# Patient Record
Sex: FEMALE | Race: WHITE | NOT HISPANIC OR LATINO | Employment: OTHER | URBAN - METROPOLITAN AREA
[De-identification: names, ages, dates, MRNs, and addresses within clinical notes are randomized per-mention and may not be internally consistent; named-entity substitution may affect disease eponyms.]

---

## 2017-01-09 ENCOUNTER — LAB VISIT (OUTPATIENT)
Dept: LAB | Facility: HOSPITAL | Age: 62
End: 2017-01-09
Attending: FAMILY MEDICINE
Payer: COMMERCIAL

## 2017-01-09 ENCOUNTER — OFFICE VISIT (OUTPATIENT)
Dept: FAMILY MEDICINE | Facility: CLINIC | Age: 62
End: 2017-01-09
Payer: COMMERCIAL

## 2017-01-09 VITALS
OXYGEN SATURATION: 98 % | BODY MASS INDEX: 33.31 KG/M2 | HEART RATE: 95 BPM | WEIGHT: 195.13 LBS | RESPIRATION RATE: 18 BRPM | HEIGHT: 64 IN | SYSTOLIC BLOOD PRESSURE: 128 MMHG | DIASTOLIC BLOOD PRESSURE: 82 MMHG

## 2017-01-09 DIAGNOSIS — M15.9 PRIMARY OSTEOARTHRITIS INVOLVING MULTIPLE JOINTS: ICD-10-CM

## 2017-01-09 DIAGNOSIS — R10.13 EPIGASTRIC PAIN: ICD-10-CM

## 2017-01-09 DIAGNOSIS — M15.9 PRIMARY OSTEOARTHRITIS INVOLVING MULTIPLE JOINTS: Primary | ICD-10-CM

## 2017-01-09 DIAGNOSIS — K21.9 GASTROESOPHAGEAL REFLUX DISEASE, ESOPHAGITIS PRESENCE NOT SPECIFIED: ICD-10-CM

## 2017-01-09 PROCEDURE — 86039 ANTINUCLEAR ANTIBODIES (ANA): CPT

## 2017-01-09 PROCEDURE — 1159F MED LIST DOCD IN RCRD: CPT | Mod: S$GLB,,, | Performed by: FAMILY MEDICINE

## 2017-01-09 PROCEDURE — 86235 NUCLEAR ANTIGEN ANTIBODY: CPT | Mod: 59

## 2017-01-09 PROCEDURE — 99213 OFFICE O/P EST LOW 20 MIN: CPT | Mod: S$GLB,,, | Performed by: FAMILY MEDICINE

## 2017-01-09 PROCEDURE — 36415 COLL VENOUS BLD VENIPUNCTURE: CPT | Mod: PO

## 2017-01-09 PROCEDURE — 86038 ANTINUCLEAR ANTIBODIES: CPT

## 2017-01-09 PROCEDURE — 99999 PR PBB SHADOW E&M-EST. PATIENT-LVL III: CPT | Mod: PBBFAC,,, | Performed by: FAMILY MEDICINE

## 2017-01-09 RX ORDER — CELECOXIB 100 MG/1
100 CAPSULE ORAL 2 TIMES DAILY PRN
Qty: 60 CAPSULE | Refills: 2 | Status: SHIPPED | OUTPATIENT
Start: 2017-01-09 | End: 2018-08-24

## 2017-01-09 NOTE — MR AVS SNAPSHOT
[facility]  [address]  [place] [address]  Phone: [phone]  Fax: [phone]                  Pham Licea   2017 8:40 AM   Office Visit    Description:  Female : 1955   Provider:  CARLOS MANUEL Howell MD   Department:  [facility]           Reason for Visit     Establish Care           Diagnoses this Visit        Comments    Primary osteoarthritis involving multiple joints    -  Primary     Gastroesophageal reflux disease, esophagitis presence not specified         Epigastric pain                To Do List           Future Appointments        Provider Department Dept Phone    7/10/2017 1:00 PM CARLOS MANUEL Howell MD [facility] [phone]      Goals (5 Years of Data)     None      Follow-Up and Disposition     Return in about 6 months (around 2017), or if symptoms worsen or fail to improve.    Follow-up and Disposition History       These Medications        Disp Refills Start End    celecoxib (CELEBREX) 100 MG capsule 60 capsule 2 2017     Take 1 capsule (100 mg total) by mouth 2 (two) times daily as needed for Pain. - Oral    Pharmacy: [facility] [employer] [address] [place] [address] AT [facility] Ph #: [phone]       ranitidine (ZANTAC) 150 MG tablet 60 tablet 5 2017    Take 1 tablet (150 mg total) by mouth 2 (two) times daily. - Oral    Pharmacy: [facility] [employer] [place] [address] AT [address] [facility]  [address] Ph #: [phone]         [facility]     [facility] Care [facility] -  Assistance  Registered nurses in the [facility] provide clinical advisement, health education, appointment booking, and other advisory services.  Call for this free service at [phone].             Medications           Message regarding Medications     Verify the changes and/or additions to your medication regime listed below  "are the same as discussed with your clinician today.  If any of these changes or additions are incorrect, please notify your healthcare provider.        START taking these NEW medications        Refills    celecoxib (CELEBREX) 100 MG capsule 2    Sig: Take 1 capsule (100 mg total) by mouth 2 (two) times daily as needed for Pain.    Class: Normal    Route: Oral      STOP taking these medications     sucralfate (CARAFATE) 1 gram tablet Take 1 tablet (1 g total) by mouth 4 (four) times daily before meals and nightly.           Verify that the below list of medications is an accurate representation of the medications you are currently taking.  If none reported, the list may be blank. If incorrect, please contact your healthcare provider. Carry this list with you in case of emergency.           Current Medications     ranitidine (ZANTAC) 150 MG tablet Take 1 tablet (150 mg total) by mouth 2 (two) times daily.    celecoxib (CELEBREX) 100 MG capsule Take 1 capsule (100 mg total) by mouth 2 (two) times daily as needed for Pain.           Clinical Reference Information           Vital Signs - Last Recorded  Most recent update: 1/9/2017  9:16 AM by Liv Trevino LPN    BP Pulse Resp Ht Wt SpO2    128/82 (BP Location: Left arm, Patient Position: Sitting, BP Method: Manual) 95 18 5' 4" (1.626 m) 88.5 kg (195 lb 1.7 oz) 98%    BMI                33.49 kg/m2          Blood Pressure          Most Recent Value    BP  128/82      Allergies as of 1/9/2017     No Known Allergies      Immunizations Administered on Date of Encounter - 1/9/2017     None      Orders Placed During Today's Visit     Future Labs/Procedures Expected by Jayro TORIBIO  1/9/2017 3/10/2018      "

## 2017-01-09 NOTE — PROGRESS NOTES
Subjective:       Patient ID: Pham Licea is a 61 y.o. female.    Chief Complaint: Establish Care    HPI Comments: Here as new patient to establish. Main concern is multiple joint pains.  Pain worse in am.  Tried mobic with some benefit but had positive blood in stool and stopped.    Review of Systems   Constitutional: Negative for chills and fever.   Respiratory: Negative for cough, chest tightness and shortness of breath.    Cardiovascular: Negative for chest pain, palpitations and leg swelling.   Gastrointestinal: Negative for abdominal distention and abdominal pain.   Endocrine: Negative for cold intolerance and heat intolerance.   Musculoskeletal: Positive for arthralgias.   Skin: Negative for rash and wound.       Objective:      Physical Exam   Constitutional: She is oriented to person, place, and time. She appears well-developed and well-nourished.   HENT:   Head: Normocephalic and atraumatic.   Cardiovascular: Normal rate, regular rhythm and normal heart sounds.    Pulmonary/Chest: Effort normal and breath sounds normal.   Musculoskeletal: Normal range of motion.   Neurological: She is alert and oriented to person, place, and time.   Psychiatric: She has a normal mood and affect.   Nursing note and vitals reviewed.      Assessment:       1. Primary osteoarthritis involving multiple joints    2. Gastroesophageal reflux disease, esophagitis presence not specified    3. Epigastric pain        Plan:       Primary osteoarthritis involving multiple joints  -     CATARINO; Future; Expected date: 1/9/17    Gastroesophageal reflux disease, esophagitis presence not specified    Epigastric pain  -     ranitidine (ZANTAC) 150 MG tablet; Take 1 tablet (150 mg total) by mouth 2 (two) times daily.  Dispense: 60 tablet; Refill: 5    Other orders  -     celecoxib (CELEBREX) 100 MG capsule; Take 1 capsule (100 mg total) by mouth 2 (two) times daily as needed for Pain.  Dispense: 60 capsule; Refill: 2    Trial of med for OA.   CATARINO screen.  Treat and monitor.    Labs at annual at next visit.  Return in about 6 months (around 7/9/2017), or if symptoms worsen or fail to improve.

## 2017-01-10 LAB
ANA SER QL IF: POSITIVE
ANA TITR SER IF: NORMAL {TITER}

## 2017-01-12 LAB
ANTI SM ANTIBODY: 1.63 EU
ANTI SM/RNP ANTIBODY: 1.03 EU
ANTI-SM INTERPRETATION: NEGATIVE
ANTI-SM/RNP INTERPRETATION: NEGATIVE
ANTI-SSA ANTIBODY: 0.23 EU
ANTI-SSA INTERPRETATION: NEGATIVE
ANTI-SSB ANTIBODY: 0 EU
ANTI-SSB INTERPRETATION: NEGATIVE
DSDNA AB SER-ACNC: NORMAL [IU]/ML

## 2017-01-17 ENCOUNTER — TELEPHONE (OUTPATIENT)
Dept: FAMILY MEDICINE | Facility: CLINIC | Age: 62
End: 2017-01-17

## 2017-01-17 DIAGNOSIS — R76.8 POSITIVE ANA (ANTINUCLEAR ANTIBODY): Primary | ICD-10-CM

## 2017-01-23 ENCOUNTER — TELEPHONE (OUTPATIENT)
Dept: FAMILY MEDICINE | Facility: CLINIC | Age: 62
End: 2017-01-23

## 2017-01-23 NOTE — TELEPHONE ENCOUNTER
Spoke to pt and she thinks she has a UTI  And the only thing new is the celebrex. Pt states she is out of town and wont be back until next week. Please advise. Thanks.

## 2017-01-23 NOTE — TELEPHONE ENCOUNTER
----- Message from Dana Miller sent at 1/23/2017 11:52 AM CST -----  Contact: self  Patient [phone] feels she may have a side effect to the Celebrex but not any problems breathing/she is asking to speak with the Nurse and this is the only information that patient would give/please call

## 2017-05-22 DIAGNOSIS — R10.13 EPIGASTRIC PAIN: ICD-10-CM

## 2017-06-22 ENCOUNTER — PATIENT OUTREACH (OUTPATIENT)
Dept: ADMINISTRATIVE | Facility: HOSPITAL | Age: 62
End: 2017-06-22

## 2017-06-30 ENCOUNTER — PATIENT OUTREACH (OUTPATIENT)
Dept: ADMINISTRATIVE | Facility: HOSPITAL | Age: 62
End: 2017-06-30

## 2017-06-30 NOTE — LETTER
June 30, 2017    Pham Licea  [address] [place] [address]             [facility]  [address]  [facility] [address]  Phone: [phone] Dear Ms. Licea:    We have tried to reach you by mychart unsuccessfully.     [facility] is committed to your overall health.  To help you get the most out of each of your visits, we will review your information to make sure you are up to date on all of your recommended tests and/or procedures.       Dr. Howell       has found that you may be due for:     Mammogram     If you have had any of the above done at another facility, please bring the records or information with you so that your record at [facility] will be complete.      If you are currently taking medication , please bring it with you to your appointment for review.     We appreciate the opportunity to provide you with excellent medical care.         If you have any questions or concerns, please don't hesitate to call.    Sincerely,    Bridgette Nguyen  Clinical Care Coordinator  [facility] [address]  [place] [address]  Phone: [phone]   Fax: [phone]

## 2018-08-07 ENCOUNTER — TELEPHONE (OUTPATIENT)
Dept: ORTHOPEDICS | Facility: CLINIC | Age: 63
End: 2018-08-07

## 2018-08-07 NOTE — TELEPHONE ENCOUNTER
Called patient to inform her that Dr. Palencia does not treat back pain and will only treat one body part per visit. Patient currently has back pain, knee pain, and hip pain as her C/O. No answer at patient's phone and left a voicemail to call back office to alter her appointment information.

## 2018-08-10 ENCOUNTER — OFFICE VISIT (OUTPATIENT)
Dept: ORTHOPEDICS | Facility: CLINIC | Age: 63
End: 2018-08-10
Payer: COMMERCIAL

## 2018-08-10 ENCOUNTER — HOSPITAL ENCOUNTER (OUTPATIENT)
Dept: RADIOLOGY | Facility: HOSPITAL | Age: 63
Discharge: HOME | End: 2018-08-10
Attending: ORTHOPAEDIC SURGERY
Payer: COMMERCIAL

## 2018-08-10 VITALS
BODY MASS INDEX: 33.31 KG/M2 | HEIGHT: 64 IN | HEART RATE: 63 BPM | WEIGHT: 195.13 LBS | DIASTOLIC BLOOD PRESSURE: 72 MMHG | SYSTOLIC BLOOD PRESSURE: 116 MMHG

## 2018-08-10 DIAGNOSIS — M25.561 RIGHT KNEE PAIN, UNSPECIFIED CHRONICITY: Primary | ICD-10-CM

## 2018-08-10 DIAGNOSIS — M25.561 RIGHT KNEE PAIN, UNSPECIFIED CHRONICITY: ICD-10-CM

## 2018-08-10 DIAGNOSIS — M17.11 ARTHRITIS OF KNEE, RIGHT: ICD-10-CM

## 2018-08-10 PROCEDURE — 3008F BODY MASS INDEX DOCD: CPT | Mod: CPTII,S$GLB,, | Performed by: ORTHOPAEDIC SURGERY

## 2018-08-10 PROCEDURE — 73564 X-RAY EXAM KNEE 4 OR MORE: CPT | Mod: 26,RT,, | Performed by: RADIOLOGY

## 2018-08-10 PROCEDURE — 99203 OFFICE O/P NEW LOW 30 MIN: CPT | Mod: S$GLB,,, | Performed by: ORTHOPAEDIC SURGERY

## 2018-08-10 PROCEDURE — 73562 X-RAY EXAM OF KNEE 3: CPT | Mod: TC,PN,LT

## 2018-08-10 PROCEDURE — 99999 PR PBB SHADOW E&M-EST. PATIENT-LVL III: CPT | Mod: PBBFAC,,, | Performed by: ORTHOPAEDIC SURGERY

## 2018-08-10 PROCEDURE — 73562 X-RAY EXAM OF KNEE 3: CPT | Mod: 26,59,LT, | Performed by: RADIOLOGY

## 2018-08-14 NOTE — PROGRESS NOTES
Past Medical History:   Diagnosis Date    Fatty liver     Former smoker     Osteoarthritis        Past Surgical History:   Procedure Laterality Date    APPENDECTOMY       SECTION      x3    COLONOSCOPY  ~    [facility]: normal findings per patient report    HYSTERECTOMY      one ovary conserved    UPPER GASTROINTESTINAL ENDOSCOPY         Current Outpatient Medications   Medication Sig    ranitidine (ZANTAC) 150 MG tablet Take 1 tablet (150 mg total) by mouth 2 (two) times daily as needed.    celecoxib (CELEBREX) 100 MG capsule Take 1 capsule (100 mg total) by mouth 2 (two) times daily as needed for Pain.     No current facility-administered medications for this visit.        Review of patient's allergies indicates:  No Known Allergies    Family History   Problem Relation Age of Onset    Ovarian cancer Mother     Heart disease Mother     Osteoporosis Mother     Hypertension Father     Stroke Father 50    Breast cancer Neg Hx     Colon cancer Neg Hx     Colon polyps Neg Hx     Crohn's disease Neg Hx     Ulcerative colitis Neg Hx        Social History     Socioeconomic History    Marital status:      Spouse name: Not on file    Number of children: Not on file    Years of education: Not on file    Highest education level: Not on file   Social Needs    Financial resource strain: Not on file    Food insecurity - worry: Not on file    Food insecurity - inability: Not on file    Transportation needs - medical: Not on file    Transportation needs - non-medical: Not on file   Occupational History    Not on file   Tobacco Use    Smoking status: Former Smoker     Packs/day: 1.00     Years: 40.00     Pack years: 40.00     Last attempt to quit: 2009     Years since quittin.7    Smokeless tobacco: Never Used    Tobacco comment: quit    Substance and Sexual Activity    Alcohol use: Yes     Comment: once per month    Drug use: No    Sexual activity: Yes   Other Topics  "Concern    Not on file   Social History Narrative    Not on file       Chief Complaint:   Chief Complaint   Patient presents with    Right Knee - Pain       Consulting Physician: Self, Aaareferral    History of present illness:    This is a 62 y.o. female who complains of right knee pain for several months.  She notes her pain ranging from 5-9 out of 10 and worse with activities and sitting.  She denies any injury.    Review of Systems:    Constitution: Denies chills, fever, and sweats.  HENT: Denies headaches or blurry vision.  Cardiovascular: Denies chest pain or irregular heart beat.  Respiratory: Denies cough or shortness of breath.  Gastrointestinal: Denies abdominal pain, nausea, or vomiting.  Musculoskeletal:  Denies muscle cramps.  Neurological: Denies dizziness or focal weakness.  Psychiatric/Behavioral: Normal mental status.  Hematologic/Lymphatic: Denies bleeding problem or easy bruising/bleeding.  Skin: Denies rash or suspicious lesions.    Examination:    Vital Signs:    Vitals:    08/10/18 0915   BP: 116/72   Pulse: 63   Weight: 88.5 kg (195 lb 1.7 oz)   Height: 5' 4" (1.626 m)   PainSc:   5   PainLoc: Knee       Body mass index is 33.49 kg/m².    This a well-developed, well nourished patient in no acute distress.    Alert and oriented x 3 and cooperative to examination.       Physical Exam: Right Knee Exam    Gait   Antalgic    Skin  Rash:   None  Scars:   None    Inspection  Erythema:  None  Bruising:  None  Effusion:  None  Masses:  None  Lymphadenopathy: None    Range of Motion: 0 to 130°    Medial Joint : y  Lateral Joint : n    Patellofemoral Tenderness: Yes  Patellofemoral Crepitus: Yes    Lachman:  Normal  Anterior Drawer: Normal  Posterior Drawer: Normal    Adela's:  Negative  Apley's:  Negative    Varus Stress:  Stable  Valgus Stress:  Stable    Strength:  5/5    Pulses:  Palpable  Sensation:  Intact          Imaging: X-rays ordered and reviewed today personally of " the left knee show mild-to-moderate degenerative changes.        Assessment: Right knee pain, unspecified chronicity  -     Ambulatory Referral to Physical/Occupational Therapy    Arthritis of knee, right        Plan:  She has some medial joint line tenderness along with some proximal hamstring tenderness and SI joint pain.  Will go ahead and get her started in physical therapy.  Will also give her a [facility].  She can come back and see us in 6 weeks at that point we will go ahead taken x-ray of her hip.  We gave her a prescription for Voltaren cream.      DISCLAIMER: This note may have been dictated using voice recognition software and may contain grammatical errors.     NOTE: Consult report sent to referring provider via [employer] EMR.

## 2018-08-20 ENCOUNTER — CLINICAL SUPPORT (OUTPATIENT)
Dept: REHABILITATION | Facility: HOSPITAL | Age: 63
End: 2018-08-20
Payer: COMMERCIAL

## 2018-08-20 DIAGNOSIS — M25.561 RIGHT KNEE PAIN, UNSPECIFIED CHRONICITY: ICD-10-CM

## 2018-08-20 PROCEDURE — 97161 PT EVAL LOW COMPLEX 20 MIN: CPT | Mod: PN | Performed by: PHYSICAL THERAPIST

## 2018-08-20 PROCEDURE — 97110 THERAPEUTIC EXERCISES: CPT | Mod: PN | Performed by: PHYSICAL THERAPIST

## 2018-08-20 NOTE — PATIENT INSTRUCTIONS
Stretching: Hamstring (Sitting)        With right leg straight, tuck other foot near groin. Reach down until stretch is felt in back of thigh. Keep back straight. Hold __10__ seconds.  Repeat __10__ times per set. Do __1__ sets per session. Do __1__ sessions per day.     [email or web address]     Copyright © [employer]. All rights reserved.   Stretching: Calf - Towel        Sit with knee straight and towel looped around left foot. Gently pull on towel until stretch is felt in calf. Hold __10__ seconds.  Repeat __10__ times per set. Do __1__ sets per session. Do __1__ sessions per day.     [email or web address]     Quad Set: Slight Flexion        Tense muscles on top of left thigh. Hold __3__ seconds.  Repeat __10__ times per set. Do __3__ sets per session. Do __1__ sessions per day.  Copyright © [employer]. All rights reserved.     Strengthening: Hip Adduction - Isometric        With ball or folded pillow between knees, squeeze knees together. Hold __3__ seconds.  Repeat __10__ times per set. Do __3__ sets per session. Do __1__ sessions per day.     [email or web address]     Copyright © [employer]. All rights reserved.        [email or web address]     Copyright © [employer]. All rights reserved.   Strengthening: Hip Abductor - Resisted        With band looped around both legs above knees, push thighs apart.  Repeat __10__ times per set. Do __3__ sets per session. Do __1__ sessions per day.     [email or web address]     Copyright © [employer]. All rights reserved.   Strengthening: Straight Leg Raise (Phase 1)        Tighten muscles on front of right thigh, then lift leg __6__ inches from surface, keeping knee locked.   Repeat __10__ times per set. Do __10__ sets per session. Do __1__ sessions per day.     [email or web address]     Copyright © [employer]. All rights reserved.

## 2018-08-23 ENCOUNTER — CLINICAL SUPPORT (OUTPATIENT)
Dept: REHABILITATION | Facility: HOSPITAL | Age: 63
End: 2018-08-23
Attending: ORTHOPAEDIC SURGERY
Payer: COMMERCIAL

## 2018-08-23 DIAGNOSIS — M25.561 RIGHT KNEE PAIN, UNSPECIFIED CHRONICITY: ICD-10-CM

## 2018-08-23 PROCEDURE — 97110 THERAPEUTIC EXERCISES: CPT | Mod: PN

## 2018-08-23 NOTE — PROGRESS NOTES
Name: Pham Licea  Clinic Number: [id number]  Date of Treatment: 08/23/2018   Diagnosis:   Encounter Diagnosis   Name Primary?    Right knee pain, unspecified chronicity        Time in: 1658  Time Out: 1749  Total Treatment Time: 51      Subjective:    Pham reports she performed her HEP last PM.  Patient reports their pain to be 1/10 on a 0-10 scale with 0 being no pain and 10 being the worst pain imaginable.    Objective    Patient received individual therapy to increase strength, ROM and flexibility with activities as follows:     Pham received therapeutic exercises to develop strength, ROM and flexibility for 46 minutes including:     Nustep level 3 x 10 minutes  Standing gastroc stretch 3 x 30 sec B  Hamstring 3 x 30 sec B  SLR x 30  SAQ x 30  Ball squeeze x 30  Clamshell RTB x 30  LAQ x 30  Shuttle: 50# B, 25# R x 30 each    MFR with therapy bar to R ITB region x 5 minutes.    Written Home Exercises Provided: cont HEP  Pt demo good understanding of the education provided. Pham demonstrated good return demonstration of activities.     Assessment:     TP along quad/ITB region which decreased in size and pain intensity upon completion of MT.  Good tolerance for activity.  No increase in s/s reported.  Pt will continue to benefit from skilled PT intervention. Medical Necessity is demonstrated by:  Pain limits function of effected part for some activities, Unable to participate fully in daily activities, Requires skilled supervision to complete and progress HEP and Weakness.    Patient is making good progress towards established goals.      Plan:  Continue with established Plan of Care towards PT goals.

## 2018-08-24 ENCOUNTER — INITIAL CONSULT (OUTPATIENT)
Dept: SPINE | Facility: CLINIC | Age: 63
End: 2018-08-24
Payer: COMMERCIAL

## 2018-08-24 VITALS
HEART RATE: 69 BPM | BODY MASS INDEX: 33.31 KG/M2 | WEIGHT: 195.13 LBS | SYSTOLIC BLOOD PRESSURE: 106 MMHG | HEIGHT: 64 IN | DIASTOLIC BLOOD PRESSURE: 67 MMHG

## 2018-08-24 DIAGNOSIS — M54.5 CHRONIC MIDLINE LOW BACK PAIN, WITH SCIATICA PRESENCE UNSPECIFIED: Primary | ICD-10-CM

## 2018-08-24 DIAGNOSIS — G89.29 CHRONIC MIDLINE LOW BACK PAIN, WITH SCIATICA PRESENCE UNSPECIFIED: Primary | ICD-10-CM

## 2018-08-24 DIAGNOSIS — Z13.820 OSTEOPOROSIS SCREENING: ICD-10-CM

## 2018-08-24 PROBLEM — M54.50 CHRONIC MIDLINE LOW BACK PAIN: Status: ACTIVE | Noted: 2018-08-24

## 2018-08-24 PROCEDURE — 99204 OFFICE O/P NEW MOD 45 MIN: CPT | Mod: ,,, | Performed by: PHYSICAL MEDICINE & REHABILITATION

## 2018-08-24 PROCEDURE — 3008F BODY MASS INDEX DOCD: CPT | Mod: ,,, | Performed by: PHYSICAL MEDICINE & REHABILITATION

## 2018-08-24 RX ORDER — DICLOFENAC SODIUM 10 MG/G
GEL TOPICAL
Refills: 0 | COMMUNITY
Start: 2018-08-10 | End: 2018-11-02 | Stop reason: SDUPTHER

## 2018-08-24 NOTE — PROGRESS NOTES
SUBJECTIVE:    Patient ID: Pham Licea is a 63 y.o. female.    Chief Complaint: Back Pain (aching pain in lower back moving towards R hip and R knee [id number] most recently changed for the worse a month ago. )    This is a 63-year-old woman who has no primary care physician here.  She really has no chronic major medical problems but she does have a history of gastroesophageal reflux disease and I believe peptic ulcer disease.  She also has a long history of low back pain.  By way of history back in January of 2017 her primary care physician was Dr. Howell.  She presented to him then with complaints of multiple joint pains.  That prompted him to do an CATARINO which came back positive at 1 : 320.  Subsequent connective tissue disease screening was negative.  She was referred to a rheumatologist who she actually saw in [place] and he was convinced that she did not have a connective tissue disease.  He felt that she had trochanteric bursitis and she responded favorably to injections in the right trochanteric bursa.  She also responded to Neurontin she believes 150 mg 4 times per day but it caused her too much grogginess and she discontinued it.  Earlier this month she became more bothered with back knee and hip pain on the right side.  She saw Dr. Tyson on 08/10/2018 and he did x-rays of the knees which showed mild-to-moderate degenerative joint disease.  He also felt that she had hamstring tenderness and possibly some SI joint related pain.  He ordered Voltaren gel for the knee pain which seems to be helping and also start her in physical therapy.  She had her 1st therapy session yesterday.  Overall she feels like she is getting better.  Her current complaints to me are mid back discomfort that occurs primarily when she is lying in bed.  She also has right lateral hip and lower buttocks discomfort as well as medial knee pain and tingling sensation on the sole of her right foot.  She has no definite radicular  sound and pains.  She has no weakness.  She denies bowel or bladder dysfunction fever chills sweats or unexpected weight loss.  Current pain level is about a 3/10.  She works as an .  One of the things that really concerns her with all these aches and pain she is having is that her mother suffered a hip fracture later in life and was extremely debilitated following that.  Patient does not want to follow down that path.  She has not had a DEXA scan done since .  She had an MRI of the lumbar spine in 2017 and that shows only mild degenerative disc disease.  No significant neural foraminal or central canal stenosis.  I reviewed that with her today          Past Medical History:   Diagnosis Date    Fatty liver     Former smoker     Osteoarthritis      Social History     Socioeconomic History    Marital status:      Spouse name: Not on file    Number of children: Not on file    Years of education: Not on file    Highest education level: Not on file   Social Needs    Financial resource strain: Not on file    Food insecurity - worry: Not on file    Food insecurity - inability: Not on file    Transportation needs - medical: Not on file    Transportation needs - non-medical: Not on file   Occupational History    Not on file   Tobacco Use    Smoking status: Former Smoker     Packs/day: 1.00     Years: 40.00     Pack years: 40.00     Last attempt to quit: 2009     Years since quittin.8    Smokeless tobacco: Never Used    Tobacco comment: quit    Substance and Sexual Activity    Alcohol use: Yes     Comment: once per month    Drug use: No    Sexual activity: Yes   Other Topics Concern    Not on file   Social History Narrative    Not on file     Past Surgical History:   Procedure Laterality Date    APPENDECTOMY       SECTION      x3    COLONOSCOPY  ~    [facility]: normal findings per patient report    HYSTERECTOMY      one ovary conserved     "UPPER GASTROINTESTINAL ENDOSCOPY       Family History   Problem Relation Age of Onset    Ovarian cancer Mother     Heart disease Mother     Osteoporosis Mother     Hypertension Father     Stroke Father 50    Breast cancer Neg Hx     Colon cancer Neg Hx     Colon polyps Neg Hx     Crohn's disease Neg Hx     Ulcerative colitis Neg Hx      Vitals:    08/24/18 0913   BP: 106/67   Pulse: 69   Weight: 88.5 kg (195 lb 1.7 oz)   Height: 5' 4" (1.626 m)       Review of Systems   Constitutional: Negative for chills, diaphoresis, fatigue, fever and unexpected weight change.   HENT: Negative for trouble swallowing.    Eyes: Negative for visual disturbance.   Respiratory: Negative for shortness of breath.    Cardiovascular: Negative for chest pain.   Gastrointestinal: Negative for abdominal pain, constipation, nausea and vomiting.   Genitourinary: Negative for difficulty urinating.   Musculoskeletal: Negative for arthralgias, back pain, gait problem, joint swelling, myalgias, neck pain and neck stiffness.   Neurological: Negative for dizziness, speech difficulty, weakness, light-headedness, numbness and headaches.          Objective:      Physical Exam   Constitutional: She is oriented to person, place, and time. She appears well-developed and well-nourished.   Neurological: She is alert and oriented to person, place, and time.   She is awake and in no acute distress  She has no point tenderness or palpable masses about the lumbar spine  She has mild tenderness over the right greater trochanter  Forward flexion and extension are normal  S she has discomfort with both movements but perhaps flexion is a little worse  She can heel and toe walk normally  Deep tendon reflexes +1 at both knees and both ankles  Straight leg raising is negative bilaterally  CHERELLE testing is negative on the left and causes discomfort at the right lower back when the right leg is tested           Assessment:       1. Chronic midline low back " pain, with sciatica presence unspecified    2. Osteoporosis screening           Plan:     she has a nonfocal examination from a neurological standpoint and no historical red flags.  I think she has a mild case of trochanteric bursitis.  I offered her an injection there but she politely declines.  She also is known to have degenerative joint disease of the knees and that is probably why her knees hurt.  She probably has some contribution of her low back discomfort and gluteal and lateral hip pain from lumbar degenerative disc disease but I have no clear evidence that her discomfort is due to radiculitis or radiculopathy.  Thankfully she is getting better and is satisfied with the way that things are going with outpatient physical therapy.  She needs to continue that.  I think she needs to have a DEXA scan check.  I can report that to her over the phone.  She can follow up with me on an as-needed basis      Chronic midline low back pain, with sciatica presence unspecified    Osteoporosis screening  -     DXA Bone Density Spine And Hip; Future; Expected date: 08/24/2018

## 2018-08-27 ENCOUNTER — CLINICAL SUPPORT (OUTPATIENT)
Dept: REHABILITATION | Facility: HOSPITAL | Age: 63
End: 2018-08-27
Payer: COMMERCIAL

## 2018-08-27 ENCOUNTER — TELEPHONE (OUTPATIENT)
Dept: SPINE | Facility: CLINIC | Age: 63
End: 2018-08-27

## 2018-08-27 ENCOUNTER — HOSPITAL ENCOUNTER (OUTPATIENT)
Dept: RADIOLOGY | Facility: HOSPITAL | Age: 63
Discharge: HOME | End: 2018-08-27
Attending: PHYSICAL MEDICINE & REHABILITATION
Payer: COMMERCIAL

## 2018-08-27 DIAGNOSIS — Z13.820 OSTEOPOROSIS SCREENING: ICD-10-CM

## 2018-08-27 DIAGNOSIS — M25.561 RIGHT KNEE PAIN, UNSPECIFIED CHRONICITY: ICD-10-CM

## 2018-08-27 PROCEDURE — 97110 THERAPEUTIC EXERCISES: CPT | Mod: PN

## 2018-08-27 PROCEDURE — 97032 APPL MODALITY 1+ESTIM EA 15: CPT | Mod: PN

## 2018-08-27 PROCEDURE — 97010 HOT OR COLD PACKS THERAPY: CPT | Mod: PN

## 2018-08-27 PROCEDURE — 77080 DXA BONE DENSITY AXIAL: CPT | Mod: 26,,, | Performed by: RADIOLOGY

## 2018-08-27 PROCEDURE — 77080 DXA BONE DENSITY AXIAL: CPT | Mod: TC

## 2018-08-27 PROCEDURE — 97140 MANUAL THERAPY 1/> REGIONS: CPT | Mod: PN

## 2018-08-27 NOTE — TELEPHONE ENCOUNTER
----- Message from Julio Evans MD sent at 8/27/2018 12:56 PM CDT -----  She has osteopenia (not osteoporosis) which is mild bone loss. Should take calcium supplement (plus vit D)1200mg per day

## 2018-08-27 NOTE — TELEPHONE ENCOUNTER
Called pt. No answer. Left message on voicemail to start 1200mg vit D daily, no osteoporosis only osteopenia. Informed pt to call for any questions.

## 2018-08-27 NOTE — PROGRESS NOTES
She has osteopenia (not osteoporosis) which is mild bone loss. Should take calcium supplement (plus vit D)1200mg per day

## 2018-08-27 NOTE — PROGRESS NOTES
"Name: Pham Licea  Clinic Number: [id number]  Date of Treatment: 08/27/2018   Diagnosis:   Encounter Diagnosis   Name Primary?    Right knee pain, unspecified chronicity        Time in: 0812  Time Out: 0857  Total Treatment Time: 45      Subjective:    Pham reports increased pain and buckling R knee after nustep and after standing gastroc stretch.  Patient reports their pain to be 3.5/10 on a 0-10 scale with 0 being no pain and 10 being the worst pain imaginable.    Objective    Patient received individual therapy to increase strength and flexibility with activities as follows:     Pham received therapeutic exercises to develop strength and flexibility for 23 minutes including:     Nustep level 3 x 10 minutes  Standing gastroc stretch 3 x 30 sec B  Step 6" x 20  Attempted SAQ, pt reported pain    Pham received the following manual therapy techniques: MFR with therapy bar and STM to medial knee region 10 minutes.     IFC to medial knee region x 11 minutes in conjunction with CP.    Written Home Exercises Provided: cont HEP  Pt demo good understanding of the education provided. Pham demonstrated fair return demonstration of activities.     Assessment:     Knee buckled x 2 during therapy.  Pt reported increased weakness in knee today.  Pt reported the buckling of knee had not occurred before today.  Pt completed therapy and walked to MD's office-in the same area-to make appt for tomorrow.  Limited activity level today due to pain and knee buckling.  Pt will continue to benefit from skilled PT intervention. Medical Necessity is demonstrated by:  Fall Risk, Pain limits function of effected part for some activities, Unable to participate fully in daily activities and Requires skilled supervision to complete and progress HEP.    Patient is making fair progress towards established goals.      Plan:  Continue with established Plan of Care towards PT goals.   "

## 2018-08-28 ENCOUNTER — OFFICE VISIT (OUTPATIENT)
Dept: ORTHOPEDICS | Facility: CLINIC | Age: 63
End: 2018-08-28
Payer: COMMERCIAL

## 2018-08-28 VITALS
DIASTOLIC BLOOD PRESSURE: 74 MMHG | SYSTOLIC BLOOD PRESSURE: 131 MMHG | BODY MASS INDEX: 33.31 KG/M2 | WEIGHT: 195.13 LBS | HEART RATE: 75 BPM | HEIGHT: 64 IN

## 2018-08-28 DIAGNOSIS — M17.11 ARTHRITIS OF KNEE, RIGHT: Primary | ICD-10-CM

## 2018-08-28 DIAGNOSIS — M25.561 RIGHT KNEE PAIN, UNSPECIFIED CHRONICITY: ICD-10-CM

## 2018-08-28 DIAGNOSIS — M25.561 RIGHT KNEE PAIN, UNSPECIFIED CHRONICITY: Primary | ICD-10-CM

## 2018-08-28 PROCEDURE — 99213 OFFICE O/P EST LOW 20 MIN: CPT | Mod: S$GLB,,, | Performed by: ORTHOPAEDIC SURGERY

## 2018-08-28 PROCEDURE — 99999 PR PBB SHADOW E&M-EST. PATIENT-LVL III: CPT | Mod: PBBFAC,,, | Performed by: ORTHOPAEDIC SURGERY

## 2018-08-28 PROCEDURE — 3008F BODY MASS INDEX DOCD: CPT | Mod: CPTII,S$GLB,, | Performed by: ORTHOPAEDIC SURGERY

## 2018-08-29 NOTE — PROGRESS NOTES
Past Medical History:   Diagnosis Date    Fatty liver     Former smoker     Osteoarthritis        Past Surgical History:   Procedure Laterality Date    APPENDECTOMY       SECTION      x3    COLONOSCOPY  ~2014    [facility]: normal findings per patient report    HYSTERECTOMY      one ovary conserved    UPPER GASTROINTESTINAL ENDOSCOPY         Current Outpatient Medications   Medication Sig    diclofenac sodium 1 % Gel APPLY AS DIRECTED     No current facility-administered medications for this visit.        Review of patient's allergies indicates:  No Known Allergies    Family History   Problem Relation Age of Onset    Ovarian cancer Mother     Heart disease Mother     Osteoporosis Mother     Hypertension Father     Stroke Father 50    Breast cancer Neg Hx     Colon cancer Neg Hx     Colon polyps Neg Hx     Crohn's disease Neg Hx     Ulcerative colitis Neg Hx        Social History     Socioeconomic History    Marital status:      Spouse name: Not on file    Number of children: Not on file    Years of education: Not on file    Highest education level: Not on file   Social Needs    Financial resource strain: Not on file    Food insecurity - worry: Not on file    Food insecurity - inability: Not on file    Transportation needs - medical: Not on file    Transportation needs - non-medical: Not on file   Occupational History    Not on file   Tobacco Use    Smoking status: Former Smoker     Packs/day: 1.00     Years: 40.00     Pack years: 40.00     Last attempt to quit: 2009     Years since quittin.8    Smokeless tobacco: Never Used    Tobacco comment: quit    Substance and Sexual Activity    Alcohol use: Yes     Comment: once per month    Drug use: No    Sexual activity: Yes   Other Topics Concern    Not on file   Social History Narrative    Not on file       Chief Complaint:   Chief Complaint   Patient presents with    Right Knee - Pain       Consulting Physician:  "No ref. provider found    History of present illness:    This is a 63 y.o. female who complains of right knee pain for several months. She denies any injury. She was in PT and had sudden onset of new pain and locking of the right knee that was different from previous pain.    Review of Systems:    Constitution: Denies chills, fever, and sweats.  HENT: Denies headaches or blurry vision.  Cardiovascular: Denies chest pain or irregular heart beat.  Respiratory: Denies cough or shortness of breath.  Gastrointestinal: Denies abdominal pain, nausea, or vomiting.  Musculoskeletal:  Denies muscle cramps.  Neurological: Denies dizziness or focal weakness.  Psychiatric/Behavioral: Normal mental status.  Hematologic/Lymphatic: Denies bleeding problem or easy bruising/bleeding.  Skin: Denies rash or suspicious lesions.    Examination:    Vital Signs:    Vitals:    08/28/18 1539   BP: 131/74   Pulse: 75   Weight: 88.5 kg (195 lb 1.7 oz)   Height: 5' 4" (1.626 m)   PainSc:   2   PainLoc: Knee       Body mass index is 33.49 kg/m².    This a well-developed, well nourished patient in no acute distress.    Alert and oriented x 3 and cooperative to examination.       Physical Exam: Right Knee Exam    Gait   Antalgic    Skin  Rash:   None  Scars:   None    Inspection  Erythema:  None  Bruising:  None  Effusion:  None  Masses:  None  Lymphadenopathy: None    Range of Motion: 0 to 130°    Medial Joint : yes  Lateral Joint : n    Patellofemoral Tenderness: Yes  Patellofemoral Crepitus: Yes    Lachman:  Normal  Anterior Drawer: Normal  Posterior Drawer: Normal    Adela's:  positive  Apley's:  positive    Varus Stress:  Stable  Valgus Stress:  Stable    Strength:  5/5    Pulses:  Palpable  Sensation:  Intact          Imaging: X-rays of the left knee show mild-to-moderate degenerative changes.        Assessment: Arthritis of knee, right    Right knee pain, unspecified chronicity        Plan: She has new onset " worsening of knee pain and locking. Will MRI for possible meniscal pathology.    DISCLAIMER: This note may have been dictated using voice recognition software and may contain grammatical errors.     NOTE: Consult report sent to referring provider via [facility] EMR.

## 2018-09-04 ENCOUNTER — TELEPHONE (OUTPATIENT)
Dept: ORTHOPEDICS | Facility: CLINIC | Age: 63
End: 2018-09-04

## 2018-09-04 NOTE — TELEPHONE ENCOUNTER
----- Message from Kay El MA sent at 9/4/2018  9:48 AM CDT -----  Contact: Self  MRI was not approved. Patient is requesting the office review the denial and see if the office can get the MRI approved.    Call Back# [phone]  Thanks

## 2018-09-06 ENCOUNTER — TELEPHONE (OUTPATIENT)
Dept: ORTHOPEDICS | Facility: CLINIC | Age: 63
End: 2018-09-06

## 2018-09-06 ENCOUNTER — PATIENT MESSAGE (OUTPATIENT)
Dept: ORTHOPEDICS | Facility: CLINIC | Age: 63
End: 2018-09-06

## 2018-09-06 NOTE — TELEPHONE ENCOUNTER
----- Message from Kay El MA sent at 9/6/2018 10:05 AM CDT -----  Contact: Self  Patient LVM following up on the status of her MRI. Patient states the office had some paperwork to do so the MRI would be approved    Call Back# [phone]

## 2018-09-06 NOTE — TELEPHONE ENCOUNTER
Returned pt's call, advised that her insurance has denied MRI at this time due to her not having 6 week trial of conservative measures.  Pt states that we need to appeal the decision that her insurance was not aware that she was in PT when her knee locked up.  I advised that all her office notes were sent to [facility] and that this was stated in there and was still denied.  Provider advises that she continue with the Voltaren gel and we retry to order MRI after the 6 weeks which would be around last week of this month.  Pt very upset that appeal would not be completed and asked me to cancel all her future appts.  I have cancelled as requested.

## 2018-09-07 ENCOUNTER — PATIENT MESSAGE (OUTPATIENT)
Dept: ORTHOPEDICS | Facility: CLINIC | Age: 63
End: 2018-09-07

## 2018-09-14 ENCOUNTER — TELEPHONE (OUTPATIENT)
Dept: ORTHOPEDICS | Facility: CLINIC | Age: 63
End: 2018-09-14

## 2018-09-14 NOTE — TELEPHONE ENCOUNTER
Called pt to advise that provider has completed peer review and MRI has been approved.  I have given auth info to precert department.  MRI scheduled for Monday in [facility].

## 2018-09-17 ENCOUNTER — HOSPITAL ENCOUNTER (OUTPATIENT)
Dept: RADIOLOGY | Facility: HOSPITAL | Age: 63
Discharge: HOME | End: 2018-09-17
Attending: ORTHOPAEDIC SURGERY
Payer: COMMERCIAL

## 2018-09-17 DIAGNOSIS — M25.561 RIGHT KNEE PAIN, UNSPECIFIED CHRONICITY: ICD-10-CM

## 2018-09-17 PROCEDURE — 73721 MRI JNT OF LWR EXTRE W/O DYE: CPT | Mod: TC,RT

## 2018-09-17 PROCEDURE — 73721 MRI JNT OF LWR EXTRE W/O DYE: CPT | Mod: 26,RT,, | Performed by: RADIOLOGY

## 2018-09-21 ENCOUNTER — OFFICE VISIT (OUTPATIENT)
Dept: ORTHOPEDICS | Facility: CLINIC | Age: 63
End: 2018-09-21
Payer: COMMERCIAL

## 2018-09-21 VITALS
HEIGHT: 64 IN | DIASTOLIC BLOOD PRESSURE: 69 MMHG | WEIGHT: 195 LBS | HEART RATE: 75 BPM | BODY MASS INDEX: 33.29 KG/M2 | SYSTOLIC BLOOD PRESSURE: 115 MMHG

## 2018-09-21 DIAGNOSIS — M17.11 PRIMARY OSTEOARTHRITIS OF RIGHT KNEE: ICD-10-CM

## 2018-09-21 DIAGNOSIS — S83.241S ACUTE TEAR MEDIAL MENISCUS, RIGHT, SEQUELA: Primary | ICD-10-CM

## 2018-09-21 PROCEDURE — 99203 OFFICE O/P NEW LOW 30 MIN: CPT | Mod: 25,S$GLB,, | Performed by: ORTHOPAEDIC SURGERY

## 2018-09-21 PROCEDURE — 3008F BODY MASS INDEX DOCD: CPT | Mod: CPTII,S$GLB,, | Performed by: ORTHOPAEDIC SURGERY

## 2018-09-21 PROCEDURE — 99999 PR PBB SHADOW E&M-EST. PATIENT-LVL III: CPT | Mod: PBBFAC,,, | Performed by: ORTHOPAEDIC SURGERY

## 2018-09-21 PROCEDURE — 20610 DRAIN/INJ JOINT/BURSA W/O US: CPT | Mod: RT,S$GLB,, | Performed by: ORTHOPAEDIC SURGERY

## 2018-09-21 RX ADMIN — TRIAMCINOLONE ACETONIDE 80 MG: 40 INJECTION, SUSPENSION INTRA-ARTICULAR; INTRAMUSCULAR at 01:09

## 2018-09-25 PROBLEM — S83.241S: Status: ACTIVE | Noted: 2018-09-25

## 2018-09-25 PROBLEM — M17.11 PRIMARY OSTEOARTHRITIS OF RIGHT KNEE: Status: ACTIVE | Noted: 2018-09-25

## 2018-09-25 RX ORDER — TRIAMCINOLONE ACETONIDE 40 MG/ML
80 INJECTION, SUSPENSION INTRA-ARTICULAR; INTRAMUSCULAR
Status: DISCONTINUED | OUTPATIENT
Start: 2018-09-21 | End: 2018-09-25 | Stop reason: HOSPADM

## 2018-09-25 NOTE — PROCEDURES
Large Joint Aspiration/Injection: R knee  Date/Time: 9/21/2018 1:19 PM  Performed by: Rudolph Palencia DO  Authorized by: Rudolph Palencia, DO     Consent Done?:  Yes (Verbal)  Indications:  Pain, joint swelling and diagnostic evaluation  Procedure site marked: Yes    Timeout: Prior to procedure the correct patient, procedure, and site was verified      Location:  Knee  Site:  R knee  Prep: Patient was prepped and draped in usual sterile fashion    Ultrasonic Guidance for needle placement: No  Needle size:  22 G  Approach:  Anterolateral  Medications:  80 mg triamcinolone acetonide 40 mg/mL  Patient tolerance:  Patient tolerated the procedure well with no immediate complications

## 2018-09-25 NOTE — PROGRESS NOTES
Subjective:      Patient ID: Pham Licea is a 63 y.o. female.    Chief Complaint: Knee Pain (right knee MRI results)      HPI: Ms. Licea is a 63-year-old female who returns today to discuss the results of an MRI of her right knee. She stated her pain began approximately 1 year ago when she began doing physical therapy.  Walking and crossing her legs increases her symptoms, while rest improves them. She stated she gets swelling and giving way, but denied locking.  She has taken NSAIDs with help.  She has worn a neoprene sleeve with help.  She has not had injections.  She has done physical therapy without help.    ROS:  No new diagnosis/surgery/prescriptions since last visit on 08/28/2018.      Objective:      Physical Exam:   General: AAOx3.  No acute distress  Vascular:  Pulses intact and equal bilaterally.  Capillary refill less than 3 seconds and equal bilaterally  Neurologic:  Pinprick and soft touch intact and equal bilaterally  Integment:  No ecchymosis, no errythema  Extremity:  Knee:  Extension/flexion right knee 2/118°, left knee 0/122°.  Mild effusion right knee. Mild crepitus with motion both knees.  Negative patellar load/compression both knees.  Negative patella apprehension/relocation both knees.  Mild increased excursion with varus stressing both knees.  Valgus stressing both knees equal with endpoint.  Lachman/drawer both knees equal with endpoint.  Adela negative both knees.  Mild joint line tenderness right knee. Amelia positive right knee.  Nontender at the anserine insertion both knees.  No swelling at the anserine insertion both knees.  Radiography:  Personally reviewed MRI of the right knee completed on 09/17/2018 which showed a medial meniscus tear and tricompartmental chondromalacia.        Assessment:       Impression:     1. Acute tear medial meniscus, right, sequela    2. Primary osteoarthritis of right knee          Plan:       1.  Discussed physical examination and  radiographic findings with the patient. Pham understands that she has a medial meniscus tear and arthritis of her right knee. Discussed with her that would like to see her trial and injection to see if she improves before proceeding with surgery. She stated she would like to trial an injection.  2.  Offered a steroid injection to the right knee, she elected to proceed.  3.  Hinged brace right knee, 1 was fitted to the patient.  4.  Continue with physical therapy until next follow-up.  5.  All NSAIDs per PCM.  6.  Follow up in approximately 4-6 weeks if the patient has not improved then will discuss surgery at that time.

## 2018-09-27 DIAGNOSIS — M25.551 RIGHT HIP PAIN: Primary | ICD-10-CM

## 2018-09-28 ENCOUNTER — OFFICE VISIT (OUTPATIENT)
Dept: ORTHOPEDICS | Facility: CLINIC | Age: 63
End: 2018-09-28
Payer: COMMERCIAL

## 2018-09-28 ENCOUNTER — HOSPITAL ENCOUNTER (OUTPATIENT)
Dept: RADIOLOGY | Facility: HOSPITAL | Age: 63
Discharge: HOME | End: 2018-09-28
Attending: ORTHOPAEDIC SURGERY
Payer: COMMERCIAL

## 2018-09-28 VITALS — HEIGHT: 64 IN | WEIGHT: 195.13 LBS | BODY MASS INDEX: 33.31 KG/M2

## 2018-09-28 DIAGNOSIS — M25.551 RIGHT HIP PAIN: ICD-10-CM

## 2018-09-28 DIAGNOSIS — G57.01 PIRIFORMIS SYNDROME OF RIGHT SIDE: ICD-10-CM

## 2018-09-28 DIAGNOSIS — M16.11 PRIMARY OSTEOARTHRITIS OF RIGHT HIP: ICD-10-CM

## 2018-09-28 DIAGNOSIS — M54.10 RADICULAR SYNDROME OF LEFT LEG: Primary | ICD-10-CM

## 2018-09-28 PROCEDURE — 73502 X-RAY EXAM HIP UNI 2-3 VIEWS: CPT | Mod: 26,RT,, | Performed by: RADIOLOGY

## 2018-09-28 PROCEDURE — 99214 OFFICE O/P EST MOD 30 MIN: CPT | Mod: S$GLB,,, | Performed by: ORTHOPAEDIC SURGERY

## 2018-09-28 PROCEDURE — 99999 PR PBB SHADOW E&M-EST. PATIENT-LVL II: CPT | Mod: PBBFAC,,, | Performed by: ORTHOPAEDIC SURGERY

## 2018-09-28 PROCEDURE — 3008F BODY MASS INDEX DOCD: CPT | Mod: CPTII,S$GLB,, | Performed by: ORTHOPAEDIC SURGERY

## 2018-09-28 PROCEDURE — 73502 X-RAY EXAM HIP UNI 2-3 VIEWS: CPT | Mod: TC,PN,RT

## 2018-09-28 NOTE — PROGRESS NOTES
Subjective:      Patient ID: Pham Licea is a 63 y.o. female.    Chief Complaint: Pain of the Right Hip      HPI: Ms. Licea returned today with new complaints of 1 and half years of right hip pain which has increased in intensity over the last 2-3 weeks.  Her pain originally began after she fell 1/2 years ago.  She has gotten relief of some of her pain with injections she has had for up to this point the last 1 given on 04/2018.  Sitting or laying on her hip increases her symptoms. Unloading her hip improves the symptoms. She cannot take NSAIDs because she has ulcers.  She has used Voltaren gel with help.  She has not done physical therapy but she is scheduled to start in approximately 4 days.  She stated that the pain refers from her buttocks down the backs of her legs to her feet.    ROS:  No new diagnosis/surgery/prescriptions since last visit on 09/25/2018.      Objective:      Physical Exam:   General: AAOx3.  No acute distress  Vascular:  Pulses intact and equal bilaterally.  Capillary refill less than 3 seconds and equal bilaterally  Neurologic:  Pinprick and soft touch intact and equal bilaterally. Mildly positive straight leg raising right leg.  Integment:  No ecchymosis, no errythema  Extremity:  Hip:  Flexion/extension equal bilaterally greater than 95/15 degrees. Internal/external rotation equal bilaterally. Gonzalez/fabere/logroll/push-pull negative. Nontender over the greater trochanter bilaterally. 0bers negative bilaterally. Nontender with groin palpation bilaterally. Mild tenderness with palpation piriformis fossa.                      Lumbar spine:  Tender with palpation lumbosacral spine.  Mild increased tone with palpation lumbosacral spine. Tender with motion lumbosacral spine.  Radiography:  Personally reviewed x-rays of the right hip to include AP pelvis completed on 09/28/2018 showed moderate femoral acetabular arthritis and lumbosacral arthritis.        Assessment:       Impression:    1.  Radicular right lower extremity pain.  2.  Mild piriformis syndrome right lower extremity.  3.  Mild hip arthritis right hip.      Plan:       1.  Discussed physical examination and radiographic findings with the patient. Pham understands that she has radicular lower extremity pain and she needs to discuss this with her spine surgeon.  She stated that she saw her spine surgeon the recent past and he told her there was nothing wrong with her.  Explained to the patient that the provocative tests show that she has issues with her lumbosacral spine and she needs to discuss them with them and have them evaluate her and treat her possibly with Pain Management.  2.  Continue with home exercises.  3.  May consider referral to physical therapy after the patient has had a thoroughly evaluation by her spine surgeon.  4.  Would not recommend she take NSAIDs as she has had a history of ulcers in the past.  5.  Follow-up p.r.n.

## 2018-10-01 ENCOUNTER — TELEPHONE (OUTPATIENT)
Dept: ORTHOPEDICS | Facility: CLINIC | Age: 63
End: 2018-10-01

## 2018-10-01 ENCOUNTER — CLINICAL SUPPORT (OUTPATIENT)
Dept: REHABILITATION | Facility: HOSPITAL | Age: 63
End: 2018-10-01
Payer: COMMERCIAL

## 2018-10-01 DIAGNOSIS — M62.81 MUSCLE WEAKNESS: ICD-10-CM

## 2018-10-01 NOTE — PLAN OF CARE
"Physical Therapy Evaluation/Plan of Care    Name: Pham Licea 1955  Clinic Number: [id number]    Diagnosis:   Encounter Diagnosis   Name Primary?    Muscle weakness      Physician: No ref. provider found  Treatment Orders: {AMB PT KNEE ORDERS:33571}    Past Medical History:   Diagnosis Date    Fatty liver     Former smoker     Osteoarthritis      Current Outpatient Medications   Medication Sig    diclofenac sodium 1 % Gel APPLY AS DIRECTED    ranitidine (ZANTAC) 150 MG tablet Take 150 mg by mouth nightly.     No current facility-administered medications for this visit.      Review of patient's allergies indicates:  No Known Allergies  Precautions: ***    Evaluation Date: ***  Time In: ***  Time Out: ***  Total Time: *** min  Visit # authorized: ***  Authorization period: ***  Plan of care Expiration: ***    Subjective     Onset Date: ***  Prior Level of Function: ***  Current level of Function: ***  Social History: Patient is in the process of purchasing a raised house with ~13 steps.  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 female that presents to [facility] secondary to ***. Pham states ***.     Imaging: X-ray or MRI taken and revealed ***.    Pain: current {0-10:90829::"0"}/10, worst {0-10:54718::"0"}/10, best {0-10:20567::"0"}/10, {Pain Description:85888}, {Constant/Intermittent:34672}  Radicular symptoms: ***  Aggravating factors: squatting, sitting, standing, walking on uneven surfaces, trunk extension,  Easing factors: topical pain gel, ice, heat    Pts goals: ***    Onset/SOCO: {AMB PT KNEE ONSET:70348}    Primary concern/ Chief complaints:    Objective     Observation: ***    Posture: ***    Range of Motion:   Knee Left active Left Passive Right Active R passive   Flexion *** *** *** ***   Extension *** *** *** ***       Lower Extremity Strength  Right LE  Left LE    Knee extension: {AMB PT " VESTIBULAR STRENGTH:60591} Knee extension: {AMB PT VESTIBULAR STRENGTH:21230}   Knee flexion: {AMB PT VESTIBULAR STRENGTH:37273} Knee flexion: {AMB PT VESTIBULAR STRENGTH:48743}   Hip flexion: {AMB PT VESTIBULAR STRENGTH:71199} Hip flexion: {AMB PT VESTIBULAR STRENGTH:88038}   Hip extension:  {AMB PT VESTIBULAR STRENGTH:85638} Hip extension: {AMB PT VESTIBULAR STRENGTH:93398}   Hip abduction: {AMB PT VESTIBULAR STRENGTH:71074} Hip abduction: {AMB PT VESTIBULAR STRENGTH:73437}   Hip adduction: {AMB PT VESTIBULAR STRENGTH:91969} Hip adduction {AMB PT VESTIBULAR STRENGTH:78182}   Ankle dorsiflexion: {AMB PT VESTIBULAR STRENGTH:78579} Ankle dorsiflexion: {AMB PT VESTIBULAR STRENGTH:71347}   Ankle plantarflexion: {AMB PT VESTIBULAR STRENGTH:27147} Ankle plantarflexion: {AMB PT VESTIBULAR STRENGTH:62437}       Special Tests:   Left Right   Valgus Stress Test *** ***   Varus Stress test *** ***   Lachman's test *** ***   Posterior Lachman *** ***   Aashish's Test *** ***   Apley's Compression *** ***   Apley's Distraction *** ***   Workman's compression test *** ***   Thessaly's Test *** ***   Patellar Grind Test *** ***     Step down test: ***    Function:    - SLS R: ***  - SLS L: ***  - Squat: ***   - Sit <--> Stand:***   - Bed Mobility: ***    Joint Mobility: ***  Patellar    Palpation: ***    Sensation: ***    Flexibility: ***   Ely's test: R = *** degrees ; L = *** degrees   Popliteal Angle: R = *** degrees ; L = *** degrees   Alena's test: R = *** ; L = ***   Zaheer test: R = *** ; L = ***    Edema: ***    Girth Measurement Joint line 5 cm below 10 cm above   Left 42 cm 37 cm 47.5 cm   Right 43 cm 37 cm 48 cm     Functional Limitations Reports - G Codes  Category: ***  Tool: ***  Score: ***  Current: ***  Goal: ***    Education:  ***    PT Evaluation Completed: {YES:41720}  Discussed Plan of Care with patient: {YES:66388}    TREATMENT:  Pham received therapeutic exercises to develop strength and endurance,  flexibility for *** minutes including:see exercise sheet ***    Pham  received the following manual therapy techniques x *** min. To include Joint mobilizations and Soft tissue Mobilization were applied to the: *** To include: ***     Pt. Received cold pack x 10 min. To ***. Following treatment.    HEP provided: ***  Instructed pt. Regarding: Proper technique with all exercises. Pt demo good understanding of the education provided. Pham demonstrated good return demonstration of activities.    Assessment     This is a 63 y.o. female referred to outpatient physical therapy and presents with a medical diagnosis of *** and demonstrates limitations as described in the problem list. Pt will benefit from physcial therapy services in order to maximize pain free and/or functional use of {LEFT/RIGHT:73419} ***. The following goals were discussed with the patient and patient is in agreement with them as to be addressed in the treatment plan. Pt was given a HEP consisting of ***. Pt verbally understood the instructions as they were given and demonstrated proper form and technique during therapy. Pt was advise to perform these exercises free of pain, and to stop performing them if pain occurs.     Anticipated barriers to physical therapy: ***    Medical necessity is demonstrated by the following IMPAIRMENTS/PROBLEM LIST:   1)Increase in pain level limiting function   2)***   3)***   4)***   5)Lack of HEP    GOALS:   Short Term Goals:  *** weeks  1. Pt will report decreased knee pain to ***/10 in order to increase tolerance for ***.  2. Pt will increase knee *** ROM by at least *** degrees in order to show improved functional mobility.  3. Pt will increase knee *** ROM by at least *** degrees in order to show improved functional mobility.  4. Pt will ***  5. Pt will be independent and consistent with issued HEP in order to show carryover between therapy sessions.    Long Term Goals: *** weeks  1. Pt will report decreased  knee pain to ***/10 in order to increase tolerance for ***.  2. Pt will ***  3. Pt will increase *** strength by 1 ms grade in order to increase tolerance to functional activities and ADLs.  4. Pt will report at *** level (***-***% impaired) on FOTO knee survey score for knee pain disability to demonstrate an increase in LE function and mobility in home and community environment.   5. Pt will be independent with updated HEP to maintain gains following discharge with therapy.    Plan     Pt will be treated by physical therapy 1-3 times a week for *** weeks for Pt Education, HEP, therapeutic exercises, neuromuscular re-education, joint mobilizations, modalities prn to achieve established goals. Pham may at times be seen by a PTA as part of the Rehab Team.     Cont PT for  ***         weeks.     Derrell Paulson, PT    I certify the need for these services furnished under this plan of treatment and while under my care.______________________________ Physician/Referring Practitioner  Date of Signature

## 2018-10-01 NOTE — TELEPHONE ENCOUNTER
Returned patient's call. Informed patient that Dr Palencia documented in his 09/28/2018 note: May consider referral to physical therapy after the patient has had a thoroughly evaluation by her spine surgeon. Patient voiced understanding.    Patient has an appointment with Dr. Evans on 10/4/2018.

## 2018-10-01 NOTE — TELEPHONE ENCOUNTER
----- Message from Lois Chavez sent at 10/1/2018  1:36 PM CDT -----  Contact: self  Patient would like a call back due to needs clarification of physical therapy orders for the rt  Knee. Please all patient at [phone]. Thanks!

## 2018-10-03 ENCOUNTER — TELEPHONE (OUTPATIENT)
Dept: SPINE | Facility: CLINIC | Age: 63
End: 2018-10-03

## 2018-10-04 ENCOUNTER — TELEPHONE (OUTPATIENT)
Dept: PAIN MEDICINE | Facility: CLINIC | Age: 63
End: 2018-10-04

## 2018-10-04 ENCOUNTER — OFFICE VISIT (OUTPATIENT)
Dept: SPINE | Facility: CLINIC | Age: 63
End: 2018-10-04
Payer: COMMERCIAL

## 2018-10-04 VITALS
WEIGHT: 195.13 LBS | HEIGHT: 64 IN | BODY MASS INDEX: 33.31 KG/M2 | SYSTOLIC BLOOD PRESSURE: 115 MMHG | DIASTOLIC BLOOD PRESSURE: 72 MMHG | HEART RATE: 68 BPM

## 2018-10-04 DIAGNOSIS — G89.29 CHRONIC MIDLINE LOW BACK PAIN, WITH SCIATICA PRESENCE UNSPECIFIED: Primary | ICD-10-CM

## 2018-10-04 DIAGNOSIS — M51.36 DDD (DEGENERATIVE DISC DISEASE), LUMBAR: Primary | ICD-10-CM

## 2018-10-04 DIAGNOSIS — M54.5 CHRONIC MIDLINE LOW BACK PAIN, WITH SCIATICA PRESENCE UNSPECIFIED: Primary | ICD-10-CM

## 2018-10-04 PROCEDURE — 3008F BODY MASS INDEX DOCD: CPT | Mod: ,,, | Performed by: PHYSICAL MEDICINE & REHABILITATION

## 2018-10-04 PROCEDURE — 99213 OFFICE O/P EST LOW 20 MIN: CPT | Mod: ,,, | Performed by: PHYSICAL MEDICINE & REHABILITATION

## 2018-10-04 RX ORDER — METHYLPREDNISOLONE 4 MG/1
TABLET ORAL
Qty: 1 PACKAGE | Refills: 0 | Status: SHIPPED | OUTPATIENT
Start: 2018-10-04 | End: 2018-10-11

## 2018-10-04 NOTE — H&P (VIEW-ONLY)
SUBJECTIVE:    Patient ID: Pham Licea is a 63 y.o. female.    Chief Complaint: Hip Pain (right hip)    She is here to follow-up on her right lateral hip discomfort.  She saw Dr. Palencia who was fairly adamant that patient's pain is being referred from her spine.  Clinically she complains of right lateral hip discomfort.  Occasionally she has pain radiating down the right leg into the sole of the foot but that is not persistent.  She has no new or progressive problems.          Past Medical History:   Diagnosis Date    Fatty liver     Former smoker     Osteoarthritis      Social History     Socioeconomic History    Marital status:      Spouse name: Not on file    Number of children: Not on file    Years of education: Not on file    Highest education level: Not on file   Social Needs    Financial resource strain: Not on file    Food insecurity - worry: Not on file    Food insecurity - inability: Not on file    Transportation needs - medical: Not on file    Transportation needs - non-medical: Not on file   Occupational History    Not on file   Tobacco Use    Smoking status: Former Smoker     Packs/day: 1.00     Years: 40.00     Pack years: 40.00     Last attempt to quit: 2009     Years since quittin.9    Smokeless tobacco: Never Used    Tobacco comment: quit    Substance and Sexual Activity    Alcohol use: Yes     Comment: once per month    Drug use: No    Sexual activity: Yes   Other Topics Concern    Not on file   Social History Narrative    Not on file     Past Surgical History:   Procedure Laterality Date    APPENDECTOMY       SECTION      x3    COLONOSCOPY  ~    [facility]: normal findings per patient report    ESOPHAGOGASTRODUODENOSCOPY (EGD) N/A 2016    Performed by Scooby Pink Jr., MD at [facility]    HYSTERECTOMY      one ovary conserved    UPPER GASTROINTESTINAL ENDOSCOPY       Family History   Problem Relation Age of Onset    Ovarian  "cancer Mother     Heart disease Mother     Osteoporosis Mother     Arthritis Mother     Hypertension Father     Stroke Father 50    Brain Hemorrhage Father     Aneurysm Father     Osteoarthritis Sister     Arthritis Sister     Hypertension Sister     Obesity Sister     Breast cancer Neg Hx     Colon cancer Neg Hx     Colon polyps Neg Hx     Crohn's disease Neg Hx     Ulcerative colitis Neg Hx      Vitals:    10/04/18 1149   BP: 115/72   Pulse: 68   Weight: 88.5 kg (195 lb 1.7 oz)   Height: 5' 4" (1.626 m)       Review of Systems   Constitutional: Negative for chills, diaphoresis, fatigue, fever and unexpected weight change.   HENT: Negative for trouble swallowing.    Eyes: Negative for visual disturbance.   Respiratory: Negative for shortness of breath.    Cardiovascular: Negative for chest pain.   Gastrointestinal: Negative for abdominal pain, constipation, nausea and vomiting.   Genitourinary: Negative for difficulty urinating.   Musculoskeletal: Negative for arthralgias, back pain, gait problem, joint swelling, myalgias, neck pain and neck stiffness.   Neurological: Negative for dizziness, speech difficulty, weakness, light-headedness, numbness and headaches.          Objective:      Physical Exam   Constitutional: She is oriented to person, place, and time. She appears well-developed and well-nourished.   Neurological: She is alert and oriented to person, place, and time.   She is awake and in no acute distress  Straight leg raising is negative bilaterally both sitting and supine  CHERELLE testing is negative bilaterally  We took another look at the MRI of the lumbar spine and again I see no definite evidence of any nerve root involvement           Assessment:       1. Chronic midline low back pain, with sciatica presence unspecified           Plan:     we talked about possibility of doing EMG and nerve conduction studies to confirm the presence of radiculopathy.  We also talked about doing a " diagnostic/therapeutic epidural steroid injection.  She chose that option.  Unfortunately she has to do some traveling in the middle of this month and we may not be able to get her scheduled for the TRISH prior to that.  In that case I went ahead and sent a prescription for Medrol Dosepak to her pharmacy which she can take in lieu of the epidural injection if it can be approved and time.  If she does get the epidural steroid injection and still does not improved to her satisfaction then I think EMG and nerve conduction studies is the next reasonable step      Chronic midline low back pain, with sciatica presence unspecified    Other orders  -     methylPREDNISolone (MEDROL DOSEPACK) 4 mg tablet; use as directed  Dispense: 1 Package; Refill: 0

## 2018-10-04 NOTE — PROGRESS NOTES
SUBJECTIVE:    Patient ID: Pham Licea is a 63 y.o. female.    Chief Complaint: Hip Pain (right hip)    She is here to follow-up on her right lateral hip discomfort.  She saw Dr. Palencia who was fairly adamant that patient's pain is being referred from her spine.  Clinically she complains of right lateral hip discomfort.  Occasionally she has pain radiating down the right leg into the sole of the foot but that is not persistent.  She has no new or progressive problems.          Past Medical History:   Diagnosis Date    Fatty liver     Former smoker     Osteoarthritis      Social History     Socioeconomic History    Marital status:      Spouse name: Not on file    Number of children: Not on file    Years of education: Not on file    Highest education level: Not on file   Social Needs    Financial resource strain: Not on file    Food insecurity - worry: Not on file    Food insecurity - inability: Not on file    Transportation needs - medical: Not on file    Transportation needs - non-medical: Not on file   Occupational History    Not on file   Tobacco Use    Smoking status: Former Smoker     Packs/day: 1.00     Years: 40.00     Pack years: 40.00     Last attempt to quit: 2009     Years since quittin.9    Smokeless tobacco: Never Used    Tobacco comment: quit    Substance and Sexual Activity    Alcohol use: Yes     Comment: once per month    Drug use: No    Sexual activity: Yes   Other Topics Concern    Not on file   Social History Narrative    Not on file     Past Surgical History:   Procedure Laterality Date    APPENDECTOMY       SECTION      x3    COLONOSCOPY  ~    [facility]: normal findings per patient report    ESOPHAGOGASTRODUODENOSCOPY (EGD) N/A 2016    Performed by Scooby Pink Jr[facility] MD at [facility]    HYSTERECTOMY      one ovary conserved    UPPER GASTROINTESTINAL ENDOSCOPY       Family History   Problem Relation Age of Onset    Ovarian  "cancer Mother     Heart disease Mother     Osteoporosis Mother     Arthritis Mother     Hypertension Father     Stroke Father 50    Brain Hemorrhage Father     Aneurysm Father     Osteoarthritis Sister     Arthritis Sister     Hypertension Sister     Obesity Sister     Breast cancer Neg Hx     Colon cancer Neg Hx     Colon polyps Neg Hx     Crohn's disease Neg Hx     Ulcerative colitis Neg Hx      Vitals:    10/04/18 1149   BP: 115/72   Pulse: 68   Weight: 88.5 kg (195 lb 1.7 oz)   Height: 5' 4" (1.626 m)       Review of Systems   Constitutional: Negative for chills, diaphoresis, fatigue, fever and unexpected weight change.   HENT: Negative for trouble swallowing.    Eyes: Negative for visual disturbance.   Respiratory: Negative for shortness of breath.    Cardiovascular: Negative for chest pain.   Gastrointestinal: Negative for abdominal pain, constipation, nausea and vomiting.   Genitourinary: Negative for difficulty urinating.   Musculoskeletal: Negative for arthralgias, back pain, gait problem, joint swelling, myalgias, neck pain and neck stiffness.   Neurological: Negative for dizziness, speech difficulty, weakness, light-headedness, numbness and headaches.          Objective:      Physical Exam   Constitutional: She is oriented to person, place, and time. She appears well-developed and well-nourished.   Neurological: She is alert and oriented to person, place, and time.   She is awake and in no acute distress  Straight leg raising is negative bilaterally both sitting and supine  CHERELLE testing is negative bilaterally  We took another look at the MRI of the lumbar spine and again I see no definite evidence of any nerve root involvement           Assessment:       1. Chronic midline low back pain, with sciatica presence unspecified           Plan:     we talked about possibility of doing EMG and nerve conduction studies to confirm the presence of radiculopathy.  We also talked about doing a " diagnostic/therapeutic epidural steroid injection.  She chose that option.  Unfortunately she has to do some traveling in the middle of this month and we may not be able to get her scheduled for the TRISH prior to that.  In that case I went ahead and sent a prescription for Medrol Dosepak to her pharmacy which she can take in lieu of the epidural injection if it can be approved and time.  If she does get the epidural steroid injection and still does not improved to her satisfaction then I think EMG and nerve conduction studies is the next reasonable step      Chronic midline low back pain, with sciatica presence unspecified    Other orders  -     methylPREDNISolone (MEDROL DOSEPACK) 4 mg tablet; use as directed  Dispense: 1 Package; Refill: 0

## 2018-10-04 NOTE — TELEPHONE ENCOUNTER
----- Message from Julio Evans MD sent at 10/4/2018 12:12 PM CDT -----  Please schedule for interlaminar TRISH at L5-S1

## 2018-10-12 ENCOUNTER — HOSPITAL ENCOUNTER (OUTPATIENT)
Facility: AMBULARY SURGERY CENTER | Age: 63
Discharge: HOME | End: 2018-10-12
Attending: ANESTHESIOLOGY | Admitting: ANESTHESIOLOGY
Payer: COMMERCIAL

## 2018-10-12 DIAGNOSIS — M54.16 LUMBAR RADICULITIS: Primary | ICD-10-CM

## 2018-10-12 PROCEDURE — 62323 NJX INTERLAMINAR LMBR/SAC: CPT | Mod: ,,, | Performed by: ANESTHESIOLOGY

## 2018-10-12 PROCEDURE — 62323 NJX INTERLAMINAR LMBR/SAC: CPT | Performed by: ANESTHESIOLOGY

## 2018-10-12 RX ORDER — LIDOCAINE HYDROCHLORIDE 10 MG/ML
INJECTION, SOLUTION EPIDURAL; INFILTRATION; INTRACAUDAL; PERINEURAL
Status: DISCONTINUED
Start: 2018-10-12 | End: 2018-10-12 | Stop reason: HOSPADM

## 2018-10-12 RX ORDER — LIDOCAINE HYDROCHLORIDE 10 MG/ML
INJECTION, SOLUTION EPIDURAL; INFILTRATION; INTRACAUDAL; PERINEURAL
Status: DISCONTINUED | OUTPATIENT
Start: 2018-10-12 | End: 2018-10-12 | Stop reason: HOSPADM

## 2018-10-12 RX ORDER — SODIUM CHLORIDE 9 MG/ML
INJECTION, SOLUTION INTRAMUSCULAR; INTRAVENOUS; SUBCUTANEOUS
Status: DISCONTINUED | OUTPATIENT
Start: 2018-10-12 | End: 2018-10-12 | Stop reason: HOSPADM

## 2018-10-12 RX ORDER — METHYLPREDNISOLONE ACETATE 80 MG/ML
INJECTION, SUSPENSION INTRA-ARTICULAR; INTRALESIONAL; INTRAMUSCULAR; SOFT TISSUE
Status: DISCONTINUED | OUTPATIENT
Start: 2018-10-12 | End: 2018-10-12 | Stop reason: HOSPADM

## 2018-10-12 RX ORDER — METHYLPREDNISOLONE ACETATE 80 MG/ML
INJECTION, SUSPENSION INTRA-ARTICULAR; INTRALESIONAL; INTRAMUSCULAR; SOFT TISSUE
Status: DISCONTINUED
Start: 2018-10-12 | End: 2018-10-12 | Stop reason: HOSPADM

## 2018-10-12 RX ORDER — SODIUM CHLORIDE, SODIUM LACTATE, POTASSIUM CHLORIDE, CALCIUM CHLORIDE 600; 310; 30; 20 MG/100ML; MG/100ML; MG/100ML; MG/100ML
INJECTION, SOLUTION INTRAVENOUS ONCE AS NEEDED
Status: DISCONTINUED | OUTPATIENT
Start: 2018-10-12 | End: 2018-10-12

## 2018-10-12 NOTE — OP NOTE
PROCEDURE DATE: 10/12/2018    Procedure:   Interlaminar epidural steroid injection at L5-S1 under fluoroscopic guidance.    Diagnosis: lUMBAR DISC DISPLACEMENT WITHOUT MYELOPATHY  pOSTOP DIAGNOSIS: sAME    Physician: Kal Johnson M.D.    Medications injected:10 mg dexamethasone with 4 ml of preservative free NaCl    Local anesthetic injected:    Lidocaine 1% 2 ml total    Sedation Medications: None    Estimated blood loss:  None    Complications:  None    Technique:  Time-out taken to identify patient and procedure prior to starting the procedure.  With the patient laying in a prone position, the area was prepped and draped in the usual sterile fashion using ChloraPrep and a fenestrated drape.  After determining the target level with an AP fluoroscopic view, local anesthetic was given using a 25-gauge 1.5 inch needle by raising a wheal and then infiltrating toward the interlaminar entry space.  A 3.5inch 20-gauge Touhy needle was introduced under AP fluoroscopic guidance to the interlaminar space of L5-S1. Once the trajectory was established, the needle was visualized in the lateral view and advanced using loss of resistance technique. Once in the desired position, 1ml contrast was injected to confirm placement and there was no vascular uptake nor intrathecal spread.  The medication was then injected slowly. The patient tolerated the procedure well.      The patient was monitored after the procedure.   They were given post-procedure and discharge instructions to follow at home.  The patient was discharged in a stable condition.

## 2018-10-12 NOTE — DISCHARGE SUMMARY
[facility]  Discharge Note  Short Stay    Admit Date: 10/12/2018    Discharge Date and Time: 10/12/2018    Attending Physician: Kal Johnson MD     Discharge Provider: Kal Johnson    Diagnoses:  Active Hospital Problems    Diagnosis  POA    *Lumbar radiculitis [M54.16]  Yes      Resolved Hospital Problems   No resolved problems to display.       Hospital Course: Lumbar TRISH  Discharged Condition: Good    Final Diagnoses:   Active Hospital Problems    Diagnosis  POA    *Lumbar radiculitis [M54.16]  Yes      Resolved Hospital Problems   No resolved problems to display.       Disposition: Home or Self Care    Follow up/Patient Instructions:    Medications:  Reconciled Home Medications:      Medication List      CONTINUE taking these medications    diclofenac sodium 1 % Gel  Commonly known as:  VOLTAREN  APPLY AS DIRECTED     ranitidine 150 MG tablet  Commonly known as:  ZANTAC  Take 150 mg by mouth nightly.          Discharge Procedure Orders   Call MD for:  temperature >100.4     Call MD for:  persistent nausea and vomiting or diarrhea     Call MD for:  severe uncontrolled pain     Call MD for:  redness, tenderness, or signs of infection (pain, swelling, redness, odor or green/yellow discharge around incision site)     Call MD for:  difficulty breathing or increased cough     Call MD for:  severe persistent headache        Follow up with MD in 2-3 weeks    Discharge Procedure Orders (must include Diet, Follow-up, Activity):   Discharge Procedure Orders (must include Diet, Follow-up, Activity)   Call MD for:  temperature >100.4     Call MD for:  persistent nausea and vomiting or diarrhea     Call MD for:  severe uncontrolled pain     Call MD for:  redness, tenderness, or signs of infection (pain, swelling, redness, odor or green/yellow discharge around incision site)     Call MD for:  difficulty breathing or increased cough     Call MD for:  severe persistent headache

## 2018-10-15 VITALS
SYSTOLIC BLOOD PRESSURE: 117 MMHG | HEIGHT: 64 IN | TEMPERATURE: 98 F | HEART RATE: 79 BPM | DIASTOLIC BLOOD PRESSURE: 74 MMHG | RESPIRATION RATE: 18 BRPM | BODY MASS INDEX: 33.31 KG/M2 | OXYGEN SATURATION: 95 % | WEIGHT: 195.13 LBS

## 2018-10-20 PROBLEM — M62.81 MUSCLE WEAKNESS: Status: ACTIVE | Noted: 2018-10-20

## 2018-10-21 NOTE — PROGRESS NOTES
Per RN, Dr Slime Rivera documented in his 09/28/2018 note: May consider referral to physical therapy after the patient has had a thoroughly evaluation by her spine surgeon.  Will await further medical workup and updated physical therapy orders prior to proceeding with evaluation.

## 2018-11-02 ENCOUNTER — OFFICE VISIT (OUTPATIENT)
Dept: SPINE | Facility: CLINIC | Age: 63
End: 2018-11-02
Payer: COMMERCIAL

## 2018-11-02 VITALS — WEIGHT: 195.13 LBS | HEIGHT: 64 IN | BODY MASS INDEX: 33.31 KG/M2

## 2018-11-02 DIAGNOSIS — G89.29 CHRONIC MIDLINE LOW BACK PAIN, WITH SCIATICA PRESENCE UNSPECIFIED: Primary | ICD-10-CM

## 2018-11-02 DIAGNOSIS — M54.5 CHRONIC MIDLINE LOW BACK PAIN, WITH SCIATICA PRESENCE UNSPECIFIED: Primary | ICD-10-CM

## 2018-11-02 PROCEDURE — 3008F BODY MASS INDEX DOCD: CPT | Mod: ,,, | Performed by: PHYSICAL MEDICINE & REHABILITATION

## 2018-11-02 PROCEDURE — 99213 OFFICE O/P EST LOW 20 MIN: CPT | Mod: ,,, | Performed by: PHYSICAL MEDICINE & REHABILITATION

## 2018-11-02 RX ORDER — DICLOFENAC SODIUM 10 MG/G
GEL TOPICAL
Qty: 1 TUBE | Refills: 3 | Status: SHIPPED | OUTPATIENT
Start: 2018-11-02 | End: 2019-08-13 | Stop reason: SDUPTHER

## 2018-11-02 NOTE — PROGRESS NOTES
SUBJECTIVE:    Patient ID: Pham Licea is a 63 y.o. female.    Chief Complaint: Follow-up (Post procedure )    She is here to follow-up status post interlaminar epidural steroid injection at L5-S1 on 10/12/2018 with Dr. Johnson.  She says she feels about 90% better following the injection.  Current pain level is about 2/10 which she can live with.  She has no new or progressive problems.  She requested a refill on Voltaren gel          Past Medical History:   Diagnosis Date    Fatty liver     Former smoker     Osteoarthritis      Social History     Socioeconomic History    Marital status:      Spouse name: Not on file    Number of children: Not on file    Years of education: Not on file    Highest education level: Not on file   Social Needs    Financial resource strain: Not on file    Food insecurity - worry: Not on file    Food insecurity - inability: Not on file    Transportation needs - medical: Not on file    Transportation needs - non-medical: Not on file   Occupational History    Not on file   Tobacco Use    Smoking status: Former Smoker     Packs/day: 1.00     Years: 40.00     Pack years: 40.00     Last attempt to quit: 2009     Years since quittin.0    Smokeless tobacco: Never Used    Tobacco comment: quit    Substance and Sexual Activity    Alcohol use: Yes     Comment: once per month    Drug use: No    Sexual activity: Yes   Other Topics Concern    Not on file   Social History Narrative    Not on file     Past Surgical History:   Procedure Laterality Date    APPENDECTOMY       SECTION      x3    COLONOSCOPY  ~    [facility]: normal findings per patient report    EPIDURAL STEROID INJECTION INTO LUMBAR SPINE N/A 10/12/2018    Procedure: Injection-steroid-epidural-lumbar;  Surgeon: Kal Johnson MD;  Location: [facility];  Service: Pain Management;  Laterality: N/A;  L5-S1    ESOPHAGOGASTRODUODENOSCOPY (EGD) N/A 2016    Performed by Scooby Pink Jr.,  "MD at [facility] END[facility]    HYSTERECTOMY      one ovary conserved    Injection-steroid-epidural-lumbar N/A 10/12/2018    [facility]    UPPER GASTROINTESTINAL ENDOSCOPY       Family History   Problem Relation Age of Onset    Ovarian cancer Mother     Heart disease Mother     Osteoporosis Mother     Arthritis Mother     Hypertension Father     Stroke Father 50    Brain Hemorrhage Father     Aneurysm Father     Osteoarthritis Sister     Arthritis Sister     Hypertension Sister     Obesity Sister     Breast cancer Neg Hx     Colon cancer Neg Hx     Colon polyps Neg Hx     Crohn's disease Neg Hx     Ulcerative colitis Neg Hx      Vitals:    11/02/18 1002   Weight: 88.5 kg (195 lb 1.7 oz)   Height: 5' 4" (1.626 m)       Review of Systems   Constitutional: Negative for chills, diaphoresis, fatigue, fever and unexpected weight change.   HENT: Negative for trouble swallowing.    Eyes: Negative for visual disturbance.   Respiratory: Negative for shortness of breath.    Cardiovascular: Negative for chest pain.   Gastrointestinal: Negative for abdominal pain, constipation, nausea and vomiting.   Genitourinary: Negative for difficulty urinating.   Musculoskeletal: Negative for arthralgias, back pain, gait problem, joint swelling, myalgias, neck pain and neck stiffness.   Neurological: Negative for dizziness, speech difficulty, weakness, light-headedness, numbness and headaches.          Objective:      Physical Exam   Constitutional: She appears well-developed and well-nourished.   Not examined           Assessment:       1. Chronic midline low back pain, with sciatica presence unspecified           Plan:     improved following epidural steroid injections.  Can repeat the injection as needed.  Follow-up with me as needed.  Refilled Voltaren gel      Chronic midline low back pain, with sciatica presence unspecified    Other orders  -     diclofenac sodium (VOLTAREN) 1 % Gel; APPLY AS " DIRECTED  Dispense: 1 Tube; Refill: 3

## 2018-11-26 PROBLEM — Z13.820 OSTEOPOROSIS SCREENING: Status: RESOLVED | Noted: 2018-08-24 | Resolved: 2018-11-26

## 2019-05-15 ENCOUNTER — TELEPHONE (OUTPATIENT)
Dept: SPINE | Facility: CLINIC | Age: 64
End: 2019-05-15

## 2019-05-16 ENCOUNTER — OFFICE VISIT (OUTPATIENT)
Dept: SPINE | Facility: CLINIC | Age: 64
End: 2019-05-16
Payer: COMMERCIAL

## 2019-05-16 ENCOUNTER — TELEPHONE (OUTPATIENT)
Dept: PAIN MEDICINE | Facility: CLINIC | Age: 64
End: 2019-05-16

## 2019-05-16 VITALS
BODY MASS INDEX: 33.31 KG/M2 | WEIGHT: 195.13 LBS | DIASTOLIC BLOOD PRESSURE: 80 MMHG | SYSTOLIC BLOOD PRESSURE: 130 MMHG | HEART RATE: 73 BPM | HEIGHT: 64 IN

## 2019-05-16 DIAGNOSIS — M54.5 CHRONIC MIDLINE LOW BACK PAIN, WITH SCIATICA PRESENCE UNSPECIFIED: Primary | ICD-10-CM

## 2019-05-16 DIAGNOSIS — G89.29 CHRONIC MIDLINE LOW BACK PAIN, WITH SCIATICA PRESENCE UNSPECIFIED: Primary | ICD-10-CM

## 2019-05-16 PROCEDURE — 99213 PR OFFICE/OUTPT VISIT, EST, LEVL III, 20-29 MIN: ICD-10-PCS | Mod: ,,, | Performed by: PHYSICAL MEDICINE & REHABILITATION

## 2019-05-16 PROCEDURE — 3008F BODY MASS INDEX DOCD: CPT | Mod: ,,, | Performed by: PHYSICAL MEDICINE & REHABILITATION

## 2019-05-16 PROCEDURE — 99213 OFFICE O/P EST LOW 20 MIN: CPT | Mod: ,,, | Performed by: PHYSICAL MEDICINE & REHABILITATION

## 2019-05-16 PROCEDURE — 3008F PR BODY MASS INDEX (BMI) DOCUMENTED: ICD-10-PCS | Mod: ,,, | Performed by: PHYSICAL MEDICINE & REHABILITATION

## 2019-05-16 RX ORDER — ACETAMINOPHEN 325 MG/1
325 TABLET ORAL EVERY 6 HOURS PRN
Status: ON HOLD | COMMUNITY
End: 2021-05-11 | Stop reason: HOSPADM

## 2019-05-16 NOTE — PROGRESS NOTES
SUBJECTIVE:    Patient ID: Pham Licea is a 63 y.o. female.    Chief Complaint: Back Pain; Hip Pain; and Knee Pain    She returns to clinic today with recurrent low back pain at the lumbosacral junction associated with bilateral leg discomfort down to the knees.  The same symptoms as before.  No new or progressive problems.  I note that she had a good results from interlaminar epidural steroid injections on L5-S1 on 10/12/2018 with Dr. Johnson.  She would like to repeat the procedure.  I also note that she plans a trip to [facility] in November and wants to make sure that she is able to have a repeat injection if needed prior to that trip        Past Medical History:   Diagnosis Date    Fatty liver     Former smoker     Osteoarthritis      Social History     Socioeconomic History    Marital status:      Spouse name: Not on file    Number of children: Not on file    Years of education: Not on file    Highest education level: Not on file   Occupational History    Not on file   Social Needs    Financial resource strain: Not on file    Food insecurity:     Worry: Not on file     Inability: Not on file    Transportation needs:     Medical: Not on file     Non-medical: Not on file   Tobacco Use    Smoking status: Former Smoker     Packs/day: 1.00     Years: 40.00     Pack years: 40.00     Last attempt to quit: 2009     Years since quittin.5    Smokeless tobacco: Never Used    Tobacco comment: quit    Substance and Sexual Activity    Alcohol use: Yes     Comment: once per month    Drug use: No    Sexual activity: Yes   Lifestyle    Physical activity:     Days per week: Not on file     Minutes per session: Not on file    Stress: Not on file   Relationships    Social connections:     Talks on phone: Not on file     Gets together: Not on file     Attends Buddhism service: Not on file     Active member of club or organization: Not on file     Attends meetings of clubs or organizations:  "Not on file     Relationship status: Not on file   Other Topics Concern    Not on file   Social History Narrative    Not on file     Past Surgical History:   Procedure Laterality Date    APPENDECTOMY       SECTION      x3    COLONOSCOPY  ~    [facility]: normal findings per patient report    ESOPHAGOGASTRODUODENOSCOPY (EGD) N/A 2016    [facility] Sc[facility] Jr[facility] at [facility]    HYSTERECTOMY      one ovary conserved    Injection-steroid-epidural-lumbar N/A 10/12/2018    Performed by Kal Johnson MD at [facility]    UPPER GASTROINTESTINAL ENDOSCOPY       Family History   Problem Relation Age of Onset    Ovarian cancer Mother     Heart disease Mother     Osteoporosis Mother     Arthritis Mother     Hypertension Father     Stroke Father 50    Brain Hemorrhage Father     Aneurysm Father     Osteoarthritis Sister     Arthritis Sister     Hypertension Sister     Obesity Sister     Breast cancer Neg Hx     Colon cancer Neg Hx     Colon polyps Neg Hx     Crohn's disease Neg Hx     Ulcerative colitis Neg Hx      Vitals:    19 0900   BP: 130/80   Pulse: 73   Weight: 88.5 kg (195 lb 1.7 oz)   Height: 5' 4" (1.626 m)       Review of Systems   Constitutional: Negative for chills, diaphoresis, fatigue, fever and unexpected weight change.   HENT: Negative for trouble swallowing.    Eyes: Negative for visual disturbance.   Respiratory: Negative for shortness of breath.    Cardiovascular: Negative for chest pain.   Gastrointestinal: Negative for abdominal pain, constipation, nausea and vomiting.   Genitourinary: Negative for difficulty urinating.   Musculoskeletal: Negative for arthralgias, back pain, gait problem, joint swelling, myalgias, neck pain and neck stiffness.   Neurological: Negative for dizziness, speech difficulty, weakness, light-headedness, numbness and headaches.          Objective:      Physical Exam   Constitutional: She is oriented to person, place, and time. She " appears well-developed and well-nourished.   Neurological: She is alert and oriented to person, place, and time.   She is awake and in no acute distress  Deep tendon reflexes are trace at both knees and both ankles  Strength is normal in both lower extremities           Assessment:       1. Chronic midline low back pain, with sciatica presence unspecified           Plan:     repeat interlaminar epidural steroid injection at L5-S1.  Follow-up with me after the procedure      Chronic midline low back pain, with sciatica presence unspecified

## 2019-05-16 NOTE — H&P (VIEW-ONLY)
SUBJECTIVE:    Patient ID: Pham Licea is a 63 y.o. female.    Chief Complaint: Back Pain; Hip Pain; and Knee Pain    She returns to clinic today with recurrent low back pain at the lumbosacral junction associated with bilateral leg discomfort down to the knees.  The same symptoms as before.  No new or progressive problems.  I note that she had a good results from interlaminar epidural steroid injections on L5-S1 on 10/12/2018 with Dr. Johnson.  She would like to repeat the procedure.  I also note that she plans a trip to [facility] in November and wants to make sure that she is able to have a repeat injection if needed prior to that trip        Past Medical History:   Diagnosis Date    Fatty liver     Former smoker     Osteoarthritis      Social History     Socioeconomic History    Marital status:      Spouse name: Not on file    Number of children: Not on file    Years of education: Not on file    Highest education level: Not on file   Occupational History    Not on file   Social Needs    Financial resource strain: Not on file    Food insecurity:     Worry: Not on file     Inability: Not on file    Transportation needs:     Medical: Not on file     Non-medical: Not on file   Tobacco Use    Smoking status: Former Smoker     Packs/day: 1.00     Years: 40.00     Pack years: 40.00     Last attempt to quit: 2009     Years since quittin.5    Smokeless tobacco: Never Used    Tobacco comment: quit    Substance and Sexual Activity    Alcohol use: Yes     Comment: once per month    Drug use: No    Sexual activity: Yes   Lifestyle    Physical activity:     Days per week: Not on file     Minutes per session: Not on file    Stress: Not on file   Relationships    Social connections:     Talks on phone: Not on file     Gets together: Not on file     Attends Yazidism service: Not on file     Active member of club or organization: Not on file     Attends meetings of clubs or organizations:  "Not on file     Relationship status: Not on file   Other Topics Concern    Not on file   Social History Narrative    Not on file     Past Surgical History:   Procedure Laterality Date    APPENDECTOMY       SECTION      x3    COLONOSCOPY  ~    [facility]: normal findings per patient report    ESOPHAGOGASTRODUODENOSCOPY (EGD) N/A 2016    [facility] Scooby [facility] Jr[facility]O    HYSTERECTOMY      one ovary conserved    Injection-steroid-epidural-lumbar N/A 10/12/2018    [facility]    UPPER GASTROINTESTINAL ENDOSCOPY       Family History   Problem Relation Age of Onset    Ovarian cancer Mother     Heart disease Mother     Osteoporosis Mother     Arthritis Mother     Hypertension Father     Stroke Father 50    Brain Hemorrhage Father     Aneurysm Father     Osteoarthritis Sister     Arthritis Sister     Hypertension Sister     Obesity Sister     Breast cancer Neg Hx     Colon cancer Neg Hx     Colon polyps Neg Hx     Crohn's disease Neg Hx     Ulcerative colitis Neg Hx      Vitals:    19 0900   BP: 130/80   Pulse: 73   Weight: 88.5 kg (195 lb 1.7 oz)   Height: 5' 4" (1.626 m)       Review of Systems   Constitutional: Negative for chills, diaphoresis, fatigue, fever and unexpected weight change.   HENT: Negative for trouble swallowing.    Eyes: Negative for visual disturbance.   Respiratory: Negative for shortness of breath.    Cardiovascular: Negative for chest pain.   Gastrointestinal: Negative for abdominal pain, constipation, nausea and vomiting.   Genitourinary: Negative for difficulty urinating.   Musculoskeletal: Negative for arthralgias, back pain, gait problem, joint swelling, myalgias, neck pain and neck stiffness.   Neurological: Negative for dizziness, speech difficulty, weakness, light-headedness, numbness and headaches.          Objective:      Physical Exam   Constitutional: She is oriented to person, place, and time. She " appears well-developed and well-nourished.   Neurological: She is alert and oriented to person, place, and time.   She is awake and in no acute distress  Deep tendon reflexes are trace at both knees and both ankles  Strength is normal in both lower extremities           Assessment:       1. Chronic midline low back pain, with sciatica presence unspecified           Plan:     repeat interlaminar epidural steroid injection at L5-S1.  Follow-up with me after the procedure      Chronic midline low back pain, with sciatica presence unspecified

## 2019-05-16 NOTE — TELEPHONE ENCOUNTER
----- Message from Julio Evans MD sent at 5/16/2019  9:19 AM CDT -----  Please schedule for interlaminar epidural steroid injection at L5-S1

## 2019-05-17 DIAGNOSIS — M54.16 LUMBAR RADICULOPATHY: Primary | ICD-10-CM

## 2019-05-31 ENCOUNTER — HOSPITAL ENCOUNTER (OUTPATIENT)
Facility: AMBULARY SURGERY CENTER | Age: 64
Discharge: HOME | End: 2019-05-31
Attending: ANESTHESIOLOGY | Admitting: ANESTHESIOLOGY
Payer: COMMERCIAL

## 2019-05-31 DIAGNOSIS — M54.16 LUMBAR RADICULITIS: Primary | ICD-10-CM

## 2019-05-31 PROCEDURE — 62323 NJX INTERLAMINAR LMBR/SAC: CPT | Mod: ,,, | Performed by: ANESTHESIOLOGY

## 2019-05-31 PROCEDURE — 62323 PR INJ LUMBAR/SACRAL, W/IMAGING GUIDANCE: ICD-10-PCS | Mod: ,,, | Performed by: ANESTHESIOLOGY

## 2019-05-31 PROCEDURE — 62323 NJX INTERLAMINAR LMBR/SAC: CPT | Performed by: ANESTHESIOLOGY

## 2019-05-31 RX ORDER — SODIUM CHLORIDE 9 MG/ML
INJECTION, SOLUTION INTRAMUSCULAR; INTRAVENOUS; SUBCUTANEOUS
Status: DISCONTINUED | OUTPATIENT
Start: 2019-05-31 | End: 2019-05-31 | Stop reason: HOSPADM

## 2019-05-31 RX ORDER — SODIUM CHLORIDE, SODIUM LACTATE, POTASSIUM CHLORIDE, CALCIUM CHLORIDE 600; 310; 30; 20 MG/100ML; MG/100ML; MG/100ML; MG/100ML
INJECTION, SOLUTION INTRAVENOUS ONCE AS NEEDED
Status: DISCONTINUED | OUTPATIENT
Start: 2019-05-31 | End: 2019-05-31 | Stop reason: HOSPADM

## 2019-05-31 RX ORDER — DEXAMETHASONE SODIUM PHOSPHATE 10 MG/ML
INJECTION INTRAMUSCULAR; INTRAVENOUS
Status: DISCONTINUED | OUTPATIENT
Start: 2019-05-31 | End: 2019-05-31 | Stop reason: HOSPADM

## 2019-05-31 RX ORDER — DEXAMETHASONE SODIUM PHOSPHATE 10 MG/ML
INJECTION INTRAMUSCULAR; INTRAVENOUS
Status: DISCONTINUED
Start: 2019-05-31 | End: 2019-05-31 | Stop reason: HOSPADM

## 2019-05-31 RX ORDER — METHYLPREDNISOLONE ACETATE 80 MG/ML
INJECTION, SUSPENSION INTRA-ARTICULAR; INTRALESIONAL; INTRAMUSCULAR; SOFT TISSUE
Status: DISCONTINUED
Start: 2019-05-31 | End: 2019-05-31 | Stop reason: WASHOUT

## 2019-05-31 RX ORDER — LIDOCAINE HYDROCHLORIDE 10 MG/ML
INJECTION, SOLUTION EPIDURAL; INFILTRATION; INTRACAUDAL; PERINEURAL
Status: DISCONTINUED | OUTPATIENT
Start: 2019-05-31 | End: 2019-05-31 | Stop reason: HOSPADM

## 2019-05-31 RX ORDER — LIDOCAINE HYDROCHLORIDE 10 MG/ML
INJECTION, SOLUTION EPIDURAL; INFILTRATION; INTRACAUDAL; PERINEURAL
Status: DISCONTINUED
Start: 2019-05-31 | End: 2019-05-31 | Stop reason: HOSPADM

## 2019-05-31 NOTE — DISCHARGE INSTRUCTIONS
Before leaving, please make sure you have all your personal belongings such as glasses, purses, wallets, keys, cell phones, jewelry, jackets etc        Pain injection instructions:     This procedure may take a couple weeks to relieve pain  You may get some pain relief from the local anesthetic initally.      Activity as tolerated- gradually increase activities.  Dont lift over 10 lbs for 24 hrs   No heat at injection sites x 2 days. No heating pads, hot tubs, saunas, or swimming in any body of water or pool for 2 days.  Use ice pack for mild swelling and for comfort , apply for 20 minutes, remove for 20 minute intervals. No direct contact of ice itself  to skin.  May shower today. No tub baths for two days.      Resume Aspirin, Plavix, or Coumadin the day after the procedure unless otherwise instructed.   If diabetic,monitor your glucose carefully as steroids can increase your glucose level    Seek immediate medical help for:   Severe increase in your usual pain or appearance of new pain.  Prolonged (mor than 8 hours) or increasing weakness or numbness in the legs or arms. Numbing medicine was injected and can affect the messages to and from the brain and legs or arms.  .    Fever above 101 ,Drainage,redness,active bleeding, or increased swelling at the injection site.  Headache, shortness of breath, chest pain, or breathing problems.

## 2019-05-31 NOTE — DISCHARGE SUMMARY
[facility]  Discharge Note  Short Stay    Admit Date: 5/31/2019    Discharge Date and Time: 5/31/2019    Attending Physician: Kal Johnson MD     Discharge Provider: Kal Johnson    Diagnoses:  Active Hospital Problems    Diagnosis  POA    *Lumbar radiculitis [M54.16]  Yes      Resolved Hospital Problems   No resolved problems to display.       Hospital Course: Lumbar TRISH  Discharged Condition: Good    Final Diagnoses:   Active Hospital Problems    Diagnosis  POA    *Lumbar radiculitis [M54.16]  Yes      Resolved Hospital Problems   No resolved problems to display.       Disposition: Home or Self Care    Follow up/Patient Instructions:    Medications:  Reconciled Home Medications:      Medication List      CONTINUE taking these medications    acetaminophen 325 MG tablet  Commonly known as:  TYLENOL  Take 325 mg by mouth every 6 (six) hours as needed for Pain.     diclofenac sodium 1 % Gel  Commonly known as:  VOLTAREN  APPLY AS DIRECTED     ranitidine 150 MG tablet  Commonly known as:  ZANTAC  Take 150 mg by mouth nightly.          Discharge Procedure Orders   Call MD for:  temperature >100.4     Call MD for:  persistent nausea and vomiting or diarrhea     Call MD for:  severe uncontrolled pain     Call MD for:  redness, tenderness, or signs of infection (pain, swelling, redness, odor or green/yellow discharge around incision site)     Call MD for:  difficulty breathing or increased cough     Call MD for:  severe persistent headache        Follow up with MD in 2-3 weeks    Discharge Procedure Orders (must include Diet, Follow-up, Activity):   Discharge Procedure Orders (must include Diet, Follow-up, Activity)   Call MD for:  temperature >100.4     Call MD for:  persistent nausea and vomiting or diarrhea     Call MD for:  severe uncontrolled pain     Call MD for:  redness, tenderness, or signs of infection (pain, swelling, redness, odor or green/yellow discharge around incision site)     Call MD for:   difficulty breathing or increased cough     Call MD for:  severe persistent headache

## 2019-05-31 NOTE — OP NOTE
PROCEDURE DATE: 5/31/2019    Procedure:   Interlaminar epidural steroid injection at L5-S1 under fluoroscopic guidance.    Diagnosis: lUMBAR DISC DISPLACEMENT WITHOUT MYELOPATHY  pOSTOP DIAGNOSIS: sAME    Physician: Kal Johnson M.D.    Medications injected:10 mg dexamethasone with 4 ml of preservative free NaCl    Local anesthetic injected:    Lidocaine 1% 2 ml total    Sedation Medications: None    Estimated blood loss:  None    Complications:  None    Technique:  Time-out taken to identify patient and procedure prior to starting the procedure.  With the patient laying in a prone position, the area was prepped and draped in the usual sterile fashion using ChloraPrep and a fenestrated drape.  After determining the target level with an AP fluoroscopic view, local anesthetic was given using a 25-gauge 1.5 inch needle by raising a wheal and then infiltrating toward the interlaminar entry space.  A 3.5inch 20-gauge Touhy needle was introduced under AP fluoroscopic guidance to the interlaminar space of L5-S1. Once the trajectory was established, the needle was visualized in the lateral view and advanced using loss of resistance technique. Once in the desired position, 1ml contrast was injected to confirm placement and there was no vascular uptake nor intrathecal spread.  The medication was then injected slowly. The patient tolerated the procedure well.      The patient was monitored after the procedure.   They were given post-procedure and discharge instructions to follow at home.  The patient was discharged in a stable condition.

## 2019-06-03 VITALS
WEIGHT: 195 LBS | HEIGHT: 64 IN | HEART RATE: 73 BPM | RESPIRATION RATE: 20 BRPM | BODY MASS INDEX: 33.29 KG/M2 | OXYGEN SATURATION: 94 % | TEMPERATURE: 98 F | SYSTOLIC BLOOD PRESSURE: 134 MMHG | DIASTOLIC BLOOD PRESSURE: 80 MMHG

## 2019-06-24 ENCOUNTER — OFFICE VISIT (OUTPATIENT)
Dept: SPINE | Facility: CLINIC | Age: 64
End: 2019-06-24
Payer: COMMERCIAL

## 2019-06-24 VITALS
DIASTOLIC BLOOD PRESSURE: 79 MMHG | HEART RATE: 63 BPM | SYSTOLIC BLOOD PRESSURE: 133 MMHG | HEIGHT: 64 IN | BODY MASS INDEX: 33.29 KG/M2 | WEIGHT: 195 LBS

## 2019-06-24 DIAGNOSIS — G89.29 CHRONIC MIDLINE LOW BACK PAIN, WITH SCIATICA PRESENCE UNSPECIFIED: Primary | ICD-10-CM

## 2019-06-24 DIAGNOSIS — M54.5 CHRONIC MIDLINE LOW BACK PAIN, WITH SCIATICA PRESENCE UNSPECIFIED: Primary | ICD-10-CM

## 2019-06-24 PROCEDURE — 99213 PR OFFICE/OUTPT VISIT, EST, LEVL III, 20-29 MIN: ICD-10-PCS | Mod: ,,, | Performed by: PHYSICAL MEDICINE & REHABILITATION

## 2019-06-24 PROCEDURE — 3008F BODY MASS INDEX DOCD: CPT | Mod: ,,, | Performed by: PHYSICAL MEDICINE & REHABILITATION

## 2019-06-24 PROCEDURE — 3008F PR BODY MASS INDEX (BMI) DOCUMENTED: ICD-10-PCS | Mod: ,,, | Performed by: PHYSICAL MEDICINE & REHABILITATION

## 2019-06-24 PROCEDURE — 99213 OFFICE O/P EST LOW 20 MIN: CPT | Mod: ,,, | Performed by: PHYSICAL MEDICINE & REHABILITATION

## 2019-06-24 NOTE — PROGRESS NOTES
SUBJECTIVE:    Patient ID: Pham Licea is a 63 y.o. female.    Chief Complaint: Follow-up    She is here for follow-up status post interlaminar epidural steroid injection on 2019 with Dr. Johnson.  She got no relief from that procedure.  She presents today continuing to complain of primarily medial leg pain around the right knee and occasional radiating discomfort down the right leg into the calf and lateral portion of the right foot.  Pain level is 8 to 10/10.  She has no new or progressive problems.  She is only taking Voltaren gel Tylenol and tramadol as needed for pain.  Tramadol does help but she tries not to take it very often.  I note that she has been on gabapentin in the past but stopped that secondary to grogginess.  I recommend that she get back on the gabapentin taking it at night instead        Past Medical History:   Diagnosis Date    Fatty liver     Former smoker     Osteoarthritis      Social History     Socioeconomic History    Marital status:      Spouse name: Not on file    Number of children: Not on file    Years of education: Not on file    Highest education level: Not on file   Occupational History    Not on file   Social Needs    Financial resource strain: Not on file    Food insecurity:     Worry: Not on file     Inability: Not on file    Transportation needs:     Medical: Not on file     Non-medical: Not on file   Tobacco Use    Smoking status: Former Smoker     Packs/day: 1.00     Years: 40.00     Pack years: 40.00     Last attempt to quit: 2009     Years since quittin.6    Smokeless tobacco: Never Used    Tobacco comment: quit    Substance and Sexual Activity    Alcohol use: Yes     Comment: once per month    Drug use: No    Sexual activity: Yes   Lifestyle    Physical activity:     Days per week: Not on file     Minutes per session: Not on file    Stress: Not on file   Relationships    Social connections:     Talks on phone: Not on file     " Gets together: Not on file     Attends Hindu service: Not on file     Active member of club or organization: Not on file     Attends meetings of clubs or organizations: Not on file     Relationship status: Not on file   Other Topics Concern    Not on file   Social History Narrative    Not on file     Past Surgical History:   Procedure Laterality Date    APPENDECTOMY       SECTION      x3    COLONOSCOPY  ~    [facility]: normal findings per patient report    ESOPHAGOGASTRODUODENOSCOPY (EGD) N/A 2016    [facility] END[facility]    HYSTERECTOMY      one ovary conserved    Injection-steroid-epidural-lumbar N/A 10/12/2018    [facility]formed [facility] at [facility] OR    Injection-steroid-epidural-lumbar L5-S1 N/A 2019    Per[facility] by Kal [facility] at [facility] OR    UPPER GASTROINTESTINAL ENDOSCOPY       Family History   Problem Relation Age of Onset    Ovarian cancer Mother     Heart disease Mother     Osteoporosis Mother     Arthritis Mother     Hypertension Father     Stroke Father 50    Brain Hemorrhage Father     Aneurysm Father     Osteoarthritis Sister     Arthritis Sister     Hypertension Sister     Obesity Sister     Breast cancer Neg Hx     Colon cancer Neg Hx     Colon polyps Neg Hx     Crohn's disease Neg Hx     Ulcerative colitis Neg Hx      Vitals:    19 0927   BP: 133/79   Pulse: 63   Weight: 88.5 kg (195 lb)   Height: 5' 4" (1.626 m)       Review of Systems   Constitutional: Negative for chills, diaphoresis, fatigue, fever and unexpected weight change.   HENT: Negative for trouble swallowing.    Eyes: Negative for visual disturbance.   Respiratory: Negative for shortness of breath.    Cardiovascular: Negative for chest pain.   Gastrointestinal: Negative for abdominal pain, constipation, nausea and vomiting.   Genitourinary: Negative for difficulty urinating.   Musculoskeletal: Negative for arthralgias, back pain, gait problem, joint " swelling, myalgias, neck pain and neck stiffness.   Neurological: Negative for dizziness, speech difficulty, weakness, light-headedness, numbness and headaches.          Objective:      Physical Exam   Constitutional: She is oriented to person, place, and time. She appears well-developed and well-nourished.   Neurological: She is alert and oriented to person, place, and time.   She is awake and in no acute distress  No point tenderness or palpable masses about the lumbar spine  Deep tendon reflexes are +1 at both knees  Deep tendon reflexes +1 at both ankles  Strength is normal in both lower extremities           Assessment:       1. Chronic midline low back pain, with sciatica presence unspecified           Plan:     she fail to respond to epidural steroid injections this time.  She is having rather severe pain in her leg and I think she has an element of radicular pain specifically S1 radicular pain.  I note that her previous MRI did not show any significant abnormalities.  I recommended updated MRI since she is not responding to treatment.  Get back on gabapentin.  Consider EMG and nerve conduction studies      Chronic midline low back pain, with sciatica presence unspecified  -     MRI Lumbar Spine Without Contrast; Future; Expected date: 06/24/2019

## 2019-07-01 ENCOUNTER — HOSPITAL ENCOUNTER (OUTPATIENT)
Dept: RADIOLOGY | Facility: HOSPITAL | Age: 64
Discharge: HOME | End: 2019-07-01
Attending: PHYSICAL MEDICINE & REHABILITATION
Payer: COMMERCIAL

## 2019-07-01 DIAGNOSIS — M54.5 CHRONIC MIDLINE LOW BACK PAIN, WITH SCIATICA PRESENCE UNSPECIFIED: ICD-10-CM

## 2019-07-01 DIAGNOSIS — G89.29 CHRONIC MIDLINE LOW BACK PAIN, WITH SCIATICA PRESENCE UNSPECIFIED: ICD-10-CM

## 2019-07-01 PROCEDURE — 72148 MRI LUMBAR SPINE W/O DYE: CPT | Mod: 26,,, | Performed by: RADIOLOGY

## 2019-07-01 PROCEDURE — 72148 MRI LUMBAR SPINE WITHOUT CONTRAST: ICD-10-PCS | Mod: 26,,, | Performed by: RADIOLOGY

## 2019-07-01 PROCEDURE — 72148 MRI LUMBAR SPINE W/O DYE: CPT | Mod: TC

## 2019-07-03 ENCOUNTER — OFFICE VISIT (OUTPATIENT)
Dept: SPINE | Facility: CLINIC | Age: 64
End: 2019-07-03
Payer: COMMERCIAL

## 2019-07-03 VITALS
BODY MASS INDEX: 33.31 KG/M2 | HEIGHT: 64 IN | HEART RATE: 81 BPM | DIASTOLIC BLOOD PRESSURE: 82 MMHG | SYSTOLIC BLOOD PRESSURE: 138 MMHG | WEIGHT: 195.13 LBS

## 2019-07-03 DIAGNOSIS — M54.16 LUMBAR RADICULITIS: Primary | ICD-10-CM

## 2019-07-03 PROCEDURE — 3008F BODY MASS INDEX DOCD: CPT | Mod: ,,, | Performed by: PHYSICAL MEDICINE & REHABILITATION

## 2019-07-03 PROCEDURE — 3008F PR BODY MASS INDEX (BMI) DOCUMENTED: ICD-10-PCS | Mod: ,,, | Performed by: PHYSICAL MEDICINE & REHABILITATION

## 2019-07-03 PROCEDURE — 99213 PR OFFICE/OUTPT VISIT, EST, LEVL III, 20-29 MIN: ICD-10-PCS | Mod: ,,, | Performed by: PHYSICAL MEDICINE & REHABILITATION

## 2019-07-03 PROCEDURE — 99213 OFFICE O/P EST LOW 20 MIN: CPT | Mod: ,,, | Performed by: PHYSICAL MEDICINE & REHABILITATION

## 2019-07-03 RX ORDER — GABAPENTIN 300 MG/1
300 CAPSULE ORAL 2 TIMES DAILY
COMMUNITY
End: 2019-08-09

## 2019-07-03 NOTE — PROGRESS NOTES
SUBJECTIVE:    Patient ID: Pham Licea is a 63 y.o. female.    Chief Complaint: Back Pain    She is here to review her lumbar MRI which was done to evaluate her complaints of primarily right leg discomfort.  The last time I saw her the complaint was of radiating right leg pain down to the lateral portion of the right foot.  The MRI is summarized below:    Impression      1. Grade 1 anterolisthesis of L4 on L5.  2. Degenerative disc disease at L2-L3 resulting in mild central spinal canal stenosis with mild bilateral neural foraminal narrowing.  3. Multilevel degenerative disc disease from L3-L5 resulting in moderate central spinal canal stenosis with mild to moderate neural foraminal narrowing.  4. Degenerative disc disease at L5-S1 resulting in mild bilateral neural foraminal narrowing.    Clinically she is about the same although every now and then she gets discomfort radiating to the anterior portion of the legs as well.  She has no new or progressive problems          Past Medical History:   Diagnosis Date    Fatty liver     Former smoker     Osteoarthritis      Social History     Socioeconomic History    Marital status:      Spouse name: Not on file    Number of children: Not on file    Years of education: Not on file    Highest education level: Not on file   Occupational History    Not on file   Social Needs    Financial resource strain: Not on file    Food insecurity:     Worry: Not on file     Inability: Not on file    Transportation needs:     Medical: Not on file     Non-medical: Not on file   Tobacco Use    Smoking status: Former Smoker     Packs/day: 1.00     Years: 40.00     Pack years: 40.00     Last attempt to quit: 2009     Years since quittin.6    Smokeless tobacco: Never Used    Tobacco comment: quit    Substance and Sexual Activity    Alcohol use: Yes     Comment: once per month    Drug use: No    Sexual activity: Yes   Lifestyle    Physical activity:  "    Days per week: Not on file     Minutes per session: Not on file    Stress: Not on file   Relationships    Social connections:     Talks on phone: Not on file     Gets together: Not on file     Attends Sikh service: Not on file     Active member of club or organization: Not on file     Attends meetings of clubs or organizations: Not on file     Relationship status: Not on file   Other Topics Concern    Not on file   Social History Narrative    Not on file     Past Surgical History:   Procedure Laterality Date    APPENDECTOMY       SECTION      x3    COLONOSCOPY  ~2014    [facility]: normal findings per patient report    ESOPHAGOGASTRODUODENOSCOPY (EGD) N/A 2016    [facility]O    HYSTERECTOMY      one ovary conserved    Injection-steroid-epidural-lumbar N/A 10/12/2018    [facility]    Injection-steroid-epidural-lumbar L5-S1 N/A 2019    [facility] Kal [facility] at [facility] OR    UPPER GASTROINTESTINAL ENDOSCOPY       Family History   Problem Relation Age of Onset    Ovarian cancer Mother     Heart disease Mother     Osteoporosis Mother     Arthritis Mother     Hypertension Father     Stroke Father 50    Brain Hemorrhage Father     Aneurysm Father     Osteoarthritis Sister     Arthritis Sister     Hypertension Sister     Obesity Sister     Breast cancer Neg Hx     Colon cancer Neg Hx     Colon polyps Neg Hx     Crohn's disease Neg Hx     Ulcerative colitis Neg Hx      Vitals:    19 0756   BP: 138/82   Pulse: 81   Weight: 88.5 kg (195 lb 1.7 oz)   Height: 5' 4" (1.626 m)       Review of Systems   Constitutional: Negative for chills, diaphoresis, fatigue, fever and unexpected weight change.   HENT: Negative for trouble swallowing.    Eyes: Negative for visual disturbance.   Respiratory: Negative for shortness of breath.    Cardiovascular: Negative for chest pain.   Gastrointestinal: Negative for " abdominal pain, constipation, nausea and vomiting.   Genitourinary: Negative for difficulty urinating.   Musculoskeletal: Negative for arthralgias, back pain, gait problem, joint swelling, myalgias, neck pain and neck stiffness.   Neurological: Negative for dizziness, speech difficulty, weakness, light-headedness, numbness and headaches.          Objective:      Physical Exam   Constitutional: She is oriented to person, place, and time. She appears well-developed and well-nourished.   Neurological: She is alert and oriented to person, place, and time.           Assessment:       1. Lumbar radiculitis           Plan:     we will get EMG and nerve conduction studies looking for primarily S1 plus or minus L5 radiculopathy.  I note that she has gone back on gabapentin 2 at night and 1 during the day which takes the edge off the pain somewhat.  I will see her back after the study      Lumbar radiculitis  -     EMG W/ ULTRASOUND AND NERVE CONDUCTION TEST 2 Extremities; Future

## 2019-07-10 ENCOUNTER — TELEPHONE (OUTPATIENT)
Dept: SPINE | Facility: CLINIC | Age: 64
End: 2019-07-10

## 2019-07-10 NOTE — TELEPHONE ENCOUNTER
----- Message from Stacey M Lefort sent at 7/10/2019 10:38 AM CDT -----  Pt called and wanted to know if you would fill out the form so she could get the temp handicapp tag for her car.  She has an appt with Dr Yeager tomorrow - and would like to pick it up after her appt.

## 2019-07-11 ENCOUNTER — OFFICE VISIT (OUTPATIENT)
Dept: PHYSICAL MEDICINE AND REHAB | Facility: CLINIC | Age: 64
End: 2019-07-11
Payer: COMMERCIAL

## 2019-07-11 DIAGNOSIS — M54.16 LUMBAR RADICULITIS: ICD-10-CM

## 2019-07-11 PROCEDURE — 95910 PR NERVE CONDUCTION STUDY; 7-8 STUDIES: ICD-10-PCS | Mod: S$GLB,,, | Performed by: PHYSICAL MEDICINE & REHABILITATION

## 2019-07-11 PROCEDURE — 95886 PR EMG COMPLETE, W/ NERVE CONDUCTION STUDIES, 5+ MUSCLES: ICD-10-PCS | Mod: S$GLB,,, | Performed by: PHYSICAL MEDICINE & REHABILITATION

## 2019-07-11 PROCEDURE — 99499 NO LOS: ICD-10-PCS | Mod: S$GLB,,, | Performed by: PHYSICAL MEDICINE & REHABILITATION

## 2019-07-11 PROCEDURE — 95910 NRV CNDJ TEST 7-8 STUDIES: CPT | Mod: S$GLB,,, | Performed by: PHYSICAL MEDICINE & REHABILITATION

## 2019-07-11 PROCEDURE — 95886 MUSC TEST DONE W/N TEST COMP: CPT | Mod: S$GLB,,, | Performed by: PHYSICAL MEDICINE & REHABILITATION

## 2019-07-11 PROCEDURE — 99499 UNLISTED E&M SERVICE: CPT | Mod: S$GLB,,, | Performed by: PHYSICAL MEDICINE & REHABILITATION

## 2019-07-11 NOTE — PROGRESS NOTES
[facility]  Suzan Yeager D.O.  Physical Medicine and Rehab  Phone: [phone]  Fax: [phone]    Neurography & Electromyography Report              Patient: Pham Licea   Patient ID: [id number]   Sex: Female   YOB: 1955   Age: 63 Years 10 Months   Notes:     Last visit date: 7/11/2019             Visit date and time: 7/11/2019 07:40   Patient Age on Visit Date: 63 Years 10 Months   Referring Physician:     Diagnoses:        Leg pain       Sensory NCS      Nerve / Sites Rec. Site Onset Lat Peak Lat Ref. NP Amp Ref. PP Amp Ref. Segments Distance Velocity     ms ms ms µV µV µV µV  cm m/s   R Sural - Ankle (Calf)      Calf Ankle NR NR ?4.20 NR ?5.0 NR ?5.0 Calf - Ankle 14 NR   L Sural - Ankle (Calf)      Calf Ankle NR NR ?4.20 NR ?5.0 NR ?5.0 Calf - Ankle 14 NR   R Superficial peroneal - Ankle      Lat leg Ankle NR NR ?4.40 NR ?5.0 NR ?5.0 Lat leg - Ankle 14 NR   L Superficial peroneal - Ankle      Lat leg Ankle 4.53 5.21 ?4.40 20.6 ?5.0 13.6 ?5.0 Lat leg - Ankle 14 31           Motor NCS      Nerve / Sites Muscle Latency Ref. Amplitude Ref. Amp % Duration Segments Distance Lat Diff Velocity Ref.     ms ms mV mV % ms  cm ms m/s m/s   R Peroneal - EDB      Ankle EDB 4.53 ?6.20 4.7 ?2.0 100 7.03 Ankle - EDB 8         Fib head EDB 11.35  4.5  96.7 7.45 Fib head - Ankle 31 6.82 45 ?39      Pop fossa EDB 12.60  4.5  94.9 6.98 Pop fossa - Fib head 10 1.25 80 ?39   L Peroneal - EDB      Ankle EDB 4.48 ?6.20 4.9 ?2.0 100 7.66 Ankle - EDB 8         Fib head EDB 11.51  4.2  85.3 8.23 Fib head - Ankle 32 7.03 46 ?39      Pop fossa EDB 13.13  4.0  81.8  Pop fossa - Fib head 10 1.61 62 ?39   R Tibial - AH      Ankle AH 3.18 ?6.00 8.2 ?3.0 100 6.30 Ankle - AH 8         Pop fossa AH 12.50  0.5  6.04 6.35 Pop fossa - Ankle 36 9.32 39 ?39   L Tibial - AH      Ankle AH 3.80 ?6.00 3.8 ?3.0 100 6.67 Ankle - AH 8         Pop fossa AH 14.64  0.5  14  Pop fossa - Ankle 33 10.83 30 ?39           F   Wave      Nerve Fmin Ref.    ms ms   R Tibial - AH 50.83 ?58.00   L Tibial - AH 50.31 ?58.00           EMG Summary Table     Spontaneous MUAP Recruitment   Muscle IA Fib PSW Fasc H.F. Amp Dur. PPP Pattern   L. Biceps femoris (short head) N None None None None N N N N   R. Biceps femoris (short head) N None None None 1+ 1+ 1+ 1+ Reduced   L. Rectus femoris N None None None None N N N N   R. Rectus femoris N None None None 1+ 1+ 1+ 1+ Reduced   L. Vastus lateralis N None None None None N N N N   R. Vastus lateralis N None None None 1+ 1+ 1+ 1+ Reduced   L. Tibialis anterior N None None None None N N N N   R. Tibialis anterior N None None None 1+ 1+ 1+ 1+ Reduced   L. Gastrocnemius (Medial head) N None None None None N N N N   R. Gastrocnemius (Medial head) N None None None None N N N N   R. Gluteus medius N None None None None N N N N   L. Lumbar paraspinals (mid) N None None None None N N N N   R. Lumbar paraspinals (mid) N None None None None N N N N   L. Lumbar paraspinals (low) N None None None None N N N N   R. Lumbar paraspinals (low) N None None None None N N N N           Summary    The motor conduction test was performed on 4 nerve(s). The results were normal in 2 nerve(s): R Peroneal - EDB, L Peroneal - EDB. Results outside the specified normal range were found in 2 nerve(s), as follows:   In the R Tibial - AH study  o the take off velocity result was reduced for Pop fossa - Ankle segment   In the L Tibial - AH study  o the take off velocity result was reduced for Pop fossa - Ankle segment    The sensory conduction test had results outside of the specified normal range in all 4 of the tested nerves:   In the R Sural - Ankle (Calf) study  o the response was considered absent for Calf stimulation   In the L Sural - Ankle (Calf) study  o the response was considered absent for Calf stimulation   In the R Superficial peroneal - Ankle study  o the response was considered absent for Lat leg stimulation   In  the L Superficial peroneal - Ankle study  o the peak latency result was increased for Lat leg stimulation    The F wave study was normal in all 2 of the tested nerves: R Tibial - AH, L Tibial - AH.    The needle EMG examination was performed in 15 muscles. It was normal in 11 muscle(s): L. Biceps femoris (short head), L. Rectus femoris, L. Vastus lateralis, L. Tibialis anterior, L. Gastrocnemius (Medial head), R. Gastrocnemius (Medial head), R. Gluteus medius, L. Lumbar paraspinals (mid), R. Lumbar paraspinals (mid), L. Lumbar paraspinals (low), R. Lumbar paraspinals (low). The study was abnormal in 4 muscle(s), with the following distribution:   Abnormal spontaneous/insertional activity was found in R. Biceps femoris (short head), R. Rectus femoris, R. Vastus lateralis, R. Tibialis anterior.   The MUP waveform abnormality was found in R. Biceps femoris (short head), R. Rectus femoris, R. Vastus lateralis, R. Tibialis anterior.   Abnormal interference pattern was found in R. Biceps femoris (short head), R. Rectus femoris, R. Vastus lateralis, R. Tibialis anterior.              Conclusion:     Chronic right L4/L5 radiculopathy with NO active denervation  Mild sensorimotor axonal neuropathy          ____________________________  Suzan Yeager D.O.

## 2019-07-11 NOTE — LETTER
July 11, 2019      Julio Evans MD  [address] [place] [address] Dr  [address]  [place] [address]           [place] [facility]  [address] [place] [address] Dr [address]  [facility] [address]  Phone: [phone]  Fax: [phone]          Patient: Pham Licea   MR Number: [id number]   YOB: 1955   Date of Visit: 7/11/2019       Dear Dr. Julio Evans:    Thank you for referring Pham Licea to me for evaluation. Attached you will find relevant portions of my assessment and plan of care.    If you have questions, please do not hesitate to call me. I look forward to following Pham Licea along with you.    Sincerely,    Suzan Yeager,     Enclosure  CC:  No Recipients    If you would like to receive this communication electronically, please contact [email or web address] or [phone] to request more information on [employer] access.    For providers and/or their staff who would like to refer a patient to [facility], please contact us through our one-stop-shop provider referral line, [facility], at [phone].    If you feel you have received this communication in error or would no longer like to receive these types of communications, please e-mail [email or web address]

## 2019-07-12 ENCOUNTER — TELEPHONE (OUTPATIENT)
Dept: SPINE | Facility: CLINIC | Age: 64
End: 2019-07-12

## 2019-07-12 NOTE — TELEPHONE ENCOUNTER
Per Dr. Evans informed patient to used heating pad, warm soaks in a tub and to take otc IBP or aleve. Instructed patient that if symptoms become severe that she should go to the ER. Patient accepted and voiced understanding.

## 2019-07-12 NOTE — TELEPHONE ENCOUNTER
----- Message from Stacey M Lefort sent at 7/12/2019  1:25 PM CDT -----  Pt had the EMG done yesterday and said she is experiencing some pretty significant pain - she would like a call back at [phone].  Thank you.

## 2019-07-22 ENCOUNTER — OFFICE VISIT (OUTPATIENT)
Dept: SPINE | Facility: CLINIC | Age: 64
End: 2019-07-22
Payer: COMMERCIAL

## 2019-07-22 VITALS
DIASTOLIC BLOOD PRESSURE: 80 MMHG | BODY MASS INDEX: 33.29 KG/M2 | HEART RATE: 79 BPM | SYSTOLIC BLOOD PRESSURE: 127 MMHG | WEIGHT: 195 LBS | HEIGHT: 64 IN

## 2019-07-22 DIAGNOSIS — M54.16 LUMBAR RADICULITIS: Primary | ICD-10-CM

## 2019-07-22 DIAGNOSIS — G60.9 IDIOPATHIC PERIPHERAL NEUROPATHY: ICD-10-CM

## 2019-07-22 PROCEDURE — 3008F BODY MASS INDEX DOCD: CPT | Mod: ,,, | Performed by: PHYSICAL MEDICINE & REHABILITATION

## 2019-07-22 PROCEDURE — 3008F PR BODY MASS INDEX (BMI) DOCUMENTED: ICD-10-PCS | Mod: ,,, | Performed by: PHYSICAL MEDICINE & REHABILITATION

## 2019-07-22 PROCEDURE — 99213 OFFICE O/P EST LOW 20 MIN: CPT | Mod: ,,, | Performed by: PHYSICAL MEDICINE & REHABILITATION

## 2019-07-22 PROCEDURE — 99213 PR OFFICE/OUTPT VISIT, EST, LEVL III, 20-29 MIN: ICD-10-PCS | Mod: ,,, | Performed by: PHYSICAL MEDICINE & REHABILITATION

## 2019-07-22 NOTE — PROGRESS NOTES
SUBJECTIVE:    Patient ID: Pham Licea is a 63 y.o. female.    Chief Complaint: Follow-up    She is here to review her EMG and nerve conduction studies which were done to evaluate her complaint of primarily right leg pain that radiates down the anterolateral portion of the leg generally stopping at about the level of the knee.  EMG shows chronic right L4/L5 radiculopathy with no active denervation and mild sensory motor axonal neuropathy.  Clinically she is about the same.  Still complaining of 7/10 discomfort.  On Neurontin 3 times per day.        Past Medical History:   Diagnosis Date    Fatty liver     Former smoker     Osteoarthritis      Social History     Socioeconomic History    Marital status:      Spouse name: Not on file    Number of children: Not on file    Years of education: Not on file    Highest education level: Not on file   Occupational History    Not on file   Social Needs    Financial resource strain: Not on file    Food insecurity:     Worry: Not on file     Inability: Not on file    Transportation needs:     Medical: Not on file     Non-medical: Not on file   Tobacco Use    Smoking status: Former Smoker     Packs/day: 1.00     Years: 40.00     Pack years: 40.00     Last attempt to quit: 2009     Years since quittin.7    Smokeless tobacco: Never Used    Tobacco comment: quit    Substance and Sexual Activity    Alcohol use: Yes     Comment: once per month    Drug use: No    Sexual activity: Yes   Lifestyle    Physical activity:     Days per week: Not on file     Minutes per session: Not on file    Stress: Not on file   Relationships    Social connections:     Talks on phone: Not on file     Gets together: Not on file     Attends Oriental orthodox service: Not on file     Active member of club or organization: Not on file     Attends meetings of clubs or organizations: Not on file     Relationship status: Not on file   Other Topics Concern    Not on file  "  Social History Narrative    Not on file     Past Surgical History:   Procedure Laterality Date    APPENDECTOMY       SECTION      x3    COLONOSCOPY  ~    [facility]: normal findings per patient report    ESOPHAGOGASTRODUODENOSCOPY (EGD) N/A 2016    [facility] Jr[facility]    HYSTERECTOMY      one ovary conserved    Injection-steroid-epidural-lumbar N/A 10/12/2018    [facility]    Injection-steroid-epidural-lumbar L5-S1 N/A 2019    [facility]    UPPER GASTROINTESTINAL ENDOSCOPY       Family History   Problem Relation Age of Onset    Ovarian cancer Mother     Heart disease Mother     Osteoporosis Mother     Arthritis Mother     Hypertension Father     Stroke Father 50    Brain Hemorrhage Father     Aneurysm Father     Osteoarthritis Sister     Arthritis Sister     Hypertension Sister     Obesity Sister     Breast cancer Neg Hx     Colon cancer Neg Hx     Colon polyps Neg Hx     Crohn's disease Neg Hx     Ulcerative colitis Neg Hx      Vitals:    19 0824   BP: 127/80   Pulse: 79   Weight: 88.5 kg (195 lb)   Height: 5' 4" (1.626 m)       Review of Systems   Constitutional: Negative for chills, diaphoresis, fatigue, fever and unexpected weight change.   HENT: Negative for trouble swallowing.    Eyes: Negative for visual disturbance.   Respiratory: Negative for shortness of breath.    Cardiovascular: Negative for chest pain.   Gastrointestinal: Negative for abdominal pain, constipation, nausea and vomiting.   Genitourinary: Negative for difficulty urinating.   Musculoskeletal: Negative for arthralgias, back pain, gait problem, joint swelling, myalgias, neck pain and neck stiffness.   Neurological: Negative for dizziness, speech difficulty, weakness, light-headedness, numbness and headaches.          Objective:      Physical Exam   Constitutional: She is oriented to person, place, and time. " She appears well-developed and well-nourished.   Neurological: She is alert and oriented to person, place, and time.           Assessment:       1. Lumbar radiculitis    2. Idiopathic peripheral neuropathy           Plan:     continue current treatment.  I am going to refer her for a 2nd opinion with neurology particularly regarding the peripheral neuropathy.  She really has not responded to epidural steroid injections and I am not convinced that neurosurgical intervention would be beneficial.  She can follow up with me in about 6 weeks or as needed        Lumbar radiculitis    Idiopathic peripheral neuropathy  -     Ambulatory Referral to Neurology

## 2019-07-24 ENCOUNTER — TELEPHONE (OUTPATIENT)
Dept: NEUROLOGY | Facility: CLINIC | Age: 64
End: 2019-07-24

## 2019-07-25 ENCOUNTER — TELEPHONE (OUTPATIENT)
Dept: NEUROLOGY | Facility: CLINIC | Age: 64
End: 2019-07-25

## 2019-07-25 NOTE — TELEPHONE ENCOUNTER
----- Message from Minerva Lorenzana MD sent at 7/25/2019 12:17 PM CDT -----      ----- Message -----  From: Stacey M Lefort  Sent: 7/25/2019   9:18 AM  To: Minerva Lorenzana MD    Pt is returning your call - she is anxious to make an appointment - she apologizes for missing your call yesterday.  She can be reached at [phone].  Thank you.

## 2019-08-05 ENCOUNTER — TELEPHONE (OUTPATIENT)
Dept: NEUROSURGERY | Facility: CLINIC | Age: 64
End: 2019-08-05

## 2019-08-05 NOTE — TELEPHONE ENCOUNTER
Patient called wanting to schedule appointment with neurology. She has a referral from Dr. Evans. Thanks!

## 2019-08-05 NOTE — TELEPHONE ENCOUNTER
Called pt and scheduled consult appt with Dr. Man for neuropathy per referral from Dr. Evans. Pt added to waiting list. Date/time/location confirmed; verbalized understanding.

## 2019-08-09 ENCOUNTER — TELEPHONE (OUTPATIENT)
Dept: SPINE | Facility: CLINIC | Age: 64
End: 2019-08-09

## 2019-08-09 RX ORDER — GABAPENTIN 300 MG/1
CAPSULE ORAL
Qty: 120 CAPSULE | Refills: 2 | Status: SHIPPED | OUTPATIENT
Start: 2019-08-09 | End: 2019-09-13

## 2019-08-09 NOTE — TELEPHONE ENCOUNTER
----- Message from Stacey M Lefort sent at 8/9/2019  8:58 AM CDT -----  Pt called and asked for a refill on her gabapentin. She asked that it be sent to [facility] - she asked for a return call at [phone].  Thank you.

## 2019-08-13 RX ORDER — DICLOFENAC SODIUM 10 MG/G
GEL TOPICAL
Qty: 100 G | Refills: 11 | Status: ON HOLD | OUTPATIENT
Start: 2019-08-13 | End: 2021-05-11 | Stop reason: HOSPADM

## 2019-08-14 ENCOUNTER — PATIENT OUTREACH (OUTPATIENT)
Dept: ADMINISTRATIVE | Facility: HOSPITAL | Age: 64
End: 2019-08-14

## 2019-08-16 ENCOUNTER — OFFICE VISIT (OUTPATIENT)
Dept: FAMILY MEDICINE | Facility: CLINIC | Age: 64
End: 2019-08-16
Payer: COMMERCIAL

## 2019-08-16 VITALS
HEART RATE: 79 BPM | OXYGEN SATURATION: 95 % | BODY MASS INDEX: 35.54 KG/M2 | HEIGHT: 64 IN | DIASTOLIC BLOOD PRESSURE: 72 MMHG | TEMPERATURE: 98 F | RESPIRATION RATE: 12 BRPM | SYSTOLIC BLOOD PRESSURE: 119 MMHG | WEIGHT: 208.19 LBS

## 2019-08-16 DIAGNOSIS — Z23 NEED FOR SHINGLES VACCINE: ICD-10-CM

## 2019-08-16 DIAGNOSIS — Z23 TETANUS-DIPHTHERIA VACCINATION ADMINISTERED AT CURRENT VISIT: ICD-10-CM

## 2019-08-16 DIAGNOSIS — Z12.39 BREAST CANCER SCREENING: ICD-10-CM

## 2019-08-16 DIAGNOSIS — Z12.9 SCREENING FOR CANCER: ICD-10-CM

## 2019-08-16 DIAGNOSIS — Z76.89 ENCOUNTER TO ESTABLISH CARE: Primary | ICD-10-CM

## 2019-08-16 DIAGNOSIS — Z79.899 HIGH RISK MEDICATION USE: ICD-10-CM

## 2019-08-16 DIAGNOSIS — Z12.2 ENCOUNTER FOR SCREENING FOR LUNG CANCER: ICD-10-CM

## 2019-08-16 DIAGNOSIS — E66.09 CLASS 2 OBESITY DUE TO EXCESS CALORIES WITHOUT SERIOUS COMORBIDITY WITH BODY MASS INDEX (BMI) OF 35.0 TO 35.9 IN ADULT: ICD-10-CM

## 2019-08-16 PROCEDURE — 90471 TDAP VACCINE GREATER THAN OR EQUAL TO 7YO IM: ICD-10-PCS | Mod: S$GLB,,, | Performed by: NURSE PRACTITIONER

## 2019-08-16 PROCEDURE — 3008F PR BODY MASS INDEX (BMI) DOCUMENTED: ICD-10-PCS | Mod: CPTII,S$GLB,, | Performed by: NURSE PRACTITIONER

## 2019-08-16 PROCEDURE — 90471 IMMUNIZATION ADMIN: CPT | Mod: S$GLB,,, | Performed by: NURSE PRACTITIONER

## 2019-08-16 PROCEDURE — 90715 TDAP VACCINE 7 YRS/> IM: CPT | Mod: S$GLB,,, | Performed by: NURSE PRACTITIONER

## 2019-08-16 PROCEDURE — 3008F BODY MASS INDEX DOCD: CPT | Mod: CPTII,S$GLB,, | Performed by: NURSE PRACTITIONER

## 2019-08-16 PROCEDURE — 99396 PREV VISIT EST AGE 40-64: CPT | Mod: 25,S$GLB,, | Performed by: NURSE PRACTITIONER

## 2019-08-16 PROCEDURE — 90715 TDAP VACCINE GREATER THAN OR EQUAL TO 7YO IM: ICD-10-PCS | Mod: S$GLB,,, | Performed by: NURSE PRACTITIONER

## 2019-08-16 PROCEDURE — 99396 PR PREVENTIVE VISIT,EST,40-64: ICD-10-PCS | Mod: 25,S$GLB,, | Performed by: NURSE PRACTITIONER

## 2019-08-16 NOTE — PATIENT INSTRUCTIONS
1- 1200 meliton a day with calorie counting  2- walk 30 min a day  3- increase water intake  4- do not eat after 6 pm and use smaller plates for smaller portions.   5- Dtap today and shingrix sent to the pharmacy  6- fasting labs, mammo and chest CT ordered for screening, pt agreeable  7- calcium twice daily for osteopenia    --------------------------------------------------------------    Vitamin D  Does this test have other names?  25-hydroxyvitamin D (25-high-DROX-ee-VIE-tuh-min D), 25(OH)D  What is this test?  Vitamin D is mainly found in fortified dairy foods, juice, breakfast cereal, and certain fish. This vitamin plays many roles in the body. But because it helps the body absorb calcium from foods and supplements, it's particularly important for bone health. Vitamin D has many additional roles in the body.  Vitamin D comes in several forms. When ultraviolet light, such as sunlight, hits your skin, it creates vitamin D3. D2 is used to fortify dairy foods. Both of these are further processed by your liver and kidneys into a form your body can use. Most tests for vitamin D check the level of a form circulating in the body called 25-hydroxyvitamin D, also called 25(OH)D.   Why do I need this test?  Vitamin D testing has become much more popular in recent years. Your healthcare provider may check your vitamin D levels to find out if you have any risks to bone health. These might be:  · Low calcium  · Soft bones caused by low vitamin D or problems using it (osteomalacia)  · Osteopenia  · Osteoporosis  · Rickets, in children  You may also need this test if you are at risk for low vitamin D levels. Risks include:  · Being an older adult  · Having difficulty absorbing fat from your diet  · Having chronic kidney disease  · Have dark skin pigmentation  · Being a  baby  Vitamin D has many effects in the body. You may need this test to help your provider diagnose or treat:  · Problems with the parathyroid  gland  · Cancer  · Autoimmune diseases such as multiple sclerosis and Crohn's disease  · Psoriasis  · Asthma  · Weakness or falls    What other tests might I have along with this test?  A healthcare provider may also want to check your parathyroid hormone levels and your calcium levels.   What do my test results mean?  A result for a lab test may be affected by many things, including the method the laboratory uses to do the test. If your test results are different from the normal value, you may not have a problem. To learn what the results mean for you, talk with your healthcare provider.  Children and adults need more than 30 nanograms per milliliter (ng/ml) of vitamin D. The optimal level of 25(OH)D is usually between 30 and 60 ng/mL. Recommended daily amounts range from 400 to 800 international units (IU) per day based on your age.  Levels lower than normal can mean you are:  · Not making enough vitamin D on your own  · Not getting enough vitamin D in your diet  · Not absorbing vitamin D from your food as you should  Lower levels may also mean that your body is not converting the vitamin as it should. This might be because of kidney or liver disease.  Above-normal levels may be a sign that you're taking too much in supplement form.   How is this test done?  The test requires a blood sample, which is drawn through a needle from a vein in your arm.  Does this test pose any risks?  Taking a blood sample with a needle carries risks that include bleeding, infection, bruising, and a sense of lightheadedness. When the needle pricks your arm, you may feel a slight stinging sensation or pain. Afterward, the site may be slightly sore.   What might affect my test results?  The amount of time you spend in the sunlight, your diet, and whether you take vitamin D in supplement form can affect your vitamin D levels. Ask your healthcare provider if any health conditions you have or medicines you take could affect your  results.  How do I get ready for this test?  Tell your healthcare provider if you take vitamin D supplements. Also be sure your provider knows about all medicines, herbs, vitamins, and supplements you are taking. This includes medicines that don't need a prescription and any illicit drugs you may use.   © 8786-0989 [employer]. [address] [place] [address], [place] [address]. All rights reserved. This information is not intended as a substitute for professional medical care. Always follow your healthcare professional's instructions.

## 2019-08-16 NOTE — PROGRESS NOTES
Subjective:       Patient ID: Pham Licea is a 64 y.o. female.    Chief Complaint: Establish Care (Weight gain)  New patient presetns to [facility] care. PMH: lumbar pain with right leg neuropathy with moderate relief provided by gabapentin. She has underwent inj and therapy without relief yet has not been seen by a chiropractor. Previous chart encounters viewed. She is due for labs and screening, requesting all be updated. She is under the care of Ortho with recent abnormal EMG thus referred to Neurology for a second opinion.     Past Medical History:   Diagnosis Date    Fatty liver 2015    Former smoker     Osteoarthritis     Ulcer of abdomen wall 2016       Past Surgical History:   Procedure Laterality Date    APPENDECTOMY       SECTION  , , 1983    x3    COLONOSCOPY  ~    [facility]: normal findings per patient report    ESOPHAGOGASTRODUODENOSCOPY (EGD) N/A 2016    Performed by Scooby Pink Jr[facility] at [facility]    HYSTERECTOMY  1993    one ovary conserved    Injection-steroid-epidural-lumbar N/A 10/12/2018    Performed [facility] Kal [facility] at [facility]    Injection-steroid-epidural-lumbar L5-S1 N/A 2019    [facility] at [facility]    UPPER GASTROINTESTINAL ENDOSCOPY  2016        Social History     Socioeconomic History    Marital status:      Spouse name: Not on file    Number of children: 4    Years of education: Not on file    Highest education level: Some college, no degree   Occupational History    Occupation: sale specialist-    Social Needs    Financial resource strain: Not on file    Food insecurity:     Worry: Not on file     Inability: Not on file    Transportation needs:     Medical: Not on file     Non-medical: Not on file   Tobacco Use    Smoking status: Former Smoker     Packs/day: 1.00     Years: 40.00     Pack years: 40.00     Last attempt to quit: 2009     Years since quittin.7    Smokeless tobacco: Never Used     Tobacco comment: quit 2009   Substance and Sexual Activity    Alcohol use: Yes     Comment: Not often - one glass of wine every now and then    Drug use: Not Currently     Types: Marijuana     Comment: in 1970s    Sexual activity: Not Currently   Lifestyle    Physical activity:     Days per week: Not on file     Minutes per session: Not on file    Stress: Not on file   Relationships    Social connections:     Talks on phone: Not on file     Gets together: Not on file     Attends [employer] service: Not on file     Active member of club or organization: Not on file     Attends meetings of clubs or organizations: Not on file     Relationship status: Not on file   Other Topics Concern    Not on file   Social History Narrative    Not on file       Family History   Problem Relation Age of Onset    Ovarian cancer Mother     Heart disease Mother     Osteoporosis Mother     Arthritis Mother     Hypertension Father     Stroke Father 50    Brain Hemorrhage Father     Aneurysm Father     Osteoarthritis Sister     Arthritis Sister     Hypertension Sister     Obesity Sister     Breast cancer Neg Hx     Colon cancer Neg Hx     Colon polyps Neg Hx     Crohn's disease Neg Hx     Ulcerative colitis Neg Hx        Review of patient's allergies indicates:  No Known Allergies       Current Outpatient Medications:     acetaminophen (TYLENOL) 325 MG tablet, Take 325 mg by mouth every 6 (six) hours as needed for Pain., Disp: , Rfl:     diclofenac sodium (VOLTAREN) 1 % Gel, APPLY AS DIRECTED, Disp: 100 g, Rfl: 11    gabapentin (NEURONTIN) 300 MG capsule, Take 1 tablet in the a.m., 1 tablet 8 hr later and 2 tablets q.h.s., Disp: 120 capsule, Rfl: 2    ranitidine (ZANTAC) 150 MG tablet, Take 150 mg by mouth nightly., Disp: , Rfl:     Back Pain   This is a chronic problem. The current episode started more than 1 year ago. The problem occurs intermittently. The problem has been gradually worsening since onset.  The pain is present in the lumbar spine. The quality of the pain is described as aching, burning, cramping, shooting and stabbing. The pain radiates to the right knee, right foot and right thigh. The pain is at a severity of 6/10. The pain is worse during the day. The symptoms are aggravated by twisting, standing, lying down and bending (walking). Associated symptoms include leg pain, numbness, tingling and weakness. Pertinent negatives include no perianal numbness. Risk factors include menopause, obesity, lack of exercise, poor posture and sedentary lifestyle. She has tried analgesics, chiropractic manipulation, NSAIDs, heat and bed rest for the symptoms. The treatment provided moderate relief.     Review of Systems   Constitutional: Negative.    HENT: Negative.    Eyes: Negative.    Respiratory: Negative.    Cardiovascular: Negative.    Gastrointestinal: Negative.    Endocrine: Negative.    Genitourinary: Negative.    Musculoskeletal: Positive for back pain.   Skin: Negative.    Allergic/Immunologic: Negative.    Neurological: Positive for tingling, weakness and numbness.   Hematological: Negative.    Psychiatric/Behavioral: Negative.        Objective:      Physical Exam   Constitutional: She is oriented to person, place, and time. She appears well-developed and well-nourished.   HENT:   Head: Normocephalic and atraumatic.   Mouth/Throat: Oropharynx is clear and moist.   Eyes: Pupils are equal, round, and reactive to light. Conjunctivae are normal. No scleral icterus.   Neck: Normal range of motion. Neck supple. No thyromegaly present.   Cardiovascular: Normal rate and regular rhythm.   No murmur heard.  Pulmonary/Chest: Effort normal and breath sounds normal. No respiratory distress.   Abdominal: Soft. Bowel sounds are normal. She exhibits no distension. There is no tenderness.   Musculoskeletal: Normal range of motion. She exhibits no edema.   Neurological: She is alert and oriented to person, place, and time. A  sensory deficit is present. She exhibits normal muscle tone. Coordination abnormal.   Skin: Skin is warm. Capillary refill takes less than 2 seconds. No rash noted.   Psychiatric: She has a normal mood and affect. Her behavior is normal. Judgment and thought content normal.   Nursing note and vitals reviewed.      Assessment:       1. Encounter to establish care    2. Breast cancer screening    3. Tetanus-diphtheria vaccination administered at current visit    4. Need for shingles vaccine    5. High risk medication use    6. Class 2 obesity due to excess calories without serious comorbidity with body mass index (BMI) of 35.0 to 35.9 in adult    7. Screening for cancer    8. Encounter for screening for lung cancer        Plan:     1- 1200 meliton a day with calorie counting  2- walk 30 min a day  3- increase water intake  4- do not eat after 6 pm and use smaller plates for smaller portions.   5- Dtap today and shingrix sent to the pharmacy  6- fasting labs, mammo and chest CT ordered for screening, pt agreeable  7- calcium twice daily for osteopenia  8- RTC 4 weeks to discuss all results         Encounter to establish care    Breast cancer screening  -     Mammo Digital Screening Bilateral With CAD; Future; Expected date: 02/14/2020    Tetanus-diphtheria vaccination administered at current visit  -     (In Office Administered) Tdap Vaccine    Need for shingles vaccine  -     Zoster Recombinant Vaccine    High risk medication use  -     Vitamin D; Future; Expected date: 08/16/2019  -     TSH; Future; Expected date: 08/16/2019  -     T4, free; Future; Expected date: 08/16/2019  -     Lipid panel; Future; Expected date: 08/16/2019  -     Comprehensive metabolic panel; Future; Expected date: 08/16/2019  -     CBC auto differential; Future; Expected date: 08/16/2019  -     Hemoglobin A1c; Future; Expected date: 08/16/2019    Class 2 obesity due to excess calories without serious comorbidity with body mass index (BMI) of 35.0  to 35.9 in adult  -     Vitamin D; Future; Expected date: 08/16/2019  -     TSH; Future; Expected date: 08/16/2019  -     T4, free; Future; Expected date: 08/16/2019  -     Lipid panel; Future; Expected date: 08/16/2019  -     Comprehensive metabolic panel; Future; Expected date: 08/16/2019  -     CBC auto differential; Future; Expected date: 08/16/2019  -     Hemoglobin A1c; Future; Expected date: 08/16/2019    Screening for cancer    Encounter for screening for lung cancer  -     CT Chest Lung Screening Low Dose; Future; Expected date: 08/16/2019        Risks, benefits, and side effects were discussed with the patient. All questions were answered to the fullest satisfaction of the patient, and pt verbalized understanding and agreement to treatment plan. Pt was to call with any new or worsening symptoms, or present to the ER.

## 2019-08-23 ENCOUNTER — HOSPITAL ENCOUNTER (OUTPATIENT)
Dept: RADIOLOGY | Facility: HOSPITAL | Age: 64
Discharge: HOME | End: 2019-08-23
Attending: NURSE PRACTITIONER
Payer: COMMERCIAL

## 2019-08-23 VITALS — BODY MASS INDEX: 35 KG/M2 | HEIGHT: 64 IN | WEIGHT: 205 LBS

## 2019-08-23 DIAGNOSIS — Z12.39 BREAST CANCER SCREENING: ICD-10-CM

## 2019-08-23 DIAGNOSIS — Z12.2 ENCOUNTER FOR SCREENING FOR LUNG CANCER: ICD-10-CM

## 2019-08-23 PROCEDURE — G0297 LDCT FOR LUNG CA SCREEN: HCPCS | Mod: 26,,, | Performed by: RADIOLOGY

## 2019-08-23 PROCEDURE — 77067 SCR MAMMO BI INCL CAD: CPT | Mod: 26,,, | Performed by: RADIOLOGY

## 2019-08-23 PROCEDURE — 77067 SCR MAMMO BI INCL CAD: CPT | Mod: TC

## 2019-08-23 PROCEDURE — 77067 MAMMO DIGITAL SCREENING BILAT WITH TOMOSYNTHESIS_CAD: ICD-10-PCS | Mod: 26,,, | Performed by: RADIOLOGY

## 2019-08-23 PROCEDURE — G0297 CT CHEST LUNG SCREENING LOW DOSE: ICD-10-PCS | Mod: 26,,, | Performed by: RADIOLOGY

## 2019-08-23 PROCEDURE — 77063 BREAST TOMOSYNTHESIS BI: CPT | Mod: 26,,, | Performed by: RADIOLOGY

## 2019-08-23 PROCEDURE — G0297 LDCT FOR LUNG CA SCREEN: HCPCS | Mod: TC

## 2019-08-23 PROCEDURE — 77063 MAMMO DIGITAL SCREENING BILAT WITH TOMOSYNTHESIS_CAD: ICD-10-PCS | Mod: 26,,, | Performed by: RADIOLOGY

## 2019-08-27 ENCOUNTER — PATIENT MESSAGE (OUTPATIENT)
Dept: FAMILY MEDICINE | Facility: CLINIC | Age: 64
End: 2019-08-27

## 2019-08-29 ENCOUNTER — PATIENT OUTREACH (OUTPATIENT)
Dept: ADMINISTRATIVE | Facility: HOSPITAL | Age: 64
End: 2019-08-29

## 2019-08-29 ENCOUNTER — PATIENT MESSAGE (OUTPATIENT)
Dept: FAMILY MEDICINE | Facility: CLINIC | Age: 64
End: 2019-08-29

## 2019-09-04 ENCOUNTER — PATIENT OUTREACH (OUTPATIENT)
Dept: ADMINISTRATIVE | Facility: HOSPITAL | Age: 64
End: 2019-09-04

## 2019-09-11 ENCOUNTER — PATIENT OUTREACH (OUTPATIENT)
Dept: ADMINISTRATIVE | Facility: HOSPITAL | Age: 64
End: 2019-09-11

## 2019-09-13 ENCOUNTER — OFFICE VISIT (OUTPATIENT)
Dept: FAMILY MEDICINE | Facility: CLINIC | Age: 64
End: 2019-09-13
Payer: COMMERCIAL

## 2019-09-13 ENCOUNTER — TELEPHONE (OUTPATIENT)
Dept: NEUROLOGY | Facility: CLINIC | Age: 64
End: 2019-09-13

## 2019-09-13 VITALS
WEIGHT: 206.19 LBS | BODY MASS INDEX: 35.2 KG/M2 | HEIGHT: 64 IN | SYSTOLIC BLOOD PRESSURE: 126 MMHG | OXYGEN SATURATION: 98 % | RESPIRATION RATE: 16 BRPM | TEMPERATURE: 98 F | HEART RATE: 74 BPM | DIASTOLIC BLOOD PRESSURE: 76 MMHG

## 2019-09-13 DIAGNOSIS — Z23 NEED FOR VACCINATION FOR H FLU TYPE B: ICD-10-CM

## 2019-09-13 DIAGNOSIS — M25.551 RIGHT HIP PAIN: ICD-10-CM

## 2019-09-13 DIAGNOSIS — R91.1 PULMONARY NODULE: ICD-10-CM

## 2019-09-13 DIAGNOSIS — E55.9 VITAMIN D DEFICIENCY: Primary | ICD-10-CM

## 2019-09-13 DIAGNOSIS — E66.09 CLASS 2 OBESITY DUE TO EXCESS CALORIES WITHOUT SERIOUS COMORBIDITY WITH BODY MASS INDEX (BMI) OF 35.0 TO 35.9 IN ADULT: ICD-10-CM

## 2019-09-13 DIAGNOSIS — M15.9 PRIMARY OSTEOARTHRITIS INVOLVING MULTIPLE JOINTS: ICD-10-CM

## 2019-09-13 PROCEDURE — 3008F BODY MASS INDEX DOCD: CPT | Mod: CPTII,S$GLB,, | Performed by: NURSE PRACTITIONER

## 2019-09-13 PROCEDURE — 99214 PR OFFICE/OUTPT VISIT, EST, LEVL IV, 30-39 MIN: ICD-10-PCS | Mod: S$GLB,,, | Performed by: NURSE PRACTITIONER

## 2019-09-13 PROCEDURE — 99214 OFFICE O/P EST MOD 30 MIN: CPT | Mod: S$GLB,,, | Performed by: NURSE PRACTITIONER

## 2019-09-13 PROCEDURE — 3008F PR BODY MASS INDEX (BMI) DOCUMENTED: ICD-10-PCS | Mod: CPTII,S$GLB,, | Performed by: NURSE PRACTITIONER

## 2019-09-13 RX ORDER — MELOXICAM 7.5 MG/1
7.5 TABLET ORAL DAILY
Qty: 90 TABLET | Refills: 1 | Status: SHIPPED | OUTPATIENT
Start: 2019-09-13 | End: 2020-03-06 | Stop reason: SDUPTHER

## 2019-09-13 RX ORDER — GABAPENTIN 300 MG/1
300 CAPSULE ORAL 3 TIMES DAILY
Qty: 90 CAPSULE | Refills: 2
Start: 2019-09-13 | End: 2020-01-07

## 2019-09-13 NOTE — TELEPHONE ENCOUNTER
Called pt to reschedule consult appt with Dr. Man for neuropathy; offered next available date and apologized; pt refusing to reschedule at this time; offered contact for [facility]; states that she will call them; verbalized understanding.

## 2019-09-13 NOTE — PATIENT INSTRUCTIONS
Vitamin D  Does this test have other names?  25-hydroxyvitamin D (25-high-DROX-ee-VIE-tuh-min D), 25(OH)D  What is this test?  Vitamin D is mainly found in fortified dairy foods, juice, breakfast cereal, and certain fish. This vitamin plays many roles in the body. But because it helps the body absorb calcium from foods and supplements, it's particularly important for bone health. Vitamin D has many additional roles in the body.  Vitamin D comes in several forms. When ultraviolet light, such as sunlight, hits your skin, it creates vitamin D3. D2 is used to fortify dairy foods. Both of these are further processed by your liver and kidneys into a form your body can use. Most tests for vitamin D check the level of a form circulating in the body called 25-hydroxyvitamin D, also called 25(OH)D.   Why do I need this test?  Vitamin D testing has become much more popular in recent years. Your healthcare provider may check your vitamin D levels to find out if you have any risks to bone health. These might be:  · Low calcium  · Soft bones caused by low vitamin D or problems using it (osteomalacia)  · Osteopenia  · Osteoporosis  · Rickets, in children  You may also need this test if you are at risk for low vitamin D levels. Risks include:  · Being an older adult  · Having difficulty absorbing fat from your diet  · Having chronic kidney disease  · Have dark skin pigmentation  · Being a  baby  Vitamin D has many effects in the body. You may need this test to help your provider diagnose or treat:  · Problems with the parathyroid gland  · Cancer  · Autoimmune diseases such as multiple sclerosis and Crohn's disease  · Psoriasis  · Asthma  · Weakness or falls    What other tests might I have along with this test?  A healthcare provider may also want to check your parathyroid hormone levels and your calcium levels.   What do my test results mean?  A result for a lab test may be affected by many things, including the method  the laboratory uses to do the test. If your test results are different from the normal value, you may not have a problem. To learn what the results mean for you, talk with your healthcare provider.  Children and adults need more than 30 nanograms per milliliter (ng/ml) of vitamin D. The optimal level of 25(OH)D is usually between 30 and 60 ng/mL. Recommended daily amounts range from 400 to 800 international units (IU) per day based on your age.  Levels lower than normal can mean you are:  · Not making enough vitamin D on your own  · Not getting enough vitamin D in your diet  · Not absorbing vitamin D from your food as you should  Lower levels may also mean that your body is not converting the vitamin as it should. This might be because of kidney or liver disease.  Above-normal levels may be a sign that you're taking too much in supplement form.   How is this test done?  The test requires a blood sample, which is drawn through a needle from a vein in your arm.  Does this test pose any risks?  Taking a blood sample with a needle carries risks that include bleeding, infection, bruising, and a sense of lightheadedness. When the needle pricks your arm, you may feel a slight stinging sensation or pain. Afterward, the site may be slightly sore.   What might affect my test results?  The amount of time you spend in the sunlight, your diet, and whether you take vitamin D in supplement form can affect your vitamin D levels. Ask your healthcare provider if any health conditions you have or medicines you take could affect your results.  How do I get ready for this test?  Tell your healthcare provider if you take vitamin D supplements. Also be sure your provider knows about all medicines, herbs, vitamins, and supplements you are taking. This includes medicines that don't need a prescription and any illicit drugs you may use.   © 0929-8510 [employer]. [address] [place] [address], [place] [address]. All rights reserved.  This information is not intended as a substitute for professional medical care. Always follow your healthcare professional's instructions.        Understanding Gastric Ulcers    A gastric ulcer is an open sore in the stomach lining. It is sometimes called a peptic ulcer. This is a more general term for ulcers that may be in the stomach or the upper part of the small intestine. Ulcers can cause pain. But they may also have no symptoms for a long time.  What causes gastric ulcers?  Gastric ulcers have a few common causes. To find the cause of your ulcer, your healthcare provider will give you an exam and take your health history. He or she may also order some tests. The main causes of gastric ulcers include:  · Infection with the H. pylori (Helicobacter pylori) bacteria. This damages the stomach lining. Digestive juices can then harm the digestive tract.  · Long-term use of some over-the-counter pain medicines. This reduces the bodys ability to protect the stomach from damage.  Other causes of gastric ulcers include:  · Heavy alcohol use  · Having a family history of ulcers  · In rare cases, a tumor in the digestive tract may cause an ulcer  Symptoms of a gastric ulcer  Ulcer symptoms may appear and then go away for a time. Symptoms of a gastric ulcer may include:  · Stomach pain, often a dull or burning feeling toward the top of your belly  · Feeling full or bloated  · Heartburn or acid reflux  · Upset stomach (nausea) or vomiting  · Vomiting blood  · Lack of appetite  · Weight loss  · Black stool  · Red blood in the stool  Treatment for a gastric ulcer  Treatment for gastric ulcers may depend on what is causing them. Treatment may include:  · Not using over-the-counter medicines. You will likely need to stop taking these medicines. But in some cases these medicines cant be safely stopped. Check with your healthcare provider to see what is best for you.   · Taking medicines to ease symptoms. These medicines may  help to reduce the amount of acid your stomach makes. They also may help coat your stomach lining.  · Taking antibiotics. If your ulcer was caused by H. pylori, your provider will likely prescribe antibiotics to get rid of the infection.  · Having an endoscopy. This is often done to check the stomach and diagnose the ulcer. In some cases it can also treat the ulcer. It involves passing a flexible tube through your mouth into your stomach and small intestine.  · Using a tube (catheter) to stop the bleeding. A thin tube is passed into one of your blood vessels. Special tools are used to help stop the bleeding.  · Having surgery. You often may need this if the ulcer has caused severe symptoms.  Making some lifestyle changes can reduce ulcer symptoms. It may also prevent more damage to your digestive tract. These changes include:  · Not taking over-the-counter pain medicines. Talk with your provider about using another type of pain reliever.  · Not taking aspirin unless your provider has advised it  · Limiting the amount of alcohol you drink  · Quitting smoking  Possible complications of a gastric ulcer  Gastric ulcers can have serious complications. These can include:  · Bleeding into the stomach  · A hole (perforation) in the stomach  · A blockage that interferes with food moving from your stomach to the small intestine  An ongoing infection with H. pylori may be a risk factor for stomach cancer. This is one reason it is important to get rid of this bacteria.  When to call your healthcare provider  Call your healthcare provider right away if you have any of these:  · Vomiting blood, or vomit that looks like coffee grounds  · Bloody, black, or tarry-looking stools  · Fever of 100.4°F (38°C) or higher, or as directed  · Pain that gets worse  · Symptoms that dont get better with treatment, or symptoms that get worse  · New symptoms   Date Last Reviewed: 5/1/2016  © 8902-5822 [employer]. [address] [place] [address]  Road, [place] [address]. All rights reserved. This information is not intended as a substitute for professional medical care. Always follow your healthcare professional's instructions.

## 2019-09-13 NOTE — PROGRESS NOTES
Subjective:       Patient ID: Pham Licea is a 64 y.o. female.    Chief Complaint: Follow-up (4 wk f/u - labs done 19)  Presents for 1 mo follow up. CT chest screening revealed pulmonary nodules recommended repeat in 6 mo. Labs and mammogram all normal. Reports pain of right hip and lower back not improved with an average 5/10 all the time. H/O taking mobic yet developed bleeding ulcer after taking for 1 year yet is not sure if increased stress and poor diet was a contributing factor, reports medication effective. She will f/u Neurology tomorrow for abnormal EMG. She has lost 2 pounds.     Past Medical History:   Diagnosis Date    Fatty liver     Former smoker     Osteoarthritis 2010    Pulmonary nodules 2019    Repeat CT in 6 mo    Ulcer of abdomen wall 2016       Past Surgical History:   Procedure Laterality Date    APPENDECTOMY  1993     SECTION  , , 1983    x3    COLONOSCOPY  ~    [facility]: normal findings per patient report    ESOPHAGOGASTRODUODENOSCOPY (EGD) N/A 2016    [facility]O    HYSTERECTOMY  1993    one ovary conserved    Injection-steroid-epidural-lumbar N/A 10/12/2018    [facility]    Injection-steroid-epidural-lumbar L5-S1 N/A 2019    [facility]    UPPER GASTROINTESTINAL ENDOSCOPY  2016        Social History     Socioeconomic History    Marital status:      Spouse name: Not on file    Number of children: 4    Years of education: Not on file    Highest education level: Some college, no degree   Occupational History    Occupation: sale specialist-    Social Needs    Financial resource strain: Not on file    Food insecurity:     Worry: Not on file     Inability: Not on file    Transportation needs:     Medical: Not on file     Non-medical: Not on file   Tobacco Use    Smoking status: Former Smoker     Packs/day: 1.00     Years:  40.00     Pack years: 40.00     Last attempt to quit: 2009     Years since quittin.8    Smokeless tobacco: Never Used    Tobacco comment: quit 2009   Substance and Sexual Activity    Alcohol use: Yes     Comment: Not often - one glass of wine every now and then    Drug use: Not Currently     Types: Marijuana     Comment: in 1970s    Sexual activity: Not Currently   Lifestyle    Physical activity:     Days per week: Not on file     Minutes per session: Not on file    Stress: Not on file   Relationships    Social connections:     Talks on phone: Not on file     Gets together: Not on file     Attends Mandaeism service: Not on file     Active member of club or organization: Not on file     Attends meetings of clubs or organizations: Not on file     Relationship status: Not on file   Other Topics Concern    Not on file   Social History Narrative    Not on file       Family History   Problem Relation Age of Onset    Ovarian cancer Mother     Heart disease Mother     Osteoporosis Mother     Arthritis Mother     Hypertension Father     Stroke Father 50    Brain Hemorrhage Father     Aneurysm Father     Osteoarthritis Sister     Arthritis Sister     Hypertension Sister     Obesity Sister     Breast cancer Neg Hx     Colon cancer Neg Hx     Colon polyps Neg Hx     Crohn's disease Neg Hx     Ulcerative colitis Neg Hx        Review of patient's allergies indicates:  No Known Allergies       Current Outpatient Medications:     acetaminophen (TYLENOL) 325 MG tablet, Take 325 mg by mouth every 6 (six) hours as needed for Pain., Disp: , Rfl:     diclofenac sodium (VOLTAREN) 1 % Gel, APPLY AS DIRECTED, Disp: 100 g, Rfl: 11    gabapentin (NEURONTIN) 300 MG capsule, Take 1 tablet in the a.m., 1 tablet 8 hr later and 2 tablets q.h.s., Disp: 120 capsule, Rfl: 2    ranitidine (ZANTAC) 150 MG tablet, Take 150 mg by mouth nightly., Disp: , Rfl:     HPI  Review of Systems   Constitutional: Negative.     HENT: Negative.    Eyes: Negative.    Respiratory: Negative.    Cardiovascular: Negative.    Gastrointestinal: Negative.    Endocrine: Negative.    Genitourinary: Negative.    Musculoskeletal: Positive for back pain.        Right hip pain   Skin: Negative.    Allergic/Immunologic: Negative.    Neurological: Negative.    Hematological: Negative.    Psychiatric/Behavioral: Negative.        Objective:      Physical Exam   Constitutional: She is oriented to person, place, and time. She appears well-developed and well-nourished.   HENT:   Head: Normocephalic and atraumatic.   Mouth/Throat: Oropharynx is clear and moist.   Eyes: Pupils are equal, round, and reactive to light. Conjunctivae are normal. No scleral icterus.   Neck: Normal range of motion. Neck supple. No thyromegaly present.   Cardiovascular: Normal rate and regular rhythm.   No murmur heard.  Pulmonary/Chest: Effort normal. No respiratory distress.   Abdominal: Soft. Bowel sounds are normal. She exhibits no distension. There is no tenderness.   Musculoskeletal: Normal range of motion. She exhibits no edema.   Neurological: She is alert and oriented to person, place, and time. No sensory deficit. She exhibits normal muscle tone.   Skin: Skin is warm. Capillary refill takes less than 2 seconds. No rash noted.   Psychiatric: She has a normal mood and affect. Her behavior is normal. Judgment and thought content normal.   Nursing note and vitals reviewed.      Assessment:       1. Vitamin D deficiency    2. Primary osteoarthritis involving multiple joints    3. Right hip pain    4. Pulmonary nodule    5. Need for vaccination for H flu type B        Plan:       1- recommend Vit D3 3000 iu daily  2- start mobic with caution   3- continue with weight loss  4- start saxenda as discussed  Vitamin D deficiency    Primary osteoarthritis involving multiple joints    Right hip pain    Pulmonary nodule    Need for vaccination for H flu type B        Risks, benefits, and  side effects were discussed with the patient. All questions were answered to the fullest satisfaction of the patient, and pt verbalized understanding and agreement to treatment plan. Pt was to call with any new or worsening symptoms, or present to the ER.

## 2019-09-15 ENCOUNTER — TELEPHONE (OUTPATIENT)
Dept: FAMILY MEDICINE | Facility: CLINIC | Age: 64
End: 2019-09-15

## 2019-09-16 ENCOUNTER — TELEPHONE (OUTPATIENT)
Dept: SPINE | Facility: CLINIC | Age: 64
End: 2019-09-16

## 2019-09-16 NOTE — TELEPHONE ENCOUNTER
----- Message from Stacey M Lefort sent at 9/16/2019  8:30 AM CDT -----  Pt called and left a msg. She was sent to see Dr Julia Man and needs a new referral.  Dr Man is booked out and won't be able to see her for 8 weeks. Pt needs a call back at [phone].  Thank you.

## 2019-10-03 ENCOUNTER — PATIENT MESSAGE (OUTPATIENT)
Dept: FAMILY MEDICINE | Facility: CLINIC | Age: 64
End: 2019-10-03

## 2019-10-04 ENCOUNTER — OFFICE VISIT (OUTPATIENT)
Dept: FAMILY MEDICINE | Facility: CLINIC | Age: 64
End: 2019-10-04
Payer: COMMERCIAL

## 2019-10-04 VITALS
RESPIRATION RATE: 13 BRPM | OXYGEN SATURATION: 96 % | HEART RATE: 74 BPM | TEMPERATURE: 98 F | BODY MASS INDEX: 35.54 KG/M2 | WEIGHT: 208.19 LBS | SYSTOLIC BLOOD PRESSURE: 127 MMHG | HEIGHT: 64 IN | DIASTOLIC BLOOD PRESSURE: 70 MMHG

## 2019-10-04 DIAGNOSIS — M65.331 TRIGGER MIDDLE FINGER OF RIGHT HAND: ICD-10-CM

## 2019-10-04 PROCEDURE — 3008F PR BODY MASS INDEX (BMI) DOCUMENTED: ICD-10-PCS | Mod: CPTII,S$GLB,, | Performed by: NURSE PRACTITIONER

## 2019-10-04 PROCEDURE — 3008F BODY MASS INDEX DOCD: CPT | Mod: CPTII,S$GLB,, | Performed by: NURSE PRACTITIONER

## 2019-10-04 PROCEDURE — 99213 OFFICE O/P EST LOW 20 MIN: CPT | Mod: S$GLB,,, | Performed by: NURSE PRACTITIONER

## 2019-10-04 PROCEDURE — 99213 PR OFFICE/OUTPT VISIT, EST, LEVL III, 20-29 MIN: ICD-10-PCS | Mod: S$GLB,,, | Performed by: NURSE PRACTITIONER

## 2019-10-04 NOTE — PROGRESS NOTES
Subjective:       Patient ID: Pham Licea is a 64 y.o. female.    Chief Complaint: Hand Pain (Trigger finger - right middle finger; Rheumatoid nodules noted on both hands.) and Immunizations (Shingles vaccination to be done at patient's pharmacy.)  Presents with c/o right middle finger getting stuck every night waking her up with associated hand/arm pain for hours. Relieve with elevation, finger realignment and pain cream. Rheumatoids nodules of bilateral hands reports pain decreased from 6 to 4/10.  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     Past Medical History:   Diagnosis Date    Fatty liver 2015    Former smoker     Osteoarthritis 2010    Pulmonary nodules 2019    Repeat CT in 6 mo    Ulcer of abdomen wall 2016       Past Surgical History:   Procedure Laterality Date    APPENDECTOMY  1993     SECTION  , , 1983    x3    COLONOSCOPY  ~    [facility]: normal findings per patient report    EPIDURAL STEROID INJECTION INTO LUMBAR SPINE N/A 10/12/2018    Procedure: Injection-steroid-epidural-lumbar;  Surgeon: Kal Johnson MD;  Location: [facility] OR;  Service: Pain Management;  Laterality: N/A;  L5-S1    EPIDURAL STEROID INJECTION INTO LUMBAR SPINE N/A 2019    Procedure: Injection-steroid-epidural-lumbar L5-S1;  Surgeon: Kal Johnson MD;  Location: [facility] OR;  Service: Pain Management;  Laterality: N/A;    HYSTERECTOMY      one ovary conserved    UPPER GASTROINTESTINAL ENDOSCOPY  2016        Social History     Socioeconomic History    Marital status:      Spouse name: Not on file    Number of children: 4    Years of education: Not on file    Highest education level: Some college, no degree   Occupational History    Occupation: sale specialist-    Social Needs    Financial resource strain: Not on file    Food insecurity:     Worry: Not on file     Inability: Not on file    Transportation  needs:     Medical: Not on file     Non-medical: Not on file   Tobacco Use    Smoking status: Former Smoker     Packs/day: 1.00     Years: 40.00     Pack years: 40.00     Last attempt to quit: 2009     Years since quittin.9    Smokeless tobacco: Never Used    Tobacco comment: quit 2009   Substance and Sexual Activity    Alcohol use: Yes     Comment: Not often - one glass of wine every now and then    Drug use: Not Currently     Types: Marijuana     Comment: in 1970s    Sexual activity: Not Currently   Lifestyle    Physical activity:     Days per week: Not on file     Minutes per session: Not on file    Stress: Not on file   Relationships    Social connections:     Talks on phone: Not on file     Gets together: Not on file     Attends Holiness service: Not on file     Active member of club or organization: Not on file     Attends meetings of clubs or organizations: Not on file     Relationship status: Not on file   Other Topics Concern    Not on file   Social History Narrative    Not on file       Family History   Problem Relation Age of Onset    Ovarian cancer Mother     Heart disease Mother     Osteoporosis Mother     Arthritis Mother     Hypertension Father     Stroke Father 50    Brain Hemorrhage Father     Aneurysm Father     Osteoarthritis Sister     Arthritis Sister     Hypertension Sister     Obesity Sister     Breast cancer Neg Hx     Colon cancer Neg Hx     Colon polyps Neg Hx     Crohn's disease Neg Hx     Ulcerative colitis Neg Hx        Review of patient's allergies indicates:  No Known Allergies       Current Outpatient Medications:     acetaminophen (TYLENOL) 325 MG tablet, Take 325 mg by mouth every 6 (six) hours as needed for Pain., Disp: , Rfl:     diclofenac sodium (VOLTAREN) 1 % Gel, APPLY AS DIRECTED, Disp: 100 g, Rfl: 11    gabapentin (NEURONTIN) 300 MG capsule, Take 1 capsule (300 mg total) by mouth 3 (three) times daily. Take 1 tablet in the a.m., 1  tablet 8 hr later and 2 tablets q.h.s., Disp: 90 capsule, Rfl: 2    meloxicam (MOBIC) 7.5 MG tablet, Take 1 tablet (7.5 mg total) by mouth once daily., Disp: 90 tablet, Rfl: 1    ranitidine (ZANTAC) 150 MG tablet, Take 150 mg by mouth nightly., Disp: , Rfl:     HPI  Review of Systems   Constitutional: Negative.    HENT: Negative.    Eyes: Negative.    Respiratory: Negative.    Cardiovascular: Negative.    Gastrointestinal: Negative.    Endocrine: Negative.    Genitourinary: Negative.    Musculoskeletal: Negative.         Right middle finger pain, stiffness and generalized weakness of both hands.    Skin: Negative.    Allergic/Immunologic: Negative.    Neurological: Negative.    Hematological: Negative.    Psychiatric/Behavioral: Negative.        Objective:      Physical Exam   Constitutional: She is oriented to person, place, and time. She appears well-developed and well-nourished.   HENT:   Head: Normocephalic and atraumatic.   Mouth/Throat: Oropharynx is clear and moist.   Eyes: Pupils are equal, round, and reactive to light. Conjunctivae are normal. No scleral icterus.   Neck: Normal range of motion. Neck supple. No thyromegaly present.   Cardiovascular: Normal rate and regular rhythm.   No murmur heard.  Pulmonary/Chest: Effort normal. No respiratory distress.   Abdominal: Soft. Bowel sounds are normal. She exhibits no distension. There is no tenderness.   Musculoskeletal: Normal range of motion. She exhibits no edema.   Rheumatoids nodules of bilateral hands    Neurological: She is alert and oriented to person, place, and time. No sensory deficit. She exhibits normal muscle tone.   Skin: Skin is warm. Capillary refill takes less than 2 seconds. No rash noted.   Psychiatric: She has a normal mood and affect. Her behavior is normal. Judgment and thought content normal.   Nursing note and vitals reviewed.      Assessment:       1. Trigger middle finger of right hand        Plan:     1- continue and RTC  PRN    Trigger middle finger of right hand  -     Ambulatory referral/consult to Orthopedics; Future; Expected date: 10/04/2019        Risks, benefits, and side effects were discussed with the patient. All questions were answered to the fullest satisfaction of the patient, and pt verbalized understanding and agreement to treatment plan. Pt was to call with any new or worsening symptoms, or present to the ER.

## 2019-10-04 NOTE — PATIENT INSTRUCTIONS
Treating Trigger Finger     The tendon sheath is opened to release the tendon. Once the tendon can move freely again, the finger can bend and straighten more normally.     Trigger finger occurs when the tissue inside your finger or thumb becomes inflamed. Mild cases can be treated without surgery. If the problem is severe, surgery may be needed. Your doctor will discuss your options with you.  Nonsurgical treatment  For mild symptoms, your doctor may have you rest the finger or thumb. You may also be told to take anti-inflammatory medicines. These include ibuprofen or aspirin. You may be given an injection of medicine in the base of the finger or thumb. This typically is a steroid, such as cortisone.  Surgery  If nonsurgical treatments dont ease your symptoms, surgery may be recommended. A tendon is a cordlike fiber that attaches muscle to bone and allows joints to bend. The tendon is surrounded by a protective cover called a sheath. During surgery, the sheath in your finger or thumb is opened to enlarge the space and release the swollen tendon. This allows the finger or thumb to bend and straighten normally. Surgery takes about 20 minutes. It can often be done using a local anesthetic. You may be able to go home the same day. Your hand will be wrapped in a soft bandage. You may need to wear a plaster splint for a short time to keep the finger or thumb still as it heals. The stitches will be removed in about 2 weeks. Your doctor can discuss the risks and benefits of surgery with you.  Date Last Reviewed: 9/21/2015 © 2000-2017 [employer]. [address] [place] [address], [place] [address]. All rights reserved. This information is not intended as a substitute for professional medical care. Always follow your healthcare professional's instructions.

## 2019-10-23 ENCOUNTER — OFFICE VISIT (OUTPATIENT)
Dept: ORTHOPEDICS | Facility: CLINIC | Age: 64
End: 2019-10-23
Attending: ORTHOPAEDIC SURGERY
Payer: COMMERCIAL

## 2019-10-23 DIAGNOSIS — M65.331 TRIGGER MIDDLE FINGER OF RIGHT HAND: ICD-10-CM

## 2019-10-23 PROCEDURE — 20550 NJX 1 TENDON SHEATH/LIGAMENT: CPT | Mod: F7,S$GLB,, | Performed by: ORTHOPAEDIC SURGERY

## 2019-10-23 PROCEDURE — 20550 PR INJECT TENDON SHEATH/LIGAMENT: ICD-10-PCS | Mod: F7,S$GLB,, | Performed by: ORTHOPAEDIC SURGERY

## 2019-10-23 PROCEDURE — 99203 PR OFFICE/OUTPT VISIT, NEW, LEVL III, 30-44 MIN: ICD-10-PCS | Mod: 25,S$GLB,, | Performed by: ORTHOPAEDIC SURGERY

## 2019-10-23 PROCEDURE — 99999 PR PBB SHADOW E&M-EST. PATIENT-LVL III: ICD-10-PCS | Mod: PBBFAC,,, | Performed by: ORTHOPAEDIC SURGERY

## 2019-10-23 PROCEDURE — 99999 PR PBB SHADOW E&M-EST. PATIENT-LVL III: CPT | Mod: PBBFAC,,, | Performed by: ORTHOPAEDIC SURGERY

## 2019-10-23 PROCEDURE — 99203 OFFICE O/P NEW LOW 30 MIN: CPT | Mod: 25,S$GLB,, | Performed by: ORTHOPAEDIC SURGERY

## 2019-10-23 RX ORDER — TRIAMCINOLONE ACETONIDE 40 MG/ML
20 INJECTION, SUSPENSION INTRA-ARTICULAR; INTRAMUSCULAR
Status: COMPLETED | OUTPATIENT
Start: 2019-10-23 | End: 2019-10-23

## 2019-10-23 RX ADMIN — TRIAMCINOLONE ACETONIDE 20 MG: 40 INJECTION, SUSPENSION INTRA-ARTICULAR; INTRAMUSCULAR at 01:10

## 2019-10-23 NOTE — PROGRESS NOTES
Subjective:      Patient ID: Pham Licea is a 64 y.o. female.    Chief Complaint: Pain of the Left Hand; Pain of the Right Hand; and Arthritis      HPI  Pham Licea is a  64 y.o. female presenting today for painful locking of the right middle finger.  There was not a history of trauma.  Onset of symptoms began several months ago  She does have arthritis in both hands and she takes Mobic for this and uses Voltaren gel with good results  However recently symptoms have gotten worse with stiffness and pain the middle finger no numbness or tingling is reported.      Review of patient's allergies indicates:  No Known Allergies      Current Outpatient Medications   Medication Sig Dispense Refill    acetaminophen (TYLENOL) 325 MG tablet Take 325 mg by mouth every 6 (six) hours as needed for Pain.      diclofenac sodium (VOLTAREN) 1 % Gel APPLY AS DIRECTED 100 g 11    gabapentin (NEURONTIN) 300 MG capsule Take 1 capsule (300 mg total) by mouth 3 (three) times daily. Take 1 tablet in the a.m., 1 tablet 8 hr later and 2 tablets q.h.s. 90 capsule 2    meloxicam (MOBIC) 7.5 MG tablet Take 1 tablet (7.5 mg total) by mouth once daily. 90 tablet 1    ranitidine (ZANTAC) 150 MG tablet Take 150 mg by mouth nightly.       Current Facility-Administered Medications   Medication Dose Route Frequency Provider Last Rate Last Dose    [COMPLETED] triamcinolone acetonide injection 20 mg  20 mg INTRABURSAL 1 time in Clinic/HOD Kennedy Mahmood Jr., MD   20 mg at 10/23/19 1345       Past Medical History:   Diagnosis Date    Fatty liver 2015    Former smoker 2009    Osteoarthritis 2010    Pulmonary nodules 2019    Repeat CT in 6 mo    Ulcer of abdomen wall 2016       Past Surgical History:   Procedure Laterality Date    APPENDECTOMY  1993     SECTION  , 1982, 1983    x3    COLONOSCOPY  ~2014    [facility]: normal findings per patient report    EPIDURAL STEROID INJECTION INTO LUMBAR SPINE N/A  10/12/2018    Procedure: Injection-steroid-epidural-lumbar;  Surgeon: Kal Johnson MD;  Location: [facility] OR;  Service: Pain Management;  Laterality: N/A;  L5-S1    EPIDURAL STEROID INJECTION INTO LUMBAR SPINE N/A 5/31/2019    Procedure: Injection-steroid-epidural-lumbar L5-S1;  Surgeon: Kal Johnson MD;  Location: [facility] OR;  Service: Pain Management;  Laterality: N/A;    HYSTERECTOMY  1993    one ovary conserved    UPPER GASTROINTESTINAL ENDOSCOPY  2016       Review of Systems:  ROS    OBJECTIVE:     PHYSICAL EXAM:       Vitals:    10/23/19 1322   PainSc:   2     Well developed, well nourished female in no acute distress  Alert and oriented x 3  HEENT- Normal exam  Lungs- Clear to auscultation  Heart- Regular rate and rhythm  Abdomen- Soft nontender  Extremity exam- examination right hand demonstrates swelling tenderness over the flexor tendon sheath right middle finger  Mild triggering  Range of motion slightly decreased   strength decreased  Tinel sign negative at the wrist    RADIOGRAPHS:  None  Comments: I have personally reviewed the imaging and I agree with the above radiologist's report.    ASSESSMENT/PLAN:     IMPRESSION:  Triggering right middle finger    PLAN:  I explained the nature of the problem to the patient recommended injection.  After pause for time-out identified the right middle finger injected flexor tendon sheath combination Kenalog 20 mg 0.5 cc xylocaine sterile technique  Tolerated the procedure well without complication  Gentle range of motion Advil as needed follow-up 3 weeks if symptoms do not resolve       - We talked at length about the anatomy and pathophysiology of   Encounter Diagnosis   Name Primary?    Trigger middle finger of right hand            Disclaimer: This note has been generated using voice-recognition software. There may be typographical errors that have been missed during proof-reading.

## 2019-10-23 NOTE — LETTER
October 23, 2019      Mar Bowling, NP  [address]  [address]  [place] [address]           [facility] [place], [facility] [address]  [facility], [address]  [facility] [address]  Phone: [phone]          Patient: Pham Licea   MR Number: [id number]   YOB: 1955   Date of Visit: 10/23/2019       Dear Mar Bowling:    Thank you for referring Pham Licea to me for evaluation. Attached you will find relevant portions of my assessment and plan of care.    If you have questions, please do not hesitate to call me. I look forward to following Pham Licea along with you.    Sincerely,    Kennedy Mahmood Jr., MD    Enclosure  CC:  No Recipients    If you would like to receive this communication electronically, please contact [email or web address] or [phone] to request more information on [employer] access.    For providers and/or their staff who would like to refer a patient to [facility], please contact us through our one-stop-shop provider referral line, [facility], at [phone].    If you feel you have received this communication in error or would no longer like to receive these types of communications, please e-mail [email or web address]

## 2019-11-20 ENCOUNTER — OFFICE VISIT (OUTPATIENT)
Dept: ORTHOPEDICS | Facility: CLINIC | Age: 64
End: 2019-11-20
Attending: ORTHOPAEDIC SURGERY
Payer: COMMERCIAL

## 2019-11-20 VITALS — BODY MASS INDEX: 35.51 KG/M2 | HEIGHT: 64 IN | WEIGHT: 208 LBS

## 2019-11-20 DIAGNOSIS — M65.331 TRIGGER MIDDLE FINGER OF RIGHT HAND: Primary | ICD-10-CM

## 2019-11-20 PROCEDURE — 20550 NJX 1 TENDON SHEATH/LIGAMENT: CPT | Mod: F7,S$GLB,, | Performed by: ORTHOPAEDIC SURGERY

## 2019-11-20 PROCEDURE — 3008F PR BODY MASS INDEX (BMI) DOCUMENTED: ICD-10-PCS | Mod: CPTII,S$GLB,, | Performed by: ORTHOPAEDIC SURGERY

## 2019-11-20 PROCEDURE — 99999 PR PBB SHADOW E&M-EST. PATIENT-LVL III: ICD-10-PCS | Mod: PBBFAC,,, | Performed by: ORTHOPAEDIC SURGERY

## 2019-11-20 PROCEDURE — 99999 PR PBB SHADOW E&M-EST. PATIENT-LVL III: CPT | Mod: PBBFAC,,, | Performed by: ORTHOPAEDIC SURGERY

## 2019-11-20 PROCEDURE — 20550 PR INJECT TENDON SHEATH/LIGAMENT: ICD-10-PCS | Mod: F7,S$GLB,, | Performed by: ORTHOPAEDIC SURGERY

## 2019-11-20 PROCEDURE — 99213 PR OFFICE/OUTPT VISIT, EST, LEVL III, 20-29 MIN: ICD-10-PCS | Mod: 25,S$GLB,, | Performed by: ORTHOPAEDIC SURGERY

## 2019-11-20 PROCEDURE — 99213 OFFICE O/P EST LOW 20 MIN: CPT | Mod: 25,S$GLB,, | Performed by: ORTHOPAEDIC SURGERY

## 2019-11-20 PROCEDURE — 3008F BODY MASS INDEX DOCD: CPT | Mod: CPTII,S$GLB,, | Performed by: ORTHOPAEDIC SURGERY

## 2019-11-20 RX ORDER — TRIAMCINOLONE ACETONIDE 40 MG/ML
20 INJECTION, SUSPENSION INTRA-ARTICULAR; INTRAMUSCULAR
Status: COMPLETED | OUTPATIENT
Start: 2019-11-20 | End: 2019-11-20

## 2019-11-20 RX ADMIN — TRIAMCINOLONE ACETONIDE 20 MG: 40 INJECTION, SUSPENSION INTRA-ARTICULAR; INTRAMUSCULAR at 02:11

## 2019-11-20 NOTE — PROGRESS NOTES
Subjective:      Patient ID: Pham Licea is a 64 y.o. female.  Chief Complaint: Joint Pain (trigger finger rt hand)      HPI  Pham Licea is a  64 y.o. female presenting today for follow up of triggering right middle finger.  She reports that she is doing better but still having occasional locking of the right middle finger but much less painful than before  Last injection was about a month ago  No numbness or tingling reported.    Review of patient's allergies indicates:  No Known Allergies      Current Outpatient Medications   Medication Sig Dispense Refill    acetaminophen (TYLENOL) 325 MG tablet Take 325 mg by mouth every 6 (six) hours as needed for Pain.      diclofenac sodium (VOLTAREN) 1 % Gel APPLY AS DIRECTED 100 g 11    gabapentin (NEURONTIN) 300 MG capsule Take 1 capsule (300 mg total) by mouth 3 (three) times daily. Take 1 tablet in the a.m., 1 tablet 8 hr later and 2 tablets q.h.s. 90 capsule 2    meloxicam (MOBIC) 7.5 MG tablet Take 1 tablet (7.5 mg total) by mouth once daily. 90 tablet 1    ranitidine (ZANTAC) 150 MG tablet Take 150 mg by mouth nightly.       No current facility-administered medications for this visit.        Past Medical History:   Diagnosis Date    Fatty liver     Former smoker     Osteoarthritis 2010    Pulmonary nodules 2019    Repeat CT in 6 mo    Ulcer of abdomen wall 2016       Past Surgical History:   Procedure Laterality Date    APPENDECTOMY  1993     SECTION  , , 1983    x3    COLONOSCOPY  ~    [facility]: normal findings per patient report    EPIDURAL STEROID INJECTION INTO LUMBAR SPINE N/A 10/12/2018    Procedure: Injection-steroid-epidural-lumbar;  Surgeon: Kal Johnson MD;  Location: [facility] OR;  Service: Pain Management;  Laterality: N/A;  L5-S1    EPIDURAL STEROID INJECTION INTO LUMBAR SPINE N/A 2019    Procedure: Injection-steroid-epidural-lumbar L5-S1;  Surgeon: Kal Johnson MD;  Location: [facility] OR;   "Service: Pain Management;  Laterality: N/A;    HYSTERECTOMY  1993    one ovary conserved    UPPER GASTROINTESTINAL ENDOSCOPY  2016       OBJECTIVE:   PHYSICAL EXAM:  Height: 5' 4" (162.6 cm) Weight: 94.3 kg (208 lb)  Vitals:    11/20/19 1345   Weight: 94.3 kg (208 lb)   Height: 5' 4" (1.626 m)   PainSc: 0-No pain     Ortho/SPM Exam  Examination right hand there is some mild tenderness over the flexor tendon sheath range of motion finger full just a light catching at the A1 pulley  strength decreased  Sensation intact all digits  Skin color and temperature normal    RADIOGRAPHS:  None  Comments: I have personally reviewed the imaging and I agree with the above radiologist's report.    ASSESSMENT/PLAN:     IMPRESSION:  Residual triggering right middle finger    PLAN:  Discussed options including injection versus surgery  She would like another injection  After pause for time-out identified the right middle finger injected flexor tendon sheath combination Kenalog 20 mg 0.5 cc xylocaine sterile technique  Tolerated the procedure well without complication  Follow-up as needed if symptoms persist or recur I would recommend surgical release    FOLLOW UP:  As needed    Disclaimer: This note has been generated using voice-recognition software. There may be typographical errors that have been missed during proof-reading.  "

## 2019-12-02 ENCOUNTER — PATIENT MESSAGE (OUTPATIENT)
Dept: FAMILY MEDICINE | Facility: CLINIC | Age: 64
End: 2019-12-02

## 2019-12-02 DIAGNOSIS — K21.9 GASTROESOPHAGEAL REFLUX DISEASE WITHOUT ESOPHAGITIS: Primary | ICD-10-CM

## 2019-12-04 ENCOUNTER — TELEPHONE (OUTPATIENT)
Dept: FAMILY MEDICINE | Facility: CLINIC | Age: 64
End: 2019-12-04

## 2019-12-05 ENCOUNTER — PATIENT MESSAGE (OUTPATIENT)
Dept: FAMILY MEDICINE | Facility: CLINIC | Age: 64
End: 2019-12-05

## 2019-12-05 RX ORDER — PANTOPRAZOLE SODIUM 20 MG/1
20 TABLET, DELAYED RELEASE ORAL DAILY
Qty: 90 TABLET | Refills: 3 | Status: SHIPPED | OUTPATIENT
Start: 2019-12-05 | End: 2020-12-14 | Stop reason: SDUPTHER

## 2019-12-05 NOTE — TELEPHONE ENCOUNTER
Received request for alternative Rx for Ranitidine on 12 /2/19. Request for alternative was sent on 12/3/19. Ranitidine filled on 12/4/19.     Pt requesting alternative. Please advise.

## 2020-01-06 ENCOUNTER — PATIENT MESSAGE (OUTPATIENT)
Dept: SPINE | Facility: CLINIC | Age: 65
End: 2020-01-06

## 2020-01-07 RX ORDER — GABAPENTIN 300 MG/1
CAPSULE ORAL
Qty: 360 CAPSULE | Refills: 3 | Status: ON HOLD | OUTPATIENT
Start: 2020-01-07 | End: 2021-05-11 | Stop reason: HOSPADM

## 2020-02-04 ENCOUNTER — PATIENT MESSAGE (OUTPATIENT)
Dept: FAMILY MEDICINE | Facility: CLINIC | Age: 65
End: 2020-02-04

## 2020-02-06 ENCOUNTER — PATIENT OUTREACH (OUTPATIENT)
Dept: ADMINISTRATIVE | Facility: HOSPITAL | Age: 65
End: 2020-02-06

## 2020-02-14 ENCOUNTER — HOSPITAL ENCOUNTER (OUTPATIENT)
Dept: RADIOLOGY | Facility: HOSPITAL | Age: 65
Discharge: HOME | End: 2020-02-14
Attending: NURSE PRACTITIONER
Payer: COMMERCIAL

## 2020-02-14 DIAGNOSIS — R91.1 PULMONARY NODULE: ICD-10-CM

## 2020-02-14 PROCEDURE — 71250 CT THORAX DX C-: CPT | Mod: 26,,, | Performed by: RADIOLOGY

## 2020-02-14 PROCEDURE — 71250 CT THORAX DX C-: CPT | Mod: TC

## 2020-02-14 PROCEDURE — 71250 CT CHEST WITHOUT CONTRAST: ICD-10-PCS | Mod: 26,,, | Performed by: RADIOLOGY

## 2020-02-21 ENCOUNTER — OFFICE VISIT (OUTPATIENT)
Dept: FAMILY MEDICINE | Facility: CLINIC | Age: 65
End: 2020-02-21
Payer: COMMERCIAL

## 2020-02-21 VITALS
RESPIRATION RATE: 16 BRPM | HEART RATE: 61 BPM | WEIGHT: 207.81 LBS | SYSTOLIC BLOOD PRESSURE: 129 MMHG | DIASTOLIC BLOOD PRESSURE: 66 MMHG | BODY MASS INDEX: 35.48 KG/M2 | TEMPERATURE: 98 F | OXYGEN SATURATION: 98 % | HEIGHT: 64 IN

## 2020-02-21 DIAGNOSIS — Z12.9 SCREENING FOR CANCER: ICD-10-CM

## 2020-02-21 DIAGNOSIS — R91.8 PULMONARY NODULES/LESIONS, MULTIPLE: Primary | ICD-10-CM

## 2020-02-21 DIAGNOSIS — R06.09 DYSPNEA ON EXERTION: ICD-10-CM

## 2020-02-21 PROCEDURE — 99214 PR OFFICE/OUTPT VISIT, EST, LEVL IV, 30-39 MIN: ICD-10-PCS | Mod: S$GLB,,, | Performed by: FAMILY MEDICINE

## 2020-02-21 PROCEDURE — 3008F PR BODY MASS INDEX (BMI) DOCUMENTED: ICD-10-PCS | Mod: CPTII,S$GLB,, | Performed by: FAMILY MEDICINE

## 2020-02-21 PROCEDURE — 99214 OFFICE O/P EST MOD 30 MIN: CPT | Mod: S$GLB,,, | Performed by: FAMILY MEDICINE

## 2020-02-21 PROCEDURE — 3008F BODY MASS INDEX DOCD: CPT | Mod: CPTII,S$GLB,, | Performed by: FAMILY MEDICINE

## 2020-02-21 RX ORDER — ALBUTEROL SULFATE 90 UG/1
2 AEROSOL, METERED RESPIRATORY (INHALATION) EVERY 6 HOURS PRN
Qty: 18 G | Refills: 3 | Status: SHIPPED | OUTPATIENT
Start: 2020-02-21 | End: 2021-05-19

## 2020-02-25 NOTE — TELEPHONE ENCOUNTER
----- Message from Emma Simeon sent at 2/24/2020 12:42 PM CST -----  Type:  Pharmacy Calling to Clarify an RX    Name of Caller:  Rosalba  Pharmacy Name:  [facility]  Prescription Name:  albuterol (PROVENTIL/VENTOLIN HFA) 90 mcg/actuation inhaler  What do they need to clarify?: name of medication   Best Call Back Number:  [phone] option 3  Additional Information:

## 2020-02-26 ENCOUNTER — TELEPHONE (OUTPATIENT)
Dept: FAMILY MEDICINE | Facility: CLINIC | Age: 65
End: 2020-02-26

## 2020-02-26 NOTE — TELEPHONE ENCOUNTER
PA submitted. Received message PA not required. Called pt insurance company. States PA not needed and they will call pt to inform.     ----- Message from Nuvia Rivero sent at 2/26/2020 11:56 AM CST -----  Contact: Pt  Type: Needs medical advice      Who Called: Pt  Best Call Back Number:  [phone]  Additional Information:  Pt stated [facility] is requesting a medical necessity form to be completed for albuterol (PROVENTIL/VENTOLIN HFA) 90 mcg/actuation inhaler.   Please contact pt once completed,

## 2020-02-26 NOTE — TELEPHONE ENCOUNTER
See other encounter.     ----- Message from Marquis Hernandez sent at 2/25/2020 11:14 AM CST -----  Contact: Yoel with [facility] [employer] with [facility] called regarding for a prior authorization     Yoel can be reached at [phone]

## 2020-02-29 NOTE — PROGRESS NOTES
[facility] Note    Subjective      Ms. Licea is a 64 y.o. female who presents to clinic for CT results.     Patient had a CT chest without contrast 1 week ago to follow up on pulmonary nodules seen on CT lung screening.  CT revealed stable lung nodules and recommended follow-up with a low-dose CT lung screening in 12 months.  Previous smoker.  Smoked for about 40 years 1 pack per day.    Today she does complain of shortness of breath on exertion.  Better with rest.  Not associated with any chest pain  Is associated with wheezing.  Denies any chest face or chest tightness.  CT chest did reveal emphysema.  Denies any leg swelling, orthopnea, or PND.  Denies a cough.    [facility] Pham has a past medical history of Fatty liver (), Former smoker (), Osteoarthritis (), Pulmonary nodules (2019), and Ulcer of abdomen wall ().   PSX Pham has a past surgical history that includes Upper gastrointestinal endoscopy (); Colonoscopy (~); Epidural steroid injection into lumbar spine (N/A, 10/12/2018); Epidural steroid injection into lumbar spine (N/A, 2019);  section (, , ); Hysterectomy (); and Appendectomy ().    Pham's family history includes Aneurysm in her father; Arthritis in her mother and sister; Brain Hemorrhage in her father; Heart disease in her mother; Hypertension in her father and sister; Obesity in her sister; Osteoarthritis in her sister; Osteoporosis in her mother; Ovarian cancer in her mother; Stroke (age of onset: 50) in her father.    Pham reports that she quit smoking about 10 years ago. She has a 40.00 pack-year smoking history. She has never used smokeless tobacco. She reports that she drinks alcohol. She reports that she has current or past drug history. Drug: Marijuana.   WIL Nath has No Known Allergies.   ABIMAEL Nath has a current medication list which includes the following prescription(s): acetaminophen,  "diclofenac sodium, gabapentin, meloxicam, pantoprazole, and albuterol.     Review of Systems   Constitutional: Negative for chills, fatigue and fever.   Eyes: Negative for visual disturbance.   Respiratory: Positive for shortness of breath and wheezing. Negative for cough.    Cardiovascular: Negative for chest pain and leg swelling.   Gastrointestinal: Negative for abdominal pain.   Neurological: Negative for dizziness and headaches.   Psychiatric/Behavioral: Negative for confusion.     Objective     /66 (BP Location: Right arm, Patient Position: Sitting, BP Method: Medium (Automatic))   Pulse 61   Temp 97.8 °F (36.6 °C) (Oral)   Resp 16   Ht 5' 4" (1.626 m)   Wt 94.3 kg (207 lb 12.8 oz)   SpO2 98%   BMI 35.67 kg/m²     Physical Exam   Constitutional: She appears well-developed and well-nourished. No distress.   HENT:   Head: Normocephalic and atraumatic.   Eyes: Right eye exhibits no discharge. Left eye exhibits no discharge.   Neck: Neck supple.   Cardiovascular: Normal rate, regular rhythm, normal heart sounds and intact distal pulses. Exam reveals no friction rub.   No murmur heard.  Pulmonary/Chest: Effort normal and breath sounds normal. No respiratory distress. She has no wheezes. She has no rales.   Lymphadenopathy:     She has no cervical adenopathy.   Neurological: She is alert.   Skin: Skin is warm and dry. Capillary refill takes less than 2 seconds. She is not diaphoretic.   Psychiatric: She has a normal mood and affect. Her behavior is normal.   Vitals reviewed.     Assessment/Plan     Pham was seen today for CT results and shortness of breath.    Diagnoses and all orders for this visit:  -New patient and new problem to me    Pulmonary nodules/lesions, multiple  -     CT Chest Lung Screening Low Dose; Future    Screening for cancer  -     CT Chest Lung Screening Low Dose; Future    Dyspnea on exertion  -     albuterol (PROVENTIL/VENTOLIN HFA) 90 mcg/actuation inhaler; Inhale 2 puffs into " the lungs every 6 (six) hours as needed for Wheezing or Shortness of Breath. Rescue  -     Complete PFT w/ bronchodilator; Future    -CT in 1 year ordered.   -Shortness of breath likely secondary to COPD/emphysema. Will obtain PFTs and try albuterol.     Follow up in about 4 weeks (around 3/20/2020).    Future Appointments   Date Time Provider Department Center   3/20/2020  8:40 AM Deven Emerson MD [facility]       Deven Emerson MD  Family Medicine  [facility]

## 2020-03-04 DIAGNOSIS — M15.9 PRIMARY OSTEOARTHRITIS INVOLVING MULTIPLE JOINTS: ICD-10-CM

## 2020-03-04 DIAGNOSIS — M25.551 RIGHT HIP PAIN: ICD-10-CM

## 2020-03-06 DIAGNOSIS — M15.9 PRIMARY OSTEOARTHRITIS INVOLVING MULTIPLE JOINTS: ICD-10-CM

## 2020-03-06 DIAGNOSIS — M25.551 RIGHT HIP PAIN: ICD-10-CM

## 2020-03-06 RX ORDER — MELOXICAM 7.5 MG/1
TABLET ORAL
Qty: 90 TABLET | Refills: 3 | OUTPATIENT
Start: 2020-03-06

## 2020-03-06 RX ORDER — MELOXICAM 7.5 MG/1
7.5 TABLET ORAL DAILY
Qty: 90 TABLET | Refills: 1 | Status: SHIPPED | OUTPATIENT
Start: 2020-03-06 | End: 2020-06-29 | Stop reason: SDUPTHER

## 2020-03-08 ENCOUNTER — PATIENT MESSAGE (OUTPATIENT)
Dept: FAMILY MEDICINE | Facility: CLINIC | Age: 65
End: 2020-03-08

## 2020-03-12 ENCOUNTER — PATIENT MESSAGE (OUTPATIENT)
Dept: FAMILY MEDICINE | Facility: CLINIC | Age: 65
End: 2020-03-12

## 2020-03-18 ENCOUNTER — PROCEDURE VISIT (OUTPATIENT)
Dept: RESPIRATORY THERAPY | Facility: HOSPITAL | Age: 65
End: 2020-03-18
Attending: FAMILY MEDICINE
Payer: COMMERCIAL

## 2020-03-18 DIAGNOSIS — Z12.9 SCREENING FOR CANCER: ICD-10-CM

## 2020-03-18 DIAGNOSIS — R91.8 PULMONARY NODULES/LESIONS, MULTIPLE: ICD-10-CM

## 2020-03-18 DIAGNOSIS — R06.09 DYSPNEA ON EXERTION: ICD-10-CM

## 2020-03-18 PROCEDURE — 25000242 PHARM REV CODE 250 ALT 637 W/ HCPCS: Performed by: FAMILY MEDICINE

## 2020-03-18 PROCEDURE — 94727 GAS DIL/WSHOT DETER LNG VOL: CPT

## 2020-03-18 PROCEDURE — 94729 DIFFUSING CAPACITY: CPT

## 2020-03-18 PROCEDURE — 94760 N-INVAS EAR/PLS OXIMETRY 1: CPT

## 2020-03-18 PROCEDURE — 94060 EVALUATION OF WHEEZING: CPT

## 2020-03-18 RX ORDER — ALBUTEROL SULFATE 0.83 MG/ML
2.5 SOLUTION RESPIRATORY (INHALATION) ONCE
Status: COMPLETED | OUTPATIENT
Start: 2020-03-18 | End: 2020-03-18

## 2020-03-18 RX ADMIN — ALBUTEROL SULFATE 2.5 MG: 2.5 SOLUTION RESPIRATORY (INHALATION) at 10:03

## 2020-03-19 LAB
BRPFT: ABNORMAL
DLCO ADJ PRE: 13.01 ML/(MIN*MMHG) (ref 16.83–28.29)
DLCO SINGLE BREATH LLN: 16.83
DLCO SINGLE BREATH PRE REF: 57.7 %
DLCO SINGLE BREATH REF: 22.56
DLCOC SBVA LLN: 3.07
DLCOC SBVA PRE REF: 99.9 %
DLCOC SBVA REF: 4.54
DLCOC SINGLE BREATH LLN: 16.83
DLCOC SINGLE BREATH PRE REF: 57.7 %
DLCOC SINGLE BREATH REF: 22.56
DLCOVA LLN: 3.07
DLCOVA PRE REF: 99.9 %
DLCOVA PRE: 4.54 ML/(MIN*MMHG*L) (ref 3.07–6.01)
DLCOVA REF: 4.54
DLVAADJ PRE: 4.54 ML/(MIN*MMHG*L) (ref 3.07–6.01)
ERVN2 LLN: 0.77
ERVN2 PRE REF: 82.5 %
ERVN2 PRE: 0.64 L (ref 0.77–0.77)
ERVN2 REF: 0.77
FEF 25 75 CHG: 18.7 %
FEF 25 75 LLN: 1.08
FEF 25 75 POST REF: 131.9 %
FEF 25 75 PRE REF: 111.2 %
FEF 25 75 REF: 2.17
FET100 CHG: 2 %
FEV1 CHG: -0.5 %
FEV1 FVC CHG: 3.9 %
FEV1 FVC LLN: 67
FEV1 FVC POST REF: 107.1 %
FEV1 FVC PRE REF: 103.1 %
FEV1 FVC REF: 79
FEV1 LLN: 1.83
FEV1 POST REF: 94.2 %
FEV1 PRE REF: 94.6 %
FEV1 REF: 2.44
FRCN2 LLN: 1.89
FRCN2 PRE REF: 79.4 %
FRCN2 REF: 2.71
FVC CHG: -4.2 %
FVC LLN: 2.33
FVC POST REF: 87.4 %
FVC PRE REF: 91.2 %
FVC REF: 3.11
IVC PRE: 1.81 L (ref 2.33–3.88)
IVC SINGLE BREATH LLN: 2.33
IVC SINGLE BREATH PRE REF: 58.3 %
IVC SINGLE BREATH REF: 3.11
MVV LLN: 78
MVV PRE REF: 54.6 %
MVV REF: 92
PEF CHG: -5.7 %
PEF LLN: 4.49
PEF POST REF: 86.4 %
PEF PRE REF: 91.6 %
PEF REF: 6.21
POST FEF 25 75: 2.86 L/S (ref 1.08–3.26)
POST FET 100: 8.41 SEC
POST FEV1 FVC: 84.63 % (ref 66.7–91.33)
POST FEV1: 2.3 L (ref 1.83–3.05)
POST FVC: 2.71 L (ref 2.33–3.88)
POST PEF: 5.36 L/S (ref 4.49–7.93)
PRE DLCO: 13.01 ML/(MIN*MMHG) (ref 16.83–28.29)
PRE FEF 25 75: 2.41 L/S (ref 1.08–3.26)
PRE FET 100: 8.24 SEC
PRE FEV1 FVC: 81.46 % (ref 66.7–91.33)
PRE FEV1: 2.31 L (ref 1.83–3.05)
PRE FRC N2: 2.15 L
PRE FVC: 2.83 L (ref 2.33–3.88)
PRE MVV: 50 L/MIN (ref 77.81–105.27)
PRE PEF: 5.69 L/S (ref 4.49–7.93)
RVN2 LLN: 1.37
RVN2 PRE REF: 66.2 %
RVN2 PRE: 1.29 L (ref 1.37–2.52)
RVN2 REF: 1.94
RVN2TLCN2 LLN: 30.45
RVN2TLCN2 PRE REF: 78 %
RVN2TLCN2 PRE: 31.23 % (ref 30.45–49.63)
RVN2TLCN2 REF: 40.04
TLCN2 LLN: 3.98
TLCN2 PRE REF: 82.9 %
TLCN2 PRE: 4.12 L (ref 3.98–5.96)
TLCN2 REF: 4.97
VA PRE: 2.87 L (ref 4.82–4.82)
VA SINGLE BREATH LLN: 4.82
VA SINGLE BREATH PRE REF: 59.5 %
VA SINGLE BREATH REF: 4.82
VCMAXN2 LLN: 2.33
VCMAXN2 PRE REF: 91.2 %
VCMAXN2 PRE: 2.83 L (ref 2.33–3.88)
VCMAXN2 REF: 3.11

## 2020-03-20 ENCOUNTER — OFFICE VISIT (OUTPATIENT)
Dept: FAMILY MEDICINE | Facility: CLINIC | Age: 65
End: 2020-03-20
Payer: COMMERCIAL

## 2020-03-20 DIAGNOSIS — R06.09 DYSPNEA ON EXERTION: Primary | ICD-10-CM

## 2020-03-20 PROCEDURE — 99214 OFFICE O/P EST MOD 30 MIN: CPT | Mod: 95,,, | Performed by: FAMILY MEDICINE

## 2020-03-20 PROCEDURE — 99214 PR OFFICE/OUTPT VISIT, EST, LEVL IV, 30-39 MIN: ICD-10-PCS | Mod: 95,,, | Performed by: FAMILY MEDICINE

## 2020-03-20 NOTE — PROGRESS NOTES
[facility] - Clinic Note    Subjective      Ms. Licea is a 64 y.o. female who presents to clinic for a follow up of shortness of breath.     The patient location is: home  The chief complaint leading to consultation is: shortness of breath  Visit type: Virtual visit with synchronous audio and video  Total time spent with patient: 11 mins  Each patient to whom he or she provides medical services by telemedicine is:  (1) informed of the relationship between the physician and patient and the respective role of any other health care provider with respect to management of the patient; and (2) notified that he or she may decline to receive medical services by telemedicine and may withdraw from such care at any time.    Notes:     Patient seen 1 month ago for shortness of breath.  Shortness of breath would occur on exertion and better with rest.  Associated with wheezing.  Patient is a previous long-time smoker.  Smoked for 40 years about a pack a day.  PFTs ordered at her last visit as well as CT chest for lung screening.  She was given albuterol p.r.n. as well as her last visit.    Today she reports that she is doing well.  States that she has been using the albuterol inhaler intermittently p.r.n..  States that she does find relief with the inhaler.  Reports activities such as:  On the treadmill will cause her to have wheezing feel short of breath and uses the inhaler and finds relief.  Has not completed her CT chest.  Patient had her PFT performed a couple of days ago.  Revealed lung volumes with normal limits.  Moderate decrease in diffusion capacity.  FEF changed by 19%.  Mild response to bronchodilator challenge.    [facility] Pham has a past medical history of Fatty liver (2015), Former smoker (2009), Osteoarthritis (2010), Pulmonary nodules (08/16/2019), and Ulcer of abdomen wall (2016).   [facility] Pham has a past surgical history that includes Upper gastrointestinal endoscopy (2016); Colonoscopy (~2014);  Epidural steroid injection into lumbar spine (N/A, 10/12/2018); Epidural steroid injection into lumbar spine (N/A, 2019);  section (, , ); Hysterectomy (); and Appendectomy ().   ABISAI Nath's family history includes Aneurysm in her father; Arthritis in her mother and sister; Brain Hemorrhage in her father; Heart disease in her mother; Hypertension in her father and sister; Obesity in her sister; Osteoarthritis in her sister; Osteoporosis in her mother; Ovarian cancer in her mother; Stroke (age of onset: 50) in her father.   KARL Nath reports that she quit smoking about 10 years ago. She has a 40.00 pack-year smoking history. She has never used smokeless tobacco. She reports that she drinks alcohol. She reports that she has current or past drug history. Drug: Marijuana.   WIL Nath has No Known Allergies.   ABIMAEL Nath has a current medication list which includes the following prescription(s): acetaminophen, albuterol, diclofenac sodium, gabapentin, meloxicam, and pantoprazole.     Review of Systems   Constitutional: Negative for chills, fatigue and fever.   Eyes: Negative for visual disturbance.   Respiratory: Negative for cough and shortness of breath.    Cardiovascular: Negative for chest pain and leg swelling.   Gastrointestinal: Negative for abdominal pain.   Neurological: Negative for dizziness, seizures and headaches.     Objective     There were no vitals taken for this visit.    Physical Exam   Constitutional: She appears well-developed and well-nourished. No distress.   HENT:   Head: Normocephalic and atraumatic.   Right Ear: External ear normal.   Left Ear: External ear normal.   Eyes: EOM are normal. Right eye exhibits no discharge. Left eye exhibits no discharge.   Pulmonary/Chest: Effort normal. No respiratory distress.   Neurological: She is alert.   Skin: She is not diaphoretic. No pallor.   Psychiatric: She has a normal mood and affect. Her behavior is normal.  Judgment and thought content normal.      Assessment/Plan     Diagnoses and all orders for this visit:    Dyspnea on exertion  -PFTs performed and seemed consistent with mild intermittent asthma. Counseled on using albuterol inhaler prn and prior to any physical activity.    -due to COVID-19 at this time, instructed patient to avoid mobic and all NSAID use and switch to tylenol.     -Patient verbalized understanding and agreed to the plan above.      Follow up in about 3 months (around 6/20/2020).    Deven Emerson MD  Family Medicine  [facility]

## 2020-04-06 ENCOUNTER — PATIENT MESSAGE (OUTPATIENT)
Dept: FAMILY MEDICINE | Facility: CLINIC | Age: 65
End: 2020-04-06

## 2020-04-06 NOTE — TELEPHONE ENCOUNTER
Spoke to patient about her message.   States that she has a history of tonsillar stones.   Reports that she is having pain in the back of her throat, post-nasal drip, hoarse voice, and swelling of her tonsils.   States that right tonsil is swollen and draining.   Denies fever, cough, or shortness of breath.   Scheduled an appt with ENT, Dr. Thompson, tomorrow tati.   -Patient verbalized understanding and agreed to the plan above.

## 2020-05-20 ENCOUNTER — PATIENT MESSAGE (OUTPATIENT)
Dept: ADMINISTRATIVE | Facility: HOSPITAL | Age: 65
End: 2020-05-20

## 2020-06-29 ENCOUNTER — OFFICE VISIT (OUTPATIENT)
Dept: FAMILY MEDICINE | Facility: CLINIC | Age: 65
End: 2020-06-29
Payer: COMMERCIAL

## 2020-06-29 VITALS
HEIGHT: 64 IN | OXYGEN SATURATION: 98 % | RESPIRATION RATE: 17 BRPM | TEMPERATURE: 98 F | HEART RATE: 73 BPM | DIASTOLIC BLOOD PRESSURE: 64 MMHG | SYSTOLIC BLOOD PRESSURE: 106 MMHG | WEIGHT: 191 LBS | BODY MASS INDEX: 32.61 KG/M2

## 2020-06-29 DIAGNOSIS — M25.551 RIGHT HIP PAIN: ICD-10-CM

## 2020-06-29 DIAGNOSIS — J45.20 MILD INTERMITTENT ASTHMA WITHOUT COMPLICATION: Primary | ICD-10-CM

## 2020-06-29 DIAGNOSIS — Z11.4 ENCOUNTER FOR SCREENING FOR HUMAN IMMUNODEFICIENCY VIRUS (HIV): ICD-10-CM

## 2020-06-29 DIAGNOSIS — M15.9 PRIMARY OSTEOARTHRITIS INVOLVING MULTIPLE JOINTS: ICD-10-CM

## 2020-06-29 PROCEDURE — 99214 PR OFFICE/OUTPT VISIT, EST, LEVL IV, 30-39 MIN: ICD-10-PCS | Mod: S$GLB,,, | Performed by: FAMILY MEDICINE

## 2020-06-29 PROCEDURE — 3008F BODY MASS INDEX DOCD: CPT | Mod: CPTII,S$GLB,, | Performed by: FAMILY MEDICINE

## 2020-06-29 PROCEDURE — 3008F PR BODY MASS INDEX (BMI) DOCUMENTED: ICD-10-PCS | Mod: CPTII,S$GLB,, | Performed by: FAMILY MEDICINE

## 2020-06-29 PROCEDURE — 99214 OFFICE O/P EST MOD 30 MIN: CPT | Mod: S$GLB,,, | Performed by: FAMILY MEDICINE

## 2020-06-29 RX ORDER — MELOXICAM 7.5 MG/1
15 TABLET ORAL DAILY
Qty: 180 TABLET | Refills: 1 | Status: SHIPPED | OUTPATIENT
Start: 2020-06-29 | End: 2021-02-09 | Stop reason: DRUGHIGH

## 2020-07-03 ENCOUNTER — LAB VISIT (OUTPATIENT)
Dept: LAB | Facility: HOSPITAL | Age: 65
End: 2020-07-03
Attending: FAMILY MEDICINE
Payer: COMMERCIAL

## 2020-07-03 DIAGNOSIS — Z11.4 ENCOUNTER FOR SCREENING FOR HUMAN IMMUNODEFICIENCY VIRUS (HIV): ICD-10-CM

## 2020-07-03 PROCEDURE — 36415 COLL VENOUS BLD VENIPUNCTURE: CPT

## 2020-07-03 PROCEDURE — 86703 HIV-1/HIV-2 1 RESULT ANTBDY: CPT

## 2020-07-06 LAB — HIV 1+2 AB+HIV1 P24 AG SERPL QL IA: NEGATIVE

## 2020-07-24 ENCOUNTER — OFFICE VISIT (OUTPATIENT)
Dept: FAMILY MEDICINE | Facility: CLINIC | Age: 65
End: 2020-07-24
Payer: COMMERCIAL

## 2020-07-24 DIAGNOSIS — J45.20 MILD INTERMITTENT ASTHMA WITHOUT COMPLICATION: Primary | ICD-10-CM

## 2020-07-24 PROCEDURE — 99212 OFFICE O/P EST SF 10 MIN: CPT | Mod: 95,,, | Performed by: FAMILY MEDICINE

## 2020-07-24 PROCEDURE — 99212 PR OFFICE/OUTPT VISIT, EST, LEVL II, 10-19 MIN: ICD-10-PCS | Mod: 95,,, | Performed by: FAMILY MEDICINE

## 2020-07-24 NOTE — PROGRESS NOTES
[facility] - Clinic Note    Subjective    The patient location is: home  The chief complaint leading to consultation is: asthma follow up  Visit type: audiovisual    Face to Face time with patient: 10 minutes of total time spent on the encounter, which includes face to face time and non-face to face time preparing to see the patient (eg, review of tests), Obtaining and/or reviewing separately obtained history, Documenting clinical information in the electronic or other health record, Independently interpreting results (not separately reported) and communicating results to the patient/family/caregiver, or Care coordination (not separately reported).         Each patient to whom he or she provides medical services by telemedicine is:  (1) informed of the relationship between the physician and patient and the respective role of any other health care provider with respect to management of the patient; and (2) notified that he or she may decline to receive medical services by telemedicine and may withdraw from such care at any time.    Notes:     Ms. Licea is a 65 y.o. female who presents to for a VV for asthma.     Mild intermittent asthma: doing well. No increase shortness of breath. Use albuterol inhaler prn. No increase use.     Answers for HPI/ROS submitted by the patient on 7/24/2020   Shortness of breath  Chronicity: recurrent  Onset: more than 1 year ago  Frequency: intermittently  Progression since onset: gradually improving  Episode duration: 1 hours  claudication: Yes  coryza: No  hemoptysis: No  leg pain: Yes  orthopnea: No  PND: No  sputum production: No  swollen glands: No  syncope: No  Aggravating factors: emotional upset, exercise  Improvement on treatment: moderate  Risk factors for DVT/PE: no known risk factors  Treatments tried: beta agonist inhalers  asthma: Yes  allergies: No  COPD: No  pneumonia: No  aspirin allergies: No  CAD: No  DVT: No  heart failure: No  PE: No  recent surgery:  No  bronchiolitis: No  chronic lung disease: No    [facility] Pham has a past medical history of Fatty liver (), Former smoker (), Osteoarthritis (), Pulmonary nodules (2019), and Ulcer of abdomen wall ().   PSXH Pham has a past surgical history that includes Upper gastrointestinal endoscopy (); Colonoscopy (~); Epidural steroid injection into lumbar spine (N/A, 10/12/2018); Epidural steroid injection into lumbar spine (N/A, 2019);  section (, , ); Hysterectomy (); Appendectomy (); and Laparoscopic cholecystectomy (N/A, 2020).   [facility] Pham's family history includes Aneurysm in her father; Arthritis in her mother and sister; Brain Hemorrhage in her father; Heart disease in her mother; Hypertension in her father and sister; Obesity in her sister; Osteoarthritis in her sister; Osteoporosis in her mother; Ovarian cancer in her mother; Stroke (age of onset: 50) in her father.   KARL Nath reports that she quit smoking about 10 years ago. She has a 40.00 pack-year smoking history. She has never used smokeless tobacco. She reports current alcohol use. She reports previous drug use. Drug: Marijuana.    WIL Nath has No Known Allergies.   ABIMAEL Naht has a current medication list which includes the following prescription(s): acetaminophen, albuterol, diclofenac sodium, gabapentin, hydrocodone-acetaminophen, meloxicam, ondansetron, ondansetron, pantoprazole, prednisone, and sucralfate.     Review of Systems   Constitutional: Negative for fever.   HENT: Positive for sore throat. Negative for ear pain and rhinorrhea.    Respiratory: Negative for wheezing.    Cardiovascular: Negative for chest pain and leg swelling.   Gastrointestinal: Negative for abdominal pain and vomiting.   Musculoskeletal: Negative for neck pain.   Skin: Negative for rash.   Neurological: Negative for headaches.     Objective     LMP  (LMP Unknown)     Physical Exam   Constitutional:   Non-toxic appearance. She does not appear ill. No distress.   HENT:   Head: Normocephalic and atraumatic.   Right Ear: External ear normal.   Left Ear: External ear normal.   Eyes: Right eye exhibits no discharge. Left eye exhibits no discharge.   Pulmonary/Chest: Effort normal. No respiratory distress.   Speaks in complete sentences without notable SOB. No tachypnea.    Abdominal: Normal appearance.   Neurological: She is alert.   Skin: She is not diaphoretic.   Psychiatric: Her behavior is normal. Mood, judgment and thought content normal.      Assessment/Plan     Diagnoses and all orders for this visit:    Mild intermittent asthma without complication  -stable. Continue albuterol prn.      Deven Emerson MD  Family Medicine  [facility]

## 2020-08-07 NOTE — PROGRESS NOTES
[facility] - Clinic Note    Subjective      Ms. Licea is a 64 y.o. female who presents to clinic for a follow up.     Reports that she is doing well.     Asthma: doing well with albuterol inhaler prn. Uses prior when she will be exerting herself or out in the yard. No increase use of inhaler. Denies shortness of breath.     Patient does have arthritis and hip pain on the right. Takes mobic 7.5mg daily.   States that she has been doing more projects around the house and out in the yard as she has been at home and having increase pain.    [facility] Pham has a past medical history of Fatty liver (), Former smoker (), Osteoarthritis (), Pulmonary nodules (2019), and Ulcer of abdomen wall ().   PSXH Pham has a past surgical history that includes Upper gastrointestinal endoscopy (); Colonoscopy (~); Epidural steroid injection into lumbar spine (N/A, 10/12/2018); Epidural steroid injection into lumbar spine (N/A, 2019);  section (, , ); Hysterectomy (); and Appendectomy ().    Pham's family history includes Aneurysm in her father; Arthritis in her mother and sister; Brain Hemorrhage in her father; Heart disease in her mother; Hypertension in her father and sister; Obesity in her sister; Osteoarthritis in her sister; Osteoporosis in her mother; Ovarian cancer in her mother; Stroke (age of onset: 50) in her father.    Pham reports that she quit smoking about 10 years ago. She has a 40.00 pack-year smoking history. She has never used smokeless tobacco. She reports current alcohol use. She reports previous drug use. Drug: Marijuana.   WIL Nath has No Known Allergies.   [facility] Pham has a current medication list which includes the following prescription(s): acetaminophen, albuterol, diclofenac sodium, gabapentin, meloxicam, and pantoprazole.     Review of Systems   Constitutional: Negative for activity change, appetite change, fatigue and fever.  "  Eyes: Negative for visual disturbance.   Respiratory: Negative for cough and shortness of breath.    Cardiovascular: Negative for chest pain, palpitations and leg swelling.   Gastrointestinal: Negative for abdominal pain, nausea and vomiting.   Musculoskeletal: Positive for arthralgias. Negative for gait problem.   Neurological: Negative for dizziness, syncope and headaches.   Psychiatric/Behavioral: Negative for confusion.     Objective     /64   Pulse 73   Temp 98 °F (36.7 °C) (Temporal)   Resp 17   Ht 5' 4" (1.626 m)   Wt 86.6 kg (191 lb)   LMP  (LMP Unknown)   SpO2 98%   BMI 32.79 kg/m²     Physical Exam   Constitutional: She appears well-developed and well-nourished.  Non-toxic appearance. She does not appear ill. No distress.   HENT:   Head: Normocephalic and atraumatic.   Eyes: Right eye exhibits no discharge. Left eye exhibits no discharge.   Neck: Neck supple.   Cardiovascular: Normal rate, regular rhythm, normal heart sounds and normal pulses. Exam reveals no gallop and no friction rub.   No murmur heard.  Pulmonary/Chest: Effort normal and breath sounds normal. No respiratory distress. She has no wheezes. She has no rhonchi. She has no rales.   Abdominal: Normal appearance.   Lymphadenopathy:     She has no cervical adenopathy.   Neurological: She is alert.   Skin: Skin is warm and dry. Capillary refill takes less than 2 seconds. She is not diaphoretic.   Psychiatric: Her behavior is normal. Mood, judgment and thought content normal.   Vitals reviewed.     Assessment/Plan     Pham was seen today for follow-up.    Diagnoses and all orders for this visit:    Mild intermittent asthma without complication  -stable. Continue albuterol prn.    Primary osteoarthritis involving multiple joints  -     meloxicam (MOBIC) 7.5 MG tablet; Take 2 tablets (15 mg total) by mouth once daily.  - Increase to 15mg prn. Recommended trying scheduled tylenol arthritis 1,000mg bid prn.    Right hip pain  -     " meloxicam (MOBIC) 7.5 MG tablet; Take 2 tablets (15 mg total) by mouth once daily.    Encounter for screening for human immunodeficiency virus (HIV)  -     HIV 1/2 Ag/Ab (4th Gen); Future      Follow up in about 4 weeks (around 7/27/2020) for asthma.    Deven Emerson MD  Family Medicine  [facility]

## 2020-08-30 ENCOUNTER — HOSPITAL ENCOUNTER (EMERGENCY)
Facility: HOSPITAL | Age: 65
Discharge: HOME | End: 2020-08-30
Attending: EMERGENCY MEDICINE
Payer: COMMERCIAL

## 2020-08-30 VITALS
DIASTOLIC BLOOD PRESSURE: 87 MMHG | BODY MASS INDEX: 34.2 KG/M2 | HEART RATE: 89 BPM | TEMPERATURE: 99 F | RESPIRATION RATE: 18 BRPM | OXYGEN SATURATION: 97 % | HEIGHT: 63 IN | SYSTOLIC BLOOD PRESSURE: 144 MMHG | WEIGHT: 193 LBS

## 2020-08-30 DIAGNOSIS — K29.70 GASTRITIS, PRESENCE OF BLEEDING UNSPECIFIED, UNSPECIFIED CHRONICITY, UNSPECIFIED GASTRITIS TYPE: Primary | ICD-10-CM

## 2020-08-30 PROCEDURE — 99283 EMERGENCY DEPT VISIT LOW MDM: CPT

## 2020-08-30 PROCEDURE — 25000003 PHARM REV CODE 250: Performed by: EMERGENCY MEDICINE

## 2020-08-30 RX ORDER — SUCRALFATE 1 G/1
1 TABLET ORAL 4 TIMES DAILY
Qty: 30 TABLET | Refills: 0 | Status: SHIPPED | OUTPATIENT
Start: 2020-08-30 | End: 2021-02-09

## 2020-08-30 RX ADMIN — LIDOCAINE HYDROCHLORIDE: 20 SOLUTION ORAL; TOPICAL at 07:08

## 2020-08-30 NOTE — ED PROVIDER NOTES
Encounter Date: 2020       History     Chief Complaint   Patient presents with    Abdominal Pain     x4wks    Nausea     65-year-old female comes via private vehicle from home this morning for evaluation and treatment epigastric abdominal pain.  She states that she has a history of stomach ulcers but has not had much of a problem over the last several years.  Recently, however, she has been eating foods that she should not, and last night she admits to common to a Mexican restaurant been eating quite a bit.  In the middle of the night she began feeling some epigastric discomfort which has continued through this morning.  She denies any vomiting but does have some mild nausea.  Over the past 2 weeks she has not had any dark or bloody stools.  Denies any lightheadedness, weakness, etc..  No abdominal bloating etc..  She takes daily Protonix.  She has not tried any medications such as Maalox, etc..  Denies any history of cholecystitis gallbladder disease, denies any history of pancreatitis.  Denies drinking alcohol.        Review of patient's allergies indicates:  No Known Allergies  Past Medical History:   Diagnosis Date    Fatty liver     Former smoker     Osteoarthritis 2010    Pulmonary nodules 2019    Repeat CT in 6 mo    Ulcer of abdomen wall 2016     Past Surgical History:   Procedure Laterality Date    APPENDECTOMY       SECTION  , , 1983    x3    COLONOSCOPY  ~    [facility]: normal findings per patient report    EPIDURAL STEROID INJECTION INTO LUMBAR SPINE N/A 10/12/2018    Procedure: Injection-steroid-epidural-lumbar;  Surgeon: Kal Johnson MD;  Location: [facility] OR;  Service: Pain Management;  Laterality: N/A;  L5-S1    EPIDURAL STEROID INJECTION INTO LUMBAR SPINE N/A 2019    Procedure: Injection-steroid-epidural-lumbar L5-S1;  Surgeon: Kal Johnson MD;  Location: [facility] OR;  Service: Pain Management;  Laterality: N/A;    HYSTERECTOMY      one ovary  conserved    UPPER GASTROINTESTINAL ENDOSCOPY  2016     Family History   Problem Relation Age of Onset    Ovarian cancer Mother     Heart disease Mother     Osteoporosis Mother     Arthritis Mother     Hypertension Father     Stroke Father 50    Brain Hemorrhage Father     Aneurysm Father     Osteoarthritis Sister     Arthritis Sister     Hypertension Sister     Obesity Sister     Breast cancer Neg Hx     Colon cancer Neg Hx     Colon polyps Neg Hx     Crohn's disease Neg Hx     Ulcerative colitis Neg Hx      Social History     Tobacco Use    Smoking status: Former Smoker     Packs/day: 1.00     Years: 40.00     Pack years: 40.00     Quit date: 11/2/2009     Years since quitting: 10.8    Smokeless tobacco: Never Used    Tobacco comment: quit 2009   Substance Use Topics    Alcohol use: Yes     Frequency: Monthly or less     Drinks per session: 1 or 2     Binge frequency: Never     Comment: Not often - one glass of wine every now and then    Drug use: Not Currently     Types: Marijuana     Comment: in 1970s     Review of Systems   Constitutional: Negative for appetite change, chills and fever.   HENT: Negative for congestion, rhinorrhea, sore throat and trouble swallowing.    Eyes: Negative for photophobia and visual disturbance.   Respiratory: Negative for cough, chest tightness and shortness of breath.    Cardiovascular: Negative for chest pain and palpitations.   Gastrointestinal: Negative for blood in stool, constipation and nausea.   Endocrine: Negative for polyphagia and polyuria.   Genitourinary: Negative for dyspareunia, enuresis and hematuria.   Musculoskeletal: Negative for myalgias, neck pain and neck stiffness.   Skin: Negative for pallor and wound.   Neurological: Negative for weakness, light-headedness and numbness.   Psychiatric/Behavioral: Negative for confusion, decreased concentration and dysphoric mood. The patient is not nervous/anxious.        Physical Exam     Initial  Vitals [08/30/20 0725]   BP Pulse Resp Temp SpO2   (!) 144/87 89 18 98.6 °F (37 °C) 97 %      MAP       --         Physical Exam    Nursing note and vitals reviewed.  Constitutional: She appears well-developed and well-nourished. No distress.   HENT:   Head: Normocephalic and atraumatic.   Nose: Nose normal.   Mouth/Throat: No oropharyngeal exudate.   Eyes: EOM are normal. Pupils are equal, round, and reactive to light. No scleral icterus.   Neck: Normal range of motion. Neck supple. No JVD present.   Cardiovascular: Normal rate, regular rhythm, normal heart sounds and intact distal pulses.   No murmur heard.  Pulmonary/Chest: Breath sounds normal. No stridor. No respiratory distress. She has no wheezes.   Abdominal: Soft. Bowel sounds are normal. She exhibits no distension and no mass. There is abdominal tenderness. There is no rebound and no guarding.   Mild mid-epigastric tenderness to palpation   Musculoskeletal: Normal range of motion. No tenderness or edema.   Neurological: She is oriented to person, place, and time. She has normal strength and normal reflexes. No sensory deficit. GCS score is 15. GCS eye subscore is 4. GCS verbal subscore is 5. GCS motor subscore is 6.   Skin: Skin is warm and dry. Capillary refill takes less than 2 seconds. No erythema.   Psychiatric: She has a normal mood and affect. Her behavior is normal.         ED Course   Procedures  Labs Reviewed - No data to display       Imaging Results    None          Medical Decision Making:   Differential Diagnosis:   Gastritis, cholecystitis, pancreatitis, colitis, etc.  ED Management:  Given the history stomach ulcers, and negative history of gallbladder disease or pancreatic disease, also given history of poor dietary choices over the last few weeks, the most likely diagnosis is gastritis.  She does not appear to be suffering any gastric bleeding.  No bloody vomitus, no dark stools.  Vital signs are normal.  Here in the emergency department,  the patient was given a GI cocktail, and upon re-evaluation, the patient reports good relief.  I believe she is safe for discharge.  She will continue her Protonix, and I will prescribe Carafate.  She will follow up proper diet, and follow-up with her primary care doctor.  I suggested she have her doctor refer her to gastroenterologist as well.  She will return here as needed or if worse in any way.                                 Clinical Impression:       ICD-10-CM ICD-9-CM   1. Gastritis, presence of bleeding unspecified, unspecified chronicity, unspecified gastritis type  K29.70 535.50             ED Disposition Condition    Discharge Stable        ED Prescriptions     Medication Sig Dispense Start Date End Date Auth. Provider    sucralfate (CARAFATE) 1 gram tablet Take 1 tablet (1 g total) by mouth 4 (four) times daily. 30 tablet 8/30/2020  Arnulfo Melgar MD        Follow-up Information     Follow up With Specialties Details Why Contact Info    Deven Emerson MD Family Medicine In 1 day  [address] [facility] [address]  [phone]      [facility] - ED Emergency Medicine  As needed, If symptoms worsen [address] [facility] [address] [phone]                                     Arnulfo Melgar MD  08/30/20 4042

## 2020-08-30 NOTE — DISCHARGE INSTRUCTIONS
Follow a bland diet as we discussed, continue your Protonix, and take Carafate as prescribed.  Follow-up with primary care and return here as needed or if worse in any way.

## 2020-09-01 ENCOUNTER — HOSPITAL ENCOUNTER (OUTPATIENT)
Dept: RADIOLOGY | Facility: HOSPITAL | Age: 65
Discharge: HOME | End: 2020-09-01
Attending: FAMILY MEDICINE
Payer: COMMERCIAL

## 2020-09-01 ENCOUNTER — TELEPHONE (OUTPATIENT)
Dept: FAMILY MEDICINE | Facility: CLINIC | Age: 65
End: 2020-09-01

## 2020-09-01 ENCOUNTER — HOSPITAL ENCOUNTER (OUTPATIENT)
Facility: HOSPITAL | Age: 65
Discharge: HOME | End: 2020-09-03
Attending: FAMILY MEDICINE | Admitting: FAMILY MEDICINE
Payer: COMMERCIAL

## 2020-09-01 ENCOUNTER — OFFICE VISIT (OUTPATIENT)
Dept: FAMILY MEDICINE | Facility: CLINIC | Age: 65
End: 2020-09-01
Payer: COMMERCIAL

## 2020-09-01 VITALS
OXYGEN SATURATION: 97 % | SYSTOLIC BLOOD PRESSURE: 111 MMHG | BODY MASS INDEX: 33.91 KG/M2 | HEIGHT: 63 IN | RESPIRATION RATE: 18 BRPM | HEART RATE: 88 BPM | WEIGHT: 191.38 LBS | DIASTOLIC BLOOD PRESSURE: 70 MMHG | TEMPERATURE: 98 F

## 2020-09-01 DIAGNOSIS — R11.2 INTRACTABLE VOMITING WITH NAUSEA, UNSPECIFIED VOMITING TYPE: Primary | ICD-10-CM

## 2020-09-01 DIAGNOSIS — R10.11 RIGHT UPPER QUADRANT ABDOMINAL PAIN: ICD-10-CM

## 2020-09-01 DIAGNOSIS — Z48.89 ENCOUNTER FOR POSTOPERATIVE CARE: ICD-10-CM

## 2020-09-01 DIAGNOSIS — K81.9 CHOLECYSTITIS: ICD-10-CM

## 2020-09-01 DIAGNOSIS — R11.2 INTRACTABLE VOMITING WITH NAUSEA, UNSPECIFIED VOMITING TYPE: ICD-10-CM

## 2020-09-01 DIAGNOSIS — Z01.810 PREOP CARDIOVASCULAR EXAM: ICD-10-CM

## 2020-09-01 DIAGNOSIS — K80.00 CALCULUS OF GALLBLADDER WITH ACUTE CHOLECYSTITIS WITHOUT OBSTRUCTION: ICD-10-CM

## 2020-09-01 PROBLEM — R11.10 EMESIS: Status: ACTIVE | Noted: 2020-09-01

## 2020-09-01 PROBLEM — R10.9 ABDOMINAL PAIN: Status: ACTIVE | Noted: 2020-09-01

## 2020-09-01 PROBLEM — R11.0 NAUSEA: Status: ACTIVE | Noted: 2020-09-01

## 2020-09-01 LAB — SARS-COV-2 RDRP RESP QL NAA+PROBE: NEGATIVE

## 2020-09-01 PROCEDURE — G0378 HOSPITAL OBSERVATION PER HR: HCPCS

## 2020-09-01 PROCEDURE — 96374 THER/PROPH/DIAG INJ IV PUSH: CPT

## 2020-09-01 PROCEDURE — 76700 US EXAM ABDOM COMPLETE: CPT | Mod: 26,,, | Performed by: RADIOLOGY

## 2020-09-01 PROCEDURE — 3008F BODY MASS INDEX DOCD: CPT | Mod: CPTII,S$GLB,, | Performed by: FAMILY MEDICINE

## 2020-09-01 PROCEDURE — 3008F PR BODY MASS INDEX (BMI) DOCUMENTED: ICD-10-PCS | Mod: CPTII,S$GLB,, | Performed by: FAMILY MEDICINE

## 2020-09-01 PROCEDURE — U0002 COVID-19 LAB TEST NON-CDC: HCPCS

## 2020-09-01 PROCEDURE — 99285 EMERGENCY DEPT VISIT HI MDM: CPT | Mod: 25

## 2020-09-01 PROCEDURE — 96375 TX/PRO/DX INJ NEW DRUG ADDON: CPT

## 2020-09-01 PROCEDURE — 25000003 PHARM REV CODE 250: Performed by: FAMILY MEDICINE

## 2020-09-01 PROCEDURE — 76700 US ABDOMEN COMPLETE: ICD-10-PCS | Mod: 26,,, | Performed by: RADIOLOGY

## 2020-09-01 PROCEDURE — 99499 UNLISTED E&M SERVICE: CPT | Mod: S$GLB,,, | Performed by: FAMILY MEDICINE

## 2020-09-01 PROCEDURE — 63600175 PHARM REV CODE 636 W HCPCS: Performed by: FAMILY MEDICINE

## 2020-09-01 PROCEDURE — 99499 NO LOS: ICD-10-PCS | Mod: S$GLB,,, | Performed by: FAMILY MEDICINE

## 2020-09-01 PROCEDURE — 76700 US EXAM ABDOM COMPLETE: CPT | Mod: TC,PN

## 2020-09-01 PROCEDURE — 99219 PR INITIAL OBSERVATION CARE,LEVL II: ICD-10-PCS | Mod: ,,, | Performed by: FAMILY MEDICINE

## 2020-09-01 PROCEDURE — 96361 HYDRATE IV INFUSION ADD-ON: CPT

## 2020-09-01 PROCEDURE — 99219 PR INITIAL OBSERVATION CARE,LEVL II: CPT | Mod: ,,, | Performed by: FAMILY MEDICINE

## 2020-09-01 RX ORDER — SODIUM CHLORIDE, SODIUM LACTATE, POTASSIUM CHLORIDE, CALCIUM CHLORIDE 600; 310; 30; 20 MG/100ML; MG/100ML; MG/100ML; MG/100ML
INJECTION, SOLUTION INTRAVENOUS CONTINUOUS
Status: DISCONTINUED | OUTPATIENT
Start: 2020-09-01 | End: 2020-09-03 | Stop reason: HOSPADM

## 2020-09-01 RX ORDER — SUCRALFATE 1 G/1
1 TABLET ORAL 4 TIMES DAILY
Status: DISCONTINUED | OUTPATIENT
Start: 2020-09-01 | End: 2020-09-03 | Stop reason: HOSPADM

## 2020-09-01 RX ORDER — SODIUM CHLORIDE 0.9 % (FLUSH) 0.9 %
10 SYRINGE (ML) INJECTION
Status: DISCONTINUED | OUTPATIENT
Start: 2020-09-01 | End: 2020-09-03 | Stop reason: HOSPADM

## 2020-09-01 RX ORDER — SODIUM CHLORIDE, SODIUM LACTATE, POTASSIUM CHLORIDE, CALCIUM CHLORIDE 600; 310; 30; 20 MG/100ML; MG/100ML; MG/100ML; MG/100ML
1000 INJECTION, SOLUTION INTRAVENOUS
Status: COMPLETED | OUTPATIENT
Start: 2020-09-01 | End: 2020-09-01

## 2020-09-01 RX ORDER — SODIUM CHLORIDE 0.9 % (FLUSH) 0.9 %
10 SYRINGE (ML) INJECTION
Status: CANCELLED | OUTPATIENT
Start: 2020-09-01

## 2020-09-01 RX ORDER — PANTOPRAZOLE SODIUM 40 MG/1
40 TABLET, DELAYED RELEASE ORAL DAILY
Status: DISCONTINUED | OUTPATIENT
Start: 2020-09-02 | End: 2020-09-03 | Stop reason: HOSPADM

## 2020-09-01 RX ORDER — MORPHINE SULFATE 10 MG/ML
2 INJECTION INTRAMUSCULAR; INTRAVENOUS; SUBCUTANEOUS EVERY 4 HOURS PRN
Status: DISCONTINUED | OUTPATIENT
Start: 2020-09-01 | End: 2020-09-03 | Stop reason: HOSPADM

## 2020-09-01 RX ORDER — ONDANSETRON 2 MG/ML
4 INJECTION INTRAMUSCULAR; INTRAVENOUS EVERY 6 HOURS PRN
Status: DISCONTINUED | OUTPATIENT
Start: 2020-09-01 | End: 2020-09-02

## 2020-09-01 RX ORDER — ENOXAPARIN SODIUM 100 MG/ML
40 INJECTION SUBCUTANEOUS EVERY 24 HOURS
Status: DISCONTINUED | OUTPATIENT
Start: 2020-09-02 | End: 2020-09-02

## 2020-09-01 RX ORDER — ONDANSETRON 8 MG/1
8 TABLET, ORALLY DISINTEGRATING ORAL EVERY 6 HOURS PRN
Qty: 30 TABLET | Refills: 1 | Status: SHIPPED | OUTPATIENT
Start: 2020-09-01 | End: 2022-03-24 | Stop reason: SDUPTHER

## 2020-09-01 RX ORDER — GABAPENTIN 300 MG/1
300 CAPSULE ORAL 3 TIMES DAILY
Status: DISCONTINUED | OUTPATIENT
Start: 2020-09-01 | End: 2020-09-03 | Stop reason: HOSPADM

## 2020-09-01 RX ADMIN — GABAPENTIN 300 MG: 300 CAPSULE ORAL at 09:09

## 2020-09-01 RX ADMIN — SUCRALFATE 1 G: 1 TABLET ORAL at 09:09

## 2020-09-01 RX ADMIN — ONDANSETRON HYDROCHLORIDE 4 MG: 2 SOLUTION INTRAMUSCULAR; INTRAVENOUS at 11:09

## 2020-09-01 RX ADMIN — SODIUM CHLORIDE, POTASSIUM CHLORIDE, SODIUM LACTATE AND CALCIUM CHLORIDE: 600; 310; 30; 20 INJECTION, SOLUTION INTRAVENOUS at 07:09

## 2020-09-01 RX ADMIN — SODIUM CHLORIDE, SODIUM LACTATE, POTASSIUM CHLORIDE, AND CALCIUM CHLORIDE 1000 ML: .6; .31; .03; .02 INJECTION, SOLUTION INTRAVENOUS at 05:09

## 2020-09-01 RX ADMIN — ERTAPENEM SODIUM 1 G: 1 INJECTION, POWDER, LYOPHILIZED, FOR SOLUTION INTRAMUSCULAR; INTRAVENOUS at 10:09

## 2020-09-01 NOTE — ED PROVIDER NOTES
Please note that my documentation in this Electronic Healthcare Record was produced using speech recognition software and therefore may contain errors related to that software.These could include grammar, punctuation and spelling errors or the inclusion/ exclusion of phrases that were not intended. Please contact myself for any clarification, questions or concerns.    HPI: Patient is a 65 y.o. female who presents with the chief complaint of to be directly admitted.  Patient has been having some right upper quadrant abdominal pain for the last couple days with nausea, vomiting, diarrhea.  She was seen here previously and diagnosed with gastritis.  Seen by her primary care today and had ultrasound which did show concern for cholecystitis.  She was sent here to have a cholecystectomy.  She was advised by Dr. Coombs to come to the Er.  Patient denies any chest pain, difficulty breathing, lightheadedness, dizziness.    REVIEW OF SYSTEMS - 10 systems were independently reviewed and are otherwise negative with the exception of those items previously documented in the HPI and nursing notes.    Allergy: Patient has no known allergies.    Past medical history:   Past Medical History:   Diagnosis Date    Fatty liver     Former smoker     Osteoarthritis 2010    Pulmonary nodules 2019    Repeat CT in 6 mo    Ulcer of abdomen wall 2016       Surgical History:   Past Surgical History:   Procedure Laterality Date    APPENDECTOMY  1993     SECTION  , , 1983    x3    COLONOSCOPY  ~    [facility]: normal findings per patient report    EPIDURAL STEROID INJECTION INTO LUMBAR SPINE N/A 10/12/2018    Procedure: Injection-steroid-epidural-lumbar;  Surgeon: Kal Johnson MD;  Location: [facility] OR;  Service: Pain Management;  Laterality: N/A;  L5-S1    EPIDURAL STEROID INJECTION INTO LUMBAR SPINE N/A 2019    Procedure: Injection-steroid-epidural-lumbar L5-S1;  Surgeon: Kal Johnson MD;  Location: [facility]  "OR;  Service: Pain Management;  Laterality: N/A;    HYSTERECTOMY  1993    one ovary conserved    UPPER GASTROINTESTINAL ENDOSCOPY  2016       Social history:   Social History     Socioeconomic History    Marital status:      Spouse name: Not on file    Number of children: 4    Years of education: Not on file    Highest education level: Some college, no degree   Occupational History    Occupation: sale specialist-    Social Needs    Financial resource strain: Not very hard    Food insecurity     Worry: Never true     Inability: Never true    Transportation needs     Medical: No     Non-medical: No   Tobacco Use    Smoking status: Former Smoker     Packs/day: 1.00     Years: 40.00     Pack years: 40.00     Quit date: 11/2/2009     Years since quitting: 10.8    Smokeless tobacco: Never Used    Tobacco comment: quit 2009   Substance and Sexual Activity    Alcohol use: Yes     Frequency: Monthly or less     Drinks per session: 1 or 2     Binge frequency: Never     Comment: Not often - one glass of wine every now and then    Drug use: Not Currently     Types: Marijuana     Comment: in 1970s    Sexual activity: Not Currently   Lifestyle    Physical activity     Days per week: 0 days     Minutes per session: 0 min    Stress: Only a little   Relationships    Social connections     Talks on phone: More than three times a week     Gets together: Once a week     Attends [employer] service: Not on file     Active member of club or organization: Yes     Attends meetings of clubs or organizations: 1 to 4 times per year     Relationship status:    Other Topics Concern    Not on file   Social History Narrative    Not on file       Family history: non-contributory    EHR: reviewed    Vitals: /75 (BP Location: Left arm, Patient Position: Sitting)   Pulse 89   Temp 98.8 °F (37.1 °C) (Oral)   Resp 18   Ht 5' 3" (1.6 m)   Wt 86.6 kg (191 lb)   LMP  (LMP Unknown)   SpO2 97%   Breastfeeding " No   BMI 33.83 kg/m²     PHYSICAL EXAM:    General-65-year-old female awake and alert, oriented, GCS 15, in no acute distress,  HEENT- normocephalic, atraumatic, sclera anicteric, moist mucous membranes, PERRL, EOMI  CARDIOVASCULAR- regular rate and rhythm  PULMONARY- nonlabored, no respiratory distress  GASTROINTESTINAL- soft, right upper quadrant tender to palpation with a positive Berrios sign, nondistended, no rigidity, rebound, or guarding, no CVA tenderness  NEUROLOGIC- mental status normal, speech fluid, cognition normal, CN II-XII grossly intact, sensations equal normal bilateral upper and lower extremities, peripheral pulse 2 +/4, ambulatory with proper gait.  MUSCULOSKELETAL- well-nourished, well-developed  DERMATOLOGIC- warm and dry, no visible rashes  PSYCHIATRIC- normal affect, normal concentration           Labs Reviewed   SARS-COV-2 RNA AMPLIFICATION, QUAL       No orders to display       MEDICAL DECISION MAKING: Patient is a 65 y.o. female who presented with chief complaint of sent by MD for cholecystectomy.  Patient has been having some right upper quadrant pain and diagnosed with cholecystitis.  Ultrasound and labs were performed today.  She does have some right upper quadrant abdominal tenderness with a positive Berrios sign.  COVID swab was negative.  Patient will be admitted to Dr. Coombs and she has already consult general surgeon Dr. Frey.  Patient's vitals are stable and normal.    CLINICAL IMPRESSION:  1. Cholecystitis         DEEPALI Montoya  09/01/20 1758       DEEPALI Montoya  09/01/20 1752

## 2020-09-01 NOTE — PROGRESS NOTES
[facility] - Clinic Note    Subjective      Ms. Licea is a 65 y.o. female who presents to clinic with complaints of nausea and vomiting.     Reports that she has been feeling uneasy for the past couple of weeks.   States that for the past week she has had nausea and vomiting.  Associated with right sided abdominal pain and intermittent diarrhea.   Unable to keep fluids and food down. Throws it up. Also states that she is throwing up bile.   She went to the ED 2 days ago and was diagnosed with gastritis. Was sent home with carafate. She has not been able to take the carafate as she cannot keep anything down.   Admits to eating more fatty foods recently such has papa johns.    [facility] Pham has a past medical history of Fatty liver (), Former smoker (), Osteoarthritis (), Pulmonary nodules (2019), and Ulcer of abdomen wall ().   PSX Pham has a past surgical history that includes Upper gastrointestinal endoscopy (); Colonoscopy (~); Epidural steroid injection into lumbar spine (N/A, 10/12/2018); Epidural steroid injection into lumbar spine (N/A, 2019);  section (, , ); Hysterectomy (); and Appendectomy ().    Pham's family history includes Aneurysm in her father; Arthritis in her mother and sister; Brain Hemorrhage in her father; Heart disease in her mother; Hypertension in her father and sister; Obesity in her sister; Osteoarthritis in her sister; Osteoporosis in her mother; Ovarian cancer in her mother; Stroke (age of onset: 50) in her father.    Pham reports that she quit smoking about 10 years ago. She has a 40.00 pack-year smoking history. She has never used smokeless tobacco. She reports current alcohol use. She reports previous drug use. Drug: Marijuana.   [facility] Pham has No Known Allergies.   MED Pham has a current medication list which includes the following prescription(s): acetaminophen, albuterol, diclofenac sodium,  "gabapentin, meloxicam, pantoprazole, sucralfate, and ondansetron.     Review of Systems   Constitutional: Positive for activity change, appetite change and chills. Negative for fatigue and fever.   Eyes: Negative for visual disturbance.   Respiratory: Negative for cough and shortness of breath.    Cardiovascular: Negative for chest pain, palpitations and leg swelling.   Gastrointestinal: Positive for abdominal pain, nausea and vomiting. Negative for constipation.   Neurological: Negative for dizziness and headaches.   Psychiatric/Behavioral: Negative for confusion.     Objective     /70   Pulse 88   Temp 97.5 °F (36.4 °C) (Temporal)   Resp 18   Ht 5' 3" (1.6 m)   Wt 86.8 kg (191 lb 6 oz)   LMP  (LMP Unknown)   SpO2 97%   BMI 33.90 kg/m²     Physical Exam   Constitutional: She appears well-developed and well-nourished.  Non-toxic appearance. She does not appear ill. No distress.   HENT:   Head: Normocephalic and atraumatic.   Eyes: Right eye exhibits no discharge. Left eye exhibits no discharge.   Neck: Neck supple.   Cardiovascular: Normal rate, regular rhythm, normal heart sounds and normal pulses. Exam reveals no gallop and no friction rub.   No murmur heard.  Pulmonary/Chest: Effort normal and breath sounds normal. No respiratory distress. She has no wheezes. She has no rhonchi. She has no rales.   Abdominal: Soft. Normal appearance and bowel sounds are normal. She exhibits no distension and no mass. There is abdominal tenderness. There is guarding. There is no rebound.   +TTP in the upper epigastric,RUQ and RLQ with guarding   Lymphadenopathy:     She has no cervical adenopathy.   Neurological: She is alert.   Skin: Skin is warm and dry. Capillary refill takes less than 2 seconds. She is not diaphoretic.   Psychiatric: Her behavior is normal. Mood, judgment and thought content normal.   Vitals reviewed.     Assessment/Plan     Pham was seen today for hospital follow up.    Diagnoses and all " orders for this visit:    Intractable vomiting with nausea, unspecified vomiting type  -     Lipase; Future  -     Comprehensive metabolic panel; Future  -     CBC auto differential; Future  -     ondansetron (ZOFRAN-ODT) 8 MG TbDL; Take 1 tablet (8 mg total) by mouth every 6 (six) hours as needed (nausea or vomiting).  -     US Abdomen Complete; Future    Right upper quadrant abdominal pain  -     Comprehensive metabolic panel; Future  -     CBC auto differential; Future  -     US Abdomen Complete; Future    -concern for cholecystitis. Obtain labs and imaging for further eval.    Follow up in about 2 weeks (around 9/15/2020).    Future Appointments   Date Time Provider Department Center   9/1/2020 12:30 PM [facility] US1 [facility] ULTRAIC [facility]   9/1/2020  1:30 PM LAB, [facility] FAM MED [facility] LAB [facility]       Deven Emerson MD  Family Medicine  [facility]

## 2020-09-01 NOTE — TELEPHONE ENCOUNTER
Spoke to patient about her US results. Informed that shows acute cholecystitis and needs admit. Patient verbalized understanding. Discussed with Dr. Coombs for direct admit who accepted.

## 2020-09-02 ENCOUNTER — ANESTHESIA (OUTPATIENT)
Dept: SURGERY | Facility: HOSPITAL | Age: 65
End: 2020-09-02
Payer: COMMERCIAL

## 2020-09-02 ENCOUNTER — ANESTHESIA EVENT (OUTPATIENT)
Dept: SURGERY | Facility: HOSPITAL | Age: 65
End: 2020-09-02
Payer: COMMERCIAL

## 2020-09-02 LAB
ALBUMIN SERPL BCP-MCNC: 3.5 G/DL (ref 3.5–5.2)
ALP SERPL-CCNC: 48 U/L (ref 55–135)
ALT SERPL W/O P-5'-P-CCNC: 13 U/L (ref 10–44)
ANION GAP SERPL CALC-SCNC: 12 MMOL/L (ref 8–16)
AST SERPL-CCNC: 16 U/L (ref 10–40)
BASOPHILS # BLD AUTO: 0.04 K/UL (ref 0–0.2)
BASOPHILS NFR BLD: 0.5 % (ref 0–1.9)
BILIRUB SERPL-MCNC: 0.5 MG/DL (ref 0.1–1)
BUN SERPL-MCNC: 21 MG/DL (ref 8–23)
CALCIUM SERPL-MCNC: 8.1 MG/DL (ref 8.7–10.5)
CHLORIDE SERPL-SCNC: 94 MMOL/L (ref 95–110)
CO2 SERPL-SCNC: 28 MMOL/L (ref 23–29)
CREAT SERPL-MCNC: 0.9 MG/DL (ref 0.5–1.4)
DIFFERENTIAL METHOD: ABNORMAL
EOSINOPHIL # BLD AUTO: 0.1 K/UL (ref 0–0.5)
EOSINOPHIL NFR BLD: 1.6 % (ref 0–8)
ERYTHROCYTE [DISTWIDTH] IN BLOOD BY AUTOMATED COUNT: 13.7 % (ref 11.5–14.5)
EST. GFR  (AFRICAN AMERICAN): >60 ML/MIN/1.73 M^2
EST. GFR  (NON AFRICAN AMERICAN): >60 ML/MIN/1.73 M^2
GLUCOSE SERPL-MCNC: 84 MG/DL (ref 70–110)
HCT VFR BLD AUTO: 34.4 % (ref 37–48.5)
HGB BLD-MCNC: 11 G/DL (ref 12–16)
IMM GRANULOCYTES # BLD AUTO: 0.02 K/UL (ref 0–0.04)
IMM GRANULOCYTES NFR BLD AUTO: 0.2 % (ref 0–0.5)
INR PPP: 1 (ref 0.8–1.2)
LYMPHOCYTES # BLD AUTO: 2.8 K/UL (ref 1–4.8)
LYMPHOCYTES NFR BLD: 33.1 % (ref 18–48)
MCH RBC QN AUTO: 28.1 PG (ref 27–31)
MCHC RBC AUTO-ENTMCNC: 32 G/DL (ref 32–36)
MCV RBC AUTO: 88 FL (ref 82–98)
MONOCYTES # BLD AUTO: 1 K/UL (ref 0.3–1)
MONOCYTES NFR BLD: 11.2 % (ref 4–15)
NEUTROPHILS # BLD AUTO: 4.6 K/UL (ref 1.8–7.7)
NEUTROPHILS NFR BLD: 53.4 % (ref 38–73)
NRBC BLD-RTO: 0 /100 WBC
PLATELET # BLD AUTO: 272 K/UL (ref 150–350)
PMV BLD AUTO: 10.4 FL (ref 9.2–12.9)
POTASSIUM SERPL-SCNC: 3.3 MMOL/L (ref 3.5–5.1)
PROT SERPL-MCNC: 5.8 G/DL (ref 6–8.4)
PROTHROMBIN TIME: 10.3 SEC (ref 9–12.5)
RBC # BLD AUTO: 3.91 M/UL (ref 4–5.4)
SODIUM SERPL-SCNC: 134 MMOL/L (ref 136–145)
WBC # BLD AUTO: 8.59 K/UL (ref 3.9–12.7)

## 2020-09-02 PROCEDURE — 99204 PR OFFICE/OUTPT VISIT, NEW, LEVL IV, 45-59 MIN: ICD-10-PCS | Mod: 57,,, | Performed by: SURGERY

## 2020-09-02 PROCEDURE — 94760 N-INVAS EAR/PLS OXIMETRY 1: CPT

## 2020-09-02 PROCEDURE — 47562 LAPAROSCOPIC CHOLECYSTECTOMY: CPT | Mod: ,,, | Performed by: SURGERY

## 2020-09-02 PROCEDURE — 63600175 PHARM REV CODE 636 W HCPCS: Performed by: FAMILY MEDICINE

## 2020-09-02 PROCEDURE — D9220A PRA ANESTHESIA: Mod: ANES,,, | Performed by: ANESTHESIOLOGY

## 2020-09-02 PROCEDURE — 25000003 PHARM REV CODE 250: Performed by: NURSE ANESTHETIST, CERTIFIED REGISTERED

## 2020-09-02 PROCEDURE — 80053 COMPREHEN METABOLIC PANEL: CPT

## 2020-09-02 PROCEDURE — 63600175 PHARM REV CODE 636 W HCPCS: Performed by: SURGERY

## 2020-09-02 PROCEDURE — C9290 INJ, BUPIVACAINE LIPOSOME: HCPCS | Performed by: SURGERY

## 2020-09-02 PROCEDURE — 36000709 HC OR TIME LEV III EA ADD 15 MIN: Performed by: SURGERY

## 2020-09-02 PROCEDURE — 71000033 HC RECOVERY, INTIAL HOUR: Performed by: SURGERY

## 2020-09-02 PROCEDURE — 99204 OFFICE O/P NEW MOD 45 MIN: CPT | Mod: 57,,, | Performed by: SURGERY

## 2020-09-02 PROCEDURE — 96375 TX/PRO/DX INJ NEW DRUG ADDON: CPT

## 2020-09-02 PROCEDURE — 63600175 PHARM REV CODE 636 W HCPCS

## 2020-09-02 PROCEDURE — 37000009 HC ANESTHESIA EA ADD 15 MINS: Performed by: SURGERY

## 2020-09-02 PROCEDURE — 85025 COMPLETE CBC W/AUTO DIFF WBC: CPT

## 2020-09-02 PROCEDURE — 88304 TISSUE EXAM BY PATHOLOGIST: CPT | Performed by: PATHOLOGY

## 2020-09-02 PROCEDURE — 99499 NO LOS: ICD-10-PCS | Mod: ,,, | Performed by: SURGERY

## 2020-09-02 PROCEDURE — 87075 CULTR BACTERIA EXCEPT BLOOD: CPT | Mod: 59

## 2020-09-02 PROCEDURE — 96376 TX/PRO/DX INJ SAME DRUG ADON: CPT | Mod: 59

## 2020-09-02 PROCEDURE — 36415 COLL VENOUS BLD VENIPUNCTURE: CPT

## 2020-09-02 PROCEDURE — 47562 PR LAP,CHOLECYSTECTOMY: ICD-10-PCS | Mod: ,,, | Performed by: SURGERY

## 2020-09-02 PROCEDURE — 25000003 PHARM REV CODE 250: Performed by: SURGERY

## 2020-09-02 PROCEDURE — G0378 HOSPITAL OBSERVATION PER HR: HCPCS

## 2020-09-02 PROCEDURE — D9220A PRA ANESTHESIA: ICD-10-PCS | Mod: CRNA,,, | Performed by: NURSE ANESTHETIST, CERTIFIED REGISTERED

## 2020-09-02 PROCEDURE — 63600175 PHARM REV CODE 636 W HCPCS: Performed by: NURSE ANESTHETIST, CERTIFIED REGISTERED

## 2020-09-02 PROCEDURE — 88304 TISSUE EXAM BY PATHOLOGIST: CPT | Mod: 26,,, | Performed by: PATHOLOGY

## 2020-09-02 PROCEDURE — 94799 UNLISTED PULMONARY SVC/PX: CPT

## 2020-09-02 PROCEDURE — 25000003 PHARM REV CODE 250: Performed by: FAMILY MEDICINE

## 2020-09-02 PROCEDURE — 85610 PROTHROMBIN TIME: CPT

## 2020-09-02 PROCEDURE — D9220A PRA ANESTHESIA: Mod: CRNA,,, | Performed by: NURSE ANESTHETIST, CERTIFIED REGISTERED

## 2020-09-02 PROCEDURE — 99499 UNLISTED E&M SERVICE: CPT | Mod: ,,, | Performed by: SURGERY

## 2020-09-02 PROCEDURE — 88304 PR  SURG PATH,LEVEL III: ICD-10-PCS | Mod: 26,,, | Performed by: PATHOLOGY

## 2020-09-02 PROCEDURE — 36000708 HC OR TIME LEV III 1ST 15 MIN: Performed by: SURGERY

## 2020-09-02 PROCEDURE — 99225 PR SUBSEQUENT OBSERVATION CARE,LEVEL II: CPT | Mod: ,,, | Performed by: FAMILY MEDICINE

## 2020-09-02 PROCEDURE — 27201423 OPTIME MED/SURG SUP & DEVICES STERILE SUPPLY: Performed by: SURGERY

## 2020-09-02 PROCEDURE — 87070 CULTURE OTHR SPECIMN AEROBIC: CPT | Mod: 59

## 2020-09-02 PROCEDURE — 99225 PR SUBSEQUENT OBSERVATION CARE,LEVEL II: ICD-10-PCS | Mod: ,,, | Performed by: FAMILY MEDICINE

## 2020-09-02 PROCEDURE — 37000008 HC ANESTHESIA 1ST 15 MINUTES: Performed by: SURGERY

## 2020-09-02 PROCEDURE — 96361 HYDRATE IV INFUSION ADD-ON: CPT | Mod: 59

## 2020-09-02 PROCEDURE — D9220A PRA ANESTHESIA: ICD-10-PCS | Mod: ANES,,, | Performed by: ANESTHESIOLOGY

## 2020-09-02 RX ORDER — EPHEDRINE SULFATE 50 MG/ML
INJECTION, SOLUTION INTRAVENOUS
Status: DISCONTINUED | OUTPATIENT
Start: 2020-09-02 | End: 2020-09-02

## 2020-09-02 RX ORDER — SUCCINYLCHOLINE CHLORIDE 20 MG/ML
INJECTION INTRAMUSCULAR; INTRAVENOUS
Status: DISCONTINUED | OUTPATIENT
Start: 2020-09-02 | End: 2020-09-02

## 2020-09-02 RX ORDER — ROCURONIUM BROMIDE 10 MG/ML
INJECTION, SOLUTION INTRAVENOUS
Status: DISCONTINUED | OUTPATIENT
Start: 2020-09-02 | End: 2020-09-02

## 2020-09-02 RX ORDER — PHENYLEPHRINE HYDROCHLORIDE 10 MG/ML
INJECTION INTRAVENOUS
Status: DISCONTINUED | OUTPATIENT
Start: 2020-09-02 | End: 2020-09-02

## 2020-09-02 RX ORDER — MIDAZOLAM HYDROCHLORIDE 1 MG/ML
INJECTION, SOLUTION INTRAMUSCULAR; INTRAVENOUS
Status: DISCONTINUED | OUTPATIENT
Start: 2020-09-02 | End: 2020-09-02

## 2020-09-02 RX ORDER — ONDANSETRON 2 MG/ML
4 INJECTION INTRAMUSCULAR; INTRAVENOUS EVERY 4 HOURS PRN
Status: DISCONTINUED | OUTPATIENT
Start: 2020-09-02 | End: 2020-09-03 | Stop reason: HOSPADM

## 2020-09-02 RX ORDER — ENOXAPARIN SODIUM 100 MG/ML
40 INJECTION SUBCUTANEOUS EVERY 24 HOURS
Status: DISCONTINUED | OUTPATIENT
Start: 2020-09-03 | End: 2020-09-03 | Stop reason: HOSPADM

## 2020-09-02 RX ORDER — MEPERIDINE HYDROCHLORIDE 50 MG/ML
INJECTION INTRAMUSCULAR; INTRAVENOUS; SUBCUTANEOUS
Status: DISCONTINUED | OUTPATIENT
Start: 2020-09-02 | End: 2020-09-02

## 2020-09-02 RX ORDER — LIDOCAINE HYDROCHLORIDE 20 MG/ML
INJECTION, SOLUTION EPIDURAL; INFILTRATION; INTRACAUDAL; PERINEURAL
Status: DISCONTINUED | OUTPATIENT
Start: 2020-09-02 | End: 2020-09-02

## 2020-09-02 RX ORDER — DEXAMETHASONE SODIUM PHOSPHATE 4 MG/ML
INJECTION, SOLUTION INTRA-ARTICULAR; INTRALESIONAL; INTRAMUSCULAR; INTRAVENOUS; SOFT TISSUE
Status: DISCONTINUED | OUTPATIENT
Start: 2020-09-02 | End: 2020-09-02

## 2020-09-02 RX ORDER — MORPHINE SULFATE 4 MG/ML
4 INJECTION, SOLUTION INTRAMUSCULAR; INTRAVENOUS EVERY 4 HOURS PRN
Status: DISCONTINUED | OUTPATIENT
Start: 2020-09-02 | End: 2020-09-03 | Stop reason: HOSPADM

## 2020-09-02 RX ORDER — PROPOFOL 10 MG/ML
VIAL (ML) INTRAVENOUS
Status: DISCONTINUED | OUTPATIENT
Start: 2020-09-02 | End: 2020-09-02

## 2020-09-02 RX ORDER — GLYCOPYRROLATE 0.2 MG/ML
INJECTION INTRAMUSCULAR; INTRAVENOUS
Status: DISCONTINUED | OUTPATIENT
Start: 2020-09-02 | End: 2020-09-02

## 2020-09-02 RX ORDER — OXYCODONE AND ACETAMINOPHEN 10; 325 MG/1; MG/1
1 TABLET ORAL EVERY 4 HOURS PRN
Status: DISCONTINUED | OUTPATIENT
Start: 2020-09-02 | End: 2020-09-03 | Stop reason: HOSPADM

## 2020-09-02 RX ORDER — OXYCODONE AND ACETAMINOPHEN 10; 325 MG/1; MG/1
2 TABLET ORAL EVERY 4 HOURS PRN
Status: DISCONTINUED | OUTPATIENT
Start: 2020-09-02 | End: 2020-09-03 | Stop reason: HOSPADM

## 2020-09-02 RX ORDER — MORPHINE SULFATE 4 MG/ML
INJECTION, SOLUTION INTRAMUSCULAR; INTRAVENOUS
Status: COMPLETED
Start: 2020-09-02 | End: 2020-09-02

## 2020-09-02 RX ORDER — POTASSIUM CHLORIDE 20 MEQ/1
40 TABLET, EXTENDED RELEASE ORAL ONCE
Status: COMPLETED | OUTPATIENT
Start: 2020-09-02 | End: 2020-09-02

## 2020-09-02 RX ADMIN — ONDANSETRON HYDROCHLORIDE 4 MG: 2 SOLUTION INTRAMUSCULAR; INTRAVENOUS at 12:09

## 2020-09-02 RX ADMIN — MORPHINE SULFATE 4 MG: 4 INJECTION INTRAVENOUS at 10:09

## 2020-09-02 RX ADMIN — MIDAZOLAM HYDROCHLORIDE 2 MG: 1 INJECTION, SOLUTION INTRAMUSCULAR; INTRAVENOUS at 12:09

## 2020-09-02 RX ADMIN — SUCRALFATE 1 G: 1 TABLET ORAL at 04:09

## 2020-09-02 RX ADMIN — ROCURONIUM BROMIDE 20 MG: 10 INJECTION, SOLUTION INTRAVENOUS at 12:09

## 2020-09-02 RX ADMIN — Medication 25 MG: at 01:09

## 2020-09-02 RX ADMIN — GABAPENTIN 300 MG: 300 CAPSULE ORAL at 04:09

## 2020-09-02 RX ADMIN — EPHEDRINE SULFATE 5 MG: 50 INJECTION INTRAVENOUS at 12:09

## 2020-09-02 RX ADMIN — Medication 25 MG: at 12:09

## 2020-09-02 RX ADMIN — GLYCOPYRROLATE 0.2 MG: 0.2 INJECTION INTRAMUSCULAR; INTRAVENOUS at 12:09

## 2020-09-02 RX ADMIN — SODIUM CHLORIDE, POTASSIUM CHLORIDE, SODIUM LACTATE AND CALCIUM CHLORIDE: 600; 310; 30; 20 INJECTION, SOLUTION INTRAVENOUS at 12:09

## 2020-09-02 RX ADMIN — SODIUM CHLORIDE, POTASSIUM CHLORIDE, SODIUM LACTATE AND CALCIUM CHLORIDE 1000 ML: 600; 310; 30; 20 INJECTION, SOLUTION INTRAVENOUS at 05:09

## 2020-09-02 RX ADMIN — SUCRALFATE 1 G: 1 TABLET ORAL at 08:09

## 2020-09-02 RX ADMIN — DEXAMETHASONE SODIUM PHOSPHATE 8 MG: 4 INJECTION, SOLUTION INTRAMUSCULAR; INTRAVENOUS at 12:09

## 2020-09-02 RX ADMIN — ONDANSETRON HYDROCHLORIDE 4 MG: 2 SOLUTION INTRAMUSCULAR; INTRAVENOUS at 02:09

## 2020-09-02 RX ADMIN — GABAPENTIN 300 MG: 300 CAPSULE ORAL at 08:09

## 2020-09-02 RX ADMIN — PHENYLEPHRINE HYDROCHLORIDE 100 MCG: 10 INJECTION INTRAVENOUS at 12:09

## 2020-09-02 RX ADMIN — POTASSIUM CHLORIDE 40 MEQ: 1500 TABLET, EXTENDED RELEASE ORAL at 09:09

## 2020-09-02 RX ADMIN — OXYCODONE HYDROCHLORIDE AND ACETAMINOPHEN 1 TABLET: 10; 325 TABLET ORAL at 05:09

## 2020-09-02 RX ADMIN — ROCURONIUM BROMIDE 30 MG: 10 INJECTION, SOLUTION INTRAVENOUS at 12:09

## 2020-09-02 RX ADMIN — LIDOCAINE HYDROCHLORIDE 100 MG: 20 INJECTION, SOLUTION EPIDURAL; INFILTRATION; INTRACAUDAL; PERINEURAL at 12:09

## 2020-09-02 RX ADMIN — OXYCODONE HYDROCHLORIDE AND ACETAMINOPHEN 1 TABLET: 10; 325 TABLET ORAL at 09:09

## 2020-09-02 RX ADMIN — PROMETHAZINE HYDROCHLORIDE 6.25 MG: 25 INJECTION INTRAMUSCULAR; INTRAVENOUS at 02:09

## 2020-09-02 RX ADMIN — SODIUM CHLORIDE, POTASSIUM CHLORIDE, SODIUM LACTATE AND CALCIUM CHLORIDE 1000 ML: 600; 310; 30; 20 INJECTION, SOLUTION INTRAVENOUS at 07:09

## 2020-09-02 RX ADMIN — PROPOFOL 200 MG: 10 INJECTION, EMULSION INTRAVENOUS at 12:09

## 2020-09-02 RX ADMIN — SUCCINYLCHOLINE CHLORIDE 120 MG: 20 INJECTION, SOLUTION INTRAMUSCULAR; INTRAVENOUS at 12:09

## 2020-09-02 RX ADMIN — MORPHINE SULFATE 2 MG: 4 INJECTION INTRAVENOUS at 03:09

## 2020-09-02 NOTE — H&P
[facility] - Med Surg Hospital Medicine  History & Physical    Patient Name: Pham Licea  MRN: [id number]  Admission Date: 2020  Attending Physician: Eloise Coombs MD   Primary Care Provider: Deven Emerson MD         Patient information was obtained from patient and PCP.     Subjective:     Principal Problem:Cholelithiasis with acute cholecystitis    Chief Complaint:   Chief Complaint   Patient presents with    direct admit        HPI: Patient is a 65-year-old female with past medical history of fatty liver, osteoarthritis who presents as a direct admit from PCP, Dr. Emerson, for acute cholecystitis.  Patient presented to her PCPs office with a 1 week history of nausea, abdominal pain, vomiting, diarrhea for the last 1 day.  Pain located in the periumbilical and right upper quadrant of her abdomen.  Noticed worsening with food.  Has decreased appetite and has avoided significant p.o. intake for the last several days.  Denies any fevers, chest pain, shortness of breath.  She was sent for a right upper quadrant ultrasound after office visit which showed cholelithiasis with likely acute cholecystitis.  Hospital team called for admission.  General surgery consulted.    Past Medical History:   Diagnosis Date    Fatty liver     Former smoker     Osteoarthritis 2010    Pulmonary nodules 2019    Repeat CT in 6 mo    Ulcer of abdomen wall 2016       Past Surgical History:   Procedure Laterality Date    APPENDECTOMY  1993     SECTION  , , 1983    x3    COLONOSCOPY  ~2014    [facility]: normal findings per patient report    EPIDURAL STEROID INJECTION INTO LUMBAR SPINE N/A 10/12/2018    Procedure: Injection-steroid-epidural-lumbar;  Surgeon: Kal Johnson MD;  Location: [facility];  Service: Pain Management;  Laterality: N/A;  L5-S1    EPIDURAL STEROID INJECTION INTO LUMBAR SPINE N/A 2019    Procedure: Injection-steroid-epidural-lumbar L5-S1;   Surgeon: Kal Johnson MD;  Location: [facility] OR;  Service: Pain Management;  Laterality: N/A;    HYSTERECTOMY  1993    one ovary conserved    UPPER GASTROINTESTINAL ENDOSCOPY  2016       Review of patient's allergies indicates:  No Known Allergies    No current facility-administered medications on file prior to encounter.      Current Outpatient Medications on File Prior to Encounter   Medication Sig    acetaminophen (TYLENOL) 325 MG tablet Take 325 mg by mouth every 6 (six) hours as needed for Pain.    albuterol (PROVENTIL/VENTOLIN HFA) 90 mcg/actuation inhaler Inhale 2 puffs into the lungs every 6 (six) hours as needed for Wheezing or Shortness of Breath. Rescue    diclofenac sodium (VOLTAREN) 1 % Gel APPLY AS DIRECTED    gabapentin (NEURONTIN) 300 MG capsule TAKE 1 CAPSULE BY MOUTH IN THE MORNING , 1 CAPSULE 8 HOURS LATER AND 2 CAPSULES DAILY AT BEDTIME (Patient taking differently: 2 (two) times daily. )    meloxicam (MOBIC) 7.5 MG tablet Take 2 tablets (15 mg total) by mouth once daily.    ondansetron (ZOFRAN-ODT) 8 MG TbDL Take 1 tablet (8 mg total) by mouth every 6 (six) hours as needed (nausea or vomiting).    pantoprazole (PROTONIX) 20 MG tablet Take 1 tablet (20 mg total) by mouth once daily.    sucralfate (CARAFATE) 1 gram tablet Take 1 tablet (1 g total) by mouth 4 (four) times daily.     Family History     Problem Relation (Age of Onset)    Aneurysm Father    Arthritis Mother, Sister    Brain Hemorrhage Father    Heart disease Mother    Hypertension Father, Sister    Obesity Sister    Osteoarthritis Sister    Osteoporosis Mother    Ovarian cancer Mother    Stroke Father (50)        Tobacco Use    Smoking status: Former Smoker     Packs/day: 1.00     Years: 40.00     Pack years: 40.00     Quit date: 11/2/2009     Years since quitting: 10.8    Smokeless tobacco: Never Used    Tobacco comment: quit 2009   Substance and Sexual Activity    Alcohol use: Yes     Frequency: Monthly or less     Drinks  per session: 1 or 2     Binge frequency: Never     Comment: Not often - one glass of wine every now and then    Drug use: Not Currently     Types: Marijuana     Comment: in 1970s    Sexual activity: Not Currently     Review of Systems   Constitutional: Positive for activity change and appetite change. Negative for chills, fatigue, fever and unexpected weight change.   HENT: Negative.    Eyes: Negative for visual disturbance.   Respiratory: Negative for cough and shortness of breath.    Cardiovascular: Negative for chest pain and palpitations.   Gastrointestinal: Positive for abdominal distention, abdominal pain, diarrhea, nausea and vomiting. Negative for constipation.   Endocrine: Negative.    Genitourinary: Negative.    Musculoskeletal: Negative.    Skin: Negative.    Neurological: Negative.    Hematological: Negative.    Psychiatric/Behavioral: Negative.      Objective:     Vital Signs (Most Recent):  Temp: 98.8 °F (37.1 °C) (09/01/20 1718)  Pulse: 89 (09/01/20 1718)  Resp: 18 (09/01/20 1718)  BP: 109/75 (09/01/20 1718)  SpO2: 97 % (09/01/20 1718) Vital Signs (24h Range):  Temp:  [97.5 °F (36.4 °C)-98.8 °F (37.1 °C)] 98.8 °F (37.1 °C)  Pulse:  [88-89] 89  Resp:  [18] 18  SpO2:  [97 %] 97 %  BP: (109-111)/(70-75) 109/75     Weight: 86.6 kg (191 lb)  Body mass index is 33.83 kg/m².    Physical Exam  Vitals signs reviewed.   Constitutional:       General: She is not in acute distress.     Appearance: Normal appearance. She is not ill-appearing.   HENT:      Head: Normocephalic and atraumatic.      Right Ear: External ear normal.      Left Ear: External ear normal.      Nose: Nose normal.      Mouth/Throat:      Mouth: Mucous membranes are moist.   Eyes:      Conjunctiva/sclera: Conjunctivae normal.   Cardiovascular:      Rate and Rhythm: Normal rate and regular rhythm.      Pulses: Normal pulses.      Heart sounds: No murmur. No gallop.    Pulmonary:      Effort: Pulmonary effort is normal. No respiratory  distress.      Breath sounds: Normal breath sounds. No wheezing or rales.   Abdominal:      General: Bowel sounds are normal. There is distension (Mild).      Tenderness: There is abdominal tenderness ( right upper quadrant, periumbilical, midepigastric tenderness to palpation).   Musculoskeletal:      Right lower leg: No edema.      Left lower leg: No edema.   Skin:     General: Skin is warm and dry.   Neurological:      Mental Status: She is alert and oriented to person, place, and time. Mental status is at baseline.   Psychiatric:         Mood and Affect: Mood normal.         Behavior: Behavior normal.             Significant Labs:   Recent Lab Results       09/01/20  1727   09/01/20  1324        Albumin   4.0     Alkaline Phosphatase   56     ALT   12     Anion Gap   12     AST   17     Baso #   0.04     Basophil%   0.4     BILIRUBIN TOTAL   1.1  Comment:  For infants and newborns, interpretation of results should be based  on gestational age, weight and in agreement with clinical  observations.  Premature Infant recommended reference ranges:  Up to 24 hours.............<8.0 mg/dL  Up to 48 hours............<12.0 mg/dL  3-5 days..................<15.0 mg/dL  6-29 days.................<15.0 mg/dL       BUN, Bld   22     Calcium   8.7     Chloride   92     CO2   29     Creatinine   0.7     Differential Method   Automated     eGFR if    >60.0     eGFR if non    >60.0  Comment:  Calculation used to obtain the estimated glomerular filtration  rate (eGFR) is the CKD-EPI equation.        Eos #   0.1     Eosinophil%   0.9     Glucose   85     Gran # (ANC)   5.2     Gran%   52.6     Hematocrit   39.3     Hemoglobin   12.7     Immature Grans (Abs)   0.02  Comment:  Mild elevation in immature granulocytes is non specific and   can be seen in a variety of conditions including stress response,   acute inflammation, trauma and pregnancy. Correlation with other   laboratory and clinical findings  is essential.       Immature Granulocytes   0.2     Lipase   27     Lymph #   3.3     Lymph%   33.4     MCH   28.2     MCHC   32.3     MCV   87     Mono #   1.2     Mono%   12.5     MPV   10.7     nRBC   0     Platelets   316     Potassium   3.5     PROTEIN TOTAL   6.7     RBC   4.50     RDW   13.6     SARS-CoV-2 RNA, Amplification, Qual Negative  Comment:  This test utilizes isothermal nucleic acid amplification   technology to detect the SARS-CoV-2 RdRp nucleic acid segment.   The analytical sensitivity (limit of detection) is 125 genome   equivalents/mL.   A POSITIVE result implies infection with the SARS-CoV-2 virus;  the patient is presumed to be contagious.    A NEGATIVE result means that SARS-CoV-2 nucleic acids are not  present above the limit of detection. A NEGATIVE result should be   treated as presumptive. It does not rule out the possibility of   COVID-19 and should not be the sole basis for treatment decisions.   If COVID-19 is strongly suspected based on clinical and exposure   history, re-testing using an alternate molecular assay should be   considered.   This test is only for use under the Food and Drug   Administration s Emergency Use Authorization (EUA).   Commercial kits are provided by [employer].   Performance characteristics of the EUA have been independently  verified by [facility] Department of  Pathology and Laboratory Medicine.   _________________________________________________________________  The ID NOW COVID-19 Letter of Authorization, along with the   authorized Fact Sheet for Healthcare Providers, the authorized Fact  Sheet for Patients, and authorized labeling are available on the FDA   website:  www.fda.gov/MedicalDevices/Safety/EmergencySituations/hjx236704.htm         Sodium   133     WBC   9.90         All pertinent labs within the past 24 hours have been reviewed.    Significant Imaging: I have reviewed all pertinent imaging results/findings within the past  24 hours.    Assessment/Plan:     * Cholelithiasis with acute cholecystitis  Right upper quadrant ultrasound demonstrating likely acute cholecystitis  General surgery consulted, appreciate their recommendations, planning for cholecystectomy around noon tomorrow  Vitals q.4 hours  Start on IV fluids  IV Invanz 1 g x1 per guidelines  Clear liquid diet  NPO at midnight  CMP, lipase, CBC without significant abnormalities  Follow-up a.m. labs      Abdominal pain  Secondary to cholecystitis  Plan as above      Nausea  Plan as above      Emesis  Secondary to acute cholecystitis most likely  IV fluids  Zofran p.r.n.  Plan as above      VTE Risk Mitigation (From admission, onward)         Ordered     enoxaparin injection 40 mg  Every 24 hours      09/01/20 1813     IP VTE HIGH RISK PATIENT  Once      09/01/20 1813     Place sequential compression device  Until discontinued      09/01/20 1813                   Eloise Coombs MD  Department of Hospital Medicine   [facility] - Med Surg

## 2020-09-02 NOTE — HOSPITAL COURSE
09/02/2020  Patient to OR today for laparoscopic cholecystectomy.  Recovering well postoperatively.  Follow-up a.m. CBC.  Planning for discharge home in a.m..  Tolerating diet well.

## 2020-09-02 NOTE — TRANSFER OF CARE
"Anesthesia Transfer of Care Note    Patient: Pham Licea    Procedure(s) Performed: Procedure(s) (LRB):  CHOLECYSTECTOMY, LAPAROSCOPIC (N/A)    Patient location: PACU    Anesthesia Type: general    Transport from OR: Transported from OR on room air with adequate spontaneous ventilation    Post pain: adequate analgesia    Post assessment: no apparent anesthetic complications    Post vital signs: stable    Level of consciousness: awake    Nausea/Vomiting: no nausea/vomiting    Complications: none    Transfer of care protocol was followed      Last vitals:   Visit Vitals  /63 (BP Location: Left arm, Patient Position: Lying)   Pulse 64   Temp 36.8 °C (98.2 °F) (Oral)   Resp 10   Ht 5' 3" (1.6 m)   Wt 86.6 kg (191 lb)   LMP  (LMP Unknown)   SpO2 95%   Breastfeeding No   BMI 33.83 kg/m²     "

## 2020-09-02 NOTE — PROGRESS NOTES
[facility] Hospital Medicine  Progress Note    Patient Name: Pham Licea  MRN: [id number]  Patient Class: OP- Observation   Admission Date: 9/1/2020  Length of Stay: 0 days  Attending Physician: Eloise Coombs MD  Primary Care Provider: Deven Emerson MD        Subjective:     Principal Problem:Cholelithiasis with acute cholecystitis        HPI:  Patient is a 65-year-old female with past medical history of fatty liver, osteoarthritis who presents as a direct admit from PCP, Dr. Emerson, for acute cholecystitis.  Patient presented to her PCPs office with a 1 week history of nausea, abdominal pain, vomiting, diarrhea for the last 1 day.  Pain located in the periumbilical and right upper quadrant of her abdomen.  Noticed worsening with food.  Has decreased appetite and has avoided significant p.o. intake for the last several days.  Denies any fevers, chest pain, shortness of breath.  She was sent for a right upper quadrant ultrasound after office visit which showed cholelithiasis with likely acute cholecystitis.  Hospital team called for admission.  General surgery consulted.    Overview/Hospital Course:  09/02/2020  Patient to OR today for laparoscopic cholecystectomy.  Recovering well postoperatively.  Follow-up a.m. CBC.  Planning for discharge home in a.m..  Tolerating diet well.    Interval History:  No acute events    Review of Systems   Constitutional: Negative for fatigue and fever.   HENT: Negative.    Eyes: Negative.    Respiratory: Negative for shortness of breath.    Cardiovascular: Negative for chest pain.   Gastrointestinal: Positive for abdominal distention and abdominal pain (Appropriate post surgically).   Genitourinary: Negative.  Negative for difficulty urinating.   Musculoskeletal: Negative for back pain.   Skin: Negative.    Neurological: Negative.    Hematological: Negative.    Psychiatric/Behavioral: Negative.      Objective:     Vital Signs  (Most Recent):  Temp: 97.8 °F (36.6 °C) (09/02/20 1449)  Pulse: 78 (09/02/20 1613)  Resp: 18 (09/02/20 1756)  BP: (!) 108/55 (09/02/20 1449)  SpO2: (!) 94 % (09/02/20 1613) Vital Signs (24h Range):  Temp:  [97.1 °F (36.2 °C)-98.2 °F (36.8 °C)] 97.8 °F (36.6 °C)  Pulse:  [62-89] 78  Resp:  [10-22] 18  SpO2:  [88 %-96 %] 94 %  BP: ()/(47-63) 108/55     Weight: 86.6 kg (191 lb)  Body mass index is 33.83 kg/m².    Intake/Output Summary (Last 24 hours) at 9/2/2020 1808  Last data filed at 9/2/2020 1723  Gross per 24 hour   Intake 1400 ml   Output 1000 ml   Net 400 ml      Physical Exam  Vitals signs reviewed.   Constitutional:       General: She is not in acute distress.     Appearance: Normal appearance. She is obese. She is not toxic-appearing.   HENT:      Head: Normocephalic and atraumatic.      Right Ear: External ear normal.      Left Ear: External ear normal.      Nose: Nose normal.      Mouth/Throat:      Mouth: Mucous membranes are moist.   Eyes:      Conjunctiva/sclera: Conjunctivae normal.   Cardiovascular:      Rate and Rhythm: Normal rate and regular rhythm.      Pulses: Normal pulses.      Heart sounds: No murmur. No gallop.    Pulmonary:      Effort: Pulmonary effort is normal. No respiratory distress.      Breath sounds: No wheezing or rales.   Abdominal:      Comments: Bowel sounds present, mild distension, slightly tender to palpation, laparoscopic sites normal in appearance   Musculoskeletal:      Right lower leg: No edema.      Left lower leg: No edema.   Skin:     General: Skin is warm and dry.   Neurological:      Mental Status: She is alert. Mental status is at baseline.   Psychiatric:         Mood and Affect: Mood normal.         Behavior: Behavior normal.         Significant Labs:   Recent Lab Results       09/02/20  0600   09/02/20  0559        Albumin 3.5       Alkaline Phosphatase 48       ALT 13       Anion Gap 12       AST 16       Baso #   0.04     Basophil%   0.5     BILIRUBIN TOTAL  0.5  Comment:  For infants and newborns, interpretation of results should be based  on gestational age, weight and in agreement with clinical  observations.  Premature Infant recommended reference ranges:  Up to 24 hours.............<8.0 mg/dL  Up to 48 hours............<12.0 mg/dL  3-5 days..................<15.0 mg/dL  6-29 days.................<15.0 mg/dL         BUN, Bld 21       Calcium 8.1       Chloride 94       CO2 28       Creatinine 0.9       Differential Method   Automated     eGFR if  >60.0       eGFR if non  >60.0  Comment:  Calculation used to obtain the estimated glomerular filtration  rate (eGFR) is the CKD-EPI equation.          Eos #   0.1     Eosinophil%   1.6     Glucose 84       Gran # (ANC)   4.6     Gran%   53.4     Hematocrit   34.4     Hemoglobin   11.0     Immature Grans (Abs)   0.02  Comment:  Mild elevation in immature granulocytes is non specific and   can be seen in a variety of conditions including stress response,   acute inflammation, trauma and pregnancy. Correlation with other   laboratory and clinical findings is essential.       Immature Granulocytes   0.2     INR   1.0  Comment:  Coumadin Therapy:  2.0 - 3.0 for INR for all indicators except mechanical heart valves  and antiphospholipid syndromes which should use 2.5 - 3.5.       Lymph #   2.8     Lymph%   33.1     MCH   28.1     MCHC   32.0     MCV   88     Mono #   1.0     Mono%   11.2     MPV   10.4     nRBC   0     Platelets   272     Potassium 3.3       PROTEIN TOTAL 5.8       Protime   10.3     RBC   3.91     RDW   13.7     Sodium 134       WBC   8.59         All pertinent labs within the past 24 hours have been reviewed.    Significant Imaging: I have reviewed all pertinent imaging results/findings within the past 24 hours.      Assessment/Plan:      * Cholelithiasis with acute cholecystitis  Right upper quadrant ultrasound demonstrating likely acute cholecystitis  General surgery  consulted, appreciate their recommendations, planning for cholecystectomy around noon tomorrow  Vitals q.4 hours  Start on IV fluids  IV Invanz 1 g x1 per guidelines  Clear liquid diet  NPO at midnight  CMP, lipase, CBC without significant abnormalities  Follow-up a.m. labs    09/02/2020  To OR today for laparoscopic cholecystectomy  Normal postoperative recovery  Continue IV Invanz acute 24 hr through tomorrow  Advance diet  Follow-up a.m. labs, CBC and CMP      Abdominal pain  Secondary to cholecystitis  Plan as above      Nausea  Plan as above      Emesis  Secondary to acute cholecystitis most likely  IV fluids  Zofran p.r.n.  Plan as above        VTE Risk Mitigation (From admission, onward)         Ordered     enoxaparin injection 40 mg  Every 24 hours      09/02/20 1444     Place LINDSEY hose  Until discontinued      09/02/20 1444     IP VTE HIGH RISK PATIENT  Once      09/01/20 1813     Place sequential compression device  Until discontinued      09/01/20 1813                Discharge Planning   GABBY:      Code Status: Not on file   Is the patient medically ready for discharge?:     Reason for patient still in hospital (select all that apply): Other (specify) Postop recovery  Discharge Plan A: Home with family                  Eloise Coombs MD  Department of Hospital Medicine   [facility]

## 2020-09-02 NOTE — PLAN OF CARE
09/02/20 0935   Discharge Assessment   Assessment Type Discharge Planning Assessment   Confirmed/corrected address and phone number on facesheet? Yes   Assessment information obtained from? Patient   Expected Length of Stay (days) 2   Communicated expected length of stay with patient/caregiver yes   Prior to hospitilization cognitive status: Alert/Oriented   Prior to hospitalization functional status: Independent   Current cognitive status: Alert/Oriented   Current Functional Status: Independent   Lives With alone   Able to Return to Prior Arrangements yes   Is patient able to care for self after discharge? Yes   Who are your caregiver(s) and their phone number(s)? Magali Nunez daughter [phone]   Patient's perception of discharge disposition home or selfcare   Readmission Within the Last 30 Days no previous admission in last 30 days   Patient currently being followed by outpatient case management? No   Patient currently receives any other outside agency services? No   Equipment Currently Used at Home none   Do you have any problems affording any of your prescribed medications? No   Is the patient taking medications as prescribed? yes   Does the patient have transportation home? Yes   Transportation Anticipated family or friend will provide   Does the patient receive services at the [facility]? No   Discharge Plan A Home with family   Patient/Family in Agreement with Plan yes   Patient lives alone. Her daughter Magali will be staying with her when she goes home. 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 She denies any needs at this time. Will continue to follow. Plan is to go to surgery today.

## 2020-09-02 NOTE — ANESTHESIA POSTPROCEDURE EVALUATION
Anesthesia Post Evaluation    Patient: Pham Licea    Procedure(s) Performed: Procedure(s) (LRB):  CHOLECYSTECTOMY, LAPAROSCOPIC (N/A)    Final Anesthesia Type: general    Patient location during evaluation: PACU  Patient participation: Yes- Able to Participate  Level of consciousness: awake and awake and alert  Post-procedure vital signs: reviewed and stable  Pain management: adequate  Airway patency: patent    PONV status at discharge: No PONV  Anesthetic complications: no      Cardiovascular status: blood pressure returned to baseline  Respiratory status: unassisted and spontaneous ventilation  Hydration status: euvolemic  Follow-up not needed.          Vitals Value Taken Time   /55 09/02/20 1449   Temp 36.6 °C (97.8 °F) 09/02/20 1449   Pulse 84 09/02/20 1449   Resp 18 09/02/20 1449   SpO2 96 % 09/02/20 1449         Event Time   Out of Recovery 09/02/2020 14:45:00         Pain/Derick Score: Pain Rating Prior to Med Admin: 9 (9/2/2020  3:14 AM)  Derick Score: 10 (9/2/2020  2:30 PM)

## 2020-09-02 NOTE — HPI
Patient is a 65-year-old female with past medical history of fatty liver, osteoarthritis who presents as a direct admit from PCP, Dr. Emerson, for acute cholecystitis.  Patient presented to her PCPs office with a 1 week history of nausea, abdominal pain, vomiting, diarrhea for the last 1 day.  Pain located in the periumbilical and right upper quadrant of her abdomen.  Noticed worsening with food.  Has decreased appetite and has avoided significant p.o. intake for the last several days.  Denies any fevers, chest pain, shortness of breath.  She was sent for a right upper quadrant ultrasound after office visit which showed cholelithiasis with likely acute cholecystitis.  Hospital team called for admission.  General surgery consulted.

## 2020-09-02 NOTE — CONSULTS
[facility]  General Surgery  Consult Note  2020    Patient Name: Pham Licea  MRN: [id number]  Admission Date: 2020        REASON FOR CONSULT:  Abdominal pain    HISTORY:  Ms. Licea was admitted yesterday from the [facility] with right upper quadrant abdominal pain.  She has been experiencing intermittent episodes of right upper quadrant abdominal pain with nausea since 2016.  Symptoms have been well controlled by following a low-fat diet.  Over the last 3 weeks she has had multiple episodes of right upper quadrant abdominal pain with nausea but no vomiting.  For the past 5 days the pain has been constant and unrelenting.  Nausea has also been constant.  No vomiting.  At no time has she had any fever, chills, jaundice, biliuria, acholia, diarrhea, constipation, melena, hematochezia, hematemesis, weight loss, etc.  No other aggravating factors other than fatty meals.  No alleviating factors other than adhering to a low-fat diet.  No triggering issues.  No other associated symptoms.    PAST MEDICAL HISTORY:  Several  sections through Pfannenstiel incisions.  Lumbar disc disease.  Chronic pain.  Hysterectomy.  Appendectomy.  Gastroesophageal reflux disease.  COPD.    ALLERGIES:  No known drug allergies    HOME MEDICATIONS:  Albuterol MDI.  Diclofenac.  Neurontin.  Meloxicam.  Zofran.  Protonix.  Carafate.    HOSPITAL MEDICATIONS:  Invanz.  Neurontin.  Protonix.  Carafate.  Potassium.  Lactated Ringer's.  Morphine.  Phenergan.    SOCIAL HISTORY:  Reformed smoker.  Quit smoking 11 years ago.  Prior to quitting she had a 40 pack-year history.  Social consumer of alcohol.  No history of alcohol dependence or withdrawal.  Recreational user of marijuana.  No history of alcohol or drug treatment.    FAMILY HISTORY:  Arthritis.  Hypertension.  Ovarian cancer.  Cerebrovascular accident.    ROS:  As above otherwise all 14 systems entirely  negative    Physical Exam:   Gen.-normotensive, normocardic, comfortable, no acute distress, GCS 15.   HEENT-normocephalic, atraumatic, sclerae are anicteric, pupils equal round reactive to light, conjunctiva clear, nares patent without discharge, dentition fair, no oral lesions, oropharynx clear without exudates, mucous membranes moist.   Neck-nontender, full range of motion, no JVD, no bruits, no masses, no thyromegaly.   Cardiovascular-regular rate and rhythm, PMI nondisplaced, tones normal, no gallops, no rubs, no murmurs.  Intact distal pulses throughout.  No peripheral edema.  No bruits or thrills.  Good perfusion.   Pulmonary-clear to auscultation, no respiratory distress, no wheezes, no rales, no rhonchi, good full symmetrical excursion, no accessory muscle use, no retractions.   Abdomen-soft, tender in the right upper quadrant on deep palpation only, nondistended, normoactive normopitched bowel sounds, no masses, no ventral hernias, no inguinal hernias, no bruits, no hepatosplenomegaly, no guarding, no rebound.  Negative heel jolt, psoas, obturator, and Berrios signs.  No percussion, referred, or CVA tenderness.  -normal external genitalia, no lesions, no discharge, no masses.  Rectal-no masses, normal sphincter tone, nontender, Hemoccult negative.    Extremities/Musculoskeletal-no clubbing, no cyanosis, no edema, no gross deformities.  Integument-no rashes, no lesions, no jaundice, no induration, no decubiti.    Lymphatic-no adenopathy - cervical, supraclavicular, axillary, or inguinal.    Psych-mental status intact, no hallucinations, no suicidal ideations, no homicidal ideations, alert, appropriate, oriented to person, time, and place.  Neuro-no gross cranial nerve deficits, no gross motor deficits, no evident sensory deficits, no incoordination, no tremors    LAB RESULTS:  No leukocytosis.  Mild normochromic normocytic anemia with a hemoglobin of 11.0 grams/deciliter.  No thrombocytopenia.  PT/INR  shows no evidence of a clotting disorder, factor deficit, or anticoagulation therapy.  Mild hyponatremia, hypokalemia, hypochloremia, and hypocalcemia.  BUN and creatinine shows no evidence of renal dysfunction.  Glucose shows no suspicion diabetes.  Liver profile shows no evidence of hepatocellular disease or biliary obstruction.  Lipase yesterday showed no evidence of pancreatitis.  COVID-19 rapid screening yesterday negative.    RADIOLOGY RESULTS:  Abdominal ultrasound films and report reviewed from yesterday.  There was evidence of cholelithiasis and demetrio cholecystic fluid consistent with cholecystitis.  A positive sonographic Berrios sign was present yesterday suggestive of acute cholecystitis.  Hepatic steatosis was also noted.    CARDIOVASCULAR STUDIES:  No cardiovascular data available for review.  EKG will be ordered preop.    ASSESSMENT:  Cholelithiasis with cholecystitis, possible acute cholecystitis.  Comorbid issues noted above.    MEDICAL DECISION MAKING:  Ms. Licea is best served with laparoscopy and cholecystectomy today.  She has been NPO since midnight.  No contraindication to proceeding today pending review of EKG.    COUNSELING:  Pham Licea was counseled regarding the results of the evaluation thus far and the differential diagnosis including but not limited to: cholelithiasis, cholecystitis, choledocholithiasis, gastritis, duodenitis, ulcer disease, reflux disease, Sphincter of Oddi Dysfunction, pancreatitis, irritable bowel syndrome, inflammatory bowel disease, diverticular disease, neoplastic disease, infectious disease, ischemic disease, etc.  We also discussed all diagnostic and therapeutic options as well as the risks, benefits, possible complications, details of, and indications for each option.  Options discussed included but were not limited to: endoscopy, laparoscopic cholecystectomy, conventional cholecystectomy, radiologic studies, second opinion, observation, empiric  therapy, symptomatic therapy, stone dissolving therapy, lithotripsy, referral elsewhere for treatment, etc.  Possible complications of laparoscopic surgery discussed included but were not limited to: bleeding, infections, scars, chronic pain, nerve damage, internal injuries, bile duct injuries, inability to complete the operation laparoscopically / need to convert to an open technique, retained stones, persistent symptoms, need for additional operations or procedures, chronic diarrhea, weight gain, weight loss, etc.  Questions were solicited and answered.  [employer] publications were provided regarding endoscopy, gallbladder disease, laparoscopic surgery, etc.  I read the entire consent form to her verbatim. A copy of the consent form was provided for her review outside the office and hospital prior to the procedure.  Entire conversation was held in laymans terms.  All unfamiliar terms defined.  At the conclusion of the conversation she voiced complete understanding of all we discussed, satisfaction that all questions were answered, and that she felt fully informed and completely capable of making an informed decision.  She requests and desires to proceed with an attempted laparoscopic cholecystectomy.    RECOMMENDATIONS:  Continue supportive care.  Continue NPO.  Continue intravenous fluids.  Continue intravenous antibiotics.  Continue analgesics.  Continue antiemetics.  Laparoscopic cholecystectomy when OR availability permits.  Further recommendations will depend upon clinical course.    PLAN:  Laparoscopic cholecystectomy today, timing depending upon OR availability.  Further management will depend upon clinical course.        Govind Frey MD  9/2/2020

## 2020-09-02 NOTE — ASSESSMENT & PLAN NOTE
Right upper quadrant ultrasound demonstrating likely acute cholecystitis  General surgery consulted, appreciate their recommendations, planning for cholecystectomy around noon tomorrow  Vitals q.4 hours  Start on IV fluids  IV Invanz 1 g x1 per guidelines  Clear liquid diet  NPO at midnight  CMP, lipase, CBC without significant abnormalities  Follow-up a.m. labs

## 2020-09-02 NOTE — PLAN OF CARE
PT TO RECOVERY FROM OR , PT CONNECTED TO MONITOR, IV INTACT,AND INFUSING,  INCISION X 4 NOTED TO ABDOMEN, CLEAN/ DRY/ INTACT CLOSED WITH DERMABOND . ICE PACK APPLIED TO ABDOMEN.,VITALS STABLE, WILL CONTINUE TO MONITOR

## 2020-09-02 NOTE — ANESTHESIA PREPROCEDURE EVALUATION
09/02/2020  Pham Licea is a 65 y.o., female.    Anesthesia Evaluation    I have reviewed the Patient Summary Reports.    I have reviewed the Nursing Notes.    I have reviewed the Medications.     Review of Systems  Anesthesia Hx:  No problems with previous Anesthesia  Neg history of prior surgery. Denies Family Hx of Anesthesia complications.   Denies Personal Hx of Anesthesia complications.   Social:  Non-Smoker    Hematology/Oncology:  Hematology Normal   Oncology Normal     EENT/Dental:EENT/Dental Normal   Cardiovascular:  Cardiovascular Normal     Pulmonary:  Pulmonary Normal    Renal/:  Renal/ Normal     Hepatic/GI:   GERD, poorly controlled Liver Disease,    Musculoskeletal:   Arthritis     Neurological:   Neuromuscular Disease,    Endocrine:  Endocrine Normal    Dermatological:  Skin Normal    Psych:  Psychiatric Normal           Physical Exam  General:  Obesity    Airway/Jaw/Neck:  AIRWAY FINDINGS: Normal      Eyes/Ears/Nose:  EYES/EARS/NOSE FINDINGS: Normal   Dental:  DENTAL FINDINGS: Normal   Chest/Lungs:  Chest/Lungs Clear    Heart/Vascular:  Heart Findings: Normal Heart murmur: negative Vascular Findings: Normal    Abdomen:  Abdomen Findings: Normal    Musculoskeletal:  Musculoskeletal Findings: Normal   Skin:  Skin Findings: Normal    Mental Status:  Mental Status Findings: Normal        Anesthesia Plan  Type of Anesthesia, risks & benefits discussed:  Anesthesia Type:  general  Patient's Preference:   Intra-op Monitoring Plan: standard ASA monitors  Intra-op Monitoring Plan Comments:   Post Op Pain Control Plan:   Post Op Pain Control Plan Comments:   Induction:   IV  Beta Blocker:  Patient is not currently on a Beta-Blocker (No further documentation required).       Informed Consent: Patient understands risks and agrees with Anesthesia plan.  Questions answered. Anesthesia  consent signed with patient.  ASA Score: 2     Day of Surgery Review of History & Physical: I have interviewed and examined the patient. I have reviewed the patient's H&P dated:    H&P update referred to the provider.         Ready For Surgery From Anesthesia Perspective.

## 2020-09-02 NOTE — SUBJECTIVE & OBJECTIVE
Interval History:  No acute events    Review of Systems   Constitutional: Negative for fatigue and fever.   HENT: Negative.    Eyes: Negative.    Respiratory: Negative for shortness of breath.    Cardiovascular: Negative for chest pain.   Gastrointestinal: Positive for abdominal distention and abdominal pain (Appropriate post surgically).   Genitourinary: Negative.  Negative for difficulty urinating.   Musculoskeletal: Negative for back pain.   Skin: Negative.    Neurological: Negative.    Hematological: Negative.    Psychiatric/Behavioral: Negative.      Objective:     Vital Signs (Most Recent):  Temp: 97.8 °F (36.6 °C) (09/02/20 1449)  Pulse: 78 (09/02/20 1613)  Resp: 18 (09/02/20 1756)  BP: (!) 108/55 (09/02/20 1449)  SpO2: (!) 94 % (09/02/20 1613) Vital Signs (24h Range):  Temp:  [97.1 °F (36.2 °C)-98.2 °F (36.8 °C)] 97.8 °F (36.6 °C)  Pulse:  [62-89] 78  Resp:  [10-22] 18  SpO2:  [88 %-96 %] 94 %  BP: ()/(47-63) 108/55     Weight: 86.6 kg (191 lb)  Body mass index is 33.83 kg/m².    Intake/Output Summary (Last 24 hours) at 9/2/2020 1808  Last data filed at 9/2/2020 1723  Gross per 24 hour   Intake 1400 ml   Output 1000 ml   Net 400 ml      Physical Exam  Vitals signs reviewed.   Constitutional:       General: She is not in acute distress.     Appearance: Normal appearance. She is obese. She is not toxic-appearing.   HENT:      Head: Normocephalic and atraumatic.      Right Ear: External ear normal.      Left Ear: External ear normal.      Nose: Nose normal.      Mouth/Throat:      Mouth: Mucous membranes are moist.   Eyes:      Conjunctiva/sclera: Conjunctivae normal.   Cardiovascular:      Rate and Rhythm: Normal rate and regular rhythm.      Pulses: Normal pulses.      Heart sounds: No murmur. No gallop.    Pulmonary:      Effort: Pulmonary effort is normal. No respiratory distress.      Breath sounds: No wheezing or rales.   Abdominal:      Comments: Bowel sounds present, mild distension, slightly  tender to palpation, laparoscopic sites normal in appearance   Musculoskeletal:      Right lower leg: No edema.      Left lower leg: No edema.   Skin:     General: Skin is warm and dry.   Neurological:      Mental Status: She is alert. Mental status is at baseline.   Psychiatric:         Mood and Affect: Mood normal.         Behavior: Behavior normal.         Significant Labs:   Recent Lab Results       09/02/20  0600   09/02/20  0559        Albumin 3.5       Alkaline Phosphatase 48       ALT 13       Anion Gap 12       AST 16       Baso #   0.04     Basophil%   0.5     BILIRUBIN TOTAL 0.5  Comment:  For infants and newborns, interpretation of results should be based  on gestational age, weight and in agreement with clinical  observations.  Premature Infant recommended reference ranges:  Up to 24 hours.............<8.0 mg/dL  Up to 48 hours............<12.0 mg/dL  3-5 days..................<15.0 mg/dL  6-29 days.................<15.0 mg/dL         BUN, Bld 21       Calcium 8.1       Chloride 94       CO2 28       Creatinine 0.9       Differential Method   Automated     eGFR if  >60.0       eGFR if non  >60.0  Comment:  Calculation used to obtain the estimated glomerular filtration  rate (eGFR) is the CKD-EPI equation.          Eos #   0.1     Eosinophil%   1.6     Glucose 84       Gran # (ANC)   4.6     Gran%   53.4     Hematocrit   34.4     Hemoglobin   11.0     Immature Grans (Abs)   0.02  Comment:  Mild elevation in immature granulocytes is non specific and   can be seen in a variety of conditions including stress response,   acute inflammation, trauma and pregnancy. Correlation with other   laboratory and clinical findings is essential.       Immature Granulocytes   0.2     INR   1.0  Comment:  Coumadin Therapy:  2.0 - 3.0 for INR for all indicators except mechanical heart valves  and antiphospholipid syndromes which should use 2.5 - 3.5.       Lymph #   2.8     Lymph%   33.1      MCH   28.1     MCHC   32.0     MCV   88     Mono #   1.0     Mono%   11.2     MPV   10.4     nRBC   0     Platelets   272     Potassium 3.3       PROTEIN TOTAL 5.8       Protime   10.3     RBC   3.91     RDW   13.7     Sodium 134       WBC   8.59         All pertinent labs within the past 24 hours have been reviewed.    Significant Imaging: I have reviewed all pertinent imaging results/findings within the past 24 hours.

## 2020-09-02 NOTE — NURSING
1125: Patient to surgery via bed.    1443: Patient back to room from PACU via bed.  VS taken, pt. Is awake and alert.  Bed alarm placed and call light within reach.  Patient instructed to call when needing to get up, verbalized understanding.  Patient eating ice chips at this time, NAD noted.

## 2020-09-02 NOTE — SUBJECTIVE & OBJECTIVE
Past Medical History:   Diagnosis Date    Fatty liver 2015    Former smoker 2009    Osteoarthritis 2010    Pulmonary nodules 2019    Repeat CT in 6 mo    Ulcer of abdomen wall 2016       Past Surgical History:   Procedure Laterality Date    APPENDECTOMY  1993     SECTION  , , 1983    x3    COLONOSCOPY  ~    [facility]: normal findings per patient report    EPIDURAL STEROID INJECTION INTO LUMBAR SPINE N/A 10/12/2018    Procedure: Injection-steroid-epidural-lumbar;  Surgeon: Kal Johnson MD;  Location: [facility] OR;  Service: Pain Management;  Laterality: N/A;  L5-S1    EPIDURAL STEROID INJECTION INTO LUMBAR SPINE N/A 2019    Procedure: Injection-steroid-epidural-lumbar L5-S1;  Surgeon: Kal Johnson MD;  Location: [facility] OR;  Service: Pain Management;  Laterality: N/A;    HYSTERECTOMY      one ovary conserved    UPPER GASTROINTESTINAL ENDOSCOPY  2016       Review of patient's allergies indicates:  No Known Allergies    No current facility-administered medications on file prior to encounter.      Current Outpatient Medications on File Prior to Encounter   Medication Sig    acetaminophen (TYLENOL) 325 MG tablet Take 325 mg by mouth every 6 (six) hours as needed for Pain.    albuterol (PROVENTIL/VENTOLIN HFA) 90 mcg/actuation inhaler Inhale 2 puffs into the lungs every 6 (six) hours as needed for Wheezing or Shortness of Breath. Rescue    diclofenac sodium (VOLTAREN) 1 % Gel APPLY AS DIRECTED    gabapentin (NEURONTIN) 300 MG capsule TAKE 1 CAPSULE BY MOUTH IN THE MORNING , 1 CAPSULE 8 HOURS LATER AND 2 CAPSULES DAILY AT BEDTIME (Patient taking differently: 2 (two) times daily. )    meloxicam (MOBIC) 7.5 MG tablet Take 2 tablets (15 mg total) by mouth once daily.    ondansetron (ZOFRAN-ODT) 8 MG TbDL Take 1 tablet (8 mg total) by mouth every 6 (six) hours as needed (nausea or vomiting).    pantoprazole (PROTONIX) 20 MG tablet Take 1 tablet (20 mg total) by mouth once daily.     sucralfate (CARAFATE) 1 gram tablet Take 1 tablet (1 g total) by mouth 4 (four) times daily.     Family History     Problem Relation (Age of Onset)    Aneurysm Father    Arthritis Mother, Sister    Brain Hemorrhage Father    Heart disease Mother    Hypertension Father, Sister    Obesity Sister    Osteoarthritis Sister    Osteoporosis Mother    Ovarian cancer Mother    Stroke Father (50)        Tobacco Use    Smoking status: Former Smoker     Packs/day: 1.00     Years: 40.00     Pack years: 40.00     Quit date: 11/2/2009     Years since quitting: 10.8    Smokeless tobacco: Never Used    Tobacco comment: quit 2009   Substance and Sexual Activity    Alcohol use: Yes     Frequency: Monthly or less     Drinks per session: 1 or 2     Binge frequency: Never     Comment: Not often - one glass of wine every now and then    Drug use: Not Currently     Types: Marijuana     Comment: in 1970s    Sexual activity: Not Currently     Review of Systems   Constitutional: Positive for activity change and appetite change. Negative for chills, fatigue, fever and unexpected weight change.   HENT: Negative.    Eyes: Negative for visual disturbance.   Respiratory: Negative for cough and shortness of breath.    Cardiovascular: Negative for chest pain and palpitations.   Gastrointestinal: Positive for abdominal distention, abdominal pain, diarrhea, nausea and vomiting. Negative for constipation.   Endocrine: Negative.    Genitourinary: Negative.    Musculoskeletal: Negative.    Skin: Negative.    Neurological: Negative.    Hematological: Negative.    Psychiatric/Behavioral: Negative.      Objective:     Vital Signs (Most Recent):  Temp: 98.8 °F (37.1 °C) (09/01/20 1718)  Pulse: 89 (09/01/20 1718)  Resp: 18 (09/01/20 1718)  BP: 109/75 (09/01/20 1718)  SpO2: 97 % (09/01/20 1718) Vital Signs (24h Range):  Temp:  [97.5 °F (36.4 °C)-98.8 °F (37.1 °C)] 98.8 °F (37.1 °C)  Pulse:  [88-89] 89  Resp:  [18] 18  SpO2:  [97 %] 97 %  BP:  (109-111)/(70-75) 109/75     Weight: 86.6 kg (191 lb)  Body mass index is 33.83 kg/m².    Physical Exam  Vitals signs reviewed.   Constitutional:       General: She is not in acute distress.     Appearance: Normal appearance. She is not ill-appearing.   HENT:      Head: Normocephalic and atraumatic.      Right Ear: External ear normal.      Left Ear: External ear normal.      Nose: Nose normal.      Mouth/Throat:      Mouth: Mucous membranes are moist.   Eyes:      Conjunctiva/sclera: Conjunctivae normal.   Cardiovascular:      Rate and Rhythm: Normal rate and regular rhythm.      Pulses: Normal pulses.      Heart sounds: No murmur. No gallop.    Pulmonary:      Effort: Pulmonary effort is normal. No respiratory distress.      Breath sounds: Normal breath sounds. No wheezing or rales.   Abdominal:      General: Bowel sounds are normal. There is distension (Mild).      Tenderness: There is abdominal tenderness ( right upper quadrant, periumbilical, midepigastric tenderness to palpation).   Musculoskeletal:      Right lower leg: No edema.      Left lower leg: No edema.   Skin:     General: Skin is warm and dry.   Neurological:      Mental Status: She is alert and oriented to person, place, and time. Mental status is at baseline.   Psychiatric:         Mood and Affect: Mood normal.         Behavior: Behavior normal.             Significant Labs:   Recent Lab Results       09/01/20  1727   09/01/20  1324        Albumin   4.0     Alkaline Phosphatase   56     ALT   12     Anion Gap   12     AST   17     Baso #   0.04     Basophil%   0.4     BILIRUBIN TOTAL   1.1  Comment:  For infants and newborns, interpretation of results should be based  on gestational age, weight and in agreement with clinical  observations.  Premature Infant recommended reference ranges:  Up to 24 hours.............<8.0 mg/dL  Up to 48 hours............<12.0 mg/dL  3-5 days..................<15.0 mg/dL  6-29 days.................<15.0 mg/dL        BUN, Bld   22     Calcium   8.7     Chloride   92     CO2   29     Creatinine   0.7     Differential Method   Automated     eGFR if    >60.0     eGFR if non    >60.0  Comment:  Calculation used to obtain the estimated glomerular filtration  rate (eGFR) is the CKD-EPI equation.        Eos #   0.1     Eosinophil%   0.9     Glucose   85     Gran # (ANC)   5.2     Gran%   52.6     Hematocrit   39.3     Hemoglobin   12.7     Immature Grans (Abs)   0.02  Comment:  Mild elevation in immature granulocytes is non specific and   can be seen in a variety of conditions including stress response,   acute inflammation, trauma and pregnancy. Correlation with other   laboratory and clinical findings is essential.       Immature Granulocytes   0.2     Lipase   27     Lymph #   3.3     Lymph%   33.4     MCH   28.2     MCHC   32.3     MCV   87     Mono #   1.2     Mono%   12.5     MPV   10.7     nRBC   0     Platelets   316     Potassium   3.5     PROTEIN TOTAL   6.7     RBC   4.50     RDW   13.6     SARS-CoV-2 RNA, Amplification, Qual Negative  Comment:  This test utilizes isothermal nucleic acid amplification   technology to detect the SARS-CoV-2 RdRp nucleic acid segment.   The analytical sensitivity (limit of detection) is 125 genome   equivalents/mL.   A POSITIVE result implies infection with the SARS-CoV-2 virus;  the patient is presumed to be contagious.    A NEGATIVE result means that SARS-CoV-2 nucleic acids are not  present above the limit of detection. A NEGATIVE result should be   treated as presumptive. It does not rule out the possibility of   COVID-19 and should not be the sole basis for treatment decisions.   If COVID-19 is strongly suspected based on clinical and exposure   history, re-testing using an alternate molecular assay should be   considered.   This test is only for use under the Food and Drug   Administration s Emergency Use Authorization (EUA).   Commercial kits are provided  by [employer].   Performance characteristics of the EUA have been independently  verified by [facility] Department of  Pathology and Laboratory Medicine.   _________________________________________________________________  The ID NOW COVID-19 Letter of Authorization, along with the   authorized Fact Sheet for Healthcare Providers, the authorized Fact  Sheet for Patients, and authorized labeling are available on the FDA   website:  www.fda.gov/MedicalDevices/Safety/EmergencySituations/ykr313390.htm         Sodium   133     WBC   9.90         All pertinent labs within the past 24 hours have been reviewed.    Significant Imaging: I have reviewed all pertinent imaging results/findings within the past 24 hours.

## 2020-09-02 NOTE — ASSESSMENT & PLAN NOTE
Right upper quadrant ultrasound demonstrating likely acute cholecystitis  General surgery consulted, appreciate their recommendations, planning for cholecystectomy around noon tomorrow  Vitals q.4 hours  Start on IV fluids  IV Invanz 1 g x1 per guidelines  Clear liquid diet  NPO at midnight  CMP, lipase, CBC without significant abnormalities  Follow-up a.m. labs    09/02/2020  To OR today for laparoscopic cholecystectomy  Normal postoperative recovery  Continue IV Invanz acute 24 hr through tomorrow  Advance diet  Follow-up a.m. labs, CBC and CMP

## 2020-09-03 VITALS
HEIGHT: 63 IN | TEMPERATURE: 97 F | DIASTOLIC BLOOD PRESSURE: 64 MMHG | WEIGHT: 191 LBS | SYSTOLIC BLOOD PRESSURE: 125 MMHG | OXYGEN SATURATION: 100 % | RESPIRATION RATE: 19 BRPM | BODY MASS INDEX: 33.84 KG/M2 | HEART RATE: 77 BPM

## 2020-09-03 LAB
ALBUMIN SERPL BCP-MCNC: 3.2 G/DL (ref 3.5–5.2)
ALP SERPL-CCNC: 57 U/L (ref 55–135)
ALT SERPL W/O P-5'-P-CCNC: 38 U/L (ref 10–44)
ANION GAP SERPL CALC-SCNC: 11 MMOL/L (ref 8–16)
AST SERPL-CCNC: 40 U/L (ref 10–40)
BASOPHILS # BLD AUTO: 0.01 K/UL (ref 0–0.2)
BASOPHILS NFR BLD: 0.1 % (ref 0–1.9)
BILIRUB SERPL-MCNC: 0.5 MG/DL (ref 0.1–1)
BUN SERPL-MCNC: 15 MG/DL (ref 8–23)
CALCIUM SERPL-MCNC: 8.2 MG/DL (ref 8.7–10.5)
CHLORIDE SERPL-SCNC: 99 MMOL/L (ref 95–110)
CO2 SERPL-SCNC: 27 MMOL/L (ref 23–29)
CREAT SERPL-MCNC: 0.7 MG/DL (ref 0.5–1.4)
DIFFERENTIAL METHOD: ABNORMAL
EOSINOPHIL # BLD AUTO: 0 K/UL (ref 0–0.5)
EOSINOPHIL NFR BLD: 0.1 % (ref 0–8)
ERYTHROCYTE [DISTWIDTH] IN BLOOD BY AUTOMATED COUNT: 13.4 % (ref 11.5–14.5)
EST. GFR  (AFRICAN AMERICAN): >60 ML/MIN/1.73 M^2
EST. GFR  (NON AFRICAN AMERICAN): >60 ML/MIN/1.73 M^2
GLUCOSE SERPL-MCNC: 109 MG/DL (ref 70–110)
HCT VFR BLD AUTO: 32.1 % (ref 37–48.5)
HGB BLD-MCNC: 10.3 G/DL (ref 12–16)
IMM GRANULOCYTES # BLD AUTO: 0.06 K/UL (ref 0–0.04)
IMM GRANULOCYTES NFR BLD AUTO: 0.5 % (ref 0–0.5)
LYMPHOCYTES # BLD AUTO: 1.9 K/UL (ref 1–4.8)
LYMPHOCYTES NFR BLD: 15.8 % (ref 18–48)
MCH RBC QN AUTO: 28.5 PG (ref 27–31)
MCHC RBC AUTO-ENTMCNC: 32.1 G/DL (ref 32–36)
MCV RBC AUTO: 89 FL (ref 82–98)
MONOCYTES # BLD AUTO: 0.9 K/UL (ref 0.3–1)
MONOCYTES NFR BLD: 7.8 % (ref 4–15)
NEUTROPHILS # BLD AUTO: 8.9 K/UL (ref 1.8–7.7)
NEUTROPHILS NFR BLD: 75.7 % (ref 38–73)
NRBC BLD-RTO: 0 /100 WBC
PLATELET # BLD AUTO: 273 K/UL (ref 150–350)
PMV BLD AUTO: 10.1 FL (ref 9.2–12.9)
POTASSIUM SERPL-SCNC: 4.1 MMOL/L (ref 3.5–5.1)
PROT SERPL-MCNC: 5.9 G/DL (ref 6–8.4)
RBC # BLD AUTO: 3.62 M/UL (ref 4–5.4)
SODIUM SERPL-SCNC: 137 MMOL/L (ref 136–145)
WBC # BLD AUTO: 11.81 K/UL (ref 3.9–12.7)

## 2020-09-03 PROCEDURE — 96372 THER/PROPH/DIAG INJ SC/IM: CPT

## 2020-09-03 PROCEDURE — 99217 PR OBSERVATION CARE DISCHARGE: CPT | Mod: ,,, | Performed by: FAMILY MEDICINE

## 2020-09-03 PROCEDURE — 80053 COMPREHEN METABOLIC PANEL: CPT

## 2020-09-03 PROCEDURE — 99024 PR POST-OP FOLLOW-UP VISIT: ICD-10-PCS | Mod: ,,, | Performed by: SURGERY

## 2020-09-03 PROCEDURE — 25000003 PHARM REV CODE 250: Performed by: SURGERY

## 2020-09-03 PROCEDURE — 94760 N-INVAS EAR/PLS OXIMETRY 1: CPT

## 2020-09-03 PROCEDURE — 63600175 PHARM REV CODE 636 W HCPCS: Performed by: SURGERY

## 2020-09-03 PROCEDURE — 36415 COLL VENOUS BLD VENIPUNCTURE: CPT

## 2020-09-03 PROCEDURE — 63700000 PHARM REV CODE 250 ALT 637 W/O HCPCS: Performed by: FAMILY MEDICINE

## 2020-09-03 PROCEDURE — 85025 COMPLETE CBC W/AUTO DIFF WBC: CPT

## 2020-09-03 PROCEDURE — 94640 AIRWAY INHALATION TREATMENT: CPT

## 2020-09-03 PROCEDURE — 99217 PR OBSERVATION CARE DISCHARGE: ICD-10-PCS | Mod: ,,, | Performed by: FAMILY MEDICINE

## 2020-09-03 PROCEDURE — 99024 POSTOP FOLLOW-UP VISIT: CPT | Mod: ,,, | Performed by: SURGERY

## 2020-09-03 PROCEDURE — G0378 HOSPITAL OBSERVATION PER HR: HCPCS

## 2020-09-03 PROCEDURE — 96361 HYDRATE IV INFUSION ADD-ON: CPT

## 2020-09-03 PROCEDURE — 25000242 PHARM REV CODE 250 ALT 637 W/ HCPCS: Performed by: SURGERY

## 2020-09-03 RX ORDER — ALBUTEROL SULFATE 0.83 MG/ML
SOLUTION RESPIRATORY (INHALATION)
Status: COMPLETED
Start: 2020-09-03 | End: 2020-09-03

## 2020-09-03 RX ORDER — IPRATROPIUM BROMIDE AND ALBUTEROL SULFATE 2.5; .5 MG/3ML; MG/3ML
3 SOLUTION RESPIRATORY (INHALATION) ONCE
Status: DISCONTINUED | OUTPATIENT
Start: 2020-09-03 | End: 2020-09-03

## 2020-09-03 RX ORDER — ONDANSETRON 4 MG/1
4 TABLET, FILM COATED ORAL EVERY 6 HOURS PRN
Qty: 30 TABLET | Refills: 0 | Status: SHIPPED | OUTPATIENT
Start: 2020-09-03 | End: 2021-03-26 | Stop reason: ALTCHOICE

## 2020-09-03 RX ORDER — ALBUTEROL SULFATE 0.83 MG/ML
2.5 SOLUTION RESPIRATORY (INHALATION) EVERY 4 HOURS PRN
Status: DISCONTINUED | OUTPATIENT
Start: 2020-09-03 | End: 2020-09-03 | Stop reason: HOSPADM

## 2020-09-03 RX ORDER — PREDNISONE 20 MG/1
20 TABLET ORAL DAILY
Qty: 10 TABLET | Refills: 0 | Status: SHIPPED | OUTPATIENT
Start: 2020-09-03 | End: 2021-02-09

## 2020-09-03 RX ORDER — FLUCONAZOLE 150 MG/1
150 TABLET ORAL DAILY
Qty: 6 TABLET | Refills: 0 | Status: SHIPPED | OUTPATIENT
Start: 2020-09-03 | End: 2020-09-09

## 2020-09-03 RX ORDER — FLUCONAZOLE 100 MG/1
200 TABLET ORAL ONCE
Status: COMPLETED | OUTPATIENT
Start: 2020-09-03 | End: 2020-09-03

## 2020-09-03 RX ORDER — HYDROCODONE BITARTRATE AND ACETAMINOPHEN 5; 325 MG/1; MG/1
1 TABLET ORAL EVERY 6 HOURS PRN
Qty: 20 TABLET | Refills: 0 | Status: SHIPPED | OUTPATIENT
Start: 2020-09-03 | End: 2021-02-09

## 2020-09-03 RX ADMIN — ERTAPENEM SODIUM 1 G: 1 INJECTION, POWDER, LYOPHILIZED, FOR SOLUTION INTRAMUSCULAR; INTRAVENOUS at 10:09

## 2020-09-03 RX ADMIN — SUCRALFATE 1 G: 1 TABLET ORAL at 01:09

## 2020-09-03 RX ADMIN — GABAPENTIN 300 MG: 300 CAPSULE ORAL at 09:09

## 2020-09-03 RX ADMIN — FLUCONAZOLE 200 MG: 100 TABLET ORAL at 03:09

## 2020-09-03 RX ADMIN — SUCRALFATE 1 G: 1 TABLET ORAL at 09:09

## 2020-09-03 RX ADMIN — PANTOPRAZOLE SODIUM 40 MG: 40 TABLET, DELAYED RELEASE ORAL at 09:09

## 2020-09-03 RX ADMIN — SODIUM CHLORIDE, POTASSIUM CHLORIDE, SODIUM LACTATE AND CALCIUM CHLORIDE: 600; 310; 30; 20 INJECTION, SOLUTION INTRAVENOUS at 05:09

## 2020-09-03 RX ADMIN — GABAPENTIN 300 MG: 300 CAPSULE ORAL at 03:09

## 2020-09-03 RX ADMIN — ALBUTEROL SULFATE 2.5 MG: 2.5 SOLUTION RESPIRATORY (INHALATION) at 02:09

## 2020-09-03 RX ADMIN — ONDANSETRON HYDROCHLORIDE 4 MG: 2 SOLUTION INTRAMUSCULAR; INTRAVENOUS at 08:09

## 2020-09-03 RX ADMIN — ENOXAPARIN SODIUM 40 MG: 40 INJECTION SUBCUTANEOUS at 10:09

## 2020-09-03 NOTE — PROGRESS NOTES
[facility] - Med Surg  General Surgery  Progress Note  09/03/2020    Patient Name: Pham Licea  MRN: [id number]  Admission Date: 9/1/2020  Hospital Day: 2    POD #:  1  Antibiotics:  Invanz - Day 3      Interval History:  No complaints related to surgery.  Pain controlled.  No nausea or vomiting.  No fever or chills.  Tolerating diet.  Passing flatus.  Mobility fair.  Desires discharge.  Mild dyspnea with wheezing due to pre-existing asthma.  No chest pain.    Vitals:  Afebrile.  Good vital signs.  Good room air oxygen saturations.    I/O:  Intake greater than output  UOP:  Good    Exam:   Gen - Comfortable.  Nontoxic.  No acute distress.  CV - Regular rate and rhythm.  Good perfusion.  No edema.  Pulm - Mild inspiratory and expiratory wheezing.  Nonlabored.   Abd - Soft.  Nondistended.  Expected tenderness only.  Normoactive normopitched bowel sounds.   Integument - No rashes.  No decubiti.  No jaundice.  Incision healing well without erythema, edema, exudate, induration, fluctuance, crepitus, necrosis, discoloration, separation, etc.  Ext - No edema.  No cords.  No tenderness.      Lab Results:  No leukocytosis.  Mild normochromic normocytic anemia.  No thrombocytopenia.  Mild hypocalcemia.  Electrolytes otherwise are all in the range of normal.  BUN and creatinine good.  Euglycemic.  No evidence of hepatocellular disease or biliary obstruction.    Radiology Results:  No new imaging results    Pathology:  Pending    Assessment:  Satisfactory postoperative recovery.  No evident surgical complications.  Symptomatic asthma.  Clinically stable.    Recommendations:  Reinstate bronchodilator therapy for asthma.  Discharge home when stable from asthma standpoint.  Follow-up surgery clinic 2 weeks.  Further recommendations will depend upon clinical course.    Plan:  Will follow while hospitalized.  Further management will depend upon clinical course.  Will order albuterol therapy to assist  hospitalist with postop management.        Govind Frey MD FACS

## 2020-09-03 NOTE — OP NOTE
[facility] - Med Surg  General Surgery  Op Note  9/2/2020      Patient Name: Pham Licea  MRN: [id number]         SURGEON:  Govind Frey M.D.     ASSISTANT:  None     PREOPERATIVE DIAGNOSIS:  Acute cholecystitis with cholelithiasis     POSTOPERATIVE DIAGNOSIS:  Same     SURGICAL PROCEDURE PERFORMED:  Laparoscopic Cholecystectomy    ANESTHESIA:  General Endotracheal.     INDICATIONS FOR PROCEDURE:  Admitted with abdominal pain.  Ultrasound confirmed cholelithiasis and demetrio cholecystic fluid.    SURGICAL FINDINGS:  Mild acute cholecystitis with cholelithiasis.  Moderate volume bile-stained ascites.     PROCEDURE IN DETAIL:   Pham Licea was identified by name, wrist band, and birth date in preop holding.  She confirmed identity.  Informed consent process was reviewed.  She  verbalized consent as well as understanding of all treatment options, risks, benefits, details of, and indications for each option.  Intravenous antibiotics were administered.  She was transported the operating room. Time-out completed. Transfered to the OR bed. General anesthesia induced. Abdomen was prepped and draped in the usual sterile fashion with ChloraPrep, sterile towels, and sterile drapes.  ChloraPrep was permitted to dry for 3 min prior to placing towels and drapes.  Exparel was infiltrated into each port site as each port was placed for postoperative analgesia.  A total of 20 cc was used.  1 cm vertical periumbilical skin incision was made with a number 15 blade.  Subcutaneous tissue was divided with electrocautery. Traction sutures were placed on either side of the fascial midline.  Fascia was incised in the midline with a number 15 blade. Peritoneum was entered bluntly.  Inner surface of the anterior abdominal wall was examined digitally.  No adhesions or other pathology identified. 12 mm blunt-tipped Monterroso balloon trocar was introduced without difficulty or complications. Telescope was  inserted.  Abdominal cavity was inspected.  No evidence of any intra-abdominal or retroperitoneal injuries.  The above pathology was identified. Three 5 mm ports were then inserted through separate skin incisions under laparoscopic visualization without difficulty or complications.  One of these was inserted in the epigastrium and 2 in the right upper quadrant.  Anterior and cephalad traction was applied to the dome of the gallbladder.  Adhesions of omentum, duodenum, and stomach to the gallbladder and liver were divided with electrocautery and blunt dissection.  Inferior and lateral traction was applied to the the infundibulum.  Blunt dissection was carried out in the triangle of Calot to identify the cystic duct, common bile duct, common hepatic duct, cystic artery, and right hepatic artery.  Critical view of safety achieved.  Cystic duct was clipped twice proximally and once distally.  Cystic artery was clipped twice proximally and once distally.  The structures were divided between the proximal distal clips.  The gallbladder was then freed from its attachments to the liver with blunt and electrocautery assisted dissection. Just prior to completing the amputation tension was relaxed on the liver and hemostasis of the gallbladder bed was ensured.  The gallbladder was then completely freed from the liver. Telescope was moved to the epigastric port.  Gallbladder was retrieved through the umbilical port.  Umbilical port was replaced.  Pneumoperitoneum was reestablished.  Telescope was moved back to the umbilical port.  Abdomen was irrigated copiously.  All irrigation solution was evacuated.  Hemostasis was ensured throughout the abdomen.  The three 5 mm ports were removed. Hemostasis was ensured at these sites by careful inspection of the internal and external surfaces.  Pneumoperitoneum was completely evacuated.  Umbilical port was removed and hemostasis was ensured at this site by careful inspection.  Peritoneum at  the umbilical site was closed with 3 0 Vicryl. Fascia was closed with 0 Vicryl.  Subcutaneous tissue was closed with 3 0 Vicryl. Skin was closed at all port sites with 4 0 undyed subcuticular Vicryl suture. Sterile Dermabond dressings applied. She was emerged from anesthesia without difficulty and transported to the recovery room in good condition.     TOLERATED:  Well    COUNTS:  Correct    DRAINS:  None     ESTIMATED BLOOD LOSS:  Less than 10 cc     SPECIMEN:  Gallbladder.  Bile Culture.  Culture of ascites     COMPLICATIONS:  None     DISPOSITION:  To PACU, stable.      Documented with M*Modal voice recognition software.        Govind Frey MD FACS

## 2020-09-03 NOTE — PLAN OF CARE
09/03/20 1527   Final Note   Assessment Type Final Discharge Note   Anticipated Discharge Disposition Home   What phone number can be called within the next 1-3 days to see how you are doing after discharge? [phone]   Hospital Follow Up  Appt(s) scheduled? Yes   Discharge plans and expectations educations in teach back method with documentation complete? Yes   Verbal & written follow up appointment with Dr Frey & Dr Emerson provided to patient. Demonstrated understanding by verbal feedback. States her daughter won't be able to get her until after her mattress is delivered between 3-5pm today. Denies any other needs at this time.

## 2020-09-03 NOTE — DISCHARGE SUMMARY
[facility] Hospital Medicine  Discharge Summary      Patient Name: Pham Licea  MRN: [id number]  Admission Date: 9/1/2020  Hospital Length of Stay: 0 days  Discharge Date and Time:  09/03/2020 5:16 PM  Attending Physician: Mya Ackerman MD   Discharging Provider: Mya Ackerman MD  Primary Care Provider: Deven Emerson MD        HPI:   Patient is a 65-year-old female with past medical history of fatty liver, osteoarthritis who presents as a direct admit from PCP, Dr. Emerson, for acute cholecystitis.  Patient presented to her PCPs office with a 1 week history of nausea, abdominal pain, vomiting, diarrhea for the last 1 day.  Pain located in the periumbilical and right upper quadrant of her abdomen.  Noticed worsening with food.  Has decreased appetite and has avoided significant p.o. intake for the last several days.  Denies any fevers, chest pain, shortness of breath.  She was sent for a right upper quadrant ultrasound after office visit which showed cholelithiasis with likely acute cholecystitis.  Hospital team called for admission.  General surgery consulted.       Procedure(s) (LRB):  CHOLECYSTECTOMY, LAPAROSCOPIC (N/A)      Hospital Course:   09/02/2020  Patient to OR today for laparoscopic cholecystectomy.  Recovering well postoperatively.  Follow-up a.m. CBC.  Planning for discharge home in a.m..  Tolerating diet well.    09/3/2020  Patient continues to do well in the post-surgical period. Had mild wheezing treated well with albuterol on day of discharge. WBC wnl. Afebrile. Patient discharged home with strict precautions. Will follow up with General Surgery in 2 weeks and PCP in 3 weeks.     Consults:   Consults (From admission, onward)        Status Ordering Provider     Inpatient consult to General Surgery  Once     Provider:  Govind Frey MD    Completed MYA ACKERMAN          Final Active Diagnoses:    Diagnosis Date Noted POA     PRINCIPAL PROBLEM:  Cholelithiasis with acute cholecystitis [K80.00] 09/01/2020 Yes    Encounter for postoperative care [Z48.89]  Not Applicable    Emesis [R11.10] 09/01/2020 Yes    Nausea [R11.0] 09/01/2020 Yes    Abdominal pain [R10.9] 09/01/2020 Yes    Obesity (BMI 30.0-34.9) [E66.9] 06/22/2016 Yes      Problems Resolved During this Admission:      Discharged Condition: good    Disposition: Home or Self Care    Follow Up:  Follow-up Information     Govind Frey MD. Go on 9/16/2020.    Specialty: General Surgery  Why: appointment time: Wed Sept 16th at 11:45am for surgery follow up  Contact information:  [address]  [facility] [place] [address]  [phone]             Deven Emerson MD. Go on 9/18/2020.    Specialty: Family Medicine  Why: appointment time: Friday Sept 18th at 11:20am for hospital follow up  Contact information:  [address] [facility] [address]  [phone]                 Patient Instructions:      Diet Adult Regular     Notify your health care provider if you experience any of the following:  temperature >100.4     Notify your health care provider if you experience any of the following:  severe uncontrolled pain     Notify your health care provider if you experience any of the following:  difficulty breathing or increased cough     Activity as tolerated     Medications:  Reconciled Home Medications:      Medication List      START taking these medications    fluconazole 150 MG Tab  Commonly known as: DIFLUCAN  Take 1 tablet (150 mg total) by mouth once daily. for 6 days     HYDROcodone-acetaminophen 5-325 mg per tablet  Commonly known as: NORCO  Take 1 tablet by mouth every 6 (six) hours as needed for Pain.     ondansetron 4 MG tablet  Commonly known as: ZOFRAN  Take 1 tablet (4 mg total) by mouth every 6 (six) hours as needed for Nausea.     predniSONE 20 MG tablet  Commonly known as: DELTASONE  Take 1 tablet (20 mg total) by mouth once  daily.        CHANGE how you take these medications    gabapentin 300 MG capsule  Commonly known as: NEURONTIN  TAKE 1 CAPSULE BY MOUTH IN THE MORNING , 1 CAPSULE 8 HOURS LATER AND 2 CAPSULES DAILY AT BEDTIME  What changed: See the new instructions.        CONTINUE taking these medications    acetaminophen 325 MG tablet  Commonly known as: TYLENOL  Take 325 mg by mouth every 6 (six) hours as needed for Pain.     albuterol 90 mcg/actuation inhaler  Commonly known as: PROVENTIL/VENTOLIN HFA  Inhale 2 puffs into the lungs every 6 (six) hours as needed for Wheezing or Shortness of Breath. Rescue     diclofenac sodium 1 % Gel  Commonly known as: VOLTAREN  APPLY AS DIRECTED     meloxicam 7.5 MG tablet  Commonly known as: MOBIC  Take 2 tablets (15 mg total) by mouth once daily.     ondansetron 8 MG Tbdl  Commonly known as: ZOFRAN-ODT  Take 1 tablet (8 mg total) by mouth every 6 (six) hours as needed (nausea or vomiting).     pantoprazole 20 MG tablet  Commonly known as: PROTONIX  Take 1 tablet (20 mg total) by mouth once daily.     sucralfate 1 gram tablet  Commonly known as: CARAFATE  Take 1 tablet (1 g total) by mouth 4 (four) times daily.            Significant Diagnostic Studies: Labs:   BMP:   Recent Labs   Lab 09/02/20  0600 09/03/20  0824   GLU 84 109   * 137   K 3.3* 4.1   CL 94* 99   CO2 28 27   BUN 21 15   CREATININE 0.9 0.7   CALCIUM 8.1* 8.2*   , CMP   Recent Labs   Lab 09/02/20  0600 09/03/20  0824   * 137   K 3.3* 4.1   CL 94* 99   CO2 28 27   GLU 84 109   BUN 21 15   CREATININE 0.9 0.7   CALCIUM 8.1* 8.2*   PROT 5.8* 5.9*   ALBUMIN 3.5 3.2*   BILITOT 0.5 0.5   ALKPHOS 48* 57   AST 16 40   ALT 13 38   ANIONGAP 12 11   ESTGFRAFRICA >60.0 >60.0   EGFRNONAA >60.0 >60.0   , CBC   Recent Labs   Lab 09/02/20  0559 09/03/20  0824   WBC 8.59 11.81   HGB 11.0* 10.3*   HCT 34.4* 32.1*    273    and All labs within the past 24 hours have been reviewed    Pending Diagnostic Studies:     Procedure  Component Value Units Date/Time    Specimen to Pathology, Surgery General Surgery [[id number]] Collected: 09/02/20 1340    Order Status: Sent Lab Status: In process Updated: 09/03/20 1036        Indwelling Lines/Drains at time of discharge:   Lines/Drains/Airways     None                 Time spent on the discharge of patient: 35 minutes  Patient was seen and examined on the date of discharge and determined to be suitable for discharge.         Eloise Coombs MD  Department of Hospital Medicine  [facility]

## 2020-09-03 NOTE — PLAN OF CARE
09/03/20 1145   Discharge Reassessment   Assessment Type Discharge Planning Reassessment   Anticipated Discharge Disposition Home   Provided patient/caregiver education on the expected discharge date and the discharge plan Yes   Do you have any problems affording any of your prescribed medications? No   Discharge Plan A Home with family   DME Needed Upon Discharge  none   Visitor at bedside. Patient waiting for doctor to make rounds to see if she will get to go home today. Denies any needs at this time. Will provide her with follow up when ready for DC.

## 2020-09-03 NOTE — NURSING
PT D/C PER MD ORDER IV SITE REMOVED WITH CATH INTACT, GAUZE AND TAPE APPLIED, PT OLY WELL. D/C INSTRUCTIONS REVIEWED WITH PT, VERB UNDERSTOOD. PT JULISSA ARRIVED, PT W/C OUT, NURSE AT SIDE.

## 2020-09-03 NOTE — PLAN OF CARE
Problem: Adult Inpatient Plan of Care  Goal: Plan of Care Review  Outcome: Ongoing, Progressing  Goal: Absence of Hospital-Acquired Illness or Injury  Outcome: Ongoing, Progressing  Goal: Optimal Comfort and Wellbeing  Outcome: Ongoing, Progressing  Goal: Readiness for Transition of Care  Outcome: Ongoing, Progressing

## 2020-09-06 LAB
BACTERIA SPEC AEROBE CULT: NO GROWTH
BACTERIA SPEC AEROBE CULT: NO GROWTH

## 2020-09-08 LAB
FINAL PATHOLOGIC DIAGNOSIS: NORMAL
GROSS: NORMAL

## 2020-09-10 LAB
BACTERIA SPEC ANAEROBE CULT: NORMAL
BACTERIA SPEC ANAEROBE CULT: NORMAL

## 2020-09-16 ENCOUNTER — PATIENT MESSAGE (OUTPATIENT)
Dept: SURGERY | Facility: CLINIC | Age: 65
End: 2020-09-16

## 2020-09-17 ENCOUNTER — PATIENT MESSAGE (OUTPATIENT)
Dept: SURGERY | Facility: CLINIC | Age: 65
End: 2020-09-17

## 2020-09-21 ENCOUNTER — OFFICE VISIT (OUTPATIENT)
Dept: SURGERY | Facility: CLINIC | Age: 65
DRG: 390 | End: 2020-09-21
Payer: COMMERCIAL

## 2020-09-21 VITALS
WEIGHT: 188.69 LBS | SYSTOLIC BLOOD PRESSURE: 131 MMHG | BODY MASS INDEX: 33.43 KG/M2 | DIASTOLIC BLOOD PRESSURE: 75 MMHG | TEMPERATURE: 98 F | RESPIRATION RATE: 16 BRPM | HEART RATE: 66 BPM | HEIGHT: 63 IN

## 2020-09-21 DIAGNOSIS — Z48.89 ENCOUNTER FOR POSTOPERATIVE CARE: Primary | ICD-10-CM

## 2020-09-21 PROCEDURE — 99024 POSTOP FOLLOW-UP VISIT: CPT | Mod: S$GLB,,, | Performed by: SURGERY

## 2020-09-21 PROCEDURE — 99024 PR POST-OP FOLLOW-UP VISIT: ICD-10-PCS | Mod: S$GLB,,, | Performed by: SURGERY

## 2020-09-21 NOTE — LETTER
September 21, 2020      Deven Emerson MD  [address] [facility] [place] [address]           [facility]  [address] [place] [address]  Phone: [phone]  Fax: [phone]          Patient: Pham Licea   MR Number: [id number]   YOB: 1955   Date of Visit: 9/21/2020       Dear Dr. Deven Emerson:    Thank you for referring Pham Licea to me for evaluation. Attached you will find relevant portions of my assessment and plan of care.    If you have questions, please do not hesitate to call me. I look forward to following Pham Licea along with you.    Sincerely,    Govind Frey MD    Enclosure  CC:  No Recipients    If you would like to receive this communication electronically, please contact [email or web address] or [phone] to request more information on [employer] access.    For providers and/or their staff who would like to refer a patient to [facility], please contact us through our one-stop-shop provider referral line, [facility], at [phone].    If you feel you have received this communication in error or would no longer like to receive these types of communications, please e-mail [email or web address]

## 2020-09-23 ENCOUNTER — TELEPHONE (OUTPATIENT)
Dept: FAMILY MEDICINE | Facility: CLINIC | Age: 65
End: 2020-09-23

## 2020-09-23 ENCOUNTER — HOSPITAL ENCOUNTER (INPATIENT)
Facility: HOSPITAL | Age: 65
LOS: 4 days | Discharge: HOME | DRG: 390 | End: 2020-09-27
Attending: FAMILY MEDICINE | Admitting: FAMILY MEDICINE
Payer: COMMERCIAL

## 2020-09-23 DIAGNOSIS — K56.609 SBO (SMALL BOWEL OBSTRUCTION): Primary | ICD-10-CM

## 2020-09-23 LAB
ALBUMIN SERPL BCP-MCNC: 4.7 G/DL (ref 3.5–5.2)
ALP SERPL-CCNC: 74 U/L (ref 55–135)
ALT SERPL W/O P-5'-P-CCNC: 17 U/L (ref 10–44)
ANION GAP SERPL CALC-SCNC: 17 MMOL/L (ref 8–16)
AST SERPL-CCNC: 20 U/L (ref 10–40)
BACTERIA #/AREA URNS HPF: ABNORMAL /HPF
BASOPHILS # BLD AUTO: 0.09 K/UL (ref 0–0.2)
BASOPHILS NFR BLD: 0.5 % (ref 0–1.9)
BILIRUB SERPL-MCNC: 0.5 MG/DL (ref 0.1–1)
BILIRUB UR QL STRIP: NEGATIVE
BUN SERPL-MCNC: 18 MG/DL (ref 8–23)
CALCIUM SERPL-MCNC: 9.7 MG/DL (ref 8.7–10.5)
CHLORIDE SERPL-SCNC: 98 MMOL/L (ref 95–110)
CLARITY UR: CLEAR
CO2 SERPL-SCNC: 25 MMOL/L (ref 23–29)
COLOR UR: YELLOW
CREAT SERPL-MCNC: 0.8 MG/DL (ref 0.5–1.4)
DIFFERENTIAL METHOD: ABNORMAL
EOSINOPHIL # BLD AUTO: 0.3 K/UL (ref 0–0.5)
EOSINOPHIL NFR BLD: 1.5 % (ref 0–8)
ERYTHROCYTE [DISTWIDTH] IN BLOOD BY AUTOMATED COUNT: 13.4 % (ref 11.5–14.5)
EST. GFR  (AFRICAN AMERICAN): >60 ML/MIN/1.73 M^2
EST. GFR  (NON AFRICAN AMERICAN): >60 ML/MIN/1.73 M^2
GLUCOSE SERPL-MCNC: 125 MG/DL (ref 70–110)
GLUCOSE UR QL STRIP: NEGATIVE
HCT VFR BLD AUTO: 43 % (ref 37–48.5)
HGB BLD-MCNC: 14 G/DL (ref 12–16)
HGB UR QL STRIP: NEGATIVE
IMM GRANULOCYTES # BLD AUTO: 0.06 K/UL (ref 0–0.04)
IMM GRANULOCYTES NFR BLD AUTO: 0.3 % (ref 0–0.5)
KETONES UR QL STRIP: NEGATIVE
LEUKOCYTE ESTERASE UR QL STRIP: ABNORMAL
LIPASE SERPL-CCNC: 32 U/L (ref 4–60)
LYMPHOCYTES # BLD AUTO: 3.8 K/UL (ref 1–4.8)
LYMPHOCYTES NFR BLD: 21.1 % (ref 18–48)
MCH RBC QN AUTO: 28.3 PG (ref 27–31)
MCHC RBC AUTO-ENTMCNC: 32.6 G/DL (ref 32–36)
MCV RBC AUTO: 87 FL (ref 82–98)
MICROSCOPIC COMMENT: ABNORMAL
MONOCYTES # BLD AUTO: 1 K/UL (ref 0.3–1)
MONOCYTES NFR BLD: 5.7 % (ref 4–15)
NEUTROPHILS # BLD AUTO: 12.6 K/UL (ref 1.8–7.7)
NEUTROPHILS NFR BLD: 70.9 % (ref 38–73)
NITRITE UR QL STRIP: NEGATIVE
NRBC BLD-RTO: 0 /100 WBC
PH UR STRIP: >8 [PH] (ref 5–8)
PLATELET # BLD AUTO: 313 K/UL (ref 150–350)
PMV BLD AUTO: 10.6 FL (ref 9.2–12.9)
POTASSIUM SERPL-SCNC: 4 MMOL/L (ref 3.5–5.1)
PROT SERPL-MCNC: 7.2 G/DL (ref 6–8.4)
PROT UR QL STRIP: NEGATIVE
RBC # BLD AUTO: 4.94 M/UL (ref 4–5.4)
RBC #/AREA URNS HPF: 2 /HPF (ref 0–4)
SARS-COV-2 RDRP RESP QL NAA+PROBE: NEGATIVE
SODIUM SERPL-SCNC: 140 MMOL/L (ref 136–145)
SP GR UR STRIP: 1.01 (ref 1–1.03)
URN SPEC COLLECT METH UR: ABNORMAL
UROBILINOGEN UR STRIP-ACNC: NEGATIVE EU/DL
WBC # BLD AUTO: 17.8 K/UL (ref 3.9–12.7)
WBC #/AREA URNS HPF: 25 /HPF (ref 0–5)

## 2020-09-23 PROCEDURE — 81000 URINALYSIS NONAUTO W/SCOPE: CPT

## 2020-09-23 PROCEDURE — 74177 CT ABD & PELVIS W/CONTRAST: CPT | Mod: TC

## 2020-09-23 PROCEDURE — 25000003 PHARM REV CODE 250: Performed by: PHYSICIAN ASSISTANT

## 2020-09-23 PROCEDURE — 25500020 PHARM REV CODE 255: Performed by: FAMILY MEDICINE

## 2020-09-23 PROCEDURE — 99222 1ST HOSP IP/OBS MODERATE 55: CPT | Mod: ,,, | Performed by: FAMILY MEDICINE

## 2020-09-23 PROCEDURE — 11000001 HC ACUTE MED/SURG PRIVATE ROOM

## 2020-09-23 PROCEDURE — 25000003 PHARM REV CODE 250: Performed by: FAMILY MEDICINE

## 2020-09-23 PROCEDURE — 96375 TX/PRO/DX INJ NEW DRUG ADDON: CPT

## 2020-09-23 PROCEDURE — 96374 THER/PROPH/DIAG INJ IV PUSH: CPT

## 2020-09-23 PROCEDURE — 63600175 PHARM REV CODE 636 W HCPCS: Performed by: FAMILY MEDICINE

## 2020-09-23 PROCEDURE — 74018 RADEX ABDOMEN 1 VIEW: CPT | Mod: TC,FY

## 2020-09-23 PROCEDURE — 99222 PR INITIAL HOSPITAL CARE,LEVL II: ICD-10-PCS | Mod: ,,, | Performed by: FAMILY MEDICINE

## 2020-09-23 PROCEDURE — 99285 EMERGENCY DEPT VISIT HI MDM: CPT | Mod: 25

## 2020-09-23 PROCEDURE — 96361 HYDRATE IV INFUSION ADD-ON: CPT

## 2020-09-23 PROCEDURE — 87086 URINE CULTURE/COLONY COUNT: CPT

## 2020-09-23 PROCEDURE — 63600175 PHARM REV CODE 636 W HCPCS

## 2020-09-23 PROCEDURE — 83690 ASSAY OF LIPASE: CPT

## 2020-09-23 PROCEDURE — 74177 CT ABDOMEN PELVIS WITH CONTRAST: ICD-10-PCS | Mod: 26,,, | Performed by: RADIOLOGY

## 2020-09-23 PROCEDURE — 74177 CT ABD & PELVIS W/CONTRAST: CPT | Mod: 26,,, | Performed by: RADIOLOGY

## 2020-09-23 PROCEDURE — 85025 COMPLETE CBC W/AUTO DIFF WBC: CPT

## 2020-09-23 PROCEDURE — 74018 RADEX ABDOMEN 1 VIEW: CPT | Mod: 26,,, | Performed by: RADIOLOGY

## 2020-09-23 PROCEDURE — 80053 COMPREHEN METABOLIC PANEL: CPT

## 2020-09-23 PROCEDURE — U0002 COVID-19 LAB TEST NON-CDC: HCPCS

## 2020-09-23 PROCEDURE — 63600175 PHARM REV CODE 636 W HCPCS: Performed by: PHYSICIAN ASSISTANT

## 2020-09-23 PROCEDURE — 74018 XR NON-RADIOLOGIST PERFORMED NG/GASTRIC TUBE CHECK: ICD-10-PCS | Mod: 26,,, | Performed by: RADIOLOGY

## 2020-09-23 RX ORDER — PROMETHAZINE HYDROCHLORIDE 25 MG/ML
INJECTION, SOLUTION INTRAMUSCULAR; INTRAVENOUS
Status: COMPLETED
Start: 2020-09-23 | End: 2020-09-23

## 2020-09-23 RX ORDER — ACETAMINOPHEN 325 MG/1
325 TABLET ORAL EVERY 6 HOURS PRN
Status: DISCONTINUED | OUTPATIENT
Start: 2020-09-23 | End: 2020-09-27 | Stop reason: HOSPADM

## 2020-09-23 RX ORDER — ONDANSETRON 2 MG/ML
INJECTION INTRAMUSCULAR; INTRAVENOUS
Status: DISPENSED
Start: 2020-09-23 | End: 2020-09-24

## 2020-09-23 RX ORDER — ONDANSETRON 2 MG/ML
4 INJECTION INTRAMUSCULAR; INTRAVENOUS
Status: COMPLETED | OUTPATIENT
Start: 2020-09-23 | End: 2020-09-23

## 2020-09-23 RX ORDER — ONDANSETRON 2 MG/ML
4 INJECTION INTRAMUSCULAR; INTRAVENOUS EVERY 6 HOURS PRN
Status: DISCONTINUED | OUTPATIENT
Start: 2020-09-23 | End: 2020-09-27 | Stop reason: HOSPADM

## 2020-09-23 RX ORDER — MORPHINE SULFATE 4 MG/ML
4 INJECTION, SOLUTION INTRAMUSCULAR; INTRAVENOUS EVERY 4 HOURS PRN
Status: DISCONTINUED | OUTPATIENT
Start: 2020-09-23 | End: 2020-09-27 | Stop reason: HOSPADM

## 2020-09-23 RX ORDER — IPRATROPIUM BROMIDE AND ALBUTEROL SULFATE 2.5; .5 MG/3ML; MG/3ML
3 SOLUTION RESPIRATORY (INHALATION) EVERY 4 HOURS PRN
Status: DISCONTINUED | OUTPATIENT
Start: 2020-09-23 | End: 2020-09-27 | Stop reason: HOSPADM

## 2020-09-23 RX ORDER — SODIUM CHLORIDE 9 MG/ML
INJECTION, SOLUTION INTRAVENOUS CONTINUOUS
Status: DISCONTINUED | OUTPATIENT
Start: 2020-09-23 | End: 2020-09-27 | Stop reason: HOSPADM

## 2020-09-23 RX ORDER — MORPHINE SULFATE 4 MG/ML
4 INJECTION, SOLUTION INTRAMUSCULAR; INTRAVENOUS
Status: COMPLETED | OUTPATIENT
Start: 2020-09-23 | End: 2020-09-23

## 2020-09-23 RX ORDER — SODIUM CHLORIDE 0.9 % (FLUSH) 0.9 %
10 SYRINGE (ML) INJECTION
Status: DISCONTINUED | OUTPATIENT
Start: 2020-09-23 | End: 2020-09-27 | Stop reason: HOSPADM

## 2020-09-23 RX ORDER — SUCRALFATE 1 G/1
1 TABLET ORAL
Status: DISCONTINUED | OUTPATIENT
Start: 2020-09-23 | End: 2020-09-24

## 2020-09-23 RX ORDER — GABAPENTIN 300 MG/1
300 CAPSULE ORAL 3 TIMES DAILY
Status: DISCONTINUED | OUTPATIENT
Start: 2020-09-23 | End: 2020-09-27 | Stop reason: HOSPADM

## 2020-09-23 RX ORDER — PANTOPRAZOLE SODIUM 40 MG/10ML
40 INJECTION, POWDER, LYOPHILIZED, FOR SOLUTION INTRAVENOUS DAILY
Status: DISCONTINUED | OUTPATIENT
Start: 2020-09-24 | End: 2020-09-27 | Stop reason: HOSPADM

## 2020-09-23 RX ADMIN — MORPHINE SULFATE 4 MG: 4 INJECTION, SOLUTION INTRAMUSCULAR; INTRAVENOUS at 06:09

## 2020-09-23 RX ADMIN — SODIUM CHLORIDE 1000 ML: 0.9 INJECTION, SOLUTION INTRAVENOUS at 05:09

## 2020-09-23 RX ADMIN — MORPHINE SULFATE 4 MG: 4 INJECTION, SOLUTION INTRAMUSCULAR; INTRAVENOUS at 05:09

## 2020-09-23 RX ADMIN — ONDANSETRON HYDROCHLORIDE 4 MG: 2 SOLUTION INTRAMUSCULAR; INTRAVENOUS at 07:09

## 2020-09-23 RX ADMIN — SODIUM CHLORIDE: 0.9 INJECTION, SOLUTION INTRAVENOUS at 07:09

## 2020-09-23 RX ADMIN — ONDANSETRON HYDROCHLORIDE 4 MG: 2 SOLUTION INTRAMUSCULAR; INTRAVENOUS at 05:09

## 2020-09-23 RX ADMIN — PROMETHAZINE HYDROCHLORIDE 12.5 MG: 25 INJECTION INTRAMUSCULAR; INTRAVENOUS at 08:09

## 2020-09-23 RX ADMIN — IOHEXOL 75 ML: 350 INJECTION, SOLUTION INTRAVENOUS at 05:09

## 2020-09-23 NOTE — SUBJECTIVE & OBJECTIVE
Past Medical History:   Diagnosis Date    Fatty liver 2015    Former smoker 2009    Osteoarthritis 2010    Pulmonary nodules 2019    Repeat CT in 6 mo    Ulcer of abdomen wall 2016       Past Surgical History:   Procedure Laterality Date    APPENDECTOMY  1993     SECTION  , , 1983    x3    COLONOSCOPY  ~    [facility]: normal findings per patient report    EPIDURAL STEROID INJECTION INTO LUMBAR SPINE N/A 10/12/2018    Procedure: Injection-steroid-epidural-lumbar;  Surgeon: Kal Johnson MD;  Location: [facility] OR;  Service: Pain Management;  Laterality: N/A;  L5-S1    EPIDURAL STEROID INJECTION INTO LUMBAR SPINE N/A 2019    Procedure: Injection-steroid-epidural-lumbar L5-S1;  Surgeon: Kal Johnson MD;  Location: [facility] OR;  Service: Pain Management;  Laterality: N/A;    HYSTERECTOMY      one ovary conserved    LAPAROSCOPIC CHOLECYSTECTOMY N/A 2020    Procedure: CHOLECYSTECTOMY, LAPAROSCOPIC;  Surgeon: Govind Frey MD;  Location: [facility] OR;  Service: General;  Laterality: N/A;    UPPER GASTROINTESTINAL ENDOSCOPY         Review of patient's allergies indicates:  No Known Allergies    No current facility-administered medications on file prior to encounter.      Current Outpatient Medications on File Prior to Encounter   Medication Sig    acetaminophen (TYLENOL) 325 MG tablet Take 325 mg by mouth every 6 (six) hours as needed for Pain.    albuterol (PROVENTIL/VENTOLIN HFA) 90 mcg/actuation inhaler Inhale 2 puffs into the lungs every 6 (six) hours as needed for Wheezing or Shortness of Breath. Rescue    diclofenac sodium (VOLTAREN) 1 % Gel APPLY AS DIRECTED    gabapentin (NEURONTIN) 300 MG capsule TAKE 1 CAPSULE BY MOUTH IN THE MORNING , 1 CAPSULE 8 HOURS LATER AND 2 CAPSULES DAILY AT BEDTIME (Patient taking differently: 2 (two) times daily. )    HYDROcodone-acetaminophen (NORCO) 5-325 mg per tablet Take 1 tablet by mouth every 6 (six) hours as needed for Pain.     meloxicam (MOBIC) 7.5 MG tablet Take 2 tablets (15 mg total) by mouth once daily.    ondansetron (ZOFRAN) 4 MG tablet Take 1 tablet (4 mg total) by mouth every 6 (six) hours as needed for Nausea.    ondansetron (ZOFRAN-ODT) 8 MG TbDL Take 1 tablet (8 mg total) by mouth every 6 (six) hours as needed (nausea or vomiting).    pantoprazole (PROTONIX) 20 MG tablet Take 1 tablet (20 mg total) by mouth once daily.    predniSONE (DELTASONE) 20 MG tablet Take 1 tablet (20 mg total) by mouth once daily.    sucralfate (CARAFATE) 1 gram tablet Take 1 tablet (1 g total) by mouth 4 (four) times daily.     Family History     Problem Relation (Age of Onset)    Aneurysm Father    Arthritis Mother, Sister    Brain Hemorrhage Father    Heart disease Mother    Hypertension Father, Sister    Obesity Sister    Osteoarthritis Sister    Osteoporosis Mother    Ovarian cancer Mother    Stroke Father (50)        Tobacco Use    Smoking status: Former Smoker     Packs/day: 1.00     Years: 40.00     Pack years: 40.00     Quit date: 11/2/2009     Years since quitting: 10.8    Smokeless tobacco: Never Used    Tobacco comment: quit 2009   Substance and Sexual Activity    Alcohol use: Yes     Frequency: Monthly or less     Drinks per session: 1 or 2     Binge frequency: Never     Comment: Not often - one glass of wine every now and then    Drug use: Not Currently     Types: Marijuana     Comment: in 1970s    Sexual activity: Not Currently     Review of Systems   Constitutional: Negative for chills, diaphoresis, fatigue and fever.   HENT: Negative.    Eyes: Negative for visual disturbance.   Respiratory: Negative for shortness of breath.    Cardiovascular: Negative for chest pain, palpitations and leg swelling.   Gastrointestinal: Positive for abdominal pain, nausea and vomiting.   Endocrine: Negative.    Genitourinary: Negative.    Musculoskeletal: Negative.    Skin: Negative.    Allergic/Immunologic: Negative.    Neurological: Negative.     Hematological: Negative.    Psychiatric/Behavioral: Negative.      Objective:     Vital Signs (Most Recent):  Temp: 98 °F (36.7 °C) (09/23/20 1822)  Pulse: 73 (09/23/20 1817)  Resp: 19 (09/23/20 1818)  BP: 117/66 (09/23/20 1817)  SpO2: 100 % (09/23/20 1817) Vital Signs (24h Range):  Temp:  [98 °F (36.7 °C)-98.4 °F (36.9 °C)] 98 °F (36.7 °C)  Pulse:  [72-87] 73  Resp:  [14-19] 19  SpO2:  [95 %-100 %] 100 %  BP: ()/(43-79) 117/66     Weight: 83.9 kg (184 lb 15.5 oz)  Body mass index is 32.77 kg/m².    Physical Exam  Constitutional:       Appearance: She is not toxic-appearing.      Comments: Tearful, slightly anxious but in no acute distress.  Alert   HENT:      Head: Normocephalic and atraumatic.      Right Ear: External ear normal.      Left Ear: External ear normal.      Nose:      Comments: NG tube in place     Mouth/Throat:      Mouth: Mucous membranes are moist.   Eyes:      Conjunctiva/sclera: Conjunctivae normal.   Cardiovascular:      Rate and Rhythm: Normal rate and regular rhythm.      Pulses: Normal pulses.      Heart sounds: No murmur. No gallop.    Pulmonary:      Effort: Pulmonary effort is normal. No respiratory distress.      Breath sounds: Normal breath sounds. No wheezing or rales.   Abdominal:      General: There is no distension.      Comments: Bowel sounds present, soft abdomen, tender to palpation in the midepigastric region but essentially generalized tenderness   Musculoskeletal:      Right lower leg: No edema.      Left lower leg: No edema.   Skin:     General: Skin is warm and dry.   Neurological:      Mental Status: She is oriented to person, place, and time. Mental status is at baseline.   Psychiatric:         Thought Content: Thought content normal.         Judgment: Judgment normal.      Comments: Anxious mood and affect             Significant Labs:   Recent Lab Results       09/23/20  1816   09/23/20  1800   09/23/20  1704        Albumin     4.7     Alkaline Phosphatase     74      ALT     17     Anion Gap     17     Appearance, UA Clear         AST     20     Bacteria, UA Rare         Baso #     0.09     Basophil%     0.5     Bilirubin (UA) Negative         BILIRUBIN TOTAL     0.5  Comment:  For infants and newborns, interpretation of results should be based  on gestational age, weight and in agreement with clinical  observations.  Premature Infant recommended reference ranges:  Up to 24 hours.............<8.0 mg/dL  Up to 48 hours............<12.0 mg/dL  3-5 days..................<15.0 mg/dL  6-29 days.................<15.0 mg/dL       BUN, Bld     18     Calcium     9.7     Chloride     98     CO2     25     Color, UA Yellow         Creatinine     0.8     Differential Method     Automated     eGFR if      >60.0     eGFR if non      >60.0  Comment:  Calculation used to obtain the estimated glomerular filtration  rate (eGFR) is the CKD-EPI equation.        Eos #     0.3     Eosinophil%     1.5     Glucose     125     Glucose, UA Negative         Gran # (ANC)     12.6     Gran%     70.9     Hematocrit     43.0     Hemoglobin     14.0     Immature Grans (Abs)     0.06  Comment:  Mild elevation in immature granulocytes is non specific and   can be seen in a variety of conditions including stress response,   acute inflammation, trauma and pregnancy. Correlation with other   laboratory and clinical findings is essential.       Immature Granulocytes     0.3     Ketones, UA Negative         Leukocytes, UA Trace         Lipase     32     Lymph #     3.8     Lymph%     21.1     MCH     28.3     MCHC     32.6     MCV     87     Microscopic Comment SEE COMMENT  Comment:  Other formed elements not mentioned in the report are not   present in the microscopic examination.            Mono #     1.0     Mono%     5.7     MPV     10.6     NITRITE UA Negative         nRBC     0     Occult Blood UA Negative         pH, UA >8.0         Platelets     313     Potassium     4.0      PROTEIN TOTAL     7.2     Protein, UA Negative  Comment:  Recommend a 24 hour urine protein or a urine   protein/creatinine ratio if globulin induced proteinuria is  clinically suspected.           RBC     4.94     RBC, UA 2         RDW     13.4     SARS-CoV-2 RNA, Amplification, Qual   Negative  Comment:  This test utilizes isothermal nucleic acid amplification   technology to detect the SARS-CoV-2 RdRp nucleic acid segment.   The analytical sensitivity (limit of detection) is 125 genome   equivalents/mL.   A POSITIVE result implies infection with the SARS-CoV-2 virus;  the patient is presumed to be contagious.    A NEGATIVE result means that SARS-CoV-2 nucleic acids are not  present above the limit of detection. A NEGATIVE result should be   treated as presumptive. It does not rule out the possibility of   COVID-19 and should not be the sole basis for treatment decisions.   If COVID-19 is strongly suspected based on clinical and exposure   history, re-testing using an alternate molecular assay should be   considered.   This test is only for use under the Food and Drug   Administration s Emergency Use Authorization (EUA).   Commercial kits are provided by [employer].   Performance characteristics of the EUA have been independently  verified by [facility] Department of  Pathology and Laboratory Medicine.   _________________________________________________________________  The ID NOW COVID-19 Letter of Authorization, along with the   authorized Fact Sheet for Healthcare Providers, the authorized Fact  Sheet for Patients, and authorized labeling are available on the FDA   website:  www.fda.gov/MedicalDevices/Safety/EmergencySituations/wlz274634.htm         Sodium     140     Specific [place], UA 1.015         Specimen UA Urine, Clean Catch         UROBILINOGEN UA Negative         WBC, UA 25         WBC     17.80         All pertinent labs within the past 24 hours have been  reviewed.    Significant Imaging: I have reviewed all pertinent imaging results/findings within the past 24 hours.

## 2020-09-23 NOTE — TELEPHONE ENCOUNTER
Spoke with patient, patient stating having N/V, extreme cramping and is screaming in pain. Advised patient per Dr. Frey, report to nearest ED. Patient v/u.

## 2020-09-23 NOTE — HPI
Patient is a 65-year-old female with past medical history of hepatic steatosis, osteoarthritis, GERD who presents to the ER with a several hours history of severe abdominal pain.  Three weeks ago patient had a laparoscopic cholecystectomy with unremarkable/routine postop recovery.  She was discharged home and was tolerating p.o. intake well.  Had follow-up with general surgery a few days ago in continue to do well until this morning when she developed severe abdominal pain in started to have nausea and vomiting.  The pain became so severe that she came to the ER.  In the ER, CT abdomen/pelvis showed a high-grade small-bowel obstruction.  General surgery notified and hospital team called for admission.  Appreciate the opportunity to be involved in this case.

## 2020-09-23 NOTE — ED TRIAGE NOTES
"Present to the ER with c/o " the stomach cramps, throwing up" reports symptoms since " this morning" symptoms since approx [id number], reports 3 episodes of vomiting, reports taking zofran odt at approx 1500 today with mild relief of nausea, reports taking Mylanta po at approx 1530 today with no relief of symptoms, currently c/o mid upper abd pain rates 11/10, recent gallbladder surgery on the beginning of this month   "

## 2020-09-23 NOTE — ASSESSMENT & PLAN NOTE
Admit and consult General surgery, follow up recommendations  NG tube placed  IV fluids at 125 mL/hour  NPO  IV morphine prn pain

## 2020-09-23 NOTE — TELEPHONE ENCOUNTER
----- Message from Mallory Clinton MA sent at 9/23/2020  4:00 PM CDT -----  Regarding: Call Back  PT stated she is not feeling well today since her Gallbladder Surgery, Per PT also stated she has been throwing up .Requesting a call back  Call Back # [phone]

## 2020-09-23 NOTE — ED PROVIDER NOTES
Encounter Date: 2020       History     Chief Complaint   Patient presents with    Abdominal Pain    3 weeks s/p cholecystectomy     65-year-old female presents complaining increased abdominal pain today a sudden onset the pain is in the lower epigastric area and is intermittent gripping associated with nausea vomiting she has not had constipation no history diarrhea she has a decreased appetite she is 22 days status post cholecystectomy per Dr. Jas Frey I had been doing well and had an appointment with Dr. Frey Monday and it was normal        Review of patient's allergies indicates:  No Known Allergies  Past Medical History:   Diagnosis Date    Fatty liver 2015    Former smoker 2009    Osteoarthritis 2010    Pulmonary nodules 2019    Repeat CT in 6 mo    Ulcer of abdomen wall 2016     Past Surgical History:   Procedure Laterality Date    APPENDECTOMY  1993     SECTION  , , 1983    x3    COLONOSCOPY  ~    [facility]: normal findings per patient report    EPIDURAL STEROID INJECTION INTO LUMBAR SPINE N/A 10/12/2018    Procedure: Injection-steroid-epidural-lumbar;  Surgeon: Kal Johnson MD;  Location: [facility] OR;  Service: Pain Management;  Laterality: N/A;  L5-S1    EPIDURAL STEROID INJECTION INTO LUMBAR SPINE N/A 2019    Procedure: Injection-steroid-epidural-lumbar L5-S1;  Surgeon: Kal Johnson MD;  Location: [facility] OR;  Service: Pain Management;  Laterality: N/A;    HYSTERECTOMY      one ovary conserved    LAPAROSCOPIC CHOLECYSTECTOMY N/A 2020    Procedure: CHOLECYSTECTOMY, LAPAROSCOPIC;  Surgeon: Govind Frey MD;  Location: [facility] OR;  Service: General;  Laterality: N/A;    UPPER GASTROINTESTINAL ENDOSCOPY  2016     Family History   Problem Relation Age of Onset    Ovarian cancer Mother     Heart disease Mother     Osteoporosis Mother     Arthritis Mother     Hypertension Father     Stroke Father 50    Brain Hemorrhage Father     Aneurysm Father      Osteoarthritis Sister     Arthritis Sister     Hypertension Sister     Obesity Sister     Breast cancer Neg Hx     Colon cancer Neg Hx     Colon polyps Neg Hx     Crohn's disease Neg Hx     Ulcerative colitis Neg Hx      Social History     Tobacco Use    Smoking status: Former Smoker     Packs/day: 1.00     Years: 40.00     Pack years: 40.00     Quit date: 11/2/2009     Years since quitting: 10.8    Smokeless tobacco: Never Used    Tobacco comment: quit 2009   Substance Use Topics    Alcohol use: Yes     Frequency: Monthly or less     Drinks per session: 1 or 2     Binge frequency: Never     Comment: Not often - one glass of wine every now and then    Drug use: Not Currently     Types: Marijuana     Comment: in 1970s     Review of Systems   Constitutional: Negative for fever.   HENT: Negative for sore throat.    Respiratory: Negative for shortness of breath.    Cardiovascular: Negative for chest pain.   Gastrointestinal: Positive for abdominal pain, nausea and vomiting. Negative for blood in stool.   Genitourinary: Negative for dysuria.   Musculoskeletal: Negative for back pain.   Skin: Negative for rash.   Neurological: Negative for weakness.   Hematological: Does not bruise/bleed easily.       Physical Exam     Initial Vitals   BP Pulse Resp Temp SpO2   09/23/20 1646 09/23/20 1644 09/23/20 1644 09/23/20 1644 09/23/20 1644   (!) 94/43 84 18 98.3 °F (36.8 °C) 100 %      MAP       --                Physical Exam    Nursing note and vitals reviewed.  Constitutional: She appears well-developed and well-nourished. She is not diaphoretic. No distress.   HENT:   Head: Normocephalic and atraumatic.   Right Ear: External ear normal.   Left Ear: External ear normal.   Eyes: Pupils are equal, round, and reactive to light. Right eye exhibits no discharge. Left eye exhibits no discharge.   Neck: No tracheal deviation present. No JVD present.   Cardiovascular: Exam reveals no friction rub.    No murmur  heard.  Pulmonary/Chest: No stridor. No respiratory distress. She has no wheezes. She has no rales.   Abdominal: Bowel sounds are normal. She exhibits distension. She exhibits no mass. There is abdominal tenderness. There is guarding. There is no rebound.   Musculoskeletal: Normal range of motion.   Neurological: She is alert.   Skin: Skin is warm.   Psychiatric: She has a normal mood and affect.         ED Course   Procedures  Labs Reviewed   COMPREHENSIVE METABOLIC PANEL - Abnormal; Notable for the following components:       Result Value    Glucose 125 (*)     Anion Gap 17 (*)     All other components within normal limits   CBC W/ AUTO DIFFERENTIAL - Abnormal; Notable for the following components:    WBC 17.80 (*)     Gran # (ANC) 12.6 (*)     Immature Grans (Abs) 0.06 (*)     All other components within normal limits   LIPASE   URINALYSIS, REFLEX TO URINE CULTURE   SARS-COV-2 RNA AMPLIFICATION, QUAL          Imaging Results          X-ray Abdomen for NG Tube Placement (Nursing should notify Radiology after placement) (In process)                CT Abdomen Pelvis With Contrast (Final result)  Result time 09/23/20 17:34:24    Final result by Beverly Yost MD (09/23/20 17:34:24)                 Impression:      Findings consistent with small-bowel obstruction the site appearing low right side of the pelvis and appearing high-grade with fecal filled and mildly thick walled appearance of distal small bowel..    Additional findings as detailed above including fatty mass appearing associated with the ascending colon suggesting lipoma, calcifications suggesting calcification the bladder wall and less likely bladder stone, mild diverticulosis without CT findings of acute diverticulitis.  Prior hysterectomy.  Prior cholecystectomy.  Nonvisualization of the appendix.    Results were called to Dr. De La Torre at 05:32 hours 09/23/2020      Electronically signed by: Beverly Yost MD  Date:    09/23/2020  Time:    17:34              Narrative:    EXAMINATION:  CT ABDOMEN PELVIS WITH CONTRAST    CLINICAL HISTORY:  abdominal pain s/p emile;    TECHNIQUE:  Low dose axial images, sagittal and coronal reformations were obtained from the lung bases to the pubic symphysis following the IV administration of 75 mL of Omnipaque 350 and with no p.o. contrast    COMPARISON:  11/01/2012    FINDINGS:  Liver, gallbladder, bile ducts; cholecystectomy clips.  No biliary duct dilatation.  Liver unremarkable in appearance.  No fluid collections in the gallbladder fossa on this post cholecystectomy patient    Adrenal glands unremarkable appearance    Pancreas unremarkable appearance    Abdominal aorta no aneurysm    Kidneys, ureters, bladder; symmetrical renal enhancement with no hydronephrosis.  Unremarkable appearance of the kidneys.  Urinary bladder mildly distended at time of the exam.  There is a 5 mm calcification projecting along the posterior aspect of the urinary bladder on the left in a similar location but slightly larger than 1 was seen on the prior study of 11/01/2012.  This was suggested to be recently passed ureteral stone on that exam in could be bladder stone but has an appearance more suggestive of calcification in the wall of the bladder.    Reproductive organs; prior hysterectomy.  Adnexal region unremarkable appearance    Stomach, bowel, mesentery; mild diverticulosis without CT findings of acute diverticulitis.  Sharply-circumscribed oval 3 cm fat density appearing within the ascending colon near but separate from the ileocecal valve,, suggesting a lipoma, and having become apparent since the prior exam.  Suggest further evaluation/follow-up.    Appendix not identified with certainty but no abnormal appendix inflammatory changes appendiceal region is seen    There are dilated fluid-filled loops of small bowel measuring up to 3.6 cm in diameter.  Colon is not dilated nor is distal small bowel.  The more distal the dilated small  bowel loops are fecal filled in appearance.  Appearance consistent with small-bowel obstruction and transition zone thought to be seen low in the right side of the pelvis with there is also mild circumferential wall thickening of small bowel loops.  No free intraperitoneal air or fluid.  No abscess..                                 Medical Decision Making:   ED Management:  Case discussed with Dr. Holm who is covering surgical service, case discussed with Dr. Coombs  In the distant past patient has had  x3 a hysterectomy with a Pfannenstiel incision, a right oophorectomy and a right appendectomy                   ED Course as of Sep 23 1809   Wed Sep 23, 2020   1741 Dr. Jas Frey  was called on his cell phone       [WK]      ED Course User Index  [WK] Donnell De La Torre MD            Clinical Impression:     ICD-10-CM ICD-9-CM   1. SBO (small bowel obstruction)  K56.609 560.9                          ED Disposition Condition    Admit                             Donnell DeL a Torre MD  201       Donnell De La Torre MD  20 1814

## 2020-09-24 LAB
ALBUMIN SERPL BCP-MCNC: 3.3 G/DL (ref 3.5–5.2)
ALP SERPL-CCNC: 81 U/L (ref 55–135)
ALT SERPL W/O P-5'-P-CCNC: 50 U/L (ref 10–44)
ANION GAP SERPL CALC-SCNC: 10 MMOL/L (ref 8–16)
AST SERPL-CCNC: 50 U/L (ref 10–40)
BASOPHILS # BLD AUTO: 0.03 K/UL (ref 0–0.2)
BASOPHILS NFR BLD: 0.3 % (ref 0–1.9)
BILIRUB SERPL-MCNC: 1 MG/DL (ref 0.1–1)
BUN SERPL-MCNC: 17 MG/DL (ref 8–23)
CALCIUM SERPL-MCNC: 7.8 MG/DL (ref 8.7–10.5)
CHLORIDE SERPL-SCNC: 106 MMOL/L (ref 95–110)
CO2 SERPL-SCNC: 22 MMOL/L (ref 23–29)
CREAT SERPL-MCNC: 0.6 MG/DL (ref 0.5–1.4)
DIFFERENTIAL METHOD: ABNORMAL
EOSINOPHIL # BLD AUTO: 0.2 K/UL (ref 0–0.5)
EOSINOPHIL NFR BLD: 1.9 % (ref 0–8)
ERYTHROCYTE [DISTWIDTH] IN BLOOD BY AUTOMATED COUNT: 14 % (ref 11.5–14.5)
EST. GFR  (AFRICAN AMERICAN): >60 ML/MIN/1.73 M^2
EST. GFR  (NON AFRICAN AMERICAN): >60 ML/MIN/1.73 M^2
GLUCOSE SERPL-MCNC: 91 MG/DL (ref 70–110)
HCT VFR BLD AUTO: 33.7 % (ref 37–48.5)
HGB BLD-MCNC: 11 G/DL (ref 12–16)
IMM GRANULOCYTES # BLD AUTO: 0.02 K/UL (ref 0–0.04)
IMM GRANULOCYTES NFR BLD AUTO: 0.2 % (ref 0–0.5)
LYMPHOCYTES # BLD AUTO: 2.5 K/UL (ref 1–4.8)
LYMPHOCYTES NFR BLD: 25.9 % (ref 18–48)
MCH RBC QN AUTO: 28.7 PG (ref 27–31)
MCHC RBC AUTO-ENTMCNC: 32.6 G/DL (ref 32–36)
MCV RBC AUTO: 88 FL (ref 82–98)
MONOCYTES # BLD AUTO: 0.9 K/UL (ref 0.3–1)
MONOCYTES NFR BLD: 9.4 % (ref 4–15)
NEUTROPHILS # BLD AUTO: 6 K/UL (ref 1.8–7.7)
NEUTROPHILS NFR BLD: 62.3 % (ref 38–73)
NRBC BLD-RTO: 0 /100 WBC
PLATELET # BLD AUTO: 243 K/UL (ref 150–350)
PMV BLD AUTO: 10.7 FL (ref 9.2–12.9)
POTASSIUM SERPL-SCNC: 3.9 MMOL/L (ref 3.5–5.1)
PROT SERPL-MCNC: 5.6 G/DL (ref 6–8.4)
RBC # BLD AUTO: 3.83 M/UL (ref 4–5.4)
SODIUM SERPL-SCNC: 138 MMOL/L (ref 136–145)
WBC # BLD AUTO: 9.67 K/UL (ref 3.9–12.7)

## 2020-09-24 PROCEDURE — 99232 SBSQ HOSP IP/OBS MODERATE 35: CPT | Mod: ,,, | Performed by: FAMILY MEDICINE

## 2020-09-24 PROCEDURE — 99222 1ST HOSP IP/OBS MODERATE 55: CPT | Mod: 24,,, | Performed by: SURGERY

## 2020-09-24 PROCEDURE — 80053 COMPREHEN METABOLIC PANEL: CPT

## 2020-09-24 PROCEDURE — 94640 AIRWAY INHALATION TREATMENT: CPT

## 2020-09-24 PROCEDURE — 25000003 PHARM REV CODE 250: Performed by: FAMILY MEDICINE

## 2020-09-24 PROCEDURE — 99900035 HC TECH TIME PER 15 MIN (STAT)

## 2020-09-24 PROCEDURE — 99232 PR SUBSEQUENT HOSPITAL CARE,LEVL II: ICD-10-PCS | Mod: ,,, | Performed by: FAMILY MEDICINE

## 2020-09-24 PROCEDURE — 63600175 PHARM REV CODE 636 W HCPCS: Performed by: HOSPITALIST

## 2020-09-24 PROCEDURE — C9113 INJ PANTOPRAZOLE SODIUM, VIA: HCPCS | Performed by: FAMILY MEDICINE

## 2020-09-24 PROCEDURE — 85025 COMPLETE CBC W/AUTO DIFF WBC: CPT

## 2020-09-24 PROCEDURE — 36415 COLL VENOUS BLD VENIPUNCTURE: CPT

## 2020-09-24 PROCEDURE — 94760 N-INVAS EAR/PLS OXIMETRY 1: CPT

## 2020-09-24 PROCEDURE — 11000001 HC ACUTE MED/SURG PRIVATE ROOM

## 2020-09-24 PROCEDURE — 63600175 PHARM REV CODE 636 W HCPCS: Performed by: FAMILY MEDICINE

## 2020-09-24 PROCEDURE — 99222 PR INITIAL HOSPITAL CARE,LEVL II: ICD-10-PCS | Mod: 24,,, | Performed by: SURGERY

## 2020-09-24 RX ORDER — SUCRALFATE 1 G/10ML
1 SUSPENSION ORAL
Status: DISCONTINUED | OUTPATIENT
Start: 2020-09-24 | End: 2020-09-27 | Stop reason: HOSPADM

## 2020-09-24 RX ADMIN — GABAPENTIN 300 MG: 300 CAPSULE ORAL at 08:09

## 2020-09-24 RX ADMIN — SUCRALFATE 1 G: 1 SUSPENSION ORAL at 03:09

## 2020-09-24 RX ADMIN — ONDANSETRON HYDROCHLORIDE 4 MG: 2 SOLUTION INTRAMUSCULAR; INTRAVENOUS at 10:09

## 2020-09-24 RX ADMIN — SUCRALFATE 1 G: 1 SUSPENSION ORAL at 10:09

## 2020-09-24 RX ADMIN — PANTOPRAZOLE SODIUM 40 MG: 40 INJECTION, POWDER, FOR SOLUTION INTRAVENOUS at 08:09

## 2020-09-24 RX ADMIN — MORPHINE SULFATE 4 MG: 4 INJECTION, SOLUTION INTRAMUSCULAR; INTRAVENOUS at 08:09

## 2020-09-24 RX ADMIN — SUCRALFATE 1 G: 1 SUSPENSION ORAL at 11:09

## 2020-09-24 RX ADMIN — GABAPENTIN 300 MG: 300 CAPSULE ORAL at 03:09

## 2020-09-24 RX ADMIN — MORPHINE SULFATE 4 MG: 4 INJECTION, SOLUTION INTRAMUSCULAR; INTRAVENOUS at 10:09

## 2020-09-24 RX ADMIN — GABAPENTIN 300 MG: 300 CAPSULE ORAL at 10:09

## 2020-09-24 NOTE — MED STUDENT SUBJECTIVE & OBJECTIVE
Medical Student Subjective & Objective     Principal Problem: SBO (small bowel obstruction)    Chief Complaint:   Chief Complaint   Patient presents with    Abdominal Pain    3 weeks s/p cholecystectomy       HPI: Pham Licea is a 65 y.o. female with s/p laparoscopic cholecystectomy on 2020 presents with nausea and diffuse abdominal pain for two days.  She presented to surgery clinic 3 days prior, without complaints and postoperative recovery was as expected.  The abdominal pain began suddenly last night, and became so severe that she felt the need to seek emergency medical treatment.  She reports hours of intolerable nausea with bilious vomiting.  Her last bowel movement was two days prior.  Since admission and placement of NG tube, her nausea and abdominal pain have improved considerably.     Past Medical History:   Diagnosis Date    Fatty liver     Former smoker     Osteoarthritis     Pulmonary nodules 2019    Repeat CT in 6 mo    Ulcer of abdomen wall 2016       Past Surgical History:   Procedure Laterality Date    APPENDECTOMY       SECTION  , , 1983    x3    COLONOSCOPY  ~    [facility]: normal findings per patient report    EPIDURAL STEROID INJECTION INTO LUMBAR SPINE N/A 10/12/2018    Procedure: Injection-steroid-epidural-lumbar;  Surgeon: Kal Johnson MD;  Location: [facility] OR;  Service: Pain Management;  Laterality: N/A;  L5-S1    EPIDURAL STEROID INJECTION INTO LUMBAR SPINE N/A 2019    Procedure: Injection-steroid-epidural-lumbar L5-S1;  Surgeon: Kal Johnson MD;  Location: [facility] OR;  Service: Pain Management;  Laterality: N/A;    HYSTERECTOMY      one ovary conserved    LAPAROSCOPIC CHOLECYSTECTOMY N/A 2020    Procedure: CHOLECYSTECTOMY, LAPAROSCOPIC;  Surgeon: Govind Frey MD;  Location: [facility] OR;  Service: General;  Laterality: N/A;    UPPER GASTROINTESTINAL ENDOSCOPY  2016       Review of patient's allergies indicates:  No  Known Allergies    No current facility-administered medications on file prior to encounter.      Current Outpatient Medications on File Prior to Encounter   Medication Sig    acetaminophen (TYLENOL) 325 MG tablet Take 325 mg by mouth every 6 (six) hours as needed for Pain.    albuterol (PROVENTIL/VENTOLIN HFA) 90 mcg/actuation inhaler Inhale 2 puffs into the lungs every 6 (six) hours as needed for Wheezing or Shortness of Breath. Rescue    diclofenac sodium (VOLTAREN) 1 % Gel APPLY AS DIRECTED    gabapentin (NEURONTIN) 300 MG capsule TAKE 1 CAPSULE BY MOUTH IN THE MORNING , 1 CAPSULE 8 HOURS LATER AND 2 CAPSULES DAILY AT BEDTIME (Patient taking differently: 2 (two) times daily. )    HYDROcodone-acetaminophen (NORCO) 5-325 mg per tablet Take 1 tablet by mouth every 6 (six) hours as needed for Pain.    meloxicam (MOBIC) 7.5 MG tablet Take 2 tablets (15 mg total) by mouth once daily.    ondansetron (ZOFRAN) 4 MG tablet Take 1 tablet (4 mg total) by mouth every 6 (six) hours as needed for Nausea.    ondansetron (ZOFRAN-ODT) 8 MG TbDL Take 1 tablet (8 mg total) by mouth every 6 (six) hours as needed (nausea or vomiting).    pantoprazole (PROTONIX) 20 MG tablet Take 1 tablet (20 mg total) by mouth once daily.    predniSONE (DELTASONE) 20 MG tablet Take 1 tablet (20 mg total) by mouth once daily.    sucralfate (CARAFATE) 1 gram tablet Take 1 tablet (1 g total) by mouth 4 (four) times daily.     Family History     Problem Relation (Age of Onset)    Aneurysm Father    Arthritis Mother, Sister    Brain Hemorrhage Father    Heart disease Mother    Hypertension Father, Sister    Obesity Sister    Osteoarthritis Sister    Osteoporosis Mother    Ovarian cancer Mother    Stroke Father (50)        Tobacco Use    Smoking status: Former Smoker     Packs/day: 1.00     Years: 40.00     Pack years: 40.00     Quit date: 11/2/2009     Years since quitting: 10.9    Smokeless tobacco: Never Used    Tobacco comment: quit 2009    Substance and Sexual Activity    Alcohol use: Yes     Frequency: Monthly or less     Drinks per session: 1 or 2     Binge frequency: Never     Comment: Not often - one glass of wine every now and then    Drug use: Not Currently     Types: Marijuana     Comment: in 1970s    Sexual activity: Not Currently     Review of Systems   Constitutional: Negative.    HENT: Negative.    Eyes: Negative.    Respiratory: Negative.    Cardiovascular: Negative.    Gastrointestinal: Positive for abdominal pain, nausea and vomiting.   Endocrine: Negative.    Genitourinary: Negative.    Musculoskeletal: Negative.    Skin: Negative.    Allergic/Immunologic: Negative.    Neurological: Negative.    Hematological: Negative.    Psychiatric/Behavioral: Negative.      Objective:     Vital Signs (Most Recent):  Temp: 97.9 °F (36.6 °C) (09/24/20 0742)  Pulse: 71 (09/24/20 0742)  Resp: 18 (09/24/20 0804)  BP: (!) 130/57 (09/24/20 0742)  SpO2: 96 % (09/24/20 0742) Vital Signs (24h Range):  Temp:  [96.5 °F (35.8 °C)-98.5 °F (36.9 °C)] 97.9 °F (36.6 °C)  Pulse:  [71-87] 71  Resp:  [14-19] 18  SpO2:  [95 %-100 %] 96 %  BP: ()/(43-79) 130/57     Weight: 88.8 kg (195 lb 12.3 oz)  Body mass index is 34.68 kg/m².    Intake/Output Summary (Last 24 hours) at 9/24/2020 0921  Last data filed at 9/24/2020 0800  Gross per 24 hour   Intake 1600 ml   Output 975 ml   Net 625 ml      Physical Exam  Constitutional:       Appearance: Normal appearance.   HENT:      Head: Normocephalic and atraumatic.      Right Ear: External ear normal.      Left Ear: External ear normal.      Nose: Nose normal.      Mouth/Throat:      Mouth: Mucous membranes are moist.   Cardiovascular:      Rate and Rhythm: Normal rate and regular rhythm.      Pulses: Normal pulses.      Heart sounds: Normal heart sounds.   Pulmonary:      Effort: Pulmonary effort is normal.      Breath sounds: Normal breath sounds.   Abdominal:      General: There is no distension.      Palpations:  Abdomen is soft.      Tenderness: There is abdominal tenderness. There is no guarding or rebound.      Comments: Hypoactive bowel sounds noted in all four quadrants.     Skin:     General: Skin is warm and dry.   Neurological:      General: No focal deficit present.      Mental Status: She is alert and oriented to person, place, and time.   Psychiatric:         Mood and Affect: Mood normal.         Behavior: Behavior normal.       Significant Labs:   CBC:   Recent Labs   Lab 09/23/20  1704 09/24/20  0838   WBC 17.80* 9.67   HGB 14.0 11.0*   HCT 43.0 33.7*    243       Significant Imaging: CT: I have reviewed all pertinent results/findings within the past 24 hours and my personal findings are:  consistent with small-bowel obstruction the site appearing low right side of the pelvis and appearing high-grade with fecal filled and mildly thick walled appearance of distal small bowel    Medical Student Subjective & Objective

## 2020-09-24 NOTE — PLAN OF CARE
09/24/20 0820   Discharge Assessment   Assessment Type Discharge Planning Assessment   Confirmed/corrected address and phone number on facesheet? Yes   Assessment information obtained from? Patient   Expected Length of Stay (days) 4   Communicated expected length of stay with patient/caregiver yes   Prior to hospitilization cognitive status: Alert/Oriented   Prior to hospitalization functional status: Independent   Current cognitive status: Alert/Oriented   Current Functional Status: Independent   Lives With alone   Able to Return to Prior Arrangements yes   Is patient able to care for self after discharge? Yes   Who are your caregiver(s) and their phone number(s)? Cait Moncada daughter [phone] or Magali Nunez daughter [phone]   Patient's perception of discharge disposition home or selfcare   Readmission Within the Last 30 Days current reason for admission unrelated to previous admission   If yes, most recent facility name: [facility]   Patient currently being followed by outpatient case management? No   Patient currently receives any other outside agency services? No   Equipment Currently Used at Home none   Do you have any problems affording any of your prescribed medications? No   Is the patient taking medications as prescribed? yes   Does the patient have transportation home? Yes   Transportation Anticipated family or friend will provide   Does the patient receive services at the [facility]? No   Discharge Plan A Home   DME Needed Upon Discharge  none   Patient/Family in Agreement with Plan yes   Readmission Questionnaire   At the time of your discharge, did someone talk to you about what your health problems were? Yes   At the time of discharge, did someone talk to you about what to watch out for regarding worsening of your health problem? Yes   At the time of discharge, did someone talk to you about what to do if you experienced worsening of your health problem? Yes   At  the time of discharge, did someone talk to you about which medication to take when you left the hospital and which ones to stop taking? Yes   At the time of discharge, did someone talk to you about when and where to follow up with a doctor after you left the hospital? Yes   What do you believe caused you to be sick enough to be re-admitted? started yesterday wtih nausea, vomiting & stomach cramps   How often do you need to have someone help you when you read instructions, pamphlets, or other written material from your doctor or pharmacy? Never   Do you have problems taking your medications as prescribed? No   Do you have any problems affording any of  your prescribed medications? No   Do you have problems obtaining/receiving your medications? No   Does the patient have transportation to healthcare appointments? Yes   Living Arrangements house   Does the patient have family/friends to help with healtcare needs after discharge? yes   Does your caregiver provide all the help you need? Yes   Are you currently feeling confused? No   Are you currently having problems thinking? No   Are you currently having memory problems? No   Have you felt down, depressed, or hopeless? 0   Have you felt little interest or pleasure in doing things? 0   In the last 7 days, my sleep quality was: good   Patient lives at home alone. Her daughter stayed with her after her last surgery. States if she needs someone to stay with her one of her daughters will stay with her. She is independent at home & was doing well until yesterday when she started with nausea & vomiting. She had her postop appointment on Monday & everything was fine. States she sometimes uses [facility] for prescriptions as it may be cheaper than what she has to pay using her insurance. Denies any needs at this time. Will continue to follow.

## 2020-09-24 NOTE — SUBJECTIVE & OBJECTIVE
Interval History: one episode of emesis overnight    Review of Systems   Constitutional: Negative for fatigue and fever.   HENT: Negative.    Eyes: Negative.    Respiratory: Negative for shortness of breath.    Cardiovascular: Negative for chest pain.   Gastrointestinal: Positive for abdominal pain and nausea. Negative for constipation, diarrhea and vomiting.   Endocrine: Negative.    Genitourinary: Negative.    Musculoskeletal: Negative.    Skin: Negative.    Allergic/Immunologic: Negative.    Neurological: Negative.    Hematological: Negative.    Psychiatric/Behavioral: Negative.      Objective:     Vital Signs (Most Recent):  Temp: 97.9 °F (36.6 °C) (09/24/20 0742)  Pulse: 71 (09/24/20 0742)  Resp: 18 (09/24/20 0804)  BP: (!) 130/57 (09/24/20 0742)  SpO2: 96 % (09/24/20 0742) Vital Signs (24h Range):  Temp:  [96.5 °F (35.8 °C)-98.5 °F (36.9 °C)] 97.9 °F (36.6 °C)  Pulse:  [71-87] 71  Resp:  [14-19] 18  SpO2:  [95 %-100 %] 96 %  BP: ()/(43-79) 130/57     Weight: 88.8 kg (195 lb 12.3 oz)  Body mass index is 34.68 kg/m².    Intake/Output Summary (Last 24 hours) at 9/24/2020 0958  Last data filed at 9/24/2020 0800  Gross per 24 hour   Intake 1630 ml   Output 975 ml   Net 655 ml      Physical Exam  Vitals signs reviewed.   Constitutional:       General: She is not in acute distress.     Appearance: She is normal weight. She is not toxic-appearing.   HENT:      Head: Normocephalic and atraumatic.      Right Ear: External ear normal.      Left Ear: External ear normal.      Nose: Nose normal.      Mouth/Throat:      Mouth: Mucous membranes are moist.   Eyes:      Conjunctiva/sclera: Conjunctivae normal.   Cardiovascular:      Rate and Rhythm: Normal rate and regular rhythm.      Pulses: Normal pulses.      Heart sounds: No murmur. No gallop.    Pulmonary:      Effort: Pulmonary effort is normal. No respiratory distress.      Breath sounds: Normal breath sounds. No wheezing or rales.   Abdominal:      Palpations:  Abdomen is soft.      Comments: Hypoactive bowel sounds, nondistended, mild generalized tenderness to palpation   Musculoskeletal:      Right lower leg: No edema.      Left lower leg: No edema.   Skin:     General: Skin is warm and dry.   Neurological:      Mental Status: She is alert and oriented to person, place, and time. Mental status is at baseline.   Psychiatric:         Mood and Affect: Mood normal.         Behavior: Behavior normal.         Significant Labs:   Recent Lab Results       09/24/20  0838   09/23/20  1816   09/23/20  1800   09/23/20  1704        Albumin       4.7     Alkaline Phosphatase       74     ALT       17     Anion Gap       17     Appearance, UA   Clear         AST       20     Bacteria, UA   Rare         Baso # 0.03     0.09     Basophil% 0.3     0.5     Bilirubin (UA)   Negative         BILIRUBIN TOTAL       0.5  Comment:  For infants and newborns, interpretation of results should be based  on gestational age, weight and in agreement with clinical  observations.  Premature Infant recommended reference ranges:  Up to 24 hours.............<8.0 mg/dL  Up to 48 hours............<12.0 mg/dL  3-5 days..................<15.0 mg/dL  6-29 days.................<15.0 mg/dL       BUN, Bld       18     Calcium       9.7     Chloride       98     CO2       25     Color, UA   Yellow         Creatinine       0.8     Differential Method Automated     Automated     eGFR if        >60.0     eGFR if non        >60.0  Comment:  Calculation used to obtain the estimated glomerular filtration  rate (eGFR) is the CKD-EPI equation.        Eos # 0.2     0.3     Eosinophil% 1.9     1.5     Glucose       125     Glucose, UA   Negative         Gran # (ANC) 6.0     12.6     Gran% 62.3     70.9     Hematocrit 33.7     43.0     Hemoglobin 11.0     14.0     Immature Grans (Abs) 0.02  Comment:  Mild elevation in immature granulocytes is non specific and   can be seen in a variety of  conditions including stress response,   acute inflammation, trauma and pregnancy. Correlation with other   laboratory and clinical findings is essential.       0.06  Comment:  Mild elevation in immature granulocytes is non specific and   can be seen in a variety of conditions including stress response,   acute inflammation, trauma and pregnancy. Correlation with other   laboratory and clinical findings is essential.       Immature Granulocytes 0.2     0.3     Ketones, UA   Negative         Leukocytes, UA   Trace         Lipase       32     Lymph # 2.5     3.8     Lymph% 25.9     21.1     MCH 28.7     28.3     MCHC 32.6     32.6     MCV 88     87     Microscopic Comment   SEE COMMENT  Comment:  Other formed elements not mentioned in the report are not   present in the microscopic examination.            Mono # 0.9     1.0     Mono% 9.4     5.7     MPV 10.7     10.6     NITRITE UA   Negative         nRBC 0     0     Occult Blood UA   Negative         pH, UA   >8.0         Platelets 243     313     Potassium       4.0     PROTEIN TOTAL       7.2     Protein, UA   Negative  Comment:  Recommend a 24 hour urine protein or a urine   protein/creatinine ratio if globulin induced proteinuria is  clinically suspected.           RBC 3.83     4.94     RBC, UA   2         RDW 14.0     13.4     SARS-CoV-2 RNA, Amplification, Qual     Negative  Comment:  This test utilizes isothermal nucleic acid amplification   technology to detect the SARS-CoV-2 RdRp nucleic acid segment.   The analytical sensitivity (limit of detection) is 125 genome   equivalents/mL.   A POSITIVE result implies infection with the SARS-CoV-2 virus;  the patient is presumed to be contagious.    A NEGATIVE result means that SARS-CoV-2 nucleic acids are not  present above the limit of detection. A NEGATIVE result should be   treated as presumptive. It does not rule out the possibility of   COVID-19 and should not be the sole basis for treatment decisions.   If  COVID-19 is strongly suspected based on clinical and exposure   history, re-testing using an alternate molecular assay should be   considered.   This test is only for use under the Food and Drug   Administration s Emergency Use Authorization (EUA).   Commercial kits are provided by [employer].   Performance characteristics of the EUA have been independently  verified by [facility] Department of  Pathology and Laboratory Medicine.   _________________________________________________________________  The ID NOW COVID-19 Letter of Authorization, along with the   authorized Fact Sheet for Healthcare Providers, the authorized Fact  Sheet for Patients, and authorized labeling are available on the FDA   website:  www.fda.gov/MedicalDevices/Safety/EmergencySituations/gun207067.htm         Sodium       140     Specific [place], UA   1.015         Specimen UA   Urine, Clean Catch         UROBILINOGEN UA   Negative         WBC, UA   25         WBC 9.67     17.80         All pertinent labs within the past 24 hours have been reviewed.    Significant Imaging: I have reviewed all pertinent imaging results/findings within the past 24 hours.

## 2020-09-24 NOTE — NURSING
New admit from ED with report of abd pain and N/V starting earlier today. NGT to right nare with small amount tan colored drainage present. No odor to drainage. Connected NGT to LWS. Gill to room and POC. Instructed on NPO status. Instructed on intake/output. Verbalized understanding of all instructions. Placed bed in low and locked position. Call light within reach. Side rails up x2.

## 2020-09-24 NOTE — ASSESSMENT & PLAN NOTE
Admit and consult General surgery, follow up recommendations  NG tube placed  IV fluids at 125 mL/hour  NPO  IV morphine prn pain    09/24/2020  NG with to 75 cc output  One episode of emesis  Continue IV fluids  Following recommendations of General surgery  X-ray flat and erect today  Continue NPO and IV morphine p.r.n. pain

## 2020-09-24 NOTE — HOSPITAL COURSE
09/24/2020  Patient admitted and started on IV fluids, IV morphine for pain control.  NG tube placed.  275 cc output.  One episode of emesis since admission.  Will obtain x-ray abdomen flat and erect today.  Following recommendations of General surgery.  Appreciate their involving us in this case.    09/25/2020  Patient with overall improvement in symptoms.  NG with 650 cc output.  Abdominal pain is improving.  No further episodes of nausea or emesis.  Following recommendations of General surgery.    09/26/2020  Continuing improvement in symptoms.  Abdominal pain has improved.  Reports no nausea or vomiting.  Patient is ready to have the NG tube removed.

## 2020-09-24 NOTE — ED NOTES
Medicated for c/o nausea upon movement to wc from stretcher, pt placed in wc for transportation to floor and c/o nausea,

## 2020-09-24 NOTE — PROGRESS NOTES
[facility] Medicine  Progress Note    Patient Name: Pham Licea  MRN: [id number]  Patient Class: IP- Inpatient   Admission Date: 9/23/2020  Length of Stay: 1 days  Attending Physician: Eloise Coombs MD  Primary Care Provider: Deven Emerson MD        Subjective:     Principal Problem:SBO (small bowel obstruction)        HPI:  Patient is a 65-year-old female with past medical history of hepatic steatosis, osteoarthritis, GERD who presents to the ER with a several hours history of severe abdominal pain.  Three weeks ago patient had a laparoscopic cholecystectomy with unremarkable/routine postop recovery.  She was discharged home and was tolerating p.o. intake well.  Had follow-up with general surgery a few days ago in continue to do well until this morning when she developed severe abdominal pain in started to have nausea and vomiting.  The pain became so severe that she came to the ER.  In the ER, CT abdomen/pelvis showed a high-grade small-bowel obstruction.  General surgery notified and hospital team called for admission.  Appreciate the opportunity to be involved in this case.    Overview/Hospital Course:  09/24/2020  Patient admitted and started on IV fluids, IV morphine for pain control.  NG tube placed.  275 cc output.  One episode of emesis since admission.  Will obtain x-ray abdomen flat and erect today.  Following recommendations of General surgery.  Appreciate their involving us in this case.    Interval History: one episode of emesis overnight    Review of Systems   Constitutional: Negative for fatigue and fever.   HENT: Negative.    Eyes: Negative.    Respiratory: Negative for shortness of breath.    Cardiovascular: Negative for chest pain.   Gastrointestinal: Positive for abdominal pain and nausea. Negative for constipation, diarrhea and vomiting.   Endocrine: Negative.    Genitourinary: Negative.    Musculoskeletal: Negative.    Skin:  Negative.    Allergic/Immunologic: Negative.    Neurological: Negative.    Hematological: Negative.    Psychiatric/Behavioral: Negative.      Objective:     Vital Signs (Most Recent):  Temp: 97.9 °F (36.6 °C) (09/24/20 0742)  Pulse: 71 (09/24/20 0742)  Resp: 18 (09/24/20 0804)  BP: (!) 130/57 (09/24/20 0742)  SpO2: 96 % (09/24/20 0742) Vital Signs (24h Range):  Temp:  [96.5 °F (35.8 °C)-98.5 °F (36.9 °C)] 97.9 °F (36.6 °C)  Pulse:  [71-87] 71  Resp:  [14-19] 18  SpO2:  [95 %-100 %] 96 %  BP: ()/(43-79) 130/57     Weight: 88.8 kg (195 lb 12.3 oz)  Body mass index is 34.68 kg/m².    Intake/Output Summary (Last 24 hours) at 9/24/2020 0958  Last data filed at 9/24/2020 0800  Gross per 24 hour   Intake 1630 ml   Output 975 ml   Net 655 ml      Physical Exam  Vitals signs reviewed.   Constitutional:       General: She is not in acute distress.     Appearance: She is normal weight. She is not toxic-appearing.   HENT:      Head: Normocephalic and atraumatic.      Right Ear: External ear normal.      Left Ear: External ear normal.      Nose: Nose normal.      Mouth/Throat:      Mouth: Mucous membranes are moist.   Eyes:      Conjunctiva/sclera: Conjunctivae normal.   Cardiovascular:      Rate and Rhythm: Normal rate and regular rhythm.      Pulses: Normal pulses.      Heart sounds: No murmur. No gallop.    Pulmonary:      Effort: Pulmonary effort is normal. No respiratory distress.      Breath sounds: Normal breath sounds. No wheezing or rales.   Abdominal:      Palpations: Abdomen is soft.      Comments: Hypoactive bowel sounds, nondistended, mild generalized tenderness to palpation   Musculoskeletal:      Right lower leg: No edema.      Left lower leg: No edema.   Skin:     General: Skin is warm and dry.   Neurological:      Mental Status: She is alert and oriented to person, place, and time. Mental status is at baseline.   Psychiatric:         Mood and Affect: Mood normal.         Behavior: Behavior normal.          Significant Labs:   Recent Lab Results       09/24/20  0838   09/23/20  1816   09/23/20  1800   09/23/20  1704        Albumin       4.7     Alkaline Phosphatase       74     ALT       17     Anion Gap       17     Appearance, UA   Clear         AST       20     Bacteria, UA   Rare         Baso # 0.03     0.09     Basophil% 0.3     0.5     Bilirubin (UA)   Negative         BILIRUBIN TOTAL       0.5  Comment:  For infants and newborns, interpretation of results should be based  on gestational age, weight and in agreement with clinical  observations.  Premature Infant recommended reference ranges:  Up to 24 hours.............<8.0 mg/dL  Up to 48 hours............<12.0 mg/dL  3-5 days..................<15.0 mg/dL  6-29 days.................<15.0 mg/dL       BUN, Bld       18     Calcium       9.7     Chloride       98     CO2       25     Color, UA   Yellow         Creatinine       0.8     Differential Method Automated     Automated     eGFR if        >60.0     eGFR if non        >60.0  Comment:  Calculation used to obtain the estimated glomerular filtration  rate (eGFR) is the CKD-EPI equation.        Eos # 0.2     0.3     Eosinophil% 1.9     1.5     Glucose       125     Glucose, UA   Negative         Gran # (ANC) 6.0     12.6     Gran% 62.3     70.9     Hematocrit 33.7     43.0     Hemoglobin 11.0     14.0     Immature Grans (Abs) 0.02  Comment:  Mild elevation in immature granulocytes is non specific and   can be seen in a variety of conditions including stress response,   acute inflammation, trauma and pregnancy. Correlation with other   laboratory and clinical findings is essential.       0.06  Comment:  Mild elevation in immature granulocytes is non specific and   can be seen in a variety of conditions including stress response,   acute inflammation, trauma and pregnancy. Correlation with other   laboratory and clinical findings is essential.       Immature Granulocytes 0.2      0.3     Ketones, UA   Negative         Leukocytes, UA   Trace         Lipase       32     Lymph # 2.5     3.8     Lymph% 25.9     21.1     MCH 28.7     28.3     MCHC 32.6     32.6     MCV 88     87     Microscopic Comment   SEE COMMENT  Comment:  Other formed elements not mentioned in the report are not   present in the microscopic examination.            Mono # 0.9     1.0     Mono% 9.4     5.7     MPV 10.7     10.6     NITRITE UA   Negative         nRBC 0     0     Occult Blood UA   Negative         pH, UA   >8.0         Platelets 243     313     Potassium       4.0     PROTEIN TOTAL       7.2     Protein, UA   Negative  Comment:  Recommend a 24 hour urine protein or a urine   protein/creatinine ratio if globulin induced proteinuria is  clinically suspected.           RBC 3.83     4.94     RBC, UA   2         RDW 14.0     13.4     SARS-CoV-2 RNA, Amplification, Qual     Negative  Comment:  This test utilizes isothermal nucleic acid amplification   technology to detect the SARS-CoV-2 RdRp nucleic acid segment.   The analytical sensitivity (limit of detection) is 125 genome   equivalents/mL.   A POSITIVE result implies infection with the SARS-CoV-2 virus;  the patient is presumed to be contagious.    A NEGATIVE result means that SARS-CoV-2 nucleic acids are not  present above the limit of detection. A NEGATIVE result should be   treated as presumptive. It does not rule out the possibility of   COVID-19 and should not be the sole basis for treatment decisions.   If COVID-19 is strongly suspected based on clinical and exposure   history, re-testing using an alternate molecular assay should be   considered.   This test is only for use under the Food and Drug   Administration s Emergency Use Authorization (EUA).   Commercial kits are provided by [employer].   Performance characteristics of the EUA have been independently  verified by [facility] Department of  Pathology and Laboratory Medicine.    _________________________________________________________________  The ID NOW COVID-19 Letter of Authorization, along with the   authorized Fact Sheet for Healthcare Providers, the authorized Fact  Sheet for Patients, and authorized labeling are available on the FDA   website:  www.fda.gov/MedicalDevices/Safety/EmergencySituations/lbp006192.htm         Sodium       140     Specific [place], UA   1.015         Specimen UA   Urine, Clean Catch         UROBILINOGEN UA   Negative         WBC, UA   25         WBC 9.67     17.80         All pertinent labs within the past 24 hours have been reviewed.    Significant Imaging: I have reviewed all pertinent imaging results/findings within the past 24 hours.      Assessment/Plan:      * SBO (small bowel obstruction)  Admit and consult General surgery, follow up recommendations  NG tube placed  IV fluids at 125 mL/hour  NPO  IV morphine prn pain    09/24/2020  NG with to 75 cc output  One episode of emesis  Continue IV fluids  Following recommendations of General surgery  X-ray flat and erect today  Continue NPO and IV morphine p.r.n. pain          Gastroesophageal reflux disease  Continue Protonix        VTE Risk Mitigation (From admission, onward)         Ordered     IP VTE HIGH RISK PATIENT  Once      09/23/20 1935     Place sequential compression device  Until discontinued      09/23/20 1935                Discharge Planning   GABBY:      Code Status: Full Code   Is the patient medically ready for discharge?:     Reason for patient still in hospital (select all that apply): Treatment and Consult recommendations                     Eloise Coombs MD  Department of Hospital Medicine   [facility]

## 2020-09-24 NOTE — MEDICAL/APP STUDENT
[facility] - Med Surg  General Surgery  Consult Note    Patient Name: Pham Licea  MRN: [id number]  Admission Date: 2020  Attending Physician: Eloise Coombs MD  Consult Physician: Misael Holm MD  Primary Care Provider: Deven Emerson MD    Patient information was obtained from patient and ER records.     Subjective:     Reason for consultation: Small Bowel Obstruction    History of Present Illness:  Pham Licea is a 65 y.o. female with a history of prior appendectomy s/p laparoscopic cholecystectomy on 2020 presents with 2 days of diffuse abdominal pain, nausea, and vomiting.  She presented to the surgery clinic 3 days prior without complaints, and postoperative recovery was as expected.  The pain began suddleny under no unusual circumstances and increased in severity until she felt the need to seek emergency medical care.  She had associated nausea and bilious vomiting. Last bowel movement was two days prior.  She denies abdominal distension and fever.  Since admission and placement of NG tube, her abdominal pain and nausea have improved considerably.       Review of patient's allergies indicates:  No Known Allergies    Past Medical History:   Diagnosis Date    Fatty liver 2015    Former smoker 2009    Osteoarthritis 2010    Pulmonary nodules 2019    Repeat CT in 6 mo    Ulcer of abdomen wall 2016     Past Surgical History:   Procedure Laterality Date    APPENDECTOMY  1993     SECTION  , , 1983    x3    COLONOSCOPY  ~    [facility]: normal findings per patient report    EPIDURAL STEROID INJECTION INTO LUMBAR SPINE N/A 10/12/2018    Procedure: Injection-steroid-epidural-lumbar;  Surgeon: Kal Johnson MD;  [facility]: [facility];  Service: Pain Management;  Laterality: N/A;  L5-S1    EPIDURAL STEROID INJECTION INTO LUMBAR SPINE N/A 2019    Procedure: Injection-steroid-epidural-lumbar L5-S1;  Surgeon: Kal Johnson MD;   [facility];  Service: Pain Management;  Laterality: N/A;    HYSTERECTOMY  1993    one ovary conserved    LAPAROSCOPIC CHOLECYSTECTOMY N/A 9/2/2020    Procedure: CHOLECYSTECTOMY, LAPAROSCOPIC;  Surgeon: Govind Frey MD;  Location: [facility] OR;  Service: General;  Laterality: N/A;    UPPER GASTROINTESTINAL ENDOSCOPY  2016     Family History     Problem Relation (Age of Onset)    Aneurysm Father    Arthritis Mother, Sister    Brain Hemorrhage Father    Heart disease Mother    Hypertension Father, Sister    Obesity Sister    Osteoarthritis Sister    Osteoporosis Mother    Ovarian cancer Mother    Stroke Father (50)        Tobacco Use    Smoking status: Former Smoker     Packs/day: 1.00     Years: 40.00     Pack years: 40.00     Quit date: 11/2/2009     Years since quitting: 10.9    Smokeless tobacco: Never Used    Tobacco comment: quit 2009   Substance and Sexual Activity    Alcohol use: Yes     Frequency: Monthly or less     Drinks per session: 1 or 2     Binge frequency: Never     Comment: Not often - one glass of wine every now and then    Drug use: Not Currently     Types: Marijuana     Comment: in 1970s    Sexual activity: Not Currently     Review of Systems   Constitutional: Negative.    HENT: Negative.    Eyes: Negative.    Respiratory: Negative.    Cardiovascular: Negative.    Gastrointestinal: Positive for abdominal pain, nausea and vomiting.   Endocrine: Negative.    Genitourinary: Negative.    Musculoskeletal: Negative.    Skin: Negative.    Allergic/Immunologic: Negative.    Neurological: Negative.      Objective:     Vital Signs (Most Recent):  Temp: 97.9 °F (36.6 °C) (09/24/20 0742)  Pulse: 71 (09/24/20 0742)  Resp: 18 (09/24/20 0804)  BP: (!) 130/57 (09/24/20 0742)  SpO2: 96 % (09/24/20 0742) Vital Signs (24h Range):  Temp:  [96.5 °F (35.8 °C)-98.5 °F (36.9 °C)] 97.9 °F (36.6 °C)  Pulse:  [71-87] 71  Resp:  [14-19] 18  SpO2:  [95 %-100 %] 96 %  BP: ()/(43-79) 130/57     Weight: 88.8  kg (195 lb 12.3 oz)  Body mass index is 34.68 kg/m².    Physical Exam  Constitutional:       Appearance: Normal appearance.   HENT:      Head: Normocephalic and atraumatic.      Mouth/Throat:      Mouth: Mucous membranes are moist.   Cardiovascular:      Rate and Rhythm: Normal rate and regular rhythm.      Pulses: Normal pulses.      Heart sounds: Normal heart sounds.   Pulmonary:      Effort: Pulmonary effort is normal.      Breath sounds: Normal breath sounds.   Abdominal:      General: There is no distension.      Palpations: Abdomen is soft.      Tenderness: There is abdominal tenderness (periumbilical). There is no guarding or rebound.      Comments: Hypoactive bowel sounds present in all four quadrants.   Skin:     General: Skin is warm and dry.   Neurological:      General: No focal deficit present.      Mental Status: She is alert and oriented to person, place, and time.   Psychiatric:         Mood and Affect: Mood normal.         Behavior: Behavior normal.         Significant Labs:  CBC:   Recent Labs   Lab 09/24/20  0838   WBC 9.67   RBC 3.83*   HGB 11.0*   HCT 33.7*      MCV 88   MCH 28.7   MCHC 32.6     BMP:   Recent Labs   Lab 09/23/20  1704   *      K 4.0   CL 98   CO2 25   BUN 18   CREATININE 0.8   CALCIUM 9.7     CMP:   Recent Labs   Lab 09/23/20  1704   *   CALCIUM 9.7   ALBUMIN 4.7   PROT 7.2      K 4.0   CO2 25   CL 98   BUN 18   CREATININE 0.8   ALKPHOS 74   ALT 17   AST 20   BILITOT 0.5     LFTs:   Recent Labs   Lab 09/23/20  1704   ALT 17   AST 20   ALKPHOS 74   BILITOT 0.5   PROT 7.2   ALBUMIN 4.7     Coagulation: No results for input(s): LABPROT, INR, APTT in the last 168 hours.  Specimen (12h ago, onward)    None        Recent Labs   Lab 09/23/20  1816   COLORU Yellow   SPECGRAV 1.015   PHUR >8.0*   PROTEINUA Negative   BACTERIA Rare   NITRITE Negative   LEUKOCYTESUR Trace*   UROBILINOGEN Negative       Significant Diagnostics:  CT abdomen findings  consistent with small bowel obstruction.  Obstruction site appears on the low right side of the pelvis and appearing high-grade with fecal filled and mildly thick walled appearance of distal small bowel.      Assessment:   Pham Licea is a 65 y.o. female who presents with     Active Diagnoses:    Diagnosis Date Noted POA    PRINCIPAL PROBLEM:  SBO (small bowel obstruction) [K56.609] 09/23/2020 Yes    Gastroesophageal reflux disease [K21.9] 01/09/2017 Yes    Obesity (BMI 30.0-34.9) [E66.9] 06/22/2016 Yes      Problems Resolved During this Admission:     VTE Risk Mitigation (From admission, onward)         Ordered     IP VTE HIGH RISK PATIENT  Once      09/23/20 1935     Place sequential compression device  Until discontinued      09/23/20 1935                Medical Decision Making/Plan:  Continue NG tube LCWS.  Continue NPO. Continue to await bowel function.  Further management will depend upon clinical course.      DEEPALI Monroy-S  General Surgery  [facility] - [facility]

## 2020-09-24 NOTE — PLAN OF CARE
Problem: Adult Inpatient Plan of Care  Goal: Plan of Care Review  Outcome: Ongoing, Progressing  Goal: Patient-Specific Goal (Individualization)  Outcome: Ongoing, Progressing  Goal: Absence of Hospital-Acquired Illness or Injury  Outcome: Ongoing, Progressing  Goal: Optimal Comfort and Wellbeing  Outcome: Ongoing, Progressing  Goal: Readiness for Transition of Care  Outcome: Ongoing, Progressing     Problem: Fall Injury Risk  Goal: Absence of Fall and Fall-Related Injury  Outcome: Ongoing, Progressing

## 2020-09-25 LAB
ALBUMIN SERPL BCP-MCNC: 3.1 G/DL (ref 3.5–5.2)
ALP SERPL-CCNC: 63 U/L (ref 55–135)
ALT SERPL W/O P-5'-P-CCNC: 33 U/L (ref 10–44)
ANION GAP SERPL CALC-SCNC: 10 MMOL/L (ref 8–16)
AST SERPL-CCNC: 28 U/L (ref 10–40)
BACTERIA UR CULT: NO GROWTH
BASOPHILS # BLD AUTO: 0.07 K/UL (ref 0–0.2)
BASOPHILS NFR BLD: 1 % (ref 0–1.9)
BILIRUB SERPL-MCNC: 0.6 MG/DL (ref 0.1–1)
BUN SERPL-MCNC: 10 MG/DL (ref 8–23)
CALCIUM SERPL-MCNC: 8.2 MG/DL (ref 8.7–10.5)
CHLORIDE SERPL-SCNC: 109 MMOL/L (ref 95–110)
CO2 SERPL-SCNC: 25 MMOL/L (ref 23–29)
CREAT SERPL-MCNC: 0.6 MG/DL (ref 0.5–1.4)
DIFFERENTIAL METHOD: ABNORMAL
EOSINOPHIL # BLD AUTO: 0.5 K/UL (ref 0–0.5)
EOSINOPHIL NFR BLD: 6.4 % (ref 0–8)
ERYTHROCYTE [DISTWIDTH] IN BLOOD BY AUTOMATED COUNT: 14.2 % (ref 11.5–14.5)
EST. GFR  (AFRICAN AMERICAN): >60 ML/MIN/1.73 M^2
EST. GFR  (NON AFRICAN AMERICAN): >60 ML/MIN/1.73 M^2
GLUCOSE SERPL-MCNC: 80 MG/DL (ref 70–110)
HCT VFR BLD AUTO: 32.1 % (ref 37–48.5)
HGB BLD-MCNC: 9.7 G/DL (ref 12–16)
IMM GRANULOCYTES # BLD AUTO: 0.01 K/UL (ref 0–0.04)
IMM GRANULOCYTES NFR BLD AUTO: 0.1 % (ref 0–0.5)
LYMPHOCYTES # BLD AUTO: 3.1 K/UL (ref 1–4.8)
LYMPHOCYTES NFR BLD: 43 % (ref 18–48)
MCH RBC QN AUTO: 27.5 PG (ref 27–31)
MCHC RBC AUTO-ENTMCNC: 30.2 G/DL (ref 32–36)
MCV RBC AUTO: 91 FL (ref 82–98)
MONOCYTES # BLD AUTO: 0.5 K/UL (ref 0.3–1)
MONOCYTES NFR BLD: 7.2 % (ref 4–15)
NEUTROPHILS # BLD AUTO: 3.1 K/UL (ref 1.8–7.7)
NEUTROPHILS NFR BLD: 42.3 % (ref 38–73)
NRBC BLD-RTO: 0 /100 WBC
PLATELET # BLD AUTO: 213 K/UL (ref 150–350)
PMV BLD AUTO: 10.6 FL (ref 9.2–12.9)
POTASSIUM SERPL-SCNC: 3.6 MMOL/L (ref 3.5–5.1)
PROT SERPL-MCNC: 5.3 G/DL (ref 6–8.4)
RBC # BLD AUTO: 3.53 M/UL (ref 4–5.4)
SODIUM SERPL-SCNC: 144 MMOL/L (ref 136–145)
WBC # BLD AUTO: 7.24 K/UL (ref 3.9–12.7)

## 2020-09-25 PROCEDURE — 99232 PR SUBSEQUENT HOSPITAL CARE,LEVL II: ICD-10-PCS | Mod: ,,, | Performed by: FAMILY MEDICINE

## 2020-09-25 PROCEDURE — 25000003 PHARM REV CODE 250: Performed by: FAMILY MEDICINE

## 2020-09-25 PROCEDURE — 99232 SBSQ HOSP IP/OBS MODERATE 35: CPT | Mod: 24,,, | Performed by: SURGERY

## 2020-09-25 PROCEDURE — 36415 COLL VENOUS BLD VENIPUNCTURE: CPT

## 2020-09-25 PROCEDURE — 99232 SBSQ HOSP IP/OBS MODERATE 35: CPT | Mod: ,,, | Performed by: FAMILY MEDICINE

## 2020-09-25 PROCEDURE — C9113 INJ PANTOPRAZOLE SODIUM, VIA: HCPCS | Performed by: FAMILY MEDICINE

## 2020-09-25 PROCEDURE — 11000001 HC ACUTE MED/SURG PRIVATE ROOM

## 2020-09-25 PROCEDURE — 85025 COMPLETE CBC W/AUTO DIFF WBC: CPT

## 2020-09-25 PROCEDURE — 80053 COMPREHEN METABOLIC PANEL: CPT

## 2020-09-25 PROCEDURE — 99232 PR SUBSEQUENT HOSPITAL CARE,LEVL II: ICD-10-PCS | Mod: 24,,, | Performed by: SURGERY

## 2020-09-25 PROCEDURE — 63600175 PHARM REV CODE 636 W HCPCS: Performed by: FAMILY MEDICINE

## 2020-09-25 RX ADMIN — SUCRALFATE 1 G: 1 SUSPENSION ORAL at 05:09

## 2020-09-25 RX ADMIN — GABAPENTIN 300 MG: 300 CAPSULE ORAL at 09:09

## 2020-09-25 RX ADMIN — SUCRALFATE 1 G: 1 SUSPENSION ORAL at 04:09

## 2020-09-25 RX ADMIN — ACETAMINOPHEN 325 MG: 325 TABLET ORAL at 06:09

## 2020-09-25 RX ADMIN — SODIUM CHLORIDE 1000 ML: 0.9 INJECTION, SOLUTION INTRAVENOUS at 12:09

## 2020-09-25 RX ADMIN — GABAPENTIN 300 MG: 300 CAPSULE ORAL at 04:09

## 2020-09-25 RX ADMIN — SUCRALFATE 1 G: 1 SUSPENSION ORAL at 11:09

## 2020-09-25 RX ADMIN — PANTOPRAZOLE SODIUM 40 MG: 40 INJECTION, POWDER, FOR SOLUTION INTRAVENOUS at 09:09

## 2020-09-25 RX ADMIN — SODIUM CHLORIDE: 0.9 INJECTION, SOLUTION INTRAVENOUS at 07:09

## 2020-09-25 RX ADMIN — SUCRALFATE 1 G: 1 SUSPENSION ORAL at 09:09

## 2020-09-25 RX ADMIN — SODIUM CHLORIDE: 0.9 INJECTION, SOLUTION INTRAVENOUS at 05:09

## 2020-09-25 NOTE — PROGRESS NOTES
[facility] - [facility] Hospital Medicine  Progress Note    Patient Name: Pham Licea  MRN: [id number]  Patient Class: IP- Inpatient   Admission Date: 9/23/2020  Length of Stay: 2 days  Attending Physician: Eloise Coombs MD  Primary Care Provider: Deven Emerson MD        Subjective:     Principal Problem:SBO (small bowel obstruction)        HPI:  Patient is a 65-year-old female with past medical history of hepatic steatosis, osteoarthritis, GERD who presents to the ER with a several hours history of severe abdominal pain.  Three weeks ago patient had a laparoscopic cholecystectomy with unremarkable/routine postop recovery.  She was discharged home and was tolerating p.o. intake well.  Had follow-up with general surgery a few days ago in continue to do well until this morning when she developed severe abdominal pain in started to have nausea and vomiting.  The pain became so severe that she came to the ER.  In the ER, CT abdomen/pelvis showed a high-grade small-bowel obstruction.  General surgery notified and hospital team called for admission.  Appreciate the opportunity to be involved in this case.    Overview/Hospital Course:  09/24/2020  Patient admitted and started on IV fluids, IV morphine for pain control.  NG tube placed.  275 cc output.  One episode of emesis since admission.  Will obtain x-ray abdomen flat and erect today.  Following recommendations of General surgery.  Appreciate their involving us in this case.    09/25/2020  Patient with overall improvement in symptoms.  NG with 650 cc output.  Abdominal pain is improving.  No further episodes of nausea or emesis.  Following recommendations of General surgery.      Interval History:  Interval improvement in symptoms    Review of Systems   Constitutional: Negative for fatigue and fever.   Respiratory: Negative for shortness of breath.    Cardiovascular: Negative for chest pain.   Gastrointestinal: Positive for  abdominal pain. Negative for constipation, nausea and vomiting.   Endocrine: Negative.    Genitourinary: Negative.    Musculoskeletal: Negative.    Skin: Negative.    Neurological: Negative.    Hematological: Negative.    Psychiatric/Behavioral: Negative.      Objective:     Vital Signs (Most Recent):  Temp: 98.4 °F (36.9 °C) (09/25/20 1100)  Pulse: 61 (09/25/20 1100)  Resp: 18 (09/25/20 1100)  BP: 130/79 (09/25/20 1100)  SpO2: 95 % (09/25/20 1100) Vital Signs (24h Range):  Temp:  [96.3 °F (35.7 °C)-98.6 °F (37 °C)] 98.4 °F (36.9 °C)  Pulse:  [61-71] 61  Resp:  [16-18] 18  SpO2:  [94 %-97 %] 95 %  BP: (119-131)/(57-79) 130/79     Weight: 88.8 kg (195 lb 12.3 oz)  Body mass index is 34.68 kg/m².    Intake/Output Summary (Last 24 hours) at 9/25/2020 1350  Last data filed at 9/25/2020 1125  Gross per 24 hour   Intake --   Output 2151 ml   Net -2151 ml      Physical Exam  Vitals signs reviewed.   Constitutional:       General: She is not in acute distress.     Appearance: She is not ill-appearing or toxic-appearing.      Comments: Appears uncomfortable but in no acute distress   HENT:      Head: Normocephalic and atraumatic.      Right Ear: External ear normal.      Left Ear: External ear normal.      Nose: Nose normal.      Comments: NG tube in place     Mouth/Throat:      Mouth: Mucous membranes are moist.   Eyes:      Conjunctiva/sclera: Conjunctivae normal.   Cardiovascular:      Rate and Rhythm: Normal rate and regular rhythm.      Pulses: Normal pulses.      Heart sounds: No murmur. No gallop.    Pulmonary:      Effort: Pulmonary effort is normal. No respiratory distress.      Breath sounds: Normal breath sounds. No wheezing or rales.   Abdominal:      General: There is no distension.      Palpations: Abdomen is soft.      Tenderness: There is abdominal tenderness (Midepigastric and left upper quadrant tenderness to palpation).      Comments: Bowel sounds present   Musculoskeletal:      Right lower leg: No edema.       Left lower leg: No edema.   Skin:     General: Skin is warm and dry.   Neurological:      Mental Status: She is alert and oriented to person, place, and time. Mental status is at baseline.   Psychiatric:         Mood and Affect: Mood normal.         Behavior: Behavior normal.         Thought Content: Thought content normal.         Judgment: Judgment normal.         Significant Labs:   Recent Lab Results       09/25/20  0556        Albumin 3.1     Alkaline Phosphatase 63     ALT 33     Anion Gap 10     AST 28     Baso # 0.07     Basophil% 1.0     BILIRUBIN TOTAL 0.6  Comment:  For infants and newborns, interpretation of results should be based  on gestational age, weight and in agreement with clinical  observations.  Premature Infant recommended reference ranges:  Up to 24 hours.............<8.0 mg/dL  Up to 48 hours............<12.0 mg/dL  3-5 days..................<15.0 mg/dL  6-29 days.................<15.0 mg/dL       BUN, Bld 10     Calcium 8.2     Chloride 109     CO2 25     Creatinine 0.6     Differential Method Automated     eGFR if African American >60.0     eGFR if non  >60.0  Comment:  Calculation used to obtain the estimated glomerular filtration  rate (eGFR) is the CKD-EPI equation.        Eos # 0.5     Eosinophil% 6.4     Glucose 80     Gran # (ANC) 3.1     Gran% 42.3     Hematocrit 32.1     Hemoglobin 9.7     Immature Grans (Abs) 0.01  Comment:  Mild elevation in immature granulocytes is non specific and   can be seen in a variety of conditions including stress response,   acute inflammation, trauma and pregnancy. Correlation with other   laboratory and clinical findings is essential.       Immature Granulocytes 0.1     Lymph # 3.1     Lymph% 43.0     MCH 27.5     MCHC 30.2     MCV 91     Mono # 0.5     Mono% 7.2     MPV 10.6     nRBC 0     Platelets 213     Potassium 3.6     PROTEIN TOTAL 5.3     RBC 3.53     RDW 14.2     Sodium 144     WBC 7.24         All pertinent labs within  the past 24 hours have been reviewed.    Significant Imaging: I have reviewed all pertinent imaging results/findings within the past 24 hours.      Assessment/Plan:      * SBO (small bowel obstruction)  Admit and consult General surgery, follow up recommendations  NG tube placed  IV fluids at 125 mL/hour  NPO  IV morphine prn pain    09/24/2020  NG with 275 cc output  One episode of emesis  Continue IV fluids  Following recommendations of General surgery  X-ray flat and erect today  Continue NPO and IV morphine p.r.n. pain    09/25/2020  NG with 650 cc output  No further episodes of nausea or emesis  Abdominal pain is improving  Continue IV fluids  Following recommendations of General surgery, appreciate their involving us in this case.  Planning for possible repeat CT in the next 24-48 hours.          Gastroesophageal reflux disease  Continue Protonix        VTE Risk Mitigation (From admission, onward)         Ordered     IP VTE HIGH RISK PATIENT  Once      09/23/20 1935     Place sequential compression device  Until discontinued      09/23/20 1935                Discharge Planning   GABBY:      Code Status: Full Code   Is the patient medically ready for discharge?:     Reason for patient still in hospital (select all that apply): Patient trending condition  Discharge Plan A: Home with family                  Eloise Coombs MD  Department of Hospital Medicine   [facility]

## 2020-09-25 NOTE — SUBJECTIVE & OBJECTIVE
Interval History:  Interval improvement in symptoms    Review of Systems   Constitutional: Negative for fatigue and fever.   Respiratory: Negative for shortness of breath.    Cardiovascular: Negative for chest pain.   Gastrointestinal: Positive for abdominal pain. Negative for constipation, nausea and vomiting.   Endocrine: Negative.    Genitourinary: Negative.    Musculoskeletal: Negative.    Skin: Negative.    Neurological: Negative.    Hematological: Negative.    Psychiatric/Behavioral: Negative.      Objective:     Vital Signs (Most Recent):  Temp: 98.4 °F (36.9 °C) (09/25/20 1100)  Pulse: 61 (09/25/20 1100)  Resp: 18 (09/25/20 1100)  BP: 130/79 (09/25/20 1100)  SpO2: 95 % (09/25/20 1100) Vital Signs (24h Range):  Temp:  [96.3 °F (35.7 °C)-98.6 °F (37 °C)] 98.4 °F (36.9 °C)  Pulse:  [61-71] 61  Resp:  [16-18] 18  SpO2:  [94 %-97 %] 95 %  BP: (119-131)/(57-79) 130/79     Weight: 88.8 kg (195 lb 12.3 oz)  Body mass index is 34.68 kg/m².    Intake/Output Summary (Last 24 hours) at 9/25/2020 1350  Last data filed at 9/25/2020 1125  Gross per 24 hour   Intake --   Output 2151 ml   Net -2151 ml      Physical Exam  Vitals signs reviewed.   Constitutional:       General: She is not in acute distress.     Appearance: She is not ill-appearing or toxic-appearing.      Comments: Appears uncomfortable but in no acute distress   HENT:      Head: Normocephalic and atraumatic.      Right Ear: External ear normal.      Left Ear: External ear normal.      Nose: Nose normal.      Comments: NG tube in place     Mouth/Throat:      Mouth: Mucous membranes are moist.   Eyes:      Conjunctiva/sclera: Conjunctivae normal.   Cardiovascular:      Rate and Rhythm: Normal rate and regular rhythm.      Pulses: Normal pulses.      Heart sounds: No murmur. No gallop.    Pulmonary:      Effort: Pulmonary effort is normal. No respiratory distress.      Breath sounds: Normal breath sounds. No wheezing or rales.   Abdominal:      General: There is  no distension.      Palpations: Abdomen is soft.      Tenderness: There is abdominal tenderness (Midepigastric and left upper quadrant tenderness to palpation).      Comments: Bowel sounds present   Musculoskeletal:      Right lower leg: No edema.      Left lower leg: No edema.   Skin:     General: Skin is warm and dry.   Neurological:      Mental Status: She is alert and oriented to person, place, and time. Mental status is at baseline.   Psychiatric:         Mood and Affect: Mood normal.         Behavior: Behavior normal.         Thought Content: Thought content normal.         Judgment: Judgment normal.         Significant Labs:   Recent Lab Results       09/25/20  0556        Albumin 3.1     Alkaline Phosphatase 63     ALT 33     Anion Gap 10     AST 28     Baso # 0.07     Basophil% 1.0     BILIRUBIN TOTAL 0.6  Comment:  For infants and newborns, interpretation of results should be based  on gestational age, weight and in agreement with clinical  observations.  Premature Infant recommended reference ranges:  Up to 24 hours.............<8.0 mg/dL  Up to 48 hours............<12.0 mg/dL  3-5 days..................<15.0 mg/dL  6-29 days.................<15.0 mg/dL       BUN, Bld 10     Calcium 8.2     Chloride 109     CO2 25     Creatinine 0.6     Differential Method Automated     eGFR if African American >60.0     eGFR if non  >60.0  Comment:  Calculation used to obtain the estimated glomerular filtration  rate (eGFR) is the CKD-EPI equation.        Eos # 0.5     Eosinophil% 6.4     Glucose 80     Gran # (ANC) 3.1     Gran% 42.3     Hematocrit 32.1     Hemoglobin 9.7     Immature Grans (Abs) 0.01  Comment:  Mild elevation in immature granulocytes is non specific and   can be seen in a variety of conditions including stress response,   acute inflammation, trauma and pregnancy. Correlation with other   laboratory and clinical findings is essential.       Immature Granulocytes 0.1     Lymph # 3.1      Lymph% 43.0     MCH 27.5     MCHC 30.2     MCV 91     Mono # 0.5     Mono% 7.2     MPV 10.6     nRBC 0     Platelets 213     Potassium 3.6     PROTEIN TOTAL 5.3     RBC 3.53     RDW 14.2     Sodium 144     WBC 7.24         All pertinent labs within the past 24 hours have been reviewed.    Significant Imaging: I have reviewed all pertinent imaging results/findings within the past 24 hours.

## 2020-09-25 NOTE — MEDICAL/APP STUDENT
"[facility]  General Surgery  Progress Note    Subjective:     Interval History:  Patient sitting up in bed. She reports 3, small "pellet like" bowel movements over the past 24 hours.  She also reports some abdominal cramping that subsided after each bowel movement.  She has had no nausea today.  Abdominal pain and tenderness much improved.      Post-Op Info:  * No surgery found *          Objective:     Vital Signs (Most Recent):  Temp: 96.7 °F (35.9 °C) (09/25/20 0730)  Pulse: 64 (09/25/20 0730)  Resp: 18 (09/25/20 0730)  BP: 131/74 (09/25/20 0730)  SpO2: (!) 94 % (09/25/20 0730) Vital Signs (24h Range):  Temp:  [96.3 °F (35.7 °C)-98.6 °F (37 °C)] 96.7 °F (35.9 °C)  Pulse:  [64-71] 64  Resp:  [16-18] 18  SpO2:  [94 %-97 %] 94 %  BP: (107-131)/(57-74) 131/74       Intake/Output Summary (Last 24 hours) at 9/25/2020 1002  Last data filed at 9/25/2020 0542  Gross per 24 hour   Intake --   Output 1251 ml   Net -1251 ml       Physical Exam  Constitutional:       Appearance: Normal appearance.   HENT:      Head: Normocephalic and atraumatic.      Mouth/Throat:      Mouth: Mucous membranes are moist.   Cardiovascular:      Rate and Rhythm: Normal rate and regular rhythm.      Pulses: Normal pulses.      Heart sounds: Normal heart sounds.   Pulmonary:      Effort: Pulmonary effort is normal.      Breath sounds: Normal breath sounds.   Abdominal:      General: Bowel sounds are normal. There is no distension.      Palpations: Abdomen is soft.      Tenderness: There is no abdominal tenderness. There is no guarding or rebound.   Skin:     General: Skin is warm and dry.   Neurological:      General: No focal deficit present.      Mental Status: She is alert and oriented to person, place, and time.   Psychiatric:         Mood and Affect: Mood normal.         Behavior: Behavior normal.         Significant Labs:    BMP:   Recent Labs   Lab 09/25/20  0556   GLU 80      K 3.6      CO2 25 "   BUN 10   CREATININE 0.6   CALCIUM 8.2*     CBC:   Recent Labs   Lab 09/25/20  0556   WBC 7.24   RBC 3.53*   HGB 9.7*   HCT 32.1*      MCV 91   MCH 27.5   MCHC 30.2*     LFT:    Lab Results   Component Value Date    ALT 33 09/25/2020    AST 28 09/25/2020    ALKPHOS 63 09/25/2020    BILITOT 0.6 09/25/2020       Assessment/Plan:   Pham Licea is a 65 y.o. female status post laparoscopic cholecystectomy 22 days prior with now bowel obstruction proved by CT scan evaluation.     Laboratory assessments have been reviewed.  Resolving leukocytosis.  No renal or hepatic dysfunction. No evidence of hernias or issues related to recent surgery.     Continue Bowel rest, IV fluid hydration, NPO with NG tube.  Continue daily labs. Plan to remove NG tube pending resolution of abdominal cramping.  Further management with depend upon clinical course.      Active Diagnoses:    Diagnosis Date Noted POA    PRINCIPAL PROBLEM:  SBO (small bowel obstruction) [K56.609] 09/23/2020 Yes    Gastroesophageal reflux disease [K21.9] 01/09/2017 Yes    Obesity (BMI 30.0-34.9) [E66.9] 06/22/2016 Yes      Problems Resolved During this Admission:     Eloise Ty  General Surgery  [facility]

## 2020-09-25 NOTE — ASSESSMENT & PLAN NOTE
Admit and consult General surgery, follow up recommendations  NG tube placed  IV fluids at 125 mL/hour  NPO  IV morphine prn pain    09/24/2020  NG with 275 cc output  One episode of emesis  Continue IV fluids  Following recommendations of General surgery  X-ray flat and erect today  Continue NPO and IV morphine p.r.n. pain    09/25/2020  NG with 650 cc output  No further episodes of nausea or emesis  Abdominal pain is improving  Continue IV fluids  Following recommendations of General surgery, appreciate their involving us in this case.  Planning for possible repeat CT in the next 24-48 hours.

## 2020-09-25 NOTE — PLAN OF CARE
09/25/20 1007   Discharge Reassessment   Assessment Type Discharge Planning Reassessment   Anticipated Discharge Disposition Home   Provided patient/caregiver education on the expected discharge date and the discharge plan Yes   Do you have any problems affording any of your prescribed medications? No   Discharge Plan A Home with family   DME Needed Upon Discharge  none   Patient ambulating in room. States she hopes she will be able to go home this weekend. If she does I will call her on Monday with her follow up appointment. Denies any needs at this time.

## 2020-09-26 LAB
ALBUMIN SERPL BCP-MCNC: 3.6 G/DL (ref 3.5–5.2)
ALP SERPL-CCNC: 66 U/L (ref 55–135)
ALT SERPL W/O P-5'-P-CCNC: 29 U/L (ref 10–44)
ANION GAP SERPL CALC-SCNC: 11 MMOL/L (ref 8–16)
AST SERPL-CCNC: 20 U/L (ref 10–40)
BASOPHILS # BLD AUTO: 0.05 K/UL (ref 0–0.2)
BASOPHILS NFR BLD: 0.7 % (ref 0–1.9)
BILIRUB SERPL-MCNC: 0.8 MG/DL (ref 0.1–1)
BUN SERPL-MCNC: 8 MG/DL (ref 8–23)
CALCIUM SERPL-MCNC: 8.5 MG/DL (ref 8.7–10.5)
CHLORIDE SERPL-SCNC: 105 MMOL/L (ref 95–110)
CO2 SERPL-SCNC: 23 MMOL/L (ref 23–29)
CREAT SERPL-MCNC: 0.5 MG/DL (ref 0.5–1.4)
DIFFERENTIAL METHOD: ABNORMAL
EOSINOPHIL # BLD AUTO: 0.6 K/UL (ref 0–0.5)
EOSINOPHIL NFR BLD: 7.2 % (ref 0–8)
ERYTHROCYTE [DISTWIDTH] IN BLOOD BY AUTOMATED COUNT: 13.2 % (ref 11.5–14.5)
EST. GFR  (AFRICAN AMERICAN): >60 ML/MIN/1.73 M^2
EST. GFR  (NON AFRICAN AMERICAN): >60 ML/MIN/1.73 M^2
GLUCOSE SERPL-MCNC: 73 MG/DL (ref 70–110)
HCT VFR BLD AUTO: 34.1 % (ref 37–48.5)
HGB BLD-MCNC: 10.6 G/DL (ref 12–16)
IMM GRANULOCYTES # BLD AUTO: 0.02 K/UL (ref 0–0.04)
IMM GRANULOCYTES NFR BLD AUTO: 0.3 % (ref 0–0.5)
LYMPHOCYTES # BLD AUTO: 2.2 K/UL (ref 1–4.8)
LYMPHOCYTES NFR BLD: 29.5 % (ref 18–48)
MCH RBC QN AUTO: 27.5 PG (ref 27–31)
MCHC RBC AUTO-ENTMCNC: 31.1 G/DL (ref 32–36)
MCV RBC AUTO: 89 FL (ref 82–98)
MONOCYTES # BLD AUTO: 0.6 K/UL (ref 0.3–1)
MONOCYTES NFR BLD: 8.2 % (ref 4–15)
NEUTROPHILS # BLD AUTO: 4.1 K/UL (ref 1.8–7.7)
NEUTROPHILS NFR BLD: 54.1 % (ref 38–73)
NRBC BLD-RTO: 0 /100 WBC
PLATELET # BLD AUTO: 224 K/UL (ref 150–350)
PMV BLD AUTO: 10.7 FL (ref 9.2–12.9)
POTASSIUM SERPL-SCNC: 3.3 MMOL/L (ref 3.5–5.1)
PROT SERPL-MCNC: 5.9 G/DL (ref 6–8.4)
RBC # BLD AUTO: 3.85 M/UL (ref 4–5.4)
SODIUM SERPL-SCNC: 139 MMOL/L (ref 136–145)
WBC # BLD AUTO: 7.59 K/UL (ref 3.9–12.7)

## 2020-09-26 PROCEDURE — C9113 INJ PANTOPRAZOLE SODIUM, VIA: HCPCS | Performed by: FAMILY MEDICINE

## 2020-09-26 PROCEDURE — 25000003 PHARM REV CODE 250: Performed by: FAMILY MEDICINE

## 2020-09-26 PROCEDURE — 63600175 PHARM REV CODE 636 W HCPCS: Performed by: FAMILY MEDICINE

## 2020-09-26 PROCEDURE — 36415 COLL VENOUS BLD VENIPUNCTURE: CPT

## 2020-09-26 PROCEDURE — 99232 SBSQ HOSP IP/OBS MODERATE 35: CPT | Mod: 24,,, | Performed by: SURGERY

## 2020-09-26 PROCEDURE — 85025 COMPLETE CBC W/AUTO DIFF WBC: CPT

## 2020-09-26 PROCEDURE — 63600175 PHARM REV CODE 636 W HCPCS: Performed by: HOSPITALIST

## 2020-09-26 PROCEDURE — 11000001 HC ACUTE MED/SURG PRIVATE ROOM

## 2020-09-26 PROCEDURE — 99232 PR SUBSEQUENT HOSPITAL CARE,LEVL II: ICD-10-PCS | Mod: 24,,, | Performed by: SURGERY

## 2020-09-26 PROCEDURE — 99232 PR SUBSEQUENT HOSPITAL CARE,LEVL II: ICD-10-PCS | Mod: ,,, | Performed by: FAMILY MEDICINE

## 2020-09-26 PROCEDURE — 63600175 PHARM REV CODE 636 W HCPCS: Performed by: SURGERY

## 2020-09-26 PROCEDURE — 80053 COMPREHEN METABOLIC PANEL: CPT

## 2020-09-26 PROCEDURE — 99232 SBSQ HOSP IP/OBS MODERATE 35: CPT | Mod: ,,, | Performed by: FAMILY MEDICINE

## 2020-09-26 RX ORDER — ENOXAPARIN SODIUM 100 MG/ML
40 INJECTION SUBCUTANEOUS EVERY 24 HOURS
Status: DISCONTINUED | OUTPATIENT
Start: 2020-09-26 | End: 2020-09-27 | Stop reason: HOSPADM

## 2020-09-26 RX ADMIN — SUCRALFATE 1 G: 1 SUSPENSION ORAL at 05:09

## 2020-09-26 RX ADMIN — SODIUM CHLORIDE: 0.9 INJECTION, SOLUTION INTRAVENOUS at 02:09

## 2020-09-26 RX ADMIN — SUCRALFATE 1 G: 1 SUSPENSION ORAL at 10:09

## 2020-09-26 RX ADMIN — ENOXAPARIN SODIUM 40 MG: 40 INJECTION SUBCUTANEOUS at 04:09

## 2020-09-26 RX ADMIN — GABAPENTIN 300 MG: 300 CAPSULE ORAL at 04:09

## 2020-09-26 RX ADMIN — SUCRALFATE 1 G: 1 SUSPENSION ORAL at 08:09

## 2020-09-26 RX ADMIN — SODIUM CHLORIDE: 0.9 INJECTION, SOLUTION INTRAVENOUS at 10:09

## 2020-09-26 RX ADMIN — ACETAMINOPHEN 325 MG: 325 TABLET ORAL at 04:09

## 2020-09-26 RX ADMIN — PANTOPRAZOLE SODIUM 40 MG: 40 INJECTION, POWDER, FOR SOLUTION INTRAVENOUS at 09:09

## 2020-09-26 RX ADMIN — GABAPENTIN 300 MG: 300 CAPSULE ORAL at 08:09

## 2020-09-26 RX ADMIN — SUCRALFATE 1 G: 1 SUSPENSION ORAL at 04:09

## 2020-09-26 RX ADMIN — GABAPENTIN 300 MG: 300 CAPSULE ORAL at 09:09

## 2020-09-26 RX ADMIN — MORPHINE SULFATE 4 MG: 4 INJECTION, SOLUTION INTRAMUSCULAR; INTRAVENOUS at 05:09

## 2020-09-26 NOTE — PROGRESS NOTES
[facility] Hospital Medicine  Progress Note    Patient Name: Pham Licea  MRN: [id number]  Patient Class: IP- Inpatient   Admission Date: 9/23/2020  Length of Stay: 3 days  Attending Physician: Eloise Coombs MD  Primary Care Provider: Deven Emerson MD        Subjective:     Principal Problem:SBO (small bowel obstruction)        HPI:  Patient is a 65-year-old female with past medical history of hepatic steatosis, osteoarthritis, GERD who presents to the ER with a several hours history of severe abdominal pain.  Three weeks ago patient had a laparoscopic cholecystectomy with unremarkable/routine postop recovery.  She was discharged home and was tolerating p.o. intake well.  Had follow-up with general surgery a few days ago in continue to do well until this morning when she developed severe abdominal pain in started to have nausea and vomiting.  The pain became so severe that she came to the ER.  In the ER, CT abdomen/pelvis showed a high-grade small-bowel obstruction.  General surgery notified and hospital team called for admission.  Appreciate the opportunity to be involved in this case.    Overview/Hospital Course:  09/24/2020  Patient admitted and started on IV fluids, IV morphine for pain control.  NG tube placed.  275 cc output.  One episode of emesis since admission.  Will obtain x-ray abdomen flat and erect today.  Following recommendations of General surgery.  Appreciate their involving us in this case.    09/25/2020  Patient with overall improvement in symptoms.  NG with 650 cc output.  Abdominal pain is improving.  No further episodes of nausea or emesis.  Following recommendations of General surgery.    09/26/2020  Continuing improvement in symptoms.  Abdominal pain has improved.  Reports no nausea or vomiting.  Patient is ready to have the NG tube removed.        Review of Systems   Constitutional: Negative for fatigue and fever.   Respiratory: Negative  for shortness of breath.    Cardiovascular: Negative for chest pain.   Gastrointestinal: Negative for abdominal pain, constipation, nausea and vomiting.   Endocrine: Negative.    Genitourinary: Negative.    Musculoskeletal: Negative.    Skin: Negative.    Neurological: Negative.    Hematological: Negative.    Psychiatric/Behavioral: Negative.      Objective:     Vital Signs (Most Recent):  Temp: 97.2 °F (36.2 °C) (09/26/20 0723)  Pulse: (!) 59 (09/26/20 0723)  Resp: 16 (09/26/20 0723)  BP: 132/63 (09/26/20 0723)  SpO2: (!) 94 % (09/26/20 0723) Vital Signs (24h Range):  Temp:  [97 °F (36.1 °C)-98.4 °F (36.9 °C)] 97.2 °F (36.2 °C)  Pulse:  [59-84] 59  Resp:  [16-18] 16  SpO2:  [94 %-97 %] 94 %  BP: (124-143)/(63-82) 132/63     Weight: 88.8 kg (195 lb 12.3 oz)  Body mass index is 34.68 kg/m².    Intake/Output Summary (Last 24 hours) at 9/26/2020 0938  Last data filed at 9/26/2020 0522  Gross per 24 hour   Intake 3709 ml   Output 5450 ml   Net -1741 ml      Physical Exam  Vitals signs and nursing note reviewed.   Constitutional:       General: She is not in acute distress.     Appearance: Normal appearance. She is well-developed. She is not ill-appearing, toxic-appearing or diaphoretic.      Comments: Appears uncomfortable but in no acute distress   HENT:      Head: Normocephalic and atraumatic.      Right Ear: External ear normal.      Left Ear: External ear normal.      Nose: Nose normal.      Comments: NG tube in place     Mouth/Throat:      Mouth: Mucous membranes are moist.   Eyes:      General:         Right eye: No discharge.         Left eye: No discharge.      Conjunctiva/sclera: Conjunctivae normal.   Cardiovascular:      Rate and Rhythm: Normal rate and regular rhythm.      Pulses: Normal pulses.      Heart sounds: Normal heart sounds. No murmur. No gallop.    Pulmonary:      Effort: Pulmonary effort is normal. No respiratory distress.      Breath sounds: Normal breath sounds. No wheezing or rales.    Abdominal:      General: There is no distension.      Palpations: Abdomen is soft.      Tenderness: There is abdominal tenderness (Midepigastric and left upper quadrant tenderness to palpation).      Comments: Bowel sounds present but hypoactive   Musculoskeletal:      Right lower leg: No edema.      Left lower leg: No edema.   Skin:     General: Skin is warm and dry.   Neurological:      Mental Status: She is alert and oriented to person, place, and time. Mental status is at baseline.   Psychiatric:         Mood and Affect: Mood normal.         Behavior: Behavior normal.         Thought Content: Thought content normal.         Judgment: Judgment normal.         Significant Labs:   CBC:   Recent Labs   Lab 09/25/20  0556 09/26/20  0642   WBC 7.24 7.59   HGB 9.7* 10.6*   HCT 32.1* 34.1*    224     CMP:   Recent Labs   Lab 09/25/20  0556 09/26/20  0643    139   K 3.6 3.3*    105   CO2 25 23   GLU 80 73   BUN 10 8   CREATININE 0.6 0.5   CALCIUM 8.2* 8.5*   PROT 5.3* 5.9*   ALBUMIN 3.1* 3.6   BILITOT 0.6 0.8   ALKPHOS 63 66   AST 28 20   ALT 33 29   ANIONGAP 10 11   EGFRNONAA >60.0 >60.0       Significant Imaging: I have reviewed all pertinent imaging results/findings within the past 24 hours.      Assessment/Plan:      * SBO (small bowel obstruction)  Admit and consult General surgery, follow up recommendations  NG tube placed  IV fluids at 125 mL/hour  NPO  IV morphine prn pain    09/24/2020  NG with 275 cc output  One episode of emesis  Continue IV fluids  Following recommendations of General surgery  X-ray flat and erect today  Continue NPO and IV morphine p.r.n. pain    09/25/2020  NG with 650 cc output  No further episodes of nausea or emesis  Abdominal pain is improving  Continue IV fluids  Following recommendations of General surgery, appreciate their involving us in this case.  Planning for possible repeat CT in the next 24-48 hours.    09/26/2020  Abdominal pain is improving, no further  episodes of nausea or emesis  Continue IV fluid  Follow-up general surgery recommendations.  Possibly repeat CT scan today and removal of NG tube          Gastroesophageal reflux disease  Continue Protonix        VTE Risk Mitigation (From admission, onward)         Ordered     IP VTE HIGH RISK PATIENT  Once      09/23/20 1935     Place sequential compression device  Until discontinued      09/23/20 1935                Discharge Planning   GABBY:      Code Status: Full Code   Is the patient medically ready for discharge?:     Reason for patient still in hospital (select all that apply): Patient trending condition, Treatment and Consult recommendations  Discharge Plan A: Home with family                  Andrey Wahl MD  Department of Hospital Medicine   [facility]

## 2020-09-26 NOTE — PROGRESS NOTES
[facility]  General Surgery  Daily Note    Patient Name: Pham Licea  MRN: [id number]  Admission Date: 9/23/2020  Attending Physician: Eloise Coombs MD   Consult Physician: Misael Holm MD  Primary Care Provider: Deven Emerson MD    Subjective:     Principle Problem: SBO (small bowel obstruction)    Last 24 hour history:  9/26/2020  Afebrile.  Vital signs stable.  NG tube decreasing output per nursing staff.  Documented as 1000 cc.  Patient hungry.  No new abdominal cramps.  Passing a lot of flatus per her account.  Multiple bowel movements.  Desires for NG tube removal.  Patient instructed on the possibilities of having to be replaced.  She voiced understanding.  No other new complaints or issues.    Objective:     Vital Signs (Most Recent):  Temp: 97.2 °F (36.2 °C) (09/26/20 0723)  Pulse: (!) 59 (09/26/20 0723)  Resp: 16 (09/26/20 0723)  BP: 132/63 (09/26/20 0723)  SpO2: (!) 94 % (09/26/20 0723) Vital Signs (24h Range):  Temp:  [97 °F (36.1 °C)-98.4 °F (36.9 °C)] 97.2 °F (36.2 °C)  Pulse:  [59-84] 59  Resp:  [16-18] 16  SpO2:  [94 %-97 %] 94 %  BP: (124-143)/(63-82) 132/63     Intake/Output last 24 hours:    Intake/Output Summary (Last 24 hours) at 9/26/2020 1014  Last data filed at 9/26/2020 0900  Gross per 24 hour   Intake 3709 ml   Output 5550 ml   Net -1841 ml       I/O last 3 completed shifts:  In: 3709 [I.V.:3549; NG/GT:160]  Out: 5801 [Urine:4351; Drains:1450]  I/O this shift:  In: -   Out: 600 [Urine:600]    Weight: 88.8 kg (195 lb 12.3 oz)  Body mass index is 34.68 kg/m².    Gen: Wd Wn female currently in NAD  Heent: Nc/At, MMM  Eyes: Perrl, Eomi  Cv: RRR, no  M/g/r  Lung: Non-labored breathing, clear bilaterally  Abd: Soft, non-tender, non-distended    Significant Labs:  CBC:   Recent Labs   Lab 09/26/20  0642   WBC 7.59   RBC 3.85*   HGB 10.6*   HCT 34.1*      MCV 89   MCH 27.5   MCHC 31.1*     BMP:   Recent Labs   Lab 09/26/20  0643    GLU 73      K 3.3*      CO2 23   BUN 8   CREATININE 0.5   CALCIUM 8.5*     CMP:   Recent Labs   Lab 09/26/20  0643   GLU 73   CALCIUM 8.5*   ALBUMIN 3.6   PROT 5.9*      K 3.3*   CO2 23      BUN 8   CREATININE 0.5   ALKPHOS 66   ALT 29   AST 20   BILITOT 0.8     LFTs:   Recent Labs   Lab 09/26/20  0643   ALT 29   AST 20   ALKPHOS 66   BILITOT 0.8   PROT 5.9*   ALBUMIN 3.6     Coagulation: No results for input(s): LABPROT, INR, APTT in the last 168 hours.  Specimen (12h ago, onward)    None        Recent Labs   Lab 09/23/20 1816   COLORU Yellow   SPECGRAV 1.015   PHUR >8.0*   PROTEINUA Negative   BACTERIA Rare   NITRITE Negative   LEUKOCYTESUR Trace*   UROBILINOGEN Negative       Cultures:    Microbiology Results (last 7 days)     Procedure Component Value Units Date/Time    Urine culture [[id number]] Collected: 09/23/20 1816    Order Status: Completed Specimen: Urine Updated: 09/25/20 0026     Urine Culture, Routine No growth    Narrative:      Specimen Source->Urine          Assessment:   Pham Licea is a 65 y.o. female who presents with bowel obstruction..    Active Diagnoses:    Diagnosis Date Noted POA    PRINCIPAL PROBLEM:  SBO (small bowel obstruction) [K56.609] 09/23/2020 Yes    Gastroesophageal reflux disease [K21.9] 01/09/2017 Yes    Obesity (BMI 30.0-34.9) [E66.9] 06/22/2016 Yes      Problems Resolved During this Admission:     VTE Risk Mitigation (From admission, onward)         Ordered     IP VTE HIGH RISK PATIENT  Once      09/23/20 1935     Place sequential compression device  Until discontinued      09/23/20 1935                Medical Decision Making/Plan:  Resolving obstruction.  Discontinue NG tube per patient's wishes.  Initiate clear liquid diet, sips.  Insure patient is on DVT prophylaxis with Lovenox and TEDs.  Possible discharge tomorrow pending patient's clinical course.  If any change in course, consider CT scan abdomen pelvis with p.o. and IV contrast.   Surgery will follow.    Misael Holm MD  General Surgery  [facility]

## 2020-09-26 NOTE — SUBJECTIVE & OBJECTIVE
Review of Systems   Constitutional: Negative for fatigue and fever.   Respiratory: Negative for shortness of breath.    Cardiovascular: Negative for chest pain.   Gastrointestinal: Negative for abdominal pain, constipation, nausea and vomiting.   Endocrine: Negative.    Genitourinary: Negative.    Musculoskeletal: Negative.    Skin: Negative.    Neurological: Negative.    Hematological: Negative.    Psychiatric/Behavioral: Negative.      Objective:     Vital Signs (Most Recent):  Temp: 97.2 °F (36.2 °C) (09/26/20 0723)  Pulse: (!) 59 (09/26/20 0723)  Resp: 16 (09/26/20 0723)  BP: 132/63 (09/26/20 0723)  SpO2: (!) 94 % (09/26/20 0723) Vital Signs (24h Range):  Temp:  [97 °F (36.1 °C)-98.4 °F (36.9 °C)] 97.2 °F (36.2 °C)  Pulse:  [59-84] 59  Resp:  [16-18] 16  SpO2:  [94 %-97 %] 94 %  BP: (124-143)/(63-82) 132/63     Weight: 88.8 kg (195 lb 12.3 oz)  Body mass index is 34.68 kg/m².    Intake/Output Summary (Last 24 hours) at 9/26/2020 0938  Last data filed at 9/26/2020 0522  Gross per 24 hour   Intake 3709 ml   Output 5450 ml   Net -1741 ml      Physical Exam  Vitals signs and nursing note reviewed.   Constitutional:       General: She is not in acute distress.     Appearance: Normal appearance. She is well-developed. She is not ill-appearing, toxic-appearing or diaphoretic.      Comments: Appears uncomfortable but in no acute distress   HENT:      Head: Normocephalic and atraumatic.      Right Ear: External ear normal.      Left Ear: External ear normal.      Nose: Nose normal.      Comments: NG tube in place     Mouth/Throat:      Mouth: Mucous membranes are moist.   Eyes:      General:         Right eye: No discharge.         Left eye: No discharge.      Conjunctiva/sclera: Conjunctivae normal.   Cardiovascular:      Rate and Rhythm: Normal rate and regular rhythm.      Pulses: Normal pulses.      Heart sounds: Normal heart sounds. No murmur. No gallop.    Pulmonary:      Effort: Pulmonary effort is normal. No  respiratory distress.      Breath sounds: Normal breath sounds. No wheezing or rales.   Abdominal:      General: There is no distension.      Palpations: Abdomen is soft.      Tenderness: There is abdominal tenderness (Midepigastric and left upper quadrant tenderness to palpation).      Comments: Bowel sounds present but hypoactive   Musculoskeletal:      Right lower leg: No edema.      Left lower leg: No edema.   Skin:     General: Skin is warm and dry.   Neurological:      Mental Status: She is alert and oriented to person, place, and time. Mental status is at baseline.   Psychiatric:         Mood and Affect: Mood normal.         Behavior: Behavior normal.         Thought Content: Thought content normal.         Judgment: Judgment normal.         Significant Labs:   CBC:   Recent Labs   Lab 09/25/20  0556 09/26/20  0642   WBC 7.24 7.59   HGB 9.7* 10.6*   HCT 32.1* 34.1*    224     CMP:   Recent Labs   Lab 09/25/20  0556 09/26/20  0643    139   K 3.6 3.3*    105   CO2 25 23   GLU 80 73   BUN 10 8   CREATININE 0.6 0.5   CALCIUM 8.2* 8.5*   PROT 5.3* 5.9*   ALBUMIN 3.1* 3.6   BILITOT 0.6 0.8   ALKPHOS 63 66   AST 28 20   ALT 33 29   ANIONGAP 10 11   EGFRNONAA >60.0 >60.0       Significant Imaging: I have reviewed all pertinent imaging results/findings within the past 24 hours.

## 2020-09-26 NOTE — ASSESSMENT & PLAN NOTE
Admit and consult General surgery, follow up recommendations  NG tube placed  IV fluids at 125 mL/hour  NPO  IV morphine prn pain    09/24/2020  NG with 275 cc output  One episode of emesis  Continue IV fluids  Following recommendations of General surgery  X-ray flat and erect today  Continue NPO and IV morphine p.r.n. pain    09/25/2020  NG with 650 cc output  No further episodes of nausea or emesis  Abdominal pain is improving  Continue IV fluids  Following recommendations of General surgery, appreciate their involving us in this case.  Planning for possible repeat CT in the next 24-48 hours.    09/26/2020  Abdominal pain is improving, no further episodes of nausea or emesis  Continue IV fluid  Follow-up general surgery recommendations.  Possibly repeat CT scan today and removal of NG tube

## 2020-09-27 VITALS
SYSTOLIC BLOOD PRESSURE: 137 MMHG | DIASTOLIC BLOOD PRESSURE: 71 MMHG | TEMPERATURE: 98 F | HEART RATE: 67 BPM | BODY MASS INDEX: 34.68 KG/M2 | WEIGHT: 195.75 LBS | RESPIRATION RATE: 18 BRPM | OXYGEN SATURATION: 96 % | HEIGHT: 63 IN

## 2020-09-27 PROBLEM — E87.6 HYPOKALEMIA: Status: ACTIVE | Noted: 2020-09-27

## 2020-09-27 LAB
ALBUMIN SERPL BCP-MCNC: 3.5 G/DL (ref 3.5–5.2)
ALP SERPL-CCNC: 63 U/L (ref 55–135)
ALT SERPL W/O P-5'-P-CCNC: 25 U/L (ref 10–44)
ANION GAP SERPL CALC-SCNC: 10 MMOL/L (ref 8–16)
AST SERPL-CCNC: 17 U/L (ref 10–40)
BASOPHILS # BLD AUTO: 0.05 K/UL (ref 0–0.2)
BASOPHILS NFR BLD: 0.8 % (ref 0–1.9)
BILIRUB SERPL-MCNC: 0.6 MG/DL (ref 0.1–1)
BUN SERPL-MCNC: 5 MG/DL (ref 8–23)
CALCIUM SERPL-MCNC: 8.6 MG/DL (ref 8.7–10.5)
CHLORIDE SERPL-SCNC: 107 MMOL/L (ref 95–110)
CO2 SERPL-SCNC: 24 MMOL/L (ref 23–29)
CREAT SERPL-MCNC: 0.4 MG/DL (ref 0.5–1.4)
DIFFERENTIAL METHOD: ABNORMAL
EOSINOPHIL # BLD AUTO: 0.4 K/UL (ref 0–0.5)
EOSINOPHIL NFR BLD: 6.8 % (ref 0–8)
ERYTHROCYTE [DISTWIDTH] IN BLOOD BY AUTOMATED COUNT: 13.2 % (ref 11.5–14.5)
EST. GFR  (AFRICAN AMERICAN): >60 ML/MIN/1.73 M^2
EST. GFR  (NON AFRICAN AMERICAN): >60 ML/MIN/1.73 M^2
GLUCOSE SERPL-MCNC: 91 MG/DL (ref 70–110)
HCT VFR BLD AUTO: 33.2 % (ref 37–48.5)
HGB BLD-MCNC: 10.8 G/DL (ref 12–16)
IMM GRANULOCYTES # BLD AUTO: 0.01 K/UL (ref 0–0.04)
IMM GRANULOCYTES NFR BLD AUTO: 0.2 % (ref 0–0.5)
LYMPHOCYTES # BLD AUTO: 2.4 K/UL (ref 1–4.8)
LYMPHOCYTES NFR BLD: 40 % (ref 18–48)
MCH RBC QN AUTO: 27.9 PG (ref 27–31)
MCHC RBC AUTO-ENTMCNC: 32.5 G/DL (ref 32–36)
MCV RBC AUTO: 86 FL (ref 82–98)
MONOCYTES # BLD AUTO: 0.5 K/UL (ref 0.3–1)
MONOCYTES NFR BLD: 9.2 % (ref 4–15)
NEUTROPHILS # BLD AUTO: 2.5 K/UL (ref 1.8–7.7)
NEUTROPHILS NFR BLD: 43 % (ref 38–73)
NRBC BLD-RTO: 0 /100 WBC
PLATELET # BLD AUTO: 242 K/UL (ref 150–350)
PMV BLD AUTO: 10.8 FL (ref 9.2–12.9)
POTASSIUM SERPL-SCNC: 3.1 MMOL/L (ref 3.5–5.1)
PROT SERPL-MCNC: 5.9 G/DL (ref 6–8.4)
RBC # BLD AUTO: 3.87 M/UL (ref 4–5.4)
SODIUM SERPL-SCNC: 141 MMOL/L (ref 136–145)
WBC # BLD AUTO: 5.9 K/UL (ref 3.9–12.7)

## 2020-09-27 PROCEDURE — 99232 SBSQ HOSP IP/OBS MODERATE 35: CPT | Mod: 24,,, | Performed by: SURGERY

## 2020-09-27 PROCEDURE — 25000003 PHARM REV CODE 250: Performed by: INTERNAL MEDICINE

## 2020-09-27 PROCEDURE — C9113 INJ PANTOPRAZOLE SODIUM, VIA: HCPCS | Performed by: FAMILY MEDICINE

## 2020-09-27 PROCEDURE — 99232 PR SUBSEQUENT HOSPITAL CARE,LEVL II: ICD-10-PCS | Mod: 24,,, | Performed by: SURGERY

## 2020-09-27 PROCEDURE — 80053 COMPREHEN METABOLIC PANEL: CPT

## 2020-09-27 PROCEDURE — 36415 COLL VENOUS BLD VENIPUNCTURE: CPT

## 2020-09-27 PROCEDURE — 85025 COMPLETE CBC W/AUTO DIFF WBC: CPT

## 2020-09-27 PROCEDURE — 25000003 PHARM REV CODE 250: Performed by: FAMILY MEDICINE

## 2020-09-27 PROCEDURE — 63600175 PHARM REV CODE 636 W HCPCS: Performed by: FAMILY MEDICINE

## 2020-09-27 RX ORDER — POTASSIUM CHLORIDE 7.45 MG/ML
10 INJECTION INTRAVENOUS
Status: DISCONTINUED | OUTPATIENT
Start: 2020-09-27 | End: 2020-09-27

## 2020-09-27 RX ORDER — POTASSIUM CHLORIDE 20 MEQ/1
40 TABLET, EXTENDED RELEASE ORAL ONCE
Status: COMPLETED | OUTPATIENT
Start: 2020-09-27 | End: 2020-09-27

## 2020-09-27 RX ADMIN — GABAPENTIN 300 MG: 300 CAPSULE ORAL at 08:09

## 2020-09-27 RX ADMIN — POTASSIUM CHLORIDE 40 MEQ: 1500 TABLET, EXTENDED RELEASE ORAL at 08:09

## 2020-09-27 RX ADMIN — SUCRALFATE 1 G: 1 SUSPENSION ORAL at 06:09

## 2020-09-27 RX ADMIN — PANTOPRAZOLE SODIUM 40 MG: 40 INJECTION, POWDER, FOR SOLUTION INTRAVENOUS at 08:09

## 2020-09-27 NOTE — DISCHARGE SUMMARY
[facility] Hospital Medicine  Discharge Summary      Patient Name: Pham Licea  MRN: [id number]  Admission Date: 9/23/2020  Hospital Length of Stay: 4 days  Discharge Date and Time:  09/27/2020 11:06 AM  Attending Physician: Mya Ackerman MD   Discharging Provider: Gui Morgan MD  Primary Care Provider: Deven Emerson MD      HPI:   Patient is a 65-year-old female with past medical history of hepatic steatosis, osteoarthritis, GERD who presents to the ER with a several hours history of severe abdominal pain.  Three weeks ago patient had a laparoscopic cholecystectomy with unremarkable/routine postop recovery.  She was discharged home and was tolerating p.o. intake well.  Had follow-up with general surgery a few days ago in continue to do well until this morning when she developed severe abdominal pain in started to have nausea and vomiting.  The pain became so severe that she came to the ER.  In the ER, CT abdomen/pelvis showed a high-grade small-bowel obstruction.  General surgery notified and hospital team called for admission.  Appreciate the opportunity to be involved in this case.    * No surgery found *      Hospital Course:   Was admitted to medicine-surgery floor [facility].  Patient was provided supportive care with IV fluid hydration, intravenous antiemetics and narcotics.  NG tube was placed with low intermittent suctioning.  Patient's symptoms improved and small-bowel obstruction resolved.  NG tube was discontinued.  Patient is tolerating diet well.  Patient has been cleared for discharge by Dr. Douglas.  Patient is expected to follow-up with him in 2 weeks and will undergo EGD and colonoscopy for further management.    Consults:   Consults (From admission, onward)        Status Ordering Provider     Inpatient consult to General Surgery  Once     Provider:  Govind Frey MD    Completed MYA ACKERMAN        Final Active Diagnoses:    Diagnosis  Date Noted POA    PRINCIPAL PROBLEM:  SBO (small bowel obstruction) [K56.609] 09/23/2020 Yes    Hypokalemia [E87.6] 09/27/2020 No    Gastroesophageal reflux disease [K21.9] 01/09/2017 Yes    Obesity (BMI 30.0-34.9) [E66.9] 06/22/2016 Yes      Problems Resolved During this Admission:       Discharged Condition: good    Disposition: Home or Self Care    Follow Up:  Follow-up Information     Deven Emerson MD In 1 week.    Specialty: Family Medicine  Contact information:  [address] [facility] [place] [address]  [phone]             Misael Holm MD In 2 weeks.    Specialty: General Surgery  Contact information:  [address] [facility] [address]  [phone]                 Patient Instructions:      Diet Adult Regular     Other restrictions (specify):   Order Comments: PLEASE OBSERVE FALL PRECAUTIONS     Call MD for:   Order Comments: For worsening symptoms, chest pain, shortness of breath, increased abdominal pain, high grade fever, stroke or stroke like symptoms, immediately go to the nearest Emergency Room or call 911 as soon as possible.       Significant Diagnostic Studies: Labs:   CMP   Recent Labs   Lab 09/26/20  0643 09/27/20  0713    141   K 3.3* 3.1*    107   CO2 23 24   GLU 73 91   BUN 8 5*   CREATININE 0.5 0.4*   CALCIUM 8.5* 8.6*   PROT 5.9* 5.9*   ALBUMIN 3.6 3.5   BILITOT 0.8 0.6   ALKPHOS 66 63   AST 20 17   ALT 29 25   ANIONGAP 11 10   ESTGFRAFRICA >60.0 >60.0   EGFRNONAA >60.0 >60.0    and CBC   Recent Labs   Lab 09/26/20  0642 09/27/20  0713   WBC 7.59 5.90   HGB 10.6* 10.8*   HCT 34.1* 33.2*    242       Pending Diagnostic Studies:     None         Medications:  Reconciled Home Medications:      Medication List      CHANGE how you take these medications    gabapentin 300 MG capsule  Commonly known as: NEURONTIN  TAKE 1 CAPSULE BY MOUTH IN THE MORNING , 1 CAPSULE 8 HOURS LATER AND 2 CAPSULES DAILY AT BEDTIME  What changed: See the new  instructions.        CONTINUE taking these medications    acetaminophen 325 MG tablet  Commonly known as: TYLENOL  Take 325 mg by mouth every 6 (six) hours as needed for Pain.     albuterol 90 mcg/actuation inhaler  Commonly known as: PROVENTIL/VENTOLIN HFA  Inhale 2 puffs into the lungs every 6 (six) hours as needed for Wheezing or Shortness of Breath. Rescue     diclofenac sodium 1 % Gel  Commonly known as: VOLTAREN  APPLY AS DIRECTED     HYDROcodone-acetaminophen 5-325 mg per tablet  Commonly known as: NORCO  Take 1 tablet by mouth every 6 (six) hours as needed for Pain.     meloxicam 7.5 MG tablet  Commonly known as: MOBIC  Take 2 tablets (15 mg total) by mouth once daily.     ondansetron 4 MG tablet  Commonly known as: ZOFRAN  Take 1 tablet (4 mg total) by mouth every 6 (six) hours as needed for Nausea.     ondansetron 8 MG Tbdl  Commonly known as: ZOFRAN-ODT  Take 1 tablet (8 mg total) by mouth every 6 (six) hours as needed (nausea or vomiting).     pantoprazole 20 MG tablet  Commonly known as: PROTONIX  Take 1 tablet (20 mg total) by mouth once daily.     predniSONE 20 MG tablet  Commonly known as: DELTASONE  Take 1 tablet (20 mg total) by mouth once daily.     sucralfate 1 gram tablet  Commonly known as: CARAFATE  Take 1 tablet (1 g total) by mouth 4 (four) times daily.            Indwelling Lines/Drains at time of discharge:   Lines/Drains/Airways     Drain                 NG/OG Tube 09/23/20 0442 Crawford sump 16 Fr. Right nostril 3 days                Time spent on the discharge of patient: 31 minutes  Patient was seen and examined on the date of discharge and determined to be suitable for discharge.         Gui Morgan MD  Department of Hospital Medicine  [facility]

## 2020-09-27 NOTE — PROGRESS NOTES
[facility] - Med Surg  General Surgery  Daily Note    Patient Name: Pham Licea  MRN: [id number]  Admission Date: 9/23/2020  Attending Physician: Eloise Coombs MD   Consult Physician: Misael Holm MD  Primary Care Provider: Deven Emerson MD    Subjective:     Principle Problem: SBO (small bowel obstruction)    Last 24 hour history:  9/26/2020  Afebrile.  Vital signs stable.  NG tube decreasing output per nursing staff.  Documented as 1000 cc.  Patient hungry.  No new abdominal cramps.  Passing a lot of flatus per her account.  Multiple bowel movements.  Desires for NG tube removal.  Patient instructed on the possibilities of having to be replaced.  She voiced understanding.  No other new complaints or issues.    9/27/2020  Afebrile.  VSS.  Tolerating liquids.  Hungry.  No nausea or vomiting.  Desires discharge.  No abdominal cramps.  Having bowel function.  No other new issues or complaints.    Objective:     Vital Signs (Most Recent):  Temp: 97.4 °F (36.3 °C) (09/27/20 0715)  Pulse: (!) 56 (09/27/20 0715)  Resp: 18 (09/27/20 0715)  BP: 129/62 (09/27/20 0715)  SpO2: 97 % (09/27/20 0715) Vital Signs (24h Range):  Temp:  [96.6 °F (35.9 °C)-98.4 °F (36.9 °C)] 97.4 °F (36.3 °C)  Pulse:  [55-62] 56  Resp:  [17-18] 18  SpO2:  [93 %-97 %] 97 %  BP: (115-143)/(60-67) 129/62     Intake/Output last 24 hours:    Intake/Output Summary (Last 24 hours) at 9/27/2020 1018  Last data filed at 9/27/2020 0500  Gross per 24 hour   Intake 4709.5 ml   Output 1000 ml   Net 3709.5 ml       I/O last 3 completed shifts:  In: 6729.5 [P.O.:1800; I.V.:4809.5; NG/GT:120]  Out: 3850 [Urine:3400; Drains:450]  No intake/output data recorded.    Weight: 88.8 kg (195 lb 12.3 oz)  Body mass index is 34.68 kg/m².    Gen: Wd Wn female currently in NAD  Heent: Nc/At, MMM  Eyes: Perrl, Eomi  Cv: RRR, no  M/g/r  Lung: Non-labored breathing, clear bilaterally  Abd: Soft, non-tender,  non-distended    Significant Labs:  CBC:   Recent Labs   Lab 09/27/20  0713   WBC 5.90   RBC 3.87*   HGB 10.8*   HCT 33.2*      MCV 86   MCH 27.9   MCHC 32.5     BMP:   Recent Labs   Lab 09/27/20  0713   GLU 91      K 3.1*      CO2 24   BUN 5*   CREATININE 0.4*   CALCIUM 8.6*     CMP:   Recent Labs   Lab 09/27/20  0713   GLU 91   CALCIUM 8.6*   ALBUMIN 3.5   PROT 5.9*      K 3.1*   CO2 24      BUN 5*   CREATININE 0.4*   ALKPHOS 63   ALT 25   AST 17   BILITOT 0.6     LFTs:   Recent Labs   Lab 09/27/20  0713   ALT 25   AST 17   ALKPHOS 63   BILITOT 0.6   PROT 5.9*   ALBUMIN 3.5     Coagulation: No results for input(s): LABPROT, INR, APTT in the last 168 hours.  Specimen (12h ago, onward)    None        Recent Labs   Lab 09/23/20 1816   COLORU Yellow   SPECGRAV 1.015   PHUR >8.0*   PROTEINUA Negative   BACTERIA Rare   NITRITE Negative   LEUKOCYTESUR Trace*   UROBILINOGEN Negative       Cultures:    Microbiology Results (last 7 days)     Procedure Component Value Units Date/Time    Urine culture [[id number]] Collected: 09/23/20 1816    Order Status: Completed Specimen: Urine Updated: 09/25/20 0026     Urine Culture, Routine No growth    Narrative:      Specimen Source->Urine          Assessment:   Pham Licea is a 65 y.o. female who presents with bowel obstruction..    Active Diagnoses:    Diagnosis Date Noted POA    PRINCIPAL PROBLEM:  SBO (small bowel obstruction) [K56.609] 09/23/2020 Yes    Hypokalemia [E87.6] 09/27/2020 No    Gastroesophageal reflux disease [K21.9] 01/09/2017 Yes    Obesity (BMI 30.0-34.9) [E66.9] 06/22/2016 Yes      Problems Resolved During this Admission:     VTE Risk Mitigation (From admission, onward)         Ordered     enoxaparin injection 40 mg  Every 24 hours      09/26/20 1015     IP VTE HIGH RISK PATIENT  Once      09/23/20 1935     Place sequential compression device  Until discontinued      09/23/20 1935                Medical Decision  Making/Plan:  Resolved obstruction.  Advance diet as tolerated.  Discharge per patient wishes.  F/u with surgery in 2 weeks for scheduling of EGD/Colonoscopy.    Misael Holm MD  General Surgery  [facility] - Med [facility]urg

## 2020-09-27 NOTE — SUBJECTIVE & OBJECTIVE
09/27/2020  Patient is in good spirits.  Anxious to go home.  Patient is tolerating clear liquid diet.  No abdominal pain reported.  Patient denies any nausea or vomiting.  Reports frequent flatus and had a bowel movement.  Patient is getting out of bed without difficulties.    Review of Systems   Constitutional: Negative for fatigue and fever.   Respiratory: Negative for shortness of breath.    Cardiovascular: Negative for chest pain.   Gastrointestinal: Negative for abdominal pain, constipation, nausea and vomiting.   Endocrine: Negative.    Genitourinary: Negative.    Musculoskeletal: Negative.    Skin: Negative.    Neurological: Negative.    Hematological: Negative.    Psychiatric/Behavioral: Negative.      Objective:     Vital Signs (Most Recent):  Temp: 97.4 °F (36.3 °C) (09/27/20 0715)  Pulse: (!) 56 (09/27/20 0715)  Resp: 18 (09/27/20 0715)  BP: 129/62 (09/27/20 0715)  SpO2: 97 % (09/27/20 0715) Vital Signs (24h Range):  Temp:  [96.6 °F (35.9 °C)-98.4 °F (36.9 °C)] 97.4 °F (36.3 °C)  Pulse:  [55-62] 56  Resp:  [17-18] 18  SpO2:  [93 %-97 %] 97 %  BP: (115-143)/(60-67) 129/62     Weight: 88.8 kg (195 lb 12.3 oz)  Body mass index is 34.68 kg/m².    Intake/Output Summary (Last 24 hours) at 9/27/2020 0744  Last data filed at 9/27/2020 0500  Gross per 24 hour   Intake 4829.5 ml   Output 1600 ml   Net 3229.5 ml      Physical Exam  Vitals signs and nursing note reviewed.   Constitutional:       General: She is not in acute distress.     Appearance: Normal appearance. She is well-developed. She is not ill-appearing, toxic-appearing or diaphoretic.   HENT:      Head: Normocephalic and atraumatic.      Right Ear: External ear normal.      Left Ear: External ear normal.      Nose: Nose normal.      Mouth/Throat:      Mouth: Mucous membranes are moist.   Eyes:      General:         Right eye: No discharge.         Left eye: No discharge.      Conjunctiva/sclera: Conjunctivae normal.   Cardiovascular:      Rate and  Rhythm: Normal rate and regular rhythm.      Pulses: Normal pulses.      Heart sounds: Normal heart sounds. No murmur. No gallop.    Pulmonary:      Effort: Pulmonary effort is normal. No respiratory distress.      Breath sounds: Normal breath sounds. No wheezing or rales.   Abdominal:      General: There is no distension.      Palpations: Abdomen is soft.      Tenderness: There is abdominal tenderness (Midepigastric and left upper quadrant tenderness to palpation).   Musculoskeletal:      Right lower leg: No edema.      Left lower leg: No edema.   Skin:     General: Skin is warm and dry.   Neurological:      Mental Status: She is alert and oriented to person, place, and time. Mental status is at baseline.   Psychiatric:         Mood and Affect: Mood normal.         Behavior: Behavior normal.         Thought Content: Thought content normal.         Judgment: Judgment normal.         Significant Labs:   CBC:   Recent Labs   Lab 09/26/20  0642   WBC 7.59   HGB 10.6*   HCT 34.1*        CMP:   Recent Labs   Lab 09/26/20  0643      K 3.3*      CO2 23   GLU 73   BUN 8   CREATININE 0.5   CALCIUM 8.5*   PROT 5.9*   ALBUMIN 3.6   BILITOT 0.8   ALKPHOS 66   AST 20   ALT 29   ANIONGAP 11   EGFRNONAA >60.0       Significant Imaging: I have reviewed all pertinent imaging results/findings within the past 24 hours.

## 2020-09-27 NOTE — ASSESSMENT & PLAN NOTE
Admit and consult General surgery, follow up recommendations  NG tube placed  IV fluids at 125 mL/hour  NPO  IV morphine prn pain    09/24/2020  NG with 275 cc output  One episode of emesis  Continue IV fluids  Following recommendations of General surgery  X-ray flat and erect today  Continue NPO and IV morphine p.r.n. pain    09/25/2020  NG with 650 cc output  No further episodes of nausea or emesis  Abdominal pain is improving  Continue IV fluids  Following recommendations of General surgery, appreciate their involving us in this case.  Planning for possible repeat CT in the next 24-48 hours.    09/26/2020  Abdominal pain is improving, no further episodes of nausea or emesis  Continue IV fluid  Follow-up general surgery recommendations.  Possibly repeat CT scan today and removal of NG tube    09/27/2020  Patient denies any abdominal pain today.  No nausea and vomiting.  Continue IV fluid  Follow-up general surgery recommendations.  Diet advancement as per general surgeon.

## 2020-09-27 NOTE — NURSING
Discharge instructions provided and reviewed with pt. Diet reviewed. Pt voiced understanding. Awaiting transportation. Pt tolerated full liquid for lunch.

## 2020-09-27 NOTE — PROGRESS NOTES
[facility] - [facility] Hospital Medicine  Progress Note    Patient Name: Pham Licea  MRN: [id number]  Patient Class: IP- Inpatient   Admission Date: 9/23/2020  Length of Stay: 4 days  Attending Physician: Eloise Coombs MD  Primary Care Provider: Deven Emerson MD        Subjective:     Principal Problem:SBO (small bowel obstruction)        HPI:  Patient is a 65-year-old female with past medical history of hepatic steatosis, osteoarthritis, GERD who presents to the ER with a several hours history of severe abdominal pain.  Three weeks ago patient had a laparoscopic cholecystectomy with unremarkable/routine postop recovery.  She was discharged home and was tolerating p.o. intake well.  Had follow-up with general surgery a few days ago in continue to do well until this morning when she developed severe abdominal pain in started to have nausea and vomiting.  The pain became so severe that she came to the ER.  In the ER, CT abdomen/pelvis showed a high-grade small-bowel obstruction.  General surgery notified and hospital team called for admission.  Appreciate the opportunity to be involved in this case.    Overview/Hospital Course:  09/24/2020  Patient admitted and started on IV fluids, IV morphine for pain control.  NG tube placed.  275 cc output.  One episode of emesis since admission.  Will obtain x-ray abdomen flat and erect today.  Following recommendations of General surgery.  Appreciate their involving us in this case.    09/25/2020  Patient with overall improvement in symptoms.  NG with 650 cc output.  Abdominal pain is improving.  No further episodes of nausea or emesis.  Following recommendations of General surgery.    09/26/2020  Continuing improvement in symptoms.  Abdominal pain has improved.  Reports no nausea or vomiting.  Patient is ready to have the NG tube removed.    09/27/2020  Patient is in good spirits.  Anxious to go home.  Patient is tolerating clear liquid  diet.  No abdominal pain reported.  Patient denies any nausea or vomiting.  Reports frequent flatus and had a bowel movement.  Patient is getting out of bed without difficulties.    Review of Systems   Constitutional: Negative for fatigue and fever.   Respiratory: Negative for shortness of breath.    Cardiovascular: Negative for chest pain.   Gastrointestinal: Negative for abdominal pain, constipation, nausea and vomiting.   Endocrine: Negative.    Genitourinary: Negative.    Musculoskeletal: Negative.    Skin: Negative.    Neurological: Negative.    Hematological: Negative.    Psychiatric/Behavioral: Negative.      Objective:     Vital Signs (Most Recent):  Temp: 97.4 °F (36.3 °C) (09/27/20 0715)  Pulse: (!) 56 (09/27/20 0715)  Resp: 18 (09/27/20 0715)  BP: 129/62 (09/27/20 0715)  SpO2: 97 % (09/27/20 0715) Vital Signs (24h Range):  Temp:  [96.6 °F (35.9 °C)-98.4 °F (36.9 °C)] 97.4 °F (36.3 °C)  Pulse:  [55-62] 56  Resp:  [17-18] 18  SpO2:  [93 %-97 %] 97 %  BP: (115-143)/(60-67) 129/62     Weight: 88.8 kg (195 lb 12.3 oz)  Body mass index is 34.68 kg/m².    Intake/Output Summary (Last 24 hours) at 9/27/2020 0744  Last data filed at 9/27/2020 0500  Gross per 24 hour   Intake 4829.5 ml   Output 1600 ml   Net 3229.5 ml      Physical Exam  Vitals signs and nursing note reviewed.   Constitutional:       General: She is not in acute distress.     Appearance: Normal appearance. She is well-developed. She is not ill-appearing, toxic-appearing or diaphoretic.   HENT:      Head: Normocephalic and atraumatic.      Right Ear: External ear normal.      Left Ear: External ear normal.      Nose: Nose normal.      Mouth/Throat:      Mouth: Mucous membranes are moist.   Eyes:      General:         Right eye: No discharge.         Left eye: No discharge.      Conjunctiva/sclera: Conjunctivae normal.   Cardiovascular:      Rate and Rhythm: Normal rate and regular rhythm.      Pulses: Normal pulses.      Heart sounds: Normal heart  sounds. No murmur. No gallop.    Pulmonary:      Effort: Pulmonary effort is normal. No respiratory distress.      Breath sounds: Normal breath sounds. No wheezing or rales.   Abdominal:      General: There is no distension.      Palpations: Abdomen is soft.    Abdomen is nontender.  Musculoskeletal:      Right lower leg: No edema.      Left lower leg: No edema.   Skin:     General: Skin is warm and dry.   Neurological:      Mental Status: She is alert and oriented to person, place, and time. Mental status is at baseline.   Psychiatric:         Mood and Affect: Mood normal.         Behavior: Behavior normal.         Thought Content: Thought content normal.         Judgment: Judgment normal.         Significant Labs:   CBC:   Recent Labs   Lab 09/26/20  0642   WBC 7.59   HGB 10.6*   HCT 34.1*        CMP:   Recent Labs   Lab 09/26/20  0643      K 3.3*      CO2 23   GLU 73   BUN 8   CREATININE 0.5   CALCIUM 8.5*   PROT 5.9*   ALBUMIN 3.6   BILITOT 0.8   ALKPHOS 66   AST 20   ALT 29   ANIONGAP 11   EGFRNONAA >60.0       Significant Imaging: I have reviewed all pertinent imaging results/findings within the past 24 hours.      Assessment/Plan:      * SBO (small bowel obstruction)  Admit and consult General surgery, follow up recommendations  NG tube placed  IV fluids at 125 mL/hour  NPO  IV morphine prn pain    09/24/2020  NG with 275 cc output  One episode of emesis  Continue IV fluids  Following recommendations of General surgery  X-ray flat and erect today  Continue NPO and IV morphine p.r.n. pain    09/25/2020  NG with 650 cc output  No further episodes of nausea or emesis  Abdominal pain is improving  Continue IV fluids  Following recommendations of General surgery, appreciate their involving us in this case.  Planning for possible repeat CT in the next 24-48 hours.    09/26/2020  Abdominal pain is improving, no further episodes of nausea or emesis  Continue IV fluid  Follow-up general surgery  recommendations.  Possibly repeat CT scan today and removal of NG tube    09/27/2020  Patient denies any abdominal pain today.  No nausea and vomiting.  Continue IV fluid  Follow-up general surgery recommendations.  Diet advancement as per general surgeon.    Hypokalemia  Replete potassium chloride.  Follow BMP.      Gastroesophageal reflux disease  Continue Protonix        VTE Risk Mitigation (From admission, onward)         Ordered     enoxaparin injection 40 mg  Every 24 hours      09/26/20 1015     IP VTE HIGH RISK PATIENT  Once      09/23/20 1935     Place sequential compression device  Until discontinued      09/23/20 1935                Discharge Planning   GABBY:  September 27, 2020 or September 28, 2020    Code Status: Full Code   Is the patient medically ready for discharge?:     Reason for patient still in hospital (select all that apply): Consult recommendations  Discharge Plan A: Home with family                  Gui Morgan MD  Department of Hospital Medicine   [facility] - [facility]

## 2020-09-27 NOTE — NURSING
Discharge pt at this time. Assisted pt to transportation. Gait noted steady. No further needs noted. resp even and unlabored. Nad noted. Cl,rn

## 2020-09-27 NOTE — PLAN OF CARE
Problem: Adult Inpatient Plan of Care  Goal: Optimal Comfort and Wellbeing  Outcome: Ongoing, Progressing  Goal: Rounds/Family Conference  Outcome: Ongoing, Progressing     Problem: Skin Injury Risk Increased  Goal: Skin Health and Integrity  Outcome: Ongoing, Progressing     Problem: Fall Injury Risk  Goal: Absence of Fall and Fall-Related Injury  Outcome: Ongoing, Progressing     Problem: Infection (Intestinal Obstruction)  Goal: Absence of Infection Signs/Symptoms  Outcome: Ongoing, Progressing

## 2020-09-28 NOTE — PLAN OF CARE
09/28/20 0851   Final Note   Assessment Type Final Discharge Note     Hospital follow-up appointments scheduled with Dr Emerson and Dr Frey. Attempted to call patient with appointments and left information on voice mail.

## 2020-10-02 ENCOUNTER — OFFICE VISIT (OUTPATIENT)
Dept: FAMILY MEDICINE | Facility: CLINIC | Age: 65
End: 2020-10-02
Payer: COMMERCIAL

## 2020-10-02 VITALS
WEIGHT: 185 LBS | RESPIRATION RATE: 16 BRPM | BODY MASS INDEX: 32.78 KG/M2 | TEMPERATURE: 98 F | HEIGHT: 63 IN | SYSTOLIC BLOOD PRESSURE: 131 MMHG | HEART RATE: 65 BPM | OXYGEN SATURATION: 96 % | DIASTOLIC BLOOD PRESSURE: 78 MMHG

## 2020-10-02 DIAGNOSIS — Z23 NEED FOR IMMUNIZATION AGAINST INFLUENZA: ICD-10-CM

## 2020-10-02 DIAGNOSIS — Z09 HOSPITAL DISCHARGE FOLLOW-UP: Primary | ICD-10-CM

## 2020-10-02 DIAGNOSIS — Z23 NEED FOR PNEUMOCOCCAL VACCINE: ICD-10-CM

## 2020-10-02 PROCEDURE — 90472 PNEUMOCOCCAL POLYSACCHARIDE VACCINE 23-VALENT =>2YO SQ IM: ICD-10-PCS | Mod: S$GLB,,, | Performed by: FAMILY MEDICINE

## 2020-10-02 PROCEDURE — 90732 PNEUMOCOCCAL POLYSACCHARIDE VACCINE 23-VALENT =>2YO SQ IM: ICD-10-PCS | Mod: S$GLB,,, | Performed by: FAMILY MEDICINE

## 2020-10-02 PROCEDURE — 99214 OFFICE O/P EST MOD 30 MIN: CPT | Mod: 25,S$GLB,, | Performed by: FAMILY MEDICINE

## 2020-10-02 PROCEDURE — 90472 IMMUNIZATION ADMIN EACH ADD: CPT | Mod: S$GLB,,, | Performed by: FAMILY MEDICINE

## 2020-10-02 PROCEDURE — 3008F BODY MASS INDEX DOCD: CPT | Mod: CPTII,S$GLB,, | Performed by: FAMILY MEDICINE

## 2020-10-02 PROCEDURE — 3008F PR BODY MASS INDEX (BMI) DOCUMENTED: ICD-10-PCS | Mod: CPTII,S$GLB,, | Performed by: FAMILY MEDICINE

## 2020-10-02 PROCEDURE — 90471 IMMUNIZATION ADMIN: CPT | Mod: S$GLB,,, | Performed by: FAMILY MEDICINE

## 2020-10-02 PROCEDURE — 90732 PPSV23 VACC 2 YRS+ SUBQ/IM: CPT | Mod: S$GLB,,, | Performed by: FAMILY MEDICINE

## 2020-10-02 PROCEDURE — 99214 PR OFFICE/OUTPT VISIT, EST, LEVL IV, 30-39 MIN: ICD-10-PCS | Mod: 25,S$GLB,, | Performed by: FAMILY MEDICINE

## 2020-10-02 PROCEDURE — 90686 FLU VACCINE (QUAD) GREATER THAN OR EQUAL TO 3YO PRESERVATIVE FREE IM: ICD-10-PCS | Mod: S$GLB,,, | Performed by: FAMILY MEDICINE

## 2020-10-02 PROCEDURE — 90471 FLU VACCINE (QUAD) GREATER THAN OR EQUAL TO 3YO PRESERVATIVE FREE IM: ICD-10-PCS | Mod: S$GLB,,, | Performed by: FAMILY MEDICINE

## 2020-10-02 PROCEDURE — 90686 IIV4 VACC NO PRSV 0.5 ML IM: CPT | Mod: S$GLB,,, | Performed by: FAMILY MEDICINE

## 2020-10-02 NOTE — PROGRESS NOTES
[facility] - Clinic Note    Subjective      Ms. Licea is a 65 y.o. female who presents to clinic for a hospital follow up.     Patient was admitted to the hospital on  for a SBO.   Patient had a laparoscopic cholecystectomy 3 weeks prior with no complications until a few days ago patient presented to the ED with abdominal pain and emesis. She was found to have a small bowel obstruction via CT.   She was treated with supportive care while inpatient. NG tube placed.   Improved well throughout admission and tolerated PO intake.     Today she states that she is doing well.   Tolerate foods well  Denies abdominal pain or emesis.  Making BM daily. Denies diarrhea.     [facility] Pham has a past medical history of Fatty liver (), Former smoker (), Osteoarthritis (), Pulmonary nodules (2019), and Ulcer of abdomen wall ().   PSXH Pham has a past surgical history that includes Upper gastrointestinal endoscopy (); Colonoscopy (~); Epidural steroid injection into lumbar spine (N/A, 10/12/2018); Epidural steroid injection into lumbar spine (N/A, 2019);  section (, , ); Hysterectomy (); Appendectomy (); and Laparoscopic cholecystectomy (N/A, 2020).    Pham's family history includes Aneurysm in her father; Arthritis in her mother and sister; Brain Hemorrhage in her father; Heart disease in her mother; Hypertension in her father and sister; Obesity in her sister; Osteoarthritis in her sister; Osteoporosis in her mother; Ovarian cancer in her mother; Stroke (age of onset: 50) in her father.    Pham reports that she quit smoking about 10 years ago. She has a 40.00 pack-year smoking history. She has never used smokeless tobacco. She reports current alcohol use. She reports previous drug use. Drug: Marijuana.   ALG Pham has No Known Allergies.   MED Pham has a current medication list which includes the following prescription(s):  "acetaminophen, albuterol, diclofenac sodium, gabapentin, hydrocodone-acetaminophen, meloxicam, ondansetron, ondansetron, pantoprazole, prednisone, and sucralfate.     Review of Systems   Constitutional: Negative for activity change, appetite change, chills, fatigue and fever.   Eyes: Negative for visual disturbance.   Respiratory: Negative for cough and shortness of breath.    Cardiovascular: Negative for chest pain, palpitations and leg swelling.   Gastrointestinal: Negative for abdominal pain, constipation, diarrhea, nausea and vomiting.   Skin: Negative for wound.   Neurological: Negative for dizziness and headaches.   Psychiatric/Behavioral: Negative for confusion.     Objective     /78   Pulse 65   Temp 97.5 °F (36.4 °C) (Temporal)   Resp 16   Ht 5' 3" (1.6 m)   Wt 83.9 kg (185 lb)   LMP  (LMP Unknown)   SpO2 96%   BMI 32.77 kg/m²     Physical Exam   Constitutional: She appears well-developed, well-nourished and obese.  Non-toxic appearance. She does not appear ill. No distress.   HENT:   Head: Normocephalic and atraumatic.   Eyes: Right eye exhibits no discharge. Left eye exhibits no discharge.   Neck: Neck supple.   Cardiovascular: Normal rate, regular rhythm, normal heart sounds and normal pulses. Exam reveals no gallop and no friction rub.   No murmur heard.  Pulmonary/Chest: Effort normal and breath sounds normal. No respiratory distress. She has no wheezes. She has no rhonchi. She has no rales.   Abdominal: Soft. Normal appearance and bowel sounds are normal. She exhibits no distension and no mass. There is no abdominal tenderness. There is no rebound and no guarding.   Lymphadenopathy:     She has no cervical adenopathy.   Neurological: She is alert.   Skin: Skin is warm and dry. Capillary refill takes less than 2 seconds. She is not diaphoretic.   Psychiatric: Her behavior is normal. Mood, judgment and thought content normal.   Vitals reviewed.     Assessment/Plan     Pham was seen " today for hospital follow up.    Diagnoses and all orders for this visit:    Hospital discharge follow-up  -hospital records and imaging reviewed by me.     Need for immunization against influenza  -     Influenza - Quadrivalent *Preferred* (6 months+) (PF)    Need for pneumococcal vaccine  -     (In Office Administered) Pneumococcal Polysaccharide Vaccine (23 Valent) (SQ/IM)        Follow up in about 6 months (around 4/2/2021), or if symptoms worsen or fail to improve.    Future Appointments   Date Time Provider Department Center   11/6/2020  9:30 AM [facility] EKG [facility]   11/6/2020 10:00 AM COVID TESTING, [facility]   11/18/2020  9:30 AM Misael Holm MD [facility]   4/5/2021 10:40 AM Deven Emerson MD [facility]       Deven Emerson MD  Family Medicine  [facility]

## 2020-10-07 ENCOUNTER — OFFICE VISIT (OUTPATIENT)
Dept: SURGERY | Facility: CLINIC | Age: 65
End: 2020-10-07
Payer: COMMERCIAL

## 2020-10-07 VITALS
BODY MASS INDEX: 31.58 KG/M2 | HEART RATE: 76 BPM | HEIGHT: 64 IN | SYSTOLIC BLOOD PRESSURE: 132 MMHG | WEIGHT: 185 LBS | DIASTOLIC BLOOD PRESSURE: 70 MMHG

## 2020-10-07 DIAGNOSIS — Z12.11 ENCOUNTER FOR SCREENING COLONOSCOPY: Primary | ICD-10-CM

## 2020-10-07 DIAGNOSIS — Z87.11 HISTORY OF GASTRIC ULCER: ICD-10-CM

## 2020-10-07 PROCEDURE — 3008F PR BODY MASS INDEX (BMI) DOCUMENTED: ICD-10-PCS | Mod: CPTII,S$GLB,, | Performed by: SURGERY

## 2020-10-07 PROCEDURE — 3008F BODY MASS INDEX DOCD: CPT | Mod: CPTII,S$GLB,, | Performed by: SURGERY

## 2020-10-07 PROCEDURE — 99214 PR OFFICE/OUTPT VISIT, EST, LEVL IV, 30-39 MIN: ICD-10-PCS | Mod: 24,S$GLB,, | Performed by: SURGERY

## 2020-10-07 PROCEDURE — 99214 OFFICE O/P EST MOD 30 MIN: CPT | Mod: 24,S$GLB,, | Performed by: SURGERY

## 2020-10-07 RX ORDER — SODIUM CHLORIDE 9 MG/ML
INJECTION, SOLUTION INTRAVENOUS CONTINUOUS
Status: CANCELLED | OUTPATIENT
Start: 2020-10-07

## 2020-10-07 NOTE — H&P
[facility] Surgery H&P Note    Subjective:       Patient ID: Pham Licea is a 65 y.o. female.    Chief Complaint:  Follow-up small bowel obstruction.  Abdominal pain.  No recent endoscopy.  HPI:  Pham Licea is a 65 y.o. female history of fatty liver disease status post cholecystectomy, osteoarthritis former smoker pulmonary nodules previous gastric ulceration presents today as established patient surgery Clinic within the postoperative phase for different issue.  Patient was admitted recently to the hospital with a bowel obstruction.  This spontaneously resolved.  Did not require operative intervention.  Patient with no recent history of colonoscopy or EGD.  EGD in 2016 for gastric ulceration.  Treated effectively resolved.  Colonoscopy years before, unable to determine date.  No hematochezia.  Change in bowel habit with constipation now after obstructive phase.  No nausea vomiting.  No family history of colon or rectal cancers.  No unexplained weight loss.  Patient now presents today as a follow-up after bowel obstruction in need of endoscopy.    Past Medical History:   Diagnosis Date    Fatty liver     Former smoker     Osteoarthritis 2010    Pulmonary nodules 2019    Repeat CT in 6 mo    Ulcer of abdomen wall 2016     Past Surgical History:   Procedure Laterality Date    APPENDECTOMY  1993     SECTION  , , 1983    x3    COLONOSCOPY  ~    [facility]: normal findings per patient report    EPIDURAL STEROID INJECTION INTO LUMBAR SPINE N/A 10/12/2018    Procedure: Injection-steroid-epidural-lumbar;  Surgeon: Kal Johnson MD;  Location: [facility] OR;  Service: Pain Management;  Laterality: N/A;  L5-S1    EPIDURAL STEROID INJECTION INTO LUMBAR SPINE N/A 2019    Procedure: Injection-steroid-epidural-lumbar L5-S1;  Surgeon: Kal Johnson MD;  Location: [facility] OR;  Service: Pain Management;  Laterality: N/A;    HYSTERECTOMY      one ovary conserved     LAPAROSCOPIC CHOLECYSTECTOMY N/A 9/2/2020    Procedure: CHOLECYSTECTOMY, LAPAROSCOPIC;  Surgeon: Govind Frey MD;  Location[facility] OR;  Service: General;  Laterality: N/A;    UPPER GASTROINTESTINAL ENDOSCOPY  2016     Family History   Problem Relation Age of Onset    Ovarian cancer Mother     Heart disease Mother     Osteoporosis Mother     Arthritis Mother     Hypertension Father     Stroke Father 50    Brain Hemorrhage Father     Aneurysm Father     Osteoarthritis Sister     Arthritis Sister     Hypertension Sister     Obesity Sister     Breast cancer Neg Hx     Colon cancer Neg Hx     Colon polyps Neg Hx     Crohn's disease Neg Hx     Ulcerative colitis Neg Hx      Social History     Socioeconomic History    Marital status:      Spouse name: Not on file    Number of children: 4    Years of education: Not on file    Highest education level: Some college, no degree   Occupational History    Occupation: sale specialist-    Social Needs    Financial resource strain: Not very hard    Food insecurity     Worry: Never true     Inability: Never true    Transportation needs     Medical: No     Non-medical: No   Tobacco Use    Smoking status: Former Smoker     Packs/day: 1.00     Years: 40.00     Pack years: 40.00     Quit date: 11/2/2009     Years since quitting: 10.9    Smokeless tobacco: Never Used    Tobacco comment: quit 2009   Substance and Sexual Activity    Alcohol use: Yes     Frequency: Monthly or less     Drinks per session: 1 or 2     Binge frequency: Never     Comment: Not often - one glass of wine every now and then    Drug use: Not Currently     Types: Marijuana     Comment: in 1970s    Sexual activity: Not Currently   Lifestyle    Physical activity     Days per week: 0 days     Minutes per session: 0 min    Stress: Only a little   Relationships    Social connections     Talks on phone: More than three times a week     Gets together: Once a week     Attends  Shinto service: Not on file     Active member of club or organization: Yes     Attends meetings of clubs or organizations: 1 to 4 times per year     Relationship status:    Other Topics Concern    Not on file   Social History Narrative    Not on file       Current Outpatient Medications   Medication Sig Dispense Refill    acetaminophen (TYLENOL) 325 MG tablet Take 325 mg by mouth every 6 (six) hours as needed for Pain.      albuterol (PROVENTIL/VENTOLIN HFA) 90 mcg/actuation inhaler Inhale 2 puffs into the lungs every 6 (six) hours as needed for Wheezing or Shortness of Breath. Rescue 18 g 3    diclofenac sodium (VOLTAREN) 1 % Gel APPLY AS DIRECTED 100 g 11    gabapentin (NEURONTIN) 300 MG capsule TAKE 1 CAPSULE BY MOUTH IN THE MORNING , 1 CAPSULE 8 HOURS LATER AND 2 CAPSULES DAILY AT BEDTIME (Patient taking differently: 2 (two) times daily. ) 360 capsule 3    HYDROcodone-acetaminophen (NORCO) 5-325 mg per tablet Take 1 tablet by mouth every 6 (six) hours as needed for Pain. (Patient not taking: Reported on 10/7/2020) 20 tablet 0    meloxicam (MOBIC) 7.5 MG tablet Take 2 tablets (15 mg total) by mouth once daily. 180 tablet 1    ondansetron (ZOFRAN) 4 MG tablet Take 1 tablet (4 mg total) by mouth every 6 (six) hours as needed for Nausea. 30 tablet 0    ondansetron (ZOFRAN-ODT) 8 MG TbDL Take 1 tablet (8 mg total) by mouth every 6 (six) hours as needed (nausea or vomiting). 30 tablet 1    pantoprazole (PROTONIX) 20 MG tablet Take 1 tablet (20 mg total) by mouth once daily. 90 tablet 3    predniSONE (DELTASONE) 20 MG tablet Take 1 tablet (20 mg total) by mouth once daily. (Patient not taking: Reported on 10/7/2020) 10 tablet 0    sucralfate (CARAFATE) 1 gram tablet Take 1 tablet (1 g total) by mouth 4 (four) times daily. (Patient not taking: Reported on 10/7/2020) 30 tablet 0     No current facility-administered medications for this visit.      Review of patient's allergies indicates:  No Known  "Allergies    Review of Systems   Constitutional: Negative for activity change, appetite change, chills and fever.   HENT: Negative for congestion, dental problem and ear discharge.    Eyes: Negative for discharge and itching.   Respiratory: Negative for apnea, choking and chest tightness.    Cardiovascular: Negative for chest pain and leg swelling.   Gastrointestinal: Negative for abdominal distention, abdominal pain, anal bleeding, constipation, diarrhea and nausea.   Endocrine: Negative for cold intolerance and heat intolerance.   Genitourinary: Negative for difficulty urinating and dyspareunia.   Musculoskeletal: Negative for arthralgias and back pain.   Skin: Negative for color change and pallor.   Neurological: Negative for dizziness and facial asymmetry.   Hematological: Negative for adenopathy. Does not bruise/bleed easily.   Psychiatric/Behavioral: Negative for agitation and behavioral problems.       Objective:      Vitals:    10/07/20 1150   BP: 132/70   Pulse: 76   Weight: 83.9 kg (185 lb)   Height: 5' 4" (1.626 m)     Physical Exam  Constitutional:       General: She is not in acute distress.     Appearance: She is well-developed.   HENT:      Head: Normocephalic and atraumatic.   Eyes:      Pupils: Pupils are equal, round, and reactive to light.   Neck:      Musculoskeletal: Normal range of motion and neck supple.      Thyroid: No thyromegaly.   Cardiovascular:      Rate and Rhythm: Normal rate and regular rhythm.   Pulmonary:      Effort: Pulmonary effort is normal.      Breath sounds: Normal breath sounds.   Abdominal:      General: Bowel sounds are normal. There is no distension.      Palpations: Abdomen is soft.      Tenderness: There is no abdominal tenderness.   Musculoskeletal: Normal range of motion.         General: No deformity.   Skin:     General: Skin is warm.      Capillary Refill: Capillary refill takes less than 2 seconds.      Findings: No erythema.   Neurological:      Mental Status: " She is alert and oriented to person, place, and time.      Cranial Nerves: No cranial nerve deficit.   Psychiatric:         Behavior: Behavior normal.         Lab Review:   CBC:   Lab Results   Component Value Date    WBC 5.90 09/27/2020    RBC 3.87 (L) 09/27/2020    HGB 10.8 (L) 09/27/2020    HCT 33.2 (L) 09/27/2020     09/27/2020     BMP:   Lab Results   Component Value Date    GLU 91 09/27/2020     09/27/2020    K 3.1 (L) 09/27/2020     09/27/2020    CO2 24 09/27/2020    BUN 5 (L) 09/27/2020    CREATININE 0.4 (L) 09/27/2020    CALCIUM 8.6 (L) 09/27/2020     Diagnostics Review: CT: Reviewed     Assessment:       1. History of gastric ulcer    2. Encounter for screening colonoscopy        Plan:   History of gastric ulcer  -     Case Request Operating Room: COLONOSCOPY, ESOPHAGOGASTRODUODENOSCOPY (EGD)  -     Full code; Standing  -     Insert peripheral IV; Standing    Encounter for screening colonoscopy  -     Case Request Operating Room: COLONOSCOPY, ESOPHAGOGASTRODUODENOSCOPY (EGD)  -     Full code; Standing  -     Insert peripheral IV; Standing    Other orders  -     Progressive Mobility Protocol (mobilize patient to their highest level of functioning at least twice daily); Standing        Medical Decision Making/Counseling:  Patient with history of gastric ulceration as well as in need of colonoscopy from an age based risk screening needs.  Risk benefits of EGD and colonoscopy were discussed in detail with patient in clinic.  Patient in need of EGD with a history of gastric ulcerations well as recent abdominal pain admitted to the hospital with small-bowel obstruction now resolved.  After risk benefits discussion in clinic, patient voiced understanding risk benefits wished to proceed with EGD and colonoscopy near future.    Will obtain preoperative lab test to include CBC, CMP and EKG for review by the Anesthesiologist the day of the procedure prior to induction of the anesthetic agent of  choice.    Risk and benefits of EGD were discussed in clinic in depth.  Risk of EGD were discussed to include bleeding as well as perforation.  Patient understands that if the above procedural risks were to occur, he could need further intervention to include but not exclude a blood transfusion, repeat procedure, admission to the hospital, or even surgery which would likely require transfer to a higher level of care.     Risks and benefits of Colonoscopy were discussed in depth in clinic as well.  From a procedural standpoint, we discuss the benefits of colonoscopy to be finding colon cancers at early stages, including polyps which can be endoscopically resectable, to finding early stage colon cancers which can be better treated with current medical and surgical therapies in order to give patients a longer survival, if found in these early stages.  From a standpoint of risks, the risk of bleeding and perforation of the colon were discussed.  I personally discussed that if complications of bleeding or perforation were to occur, the patient could need as little as a blood transfusion and as much as possible hospital admission, repeat procedure, or even surgery.  During today's discussion of the procedure of colonoscopy with the patient, I personally mary the patient a picture to assist with counseling.  Total clinic time spent today 45 minutes face-to-face, with greater than half of the time spent in face to face counseling.    Patient instructed that best way to communicate with my office staff is for patient to get on the [facility] patient portal to expedite communication and communication issues that may occur.  Patient was given instructions on how to get on the portal.  I encouraged patient to obtain portal access as well.  Ultimately it is up to the patient to obtain access.  Patient voiced understanding.

## 2020-10-07 NOTE — LETTER
October 7, 2020      Deven Emerson MD  [address] [place] [address]           [facility]  [address] [place] [place] [address]  Phone: [phone]  Fax: [phone]          Patient: Pham Licea   MR Number: [id number]   YOB: 1955   Date of Visit: 10/7/2020       Dear Dr. Deven Emerson:    Thank you for referring Pham Licea to me for evaluation. Attached you will find relevant portions of my assessment and plan of care.    If you have questions, please do not hesitate to call me. I look forward to following Pham Licea along with you.    Sincerely,    Misael Holm MD    Enclosure  CC:  No Recipients    If you would like to receive this communication electronically, please contact [email or web address] or [phone] to request more information on [facility] access.    For providers and/or their staff who would like to refer a patient to [facility], please contact us through our one-stop-shop provider referral line, [facility], at [phone].    If you feel you have received this communication in error or would no longer like to receive these types of communications, please e-mail [email or web address]

## 2020-11-06 ENCOUNTER — HOSPITAL ENCOUNTER (OUTPATIENT)
Dept: CARDIOLOGY | Facility: HOSPITAL | Age: 65
Discharge: HOME | End: 2020-11-06
Attending: SURGERY
Payer: COMMERCIAL

## 2020-11-06 ENCOUNTER — TELEPHONE (OUTPATIENT)
Dept: SURGERY | Facility: CLINIC | Age: 65
End: 2020-11-06

## 2020-11-06 DIAGNOSIS — Z12.11 ENCOUNTER FOR SCREENING COLONOSCOPY: ICD-10-CM

## 2020-11-06 PROCEDURE — 93005 ELECTROCARDIOGRAM TRACING: CPT

## 2020-11-06 PROCEDURE — 93010 EKG 12-LEAD: ICD-10-PCS | Mod: ,,, | Performed by: INTERNAL MEDICINE

## 2020-11-06 PROCEDURE — 93010 ELECTROCARDIOGRAM REPORT: CPT | Mod: ,,, | Performed by: INTERNAL MEDICINE

## 2020-11-06 NOTE — TELEPHONE ENCOUNTER
----- Message from Kylie Burrows sent at 11/6/2020  2:03 PM CST -----  Regarding: Requesting call back  Contact: Patient  Type:     Who Called:  [Patient    Best Call Back Number: [phone]    Additional Information: Patient requesting call back from nurse in regards to procedure Monday 11/9-states she was told to call to touch base

## 2020-11-06 NOTE — TELEPHONE ENCOUNTER
Writer spoke to patient and she stated that she spoke to Gianna in surgery and she has to be her 08:30 am Monday morning for her procedure. Writer expressed verbal understanding.

## 2020-11-09 ENCOUNTER — ANESTHESIA EVENT (OUTPATIENT)
Dept: SURGERY | Facility: HOSPITAL | Age: 65
End: 2020-11-09
Payer: COMMERCIAL

## 2020-11-09 ENCOUNTER — ANESTHESIA (OUTPATIENT)
Dept: SURGERY | Facility: HOSPITAL | Age: 65
End: 2020-11-09
Payer: COMMERCIAL

## 2020-11-09 ENCOUNTER — HOSPITAL ENCOUNTER (OUTPATIENT)
Facility: HOSPITAL | Age: 65
Discharge: HOME | End: 2020-11-09
Attending: SURGERY | Admitting: SURGERY
Payer: COMMERCIAL

## 2020-11-09 DIAGNOSIS — Z87.11 HISTORY OF GASTRIC ULCER: ICD-10-CM

## 2020-11-09 DIAGNOSIS — Z12.11 ENCOUNTER FOR SCREENING COLONOSCOPY: ICD-10-CM

## 2020-11-09 PROCEDURE — 63600175 PHARM REV CODE 636 W HCPCS: Performed by: NURSE ANESTHETIST, CERTIFIED REGISTERED

## 2020-11-09 PROCEDURE — 45380 COLONOSCOPY AND BIOPSY: CPT | Performed by: SURGERY

## 2020-11-09 PROCEDURE — 88305 TISSUE EXAM BY PATHOLOGIST: CPT | Mod: 26,,, | Performed by: PATHOLOGY

## 2020-11-09 PROCEDURE — 45380 COLONOSCOPY AND BIOPSY: CPT | Mod: 33,,, | Performed by: SURGERY

## 2020-11-09 PROCEDURE — 88305 TISSUE EXAM BY PATHOLOGIST: ICD-10-PCS | Mod: 26,,, | Performed by: PATHOLOGY

## 2020-11-09 PROCEDURE — 88305 TISSUE EXAM BY PATHOLOGIST: CPT | Performed by: PATHOLOGY

## 2020-11-09 PROCEDURE — 25000003 PHARM REV CODE 250: Performed by: SURGERY

## 2020-11-09 PROCEDURE — D9220A PRA ANESTHESIA: Mod: 33,CRNA,, | Performed by: NURSE ANESTHETIST, CERTIFIED REGISTERED

## 2020-11-09 PROCEDURE — 43239 EGD BIOPSY SINGLE/MULTIPLE: CPT | Mod: 51,,, | Performed by: SURGERY

## 2020-11-09 PROCEDURE — D9220A PRA ANESTHESIA: ICD-10-PCS | Mod: 33,ANES,, | Performed by: ANESTHESIOLOGY

## 2020-11-09 PROCEDURE — 43239 EGD BIOPSY SINGLE/MULTIPLE: CPT | Performed by: SURGERY

## 2020-11-09 PROCEDURE — D9220A PRA ANESTHESIA: ICD-10-PCS | Mod: 33,CRNA,, | Performed by: NURSE ANESTHETIST, CERTIFIED REGISTERED

## 2020-11-09 PROCEDURE — 43239 PR EGD, FLEX, W/BIOPSY, SGL/MULTI: ICD-10-PCS | Mod: 51,,, | Performed by: SURGERY

## 2020-11-09 PROCEDURE — 25000003 PHARM REV CODE 250: Performed by: NURSE ANESTHETIST, CERTIFIED REGISTERED

## 2020-11-09 PROCEDURE — D9220A PRA ANESTHESIA: Mod: 33,ANES,, | Performed by: ANESTHESIOLOGY

## 2020-11-09 PROCEDURE — 37000008 HC ANESTHESIA 1ST 15 MINUTES: Performed by: SURGERY

## 2020-11-09 PROCEDURE — 27201423 OPTIME MED/SURG SUP & DEVICES STERILE SUPPLY: Performed by: SURGERY

## 2020-11-09 PROCEDURE — 45380 PR COLONOSCOPY,BIOPSY: ICD-10-PCS | Mod: 33,,, | Performed by: SURGERY

## 2020-11-09 PROCEDURE — 37000009 HC ANESTHESIA EA ADD 15 MINS: Performed by: SURGERY

## 2020-11-09 RX ORDER — SODIUM CHLORIDE, SODIUM LACTATE, POTASSIUM CHLORIDE, CALCIUM CHLORIDE 600; 310; 30; 20 MG/100ML; MG/100ML; MG/100ML; MG/100ML
125 INJECTION, SOLUTION INTRAVENOUS CONTINUOUS
Status: DISCONTINUED | OUTPATIENT
Start: 2020-11-09 | End: 2020-11-09 | Stop reason: HOSPADM

## 2020-11-09 RX ORDER — PROPOFOL 10 MG/ML
VIAL (ML) INTRAVENOUS
Status: DISCONTINUED | OUTPATIENT
Start: 2020-11-09 | End: 2020-11-09

## 2020-11-09 RX ORDER — SODIUM CHLORIDE 9 MG/ML
INJECTION, SOLUTION INTRAVENOUS CONTINUOUS
Status: DISCONTINUED | OUTPATIENT
Start: 2020-11-09 | End: 2020-11-09 | Stop reason: HOSPADM

## 2020-11-09 RX ORDER — LIDOCAINE HYDROCHLORIDE 20 MG/ML
INJECTION, SOLUTION EPIDURAL; INFILTRATION; INTRACAUDAL; PERINEURAL
Status: DISCONTINUED | OUTPATIENT
Start: 2020-11-09 | End: 2020-11-09

## 2020-11-09 RX ORDER — SODIUM CHLORIDE, SODIUM LACTATE, POTASSIUM CHLORIDE, CALCIUM CHLORIDE 600; 310; 30; 20 MG/100ML; MG/100ML; MG/100ML; MG/100ML
INJECTION, SOLUTION INTRAVENOUS CONTINUOUS
Status: DISCONTINUED | OUTPATIENT
Start: 2020-11-09 | End: 2020-11-09 | Stop reason: HOSPADM

## 2020-11-09 RX ORDER — LIDOCAINE HYDROCHLORIDE 10 MG/ML
1 INJECTION, SOLUTION EPIDURAL; INFILTRATION; INTRACAUDAL; PERINEURAL ONCE
Status: DISCONTINUED | OUTPATIENT
Start: 2020-11-09 | End: 2020-11-09 | Stop reason: HOSPADM

## 2020-11-09 RX ORDER — ONDANSETRON 2 MG/ML
4 INJECTION INTRAMUSCULAR; INTRAVENOUS DAILY PRN
Status: DISCONTINUED | OUTPATIENT
Start: 2020-11-09 | End: 2020-11-09 | Stop reason: HOSPADM

## 2020-11-09 RX ADMIN — PROPOFOL 20 MG: 10 INJECTION, EMULSION INTRAVENOUS at 11:11

## 2020-11-09 RX ADMIN — PROPOFOL 30 MG: 10 INJECTION, EMULSION INTRAVENOUS at 11:11

## 2020-11-09 RX ADMIN — SODIUM CHLORIDE: 0.9 INJECTION, SOLUTION INTRAVENOUS at 11:11

## 2020-11-09 RX ADMIN — LIDOCAINE HYDROCHLORIDE 100 MG: 20 INJECTION, SOLUTION EPIDURAL; INFILTRATION; INTRACAUDAL; PERINEURAL at 11:11

## 2020-11-09 RX ADMIN — PROPOFOL 100 MG: 10 INJECTION, EMULSION INTRAVENOUS at 11:11

## 2020-11-09 NOTE — [employer] PATIENT INSTRUCTIONS
Discharge Summary/Instructions after an Endoscopic Procedure  Patient Name: Pham Licea  Patient MRN: [id number]  Patient YOB: 1955 Monday, November 9, 2020  Misael Holm MD  RESTRICTIONS:  During your procedure today, you received medications for sedation.  These   medications may affect your judgment, balance and coordination.  Therefore,   for 24 hours, you have the following restrictions:   - DO NOT drive a car, operate machinery, make legal/financial decisions,   sign important papers or drink alcohol.    ACTIVITY:  Today: no heavy lifting, straining or running due to procedural   sedation/anesthesia.  The following day: return to full activity including work.  DIET:  Eat and drink normally unless instructed otherwise.     TREATMENT FOR COMMON SIDE EFFECTS:  - Mild abdominal pain, nausea, belching, bloating or excessive gas:  rest,   eat lightly and use a heating pad.  - Sore Throat: treat with throat lozenges and/or gargle with warm salt   water.  - Because air was used during the procedure, expelling large amounts of air   from your rectum or belching is normal.  - If a bowel prep was taken, you may not have a bowel movement for 1-3 days.    This is normal.  SYMPTOMS TO WATCH FOR AND REPORT TO YOUR PHYSICIAN:  1. Abdominal pain or bloating, other than gas cramps.  2. Chest pain.  3. Back pain.  4. Signs of infection such as: chills or fever occurring within 24 hours   after the procedure.  5. Rectal bleeding, which would show as bright red, maroon, or black stools.   (A tablespoon of blood from the rectum is not serious, especially if   hemorrhoids are present.)  6. Vomiting.  7. Weakness or dizziness.  GO DIRECTLY TO THE NEAREST EMERGENCY ROOM IF YOU HAVE ANY OF THE FOLLOWING:      Difficulty breathing              Chills and/or fever over 101 F   Persistent vomiting and/or vomiting blood   Severe abdominal pain   Severe chest pain   Black, tarry stools   Bleeding- more than one  tablespoon   Any other symptom or condition that you feel may need urgent attention  Your doctor recommends these additional instructions:  If any biopsies were taken, your doctors clinic will contact you in 1 to 2   weeks with any results.  - Discharge patient to home (ambulatory).   - Resume previous diet.   - Continue present medications.   - Await pathology results.   - Return to my office in 2 weeks.  For questions, problems or results please call your physician - Misael oHlm MD at Work:  [phone].  [facility] EMERGENCY ROOM PHONE NUMBER: [phone]  IF A COMPLICATION OR EMERGENCY SITUATION ARISES AND YOU ARE UNABLE TO REACH   YOUR PHYSICIAN - GO DIRECTLY TO THE EMERGENCY ROOM.  MD Misael Frank MD  11/9/2020 11:55:12 AM  This report has been verified and signed electronically.  [employer]

## 2020-11-09 NOTE — DISCHARGE INSTRUCTIONS
Discharge Instructions: After Your Surgery  Youve just had surgery. During surgery, you were given medicine called anesthesia to keep you relaxed and free of pain. After surgery, you may have some pain or nausea. This is common. Here are some tips for feeling better and getting well after surgery.     Stay on schedule with your medicine.   Going home  Your healthcare provider will show you how to take care of yourself when you go home. He or she will also answer your questions. Have an adult family member or friend drive you home. For the first 24 hours after your surgery:  · Do not drive or use heavy equipment.  · Do not make important decisions or sign legal papers.  · Do not drink alcohol.  · Have someone stay with you, if needed. He or she can watch for problems and help keep you safe.  Be sure to go to all follow-up visits with your healthcare provider. And rest after your surgery for as long as your healthcare provider tells you to.  Coping with pain  If you have pain after surgery, pain medicine will help you feel better. Take it as told, before pain becomes severe. Also, ask your healthcare provider or pharmacist about other ways to control pain. This might be with heat, ice, or relaxation. And follow any other instructions your surgeon or nurse gives you.  Tips for taking pain medicine  To get the best relief possible, remember these points:  · Pain medicines can upset your stomach. Taking them with a little food may help.  · Most pain relievers taken by mouth need at least 20 to 30 minutes to start to work.  · Taking medicine on a schedule can help you remember to take it. Try to time your medicine so that you can take it before starting an activity. This might be before you get dressed, go for a walk, or sit down for dinner.  · Constipation is a common side effect of pain medicines. Call your healthcare provider before taking any medicines such as laxatives or stool softeners to help ease constipation.  Also ask if you should skip any foods. Drinking lots of fluids and eating foods such as fruits and vegetables that are high in fiber can also help. Remember, do not take laxatives unless your surgeon has prescribed them.  · Drinking alcohol and taking pain medicine can cause dizziness and slow your breathing. It can even be deadly. Do not drink alcohol while taking pain medicine.  · Pain medicine can make you react more slowly to things. Do not drive or run machinery while taking pain medicine.  Your healthcare provider may tell you to take acetaminophen to help ease your pain. Ask him or her how much you are supposed to take each day. Acetaminophen or other pain relievers may interact with your prescription medicines or other over-the-counter (OTC) medicines. Some prescription medicines have acetaminophen and other ingredients. Using both prescription and OTC acetaminophen for pain can cause you to overdose. Read the labels on your OTC medicines with care. This will help you to clearly know the list of ingredients, how much to take, and any warnings. It may also help you not take too much acetaminophen. If you have questions or do not understand the information, ask your pharmacist or healthcare provider to explain it to you before you take the OTC medicine.  Managing nausea  Some people have an upset stomach after surgery. This is often because of anesthesia, pain, or pain medicine, or the stress of surgery. These tips will help you handle nausea and eat healthy foods as you get better. If you were on a special food plan before surgery, ask your healthcare provider if you should follow it while you get better. These tips may help:  · Do not push yourself to eat. Your body will tell you when to eat and how much.  · Start off with clear liquids and soup. They are easier to digest.  · Next try semi-solid foods, such as mashed potatoes, applesauce, and gelatin, as you feel ready.  · Slowly move to solid foods. Dont  eat fatty, rich, or spicy foods at first.  · Do not force yourself to have 3 large meals a day. Instead eat smaller amounts more often.  · Take pain medicines with a small amount of solid food, such as crackers or toast, to avoid nausea.     Call your surgeon if  · You still have pain an hour after taking medicine. The medicine may not be strong enough.  · You feel too sleepy, dizzy, or groggy. The medicine may be too strong.  · You have side effects like nausea, vomiting, or skin changes, such as rash, itching, or hives.       If you have obstructive sleep apnea  You were given anesthesia medicine during surgery to keep you comfortable and free of pain. After surgery, you may have more apnea spells because of this medicine and other medicines you were given. The spells may last longer than usual.   At home:  · Keep using the continuous positive airway pressure (CPAP) device when you sleep. Unless your healthcare provider tells you not to, use it when you sleep, day or night. CPAP is a common device used to treat obstructive sleep apnea.  · Talk with your provider before taking any pain medicine, muscle relaxants, or sedatives. Your provider will tell you about the possible dangers of taking these medicines.  Date Last Reviewed: 12/1/2016 © 2000-2017 [employer]. [address] [place] [address]. All rights reserved. This information is not intended as a substitute for professional medical care. Always follow your healthcare professional's instructions.        Colonoscopy     A camera attached to a flexible tube with a viewing lens is used to take video pictures.     Colonoscopy is a test to view the inside of your lower digestive tract (colon and rectum). Sometimes it can show the last part of the small intestine (ileum). During the test, small pieces of tissue may be removed for testing. This is called a biopsy. Small growths, such as polyps, may also be removed.   Why is colonoscopy  done?  The test is done to help look for colon cancer. And it can help find the source of abdominal pain, bleeding, and changes in bowel habits. It may be needed once a year, depending on factors such as your:  · Age  · Health history  · Family health history  · Symptoms  · Results from any prior colonoscopy  Risks and possible complications  These include:  · Bleeding               · A puncture or tear in the colon   · Risks of anesthesia  · A cancer lesion not being seen  Getting ready   To prepare for the test:  · Talk with your healthcare provider about the risks of the test (see below). Also ask your healthcare provider about alternatives to the test.  · Tell your healthcare provider about any medicines you take. Also tell him or her about any health conditions you may have.  · Make sure your rectum and colon are empty for the test. Follow the diet and bowel prep instructions exactly. If you dont, the test may need to be rescheduled.  · Plan for a friend or family member to drive you home after the test.     Colonoscopy provides an inside view of the entire colon.     You may discuss the results with your doctor right away or at a future visit.  During the test   The test is usually done in the hospital on an outpatient basis. This means you go home the same day. The procedure takes about 30 minutes. During that time:  · You are given relaxing (sedating) medicine through an IV line. You may be drowsy, or fully asleep.  · The healthcare provider will first give you a physical exam to check for anal and rectal problems.  · Then the anus is lubricated and the scope inserted.  · If you are awake, you may have a feeling similar to needing to have a bowel movement. You may also feel pressure as air is pumped into the colon. Its OK to pass gas during the procedure.  · Biopsy, polyp removal, or other treatments may be done during the test.  After the test   You may have gas right after the test. It can help to try to  pass it to help prevent later bloating. Your healthcare provider may discuss the results with you right away. Or you may need to schedule a follow-up visit to talk about the results. After the test, you can go back to your normal eating and other activities. You may be tired from the sedation and need to rest for a few hours.  Date Last Reviewed: 11/1/2016  © 2983-1926 [employer]. [address] [place] [address], [place] [address]. All rights reserved. This information is not intended as a substitute for professional medical care. Always follow your healthcare professional's instructions.        Upper GI Endoscopy     During endoscopy, a long, flexible tube is used to view the inside of your upper GI tract.      Upper GI endoscopy allows your healthcare provider to look directly into the beginning of your gastrointestinal (GI) tract. The esophagus, stomach, and duodenum (the first part of the small intestine) make up the upper GI tract.   Before the exam  Follow these and any other instructions you are given before your endoscopy. If you dont follow the healthcare providers instructions carefully, the test may need to be canceled or done over:  · Don't eat or drink anything after midnight the night before your exam. If your exam is in the afternoon, drink only clear liquids in the morning. Don't eat or drink anything for 8 hours before the exam. In some cases, you may be able to take medicines with sips of water until 2 hours before the procedure. Speak with your healthcare provider about this.   · Bring your X-rays and any other test results you have.  · Because you will be sedated, arrange for an adult to drive you home after the exam.  · Tell your healthcare provider before the exam if you are taking any medicines or have any medical problems.  The procedure  Here is what to expect:  · You will lie on the endoscopy table. Usually patients lie on the left side.  · You will be monitored and given  oxygen.  · Your throat may be numbed with a spray or gargle. You are given medicine through an intravenous (IV) line that will help you relax and remain comfortable. You may be awake or asleep during the procedure.  · The healthcare provider will put the endoscope in your mouth and down your esophagus. It is thinner than most pieces of food that you swallow. It will not affect your breathing. The medicine helps keep you from gagging.  · Air is put into your GI tract to expand it. It can make you burp.  · During the procedure, the healthcare provider can take biopsies (tissue samples), remove abnormalities, such as polyps, or treat abnormalities through a variety of devices placed through the endoscope. You will not feel this.   · The endoscope carries images of your upper GI tract to a video screen. If you are awake, you may be able to look at the images.  · After the procedure is done, you will rest for a time. An adult must drive you home.  When to call your healthcare provider  Contact your healthcare provider if you have:  · Black or tarry stools, or blood in your stool  · Fever  · Pain in your belly that does not go away  · Nausea and vomiting, or vomiting blood   Date Last Reviewed: 7/1/2016  © 5693-7557 [employer]. [address] [place] [address], [place] [address]. All rights reserved. This information is not intended as a substitute for professional medical care. Always follow your healthcare professional's instructions.

## 2020-11-09 NOTE — ANESTHESIA PREPROCEDURE EVALUATION
11/09/2020  Pham Licea is a 65 y.o., female.    Anesthesia Evaluation    I have reviewed the Patient Summary Reports.    I have reviewed the Nursing Notes.    I have reviewed the Medications.     Review of Systems  Anesthesia Hx:  No problems with previous Anesthesia  Neg history of prior surgery. Denies Family Hx of Anesthesia complications.   Denies Personal Hx of Anesthesia complications.   Social:  Former Smoker    Hematology/Oncology:  Hematology Normal   Oncology Normal     EENT/Dental:EENT/Dental Normal   Cardiovascular:  Cardiovascular Normal     Pulmonary:  Pulmonary Normal    Renal/:  Renal/ Normal     Hepatic/GI:   GERD, poorly controlled Liver Disease,    Musculoskeletal:   Arthritis     Neurological:  Neurology Normal    Endocrine:  Endocrine Normal    Dermatological:  Skin Normal    Psych:  Psychiatric Normal           Physical Exam  General:  Well nourished    Airway/Jaw/Neck:  Airway Findings: Mouth Opening: Normal Tongue: Normal  General Airway Assessment: Adult  Mallampati: II  TM Distance: 4 - 6 cm        Eyes/Ears/Nose:  EYES/EARS/NOSE FINDINGS: Normal   Dental:  DENTAL FINDINGS: Normal   Chest/Lungs:  Chest/Lungs Clear    Heart/Vascular:  Heart Findings: Normal Heart murmur: negative Vascular Findings: Normal    Abdomen:  Abdomen Findings: Normal    Musculoskeletal:  Musculoskeletal Findings: Normal   Skin:  Skin Findings: Normal    Mental Status:  Mental Status Findings: Normal        Anesthesia Plan  Type of Anesthesia, risks & benefits discussed:  Anesthesia Type:  general  Patient's Preference:   Intra-op Monitoring Plan: standard ASA monitors  Intra-op Monitoring Plan Comments:   Post Op Pain Control Plan:   Post Op Pain Control Plan Comments:   Induction:   IV  Beta Blocker:  Patient is not currently on a Beta-Blocker (No further documentation required).        Informed Consent: Patient understands risks and agrees with Anesthesia plan.  Questions answered. Anesthesia consent signed with patient.  ASA Score: 2     Day of Surgery Review of History & Physical: I have interviewed and examined the patient. I have reviewed the patient's H&P dated:    H&P update referred to the provider.         Ready For Surgery From Anesthesia Perspective.

## 2020-11-09 NOTE — ANESTHESIA POSTPROCEDURE EVALUATION
Anesthesia Post Evaluation    Patient: Pham Licea    Procedure(s) Performed: Procedure(s) (LRB):  COLONOSCOPY (N/A)  ESOPHAGOGASTRODUODENOSCOPY (EGD) (N/A)    Final Anesthesia Type: general    Patient location during evaluation: PACU  Patient participation: Yes- Able to Participate  Level of consciousness: awake and awake and alert  Post-procedure vital signs: reviewed and stable  Pain management: adequate  Airway patency: patent    PONV status at discharge: No PONV  Anesthetic complications: no      Cardiovascular status: blood pressure returned to baseline  Respiratory status: unassisted and spontaneous ventilation  Hydration status: euvolemic  Follow-up not needed.          Vitals Value Taken Time   /68 11/09/20 1220   Temp 98 11/09/20 1559   Pulse 55 11/09/20 1220   Resp 48 11/09/20 1210   SpO2 98 % 11/09/20 1220   Vitals shown include unvalidated device data.      Event Time   Out of Recovery 12:10:00         Pain/Derick Score: Derick Score: 10 (11/9/2020 12:25 PM)

## 2020-11-09 NOTE — PLAN OF CARE
Pt brought to pacu via strecher. Pt not responding. Monitors applied. VSS. IV site, clean dry and intact. LR to gravity. Pt on room air. Family notified pt in recovery. Will continue to monitor. Report received from Eloise RIVAS and Ghanshyam ADAME at bedside

## 2020-11-09 NOTE — [employer] PATIENT INSTRUCTIONS
Discharge Summary/Instructions after an Endoscopic Procedure  Patient Name: Pham Licea  Patient MRN: [id number]  Patient YOB: 1955 Monday, November 9, 2020  Misael Holm MD  RESTRICTIONS:  During your procedure today, you received medications for sedation.  These   medications may affect your judgment, balance and coordination.  Therefore,   for 24 hours, you have the following restrictions:   - DO NOT drive a car, operate machinery, make legal/financial decisions,   sign important papers or drink alcohol.    ACTIVITY:  Today: no heavy lifting, straining or running due to procedural   sedation/anesthesia.  The following day: return to full activity including work.  DIET:  Eat and drink normally unless instructed otherwise.     TREATMENT FOR COMMON SIDE EFFECTS:  - Mild abdominal pain, nausea, belching, bloating or excessive gas:  rest,   eat lightly and use a heating pad.  - Sore Throat: treat with throat lozenges and/or gargle with warm salt   water.  - Because air was used during the procedure, expelling large amounts of air   from your rectum or belching is normal.  - If a bowel prep was taken, you may not have a bowel movement for 1-3 days.    This is normal.  SYMPTOMS TO WATCH FOR AND REPORT TO YOUR PHYSICIAN:  1. Abdominal pain or bloating, other than gas cramps.  2. Chest pain.  3. Back pain.  4. Signs of infection such as: chills or fever occurring within 24 hours   after the procedure.  5. Rectal bleeding, which would show as bright red, maroon, or black stools.   (A tablespoon of blood from the rectum is not serious, especially if   hemorrhoids are present.)  6. Vomiting.  7. Weakness or dizziness.  GO DIRECTLY TO THE NEAREST EMERGENCY ROOM IF YOU HAVE ANY OF THE FOLLOWING:      Difficulty breathing              Chills and/or fever over 101 F   Persistent vomiting and/or vomiting blood   Severe abdominal pain   Severe chest pain   Black, tarry stools   Bleeding- more than one  tablespoon   Any other symptom or condition that you feel may need urgent attention  Your doctor recommends these additional instructions:  If any biopsies were taken, your doctors clinic will contact you in 1 to 2   weeks with any results.  - Discharge patient to home (ambulatory).   - Resume previous diet.   - Continue present medications.   - Await pathology results.   - Repeat colonoscopy in 10 years for surveillance.   - Return to my office in 2 weeks.  For questions, problems or results please call your physician - Misael Holm MD at Work:  [phone].  [facility] EMERGENCY ROOM PHONE NUMBER: [phone]  IF A COMPLICATION OR EMERGENCY SITUATION ARISES AND YOU ARE UNABLE TO REACH   YOUR PHYSICIAN - GO DIRECTLY TO THE EMERGENCY ROOM.  MD Misael Frank MD  11/9/2020 11:51:29 AM  This report has been verified and signed electronically.  [employer]

## 2020-11-09 NOTE — DISCHARGE SUMMARY
Discharge Note        SUMMARY     Admit Date: 11/9/2020    Attending Physician: Misael Holm MD     Discharge Physician: Misael Holm MD    Discharge Date: 11/9/2020 11:49 AM      Hospital Course: Patient tolerated procedure well.     Disposition: Home or Self Care    Patient Instructions:   Current Discharge Medication List      CONTINUE these medications which have NOT CHANGED    Details   albuterol (PROVENTIL/VENTOLIN HFA) 90 mcg/actuation inhaler Inhale 2 puffs into the lungs every 6 (six) hours as needed for Wheezing or Shortness of Breath. Rescue  Qty: 18 g, Refills: 3    Associated Diagnoses: Dyspnea on exertion      gabapentin (NEURONTIN) 300 MG capsule TAKE 1 CAPSULE BY MOUTH IN THE MORNING , 1 CAPSULE 8 HOURS LATER AND 2 CAPSULES DAILY AT BEDTIME  Qty: 360 capsule, Refills: 3      HYDROcodone-acetaminophen (NORCO) 5-325 mg per tablet Take 1 tablet by mouth every 6 (six) hours as needed for Pain.  Qty: 20 tablet, Refills: 0    Comments: n/a       meloxicam (MOBIC) 7.5 MG tablet Take 2 tablets (15 mg total) by mouth once daily.  Qty: 180 tablet, Refills: 1    Associated Diagnoses: Primary osteoarthritis involving multiple joints; Right hip pain      ondansetron (ZOFRAN) 4 MG tablet Take 1 tablet (4 mg total) by mouth every 6 (six) hours as needed for Nausea.  Qty: 30 tablet, Refills: 0      ondansetron (ZOFRAN-ODT) 8 MG TbDL Take 1 tablet (8 mg total) by mouth every 6 (six) hours as needed (nausea or vomiting).  Qty: 30 tablet, Refills: 1    Associated Diagnoses: Intractable vomiting with nausea, unspecified vomiting type      pantoprazole (PROTONIX) 20 MG tablet Take 1 tablet (20 mg total) by mouth once daily.  Qty: 90 tablet, Refills: 3    Associated Diagnoses: Gastroesophageal reflux disease without esophagitis      predniSONE (DELTASONE) 20 MG tablet Take 1 tablet (20 mg total) by mouth once daily.  Qty: 10 tablet, Refills: 0      sucralfate (CARAFATE) 1 gram tablet Take 1 tablet (1 g  total) by mouth 4 (four) times daily.  Qty: 30 tablet, Refills: 0    Associated Diagnoses: Gastritis, presence of bleeding unspecified, unspecified chronicity, unspecified gastritis type      acetaminophen (TYLENOL) 325 MG tablet Take 325 mg by mouth every 6 (six) hours as needed for Pain.      diclofenac sodium (VOLTAREN) 1 % Gel APPLY AS DIRECTED  Qty: 100 g, Refills: 11             Discharge Procedure Orders (must include Diet, Follow-up, Activity):   Discharge Procedure Orders (must include Diet, Follow-up, Activity)   Diet general     Call MD for:  temperature >100.4     Call MD for:  persistent nausea and vomiting     Call MD for:  severe uncontrolled pain     Call MD for:  difficulty breathing, headache or visual disturbances     Call MD for:  redness, tenderness, or signs of infection (pain, swelling, redness, odor or green/yellow discharge around incision site)     Call MD for:  persistent dizziness or light-headedness        Follow Up:  Follow up as scheduled.  Resume routine diet.  Activity as tolerated.    No driving day of procedure.

## 2020-11-09 NOTE — TRANSFER OF CARE
Anesthesia Transfer of Care Note    Patient: Pham Licea    Procedure(s) Performed: Procedure(s) (LRB):  COLONOSCOPY (N/A)  ESOPHAGOGASTRODUODENOSCOPY (EGD) (N/A)    Patient location: PACU    Anesthesia Type: general    Transport from OR: Transported from OR on room air with adequate spontaneous ventilation    Post pain: adequate analgesia    Post assessment: no apparent anesthetic complications and tolerated procedure well    Post vital signs: stable    Level of consciousness: awake, alert and oriented    Nausea/Vomiting: no nausea/vomiting    Complications: none    Transfer of care protocol was followed      Last vitals:   Visit Vitals  LMP  (LMP Unknown)   Breastfeeding No

## 2020-11-09 NOTE — PLAN OF CARE
Pt verbalizes understanding of discharge instructions. Pt daughter Magali taking her home. Pt awake alert and oriented at discharge and VSS.

## 2020-11-09 NOTE — H&P
[facility] Surgery H&P Note    Subjective:       Patient ID: Pham Licea is a 65 y.o. female.    Chief Complaint:  Follow-up small bowel obstruction.  Abdominal pain.  No recent endoscopy.  HPI:  Pham Licea is a 65 y.o. female history of fatty liver disease status post cholecystectomy, osteoarthritis former smoker pulmonary nodules previous gastric ulceration presents today as established patient surgery Clinic within the postoperative phase for different issue.  Patient was admitted recently to the hospital with a bowel obstruction.  This spontaneously resolved.  Did not require operative intervention.  Patient with no recent history of colonoscopy or EGD.  EGD in 2016 for gastric ulceration.  Treated effectively resolved.  Colonoscopy years before, unable to determine date.  No hematochezia.  Change in bowel habit with constipation now after obstructive phase.  No nausea vomiting.  No family history of colon or rectal cancers.  No unexplained weight loss.  Patient now presents today as a follow-up after bowel obstruction in need of endoscopy.    Past Medical History:   Diagnosis Date    Fatty liver     Former smoker     Osteoarthritis 2010    Pulmonary nodules 2019    Repeat CT in 6 mo    Ulcer of abdomen wall 2016     Past Surgical History:   Procedure Laterality Date    APPENDECTOMY  1993     SECTION  , , 1983    x3    COLONOSCOPY  ~    [facility]: normal findings per patient report    EPIDURAL STEROID INJECTION INTO LUMBAR SPINE N/A 10/12/2018    Procedure: Injection-steroid-epidural-lumbar;  Surgeon: Kal Johnson MD;  Location: [facility] OR;  Service: Pain Management;  Laterality: N/A;  L5-S1    EPIDURAL STEROID INJECTION INTO LUMBAR SPINE N/A 2019    Procedure: Injection-steroid-epidural-lumbar L5-S1;  Surgeon: Kal Johnson MD;  Location: [facility] OR;  Service: Pain Management;  Laterality: N/A;    HYSTERECTOMY      one ovary conserved     LAPAROSCOPIC CHOLECYSTECTOMY N/A 2020    Procedure: CHOLECYSTECTOMY, LAPAROSCOPIC;  Surgeon: Govind Frey MD;  Location: [facility] OR;  Service: General;  Laterality: N/A;    UPPER GASTROINTESTINAL ENDOSCOPY  2016     Family History   Problem Relation Age of Onset    Ovarian cancer Mother     Heart disease Mother     Osteoporosis Mother     Arthritis Mother     Hypertension Father     Stroke Father 50    Brain Hemorrhage Father     Aneurysm Father     Osteoarthritis Sister     Arthritis Sister     Hypertension Sister     Obesity Sister     Breast cancer Neg Hx     Colon cancer Neg Hx     Colon polyps Neg Hx     Crohn's disease Neg Hx     Ulcerative colitis Neg Hx      Social History     Socioeconomic History    Marital status:      Spouse name: Not on file    Number of children: 4    Years of education: Not on file    Highest education level: Some college, no degree   Occupational History    Occupation: sale specialist-    Social Needs    Financial resource strain: Not very hard    Food insecurity     Worry: Never true     Inability: Never true    Transportation needs     Medical: No     Non-medical: No   Tobacco Use    Smoking status: Former Smoker     Packs/day: 1.00     Years: 40.00     Pack years: 40.00     Quit date: 2009     Years since quittin.0    Smokeless tobacco: Never Used    Tobacco comment: quit    Substance and Sexual Activity    Alcohol use: Yes     Frequency: Monthly or less     Drinks per session: 1 or 2     Binge frequency: Never     Comment: Not often - one glass of wine every now and then    Drug use: Not Currently     Types: Marijuana     Comment: in     Sexual activity: Not Currently   Lifestyle    Physical activity     Days per week: 0 days     Minutes per session: 0 min    Stress: Only a little   Relationships    Social connections     Talks on phone: More than three times a week     Gets together: Once a week     Attends  Nondenominational service: Not on file     Active member of club or organization: Yes     Attends meetings of clubs or organizations: 1 to 4 times per year     Relationship status:    Other Topics Concern    Not on file   Social History Narrative    Not on file       Current Facility-Administered Medications   Medication Dose Route Frequency Provider Last Rate Last Dose    0.9%  NaCl infusion   Intravenous Continuous Misael Holm MD        lactated ringers infusion   Intravenous Continuous Johnathon Morrissey MD        lidocaine (PF) 10 mg/ml (1%) injection 10 mg  1 mL Intradermal Once Johnathon Morrissey MD         Review of patient's allergies indicates:  No Known Allergies    Review of Systems   Constitutional: Negative for activity change, appetite change, chills and fever.   HENT: Negative for congestion, dental problem and ear discharge.    Eyes: Negative for discharge and itching.   Respiratory: Negative for apnea, choking and chest tightness.    Cardiovascular: Negative for chest pain and leg swelling.   Gastrointestinal: Negative for abdominal distention, abdominal pain, anal bleeding, constipation, diarrhea and nausea.   Endocrine: Negative for cold intolerance and heat intolerance.   Genitourinary: Negative for difficulty urinating and dyspareunia.   Musculoskeletal: Negative for arthralgias and back pain.   Skin: Negative for color change and pallor.   Neurological: Negative for dizziness and facial asymmetry.   Hematological: Negative for adenopathy. Does not bruise/bleed easily.   Psychiatric/Behavioral: Negative for agitation and behavioral problems.       Objective:      There were no vitals filed for this visit.  Physical Exam  Constitutional:       General: She is not in acute distress.     Appearance: She is well-developed.   HENT:      Head: Normocephalic and atraumatic.   Eyes:      Pupils: Pupils are equal, round, and reactive to light.   Neck:      Musculoskeletal: Normal range of  motion and neck supple.      Thyroid: No thyromegaly.   Cardiovascular:      Rate and Rhythm: Normal rate and regular rhythm.   Pulmonary:      Effort: Pulmonary effort is normal.      Breath sounds: Normal breath sounds.   Abdominal:      General: Bowel sounds are normal. There is no distension.      Palpations: Abdomen is soft.      Tenderness: There is no abdominal tenderness.   Musculoskeletal: Normal range of motion.         General: No deformity.   Skin:     General: Skin is warm.      Capillary Refill: Capillary refill takes less than 2 seconds.      Findings: No erythema.   Neurological:      Mental Status: She is alert and oriented to person, place, and time.      Cranial Nerves: No cranial nerve deficit.   Psychiatric:         Behavior: Behavior normal.         Lab Review:   CBC:   Lab Results   Component Value Date    WBC 5.90 09/27/2020    RBC 3.87 (L) 09/27/2020    HGB 10.8 (L) 09/27/2020    HCT 33.2 (L) 09/27/2020     09/27/2020     BMP:   Lab Results   Component Value Date    GLU 91 09/27/2020     09/27/2020    K 3.1 (L) 09/27/2020     09/27/2020    CO2 24 09/27/2020    BUN 5 (L) 09/27/2020    CREATININE 0.4 (L) 09/27/2020    CALCIUM 8.6 (L) 09/27/2020     Diagnostics Review: CT: Reviewed     Assessment:       1. History of gastric ulcer    2. Encounter for screening colonoscopy        Plan:   History of gastric ulcer  -     Case Request Operating Room: COLONOSCOPY, ESOPHAGOGASTRODUODENOSCOPY (EGD)  -     Place in Outpatient; Standing  -     Diet NPO; Standing  -     Vital signs; Standing  -     Place sequential compression device; Standing  -     Place LINDSEY hose; Standing    Encounter for screening colonoscopy  -     Case Request Operating Room: COLONOSCOPY, ESOPHAGOGASTRODUODENOSCOPY (EGD)  -     Place in Outpatient; Standing  -     Diet NPO; Standing  -     Vital signs; Standing  -     Place sequential compression device; Standing  -     Place LINDSEY hose; Standing    Other orders  -      Vital signs; Standing  -     Insert peripheral IV; Standing  -     Use 1% lidocaine at IV site; Standing  -     Nursing to confirm consent for anesthesia services; Standing  -     Cancel: Diet NPO Except for: Medication; Standing  -     lactated ringers infusion  -     Notify Anesthesiologist if Patient on Home Insulin Pump; Standing  -     lidocaine (PF) 10 mg/ml (1%) injection 10 mg  -     POCT glucose; Standing  -     Pregnancy, urine rapid; Standing  -     POCT glucose; Standing  -     0.9%  NaCl infusion  -     IP VTE LOW RISK PATIENT; Standing        Medical Decision Making/Counseling:  Patient with history of gastric ulceration as well as in need of colonoscopy from an age based risk screening needs.  Risk benefits of EGD and colonoscopy were discussed in detail with patient in clinic.  Patient in need of EGD with a history of gastric ulcerations well as recent abdominal pain admitted to the hospital with small-bowel obstruction now resolved.  After risk benefits discussion in clinic, patient voiced understanding risk benefits wished to proceed with EGD and colonoscopy near future.    Will obtain preoperative lab test to include CBC, CMP and EKG for review by the Anesthesiologist the day of the procedure prior to induction of the anesthetic agent of choice.    Risk and benefits of EGD were discussed in clinic in depth.  Risk of EGD were discussed to include bleeding as well as perforation.  Patient understands that if the above procedural risks were to occur, he could need further intervention to include but not exclude a blood transfusion, repeat procedure, admission to the hospital, or even surgery which would likely require transfer to a higher level of care.     Risks and benefits of Colonoscopy were discussed in depth in clinic as well.  From a procedural standpoint, we discuss the benefits of colonoscopy to be finding colon cancers at early stages, including polyps which can be endoscopically  resectable, to finding early stage colon cancers which can be better treated with current medical and surgical therapies in order to give patients a longer survival, if found in these early stages.  From a standpoint of risks, the risk of bleeding and perforation of the colon were discussed.  I personally discussed that if complications of bleeding or perforation were to occur, the patient could need as little as a blood transfusion and as much as possible hospital admission, repeat procedure, or even surgery.  During today's discussion of the procedure of colonoscopy with the patient, I personally mary the patient a picture to assist with counseling.  Total clinic time spent today 45 minutes face-to-face, with greater than half of the time spent in face to face counseling.    Patient instructed that best way to communicate with my office staff is for patient to get on the [facility] patient portal to expedite communication and communication issues that may occur.  Patient was given instructions on how to get on the portal.  I encouraged patient to obtain portal access as well.  Ultimately it is up to the patient to obtain access.  Patient voiced understanding.

## 2020-11-10 VITALS
HEART RATE: 90 BPM | RESPIRATION RATE: 48 BRPM | SYSTOLIC BLOOD PRESSURE: 101 MMHG | OXYGEN SATURATION: 97 % | DIASTOLIC BLOOD PRESSURE: 64 MMHG

## 2020-11-12 LAB
FINAL PATHOLOGIC DIAGNOSIS: NORMAL
GROSS: NORMAL
Lab: NORMAL

## 2020-11-18 ENCOUNTER — OFFICE VISIT (OUTPATIENT)
Dept: SURGERY | Facility: CLINIC | Age: 65
End: 2020-11-18
Payer: COMMERCIAL

## 2020-11-18 VITALS
HEART RATE: 59 BPM | BODY MASS INDEX: 31.68 KG/M2 | OXYGEN SATURATION: 96 % | TEMPERATURE: 98 F | SYSTOLIC BLOOD PRESSURE: 130 MMHG | RESPIRATION RATE: 16 BRPM | WEIGHT: 178.81 LBS | HEIGHT: 63 IN | DIASTOLIC BLOOD PRESSURE: 78 MMHG

## 2020-11-18 DIAGNOSIS — K29.70 GASTRITIS, PRESENCE OF BLEEDING UNSPECIFIED, UNSPECIFIED CHRONICITY, UNSPECIFIED GASTRITIS TYPE: ICD-10-CM

## 2020-11-18 DIAGNOSIS — D17.5 LIPOMA OF COLON: Primary | ICD-10-CM

## 2020-11-18 PROCEDURE — 3008F PR BODY MASS INDEX (BMI) DOCUMENTED: ICD-10-PCS | Mod: CPTII,S$GLB,, | Performed by: SURGERY

## 2020-11-18 PROCEDURE — 3008F BODY MASS INDEX DOCD: CPT | Mod: CPTII,S$GLB,, | Performed by: SURGERY

## 2020-11-18 PROCEDURE — 1126F AMNT PAIN NOTED NONE PRSNT: CPT | Mod: S$GLB,,, | Performed by: SURGERY

## 2020-11-18 PROCEDURE — 99213 OFFICE O/P EST LOW 20 MIN: CPT | Mod: 24,S$GLB,, | Performed by: SURGERY

## 2020-11-18 PROCEDURE — 1126F PR PAIN SEVERITY QUANTIFIED, NO PAIN PRESENT: ICD-10-PCS | Mod: S$GLB,,, | Performed by: SURGERY

## 2020-11-18 PROCEDURE — 99213 PR OFFICE/OUTPT VISIT, EST, LEVL III, 20-29 MIN: ICD-10-PCS | Mod: 24,S$GLB,, | Performed by: SURGERY

## 2020-11-18 NOTE — PROGRESS NOTES
"[facility] General Surgery  Follow-up    Subjective:     Chief Complaint:  Follow-up EGD colonoscopy.    HPI:  Pham Licea is a 65 y.o. female presents today for follow-up examination of EGD and colonoscopy.  Patient since EGD colonoscopy is done well.  No apparent post colonoscopic are endoscopic issues.  Patient found to have gastritis negative evidence of H pylori.  On Protonix.  Much improved symptoms.  Colonoscopy shows evidence of large right colon nonobstructive lipoma.  No other new issues or complaints today.    Review of Systems   Constitutional: Negative for activity change, appetite change, chills and fever.   HENT: Negative for congestion, dental problem and ear discharge.    Eyes: Negative for discharge and itching.   Respiratory: Negative for apnea, choking and chest tightness.    Cardiovascular: Negative for chest pain and leg swelling.   Gastrointestinal: Negative for abdominal distention, abdominal pain, anal bleeding, constipation, diarrhea and nausea.   Endocrine: Negative for cold intolerance and heat intolerance.   Genitourinary: Negative for difficulty urinating and dyspareunia.   Musculoskeletal: Negative for arthralgias and back pain.   Skin: Negative for color change and pallor.   Neurological: Negative for dizziness and facial asymmetry.   Hematological: Negative for adenopathy. Does not bruise/bleed easily.   Psychiatric/Behavioral: Negative for agitation and behavioral problems.       Objective:      Vitals:    11/18/20 0945   BP: 130/78   Pulse: (!) 59   Resp: 16   Temp: 97.5 °F (36.4 °C)   TempSrc: Temporal   SpO2: 96%   Weight: 81.1 kg (178 lb 12.8 oz)   Height: 5' 3" (1.6 m)     Physical Exam  Constitutional:       General: She is not in acute distress.     Appearance: She is well-developed.   HENT:      Head: Normocephalic and atraumatic.   Eyes:      Pupils: Pupils are equal, round, and reactive to light.   Neck:      Musculoskeletal: Normal range of motion and neck " supple.      Thyroid: No thyromegaly.   Cardiovascular:      Rate and Rhythm: Normal rate and regular rhythm.   Pulmonary:      Effort: Pulmonary effort is normal.      Breath sounds: Normal breath sounds.   Abdominal:      General: Bowel sounds are normal. There is no distension.      Palpations: Abdomen is soft.      Tenderness: There is no abdominal tenderness.   Musculoskeletal: Normal range of motion.         General: No deformity.   Skin:     General: Skin is warm.      Capillary Refill: Capillary refill takes less than 2 seconds.      Findings: No erythema.   Neurological:      Mental Status: She is alert and oriented to person, place, and time.      Cranial Nerves: No cranial nerve deficit.   Psychiatric:         Behavior: Behavior normal.       EGD colonoscopy report reviewed.  Pathology report reviewed.     Assessment:       1. Lipoma of colon    2. Gastritis, presence of bleeding unspecified, unspecified chronicity, unspecified gastritis type        Plan:   Lipoma of colon    Gastritis, presence of bleeding unspecified, unspecified chronicity, unspecified gastritis type        Medical Decision Making/Counseling:  Doing well at EGD colonoscopy.  Large lipoma right colon.  Benign.  Gastritis seen on EGD.  No evidence of H pylori.  Recommendation be Protonix therapy for next 6 months.  Discontinued after that time if symptoms resolved.  Recurrence of symptoms would indicate need to reestablished Protonix therapy.  Recommend repeat colonoscopy in 10 years for surveillance/screening purposes.    Follow up:  As needed    Patient instructed that best way to communicate with my office staff is for patient to get on the [facility] patient portal to expedite communication and communication issues that may occur.  Patient was given instructions on how to get on the portal.  I encouraged patient to obtain portal access as well.  Ultimately it is up to the patient to obtain access.  Patient voiced  understanding.

## 2020-12-14 DIAGNOSIS — K21.9 GASTROESOPHAGEAL REFLUX DISEASE WITHOUT ESOPHAGITIS: ICD-10-CM

## 2020-12-14 RX ORDER — PANTOPRAZOLE SODIUM 20 MG/1
20 TABLET, DELAYED RELEASE ORAL DAILY
Qty: 90 TABLET | Refills: 3 | Status: SHIPPED | OUTPATIENT
Start: 2020-12-14 | End: 2022-02-04 | Stop reason: SDUPTHER

## 2020-12-14 RX ORDER — PANTOPRAZOLE SODIUM 20 MG/1
20 TABLET, DELAYED RELEASE ORAL DAILY
Qty: 90 TABLET | Refills: 3 | Status: SHIPPED | OUTPATIENT
Start: 2020-12-14 | End: 2020-12-14 | Stop reason: SDUPTHER

## 2021-01-07 DIAGNOSIS — Z12.31 OTHER SCREENING MAMMOGRAM: ICD-10-CM

## 2021-01-20 ENCOUNTER — PATIENT MESSAGE (OUTPATIENT)
Dept: FAMILY MEDICINE | Facility: CLINIC | Age: 66
End: 2021-01-20

## 2021-01-22 ENCOUNTER — PATIENT MESSAGE (OUTPATIENT)
Dept: FAMILY MEDICINE | Facility: CLINIC | Age: 66
End: 2021-01-22

## 2021-02-09 ENCOUNTER — PATIENT OUTREACH (OUTPATIENT)
Dept: ADMINISTRATIVE | Facility: OTHER | Age: 66
End: 2021-02-09

## 2021-02-09 ENCOUNTER — LAB VISIT (OUTPATIENT)
Dept: LAB | Facility: HOSPITAL | Age: 66
End: 2021-02-09
Attending: FAMILY MEDICINE
Payer: COMMERCIAL

## 2021-02-09 ENCOUNTER — OFFICE VISIT (OUTPATIENT)
Dept: FAMILY MEDICINE | Facility: CLINIC | Age: 66
End: 2021-02-09
Payer: COMMERCIAL

## 2021-02-09 VITALS
OXYGEN SATURATION: 98 % | DIASTOLIC BLOOD PRESSURE: 75 MMHG | HEIGHT: 63 IN | BODY MASS INDEX: 30.8 KG/M2 | HEART RATE: 61 BPM | SYSTOLIC BLOOD PRESSURE: 125 MMHG | TEMPERATURE: 98 F | RESPIRATION RATE: 15 BRPM | WEIGHT: 173.81 LBS

## 2021-02-09 DIAGNOSIS — M15.9 PRIMARY OSTEOARTHRITIS INVOLVING MULTIPLE JOINTS: ICD-10-CM

## 2021-02-09 DIAGNOSIS — K59.00 CONSTIPATION, UNSPECIFIED CONSTIPATION TYPE: Primary | ICD-10-CM

## 2021-02-09 DIAGNOSIS — Z51.81 ENCOUNTER FOR MEDICATION MONITORING: ICD-10-CM

## 2021-02-09 LAB
ALBUMIN SERPL BCP-MCNC: 3.9 G/DL (ref 3.5–5.2)
ALP SERPL-CCNC: 71 U/L (ref 55–135)
ALT SERPL W/O P-5'-P-CCNC: 15 U/L (ref 10–44)
ANION GAP SERPL CALC-SCNC: 8 MMOL/L (ref 8–16)
AST SERPL-CCNC: 19 U/L (ref 10–40)
BILIRUB SERPL-MCNC: 0.6 MG/DL (ref 0.1–1)
BUN SERPL-MCNC: 18 MG/DL (ref 8–23)
CALCIUM SERPL-MCNC: 9.1 MG/DL (ref 8.7–10.5)
CHLORIDE SERPL-SCNC: 105 MMOL/L (ref 95–110)
CO2 SERPL-SCNC: 27 MMOL/L (ref 23–29)
CREAT SERPL-MCNC: 0.7 MG/DL (ref 0.5–1.4)
EST. GFR  (AFRICAN AMERICAN): >60 ML/MIN/1.73 M^2
EST. GFR  (NON AFRICAN AMERICAN): >60 ML/MIN/1.73 M^2
GLUCOSE SERPL-MCNC: 100 MG/DL (ref 70–110)
POTASSIUM SERPL-SCNC: 4.4 MMOL/L (ref 3.5–5.1)
PROT SERPL-MCNC: 6.3 G/DL (ref 6–8.4)
SODIUM SERPL-SCNC: 140 MMOL/L (ref 136–145)

## 2021-02-09 PROCEDURE — 1126F AMNT PAIN NOTED NONE PRSNT: CPT | Mod: S$GLB,,, | Performed by: FAMILY MEDICINE

## 2021-02-09 PROCEDURE — 3008F PR BODY MASS INDEX (BMI) DOCUMENTED: ICD-10-PCS | Mod: CPTII,S$GLB,, | Performed by: FAMILY MEDICINE

## 2021-02-09 PROCEDURE — 99214 OFFICE O/P EST MOD 30 MIN: CPT | Mod: S$GLB,,, | Performed by: FAMILY MEDICINE

## 2021-02-09 PROCEDURE — 99214 PR OFFICE/OUTPT VISIT, EST, LEVL IV, 30-39 MIN: ICD-10-PCS | Mod: S$GLB,,, | Performed by: FAMILY MEDICINE

## 2021-02-09 PROCEDURE — 3008F BODY MASS INDEX DOCD: CPT | Mod: CPTII,S$GLB,, | Performed by: FAMILY MEDICINE

## 2021-02-09 PROCEDURE — 80053 COMPREHEN METABOLIC PANEL: CPT

## 2021-02-09 PROCEDURE — 36415 COLL VENOUS BLD VENIPUNCTURE: CPT

## 2021-02-09 PROCEDURE — 1126F PR PAIN SEVERITY QUANTIFIED, NO PAIN PRESENT: ICD-10-PCS | Mod: S$GLB,,, | Performed by: FAMILY MEDICINE

## 2021-02-09 RX ORDER — MELOXICAM 15 MG/1
15 TABLET ORAL DAILY
Qty: 90 TABLET | Refills: 1 | Status: ON HOLD | OUTPATIENT
Start: 2021-02-09 | End: 2021-05-11 | Stop reason: HOSPADM

## 2021-02-11 ENCOUNTER — PATIENT MESSAGE (OUTPATIENT)
Dept: FAMILY MEDICINE | Facility: CLINIC | Age: 66
End: 2021-02-11

## 2021-02-15 ENCOUNTER — HOSPITAL ENCOUNTER (OUTPATIENT)
Dept: RADIOLOGY | Facility: HOSPITAL | Age: 66
Discharge: HOME | End: 2021-02-15
Attending: INTERNAL MEDICINE
Payer: COMMERCIAL

## 2021-02-15 ENCOUNTER — OFFICE VISIT (OUTPATIENT)
Dept: GASTROENTEROLOGY | Facility: CLINIC | Age: 66
End: 2021-02-15
Payer: COMMERCIAL

## 2021-02-15 ENCOUNTER — PATIENT MESSAGE (OUTPATIENT)
Dept: GASTROENTEROLOGY | Facility: CLINIC | Age: 66
End: 2021-02-15

## 2021-02-15 VITALS
OXYGEN SATURATION: 98 % | HEIGHT: 63 IN | TEMPERATURE: 98 F | SYSTOLIC BLOOD PRESSURE: 131 MMHG | BODY MASS INDEX: 30.83 KG/M2 | RESPIRATION RATE: 14 BRPM | HEART RATE: 71 BPM | DIASTOLIC BLOOD PRESSURE: 76 MMHG | WEIGHT: 174 LBS

## 2021-02-15 DIAGNOSIS — K21.9 GASTROESOPHAGEAL REFLUX DISEASE WITHOUT ESOPHAGITIS: Primary | ICD-10-CM

## 2021-02-15 DIAGNOSIS — Z90.49 HISTORY OF CHOLECYSTECTOMY: ICD-10-CM

## 2021-02-15 DIAGNOSIS — K59.00 CONSTIPATION, UNSPECIFIED CONSTIPATION TYPE: ICD-10-CM

## 2021-02-15 PROBLEM — K80.00 CHOLELITHIASIS WITH ACUTE CHOLECYSTITIS: Status: RESOLVED | Noted: 2020-09-01 | Resolved: 2021-02-15

## 2021-02-15 PROBLEM — K56.609 SBO (SMALL BOWEL OBSTRUCTION): Status: RESOLVED | Noted: 2020-09-23 | Resolved: 2021-02-15

## 2021-02-15 PROCEDURE — 3008F PR BODY MASS INDEX (BMI) DOCUMENTED: ICD-10-PCS | Mod: CPTII,,, | Performed by: INTERNAL MEDICINE

## 2021-02-15 PROCEDURE — 74018 RADEX ABDOMEN 1 VIEW: CPT | Mod: TC,FY

## 2021-02-15 PROCEDURE — 99204 PR OFFICE/OUTPT VISIT, NEW, LEVL IV, 45-59 MIN: ICD-10-PCS | Mod: ,,, | Performed by: INTERNAL MEDICINE

## 2021-02-15 PROCEDURE — 1125F AMNT PAIN NOTED PAIN PRSNT: CPT | Mod: ,,, | Performed by: INTERNAL MEDICINE

## 2021-02-15 PROCEDURE — 99999 PR PBB SHADOW E&M-EST. PATIENT-LVL V: ICD-10-PCS | Mod: PBBFAC,,, | Performed by: INTERNAL MEDICINE

## 2021-02-15 PROCEDURE — 99999 PR PBB SHADOW E&M-EST. PATIENT-LVL V: CPT | Mod: PBBFAC,,, | Performed by: INTERNAL MEDICINE

## 2021-02-15 PROCEDURE — 74018 RADEX ABDOMEN 1 VIEW: CPT | Mod: 26,,, | Performed by: RADIOLOGY

## 2021-02-15 PROCEDURE — 3008F BODY MASS INDEX DOCD: CPT | Mod: CPTII,,, | Performed by: INTERNAL MEDICINE

## 2021-02-15 PROCEDURE — 99204 OFFICE O/P NEW MOD 45 MIN: CPT | Mod: ,,, | Performed by: INTERNAL MEDICINE

## 2021-02-15 PROCEDURE — 1125F PR PAIN SEVERITY QUANTIFIED, PAIN PRESENT: ICD-10-PCS | Mod: ,,, | Performed by: INTERNAL MEDICINE

## 2021-02-15 PROCEDURE — 74018 XR ABDOMEN_COLON MOTILITY STUDY: ICD-10-PCS | Mod: 26,,, | Performed by: RADIOLOGY

## 2021-02-16 ENCOUNTER — PATIENT MESSAGE (OUTPATIENT)
Dept: GASTROENTEROLOGY | Facility: CLINIC | Age: 66
End: 2021-02-16

## 2021-02-16 ENCOUNTER — LAB VISIT (OUTPATIENT)
Dept: LAB | Facility: HOSPITAL | Age: 66
End: 2021-02-16
Attending: INTERNAL MEDICINE
Payer: COMMERCIAL

## 2021-02-16 DIAGNOSIS — K59.00 CONSTIPATION, UNSPECIFIED CONSTIPATION TYPE: ICD-10-CM

## 2021-02-16 PROCEDURE — 87338 HPYLORI STOOL AG IA: CPT

## 2021-02-16 PROCEDURE — 83630 LACTOFERRIN FECAL (QUAL): CPT

## 2021-02-17 ENCOUNTER — HOSPITAL ENCOUNTER (OUTPATIENT)
Dept: RADIOLOGY | Facility: HOSPITAL | Age: 66
Discharge: HOME | End: 2021-02-17
Attending: INTERNAL MEDICINE
Payer: COMMERCIAL

## 2021-02-17 DIAGNOSIS — K59.00 CONSTIPATION, UNSPECIFIED CONSTIPATION TYPE: ICD-10-CM

## 2021-02-17 PROCEDURE — 74018 RADEX ABDOMEN 1 VIEW: CPT | Mod: 26,,, | Performed by: RADIOLOGY

## 2021-02-17 PROCEDURE — 74018 RADEX ABDOMEN 1 VIEW: CPT | Mod: TC,FY

## 2021-02-17 PROCEDURE — 74018 XR ABDOMEN_COLON MOTILITY STUDY: ICD-10-PCS | Mod: 26,,, | Performed by: RADIOLOGY

## 2021-02-19 ENCOUNTER — HOSPITAL ENCOUNTER (OUTPATIENT)
Dept: RADIOLOGY | Facility: HOSPITAL | Age: 66
Discharge: HOME | End: 2021-02-19
Attending: INTERNAL MEDICINE
Payer: COMMERCIAL

## 2021-02-19 DIAGNOSIS — K59.00 CONSTIPATION, UNSPECIFIED CONSTIPATION TYPE: ICD-10-CM

## 2021-02-19 LAB — LACTOFERRIN STL QL IA: NEGATIVE

## 2021-02-19 PROCEDURE — 74018 XR ABDOMEN_COLON MOTILITY STUDY: ICD-10-PCS | Mod: 26,,, | Performed by: RADIOLOGY

## 2021-02-19 PROCEDURE — 74018 RADEX ABDOMEN 1 VIEW: CPT | Mod: 26,,, | Performed by: RADIOLOGY

## 2021-02-19 PROCEDURE — 74018 RADEX ABDOMEN 1 VIEW: CPT | Mod: TC,FY

## 2021-02-24 LAB — H PYLORI AG STL QL IA: NOT DETECTED

## 2021-03-01 ENCOUNTER — PATIENT MESSAGE (OUTPATIENT)
Dept: GASTROENTEROLOGY | Facility: CLINIC | Age: 66
End: 2021-03-01

## 2021-03-03 ENCOUNTER — OFFICE VISIT (OUTPATIENT)
Dept: GASTROENTEROLOGY | Facility: CLINIC | Age: 66
End: 2021-03-03
Payer: COMMERCIAL

## 2021-03-03 ENCOUNTER — TELEPHONE (OUTPATIENT)
Dept: GASTROENTEROLOGY | Facility: CLINIC | Age: 66
End: 2021-03-03

## 2021-03-03 VITALS
TEMPERATURE: 98 F | HEART RATE: 77 BPM | SYSTOLIC BLOOD PRESSURE: 89 MMHG | WEIGHT: 169 LBS | BODY MASS INDEX: 29.95 KG/M2 | RESPIRATION RATE: 14 BRPM | HEIGHT: 63 IN | DIASTOLIC BLOOD PRESSURE: 58 MMHG | OXYGEN SATURATION: 98 %

## 2021-03-03 DIAGNOSIS — R11.0 NAUSEA: ICD-10-CM

## 2021-03-03 DIAGNOSIS — K59.00 CONSTIPATION, UNSPECIFIED CONSTIPATION TYPE: ICD-10-CM

## 2021-03-03 DIAGNOSIS — K21.9 GASTROESOPHAGEAL REFLUX DISEASE WITHOUT ESOPHAGITIS: ICD-10-CM

## 2021-03-03 DIAGNOSIS — Z90.49 HISTORY OF CHOLECYSTECTOMY: Primary | ICD-10-CM

## 2021-03-03 PROCEDURE — 1125F PR PAIN SEVERITY QUANTIFIED, PAIN PRESENT: ICD-10-PCS | Mod: S$GLB,,, | Performed by: INTERNAL MEDICINE

## 2021-03-03 PROCEDURE — 99214 PR OFFICE/OUTPT VISIT, EST, LEVL IV, 30-39 MIN: ICD-10-PCS | Mod: S$GLB,,, | Performed by: INTERNAL MEDICINE

## 2021-03-03 PROCEDURE — 3008F BODY MASS INDEX DOCD: CPT | Mod: CPTII,S$GLB,, | Performed by: INTERNAL MEDICINE

## 2021-03-03 PROCEDURE — 99999 PR PBB SHADOW E&M-EST. PATIENT-LVL IV: ICD-10-PCS | Mod: PBBFAC,,, | Performed by: INTERNAL MEDICINE

## 2021-03-03 PROCEDURE — 1125F AMNT PAIN NOTED PAIN PRSNT: CPT | Mod: S$GLB,,, | Performed by: INTERNAL MEDICINE

## 2021-03-03 PROCEDURE — 99999 PR PBB SHADOW E&M-EST. PATIENT-LVL IV: CPT | Mod: PBBFAC,,, | Performed by: INTERNAL MEDICINE

## 2021-03-03 PROCEDURE — 99214 OFFICE O/P EST MOD 30 MIN: CPT | Mod: S$GLB,,, | Performed by: INTERNAL MEDICINE

## 2021-03-03 PROCEDURE — 3008F PR BODY MASS INDEX (BMI) DOCUMENTED: ICD-10-PCS | Mod: CPTII,S$GLB,, | Performed by: INTERNAL MEDICINE

## 2021-03-04 RX ORDER — LACTULOSE 10 G/15ML
6.5 SOLUTION ORAL 3 TIMES DAILY
Qty: 900 ML | Refills: 11 | Status: SHIPPED | OUTPATIENT
Start: 2021-03-04 | End: 2021-03-08 | Stop reason: SDUPTHER

## 2021-03-05 ENCOUNTER — HOSPITAL ENCOUNTER (EMERGENCY)
Facility: HOSPITAL | Age: 66
Discharge: LEFT BEFORE DISCHARGE | End: 2021-03-06
Attending: EMERGENCY MEDICINE
Payer: COMMERCIAL

## 2021-03-05 VITALS
RESPIRATION RATE: 18 BRPM | TEMPERATURE: 98 F | DIASTOLIC BLOOD PRESSURE: 64 MMHG | HEART RATE: 78 BPM | HEIGHT: 64 IN | BODY MASS INDEX: 29.88 KG/M2 | OXYGEN SATURATION: 97 % | WEIGHT: 175 LBS | SYSTOLIC BLOOD PRESSURE: 112 MMHG

## 2021-03-05 DIAGNOSIS — R10.9 ABDOMINAL CRAMPING: ICD-10-CM

## 2021-03-05 DIAGNOSIS — K56.600 PARTIAL SMALL BOWEL OBSTRUCTION: Primary | ICD-10-CM

## 2021-03-05 LAB
ALBUMIN SERPL BCP-MCNC: 3.5 G/DL (ref 3.5–5.2)
ALP SERPL-CCNC: 75 U/L (ref 55–135)
ALT SERPL W/O P-5'-P-CCNC: 18 U/L (ref 10–44)
ANION GAP SERPL CALC-SCNC: 10 MMOL/L (ref 8–16)
AST SERPL-CCNC: 24 U/L (ref 10–40)
BASOPHILS # BLD AUTO: 0.06 K/UL (ref 0–0.2)
BASOPHILS NFR BLD: 0.4 % (ref 0–1.9)
BILIRUB SERPL-MCNC: 0.5 MG/DL (ref 0.1–1)
BILIRUB UR QL STRIP: NEGATIVE
BUN SERPL-MCNC: 18 MG/DL (ref 8–23)
CALCIUM SERPL-MCNC: 8.9 MG/DL (ref 8.7–10.5)
CHLORIDE SERPL-SCNC: 102 MMOL/L (ref 95–110)
CLARITY UR: CLEAR
CO2 SERPL-SCNC: 22 MMOL/L (ref 23–29)
COLOR UR: YELLOW
CREAT SERPL-MCNC: 0.8 MG/DL (ref 0.5–1.4)
DIFFERENTIAL METHOD: ABNORMAL
EOSINOPHIL # BLD AUTO: 0.2 K/UL (ref 0–0.5)
EOSINOPHIL NFR BLD: 1.1 % (ref 0–8)
ERYTHROCYTE [DISTWIDTH] IN BLOOD BY AUTOMATED COUNT: 13.2 % (ref 11.5–14.5)
EST. GFR  (AFRICAN AMERICAN): >60 ML/MIN/1.73 M^2
EST. GFR  (NON AFRICAN AMERICAN): >60 ML/MIN/1.73 M^2
GLUCOSE SERPL-MCNC: 113 MG/DL (ref 70–110)
GLUCOSE UR QL STRIP: NEGATIVE
HCT VFR BLD AUTO: 29.3 % (ref 37–48.5)
HGB BLD-MCNC: 9.4 G/DL (ref 12–16)
HGB UR QL STRIP: NEGATIVE
IMM GRANULOCYTES # BLD AUTO: 0.05 K/UL (ref 0–0.04)
IMM GRANULOCYTES NFR BLD AUTO: 0.3 % (ref 0–0.5)
KETONES UR QL STRIP: ABNORMAL
LEUKOCYTE ESTERASE UR QL STRIP: NEGATIVE
LYMPHOCYTES # BLD AUTO: 1.5 K/UL (ref 1–4.8)
LYMPHOCYTES NFR BLD: 10.1 % (ref 18–48)
MCH RBC QN AUTO: 26.7 PG (ref 27–31)
MCHC RBC AUTO-ENTMCNC: 32.1 G/DL (ref 32–36)
MCV RBC AUTO: 83 FL (ref 82–98)
MONOCYTES # BLD AUTO: 0.8 K/UL (ref 0.3–1)
MONOCYTES NFR BLD: 5.3 % (ref 4–15)
NEUTROPHILS # BLD AUTO: 12.6 K/UL (ref 1.8–7.7)
NEUTROPHILS NFR BLD: 82.8 % (ref 38–73)
NITRITE UR QL STRIP: NEGATIVE
NRBC BLD-RTO: 0 /100 WBC
OB PNL STL: POSITIVE
PH UR STRIP: >=9 [PH] (ref 5–8)
PLATELET # BLD AUTO: 377 K/UL (ref 150–350)
PMV BLD AUTO: 10.6 FL (ref 9.2–12.9)
POTASSIUM SERPL-SCNC: 3.9 MMOL/L (ref 3.5–5.1)
PROT SERPL-MCNC: 6.1 G/DL (ref 6–8.4)
PROT UR QL STRIP: NEGATIVE
RBC # BLD AUTO: 3.52 M/UL (ref 4–5.4)
SODIUM SERPL-SCNC: 134 MMOL/L (ref 136–145)
SP GR UR STRIP: 1.02 (ref 1–1.03)
URN SPEC COLLECT METH UR: ABNORMAL
UROBILINOGEN UR STRIP-ACNC: NEGATIVE EU/DL
WBC # BLD AUTO: 15.19 K/UL (ref 3.9–12.7)

## 2021-03-05 PROCEDURE — 80053 COMPREHEN METABOLIC PANEL: CPT | Performed by: EMERGENCY MEDICINE

## 2021-03-05 PROCEDURE — 74019 RADEX ABDOMEN 2 VIEWS: CPT | Mod: 26,,, | Performed by: RADIOLOGY

## 2021-03-05 PROCEDURE — 96375 TX/PRO/DX INJ NEW DRUG ADDON: CPT

## 2021-03-05 PROCEDURE — 74019 RADEX ABDOMEN 2 VIEWS: CPT | Mod: TC,FY

## 2021-03-05 PROCEDURE — 96374 THER/PROPH/DIAG INJ IV PUSH: CPT | Mod: 59

## 2021-03-05 PROCEDURE — 25500020 PHARM REV CODE 255: Performed by: EMERGENCY MEDICINE

## 2021-03-05 PROCEDURE — 74177 CT ABD & PELVIS W/CONTRAST: CPT | Mod: TC

## 2021-03-05 PROCEDURE — 81003 URINALYSIS AUTO W/O SCOPE: CPT | Performed by: EMERGENCY MEDICINE

## 2021-03-05 PROCEDURE — 74177 CT ABD & PELVIS W/CONTRAST: CPT | Mod: 26,,, | Performed by: RADIOLOGY

## 2021-03-05 PROCEDURE — 82272 OCCULT BLD FECES 1-3 TESTS: CPT | Performed by: EMERGENCY MEDICINE

## 2021-03-05 PROCEDURE — 74177 CT ABDOMEN PELVIS WITH CONTRAST: ICD-10-PCS | Mod: 26,,, | Performed by: RADIOLOGY

## 2021-03-05 PROCEDURE — 63600175 PHARM REV CODE 636 W HCPCS: Performed by: EMERGENCY MEDICINE

## 2021-03-05 PROCEDURE — 85025 COMPLETE CBC W/AUTO DIFF WBC: CPT | Performed by: EMERGENCY MEDICINE

## 2021-03-05 PROCEDURE — 96372 THER/PROPH/DIAG INJ SC/IM: CPT | Mod: 59

## 2021-03-05 PROCEDURE — 99285 EMERGENCY DEPT VISIT HI MDM: CPT | Mod: 25

## 2021-03-05 PROCEDURE — 74019 XR ABDOMEN FLAT AND ERECT: ICD-10-PCS | Mod: 26,,, | Performed by: RADIOLOGY

## 2021-03-05 RX ORDER — HYDROMORPHONE HYDROCHLORIDE 2 MG/ML
0.5 INJECTION, SOLUTION INTRAMUSCULAR; INTRAVENOUS; SUBCUTANEOUS
Status: COMPLETED | OUTPATIENT
Start: 2021-03-05 | End: 2021-03-05

## 2021-03-05 RX ORDER — DICYCLOMINE HYDROCHLORIDE 10 MG/ML
20 INJECTION INTRAMUSCULAR
Status: COMPLETED | OUTPATIENT
Start: 2021-03-05 | End: 2021-03-05

## 2021-03-05 RX ORDER — ONDANSETRON 2 MG/ML
4 INJECTION INTRAMUSCULAR; INTRAVENOUS
Status: COMPLETED | OUTPATIENT
Start: 2021-03-05 | End: 2021-03-05

## 2021-03-05 RX ADMIN — HYDROMORPHONE HYDROCHLORIDE 0.5 MG: 2 INJECTION, SOLUTION INTRAMUSCULAR; INTRAVENOUS; SUBCUTANEOUS at 07:03

## 2021-03-05 RX ADMIN — ONDANSETRON HYDROCHLORIDE 4 MG: 2 SOLUTION INTRAMUSCULAR; INTRAVENOUS at 07:03

## 2021-03-05 RX ADMIN — DICYCLOMINE HYDROCHLORIDE 20 MG: 20 INJECTION, SOLUTION INTRAMUSCULAR at 07:03

## 2021-03-05 RX ADMIN — IOHEXOL 75 ML: 350 INJECTION, SOLUTION INTRAVENOUS at 09:03

## 2021-03-07 ENCOUNTER — PATIENT MESSAGE (OUTPATIENT)
Dept: GASTROENTEROLOGY | Facility: CLINIC | Age: 66
End: 2021-03-07

## 2021-03-08 ENCOUNTER — OFFICE VISIT (OUTPATIENT)
Dept: GASTROENTEROLOGY | Facility: CLINIC | Age: 66
End: 2021-03-08
Payer: COMMERCIAL

## 2021-03-08 VITALS
RESPIRATION RATE: 17 BRPM | HEART RATE: 63 BPM | DIASTOLIC BLOOD PRESSURE: 70 MMHG | WEIGHT: 170 LBS | TEMPERATURE: 98 F | SYSTOLIC BLOOD PRESSURE: 117 MMHG | OXYGEN SATURATION: 96 % | BODY MASS INDEX: 29.02 KG/M2 | HEIGHT: 64 IN

## 2021-03-08 DIAGNOSIS — R10.11 RIGHT UPPER QUADRANT PAIN: ICD-10-CM

## 2021-03-08 DIAGNOSIS — K21.9 GASTROESOPHAGEAL REFLUX DISEASE WITHOUT ESOPHAGITIS: ICD-10-CM

## 2021-03-08 DIAGNOSIS — K59.00 CONSTIPATION, UNSPECIFIED CONSTIPATION TYPE: ICD-10-CM

## 2021-03-08 DIAGNOSIS — R10.9 ABDOMINAL PAIN, UNSPECIFIED ABDOMINAL LOCATION: ICD-10-CM

## 2021-03-08 DIAGNOSIS — Z90.49 HISTORY OF CHOLECYSTECTOMY: Primary | ICD-10-CM

## 2021-03-08 DIAGNOSIS — R11.0 NAUSEA: ICD-10-CM

## 2021-03-08 PROBLEM — Z87.11 HISTORY OF GASTRIC ULCER: Status: RESOLVED | Noted: 2020-11-09 | Resolved: 2021-03-08

## 2021-03-08 PROCEDURE — 1125F AMNT PAIN NOTED PAIN PRSNT: CPT | Mod: S$GLB,,, | Performed by: INTERNAL MEDICINE

## 2021-03-08 PROCEDURE — 1125F PR PAIN SEVERITY QUANTIFIED, PAIN PRESENT: ICD-10-PCS | Mod: S$GLB,,, | Performed by: INTERNAL MEDICINE

## 2021-03-08 PROCEDURE — 99214 OFFICE O/P EST MOD 30 MIN: CPT | Mod: S$GLB,,, | Performed by: INTERNAL MEDICINE

## 2021-03-08 PROCEDURE — 99999 PR PBB SHADOW E&M-EST. PATIENT-LVL V: ICD-10-PCS | Mod: PBBFAC,,, | Performed by: INTERNAL MEDICINE

## 2021-03-08 PROCEDURE — 99214 PR OFFICE/OUTPT VISIT, EST, LEVL IV, 30-39 MIN: ICD-10-PCS | Mod: S$GLB,,, | Performed by: INTERNAL MEDICINE

## 2021-03-08 PROCEDURE — 3008F PR BODY MASS INDEX (BMI) DOCUMENTED: ICD-10-PCS | Mod: CPTII,S$GLB,, | Performed by: INTERNAL MEDICINE

## 2021-03-08 PROCEDURE — 99999 PR PBB SHADOW E&M-EST. PATIENT-LVL V: CPT | Mod: PBBFAC,,, | Performed by: INTERNAL MEDICINE

## 2021-03-08 PROCEDURE — 3008F BODY MASS INDEX DOCD: CPT | Mod: CPTII,S$GLB,, | Performed by: INTERNAL MEDICINE

## 2021-03-08 RX ORDER — LACTULOSE 10 G/15ML
SOLUTION ORAL
COMMUNITY
Start: 2021-03-07 | End: 2021-03-08 | Stop reason: SDUPTHER

## 2021-03-11 ENCOUNTER — PATIENT MESSAGE (OUTPATIENT)
Dept: GASTROENTEROLOGY | Facility: CLINIC | Age: 66
End: 2021-03-11

## 2021-03-12 ENCOUNTER — PATIENT MESSAGE (OUTPATIENT)
Dept: GASTROENTEROLOGY | Facility: CLINIC | Age: 66
End: 2021-03-12

## 2021-03-12 PROBLEM — R10.11 RIGHT UPPER QUADRANT PAIN: Status: ACTIVE | Noted: 2021-03-12

## 2021-03-12 RX ORDER — LACTULOSE 10 G/15ML
6.5 SOLUTION ORAL 3 TIMES DAILY
Qty: 946 ML | Refills: 11 | Status: SHIPPED | OUTPATIENT
Start: 2021-03-12 | End: 2021-03-23 | Stop reason: SDUPTHER

## 2021-03-15 ENCOUNTER — TELEPHONE (OUTPATIENT)
Dept: GASTROENTEROLOGY | Facility: CLINIC | Age: 66
End: 2021-03-15

## 2021-03-18 ENCOUNTER — PATIENT OUTREACH (OUTPATIENT)
Dept: ADMINISTRATIVE | Facility: OTHER | Age: 66
End: 2021-03-18

## 2021-03-23 ENCOUNTER — OFFICE VISIT (OUTPATIENT)
Dept: GASTROENTEROLOGY | Facility: CLINIC | Age: 66
End: 2021-03-23
Payer: COMMERCIAL

## 2021-03-23 VITALS
DIASTOLIC BLOOD PRESSURE: 65 MMHG | WEIGHT: 168.81 LBS | SYSTOLIC BLOOD PRESSURE: 111 MMHG | HEIGHT: 64 IN | HEART RATE: 81 BPM | BODY MASS INDEX: 28.82 KG/M2 | RESPIRATION RATE: 17 BRPM | OXYGEN SATURATION: 97 % | TEMPERATURE: 98 F

## 2021-03-23 DIAGNOSIS — R11.0 NAUSEA: ICD-10-CM

## 2021-03-23 DIAGNOSIS — Z90.49 HISTORY OF CHOLECYSTECTOMY: ICD-10-CM

## 2021-03-23 DIAGNOSIS — I34.1 MITRAL VALVE PROLAPSE: Primary | ICD-10-CM

## 2021-03-23 DIAGNOSIS — K64.9 HEMORRHOIDS, UNSPECIFIED HEMORRHOID TYPE: ICD-10-CM

## 2021-03-23 DIAGNOSIS — Z90.710 HISTORY OF HYSTERECTOMY: ICD-10-CM

## 2021-03-23 DIAGNOSIS — K21.9 GASTROESOPHAGEAL REFLUX DISEASE WITHOUT ESOPHAGITIS: ICD-10-CM

## 2021-03-23 DIAGNOSIS — K59.00 CONSTIPATION, UNSPECIFIED CONSTIPATION TYPE: ICD-10-CM

## 2021-03-23 DIAGNOSIS — K56.609 INTESTINAL OBSTRUCTION, UNSPECIFIED CAUSE, UNSPECIFIED WHETHER PARTIAL OR COMPLETE: ICD-10-CM

## 2021-03-23 DIAGNOSIS — E66.9 OBESITY (BMI 30.0-34.9): ICD-10-CM

## 2021-03-23 DIAGNOSIS — K62.5 RECTAL BLEEDING: ICD-10-CM

## 2021-03-23 PROBLEM — R11.10 EMESIS: Status: RESOLVED | Noted: 2020-09-01 | Resolved: 2021-03-23

## 2021-03-23 PROCEDURE — 1126F PR PAIN SEVERITY QUANTIFIED, NO PAIN PRESENT: ICD-10-PCS | Mod: S$GLB,,, | Performed by: INTERNAL MEDICINE

## 2021-03-23 PROCEDURE — 1126F AMNT PAIN NOTED NONE PRSNT: CPT | Mod: S$GLB,,, | Performed by: INTERNAL MEDICINE

## 2021-03-23 PROCEDURE — 99214 PR OFFICE/OUTPT VISIT, EST, LEVL IV, 30-39 MIN: ICD-10-PCS | Mod: S$GLB,,, | Performed by: INTERNAL MEDICINE

## 2021-03-23 PROCEDURE — 99214 OFFICE O/P EST MOD 30 MIN: CPT | Mod: S$GLB,,, | Performed by: INTERNAL MEDICINE

## 2021-03-23 PROCEDURE — 3008F BODY MASS INDEX DOCD: CPT | Mod: CPTII,S$GLB,, | Performed by: INTERNAL MEDICINE

## 2021-03-23 PROCEDURE — 3008F PR BODY MASS INDEX (BMI) DOCUMENTED: ICD-10-PCS | Mod: CPTII,S$GLB,, | Performed by: INTERNAL MEDICINE

## 2021-03-23 PROCEDURE — 99999 PR PBB SHADOW E&M-EST. PATIENT-LVL IV: ICD-10-PCS | Mod: PBBFAC,,, | Performed by: INTERNAL MEDICINE

## 2021-03-23 PROCEDURE — 99999 PR PBB SHADOW E&M-EST. PATIENT-LVL IV: CPT | Mod: PBBFAC,,, | Performed by: INTERNAL MEDICINE

## 2021-03-23 RX ORDER — LACTULOSE 10 G/15ML
6.5 SOLUTION ORAL 4 TIMES DAILY
Qty: 3600 ML | Refills: 4 | Status: ON HOLD | OUTPATIENT
Start: 2021-03-23 | End: 2021-05-11 | Stop reason: HOSPADM

## 2021-03-26 ENCOUNTER — LAB VISIT (OUTPATIENT)
Dept: LAB | Facility: HOSPITAL | Age: 66
End: 2021-03-26
Attending: INTERNAL MEDICINE
Payer: COMMERCIAL

## 2021-03-26 ENCOUNTER — OFFICE VISIT (OUTPATIENT)
Dept: CARDIOLOGY | Facility: CLINIC | Age: 66
End: 2021-03-26
Payer: COMMERCIAL

## 2021-03-26 ENCOUNTER — PATIENT MESSAGE (OUTPATIENT)
Dept: GASTROENTEROLOGY | Facility: CLINIC | Age: 66
End: 2021-03-26

## 2021-03-26 VITALS
TEMPERATURE: 98 F | WEIGHT: 170.69 LBS | BODY MASS INDEX: 29.14 KG/M2 | SYSTOLIC BLOOD PRESSURE: 114 MMHG | RESPIRATION RATE: 17 BRPM | DIASTOLIC BLOOD PRESSURE: 56 MMHG | HEART RATE: 75 BPM | OXYGEN SATURATION: 100 % | HEIGHT: 64 IN

## 2021-03-26 DIAGNOSIS — M15.9 PRIMARY OSTEOARTHRITIS INVOLVING MULTIPLE JOINTS: ICD-10-CM

## 2021-03-26 DIAGNOSIS — Z82.49 FAMILY HISTORY OF PREMATURE CAD: ICD-10-CM

## 2021-03-26 DIAGNOSIS — Z87.891 HISTORY OF SMOKING 25-50 PACK YEARS: ICD-10-CM

## 2021-03-26 DIAGNOSIS — D64.9 ANEMIA, UNSPECIFIED TYPE: ICD-10-CM

## 2021-03-26 DIAGNOSIS — E65 ABDOMINAL OBESITY: ICD-10-CM

## 2021-03-26 DIAGNOSIS — I25.10 CORONARY ARTERY CALCIFICATION: ICD-10-CM

## 2021-03-26 DIAGNOSIS — E78.5 DYSLIPIDEMIA: ICD-10-CM

## 2021-03-26 DIAGNOSIS — I34.1 MITRAL VALVE PROLAPSE: ICD-10-CM

## 2021-03-26 DIAGNOSIS — I25.10 CORONARY ARTERY CALCIFICATION: Primary | ICD-10-CM

## 2021-03-26 DIAGNOSIS — J43.8 OTHER EMPHYSEMA: ICD-10-CM

## 2021-03-26 DIAGNOSIS — Z91.89 CARDIOVASCULAR EVENT RISK: ICD-10-CM

## 2021-03-26 DIAGNOSIS — I25.84 CORONARY ARTERY CALCIFICATION: Primary | ICD-10-CM

## 2021-03-26 DIAGNOSIS — I25.84 CORONARY ARTERY CALCIFICATION: ICD-10-CM

## 2021-03-26 DIAGNOSIS — Z79.1 NSAID LONG-TERM USE: ICD-10-CM

## 2021-03-26 DIAGNOSIS — D72.825 BANDEMIA: ICD-10-CM

## 2021-03-26 DIAGNOSIS — R06.09 DOE (DYSPNEA ON EXERTION): ICD-10-CM

## 2021-03-26 LAB
CHOLEST SERPL-MCNC: 206 MG/DL (ref 120–199)
CHOLEST/HDLC SERPL: 4.1 {RATIO} (ref 2–5)
FERRITIN SERPL-MCNC: 3 NG/ML (ref 20–300)
HDLC SERPL-MCNC: 50 MG/DL (ref 40–75)
HDLC SERPL: 24.3 % (ref 20–50)
IRON SERPL-MCNC: 10 UG/DL (ref 30–160)
LDLC SERPL CALC-MCNC: 126.4 MG/DL (ref 63–159)
NONHDLC SERPL-MCNC: 156 MG/DL
RETICS/RBC NFR AUTO: 2.1 % (ref 0.5–2.5)
SATURATED IRON: 2 % (ref 20–50)
TOTAL IRON BINDING CAPACITY: 462 UG/DL (ref 250–450)
TRANSFERRIN SERPL-MCNC: 312 MG/DL (ref 200–375)
TRIGL SERPL-MCNC: 148 MG/DL (ref 30–150)

## 2021-03-26 PROCEDURE — 93010 EKG 12-LEAD: ICD-10-PCS | Mod: S$GLB,,, | Performed by: INTERNAL MEDICINE

## 2021-03-26 PROCEDURE — 99205 OFFICE O/P NEW HI 60 MIN: CPT | Mod: 25,S$GLB,, | Performed by: INTERNAL MEDICINE

## 2021-03-26 PROCEDURE — 85045 AUTOMATED RETICULOCYTE COUNT: CPT | Performed by: INTERNAL MEDICINE

## 2021-03-26 PROCEDURE — 83540 ASSAY OF IRON: CPT | Performed by: INTERNAL MEDICINE

## 2021-03-26 PROCEDURE — 99205 PR OFFICE/OUTPT VISIT, NEW, LEVL V, 60-74 MIN: ICD-10-PCS | Mod: 25,S$GLB,, | Performed by: INTERNAL MEDICINE

## 2021-03-26 PROCEDURE — 99999 PR PBB SHADOW E&M-EST. PATIENT-LVL V: CPT | Mod: PBBFAC,,, | Performed by: INTERNAL MEDICINE

## 2021-03-26 PROCEDURE — 99999 PR PBB SHADOW E&M-EST. PATIENT-LVL V: ICD-10-PCS | Mod: PBBFAC,,, | Performed by: INTERNAL MEDICINE

## 2021-03-26 PROCEDURE — 36415 COLL VENOUS BLD VENIPUNCTURE: CPT | Performed by: INTERNAL MEDICINE

## 2021-03-26 PROCEDURE — 82728 ASSAY OF FERRITIN: CPT | Performed by: INTERNAL MEDICINE

## 2021-03-26 PROCEDURE — 82607 VITAMIN B-12: CPT | Performed by: INTERNAL MEDICINE

## 2021-03-26 PROCEDURE — 93010 ELECTROCARDIOGRAM REPORT: CPT | Mod: S$GLB,,, | Performed by: INTERNAL MEDICINE

## 2021-03-26 PROCEDURE — 83921 ORGANIC ACID SINGLE QUANT: CPT | Performed by: INTERNAL MEDICINE

## 2021-03-26 PROCEDURE — 80061 LIPID PANEL: CPT | Performed by: INTERNAL MEDICINE

## 2021-03-27 LAB — VIT B12 SERPL-MCNC: 221 NG/L (ref 180–914)

## 2021-03-29 ENCOUNTER — OFFICE VISIT (OUTPATIENT)
Dept: SURGERY | Facility: CLINIC | Age: 66
End: 2021-03-29
Payer: COMMERCIAL

## 2021-03-29 ENCOUNTER — PATIENT MESSAGE (OUTPATIENT)
Dept: GASTROENTEROLOGY | Facility: CLINIC | Age: 66
End: 2021-03-29

## 2021-03-29 VITALS
BODY MASS INDEX: 29.37 KG/M2 | WEIGHT: 171.06 LBS | HEART RATE: 85 BPM | DIASTOLIC BLOOD PRESSURE: 55 MMHG | SYSTOLIC BLOOD PRESSURE: 119 MMHG | TEMPERATURE: 99 F

## 2021-03-29 DIAGNOSIS — R10.9 ABDOMINAL PAIN, UNSPECIFIED ABDOMINAL LOCATION: ICD-10-CM

## 2021-03-29 PROCEDURE — 99204 PR OFFICE/OUTPT VISIT, NEW, LEVL IV, 45-59 MIN: ICD-10-PCS | Mod: S$GLB,,, | Performed by: STUDENT IN AN ORGANIZED HEALTH CARE EDUCATION/TRAINING PROGRAM

## 2021-03-29 PROCEDURE — 3008F BODY MASS INDEX DOCD: CPT | Mod: CPTII,S$GLB,, | Performed by: STUDENT IN AN ORGANIZED HEALTH CARE EDUCATION/TRAINING PROGRAM

## 2021-03-29 PROCEDURE — 1125F AMNT PAIN NOTED PAIN PRSNT: CPT | Mod: S$GLB,,, | Performed by: STUDENT IN AN ORGANIZED HEALTH CARE EDUCATION/TRAINING PROGRAM

## 2021-03-29 PROCEDURE — 99999 PR PBB SHADOW E&M-EST. PATIENT-LVL III: ICD-10-PCS | Mod: PBBFAC,,, | Performed by: STUDENT IN AN ORGANIZED HEALTH CARE EDUCATION/TRAINING PROGRAM

## 2021-03-29 PROCEDURE — 3008F PR BODY MASS INDEX (BMI) DOCUMENTED: ICD-10-PCS | Mod: CPTII,S$GLB,, | Performed by: STUDENT IN AN ORGANIZED HEALTH CARE EDUCATION/TRAINING PROGRAM

## 2021-03-29 PROCEDURE — 99204 OFFICE O/P NEW MOD 45 MIN: CPT | Mod: S$GLB,,, | Performed by: STUDENT IN AN ORGANIZED HEALTH CARE EDUCATION/TRAINING PROGRAM

## 2021-03-29 PROCEDURE — 99999 PR PBB SHADOW E&M-EST. PATIENT-LVL III: CPT | Mod: PBBFAC,,, | Performed by: STUDENT IN AN ORGANIZED HEALTH CARE EDUCATION/TRAINING PROGRAM

## 2021-03-29 PROCEDURE — 1125F PR PAIN SEVERITY QUANTIFIED, PAIN PRESENT: ICD-10-PCS | Mod: S$GLB,,, | Performed by: STUDENT IN AN ORGANIZED HEALTH CARE EDUCATION/TRAINING PROGRAM

## 2021-03-29 RX ORDER — ASCORBIC ACID 500 MG
TABLET,CHEWABLE ORAL
COMMUNITY
End: 2021-05-19

## 2021-03-30 ENCOUNTER — PATIENT MESSAGE (OUTPATIENT)
Dept: SURGERY | Facility: CLINIC | Age: 66
End: 2021-03-30

## 2021-03-30 LAB — METHYLMALONATE SERPL-SCNC: 0.23 NMOL/ML

## 2021-04-01 ENCOUNTER — TELEPHONE (OUTPATIENT)
Dept: SURGERY | Facility: CLINIC | Age: 66
End: 2021-04-01

## 2021-04-05 ENCOUNTER — PATIENT MESSAGE (OUTPATIENT)
Dept: CARDIOLOGY | Facility: CLINIC | Age: 66
End: 2021-04-05

## 2021-04-06 DIAGNOSIS — I25.10 CORONARY ARTERY CALCIFICATION: ICD-10-CM

## 2021-04-06 DIAGNOSIS — Z91.89 CARDIOVASCULAR EVENT RISK: Primary | ICD-10-CM

## 2021-04-06 DIAGNOSIS — I25.84 CORONARY ARTERY CALCIFICATION: ICD-10-CM

## 2021-04-06 DIAGNOSIS — R06.09 DOE (DYSPNEA ON EXERTION): ICD-10-CM

## 2021-04-07 ENCOUNTER — PATIENT MESSAGE (OUTPATIENT)
Dept: ADMINISTRATIVE | Facility: HOSPITAL | Age: 66
End: 2021-04-07

## 2021-04-07 ENCOUNTER — OFFICE VISIT (OUTPATIENT)
Dept: SURGERY | Facility: CLINIC | Age: 66
End: 2021-04-07
Attending: INTERNAL MEDICINE
Payer: COMMERCIAL

## 2021-04-07 ENCOUNTER — HOSPITAL ENCOUNTER (OUTPATIENT)
Dept: CARDIOLOGY | Facility: HOSPITAL | Age: 66
Discharge: HOME | End: 2021-04-07
Attending: INTERNAL MEDICINE
Payer: COMMERCIAL

## 2021-04-07 VITALS — HEART RATE: 99 BPM | DIASTOLIC BLOOD PRESSURE: 59 MMHG | SYSTOLIC BLOOD PRESSURE: 131 MMHG

## 2021-04-07 VITALS
HEART RATE: 83 BPM | DIASTOLIC BLOOD PRESSURE: 76 MMHG | HEIGHT: 64 IN | WEIGHT: 172 LBS | OXYGEN SATURATION: 98 % | SYSTOLIC BLOOD PRESSURE: 116 MMHG | TEMPERATURE: 98 F | BODY MASS INDEX: 29.37 KG/M2

## 2021-04-07 DIAGNOSIS — R06.09 DOE (DYSPNEA ON EXERTION): ICD-10-CM

## 2021-04-07 DIAGNOSIS — Z91.89 CARDIOVASCULAR EVENT RISK: ICD-10-CM

## 2021-04-07 DIAGNOSIS — I25.84 CORONARY ARTERY CALCIFICATION: ICD-10-CM

## 2021-04-07 DIAGNOSIS — R10.84 GENERALIZED ABDOMINAL PAIN: Primary | ICD-10-CM

## 2021-04-07 DIAGNOSIS — Z01.818 PRE-OP TESTING: ICD-10-CM

## 2021-04-07 DIAGNOSIS — I25.10 CORONARY ARTERY CALCIFICATION: ICD-10-CM

## 2021-04-07 DIAGNOSIS — Z87.19 HISTORY OF SMALL BOWEL OBSTRUCTION: ICD-10-CM

## 2021-04-07 LAB
AORTIC ROOT ANNULUS: 2.19 CM
AORTIC VALVE CUSP SEPERATION: 1.61 CM
CV ECHO LV RWT: 0.52 CM
CV STRESS BASE HR: 87 BPM
DIASTOLIC BLOOD PRESSURE: 74 MMHG
DOP CALC LVOT AREA: 3.2 CM2
DOP CALC LVOT DIAMETER: 2.01 CM
ECHO LV POSTERIOR WALL: 1.03 CM (ref 0.6–1.1)
EJECTION FRACTION: 60 %
FRACTIONAL SHORTENING: 32 % (ref 28–44)
INTERVENTRICULAR SEPTUM: 0.96 CM (ref 0.6–1.1)
LA MAJOR: 3.97 CM
LA MINOR: 3.35 CM
LEFT ATRIUM SIZE: 3.46 CM
LEFT ATRIUM VOLUME MOD: 15.77 CM3
LEFT INTERNAL DIMENSION IN SYSTOLE: 2.69 CM (ref 2.1–4)
LEFT VENTRICLE DIASTOLIC VOLUME: 67.23 ML
LEFT VENTRICLE SYSTOLIC VOLUME: 26.72 ML
LEFT VENTRICULAR INTERNAL DIMENSION IN DIASTOLE: 3.93 CM (ref 3.5–6)
LEFT VENTRICULAR MASS: 122.72 G
OHS CV CPX 85 PERCENT MAX PREDICTED HEART RATE MALE: 128
OHS CV CPX MAX PREDICTED HEART RATE: 151
OHS CV CPX PATIENT IS FEMALE: 1
OHS CV CPX PATIENT IS MALE: 0
OHS CV CPX PEAK DIASTOLIC BLOOD PRESSURE: 74 MMHG
OHS CV CPX PEAK HEAR RATE: 144 BPM
OHS CV CPX PEAK RATE PRESSURE PRODUCT: NORMAL
OHS CV CPX PEAK SYSTOLIC BLOOD PRESSURE: 147 MMHG
OHS CV CPX PERCENT MAX PREDICTED HEART RATE ACHIEVED: 96
OHS CV CPX RATE PRESSURE PRODUCT PRESENTING: NORMAL
RA MAJOR: 3.86 CM
RA WIDTH: 2.8 CM
RIGHT VENTRICULAR END-DIASTOLIC DIMENSION: 2.85 CM
SYSTOLIC BLOOD PRESSURE: 147 MMHG

## 2021-04-07 PROCEDURE — 1125F AMNT PAIN NOTED PAIN PRSNT: CPT | Mod: S$GLB,,, | Performed by: SURGERY

## 2021-04-07 PROCEDURE — 3008F PR BODY MASS INDEX (BMI) DOCUMENTED: ICD-10-PCS | Mod: CPTII,S$GLB,, | Performed by: SURGERY

## 2021-04-07 PROCEDURE — 99214 PR OFFICE/OUTPT VISIT, EST, LEVL IV, 30-39 MIN: ICD-10-PCS | Mod: S$GLB,,, | Performed by: SURGERY

## 2021-04-07 PROCEDURE — 93351 STRESS TTE COMPLETE: CPT

## 2021-04-07 PROCEDURE — 3008F BODY MASS INDEX DOCD: CPT | Mod: CPTII,S$GLB,, | Performed by: SURGERY

## 2021-04-07 PROCEDURE — 93351 STRESS TTE COMPLETE: CPT | Mod: 26,,, | Performed by: INTERNAL MEDICINE

## 2021-04-07 PROCEDURE — 1125F PR PAIN SEVERITY QUANTIFIED, PAIN PRESENT: ICD-10-PCS | Mod: S$GLB,,, | Performed by: SURGERY

## 2021-04-07 PROCEDURE — 93351 STRESS ECHO (CUPID ONLY): ICD-10-PCS | Mod: 26,,, | Performed by: INTERNAL MEDICINE

## 2021-04-07 PROCEDURE — 63600175 PHARM REV CODE 636 W HCPCS: Performed by: INTERNAL MEDICINE

## 2021-04-07 PROCEDURE — 99214 OFFICE O/P EST MOD 30 MIN: CPT | Mod: S$GLB,,, | Performed by: SURGERY

## 2021-04-07 RX ADMIN — DOBUTAMINE HYDROCHLORIDE: 400 INJECTION INTRAVENOUS at 09:04

## 2021-04-26 ENCOUNTER — HOSPITAL ENCOUNTER (OUTPATIENT)
Dept: RADIOLOGY | Facility: HOSPITAL | Age: 66
Discharge: HOME | End: 2021-04-26
Attending: SURGERY
Payer: COMMERCIAL

## 2021-04-26 DIAGNOSIS — R10.84 GENERALIZED ABDOMINAL PAIN: Primary | ICD-10-CM

## 2021-04-26 DIAGNOSIS — R10.9 ABDOMINAL PAIN, UNSPECIFIED ABDOMINAL LOCATION: ICD-10-CM

## 2021-04-26 DIAGNOSIS — R10.84 GENERALIZED ABDOMINAL PAIN: ICD-10-CM

## 2021-04-26 PROCEDURE — 25500020 PHARM REV CODE 255: Performed by: SURGERY

## 2021-04-26 PROCEDURE — 74177 CT ABDOMEN PELVIS WITH CONTRAST: ICD-10-PCS | Mod: 26,,, | Performed by: RADIOLOGY

## 2021-04-26 PROCEDURE — 74177 CT ABD & PELVIS W/CONTRAST: CPT | Mod: 26,,, | Performed by: RADIOLOGY

## 2021-04-26 PROCEDURE — A9698 NON-RAD CONTRAST MATERIALNOC: HCPCS | Performed by: SURGERY

## 2021-04-26 PROCEDURE — 74177 CT ABD & PELVIS W/CONTRAST: CPT | Mod: TC

## 2021-04-26 RX ADMIN — IOHEXOL 75 ML: 350 INJECTION, SOLUTION INTRAVENOUS at 12:04

## 2021-04-26 RX ADMIN — IOHEXOL 1000 ML: 9 SOLUTION ORAL at 11:04

## 2021-04-27 ENCOUNTER — PATIENT OUTREACH (OUTPATIENT)
Dept: ADMINISTRATIVE | Facility: OTHER | Age: 66
End: 2021-04-27

## 2021-04-28 ENCOUNTER — OFFICE VISIT (OUTPATIENT)
Dept: SURGERY | Facility: CLINIC | Age: 66
End: 2021-04-28
Payer: COMMERCIAL

## 2021-04-28 VITALS
SYSTOLIC BLOOD PRESSURE: 141 MMHG | DIASTOLIC BLOOD PRESSURE: 71 MMHG | RESPIRATION RATE: 18 BRPM | OXYGEN SATURATION: 100 % | TEMPERATURE: 98 F | HEIGHT: 64 IN | HEART RATE: 76 BPM | WEIGHT: 163.63 LBS | BODY MASS INDEX: 27.93 KG/M2

## 2021-04-28 DIAGNOSIS — K56.51 INTESTINAL ADHESIONS WITH PARTIAL OBSTRUCTION: Primary | ICD-10-CM

## 2021-04-28 PROCEDURE — 1126F AMNT PAIN NOTED NONE PRSNT: CPT | Mod: S$GLB,,, | Performed by: SURGERY

## 2021-04-28 PROCEDURE — 3008F BODY MASS INDEX DOCD: CPT | Mod: CPTII,S$GLB,, | Performed by: SURGERY

## 2021-04-28 PROCEDURE — 3008F PR BODY MASS INDEX (BMI) DOCUMENTED: ICD-10-PCS | Mod: CPTII,S$GLB,, | Performed by: SURGERY

## 2021-04-28 PROCEDURE — 99215 OFFICE O/P EST HI 40 MIN: CPT | Mod: S$GLB,,, | Performed by: SURGERY

## 2021-04-28 PROCEDURE — 99215 PR OFFICE/OUTPT VISIT, EST, LEVL V, 40-54 MIN: ICD-10-PCS | Mod: S$GLB,,, | Performed by: SURGERY

## 2021-04-28 PROCEDURE — 1126F PR PAIN SEVERITY QUANTIFIED, NO PAIN PRESENT: ICD-10-PCS | Mod: S$GLB,,, | Performed by: SURGERY

## 2021-04-28 RX ORDER — SODIUM CHLORIDE 9 MG/ML
INJECTION, SOLUTION INTRAVENOUS CONTINUOUS
Status: CANCELLED | OUTPATIENT
Start: 2021-04-28

## 2021-04-30 ENCOUNTER — ANESTHESIA EVENT (OUTPATIENT)
Dept: SURGERY | Facility: HOSPITAL | Age: 66
DRG: 330 | End: 2021-04-30
Payer: COMMERCIAL

## 2021-04-30 ENCOUNTER — HOSPITAL ENCOUNTER (OUTPATIENT)
Dept: PREADMISSION TESTING | Facility: HOSPITAL | Age: 66
Discharge: HOME | End: 2021-04-30
Attending: SURGERY
Payer: COMMERCIAL

## 2021-04-30 PROCEDURE — 99900103 DSU ONLY-NO CHARGE-INITIAL HR (STAT)

## 2021-05-01 ENCOUNTER — PATIENT MESSAGE (OUTPATIENT)
Dept: GASTROENTEROLOGY | Facility: CLINIC | Age: 66
End: 2021-05-01

## 2021-05-01 ENCOUNTER — LAB VISIT (OUTPATIENT)
Dept: FAMILY MEDICINE | Facility: CLINIC | Age: 66
DRG: 330 | End: 2021-05-01
Payer: COMMERCIAL

## 2021-05-01 DIAGNOSIS — K56.51 INTESTINAL ADHESIONS WITH PARTIAL OBSTRUCTION: ICD-10-CM

## 2021-05-01 LAB — SARS-COV-2 RNA RESP QL NAA+PROBE: NOT DETECTED

## 2021-05-01 PROCEDURE — U0005 INFEC AGEN DETEC AMPLI PROBE: HCPCS | Performed by: SURGERY

## 2021-05-01 PROCEDURE — U0003 INFECTIOUS AGENT DETECTION BY NUCLEIC ACID (DNA OR RNA); SEVERE ACUTE RESPIRATORY SYNDROME CORONAVIRUS 2 (SARS-COV-2) (CORONAVIRUS DISEASE [COVID-19]), AMPLIFIED PROBE TECHNIQUE, MAKING USE OF HIGH THROUGHPUT TECHNOLOGIES AS DESCRIBED BY CMS-2020-01-R: HCPCS | Performed by: SURGERY

## 2021-05-04 ENCOUNTER — HOSPITAL ENCOUNTER (INPATIENT)
Facility: HOSPITAL | Age: 66
LOS: 7 days | Discharge: HOME | DRG: 330 | End: 2021-05-11
Attending: SURGERY | Admitting: SURGERY
Payer: COMMERCIAL

## 2021-05-04 ENCOUNTER — ANESTHESIA (OUTPATIENT)
Dept: SURGERY | Facility: HOSPITAL | Age: 66
DRG: 330 | End: 2021-05-04
Payer: COMMERCIAL

## 2021-05-04 DIAGNOSIS — R11.0 NAUSEA: ICD-10-CM

## 2021-05-04 DIAGNOSIS — K56.609 INTESTINAL OBSTRUCTION, UNSPECIFIED CAUSE, UNSPECIFIED WHETHER PARTIAL OR COMPLETE: Primary | ICD-10-CM

## 2021-05-04 DIAGNOSIS — D62 ACUTE BLOOD LOSS ANEMIA: ICD-10-CM

## 2021-05-04 DIAGNOSIS — K56.51 INTESTINAL ADHESIONS WITH PARTIAL OBSTRUCTION: ICD-10-CM

## 2021-05-04 LAB
ANION GAP SERPL CALC-SCNC: 9 MMOL/L (ref 8–16)
BUN SERPL-MCNC: 9 MG/DL (ref 8–23)
CALCIUM SERPL-MCNC: 8.2 MG/DL (ref 8.7–10.5)
CHLORIDE SERPL-SCNC: 108 MMOL/L (ref 95–110)
CO2 SERPL-SCNC: 24 MMOL/L (ref 23–29)
CREAT SERPL-MCNC: 0.7 MG/DL (ref 0.5–1.4)
EST. GFR  (AFRICAN AMERICAN): >60 ML/MIN/1.73 M^2
EST. GFR  (NON AFRICAN AMERICAN): >60 ML/MIN/1.73 M^2
GLUCOSE SERPL-MCNC: 134 MG/DL (ref 70–110)
POTASSIUM SERPL-SCNC: 4.1 MMOL/L (ref 3.5–5.1)
SODIUM SERPL-SCNC: 141 MMOL/L (ref 136–145)

## 2021-05-04 PROCEDURE — 37000008 HC ANESTHESIA 1ST 15 MINUTES: Performed by: SURGERY

## 2021-05-04 PROCEDURE — 49905 OMENTAL FLAP INTRA-ABDOM: CPT | Mod: ,,, | Performed by: SURGERY

## 2021-05-04 PROCEDURE — 11000001 HC ACUTE MED/SURG PRIVATE ROOM

## 2021-05-04 PROCEDURE — 44120 REMOVAL OF SMALL INTESTINE: CPT | Mod: ,,, | Performed by: SURGERY

## 2021-05-04 PROCEDURE — 71000033 HC RECOVERY, INTIAL HOUR: Performed by: SURGERY

## 2021-05-04 PROCEDURE — 36415 COLL VENOUS BLD VENIPUNCTURE: CPT | Performed by: SURGERY

## 2021-05-04 PROCEDURE — 71000039 HC RECOVERY, EACH ADD'L HOUR: Performed by: SURGERY

## 2021-05-04 PROCEDURE — 27201423 OPTIME MED/SURG SUP & DEVICES STERILE SUPPLY: Performed by: SURGERY

## 2021-05-04 PROCEDURE — 63600175 PHARM REV CODE 636 W HCPCS: Performed by: SURGERY

## 2021-05-04 PROCEDURE — D9220A PRA ANESTHESIA: Mod: CRNA,,, | Performed by: NURSE ANESTHETIST, CERTIFIED REGISTERED

## 2021-05-04 PROCEDURE — 63600175 PHARM REV CODE 636 W HCPCS: Performed by: ANESTHESIOLOGY

## 2021-05-04 PROCEDURE — D9220A PRA ANESTHESIA: ICD-10-PCS | Mod: CRNA,,, | Performed by: NURSE ANESTHETIST, CERTIFIED REGISTERED

## 2021-05-04 PROCEDURE — 94799 UNLISTED PULMONARY SVC/PX: CPT

## 2021-05-04 PROCEDURE — 88307 TISSUE EXAM BY PATHOLOGIST: CPT | Mod: 26,,, | Performed by: PATHOLOGY

## 2021-05-04 PROCEDURE — 44120 PR RESECT SMALL INTEST,SINGL RESEC/ANAS: ICD-10-PCS | Mod: ,,, | Performed by: SURGERY

## 2021-05-04 PROCEDURE — 25000003 PHARM REV CODE 250: Performed by: SURGERY

## 2021-05-04 PROCEDURE — 37000009 HC ANESTHESIA EA ADD 15 MINS: Performed by: SURGERY

## 2021-05-04 PROCEDURE — C9290 INJ, BUPIVACAINE LIPOSOME: HCPCS | Performed by: SURGERY

## 2021-05-04 PROCEDURE — 88307 TISSUE EXAM BY PATHOLOGIST: CPT | Performed by: PATHOLOGY

## 2021-05-04 PROCEDURE — 36000709 HC OR TIME LEV III EA ADD 15 MIN: Performed by: SURGERY

## 2021-05-04 PROCEDURE — C9113 INJ PANTOPRAZOLE SODIUM, VIA: HCPCS | Performed by: SURGERY

## 2021-05-04 PROCEDURE — 80048 BASIC METABOLIC PNL TOTAL CA: CPT | Performed by: SURGERY

## 2021-05-04 PROCEDURE — 49905 PR OMENTAL FLAP,INTRA-ABDOMINAL: ICD-10-PCS | Mod: ,,, | Performed by: SURGERY

## 2021-05-04 PROCEDURE — D9220A PRA ANESTHESIA: Mod: ANES,,, | Performed by: ANESTHESIOLOGY

## 2021-05-04 PROCEDURE — 36000708 HC OR TIME LEV III 1ST 15 MIN: Performed by: SURGERY

## 2021-05-04 PROCEDURE — 63600175 PHARM REV CODE 636 W HCPCS: Performed by: NURSE ANESTHETIST, CERTIFIED REGISTERED

## 2021-05-04 PROCEDURE — 25000003 PHARM REV CODE 250: Performed by: NURSE ANESTHETIST, CERTIFIED REGISTERED

## 2021-05-04 PROCEDURE — D9220A PRA ANESTHESIA: ICD-10-PCS | Mod: ANES,,, | Performed by: ANESTHESIOLOGY

## 2021-05-04 PROCEDURE — 25000003 PHARM REV CODE 250: Performed by: ANESTHESIOLOGY

## 2021-05-04 PROCEDURE — 88307 PR  SURG PATH,LEVEL V: ICD-10-PCS | Mod: 26,,, | Performed by: PATHOLOGY

## 2021-05-04 RX ORDER — LIDOCAINE HYDROCHLORIDE 10 MG/ML
1 INJECTION, SOLUTION EPIDURAL; INFILTRATION; INTRACAUDAL; PERINEURAL ONCE
Status: DISCONTINUED | OUTPATIENT
Start: 2021-05-04 | End: 2021-05-11 | Stop reason: HOSPADM

## 2021-05-04 RX ORDER — SODIUM CHLORIDE 9 MG/ML
INJECTION, SOLUTION INTRAVENOUS CONTINUOUS
Status: DISCONTINUED | OUTPATIENT
Start: 2021-05-04 | End: 2021-05-04

## 2021-05-04 RX ORDER — LIDOCAINE HYDROCHLORIDE 20 MG/ML
INJECTION, SOLUTION EPIDURAL; INFILTRATION; INTRACAUDAL; PERINEURAL
Status: DISCONTINUED | OUTPATIENT
Start: 2021-05-04 | End: 2021-05-04

## 2021-05-04 RX ORDER — SUCCINYLCHOLINE CHLORIDE 20 MG/ML
INJECTION INTRAMUSCULAR; INTRAVENOUS
Status: DISCONTINUED | OUTPATIENT
Start: 2021-05-04 | End: 2021-05-04

## 2021-05-04 RX ORDER — PROPOFOL 10 MG/ML
VIAL (ML) INTRAVENOUS
Status: DISCONTINUED | OUTPATIENT
Start: 2021-05-04 | End: 2021-05-04

## 2021-05-04 RX ORDER — MORPHINE SULFATE 4 MG/ML
2 INJECTION, SOLUTION INTRAMUSCULAR; INTRAVENOUS EVERY 5 MIN PRN
Status: DISCONTINUED | OUTPATIENT
Start: 2021-05-04 | End: 2021-05-04 | Stop reason: HOSPADM

## 2021-05-04 RX ORDER — HYDROMORPHONE HYDROCHLORIDE 2 MG/ML
1 INJECTION, SOLUTION INTRAMUSCULAR; INTRAVENOUS; SUBCUTANEOUS
Status: DISCONTINUED | OUTPATIENT
Start: 2021-05-04 | End: 2021-05-11 | Stop reason: HOSPADM

## 2021-05-04 RX ORDER — SODIUM CHLORIDE, SODIUM LACTATE, POTASSIUM CHLORIDE, CALCIUM CHLORIDE 600; 310; 30; 20 MG/100ML; MG/100ML; MG/100ML; MG/100ML
INJECTION, SOLUTION INTRAVENOUS CONTINUOUS
Status: DISCONTINUED | OUTPATIENT
Start: 2021-05-04 | End: 2021-05-10

## 2021-05-04 RX ORDER — DEXAMETHASONE SODIUM PHOSPHATE 4 MG/ML
INJECTION, SOLUTION INTRA-ARTICULAR; INTRALESIONAL; INTRAMUSCULAR; INTRAVENOUS; SOFT TISSUE
Status: DISCONTINUED | OUTPATIENT
Start: 2021-05-04 | End: 2021-05-04

## 2021-05-04 RX ORDER — ONDANSETRON 2 MG/ML
INJECTION INTRAMUSCULAR; INTRAVENOUS
Status: DISCONTINUED | OUTPATIENT
Start: 2021-05-04 | End: 2021-05-04

## 2021-05-04 RX ORDER — SODIUM CHLORIDE, SODIUM LACTATE, POTASSIUM CHLORIDE, CALCIUM CHLORIDE 600; 310; 30; 20 MG/100ML; MG/100ML; MG/100ML; MG/100ML
INJECTION, SOLUTION INTRAVENOUS CONTINUOUS
Status: DISCONTINUED | OUTPATIENT
Start: 2021-05-04 | End: 2021-05-04

## 2021-05-04 RX ORDER — ONDANSETRON 2 MG/ML
4 INJECTION INTRAMUSCULAR; INTRAVENOUS EVERY 6 HOURS PRN
Status: DISCONTINUED | OUTPATIENT
Start: 2021-05-04 | End: 2021-05-11 | Stop reason: HOSPADM

## 2021-05-04 RX ORDER — MIDAZOLAM HYDROCHLORIDE 1 MG/ML
INJECTION, SOLUTION INTRAMUSCULAR; INTRAVENOUS
Status: DISCONTINUED | OUTPATIENT
Start: 2021-05-04 | End: 2021-05-04

## 2021-05-04 RX ORDER — DIPHENHYDRAMINE HYDROCHLORIDE 50 MG/ML
12.5 INJECTION INTRAMUSCULAR; INTRAVENOUS
Status: DISCONTINUED | OUTPATIENT
Start: 2021-05-04 | End: 2021-05-04 | Stop reason: HOSPADM

## 2021-05-04 RX ORDER — BUPIVACAINE HYDROCHLORIDE AND EPINEPHRINE 5; 5 MG/ML; UG/ML
INJECTION, SOLUTION EPIDURAL; INTRACAUDAL; PERINEURAL
Status: DISCONTINUED | OUTPATIENT
Start: 2021-05-04 | End: 2021-05-04 | Stop reason: HOSPADM

## 2021-05-04 RX ORDER — CEFAZOLIN SODIUM 2 G/50ML
2 SOLUTION INTRAVENOUS
Status: COMPLETED | OUTPATIENT
Start: 2021-05-04 | End: 2021-05-04

## 2021-05-04 RX ORDER — PANTOPRAZOLE SODIUM 40 MG/10ML
40 INJECTION, POWDER, LYOPHILIZED, FOR SOLUTION INTRAVENOUS DAILY
Status: DISCONTINUED | OUTPATIENT
Start: 2021-05-04 | End: 2021-05-11 | Stop reason: HOSPADM

## 2021-05-04 RX ORDER — FAMOTIDINE 10 MG/ML
20 INJECTION INTRAVENOUS ONCE
Status: COMPLETED | OUTPATIENT
Start: 2021-05-04 | End: 2021-05-04

## 2021-05-04 RX ORDER — MEPERIDINE HYDROCHLORIDE 50 MG/ML
INJECTION INTRAMUSCULAR; INTRAVENOUS; SUBCUTANEOUS
Status: DISCONTINUED | OUTPATIENT
Start: 2021-05-04 | End: 2021-05-04

## 2021-05-04 RX ORDER — ROCURONIUM BROMIDE 10 MG/ML
INJECTION, SOLUTION INTRAVENOUS
Status: DISCONTINUED | OUTPATIENT
Start: 2021-05-04 | End: 2021-05-04

## 2021-05-04 RX ORDER — SODIUM CHLORIDE, SODIUM LACTATE, POTASSIUM CHLORIDE, CALCIUM CHLORIDE 600; 310; 30; 20 MG/100ML; MG/100ML; MG/100ML; MG/100ML
125 INJECTION, SOLUTION INTRAVENOUS CONTINUOUS
Status: DISCONTINUED | OUTPATIENT
Start: 2021-05-04 | End: 2021-05-04

## 2021-05-04 RX ORDER — SODIUM CHLORIDE 0.9 % (FLUSH) 0.9 %
10 SYRINGE (ML) INJECTION
Status: DISCONTINUED | OUTPATIENT
Start: 2021-05-04 | End: 2021-05-11 | Stop reason: HOSPADM

## 2021-05-04 RX ORDER — LIDOCAINE HYDROCHLORIDE 10 MG/ML
1 INJECTION, SOLUTION EPIDURAL; INFILTRATION; INTRACAUDAL; PERINEURAL ONCE
Status: DISCONTINUED | OUTPATIENT
Start: 2021-05-04 | End: 2021-05-04

## 2021-05-04 RX ORDER — ENOXAPARIN SODIUM 100 MG/ML
40 INJECTION SUBCUTANEOUS EVERY 24 HOURS
Status: DISCONTINUED | OUTPATIENT
Start: 2021-05-04 | End: 2021-05-11 | Stop reason: HOSPADM

## 2021-05-04 RX ORDER — ERTAPENEM 1 G/1
INJECTION, POWDER, LYOPHILIZED, FOR SOLUTION INTRAMUSCULAR; INTRAVENOUS
Status: DISCONTINUED | OUTPATIENT
Start: 2021-05-04 | End: 2021-05-04

## 2021-05-04 RX ORDER — ONDANSETRON 2 MG/ML
4 INJECTION INTRAMUSCULAR; INTRAVENOUS DAILY PRN
Status: DISCONTINUED | OUTPATIENT
Start: 2021-05-04 | End: 2021-05-04 | Stop reason: HOSPADM

## 2021-05-04 RX ADMIN — FAMOTIDINE 20 MG: 10 INJECTION INTRAVENOUS at 07:05

## 2021-05-04 RX ADMIN — SODIUM CHLORIDE, POTASSIUM CHLORIDE, SODIUM LACTATE AND CALCIUM CHLORIDE: 600; 310; 30; 20 INJECTION, SOLUTION INTRAVENOUS at 07:05

## 2021-05-04 RX ADMIN — LIDOCAINE HYDROCHLORIDE 100 MG: 20 INJECTION, SOLUTION EPIDURAL; INFILTRATION; INTRACAUDAL; PERINEURAL at 07:05

## 2021-05-04 RX ADMIN — HYDROMORPHONE HYDROCHLORIDE 1 MG: 2 INJECTION INTRAMUSCULAR; INTRAVENOUS; SUBCUTANEOUS at 10:05

## 2021-05-04 RX ADMIN — MIDAZOLAM HYDROCHLORIDE 2 MG: 1 INJECTION, SOLUTION INTRAMUSCULAR; INTRAVENOUS at 07:05

## 2021-05-04 RX ADMIN — PROPOFOL 200 MG: 10 INJECTION, EMULSION INTRAVENOUS at 07:05

## 2021-05-04 RX ADMIN — SUGAMMADEX 200 MG: 100 INJECTION, SOLUTION INTRAVENOUS at 10:05

## 2021-05-04 RX ADMIN — MORPHINE SULFATE 2 MG: 4 INJECTION INTRAVENOUS at 11:05

## 2021-05-04 RX ADMIN — ONDANSETRON HYDROCHLORIDE 4 MG: 2 SOLUTION INTRAMUSCULAR; INTRAVENOUS at 11:05

## 2021-05-04 RX ADMIN — ROCURONIUM BROMIDE 20 MG: 10 INJECTION, SOLUTION INTRAVENOUS at 09:05

## 2021-05-04 RX ADMIN — SODIUM CHLORIDE, SODIUM LACTATE, POTASSIUM CHLORIDE, AND CALCIUM CHLORIDE: .6; .31; .03; .02 INJECTION, SOLUTION INTRAVENOUS at 02:05

## 2021-05-04 RX ADMIN — MEPERIDINE HYDROCHLORIDE 50 MG: 50 INJECTION INTRAMUSCULAR; INTRAVENOUS; SUBCUTANEOUS at 07:05

## 2021-05-04 RX ADMIN — HYDROMORPHONE HYDROCHLORIDE 1 MG: 2 INJECTION INTRAMUSCULAR; INTRAVENOUS; SUBCUTANEOUS at 02:05

## 2021-05-04 RX ADMIN — ENOXAPARIN SODIUM 40 MG: 40 INJECTION SUBCUTANEOUS at 05:05

## 2021-05-04 RX ADMIN — SODIUM CHLORIDE, SODIUM LACTATE, POTASSIUM CHLORIDE, AND CALCIUM CHLORIDE: .6; .31; .03; .02 INJECTION, SOLUTION INTRAVENOUS at 09:05

## 2021-05-04 RX ADMIN — DEXAMETHASONE SODIUM PHOSPHATE 4 MG: 4 INJECTION, SOLUTION INTRA-ARTICULAR; INTRALESIONAL; INTRAMUSCULAR; INTRAVENOUS; SOFT TISSUE at 07:05

## 2021-05-04 RX ADMIN — SODIUM CHLORIDE, POTASSIUM CHLORIDE, SODIUM LACTATE AND CALCIUM CHLORIDE: 600; 310; 30; 20 INJECTION, SOLUTION INTRAVENOUS at 08:05

## 2021-05-04 RX ADMIN — ROCURONIUM BROMIDE 20 MG: 10 INJECTION, SOLUTION INTRAVENOUS at 08:05

## 2021-05-04 RX ADMIN — GLYCOPYRROLATE 0.2 MG: 0.2 INJECTION INTRAMUSCULAR; INTRAVENOUS at 08:05

## 2021-05-04 RX ADMIN — ONDANSETRON HYDROCHLORIDE 4 MG: 2 SOLUTION INTRAMUSCULAR; INTRAVENOUS at 10:05

## 2021-05-04 RX ADMIN — ONDANSETRON 4 MG: 2 INJECTION INTRAMUSCULAR; INTRAVENOUS at 07:05

## 2021-05-04 RX ADMIN — CEFAZOLIN SODIUM 2 G: 2 SOLUTION INTRAVENOUS at 07:05

## 2021-05-04 RX ADMIN — ROCURONIUM BROMIDE 30 MG: 10 INJECTION, SOLUTION INTRAVENOUS at 07:05

## 2021-05-04 RX ADMIN — HYDROMORPHONE HYDROCHLORIDE 1 MG: 2 INJECTION INTRAMUSCULAR; INTRAVENOUS; SUBCUTANEOUS at 05:05

## 2021-05-04 RX ADMIN — SUCCINYLCHOLINE CHLORIDE 100 MG: 20 INJECTION, SOLUTION INTRAMUSCULAR; INTRAVENOUS; PARENTERAL at 07:05

## 2021-05-04 RX ADMIN — ERTAPENEM 1 G: 1 INJECTION INTRAMUSCULAR; INTRAVENOUS at 11:05

## 2021-05-04 RX ADMIN — PANTOPRAZOLE SODIUM 40 MG: 40 INJECTION, POWDER, FOR SOLUTION INTRAVENOUS at 02:05

## 2021-05-04 RX ADMIN — SODIUM CHLORIDE, POTASSIUM CHLORIDE, SODIUM LACTATE AND CALCIUM CHLORIDE: 600; 310; 30; 20 INJECTION, SOLUTION INTRAVENOUS at 10:05

## 2021-05-05 LAB
ABO + RH BLD: NORMAL
ALBUMIN SERPL BCP-MCNC: 2.7 G/DL (ref 3.5–5.2)
ALP SERPL-CCNC: 60 U/L (ref 55–135)
ALT SERPL W/O P-5'-P-CCNC: 18 U/L (ref 10–44)
ANION GAP SERPL CALC-SCNC: 9 MMOL/L (ref 8–16)
AST SERPL-CCNC: 28 U/L (ref 10–40)
BASOPHILS # BLD AUTO: 0.03 K/UL (ref 0–0.2)
BASOPHILS # BLD AUTO: 0.03 K/UL (ref 0–0.2)
BASOPHILS NFR BLD: 0.3 % (ref 0–1.9)
BASOPHILS NFR BLD: 0.3 % (ref 0–1.9)
BILIRUB SERPL-MCNC: 0.6 MG/DL (ref 0.1–1)
BLD GP AB SCN CELLS X3 SERPL QL: NORMAL
BLD PROD TYP BPU: NORMAL
BLD PROD TYP BPU: NORMAL
BLOOD UNIT EXPIRATION DATE: NORMAL
BLOOD UNIT EXPIRATION DATE: NORMAL
BLOOD UNIT TYPE CODE: 5100
BLOOD UNIT TYPE CODE: 5100
BLOOD UNIT TYPE: NORMAL
BLOOD UNIT TYPE: NORMAL
BUN SERPL-MCNC: 11 MG/DL (ref 8–23)
CALCIUM SERPL-MCNC: 8.2 MG/DL (ref 8.7–10.5)
CHLORIDE SERPL-SCNC: 102 MMOL/L (ref 95–110)
CO2 SERPL-SCNC: 27 MMOL/L (ref 23–29)
CODING SYSTEM: NORMAL
CODING SYSTEM: NORMAL
CREAT SERPL-MCNC: 0.7 MG/DL (ref 0.5–1.4)
DIFFERENTIAL METHOD: ABNORMAL
DIFFERENTIAL METHOD: ABNORMAL
DISPENSE STATUS: NORMAL
DISPENSE STATUS: NORMAL
EOSINOPHIL # BLD AUTO: 0 K/UL (ref 0–0.5)
EOSINOPHIL # BLD AUTO: 0 K/UL (ref 0–0.5)
EOSINOPHIL NFR BLD: 0.1 % (ref 0–8)
EOSINOPHIL NFR BLD: 0.3 % (ref 0–8)
ERYTHROCYTE [DISTWIDTH] IN BLOOD BY AUTOMATED COUNT: 18.9 % (ref 11.5–14.5)
ERYTHROCYTE [DISTWIDTH] IN BLOOD BY AUTOMATED COUNT: 19.2 % (ref 11.5–14.5)
EST. GFR  (AFRICAN AMERICAN): >60 ML/MIN/1.73 M^2
EST. GFR  (NON AFRICAN AMERICAN): >60 ML/MIN/1.73 M^2
GLUCOSE SERPL-MCNC: 95 MG/DL (ref 70–110)
HCT VFR BLD AUTO: 20.9 % (ref 37–48.5)
HCT VFR BLD AUTO: 22 % (ref 37–48.5)
HGB BLD-MCNC: 6 G/DL (ref 12–16)
HGB BLD-MCNC: 6.4 G/DL (ref 12–16)
IMM GRANULOCYTES # BLD AUTO: 0.03 K/UL (ref 0–0.04)
IMM GRANULOCYTES # BLD AUTO: 0.04 K/UL (ref 0–0.04)
IMM GRANULOCYTES NFR BLD AUTO: 0.3 % (ref 0–0.5)
IMM GRANULOCYTES NFR BLD AUTO: 0.4 % (ref 0–0.5)
LYMPHOCYTES # BLD AUTO: 2.3 K/UL (ref 1–4.8)
LYMPHOCYTES # BLD AUTO: 2.4 K/UL (ref 1–4.8)
LYMPHOCYTES NFR BLD: 20.9 % (ref 18–48)
LYMPHOCYTES NFR BLD: 25 % (ref 18–48)
MCH RBC QN AUTO: 23.3 PG (ref 27–31)
MCH RBC QN AUTO: 23.6 PG (ref 27–31)
MCHC RBC AUTO-ENTMCNC: 28.7 G/DL (ref 32–36)
MCHC RBC AUTO-ENTMCNC: 29.1 G/DL (ref 32–36)
MCV RBC AUTO: 81 FL (ref 82–98)
MCV RBC AUTO: 81 FL (ref 82–98)
MONOCYTES # BLD AUTO: 0.8 K/UL (ref 0.3–1)
MONOCYTES # BLD AUTO: 0.9 K/UL (ref 0.3–1)
MONOCYTES NFR BLD: 8.1 % (ref 4–15)
MONOCYTES NFR BLD: 8.6 % (ref 4–15)
NEUTROPHILS # BLD AUTO: 6.4 K/UL (ref 1.8–7.7)
NEUTROPHILS # BLD AUTO: 7.5 K/UL (ref 1.8–7.7)
NEUTROPHILS NFR BLD: 66 % (ref 38–73)
NEUTROPHILS NFR BLD: 69.7 % (ref 38–73)
NRBC BLD-RTO: 0 /100 WBC
NRBC BLD-RTO: 0 /100 WBC
NUM UNITS TRANS PACKED RBC: NORMAL
NUM UNITS TRANS PACKED RBC: NORMAL
PLATELET # BLD AUTO: 335 K/UL (ref 150–450)
PLATELET # BLD AUTO: 345 K/UL (ref 150–450)
PMV BLD AUTO: 10.5 FL (ref 9.2–12.9)
PMV BLD AUTO: 10.6 FL (ref 9.2–12.9)
POTASSIUM SERPL-SCNC: 4.1 MMOL/L (ref 3.5–5.1)
PROT SERPL-MCNC: 4.9 G/DL (ref 6–8.4)
RBC # BLD AUTO: 2.58 M/UL (ref 4–5.4)
RBC # BLD AUTO: 2.71 M/UL (ref 4–5.4)
SODIUM SERPL-SCNC: 138 MMOL/L (ref 136–145)
WBC # BLD AUTO: 10.76 K/UL (ref 3.9–12.7)
WBC # BLD AUTO: 9.68 K/UL (ref 3.9–12.7)

## 2021-05-05 PROCEDURE — 86900 BLOOD TYPING SEROLOGIC ABO: CPT | Performed by: SURGERY

## 2021-05-05 PROCEDURE — 63600175 PHARM REV CODE 636 W HCPCS: Performed by: SURGERY

## 2021-05-05 PROCEDURE — P9016 RBC LEUKOCYTES REDUCED: HCPCS | Performed by: SURGERY

## 2021-05-05 PROCEDURE — 85025 COMPLETE CBC W/AUTO DIFF WBC: CPT | Mod: 91 | Performed by: SURGERY

## 2021-05-05 PROCEDURE — 80053 COMPREHEN METABOLIC PANEL: CPT | Performed by: SURGERY

## 2021-05-05 PROCEDURE — 36415 COLL VENOUS BLD VENIPUNCTURE: CPT | Performed by: SURGERY

## 2021-05-05 PROCEDURE — 11000001 HC ACUTE MED/SURG PRIVATE ROOM

## 2021-05-05 PROCEDURE — 86920 COMPATIBILITY TEST SPIN: CPT | Performed by: SURGERY

## 2021-05-05 PROCEDURE — 36430 TRANSFUSION BLD/BLD COMPNT: CPT

## 2021-05-05 PROCEDURE — C9113 INJ PANTOPRAZOLE SODIUM, VIA: HCPCS | Performed by: SURGERY

## 2021-05-05 RX ORDER — ACETAMINOPHEN 325 MG/1
650 TABLET ORAL EVERY 6 HOURS PRN
Status: DISCONTINUED | OUTPATIENT
Start: 2021-05-05 | End: 2021-05-11 | Stop reason: HOSPADM

## 2021-05-05 RX ORDER — HYDROCODONE BITARTRATE AND ACETAMINOPHEN 500; 5 MG/1; MG/1
TABLET ORAL
Status: DISCONTINUED | OUTPATIENT
Start: 2021-05-05 | End: 2021-05-11 | Stop reason: HOSPADM

## 2021-05-05 RX ADMIN — PANTOPRAZOLE SODIUM 40 MG: 40 INJECTION, POWDER, FOR SOLUTION INTRAVENOUS at 09:05

## 2021-05-05 RX ADMIN — HYDROMORPHONE HYDROCHLORIDE 1 MG: 2 INJECTION INTRAMUSCULAR; INTRAVENOUS; SUBCUTANEOUS at 09:05

## 2021-05-05 RX ADMIN — HYDROMORPHONE HYDROCHLORIDE 1 MG: 2 INJECTION INTRAMUSCULAR; INTRAVENOUS; SUBCUTANEOUS at 11:05

## 2021-05-05 RX ADMIN — ERTAPENEM SODIUM 1 G: 1 INJECTION, POWDER, LYOPHILIZED, FOR SOLUTION INTRAMUSCULAR; INTRAVENOUS at 08:05

## 2021-05-05 RX ADMIN — HYDROMORPHONE HYDROCHLORIDE 1 MG: 2 INJECTION INTRAMUSCULAR; INTRAVENOUS; SUBCUTANEOUS at 03:05

## 2021-05-05 RX ADMIN — HYDROMORPHONE HYDROCHLORIDE 1 MG: 2 INJECTION INTRAMUSCULAR; INTRAVENOUS; SUBCUTANEOUS at 04:05

## 2021-05-05 RX ADMIN — HYDROMORPHONE HYDROCHLORIDE 1 MG: 2 INJECTION INTRAMUSCULAR; INTRAVENOUS; SUBCUTANEOUS at 08:05

## 2021-05-05 RX ADMIN — HYDROMORPHONE HYDROCHLORIDE 1 MG: 2 INJECTION INTRAMUSCULAR; INTRAVENOUS; SUBCUTANEOUS at 06:05

## 2021-05-05 RX ADMIN — ENOXAPARIN SODIUM 40 MG: 40 INJECTION SUBCUTANEOUS at 05:05

## 2021-05-05 RX ADMIN — SODIUM CHLORIDE, SODIUM LACTATE, POTASSIUM CHLORIDE, AND CALCIUM CHLORIDE: .6; .31; .03; .02 INJECTION, SOLUTION INTRAVENOUS at 03:05

## 2021-05-06 LAB
ALBUMIN SERPL BCP-MCNC: 2.9 G/DL (ref 3.5–5.2)
ALP SERPL-CCNC: 57 U/L (ref 55–135)
ALT SERPL W/O P-5'-P-CCNC: 17 U/L (ref 10–44)
ANION GAP SERPL CALC-SCNC: 12 MMOL/L (ref 8–16)
AST SERPL-CCNC: 24 U/L (ref 10–40)
BASOPHILS # BLD AUTO: 0.05 K/UL (ref 0–0.2)
BASOPHILS NFR BLD: 0.6 % (ref 0–1.9)
BILIRUB SERPL-MCNC: 0.9 MG/DL (ref 0.1–1)
BUN SERPL-MCNC: 9 MG/DL (ref 8–23)
CALCIUM SERPL-MCNC: 8.2 MG/DL (ref 8.7–10.5)
CHLORIDE SERPL-SCNC: 101 MMOL/L (ref 95–110)
CO2 SERPL-SCNC: 24 MMOL/L (ref 23–29)
CREAT SERPL-MCNC: 0.6 MG/DL (ref 0.5–1.4)
DIFFERENTIAL METHOD: ABNORMAL
EOSINOPHIL # BLD AUTO: 0.1 K/UL (ref 0–0.5)
EOSINOPHIL NFR BLD: 1.5 % (ref 0–8)
ERYTHROCYTE [DISTWIDTH] IN BLOOD BY AUTOMATED COUNT: 18.6 % (ref 11.5–14.5)
EST. GFR  (AFRICAN AMERICAN): >60 ML/MIN/1.73 M^2
EST. GFR  (NON AFRICAN AMERICAN): >60 ML/MIN/1.73 M^2
GLUCOSE SERPL-MCNC: 76 MG/DL (ref 70–110)
HCT VFR BLD AUTO: 28.1 % (ref 37–48.5)
HGB BLD-MCNC: 8.7 G/DL (ref 12–16)
IMM GRANULOCYTES # BLD AUTO: 0.03 K/UL (ref 0–0.04)
IMM GRANULOCYTES NFR BLD AUTO: 0.3 % (ref 0–0.5)
LYMPHOCYTES # BLD AUTO: 2.5 K/UL (ref 1–4.8)
LYMPHOCYTES NFR BLD: 27.3 % (ref 18–48)
MCH RBC QN AUTO: 25.5 PG (ref 27–31)
MCHC RBC AUTO-ENTMCNC: 31 G/DL (ref 32–36)
MCV RBC AUTO: 82 FL (ref 82–98)
MONOCYTES # BLD AUTO: 0.9 K/UL (ref 0.3–1)
MONOCYTES NFR BLD: 9.5 % (ref 4–15)
NEUTROPHILS # BLD AUTO: 5.5 K/UL (ref 1.8–7.7)
NEUTROPHILS NFR BLD: 60.8 % (ref 38–73)
NRBC BLD-RTO: 0 /100 WBC
PLATELET # BLD AUTO: 286 K/UL (ref 150–450)
PMV BLD AUTO: 10.2 FL (ref 9.2–12.9)
POTASSIUM SERPL-SCNC: 3.6 MMOL/L (ref 3.5–5.1)
PROT SERPL-MCNC: 5.2 G/DL (ref 6–8.4)
RBC # BLD AUTO: 3.41 M/UL (ref 4–5.4)
SODIUM SERPL-SCNC: 137 MMOL/L (ref 136–145)
WBC # BLD AUTO: 9.09 K/UL (ref 3.9–12.7)

## 2021-05-06 PROCEDURE — 94640 AIRWAY INHALATION TREATMENT: CPT

## 2021-05-06 PROCEDURE — 94761 N-INVAS EAR/PLS OXIMETRY MLT: CPT

## 2021-05-06 PROCEDURE — 36415 COLL VENOUS BLD VENIPUNCTURE: CPT | Performed by: SURGERY

## 2021-05-06 PROCEDURE — 25000242 PHARM REV CODE 250 ALT 637 W/ HCPCS

## 2021-05-06 PROCEDURE — 11000001 HC ACUTE MED/SURG PRIVATE ROOM

## 2021-05-06 PROCEDURE — C9113 INJ PANTOPRAZOLE SODIUM, VIA: HCPCS | Performed by: SURGERY

## 2021-05-06 PROCEDURE — 80053 COMPREHEN METABOLIC PANEL: CPT | Performed by: SURGERY

## 2021-05-06 PROCEDURE — 85025 COMPLETE CBC W/AUTO DIFF WBC: CPT | Performed by: SURGERY

## 2021-05-06 PROCEDURE — 63600175 PHARM REV CODE 636 W HCPCS: Performed by: SURGERY

## 2021-05-06 RX ORDER — KETOROLAC TROMETHAMINE 30 MG/ML
15 INJECTION, SOLUTION INTRAMUSCULAR; INTRAVENOUS EVERY 8 HOURS
Status: COMPLETED | OUTPATIENT
Start: 2021-05-06 | End: 2021-05-09

## 2021-05-06 RX ORDER — IPRATROPIUM BROMIDE AND ALBUTEROL SULFATE 2.5; .5 MG/3ML; MG/3ML
SOLUTION RESPIRATORY (INHALATION)
Status: COMPLETED
Start: 2021-05-06 | End: 2021-05-06

## 2021-05-06 RX ADMIN — HYDROMORPHONE HYDROCHLORIDE 1 MG: 2 INJECTION INTRAMUSCULAR; INTRAVENOUS; SUBCUTANEOUS at 12:05

## 2021-05-06 RX ADMIN — SODIUM CHLORIDE, SODIUM LACTATE, POTASSIUM CHLORIDE, AND CALCIUM CHLORIDE: .6; .31; .03; .02 INJECTION, SOLUTION INTRAVENOUS at 12:05

## 2021-05-06 RX ADMIN — IPRATROPIUM BROMIDE AND ALBUTEROL SULFATE 3 ML: .5; 3 SOLUTION RESPIRATORY (INHALATION) at 05:05

## 2021-05-06 RX ADMIN — HYDROMORPHONE HYDROCHLORIDE 1 MG: 2 INJECTION INTRAMUSCULAR; INTRAVENOUS; SUBCUTANEOUS at 02:05

## 2021-05-06 RX ADMIN — SODIUM CHLORIDE, SODIUM LACTATE, POTASSIUM CHLORIDE, AND CALCIUM CHLORIDE: .6; .31; .03; .02 INJECTION, SOLUTION INTRAVENOUS at 11:05

## 2021-05-06 RX ADMIN — PANTOPRAZOLE SODIUM 40 MG: 40 INJECTION, POWDER, FOR SOLUTION INTRAVENOUS at 09:05

## 2021-05-06 RX ADMIN — KETOROLAC TROMETHAMINE 15 MG: 30 INJECTION, SOLUTION INTRAMUSCULAR; INTRAVENOUS at 09:05

## 2021-05-06 RX ADMIN — ONDANSETRON HYDROCHLORIDE 4 MG: 2 SOLUTION INTRAMUSCULAR; INTRAVENOUS at 07:05

## 2021-05-06 RX ADMIN — HYDROMORPHONE HYDROCHLORIDE 1 MG: 2 INJECTION INTRAMUSCULAR; INTRAVENOUS; SUBCUTANEOUS at 04:05

## 2021-05-06 RX ADMIN — HYDROMORPHONE HYDROCHLORIDE 1 MG: 2 INJECTION INTRAMUSCULAR; INTRAVENOUS; SUBCUTANEOUS at 05:05

## 2021-05-06 RX ADMIN — ENOXAPARIN SODIUM 40 MG: 40 INJECTION SUBCUTANEOUS at 04:05

## 2021-05-06 RX ADMIN — HYDROMORPHONE HYDROCHLORIDE 1 MG: 2 INJECTION INTRAMUSCULAR; INTRAVENOUS; SUBCUTANEOUS at 11:05

## 2021-05-06 RX ADMIN — HYDROMORPHONE HYDROCHLORIDE 1 MG: 2 INJECTION INTRAMUSCULAR; INTRAVENOUS; SUBCUTANEOUS at 09:05

## 2021-05-07 LAB
ALBUMIN SERPL BCP-MCNC: 2.9 G/DL (ref 3.5–5.2)
ALP SERPL-CCNC: 54 U/L (ref 55–135)
ALT SERPL W/O P-5'-P-CCNC: 16 U/L (ref 10–44)
ANION GAP SERPL CALC-SCNC: 13 MMOL/L (ref 8–16)
AST SERPL-CCNC: 22 U/L (ref 10–40)
BASOPHILS # BLD AUTO: 0.04 K/UL (ref 0–0.2)
BASOPHILS NFR BLD: 0.6 % (ref 0–1.9)
BILIRUB SERPL-MCNC: 0.9 MG/DL (ref 0.1–1)
BUN SERPL-MCNC: 9 MG/DL (ref 8–23)
CALCIUM SERPL-MCNC: 8.5 MG/DL (ref 8.7–10.5)
CHLORIDE SERPL-SCNC: 102 MMOL/L (ref 95–110)
CO2 SERPL-SCNC: 23 MMOL/L (ref 23–29)
CREAT SERPL-MCNC: 0.6 MG/DL (ref 0.5–1.4)
DIFFERENTIAL METHOD: ABNORMAL
EOSINOPHIL # BLD AUTO: 0.3 K/UL (ref 0–0.5)
EOSINOPHIL NFR BLD: 5.1 % (ref 0–8)
ERYTHROCYTE [DISTWIDTH] IN BLOOD BY AUTOMATED COUNT: 18.1 % (ref 11.5–14.5)
EST. GFR  (AFRICAN AMERICAN): >60 ML/MIN/1.73 M^2
EST. GFR  (NON AFRICAN AMERICAN): >60 ML/MIN/1.73 M^2
FINAL PATHOLOGIC DIAGNOSIS: NORMAL
GLUCOSE SERPL-MCNC: 65 MG/DL (ref 70–110)
GROSS: NORMAL
HCT VFR BLD AUTO: 29.2 % (ref 37–48.5)
HGB BLD-MCNC: 8.9 G/DL (ref 12–16)
IMM GRANULOCYTES # BLD AUTO: 0.02 K/UL (ref 0–0.04)
IMM GRANULOCYTES NFR BLD AUTO: 0.3 % (ref 0–0.5)
LYMPHOCYTES # BLD AUTO: 1.8 K/UL (ref 1–4.8)
LYMPHOCYTES NFR BLD: 26.8 % (ref 18–48)
Lab: NORMAL
MCH RBC QN AUTO: 24.9 PG (ref 27–31)
MCHC RBC AUTO-ENTMCNC: 30.5 G/DL (ref 32–36)
MCV RBC AUTO: 82 FL (ref 82–98)
MONOCYTES # BLD AUTO: 0.6 K/UL (ref 0.3–1)
MONOCYTES NFR BLD: 9.5 % (ref 4–15)
NEUTROPHILS # BLD AUTO: 3.8 K/UL (ref 1.8–7.7)
NEUTROPHILS NFR BLD: 57.7 % (ref 38–73)
NRBC BLD-RTO: 0 /100 WBC
PLATELET # BLD AUTO: 315 K/UL (ref 150–450)
PMV BLD AUTO: 10.1 FL (ref 9.2–12.9)
POCT GLUCOSE: 80 MG/DL (ref 70–110)
POTASSIUM SERPL-SCNC: 3.3 MMOL/L (ref 3.5–5.1)
PROT SERPL-MCNC: 5.2 G/DL (ref 6–8.4)
RBC # BLD AUTO: 3.58 M/UL (ref 4–5.4)
SODIUM SERPL-SCNC: 138 MMOL/L (ref 136–145)
WBC # BLD AUTO: 6.52 K/UL (ref 3.9–12.7)

## 2021-05-07 PROCEDURE — 99222 1ST HOSP IP/OBS MODERATE 55: CPT | Mod: ,,, | Performed by: FAMILY MEDICINE

## 2021-05-07 PROCEDURE — 63600175 PHARM REV CODE 636 W HCPCS: Performed by: SURGERY

## 2021-05-07 PROCEDURE — 36415 COLL VENOUS BLD VENIPUNCTURE: CPT | Performed by: SURGERY

## 2021-05-07 PROCEDURE — 63600175 PHARM REV CODE 636 W HCPCS: Performed by: FAMILY MEDICINE

## 2021-05-07 PROCEDURE — 94760 N-INVAS EAR/PLS OXIMETRY 1: CPT

## 2021-05-07 PROCEDURE — 85025 COMPLETE CBC W/AUTO DIFF WBC: CPT | Performed by: SURGERY

## 2021-05-07 PROCEDURE — 11000001 HC ACUTE MED/SURG PRIVATE ROOM

## 2021-05-07 PROCEDURE — 99222 PR INITIAL HOSPITAL CARE,LEVL II: ICD-10-PCS | Mod: ,,, | Performed by: FAMILY MEDICINE

## 2021-05-07 PROCEDURE — C9113 INJ PANTOPRAZOLE SODIUM, VIA: HCPCS | Performed by: SURGERY

## 2021-05-07 PROCEDURE — 80053 COMPREHEN METABOLIC PANEL: CPT | Performed by: SURGERY

## 2021-05-07 RX ORDER — POTASSIUM CHLORIDE 7.45 MG/ML
40 INJECTION INTRAVENOUS ONCE
Status: COMPLETED | OUTPATIENT
Start: 2021-05-07 | End: 2021-05-07

## 2021-05-07 RX ADMIN — PANTOPRAZOLE SODIUM 40 MG: 40 INJECTION, POWDER, FOR SOLUTION INTRAVENOUS at 09:05

## 2021-05-07 RX ADMIN — HYDROMORPHONE HYDROCHLORIDE 1 MG: 2 INJECTION INTRAMUSCULAR; INTRAVENOUS; SUBCUTANEOUS at 07:05

## 2021-05-07 RX ADMIN — ENOXAPARIN SODIUM 40 MG: 40 INJECTION SUBCUTANEOUS at 04:05

## 2021-05-07 RX ADMIN — HYDROMORPHONE HYDROCHLORIDE 1 MG: 2 INJECTION INTRAMUSCULAR; INTRAVENOUS; SUBCUTANEOUS at 04:05

## 2021-05-07 RX ADMIN — KETOROLAC TROMETHAMINE 15 MG: 30 INJECTION, SOLUTION INTRAMUSCULAR; INTRAVENOUS at 05:05

## 2021-05-07 RX ADMIN — POTASSIUM CHLORIDE 40 MEQ: 7.46 INJECTION, SOLUTION INTRAVENOUS at 01:05

## 2021-05-07 RX ADMIN — HYDROMORPHONE HYDROCHLORIDE 1 MG: 2 INJECTION INTRAMUSCULAR; INTRAVENOUS; SUBCUTANEOUS at 08:05

## 2021-05-07 RX ADMIN — KETOROLAC TROMETHAMINE 15 MG: 30 INJECTION, SOLUTION INTRAMUSCULAR; INTRAVENOUS at 10:05

## 2021-05-07 RX ADMIN — KETOROLAC TROMETHAMINE 15 MG: 30 INJECTION, SOLUTION INTRAMUSCULAR; INTRAVENOUS at 01:05

## 2021-05-08 LAB
ALBUMIN SERPL BCP-MCNC: 2.8 G/DL (ref 3.5–5.2)
ALP SERPL-CCNC: 46 U/L (ref 55–135)
ALT SERPL W/O P-5'-P-CCNC: 18 U/L (ref 10–44)
ANION GAP SERPL CALC-SCNC: 8 MMOL/L (ref 8–16)
AST SERPL-CCNC: 23 U/L (ref 10–40)
BASOPHILS # BLD AUTO: 0.03 K/UL (ref 0–0.2)
BASOPHILS NFR BLD: 0.6 % (ref 0–1.9)
BILIRUB SERPL-MCNC: 0.4 MG/DL (ref 0.1–1)
BUN SERPL-MCNC: 6 MG/DL (ref 8–23)
CALCIUM SERPL-MCNC: 8.3 MG/DL (ref 8.7–10.5)
CHLORIDE SERPL-SCNC: 107 MMOL/L (ref 95–110)
CO2 SERPL-SCNC: 27 MMOL/L (ref 23–29)
CREAT SERPL-MCNC: 0.4 MG/DL (ref 0.5–1.4)
DIFFERENTIAL METHOD: ABNORMAL
EOSINOPHIL # BLD AUTO: 0.4 K/UL (ref 0–0.5)
EOSINOPHIL NFR BLD: 7 % (ref 0–8)
ERYTHROCYTE [DISTWIDTH] IN BLOOD BY AUTOMATED COUNT: 18.5 % (ref 11.5–14.5)
EST. GFR  (AFRICAN AMERICAN): >60 ML/MIN/1.73 M^2
EST. GFR  (NON AFRICAN AMERICAN): >60 ML/MIN/1.73 M^2
GLUCOSE SERPL-MCNC: 120 MG/DL (ref 70–110)
HCT VFR BLD AUTO: 28.2 % (ref 37–48.5)
HGB BLD-MCNC: 8.7 G/DL (ref 12–16)
IMM GRANULOCYTES # BLD AUTO: 0.01 K/UL (ref 0–0.04)
IMM GRANULOCYTES NFR BLD AUTO: 0.2 % (ref 0–0.5)
LYMPHOCYTES # BLD AUTO: 1.2 K/UL (ref 1–4.8)
LYMPHOCYTES NFR BLD: 24.7 % (ref 18–48)
MAGNESIUM SERPL-MCNC: 1.4 MG/DL (ref 1.6–2.6)
MCH RBC QN AUTO: 25.3 PG (ref 27–31)
MCHC RBC AUTO-ENTMCNC: 30.9 G/DL (ref 32–36)
MCV RBC AUTO: 82 FL (ref 82–98)
MONOCYTES # BLD AUTO: 0.5 K/UL (ref 0.3–1)
MONOCYTES NFR BLD: 9.4 % (ref 4–15)
NEUTROPHILS # BLD AUTO: 2.9 K/UL (ref 1.8–7.7)
NEUTROPHILS NFR BLD: 58.1 % (ref 38–73)
NRBC BLD-RTO: 0 /100 WBC
PHOSPHATE SERPL-MCNC: 2.4 MG/DL (ref 2.7–4.5)
PLATELET # BLD AUTO: 288 K/UL (ref 150–450)
PMV BLD AUTO: 10.1 FL (ref 9.2–12.9)
POTASSIUM SERPL-SCNC: 3.4 MMOL/L (ref 3.5–5.1)
PROT SERPL-MCNC: 4.9 G/DL (ref 6–8.4)
RBC # BLD AUTO: 3.44 M/UL (ref 4–5.4)
SODIUM SERPL-SCNC: 142 MMOL/L (ref 136–145)
WBC # BLD AUTO: 5.02 K/UL (ref 3.9–12.7)

## 2021-05-08 PROCEDURE — 80053 COMPREHEN METABOLIC PANEL: CPT | Performed by: FAMILY MEDICINE

## 2021-05-08 PROCEDURE — 94760 N-INVAS EAR/PLS OXIMETRY 1: CPT

## 2021-05-08 PROCEDURE — 99232 SBSQ HOSP IP/OBS MODERATE 35: CPT | Mod: ,,, | Performed by: FAMILY MEDICINE

## 2021-05-08 PROCEDURE — 25000003 PHARM REV CODE 250: Performed by: FAMILY MEDICINE

## 2021-05-08 PROCEDURE — C9113 INJ PANTOPRAZOLE SODIUM, VIA: HCPCS | Performed by: SURGERY

## 2021-05-08 PROCEDURE — 36415 COLL VENOUS BLD VENIPUNCTURE: CPT | Performed by: FAMILY MEDICINE

## 2021-05-08 PROCEDURE — 63600175 PHARM REV CODE 636 W HCPCS: Performed by: SURGERY

## 2021-05-08 PROCEDURE — 85025 COMPLETE CBC W/AUTO DIFF WBC: CPT | Performed by: FAMILY MEDICINE

## 2021-05-08 PROCEDURE — 84100 ASSAY OF PHOSPHORUS: CPT | Performed by: FAMILY MEDICINE

## 2021-05-08 PROCEDURE — 83735 ASSAY OF MAGNESIUM: CPT | Performed by: FAMILY MEDICINE

## 2021-05-08 PROCEDURE — 11000001 HC ACUTE MED/SURG PRIVATE ROOM

## 2021-05-08 PROCEDURE — 99232 PR SUBSEQUENT HOSPITAL CARE,LEVL II: ICD-10-PCS | Mod: ,,, | Performed by: FAMILY MEDICINE

## 2021-05-08 RX ORDER — MUPIROCIN 20 MG/G
OINTMENT TOPICAL DAILY
Status: DISCONTINUED | OUTPATIENT
Start: 2021-05-08 | End: 2021-05-11 | Stop reason: HOSPADM

## 2021-05-08 RX ORDER — OXYCODONE HYDROCHLORIDE 5 MG/1
5 TABLET ORAL EVERY 6 HOURS PRN
Status: DISCONTINUED | OUTPATIENT
Start: 2021-05-08 | End: 2021-05-11 | Stop reason: HOSPADM

## 2021-05-08 RX ADMIN — OXYCODONE HYDROCHLORIDE 5 MG: 5 TABLET ORAL at 10:05

## 2021-05-08 RX ADMIN — KETOROLAC TROMETHAMINE 15 MG: 30 INJECTION, SOLUTION INTRAMUSCULAR; INTRAVENOUS at 09:05

## 2021-05-08 RX ADMIN — SODIUM CHLORIDE, SODIUM LACTATE, POTASSIUM CHLORIDE, AND CALCIUM CHLORIDE: .6; .31; .03; .02 INJECTION, SOLUTION INTRAVENOUS at 09:05

## 2021-05-08 RX ADMIN — PANTOPRAZOLE SODIUM 40 MG: 40 INJECTION, POWDER, FOR SOLUTION INTRAVENOUS at 08:05

## 2021-05-08 RX ADMIN — HYDROMORPHONE HYDROCHLORIDE 1 MG: 2 INJECTION INTRAMUSCULAR; INTRAVENOUS; SUBCUTANEOUS at 06:05

## 2021-05-08 RX ADMIN — KETOROLAC TROMETHAMINE 15 MG: 30 INJECTION, SOLUTION INTRAMUSCULAR; INTRAVENOUS at 01:05

## 2021-05-08 RX ADMIN — SODIUM CHLORIDE, SODIUM LACTATE, POTASSIUM CHLORIDE, AND CALCIUM CHLORIDE: .6; .31; .03; .02 INJECTION, SOLUTION INTRAVENOUS at 08:05

## 2021-05-08 RX ADMIN — ENOXAPARIN SODIUM 40 MG: 40 INJECTION SUBCUTANEOUS at 04:05

## 2021-05-08 RX ADMIN — ONDANSETRON HYDROCHLORIDE 4 MG: 2 SOLUTION INTRAMUSCULAR; INTRAVENOUS at 08:05

## 2021-05-08 RX ADMIN — HYDROMORPHONE HYDROCHLORIDE 1 MG: 2 INJECTION INTRAMUSCULAR; INTRAVENOUS; SUBCUTANEOUS at 04:05

## 2021-05-08 RX ADMIN — HYDROMORPHONE HYDROCHLORIDE 1 MG: 2 INJECTION INTRAMUSCULAR; INTRAVENOUS; SUBCUTANEOUS at 01:05

## 2021-05-08 RX ADMIN — HYDROMORPHONE HYDROCHLORIDE 1 MG: 2 INJECTION INTRAMUSCULAR; INTRAVENOUS; SUBCUTANEOUS at 10:05

## 2021-05-08 RX ADMIN — KETOROLAC TROMETHAMINE 15 MG: 30 INJECTION, SOLUTION INTRAMUSCULAR; INTRAVENOUS at 06:05

## 2021-05-09 LAB
ALBUMIN SERPL BCP-MCNC: 2.8 G/DL (ref 3.5–5.2)
ALP SERPL-CCNC: 50 U/L (ref 55–135)
ALT SERPL W/O P-5'-P-CCNC: 20 U/L (ref 10–44)
ANION GAP SERPL CALC-SCNC: 9 MMOL/L (ref 8–16)
AST SERPL-CCNC: 25 U/L (ref 10–40)
BASOPHILS # BLD AUTO: 0.04 K/UL (ref 0–0.2)
BASOPHILS NFR BLD: 1 % (ref 0–1.9)
BILIRUB SERPL-MCNC: 0.4 MG/DL (ref 0.1–1)
BUN SERPL-MCNC: 6 MG/DL (ref 8–23)
CALCIUM SERPL-MCNC: 8.3 MG/DL (ref 8.7–10.5)
CHLORIDE SERPL-SCNC: 105 MMOL/L (ref 95–110)
CO2 SERPL-SCNC: 27 MMOL/L (ref 23–29)
CREAT SERPL-MCNC: 0.5 MG/DL (ref 0.5–1.4)
DIFFERENTIAL METHOD: ABNORMAL
EOSINOPHIL # BLD AUTO: 0.3 K/UL (ref 0–0.5)
EOSINOPHIL NFR BLD: 8 % (ref 0–8)
ERYTHROCYTE [DISTWIDTH] IN BLOOD BY AUTOMATED COUNT: 19.2 % (ref 11.5–14.5)
EST. GFR  (AFRICAN AMERICAN): >60 ML/MIN/1.73 M^2
EST. GFR  (NON AFRICAN AMERICAN): >60 ML/MIN/1.73 M^2
GLUCOSE SERPL-MCNC: 86 MG/DL (ref 70–110)
HCT VFR BLD AUTO: 30.3 % (ref 37–48.5)
HGB BLD-MCNC: 9.3 G/DL (ref 12–16)
IMM GRANULOCYTES # BLD AUTO: 0.01 K/UL (ref 0–0.04)
IMM GRANULOCYTES NFR BLD AUTO: 0.2 % (ref 0–0.5)
LYMPHOCYTES # BLD AUTO: 1.6 K/UL (ref 1–4.8)
LYMPHOCYTES NFR BLD: 37.4 % (ref 18–48)
MAGNESIUM SERPL-MCNC: 1.5 MG/DL (ref 1.6–2.6)
MCH RBC QN AUTO: 25.1 PG (ref 27–31)
MCHC RBC AUTO-ENTMCNC: 30.7 G/DL (ref 32–36)
MCV RBC AUTO: 82 FL (ref 82–98)
MONOCYTES # BLD AUTO: 0.5 K/UL (ref 0.3–1)
MONOCYTES NFR BLD: 12.1 % (ref 4–15)
NEUTROPHILS # BLD AUTO: 1.7 K/UL (ref 1.8–7.7)
NEUTROPHILS NFR BLD: 41.3 % (ref 38–73)
NRBC BLD-RTO: 0 /100 WBC
PHOSPHATE SERPL-MCNC: 3.9 MG/DL (ref 2.7–4.5)
PLATELET # BLD AUTO: 303 K/UL (ref 150–450)
PMV BLD AUTO: 10.3 FL (ref 9.2–12.9)
POTASSIUM SERPL-SCNC: 3.3 MMOL/L (ref 3.5–5.1)
PROT SERPL-MCNC: 5.1 G/DL (ref 6–8.4)
RBC # BLD AUTO: 3.71 M/UL (ref 4–5.4)
SODIUM SERPL-SCNC: 141 MMOL/L (ref 136–145)
WBC # BLD AUTO: 4.14 K/UL (ref 3.9–12.7)

## 2021-05-09 PROCEDURE — 83735 ASSAY OF MAGNESIUM: CPT | Performed by: FAMILY MEDICINE

## 2021-05-09 PROCEDURE — 36415 COLL VENOUS BLD VENIPUNCTURE: CPT | Performed by: FAMILY MEDICINE

## 2021-05-09 PROCEDURE — C9113 INJ PANTOPRAZOLE SODIUM, VIA: HCPCS | Performed by: SURGERY

## 2021-05-09 PROCEDURE — 99232 SBSQ HOSP IP/OBS MODERATE 35: CPT | Mod: ,,, | Performed by: FAMILY MEDICINE

## 2021-05-09 PROCEDURE — 85025 COMPLETE CBC W/AUTO DIFF WBC: CPT | Performed by: FAMILY MEDICINE

## 2021-05-09 PROCEDURE — 63600175 PHARM REV CODE 636 W HCPCS: Performed by: SURGERY

## 2021-05-09 PROCEDURE — 94760 N-INVAS EAR/PLS OXIMETRY 1: CPT

## 2021-05-09 PROCEDURE — 84100 ASSAY OF PHOSPHORUS: CPT | Performed by: FAMILY MEDICINE

## 2021-05-09 PROCEDURE — 11000001 HC ACUTE MED/SURG PRIVATE ROOM

## 2021-05-09 PROCEDURE — 25000003 PHARM REV CODE 250

## 2021-05-09 PROCEDURE — 80053 COMPREHEN METABOLIC PANEL: CPT | Performed by: FAMILY MEDICINE

## 2021-05-09 PROCEDURE — 25000003 PHARM REV CODE 250: Performed by: FAMILY MEDICINE

## 2021-05-09 PROCEDURE — 63600175 PHARM REV CODE 636 W HCPCS: Performed by: FAMILY MEDICINE

## 2021-05-09 PROCEDURE — 99232 PR SUBSEQUENT HOSPITAL CARE,LEVL II: ICD-10-PCS | Mod: ,,, | Performed by: FAMILY MEDICINE

## 2021-05-09 RX ORDER — LORAZEPAM 0.5 MG/1
0.5 TABLET ORAL EVERY 6 HOURS PRN
Status: DISCONTINUED | OUTPATIENT
Start: 2021-05-09 | End: 2021-05-11 | Stop reason: HOSPADM

## 2021-05-09 RX ORDER — MAGNESIUM SULFATE 1 G/100ML
1 INJECTION INTRAVENOUS ONCE
Status: COMPLETED | OUTPATIENT
Start: 2021-05-09 | End: 2021-05-09

## 2021-05-09 RX ORDER — POTASSIUM CHLORIDE 20 MEQ/1
40 TABLET, EXTENDED RELEASE ORAL
Status: COMPLETED | OUTPATIENT
Start: 2021-05-09 | End: 2021-05-09

## 2021-05-09 RX ORDER — LORAZEPAM 1 MG/1
TABLET ORAL
Status: COMPLETED
Start: 2021-05-09 | End: 2021-05-09

## 2021-05-09 RX ADMIN — MUPIROCIN: 20 OINTMENT TOPICAL at 09:05

## 2021-05-09 RX ADMIN — HYDROMORPHONE HYDROCHLORIDE 1 MG: 2 INJECTION INTRAMUSCULAR; INTRAVENOUS; SUBCUTANEOUS at 09:05

## 2021-05-09 RX ADMIN — KETOROLAC TROMETHAMINE 15 MG: 30 INJECTION, SOLUTION INTRAMUSCULAR; INTRAVENOUS at 05:05

## 2021-05-09 RX ADMIN — POTASSIUM CHLORIDE 40 MEQ: 1500 TABLET, EXTENDED RELEASE ORAL at 01:05

## 2021-05-09 RX ADMIN — LORAZEPAM 0.5 MG: 0.5 TABLET ORAL at 10:05

## 2021-05-09 RX ADMIN — MAGNESIUM SULFATE IN DEXTROSE 1 G: 10 INJECTION, SOLUTION INTRAVENOUS at 11:05

## 2021-05-09 RX ADMIN — PANTOPRAZOLE SODIUM 40 MG: 40 INJECTION, POWDER, FOR SOLUTION INTRAVENOUS at 08:05

## 2021-05-09 RX ADMIN — SODIUM CHLORIDE, SODIUM LACTATE, POTASSIUM CHLORIDE, AND CALCIUM CHLORIDE: .6; .31; .03; .02 INJECTION, SOLUTION INTRAVENOUS at 03:05

## 2021-05-09 RX ADMIN — OXYCODONE HYDROCHLORIDE 5 MG: 5 TABLET ORAL at 04:05

## 2021-05-09 RX ADMIN — HYDROMORPHONE HYDROCHLORIDE 1 MG: 2 INJECTION INTRAMUSCULAR; INTRAVENOUS; SUBCUTANEOUS at 08:05

## 2021-05-09 RX ADMIN — POTASSIUM CHLORIDE 40 MEQ: 1500 TABLET, EXTENDED RELEASE ORAL at 11:05

## 2021-05-09 RX ADMIN — OXYCODONE HYDROCHLORIDE 5 MG: 5 TABLET ORAL at 03:05

## 2021-05-09 RX ADMIN — HYDROMORPHONE HYDROCHLORIDE 1 MG: 2 INJECTION INTRAMUSCULAR; INTRAVENOUS; SUBCUTANEOUS at 06:05

## 2021-05-09 RX ADMIN — ENOXAPARIN SODIUM 40 MG: 40 INJECTION SUBCUTANEOUS at 04:05

## 2021-05-09 RX ADMIN — KETOROLAC TROMETHAMINE 15 MG: 30 INJECTION, SOLUTION INTRAMUSCULAR; INTRAVENOUS at 01:05

## 2021-05-09 RX ADMIN — LORAZEPAM 0.5 MG: 1 TABLET ORAL at 10:05

## 2021-05-10 LAB
ALBUMIN SERPL BCP-MCNC: 2.9 G/DL (ref 3.5–5.2)
ALP SERPL-CCNC: 57 U/L (ref 55–135)
ALT SERPL W/O P-5'-P-CCNC: 22 U/L (ref 10–44)
ANION GAP SERPL CALC-SCNC: 9 MMOL/L (ref 8–16)
AST SERPL-CCNC: 27 U/L (ref 10–40)
BASOPHILS # BLD AUTO: 0.05 K/UL (ref 0–0.2)
BASOPHILS NFR BLD: 1 % (ref 0–1.9)
BILIRUB SERPL-MCNC: 0.5 MG/DL (ref 0.1–1)
BUN SERPL-MCNC: 5 MG/DL (ref 8–23)
CALCIUM SERPL-MCNC: 8.3 MG/DL (ref 8.7–10.5)
CHLORIDE SERPL-SCNC: 106 MMOL/L (ref 95–110)
CO2 SERPL-SCNC: 25 MMOL/L (ref 23–29)
CREAT SERPL-MCNC: 0.5 MG/DL (ref 0.5–1.4)
DIFFERENTIAL METHOD: ABNORMAL
EOSINOPHIL # BLD AUTO: 0.5 K/UL (ref 0–0.5)
EOSINOPHIL NFR BLD: 9.8 % (ref 0–8)
ERYTHROCYTE [DISTWIDTH] IN BLOOD BY AUTOMATED COUNT: 19.6 % (ref 11.5–14.5)
EST. GFR  (AFRICAN AMERICAN): >60 ML/MIN/1.73 M^2
EST. GFR  (NON AFRICAN AMERICAN): >60 ML/MIN/1.73 M^2
GLUCOSE SERPL-MCNC: 96 MG/DL (ref 70–110)
HCT VFR BLD AUTO: 29.2 % (ref 37–48.5)
HGB BLD-MCNC: 9 G/DL (ref 12–16)
IMM GRANULOCYTES # BLD AUTO: 0.01 K/UL (ref 0–0.04)
IMM GRANULOCYTES NFR BLD AUTO: 0.2 % (ref 0–0.5)
LYMPHOCYTES # BLD AUTO: 1.8 K/UL (ref 1–4.8)
LYMPHOCYTES NFR BLD: 35.8 % (ref 18–48)
MAGNESIUM SERPL-MCNC: 1.7 MG/DL (ref 1.6–2.6)
MCH RBC QN AUTO: 25.1 PG (ref 27–31)
MCHC RBC AUTO-ENTMCNC: 30.8 G/DL (ref 32–36)
MCV RBC AUTO: 81 FL (ref 82–98)
MONOCYTES # BLD AUTO: 0.6 K/UL (ref 0.3–1)
MONOCYTES NFR BLD: 11.2 % (ref 4–15)
NEUTROPHILS # BLD AUTO: 2.2 K/UL (ref 1.8–7.7)
NEUTROPHILS NFR BLD: 42 % (ref 38–73)
NRBC BLD-RTO: 0 /100 WBC
PHOSPHATE SERPL-MCNC: 3.5 MG/DL (ref 2.7–4.5)
PLATELET # BLD AUTO: 282 K/UL (ref 150–450)
PMV BLD AUTO: 9.9 FL (ref 9.2–12.9)
POTASSIUM SERPL-SCNC: 3.7 MMOL/L (ref 3.5–5.1)
PROT SERPL-MCNC: 5.3 G/DL (ref 6–8.4)
RBC # BLD AUTO: 3.59 M/UL (ref 4–5.4)
SODIUM SERPL-SCNC: 140 MMOL/L (ref 136–145)
WBC # BLD AUTO: 5.11 K/UL (ref 3.9–12.7)

## 2021-05-10 PROCEDURE — 63600175 PHARM REV CODE 636 W HCPCS: Performed by: SURGERY

## 2021-05-10 PROCEDURE — 11000001 HC ACUTE MED/SURG PRIVATE ROOM

## 2021-05-10 PROCEDURE — 36415 COLL VENOUS BLD VENIPUNCTURE: CPT | Performed by: FAMILY MEDICINE

## 2021-05-10 PROCEDURE — C9113 INJ PANTOPRAZOLE SODIUM, VIA: HCPCS | Performed by: SURGERY

## 2021-05-10 PROCEDURE — 99232 PR SUBSEQUENT HOSPITAL CARE,LEVL II: ICD-10-PCS | Mod: ,,, | Performed by: FAMILY MEDICINE

## 2021-05-10 PROCEDURE — 85025 COMPLETE CBC W/AUTO DIFF WBC: CPT | Performed by: FAMILY MEDICINE

## 2021-05-10 PROCEDURE — 99232 SBSQ HOSP IP/OBS MODERATE 35: CPT | Mod: ,,, | Performed by: FAMILY MEDICINE

## 2021-05-10 PROCEDURE — 94760 N-INVAS EAR/PLS OXIMETRY 1: CPT

## 2021-05-10 PROCEDURE — 84100 ASSAY OF PHOSPHORUS: CPT | Performed by: FAMILY MEDICINE

## 2021-05-10 PROCEDURE — 80053 COMPREHEN METABOLIC PANEL: CPT | Performed by: FAMILY MEDICINE

## 2021-05-10 PROCEDURE — 25000003 PHARM REV CODE 250: Performed by: FAMILY MEDICINE

## 2021-05-10 PROCEDURE — 83735 ASSAY OF MAGNESIUM: CPT | Performed by: FAMILY MEDICINE

## 2021-05-10 RX ADMIN — OXYCODONE HYDROCHLORIDE 5 MG: 5 TABLET ORAL at 04:05

## 2021-05-10 RX ADMIN — HYDROMORPHONE HYDROCHLORIDE 1 MG: 2 INJECTION INTRAMUSCULAR; INTRAVENOUS; SUBCUTANEOUS at 03:05

## 2021-05-10 RX ADMIN — ONDANSETRON HYDROCHLORIDE 4 MG: 2 SOLUTION INTRAMUSCULAR; INTRAVENOUS at 06:05

## 2021-05-10 RX ADMIN — ENOXAPARIN SODIUM 40 MG: 40 INJECTION SUBCUTANEOUS at 04:05

## 2021-05-10 RX ADMIN — MUPIROCIN: 20 OINTMENT TOPICAL at 08:05

## 2021-05-10 RX ADMIN — HYDROMORPHONE HYDROCHLORIDE 1 MG: 2 INJECTION INTRAMUSCULAR; INTRAVENOUS; SUBCUTANEOUS at 07:05

## 2021-05-10 RX ADMIN — HYDROMORPHONE HYDROCHLORIDE 1 MG: 2 INJECTION INTRAMUSCULAR; INTRAVENOUS; SUBCUTANEOUS at 10:05

## 2021-05-10 RX ADMIN — PANTOPRAZOLE SODIUM 40 MG: 40 INJECTION, POWDER, FOR SOLUTION INTRAVENOUS at 08:05

## 2021-05-10 RX ADMIN — ONDANSETRON HYDROCHLORIDE 4 MG: 2 SOLUTION INTRAMUSCULAR; INTRAVENOUS at 08:05

## 2021-05-10 RX ADMIN — SODIUM CHLORIDE, SODIUM LACTATE, POTASSIUM CHLORIDE, AND CALCIUM CHLORIDE: .6; .31; .03; .02 INJECTION, SOLUTION INTRAVENOUS at 03:05

## 2021-05-11 VITALS
TEMPERATURE: 96 F | HEIGHT: 64 IN | SYSTOLIC BLOOD PRESSURE: 119 MMHG | OXYGEN SATURATION: 95 % | DIASTOLIC BLOOD PRESSURE: 55 MMHG | HEART RATE: 50 BPM | BODY MASS INDEX: 26.29 KG/M2 | WEIGHT: 154 LBS | RESPIRATION RATE: 17 BRPM

## 2021-05-11 LAB
ALBUMIN SERPL BCP-MCNC: 2.9 G/DL (ref 3.5–5.2)
ALP SERPL-CCNC: 55 U/L (ref 55–135)
ALT SERPL W/O P-5'-P-CCNC: 23 U/L (ref 10–44)
ANION GAP SERPL CALC-SCNC: 9 MMOL/L (ref 8–16)
AST SERPL-CCNC: 22 U/L (ref 10–40)
BASOPHILS # BLD AUTO: 0.04 K/UL (ref 0–0.2)
BASOPHILS NFR BLD: 0.9 % (ref 0–1.9)
BILIRUB SERPL-MCNC: 0.2 MG/DL (ref 0.1–1)
BUN SERPL-MCNC: <5 MG/DL (ref 8–23)
CALCIUM SERPL-MCNC: 8.4 MG/DL (ref 8.7–10.5)
CHLORIDE SERPL-SCNC: 105 MMOL/L (ref 95–110)
CO2 SERPL-SCNC: 27 MMOL/L (ref 23–29)
CREAT SERPL-MCNC: 0.6 MG/DL (ref 0.5–1.4)
DIFFERENTIAL METHOD: ABNORMAL
EOSINOPHIL # BLD AUTO: 0.3 K/UL (ref 0–0.5)
EOSINOPHIL NFR BLD: 7.3 % (ref 0–8)
ERYTHROCYTE [DISTWIDTH] IN BLOOD BY AUTOMATED COUNT: 19.8 % (ref 11.5–14.5)
EST. GFR  (AFRICAN AMERICAN): >60 ML/MIN/1.73 M^2
EST. GFR  (NON AFRICAN AMERICAN): >60 ML/MIN/1.73 M^2
GLUCOSE SERPL-MCNC: 98 MG/DL (ref 70–110)
HCT VFR BLD AUTO: 30.2 % (ref 37–48.5)
HGB BLD-MCNC: 9.1 G/DL (ref 12–16)
IMM GRANULOCYTES # BLD AUTO: 0.01 K/UL (ref 0–0.04)
IMM GRANULOCYTES NFR BLD AUTO: 0.2 % (ref 0–0.5)
LYMPHOCYTES # BLD AUTO: 1.5 K/UL (ref 1–4.8)
LYMPHOCYTES NFR BLD: 33.8 % (ref 18–48)
MAGNESIUM SERPL-MCNC: 1.9 MG/DL (ref 1.6–2.6)
MCH RBC QN AUTO: 24.9 PG (ref 27–31)
MCHC RBC AUTO-ENTMCNC: 30.1 G/DL (ref 32–36)
MCV RBC AUTO: 83 FL (ref 82–98)
MONOCYTES # BLD AUTO: 0.4 K/UL (ref 0.3–1)
MONOCYTES NFR BLD: 8.7 % (ref 4–15)
NEUTROPHILS # BLD AUTO: 2.2 K/UL (ref 1.8–7.7)
NEUTROPHILS NFR BLD: 49.1 % (ref 38–73)
NRBC BLD-RTO: 0 /100 WBC
PHOSPHATE SERPL-MCNC: 3.9 MG/DL (ref 2.7–4.5)
PLATELET # BLD AUTO: 294 K/UL (ref 150–450)
PMV BLD AUTO: 10.4 FL (ref 9.2–12.9)
POTASSIUM SERPL-SCNC: 3.8 MMOL/L (ref 3.5–5.1)
PROT SERPL-MCNC: 5.4 G/DL (ref 6–8.4)
RBC # BLD AUTO: 3.66 M/UL (ref 4–5.4)
SODIUM SERPL-SCNC: 141 MMOL/L (ref 136–145)
WBC # BLD AUTO: 4.38 K/UL (ref 3.9–12.7)

## 2021-05-11 PROCEDURE — 83735 ASSAY OF MAGNESIUM: CPT | Performed by: FAMILY MEDICINE

## 2021-05-11 PROCEDURE — 36415 COLL VENOUS BLD VENIPUNCTURE: CPT | Performed by: FAMILY MEDICINE

## 2021-05-11 PROCEDURE — 99239 HOSP IP/OBS DSCHRG MGMT >30: CPT | Mod: ,,, | Performed by: FAMILY MEDICINE

## 2021-05-11 PROCEDURE — 80053 COMPREHEN METABOLIC PANEL: CPT | Performed by: FAMILY MEDICINE

## 2021-05-11 PROCEDURE — 99239 PR HOSPITAL DISCHARGE DAY,>30 MIN: ICD-10-PCS | Mod: ,,, | Performed by: FAMILY MEDICINE

## 2021-05-11 PROCEDURE — C9113 INJ PANTOPRAZOLE SODIUM, VIA: HCPCS | Performed by: SURGERY

## 2021-05-11 PROCEDURE — 25000003 PHARM REV CODE 250: Performed by: FAMILY MEDICINE

## 2021-05-11 PROCEDURE — 85025 COMPLETE CBC W/AUTO DIFF WBC: CPT | Performed by: FAMILY MEDICINE

## 2021-05-11 PROCEDURE — 63600175 PHARM REV CODE 636 W HCPCS: Performed by: SURGERY

## 2021-05-11 PROCEDURE — 84100 ASSAY OF PHOSPHORUS: CPT | Performed by: FAMILY MEDICINE

## 2021-05-11 RX ORDER — ONDANSETRON 4 MG/1
4 TABLET, FILM COATED ORAL EVERY 6 HOURS PRN
Qty: 60 TABLET | Refills: 1 | Status: SHIPPED | OUTPATIENT
Start: 2021-05-11 | End: 2021-05-19

## 2021-05-11 RX ORDER — OXYCODONE HYDROCHLORIDE 5 MG/1
5 TABLET ORAL EVERY 6 HOURS PRN
Qty: 28 TABLET | Refills: 0 | Status: SHIPPED | OUTPATIENT
Start: 2021-05-11 | End: 2021-06-30

## 2021-05-11 RX ADMIN — PANTOPRAZOLE SODIUM 40 MG: 40 INJECTION, POWDER, FOR SOLUTION INTRAVENOUS at 08:05

## 2021-05-11 RX ADMIN — ONDANSETRON HYDROCHLORIDE 4 MG: 2 SOLUTION INTRAMUSCULAR; INTRAVENOUS at 08:05

## 2021-05-11 RX ADMIN — OXYCODONE HYDROCHLORIDE 5 MG: 5 TABLET ORAL at 04:05

## 2021-05-11 RX ADMIN — MUPIROCIN: 20 OINTMENT TOPICAL at 08:05

## 2021-05-19 ENCOUNTER — OFFICE VISIT (OUTPATIENT)
Dept: FAMILY MEDICINE | Facility: CLINIC | Age: 66
End: 2021-05-19
Payer: COMMERCIAL

## 2021-05-19 ENCOUNTER — LAB VISIT (OUTPATIENT)
Dept: LAB | Facility: HOSPITAL | Age: 66
End: 2021-05-19
Attending: FAMILY MEDICINE
Payer: COMMERCIAL

## 2021-05-19 ENCOUNTER — OFFICE VISIT (OUTPATIENT)
Dept: SURGERY | Facility: CLINIC | Age: 66
End: 2021-05-19
Payer: COMMERCIAL

## 2021-05-19 VITALS
WEIGHT: 151.69 LBS | BODY MASS INDEX: 25.9 KG/M2 | DIASTOLIC BLOOD PRESSURE: 71 MMHG | OXYGEN SATURATION: 97 % | SYSTOLIC BLOOD PRESSURE: 116 MMHG | HEART RATE: 85 BPM | HEIGHT: 64 IN | TEMPERATURE: 98 F

## 2021-05-19 VITALS
OXYGEN SATURATION: 97 % | TEMPERATURE: 98 F | SYSTOLIC BLOOD PRESSURE: 98 MMHG | HEART RATE: 71 BPM | WEIGHT: 151.13 LBS | BODY MASS INDEX: 25.8 KG/M2 | RESPIRATION RATE: 17 BRPM | HEIGHT: 64 IN | DIASTOLIC BLOOD PRESSURE: 76 MMHG

## 2021-05-19 DIAGNOSIS — D64.9 ANEMIA, UNSPECIFIED TYPE: ICD-10-CM

## 2021-05-19 DIAGNOSIS — Z09 HOSPITAL DISCHARGE FOLLOW-UP: Primary | ICD-10-CM

## 2021-05-19 DIAGNOSIS — I95.9 HYPOTENSION, UNSPECIFIED HYPOTENSION TYPE: ICD-10-CM

## 2021-05-19 DIAGNOSIS — Z09 POSTOP CHECK: Primary | ICD-10-CM

## 2021-05-19 DIAGNOSIS — B37.0 ORAL THRUSH: ICD-10-CM

## 2021-05-19 LAB
ALBUMIN SERPL BCP-MCNC: 4.2 G/DL (ref 3.5–5.2)
ALP SERPL-CCNC: 64 U/L (ref 55–135)
ALT SERPL W/O P-5'-P-CCNC: 18 U/L (ref 10–44)
ANION GAP SERPL CALC-SCNC: 12 MMOL/L (ref 8–16)
AST SERPL-CCNC: 20 U/L (ref 10–40)
BASOPHILS # BLD AUTO: 0.14 K/UL (ref 0–0.2)
BASOPHILS NFR BLD: 1.8 % (ref 0–1.9)
BILIRUB SERPL-MCNC: 0.4 MG/DL (ref 0.1–1)
BUN SERPL-MCNC: 13 MG/DL (ref 8–23)
CALCIUM SERPL-MCNC: 9.5 MG/DL (ref 8.7–10.5)
CHLORIDE SERPL-SCNC: 103 MMOL/L (ref 95–110)
CO2 SERPL-SCNC: 25 MMOL/L (ref 23–29)
CREAT SERPL-MCNC: 0.7 MG/DL (ref 0.5–1.4)
DIFFERENTIAL METHOD: ABNORMAL
EOSINOPHIL # BLD AUTO: 0.2 K/UL (ref 0–0.5)
EOSINOPHIL NFR BLD: 2.5 % (ref 0–8)
ERYTHROCYTE [DISTWIDTH] IN BLOOD BY AUTOMATED COUNT: 19.5 % (ref 11.5–14.5)
EST. GFR  (AFRICAN AMERICAN): >60 ML/MIN/1.73 M^2
EST. GFR  (NON AFRICAN AMERICAN): >60 ML/MIN/1.73 M^2
GLUCOSE SERPL-MCNC: 105 MG/DL (ref 70–110)
HCT VFR BLD AUTO: 35.9 % (ref 37–48.5)
HGB BLD-MCNC: 11.2 G/DL (ref 12–16)
IMM GRANULOCYTES # BLD AUTO: 0.01 K/UL (ref 0–0.04)
IMM GRANULOCYTES NFR BLD AUTO: 0.1 % (ref 0–0.5)
IRON SERPL-MCNC: 25 UG/DL (ref 30–160)
LYMPHOCYTES # BLD AUTO: 3.5 K/UL (ref 1–4.8)
LYMPHOCYTES NFR BLD: 45.1 % (ref 18–48)
MCH RBC QN AUTO: 25.2 PG (ref 27–31)
MCHC RBC AUTO-ENTMCNC: 31.2 G/DL (ref 32–36)
MCV RBC AUTO: 81 FL (ref 82–98)
MONOCYTES # BLD AUTO: 0.7 K/UL (ref 0.3–1)
MONOCYTES NFR BLD: 9.6 % (ref 4–15)
NEUTROPHILS # BLD AUTO: 3.2 K/UL (ref 1.8–7.7)
NEUTROPHILS NFR BLD: 40.9 % (ref 38–73)
NRBC BLD-RTO: 0 /100 WBC
PLATELET # BLD AUTO: 437 K/UL (ref 150–450)
PMV BLD AUTO: 10.1 FL (ref 9.2–12.9)
POTASSIUM SERPL-SCNC: 4.2 MMOL/L (ref 3.5–5.1)
PROT SERPL-MCNC: 7 G/DL (ref 6–8.4)
RBC # BLD AUTO: 4.45 M/UL (ref 4–5.4)
SATURATED IRON: 5 % (ref 20–50)
SODIUM SERPL-SCNC: 140 MMOL/L (ref 136–145)
TOTAL IRON BINDING CAPACITY: 505 UG/DL (ref 250–450)
TRANSFERRIN SERPL-MCNC: 341 MG/DL (ref 200–375)
TSH SERPL DL<=0.005 MIU/L-ACNC: 0.85 UIU/ML (ref 0.34–5.6)
WBC # BLD AUTO: 7.71 K/UL (ref 3.9–12.7)

## 2021-05-19 PROCEDURE — 36415 COLL VENOUS BLD VENIPUNCTURE: CPT | Performed by: FAMILY MEDICINE

## 2021-05-19 PROCEDURE — 80053 COMPREHEN METABOLIC PANEL: CPT | Performed by: FAMILY MEDICINE

## 2021-05-19 PROCEDURE — 99024 PR POST-OP FOLLOW-UP VISIT: ICD-10-PCS | Mod: S$GLB,,, | Performed by: SURGERY

## 2021-05-19 PROCEDURE — 99214 OFFICE O/P EST MOD 30 MIN: CPT | Mod: S$GLB,,, | Performed by: FAMILY MEDICINE

## 2021-05-19 PROCEDURE — 84443 ASSAY THYROID STIM HORMONE: CPT | Performed by: FAMILY MEDICINE

## 2021-05-19 PROCEDURE — 1125F PR PAIN SEVERITY QUANTIFIED, PAIN PRESENT: ICD-10-PCS | Mod: S$GLB,,, | Performed by: SURGERY

## 2021-05-19 PROCEDURE — 1125F AMNT PAIN NOTED PAIN PRSNT: CPT | Mod: S$GLB,,, | Performed by: SURGERY

## 2021-05-19 PROCEDURE — 85025 COMPLETE CBC W/AUTO DIFF WBC: CPT | Performed by: FAMILY MEDICINE

## 2021-05-19 PROCEDURE — 3008F PR BODY MASS INDEX (BMI) DOCUMENTED: ICD-10-PCS | Mod: CPTII,S$GLB,, | Performed by: FAMILY MEDICINE

## 2021-05-19 PROCEDURE — 3008F BODY MASS INDEX DOCD: CPT | Mod: CPTII,S$GLB,, | Performed by: FAMILY MEDICINE

## 2021-05-19 PROCEDURE — 99024 POSTOP FOLLOW-UP VISIT: CPT | Mod: S$GLB,,, | Performed by: SURGERY

## 2021-05-19 PROCEDURE — 3008F BODY MASS INDEX DOCD: CPT | Mod: CPTII,S$GLB,, | Performed by: SURGERY

## 2021-05-19 PROCEDURE — 3008F PR BODY MASS INDEX (BMI) DOCUMENTED: ICD-10-PCS | Mod: CPTII,S$GLB,, | Performed by: SURGERY

## 2021-05-19 PROCEDURE — 1125F AMNT PAIN NOTED PAIN PRSNT: CPT | Mod: S$GLB,,, | Performed by: FAMILY MEDICINE

## 2021-05-19 PROCEDURE — 99214 PR OFFICE/OUTPT VISIT, EST, LEVL IV, 30-39 MIN: ICD-10-PCS | Mod: S$GLB,,, | Performed by: FAMILY MEDICINE

## 2021-05-19 PROCEDURE — 1125F PR PAIN SEVERITY QUANTIFIED, PAIN PRESENT: ICD-10-PCS | Mod: S$GLB,,, | Performed by: FAMILY MEDICINE

## 2021-05-19 PROCEDURE — 83540 ASSAY OF IRON: CPT | Performed by: FAMILY MEDICINE

## 2021-05-19 PROCEDURE — 82728 ASSAY OF FERRITIN: CPT | Performed by: FAMILY MEDICINE

## 2021-05-19 RX ORDER — FLUCONAZOLE 150 MG/1
150 TABLET ORAL DAILY
Qty: 1 TABLET | Refills: 0 | Status: SHIPPED | OUTPATIENT
Start: 2021-05-19 | End: 2021-05-20

## 2021-05-20 ENCOUNTER — PATIENT MESSAGE (OUTPATIENT)
Dept: FAMILY MEDICINE | Facility: CLINIC | Age: 66
End: 2021-05-20

## 2021-05-20 ENCOUNTER — TELEPHONE (OUTPATIENT)
Dept: FAMILY MEDICINE | Facility: CLINIC | Age: 66
End: 2021-05-20

## 2021-05-20 LAB — FERRITIN SERPL-MCNC: 21 NG/ML (ref 20–300)

## 2021-05-21 ENCOUNTER — PATIENT MESSAGE (OUTPATIENT)
Dept: FAMILY MEDICINE | Facility: CLINIC | Age: 66
End: 2021-05-21

## 2021-05-26 ENCOUNTER — PATIENT MESSAGE (OUTPATIENT)
Dept: FAMILY MEDICINE | Facility: CLINIC | Age: 66
End: 2021-05-26

## 2021-05-26 DIAGNOSIS — D50.8 IRON DEFICIENCY ANEMIA SECONDARY TO INADEQUATE DIETARY IRON INTAKE: ICD-10-CM

## 2021-05-26 RX ORDER — SODIUM CHLORIDE 0.9 % (FLUSH) 0.9 %
10 SYRINGE (ML) INJECTION
Status: CANCELLED | OUTPATIENT
Start: 2021-05-26

## 2021-05-26 RX ORDER — METHYLPREDNISOLONE SOD SUCC 125 MG
125 VIAL (EA) INJECTION ONCE AS NEEDED
Status: CANCELLED | OUTPATIENT
Start: 2021-05-26

## 2021-05-26 RX ORDER — HEPARIN 100 UNIT/ML
5 SYRINGE INTRAVENOUS
Status: CANCELLED | OUTPATIENT
Start: 2021-05-26

## 2021-05-26 RX ORDER — DIPHENHYDRAMINE HYDROCHLORIDE 50 MG/ML
50 INJECTION INTRAMUSCULAR; INTRAVENOUS ONCE AS NEEDED
Status: CANCELLED | OUTPATIENT
Start: 2021-05-26

## 2021-05-26 RX ORDER — EPINEPHRINE 0.3 MG/.3ML
0.3 INJECTION SUBCUTANEOUS ONCE AS NEEDED
Status: CANCELLED | OUTPATIENT
Start: 2021-05-26

## 2021-05-26 RX ORDER — SODIUM CHLORIDE 9 MG/ML
INJECTION, SOLUTION INTRAVENOUS CONTINUOUS
Status: CANCELLED | OUTPATIENT
Start: 2021-05-26

## 2021-06-17 ENCOUNTER — PATIENT MESSAGE (OUTPATIENT)
Dept: ADMINISTRATIVE | Facility: HOSPITAL | Age: 66
End: 2021-06-17

## 2021-06-17 ENCOUNTER — PATIENT OUTREACH (OUTPATIENT)
Dept: ADMINISTRATIVE | Facility: HOSPITAL | Age: 66
End: 2021-06-17

## 2021-06-25 ENCOUNTER — TELEPHONE (OUTPATIENT)
Dept: INFUSION THERAPY | Facility: HOSPITAL | Age: 66
End: 2021-06-25

## 2021-06-30 ENCOUNTER — OFFICE VISIT (OUTPATIENT)
Dept: SURGERY | Facility: CLINIC | Age: 66
End: 2021-06-30
Payer: COMMERCIAL

## 2021-06-30 ENCOUNTER — OFFICE VISIT (OUTPATIENT)
Dept: FAMILY MEDICINE | Facility: CLINIC | Age: 66
End: 2021-06-30
Payer: COMMERCIAL

## 2021-06-30 VITALS
HEART RATE: 76 BPM | SYSTOLIC BLOOD PRESSURE: 116 MMHG | BODY MASS INDEX: 26.63 KG/M2 | TEMPERATURE: 98 F | WEIGHT: 156 LBS | DIASTOLIC BLOOD PRESSURE: 72 MMHG | HEIGHT: 64 IN | OXYGEN SATURATION: 97 %

## 2021-06-30 VITALS
OXYGEN SATURATION: 98 % | SYSTOLIC BLOOD PRESSURE: 105 MMHG | RESPIRATION RATE: 16 BRPM | HEART RATE: 70 BPM | DIASTOLIC BLOOD PRESSURE: 69 MMHG | TEMPERATURE: 98 F | WEIGHT: 157.38 LBS | HEIGHT: 64 IN | BODY MASS INDEX: 26.87 KG/M2

## 2021-06-30 DIAGNOSIS — M62.89 PELVIC FLOOR DYSFUNCTION: ICD-10-CM

## 2021-06-30 DIAGNOSIS — Z09 POSTOP CHECK: ICD-10-CM

## 2021-06-30 DIAGNOSIS — K59.00 CONSTIPATION, UNSPECIFIED CONSTIPATION TYPE: ICD-10-CM

## 2021-06-30 DIAGNOSIS — K59.00 CONSTIPATION, UNSPECIFIED CONSTIPATION TYPE: Primary | ICD-10-CM

## 2021-06-30 DIAGNOSIS — D50.8 IRON DEFICIENCY ANEMIA SECONDARY TO INADEQUATE DIETARY IRON INTAKE: Primary | ICD-10-CM

## 2021-06-30 PROCEDURE — 99214 PR OFFICE/OUTPT VISIT, EST, LEVL IV, 30-39 MIN: ICD-10-PCS | Mod: 24,S$GLB,, | Performed by: SURGERY

## 2021-06-30 PROCEDURE — 3008F BODY MASS INDEX DOCD: CPT | Mod: CPTII,S$GLB,, | Performed by: SURGERY

## 2021-06-30 PROCEDURE — 99214 OFFICE O/P EST MOD 30 MIN: CPT | Mod: 24,S$GLB,, | Performed by: SURGERY

## 2021-06-30 PROCEDURE — 3008F PR BODY MASS INDEX (BMI) DOCUMENTED: ICD-10-PCS | Mod: CPTII,S$GLB,, | Performed by: SURGERY

## 2021-06-30 PROCEDURE — 3008F PR BODY MASS INDEX (BMI) DOCUMENTED: ICD-10-PCS | Mod: CPTII,S$GLB,, | Performed by: FAMILY MEDICINE

## 2021-06-30 PROCEDURE — 1126F PR PAIN SEVERITY QUANTIFIED, NO PAIN PRESENT: ICD-10-PCS | Mod: S$GLB,,, | Performed by: SURGERY

## 2021-06-30 PROCEDURE — 1126F PR PAIN SEVERITY QUANTIFIED, NO PAIN PRESENT: ICD-10-PCS | Mod: S$GLB,,, | Performed by: FAMILY MEDICINE

## 2021-06-30 PROCEDURE — 1126F AMNT PAIN NOTED NONE PRSNT: CPT | Mod: S$GLB,,, | Performed by: SURGERY

## 2021-06-30 PROCEDURE — 3008F BODY MASS INDEX DOCD: CPT | Mod: CPTII,S$GLB,, | Performed by: FAMILY MEDICINE

## 2021-06-30 PROCEDURE — 99214 OFFICE O/P EST MOD 30 MIN: CPT | Mod: S$GLB,,, | Performed by: FAMILY MEDICINE

## 2021-06-30 PROCEDURE — 1126F AMNT PAIN NOTED NONE PRSNT: CPT | Mod: S$GLB,,, | Performed by: FAMILY MEDICINE

## 2021-06-30 PROCEDURE — 99214 PR OFFICE/OUTPT VISIT, EST, LEVL IV, 30-39 MIN: ICD-10-PCS | Mod: S$GLB,,, | Performed by: FAMILY MEDICINE

## 2021-06-30 RX ORDER — DOCUSATE SODIUM 100 MG/1
100 CAPSULE, LIQUID FILLED ORAL 2 TIMES DAILY
COMMUNITY
End: 2022-05-25

## 2021-06-30 RX ORDER — LACTULOSE 10 G/15ML
10 SOLUTION ORAL; RECTAL 2 TIMES DAILY
COMMUNITY
Start: 2021-06-15 | End: 2022-04-06

## 2021-06-30 RX ORDER — DICLOFENAC SODIUM 10 MG/G
2 GEL TOPICAL 2 TIMES DAILY PRN
COMMUNITY

## 2021-07-01 ENCOUNTER — INFUSION (OUTPATIENT)
Dept: INFUSION THERAPY | Facility: HOSPITAL | Age: 66
End: 2021-07-01
Attending: FAMILY MEDICINE
Payer: COMMERCIAL

## 2021-07-01 VITALS
RESPIRATION RATE: 16 BRPM | OXYGEN SATURATION: 100 % | SYSTOLIC BLOOD PRESSURE: 112 MMHG | HEART RATE: 67 BPM | DIASTOLIC BLOOD PRESSURE: 56 MMHG

## 2021-07-01 DIAGNOSIS — D50.8 IRON DEFICIENCY ANEMIA SECONDARY TO INADEQUATE DIETARY IRON INTAKE: Primary | ICD-10-CM

## 2021-07-01 PROCEDURE — 63600175 PHARM REV CODE 636 W HCPCS: Mod: JG | Performed by: FAMILY MEDICINE

## 2021-07-01 PROCEDURE — 25000003 PHARM REV CODE 250: Performed by: FAMILY MEDICINE

## 2021-07-01 PROCEDURE — 96365 THER/PROPH/DIAG IV INF INIT: CPT

## 2021-07-01 RX ORDER — SODIUM CHLORIDE 9 MG/ML
INJECTION, SOLUTION INTRAVENOUS CONTINUOUS
Status: CANCELLED | OUTPATIENT
Start: 2021-07-08

## 2021-07-01 RX ORDER — DIPHENHYDRAMINE HYDROCHLORIDE 50 MG/ML
50 INJECTION INTRAMUSCULAR; INTRAVENOUS ONCE AS NEEDED
Status: CANCELLED | OUTPATIENT
Start: 2021-07-08

## 2021-07-01 RX ORDER — SODIUM CHLORIDE 9 MG/ML
INJECTION, SOLUTION INTRAVENOUS CONTINUOUS
Status: DISCONTINUED | OUTPATIENT
Start: 2021-07-01 | End: 2021-07-01 | Stop reason: HOSPADM

## 2021-07-01 RX ORDER — SODIUM CHLORIDE 0.9 % (FLUSH) 0.9 %
10 SYRINGE (ML) INJECTION
Status: DISCONTINUED | OUTPATIENT
Start: 2021-07-01 | End: 2021-07-01 | Stop reason: HOSPADM

## 2021-07-01 RX ORDER — HEPARIN 100 UNIT/ML
5 SYRINGE INTRAVENOUS
Status: CANCELLED | OUTPATIENT
Start: 2021-07-08

## 2021-07-01 RX ORDER — HEPARIN 100 UNIT/ML
5 SYRINGE INTRAVENOUS
Status: DISCONTINUED | OUTPATIENT
Start: 2021-07-01 | End: 2021-07-01 | Stop reason: HOSPADM

## 2021-07-01 RX ORDER — EPINEPHRINE 0.3 MG/.3ML
0.3 INJECTION SUBCUTANEOUS ONCE AS NEEDED
Status: CANCELLED | OUTPATIENT
Start: 2021-07-08

## 2021-07-01 RX ORDER — METHYLPREDNISOLONE SOD SUCC 125 MG
125 VIAL (EA) INJECTION ONCE AS NEEDED
Status: CANCELLED | OUTPATIENT
Start: 2021-07-08

## 2021-07-01 RX ORDER — SODIUM CHLORIDE 0.9 % (FLUSH) 0.9 %
10 SYRINGE (ML) INJECTION
Status: CANCELLED | OUTPATIENT
Start: 2021-07-08

## 2021-07-01 RX ADMIN — FERRIC CARBOXYMALTOSE INJECTION 750 MG: 50 INJECTION, SOLUTION INTRAVENOUS at 02:07

## 2021-07-16 ENCOUNTER — PATIENT OUTREACH (OUTPATIENT)
Dept: ADMINISTRATIVE | Facility: OTHER | Age: 66
End: 2021-07-16

## 2021-07-21 ENCOUNTER — OFFICE VISIT (OUTPATIENT)
Dept: SURGERY | Facility: CLINIC | Age: 66
End: 2021-07-21
Payer: COMMERCIAL

## 2021-07-21 ENCOUNTER — HOSPITAL ENCOUNTER (EMERGENCY)
Facility: HOSPITAL | Age: 66
Discharge: HOME | End: 2021-07-21
Payer: COMMERCIAL

## 2021-07-21 ENCOUNTER — PATIENT MESSAGE (OUTPATIENT)
Dept: FAMILY MEDICINE | Facility: CLINIC | Age: 66
End: 2021-07-21

## 2021-07-21 VITALS
BODY MASS INDEX: 26.98 KG/M2 | HEART RATE: 72 BPM | WEIGHT: 158 LBS | OXYGEN SATURATION: 99 % | TEMPERATURE: 98 F | HEIGHT: 64 IN | SYSTOLIC BLOOD PRESSURE: 128 MMHG | DIASTOLIC BLOOD PRESSURE: 67 MMHG

## 2021-07-21 VITALS
HEART RATE: 76 BPM | DIASTOLIC BLOOD PRESSURE: 65 MMHG | WEIGHT: 158 LBS | TEMPERATURE: 98 F | RESPIRATION RATE: 18 BRPM | SYSTOLIC BLOOD PRESSURE: 129 MMHG | BODY MASS INDEX: 26.98 KG/M2 | HEIGHT: 64 IN | OXYGEN SATURATION: 100 %

## 2021-07-21 DIAGNOSIS — T14.90XA TRAUMA: ICD-10-CM

## 2021-07-21 DIAGNOSIS — S52.502A CLOSED FRACTURE OF DISTAL END OF LEFT RADIUS, UNSPECIFIED FRACTURE MORPHOLOGY, INITIAL ENCOUNTER: Primary | ICD-10-CM

## 2021-07-21 DIAGNOSIS — K56.1: Primary | ICD-10-CM

## 2021-07-21 DIAGNOSIS — S90.121A CONTUSION OF LESSER TOE OF RIGHT FOOT WITHOUT DAMAGE TO NAIL, INITIAL ENCOUNTER: ICD-10-CM

## 2021-07-21 PROCEDURE — 99214 OFFICE O/P EST MOD 30 MIN: CPT | Mod: 24,S$GLB,, | Performed by: SURGERY

## 2021-07-21 PROCEDURE — 3008F PR BODY MASS INDEX (BMI) DOCUMENTED: ICD-10-PCS | Mod: CPTII,S$GLB,, | Performed by: SURGERY

## 2021-07-21 PROCEDURE — 99284 EMERGENCY DEPT VISIT MOD MDM: CPT | Mod: 25

## 2021-07-21 PROCEDURE — 73090 XR FOREARM LEFT: ICD-10-PCS | Mod: 26,LT,, | Performed by: RADIOLOGY

## 2021-07-21 PROCEDURE — 73630 X-RAY EXAM OF FOOT: CPT | Mod: 26,RT,, | Performed by: RADIOLOGY

## 2021-07-21 PROCEDURE — 3008F BODY MASS INDEX DOCD: CPT | Mod: CPTII,S$GLB,, | Performed by: SURGERY

## 2021-07-21 PROCEDURE — 73630 X-RAY EXAM OF FOOT: CPT | Mod: TC,FY,RT

## 2021-07-21 PROCEDURE — 25000003 PHARM REV CODE 250: Performed by: NURSE PRACTITIONER

## 2021-07-21 PROCEDURE — 1126F PR PAIN SEVERITY QUANTIFIED, NO PAIN PRESENT: ICD-10-PCS | Mod: CPTII,S$GLB,, | Performed by: SURGERY

## 2021-07-21 PROCEDURE — 29105 APPLICATION LONG ARM SPLINT: CPT | Mod: LT

## 2021-07-21 PROCEDURE — 73090 X-RAY EXAM OF FOREARM: CPT | Mod: TC,FY,LT

## 2021-07-21 PROCEDURE — 1126F AMNT PAIN NOTED NONE PRSNT: CPT | Mod: CPTII,S$GLB,, | Performed by: SURGERY

## 2021-07-21 PROCEDURE — 99214 PR OFFICE/OUTPT VISIT, EST, LEVL IV, 30-39 MIN: ICD-10-PCS | Mod: 24,S$GLB,, | Performed by: SURGERY

## 2021-07-21 PROCEDURE — 73630 XR FOOT COMPLETE 3 VIEW RIGHT: ICD-10-PCS | Mod: 26,RT,, | Performed by: RADIOLOGY

## 2021-07-21 PROCEDURE — 73090 X-RAY EXAM OF FOREARM: CPT | Mod: 26,LT,, | Performed by: RADIOLOGY

## 2021-07-21 RX ORDER — HYDROCODONE BITARTRATE AND ACETAMINOPHEN 5; 325 MG/1; MG/1
1 TABLET ORAL EVERY 12 HOURS PRN
Qty: 13 TABLET | Refills: 0 | Status: SHIPPED | OUTPATIENT
Start: 2021-07-21 | End: 2022-01-25

## 2021-07-21 RX ORDER — HYDROCODONE BITARTRATE AND ACETAMINOPHEN 10; 325 MG/1; MG/1
1 TABLET ORAL
Status: COMPLETED | OUTPATIENT
Start: 2021-07-21 | End: 2021-07-21

## 2021-07-21 RX ADMIN — HYDROCODONE BITARTRATE AND ACETAMINOPHEN 1 TABLET: 10; 325 TABLET ORAL at 07:07

## 2021-07-22 ENCOUNTER — TELEPHONE (OUTPATIENT)
Dept: SURGERY | Facility: CLINIC | Age: 66
End: 2021-07-22

## 2021-08-02 ENCOUNTER — INFUSION (OUTPATIENT)
Dept: INFUSION THERAPY | Facility: HOSPITAL | Age: 66
End: 2021-08-02
Attending: FAMILY MEDICINE
Payer: COMMERCIAL

## 2021-08-02 VITALS
RESPIRATION RATE: 16 BRPM | SYSTOLIC BLOOD PRESSURE: 118 MMHG | DIASTOLIC BLOOD PRESSURE: 65 MMHG | HEART RATE: 60 BPM | OXYGEN SATURATION: 100 % | TEMPERATURE: 98 F

## 2021-08-02 DIAGNOSIS — D50.8 IRON DEFICIENCY ANEMIA SECONDARY TO INADEQUATE DIETARY IRON INTAKE: Primary | ICD-10-CM

## 2021-08-02 PROCEDURE — 63600175 PHARM REV CODE 636 W HCPCS: Mod: JG | Performed by: FAMILY MEDICINE

## 2021-08-02 PROCEDURE — 96365 THER/PROPH/DIAG IV INF INIT: CPT

## 2021-08-02 PROCEDURE — 25000003 PHARM REV CODE 250: Performed by: FAMILY MEDICINE

## 2021-08-02 RX ORDER — METHYLPREDNISOLONE SOD SUCC 125 MG
125 VIAL (EA) INJECTION ONCE AS NEEDED
Status: DISCONTINUED | OUTPATIENT
Start: 2021-08-02 | End: 2021-08-02 | Stop reason: HOSPADM

## 2021-08-02 RX ORDER — SODIUM CHLORIDE 9 MG/ML
INJECTION, SOLUTION INTRAVENOUS CONTINUOUS
Status: CANCELLED | OUTPATIENT
Start: 2021-08-02

## 2021-08-02 RX ORDER — METHYLPREDNISOLONE SOD SUCC 125 MG
125 VIAL (EA) INJECTION ONCE AS NEEDED
Status: CANCELLED | OUTPATIENT
Start: 2021-08-02

## 2021-08-02 RX ORDER — SODIUM CHLORIDE 0.9 % (FLUSH) 0.9 %
10 SYRINGE (ML) INJECTION
Status: CANCELLED | OUTPATIENT
Start: 2021-08-02

## 2021-08-02 RX ORDER — HEPARIN 100 UNIT/ML
5 SYRINGE INTRAVENOUS
Status: DISCONTINUED | OUTPATIENT
Start: 2021-08-02 | End: 2021-08-02 | Stop reason: HOSPADM

## 2021-08-02 RX ORDER — SODIUM CHLORIDE 0.9 % (FLUSH) 0.9 %
10 SYRINGE (ML) INJECTION
Status: DISCONTINUED | OUTPATIENT
Start: 2021-08-02 | End: 2021-08-02 | Stop reason: HOSPADM

## 2021-08-02 RX ORDER — DIPHENHYDRAMINE HYDROCHLORIDE 50 MG/ML
50 INJECTION INTRAMUSCULAR; INTRAVENOUS ONCE AS NEEDED
Status: CANCELLED | OUTPATIENT
Start: 2021-08-02

## 2021-08-02 RX ORDER — EPINEPHRINE 0.3 MG/.3ML
0.3 INJECTION SUBCUTANEOUS ONCE AS NEEDED
Status: CANCELLED | OUTPATIENT
Start: 2021-08-02

## 2021-08-02 RX ORDER — DIPHENHYDRAMINE HYDROCHLORIDE 50 MG/ML
50 INJECTION INTRAMUSCULAR; INTRAVENOUS ONCE AS NEEDED
Status: DISCONTINUED | OUTPATIENT
Start: 2021-08-02 | End: 2021-08-02 | Stop reason: HOSPADM

## 2021-08-02 RX ORDER — HEPARIN 100 UNIT/ML
5 SYRINGE INTRAVENOUS
Status: CANCELLED | OUTPATIENT
Start: 2021-08-02

## 2021-08-02 RX ORDER — SODIUM CHLORIDE 9 MG/ML
INJECTION, SOLUTION INTRAVENOUS CONTINUOUS
Status: DISCONTINUED | OUTPATIENT
Start: 2021-08-02 | End: 2021-08-02 | Stop reason: HOSPADM

## 2021-08-02 RX ORDER — EPINEPHRINE 0.3 MG/.3ML
0.3 INJECTION SUBCUTANEOUS ONCE AS NEEDED
Status: DISCONTINUED | OUTPATIENT
Start: 2021-08-02 | End: 2021-08-02 | Stop reason: HOSPADM

## 2021-08-02 RX ADMIN — FERRIC CARBOXYMALTOSE INJECTION 750 MG: 50 INJECTION, SOLUTION INTRAVENOUS at 08:08

## 2021-08-09 ENCOUNTER — PATIENT MESSAGE (OUTPATIENT)
Dept: FAMILY MEDICINE | Facility: CLINIC | Age: 66
End: 2021-08-09

## 2021-08-09 DIAGNOSIS — D50.8 IRON DEFICIENCY ANEMIA SECONDARY TO INADEQUATE DIETARY IRON INTAKE: Primary | ICD-10-CM

## 2021-08-11 ENCOUNTER — LAB VISIT (OUTPATIENT)
Dept: FAMILY MEDICINE | Facility: CLINIC | Age: 66
End: 2021-08-11
Payer: COMMERCIAL

## 2021-08-11 DIAGNOSIS — Z20.822 EXPOSURE TO COVID-19 VIRUS: Primary | ICD-10-CM

## 2021-08-11 PROCEDURE — U0005 INFEC AGEN DETEC AMPLI PROBE: HCPCS | Performed by: FAMILY MEDICINE

## 2021-08-11 PROCEDURE — U0003 INFECTIOUS AGENT DETECTION BY NUCLEIC ACID (DNA OR RNA); SEVERE ACUTE RESPIRATORY SYNDROME CORONAVIRUS 2 (SARS-COV-2) (CORONAVIRUS DISEASE [COVID-19]), AMPLIFIED PROBE TECHNIQUE, MAKING USE OF HIGH THROUGHPUT TECHNOLOGIES AS DESCRIBED BY CMS-2020-01-R: HCPCS | Performed by: FAMILY MEDICINE

## 2021-08-12 LAB
SARS-COV-2 RNA RESP QL NAA+PROBE: NOT DETECTED
SARS-COV-2- CYCLE NUMBER: -1

## 2021-09-07 ENCOUNTER — TELEPHONE (OUTPATIENT)
Dept: ORTHOPEDICS | Facility: CLINIC | Age: 66
End: 2021-09-07

## 2021-09-16 ENCOUNTER — TELEPHONE (OUTPATIENT)
Dept: FAMILY MEDICINE | Facility: CLINIC | Age: 66
End: 2021-09-16

## 2021-09-16 ENCOUNTER — LAB VISIT (OUTPATIENT)
Dept: LAB | Facility: HOSPITAL | Age: 66
End: 2021-09-16
Attending: FAMILY MEDICINE
Payer: COMMERCIAL

## 2021-09-16 DIAGNOSIS — D50.8 IRON DEFICIENCY ANEMIA SECONDARY TO INADEQUATE DIETARY IRON INTAKE: ICD-10-CM

## 2021-09-16 LAB
FERRITIN SERPL-MCNC: 186 NG/ML (ref 20–300)
IRON SERPL-MCNC: 71 UG/DL (ref 30–160)
SATURATED IRON: 22 % (ref 20–50)
TOTAL IRON BINDING CAPACITY: 320 UG/DL (ref 250–450)
TRANSFERRIN SERPL-MCNC: 216 MG/DL (ref 200–375)

## 2021-09-16 PROCEDURE — 84466 ASSAY OF TRANSFERRIN: CPT | Performed by: FAMILY MEDICINE

## 2021-09-16 PROCEDURE — 36415 COLL VENOUS BLD VENIPUNCTURE: CPT | Performed by: FAMILY MEDICINE

## 2021-09-16 PROCEDURE — 82728 ASSAY OF FERRITIN: CPT | Performed by: FAMILY MEDICINE

## 2021-09-17 ENCOUNTER — TELEPHONE (OUTPATIENT)
Dept: FAMILY MEDICINE | Facility: CLINIC | Age: 66
End: 2021-09-17

## 2021-10-27 DIAGNOSIS — M15.9 PRIMARY OSTEOARTHRITIS INVOLVING MULTIPLE JOINTS: ICD-10-CM

## 2021-10-27 DIAGNOSIS — M25.551 RIGHT HIP PAIN: ICD-10-CM

## 2021-10-28 RX ORDER — MELOXICAM 15 MG/1
15 TABLET ORAL DAILY
Qty: 30 TABLET | Refills: 5 | Status: SHIPPED | OUTPATIENT
Start: 2021-10-28 | End: 2022-01-25

## 2021-12-14 ENCOUNTER — PATIENT MESSAGE (OUTPATIENT)
Dept: SURGERY | Facility: CLINIC | Age: 66
End: 2021-12-14
Payer: COMMERCIAL

## 2021-12-14 ENCOUNTER — TELEPHONE (OUTPATIENT)
Dept: SURGERY | Facility: CLINIC | Age: 66
End: 2021-12-14
Payer: COMMERCIAL

## 2021-12-14 DIAGNOSIS — K56.1: Primary | ICD-10-CM

## 2021-12-21 ENCOUNTER — OFFICE VISIT (OUTPATIENT)
Dept: SURGERY | Facility: CLINIC | Age: 66
End: 2021-12-21
Payer: COMMERCIAL

## 2021-12-21 VITALS
HEART RATE: 71 BPM | SYSTOLIC BLOOD PRESSURE: 138 MMHG | DIASTOLIC BLOOD PRESSURE: 63 MMHG | TEMPERATURE: 99 F | WEIGHT: 170.63 LBS | BODY MASS INDEX: 29.13 KG/M2 | HEIGHT: 64 IN

## 2021-12-21 DIAGNOSIS — K56.1: ICD-10-CM

## 2021-12-21 PROCEDURE — 46600 PR DIAG2STIC A2SCOPY: ICD-10-PCS | Mod: S$GLB,,, | Performed by: SURGERY

## 2021-12-21 PROCEDURE — 46600 DIAGNOSTIC ANOSCOPY SPX: CPT | Mod: S$GLB,,, | Performed by: SURGERY

## 2021-12-21 PROCEDURE — 99213 OFFICE O/P EST LOW 20 MIN: CPT | Mod: 25,S$GLB,, | Performed by: SURGERY

## 2021-12-21 PROCEDURE — 99213 PR OFFICE/OUTPT VISIT, EST, LEVL III, 20-29 MIN: ICD-10-PCS | Mod: 25,S$GLB,, | Performed by: SURGERY

## 2021-12-21 PROCEDURE — 99999 PR PBB SHADOW E&M-EST. PATIENT-LVL IV: CPT | Mod: PBBFAC,,, | Performed by: SURGERY

## 2021-12-21 PROCEDURE — 99999 PR PBB SHADOW E&M-EST. PATIENT-LVL IV: ICD-10-PCS | Mod: PBBFAC,,, | Performed by: SURGERY

## 2021-12-21 RX ORDER — CHOLECALCIFEROL (VITAMIN D3) 25 MCG
3000 TABLET ORAL DAILY
COMMUNITY

## 2021-12-22 ENCOUNTER — PATIENT MESSAGE (OUTPATIENT)
Dept: FAMILY MEDICINE | Facility: CLINIC | Age: 66
End: 2021-12-22
Payer: COMMERCIAL

## 2021-12-22 ENCOUNTER — TELEPHONE (OUTPATIENT)
Dept: SURGERY | Facility: CLINIC | Age: 66
End: 2021-12-22
Payer: COMMERCIAL

## 2021-12-22 ENCOUNTER — PATIENT MESSAGE (OUTPATIENT)
Dept: SURGERY | Facility: CLINIC | Age: 66
End: 2021-12-22
Payer: COMMERCIAL

## 2021-12-22 ENCOUNTER — HOSPITAL ENCOUNTER (OUTPATIENT)
Dept: RADIOLOGY | Facility: HOSPITAL | Age: 66
Discharge: HOME | End: 2021-12-22
Attending: SURGERY
Payer: COMMERCIAL

## 2021-12-22 DIAGNOSIS — K56.1: ICD-10-CM

## 2021-12-22 PROCEDURE — 74018 RADEX ABDOMEN 1 VIEW: CPT | Mod: 26,,, | Performed by: RADIOLOGY

## 2021-12-22 PROCEDURE — 74018 RADEX ABDOMEN 1 VIEW: CPT | Mod: TC,FY

## 2021-12-22 PROCEDURE — 74018 XR ABDOMEN AP 1 VIEW: ICD-10-PCS | Mod: 26,,, | Performed by: RADIOLOGY

## 2021-12-27 ENCOUNTER — TELEPHONE (OUTPATIENT)
Dept: SURGERY | Facility: CLINIC | Age: 66
End: 2021-12-27
Payer: COMMERCIAL

## 2021-12-28 ENCOUNTER — HOSPITAL ENCOUNTER (OUTPATIENT)
Dept: RADIOLOGY | Facility: HOSPITAL | Age: 66
Discharge: HOME | End: 2021-12-28
Attending: SURGERY
Payer: COMMERCIAL

## 2021-12-28 DIAGNOSIS — K56.1: ICD-10-CM

## 2021-12-28 PROCEDURE — 74018 RADEX ABDOMEN 1 VIEW: CPT | Mod: TC,FY

## 2021-12-28 PROCEDURE — 74018 XR ABDOMEN AP 1 VIEW: ICD-10-PCS | Mod: 26,,, | Performed by: RADIOLOGY

## 2021-12-28 PROCEDURE — 74018 RADEX ABDOMEN 1 VIEW: CPT | Mod: 26,,, | Performed by: RADIOLOGY

## 2021-12-30 ENCOUNTER — HOSPITAL ENCOUNTER (OUTPATIENT)
Dept: RADIOLOGY | Facility: HOSPITAL | Age: 66
Discharge: HOME | End: 2021-12-30
Attending: SURGERY
Payer: COMMERCIAL

## 2021-12-30 DIAGNOSIS — K56.1: ICD-10-CM

## 2021-12-30 PROCEDURE — 74240 FL UPPER GI WITH SMALL BOWEL (XPD): ICD-10-PCS | Mod: 26,,, | Performed by: RADIOLOGY

## 2021-12-30 PROCEDURE — A9698 NON-RAD CONTRAST MATERIALNOC: HCPCS | Performed by: SURGERY

## 2021-12-30 PROCEDURE — 25500020 PHARM REV CODE 255: Performed by: SURGERY

## 2021-12-30 PROCEDURE — 74248 X-RAY SM INT F-THRU STD: CPT | Mod: 26,,, | Performed by: RADIOLOGY

## 2021-12-30 PROCEDURE — 74248 X-RAY SM INT F-THRU STD: CPT | Mod: TC

## 2021-12-30 PROCEDURE — 74248 FL UPPER GI WITH SMALL BOWEL (XPD): ICD-10-PCS | Mod: 26,,, | Performed by: RADIOLOGY

## 2021-12-30 PROCEDURE — 74240 X-RAY XM UPR GI TRC 1CNTRST: CPT | Mod: TC,FY

## 2021-12-30 PROCEDURE — 74240 X-RAY XM UPR GI TRC 1CNTRST: CPT | Mod: 26,,, | Performed by: RADIOLOGY

## 2021-12-30 RX ADMIN — BARIUM SULFATE 350 ML: 0.6 SUSPENSION ORAL at 10:12

## 2022-01-10 ENCOUNTER — PATIENT MESSAGE (OUTPATIENT)
Dept: ADMINISTRATIVE | Facility: HOSPITAL | Age: 67
End: 2022-01-10
Payer: COMMERCIAL

## 2022-01-10 ENCOUNTER — PATIENT MESSAGE (OUTPATIENT)
Dept: SURGERY | Facility: CLINIC | Age: 67
End: 2022-01-10
Payer: COMMERCIAL

## 2022-01-11 DIAGNOSIS — M62.89 PELVIC FLOOR DYSFUNCTION IN FEMALE: Primary | ICD-10-CM

## 2022-01-25 ENCOUNTER — LAB VISIT (OUTPATIENT)
Dept: LAB | Facility: HOSPITAL | Age: 67
End: 2022-01-25
Attending: FAMILY MEDICINE
Payer: COMMERCIAL

## 2022-01-25 ENCOUNTER — OFFICE VISIT (OUTPATIENT)
Dept: FAMILY MEDICINE | Facility: CLINIC | Age: 67
End: 2022-01-25
Payer: COMMERCIAL

## 2022-01-25 VITALS
HEART RATE: 70 BPM | OXYGEN SATURATION: 98 % | WEIGHT: 176 LBS | BODY MASS INDEX: 30.05 KG/M2 | SYSTOLIC BLOOD PRESSURE: 112 MMHG | DIASTOLIC BLOOD PRESSURE: 70 MMHG | RESPIRATION RATE: 15 BRPM | HEIGHT: 64 IN

## 2022-01-25 DIAGNOSIS — R25.2 MUSCLE CRAMPS: Primary | ICD-10-CM

## 2022-01-25 DIAGNOSIS — D50.8 IRON DEFICIENCY ANEMIA SECONDARY TO INADEQUATE DIETARY IRON INTAKE: ICD-10-CM

## 2022-01-25 DIAGNOSIS — R25.2 MUSCLE CRAMPS: ICD-10-CM

## 2022-01-25 DIAGNOSIS — Z23 NEED FOR IMMUNIZATION AGAINST INFLUENZA: ICD-10-CM

## 2022-01-25 DIAGNOSIS — Z53.20 MAMMOGRAM DECLINED: ICD-10-CM

## 2022-01-25 DIAGNOSIS — Z87.891 PERSONAL HISTORY OF NICOTINE DEPENDENCE: ICD-10-CM

## 2022-01-25 LAB
ALBUMIN SERPL BCP-MCNC: 4.1 G/DL (ref 3.5–5.2)
ALP SERPL-CCNC: 75 U/L (ref 55–135)
ALT SERPL W/O P-5'-P-CCNC: 11 U/L (ref 10–44)
ANION GAP SERPL CALC-SCNC: 11 MMOL/L (ref 8–16)
AST SERPL-CCNC: 14 U/L (ref 10–40)
BASOPHILS # BLD AUTO: 0.08 K/UL (ref 0–0.2)
BASOPHILS NFR BLD: 0.9 % (ref 0–1.9)
BILIRUB SERPL-MCNC: 0.2 MG/DL (ref 0.1–1)
BUN SERPL-MCNC: 16 MG/DL (ref 8–23)
CALCIUM SERPL-MCNC: 9.5 MG/DL (ref 8.7–10.5)
CHLORIDE SERPL-SCNC: 104 MMOL/L (ref 95–110)
CO2 SERPL-SCNC: 26 MMOL/L (ref 23–29)
CREAT SERPL-MCNC: 0.7 MG/DL (ref 0.5–1.4)
DIFFERENTIAL METHOD: ABNORMAL
EOSINOPHIL # BLD AUTO: 0.1 K/UL (ref 0–0.5)
EOSINOPHIL NFR BLD: 1.5 % (ref 0–8)
ERYTHROCYTE [DISTWIDTH] IN BLOOD BY AUTOMATED COUNT: 18.1 % (ref 11.5–14.5)
EST. GFR  (AFRICAN AMERICAN): >60 ML/MIN/1.73 M^2
EST. GFR  (NON AFRICAN AMERICAN): >60 ML/MIN/1.73 M^2
GLUCOSE SERPL-MCNC: 97 MG/DL (ref 70–110)
HCT VFR BLD AUTO: 26.3 % (ref 37–48.5)
HGB BLD-MCNC: 7.7 G/DL (ref 12–16)
IMM GRANULOCYTES # BLD AUTO: 0.04 K/UL (ref 0–0.04)
IMM GRANULOCYTES NFR BLD AUTO: 0.5 % (ref 0–0.5)
IRON SERPL-MCNC: 15 UG/DL (ref 30–160)
LYMPHOCYTES # BLD AUTO: 3 K/UL (ref 1–4.8)
LYMPHOCYTES NFR BLD: 34 % (ref 18–48)
MAGNESIUM SERPL-MCNC: 2.1 MG/DL (ref 1.6–2.6)
MCH RBC QN AUTO: 22.6 PG (ref 27–31)
MCHC RBC AUTO-ENTMCNC: 29.3 G/DL (ref 32–36)
MCV RBC AUTO: 77 FL (ref 82–98)
MONOCYTES # BLD AUTO: 0.8 K/UL (ref 0.3–1)
MONOCYTES NFR BLD: 8.8 % (ref 4–15)
NEUTROPHILS # BLD AUTO: 4.8 K/UL (ref 1.8–7.7)
NEUTROPHILS NFR BLD: 54.3 % (ref 38–73)
NRBC BLD-RTO: 0 /100 WBC
PLATELET # BLD AUTO: 473 K/UL (ref 150–450)
PMV BLD AUTO: 8.9 FL (ref 9.2–12.9)
POTASSIUM SERPL-SCNC: 3.9 MMOL/L (ref 3.5–5.1)
PROT SERPL-MCNC: 7.3 G/DL (ref 6–8.4)
RBC # BLD AUTO: 3.41 M/UL (ref 4–5.4)
SATURATED IRON: 2 % (ref 20–50)
SODIUM SERPL-SCNC: 141 MMOL/L (ref 136–145)
TOTAL IRON BINDING CAPACITY: 617 UG/DL (ref 250–450)
TRANSFERRIN SERPL-MCNC: 417 MG/DL (ref 200–375)
TSH SERPL DL<=0.005 MIU/L-ACNC: 1.94 UIU/ML (ref 0.4–4)
WBC # BLD AUTO: 8.77 K/UL (ref 3.9–12.7)

## 2022-01-25 PROCEDURE — 99999 PR PBB SHADOW E&M-EST. PATIENT-LVL IV: CPT | Mod: PBBFAC,,, | Performed by: FAMILY MEDICINE

## 2022-01-25 PROCEDURE — 3008F BODY MASS INDEX DOCD: CPT | Mod: CPTII,S$GLB,, | Performed by: FAMILY MEDICINE

## 2022-01-25 PROCEDURE — 84443 ASSAY THYROID STIM HORMONE: CPT | Performed by: FAMILY MEDICINE

## 2022-01-25 PROCEDURE — 84466 ASSAY OF TRANSFERRIN: CPT | Performed by: FAMILY MEDICINE

## 2022-01-25 PROCEDURE — 90471 FLU VACCINE (QUAD) GREATER THAN OR EQUAL TO 3YO PRESERVATIVE FREE IM: ICD-10-PCS | Mod: S$GLB,,, | Performed by: FAMILY MEDICINE

## 2022-01-25 PROCEDURE — 36415 COLL VENOUS BLD VENIPUNCTURE: CPT | Performed by: FAMILY MEDICINE

## 2022-01-25 PROCEDURE — 1160F RVW MEDS BY RX/DR IN RCRD: CPT | Mod: CPTII,S$GLB,, | Performed by: FAMILY MEDICINE

## 2022-01-25 PROCEDURE — 1160F PR REVIEW ALL MEDS BY PRESCRIBER/CLIN PHARMACIST DOCUMENTED: ICD-10-PCS | Mod: CPTII,S$GLB,, | Performed by: FAMILY MEDICINE

## 2022-01-25 PROCEDURE — 3008F PR BODY MASS INDEX (BMI) DOCUMENTED: ICD-10-PCS | Mod: CPTII,S$GLB,, | Performed by: FAMILY MEDICINE

## 2022-01-25 PROCEDURE — 90686 FLU VACCINE (QUAD) GREATER THAN OR EQUAL TO 3YO PRESERVATIVE FREE IM: ICD-10-PCS | Mod: S$GLB,,, | Performed by: FAMILY MEDICINE

## 2022-01-25 PROCEDURE — 3078F PR MOST RECENT DIASTOLIC BLOOD PRESSURE < 80 MM HG: ICD-10-PCS | Mod: CPTII,S$GLB,, | Performed by: FAMILY MEDICINE

## 2022-01-25 PROCEDURE — 3074F SYST BP LT 130 MM HG: CPT | Mod: CPTII,S$GLB,, | Performed by: FAMILY MEDICINE

## 2022-01-25 PROCEDURE — 1159F PR MEDICATION LIST DOCUMENTED IN MEDICAL RECORD: ICD-10-PCS | Mod: CPTII,S$GLB,, | Performed by: FAMILY MEDICINE

## 2022-01-25 PROCEDURE — 90686 IIV4 VACC NO PRSV 0.5 ML IM: CPT | Mod: S$GLB,,, | Performed by: FAMILY MEDICINE

## 2022-01-25 PROCEDURE — 99214 OFFICE O/P EST MOD 30 MIN: CPT | Mod: 25,S$GLB,, | Performed by: FAMILY MEDICINE

## 2022-01-25 PROCEDURE — 99214 PR OFFICE/OUTPT VISIT, EST, LEVL IV, 30-39 MIN: ICD-10-PCS | Mod: 25,S$GLB,, | Performed by: FAMILY MEDICINE

## 2022-01-25 PROCEDURE — 99999 PR PBB SHADOW E&M-EST. PATIENT-LVL IV: ICD-10-PCS | Mod: PBBFAC,,, | Performed by: FAMILY MEDICINE

## 2022-01-25 PROCEDURE — 1159F MED LIST DOCD IN RCRD: CPT | Mod: CPTII,S$GLB,, | Performed by: FAMILY MEDICINE

## 2022-01-25 PROCEDURE — 3078F DIAST BP <80 MM HG: CPT | Mod: CPTII,S$GLB,, | Performed by: FAMILY MEDICINE

## 2022-01-25 PROCEDURE — 80053 COMPREHEN METABOLIC PANEL: CPT | Performed by: FAMILY MEDICINE

## 2022-01-25 PROCEDURE — 85025 COMPLETE CBC W/AUTO DIFF WBC: CPT | Performed by: FAMILY MEDICINE

## 2022-01-25 PROCEDURE — 83735 ASSAY OF MAGNESIUM: CPT | Performed by: FAMILY MEDICINE

## 2022-01-25 PROCEDURE — 90471 IMMUNIZATION ADMIN: CPT | Mod: S$GLB,,, | Performed by: FAMILY MEDICINE

## 2022-01-25 PROCEDURE — 3074F PR MOST RECENT SYSTOLIC BLOOD PRESSURE < 130 MM HG: ICD-10-PCS | Mod: CPTII,S$GLB,, | Performed by: FAMILY MEDICINE

## 2022-01-25 PROCEDURE — 82728 ASSAY OF FERRITIN: CPT | Performed by: FAMILY MEDICINE

## 2022-01-25 NOTE — PROGRESS NOTES
[facility] - Clinic Note    Subjective      Ms. Licea is a 66 y.o. female who presents to clinic with complaints of leg cramps.     Complains of bilateral leg cramps for the few months.   Mainly occurs at night.   She has a history of iron def. And had to have a transfusion last year.   She drinks pedialyte at night and ensure protein during the day.   Denies color change or swelling.     Declined mammogram.     Would like to have CT lung screening done. Last was done in 2019 which needed a 6 month follow up.     Would like the flu vaccine today.    PMH Pham has a past medical history of Anemia, Fatty liver (), Former smoker (), Osteoarthritis (), Pulmonary nodules (2019), and Ulcer of abdomen wall ().   PSXH Pham has a past surgical history that includes Upper gastrointestinal endoscopy (); Colonoscopy (~); Epidural steroid injection into lumbar spine (N/A, 10/12/2018); Epidural steroid injection into lumbar spine (N/A, 2019);  section (, , ); Hysterectomy (); Appendectomy (); Laparoscopic cholecystectomy (N/A, 2020); Colonoscopy (N/A, 2020); Esophagogastroduodenoscopy (N/A, 2020); Diagnostic laparoscopy (N/A, 2021); and Laparoscopic lysis of adhesions (N/A, 2021).    Pham's family history includes Aneurysm in her father; Arthritis in her mother and sister; Brain Hemorrhage in her father; Heart disease in her mother; Hypertension in her father and sister; Obesity in her sister; Osteoarthritis in her sister; Osteoporosis in her mother; Ovarian cancer in her mother; Stroke (age of onset: 50) in her father.    Pham reports that she quit smoking about 12 years ago. She has a 40.00 pack-year smoking history. She has never used smokeless tobacco. She reports previous alcohol use. She reports previous drug use. Drug: Marijuana.   ALG Pham has No Known Allergies.   MED Pham has a current medication list which  "includes the following prescription(s): diclofenac sodium, docusate sodium, lactulose, ondansetron, pantoprazole, and vitamin d.     Review of Systems   Constitutional: Negative for activity change, appetite change, chills, fatigue and fever.   Eyes: Negative for visual disturbance.   Respiratory: Negative for cough and shortness of breath.    Cardiovascular: Negative for chest pain, palpitations and leg swelling.   Gastrointestinal: Negative for abdominal pain, nausea and vomiting.   Skin: Negative for wound.   Neurological: Negative for dizziness and headaches.   Psychiatric/Behavioral: Negative for confusion.     Objective     /70 (BP Location: Left arm, Patient Position: Sitting, BP Method: Medium (Manual))   Pulse 70   Resp 15   Ht 5' 4" (1.626 m)   Wt 79.8 kg (176 lb)   LMP  (LMP Unknown)   SpO2 98%   BMI 30.21 kg/m²     Physical Exam   Constitutional: She appears well-developed, well-nourished and obese.  Non-toxic appearance. She does not appear ill. No distress.   HENT:   Head: Normocephalic and atraumatic.   Eyes: Right eye exhibits no discharge. Left eye exhibits no discharge.   Cardiovascular: Normal rate, regular rhythm, normal heart sounds, intact distal pulses and normal pulses. Exam reveals no gallop and no friction rub.   No murmur heard.  Pulmonary/Chest: Effort normal and breath sounds normal. No respiratory distress. She has no wheezes. She has no rhonchi. She has no rales.   Abdominal: Normal appearance.   Musculoskeletal:         General: No swelling, tenderness, deformity or signs of injury. Normal range of motion.      Cervical back: Neck supple.      Right lower leg: No edema.      Left lower leg: No edema.   Lymphadenopathy:     She has no cervical adenopathy.   Neurological: She is alert.   Skin: Skin is warm and dry. Capillary refill takes less than 2 seconds. She is not diaphoretic.   Psychiatric: She has a normal mood and affect. Her behavior is normal. Mood, judgment and " thought content normal.   Vitals reviewed.     Assessment/Plan     Pham was seen today for follow-up and leg cramps.    Diagnoses and all orders for this visit:    Muscle cramps  -     Ferritin; Future  -     Iron and TIBC; Future  -     CBC Auto Differential; Future  -     Comprehensive Metabolic Panel; Future  -     Magnesium; Future  -     TSH; Future    Iron deficiency anemia secondary to inadequate dietary iron intake  -     Ferritin; Future  -     Iron and TIBC; Future  -     CBC Auto Differential; Future    Personal history of nicotine dependence  -     CT Chest Lung Screening Low Dose; Future    Need for immunization against influenza  -     Influenza - Quadrivalent *Preferred* (6 months+) (PF)    Mammogram declined      -Obtain labs and adjust regimen as indicated    Follow up in about 6 months (around 7/25/2022), or if symptoms worsen or fail to improve.    Future Appointments   Date Time Provider Department Center   2/23/2022  9:30 AM Berenice Menendez PT [facility]   3/22/2022 12:00 PM John Tyler MD [facility] [place] [facility]       Deven Emerson MD  Family Medicine  [facility]

## 2022-01-25 NOTE — PROGRESS NOTES
2022  5:00pm------Pham Licea presented to clinic for an appt visit with the provider. Orders were obtained before injection was administered. The 10 rights of administration were utilized by this nurse. Pham Licea verified Name and . Pham Licea denies any previous reactions / allergies to injections received in the past. Educated Pham Licea on medication and time was given for Pham Licea to ask / answer any questions. Gave appropriate and current CDC-VIS sheets. No redness or signs of adverse reaction noted at injection site. Pham Licea  tolerated injection well and voiced understanding of all information given. Pham Licea left in satisfactory condition./LEdler,LPN

## 2022-01-26 ENCOUNTER — TELEPHONE (OUTPATIENT)
Dept: SURGERY | Facility: CLINIC | Age: 67
End: 2022-01-26
Payer: COMMERCIAL

## 2022-01-26 ENCOUNTER — PATIENT MESSAGE (OUTPATIENT)
Dept: FAMILY MEDICINE | Facility: CLINIC | Age: 67
End: 2022-01-26
Payer: COMMERCIAL

## 2022-01-26 LAB — FERRITIN SERPL-MCNC: 2 NG/ML (ref 20–300)

## 2022-01-26 RX ORDER — HEPARIN 100 UNIT/ML
5 SYRINGE INTRAVENOUS
Status: CANCELLED | OUTPATIENT
Start: 2022-01-26

## 2022-01-26 RX ORDER — METHYLPREDNISOLONE SOD SUCC 125 MG
125 VIAL (EA) INJECTION ONCE AS NEEDED
Status: CANCELLED | OUTPATIENT
Start: 2022-01-26

## 2022-01-26 RX ORDER — EPINEPHRINE 0.3 MG/.3ML
0.3 INJECTION SUBCUTANEOUS ONCE AS NEEDED
Status: CANCELLED | OUTPATIENT
Start: 2022-01-26

## 2022-01-26 RX ORDER — DIPHENHYDRAMINE HYDROCHLORIDE 50 MG/ML
50 INJECTION INTRAMUSCULAR; INTRAVENOUS ONCE AS NEEDED
Status: CANCELLED | OUTPATIENT
Start: 2022-01-26

## 2022-01-26 RX ORDER — SODIUM CHLORIDE 9 MG/ML
INJECTION, SOLUTION INTRAVENOUS CONTINUOUS
Status: CANCELLED | OUTPATIENT
Start: 2022-01-26

## 2022-01-26 RX ORDER — SODIUM CHLORIDE 0.9 % (FLUSH) 0.9 %
10 SYRINGE (ML) INJECTION
Status: CANCELLED | OUTPATIENT
Start: 2022-01-26

## 2022-01-28 ENCOUNTER — HOSPITAL ENCOUNTER (EMERGENCY)
Facility: HOSPITAL | Age: 67
Discharge: HOME | End: 2022-01-29
Attending: FAMILY MEDICINE
Payer: COMMERCIAL

## 2022-01-28 ENCOUNTER — PATIENT MESSAGE (OUTPATIENT)
Dept: FAMILY MEDICINE | Facility: CLINIC | Age: 67
End: 2022-01-28
Payer: COMMERCIAL

## 2022-01-28 DIAGNOSIS — D50.9 IRON DEFICIENCY ANEMIA, UNSPECIFIED IRON DEFICIENCY ANEMIA TYPE: Primary | ICD-10-CM

## 2022-01-28 LAB
ABO + RH BLD: NORMAL
ALBUMIN SERPL BCP-MCNC: 4 G/DL (ref 3.5–5.2)
ALP SERPL-CCNC: 68 U/L (ref 55–135)
ALT SERPL W/O P-5'-P-CCNC: 12 U/L (ref 10–44)
ANION GAP SERPL CALC-SCNC: 11 MMOL/L (ref 8–16)
APTT BLDCRRT: 24.9 SEC (ref 21–32)
AST SERPL-CCNC: 13 U/L (ref 10–40)
BASOPHILS # BLD AUTO: 0.07 K/UL (ref 0–0.2)
BASOPHILS NFR BLD: 0.7 % (ref 0–1.9)
BILIRUB SERPL-MCNC: 0.2 MG/DL (ref 0.1–1)
BLD GP AB SCN CELLS X3 SERPL QL: NORMAL
BUN SERPL-MCNC: 21 MG/DL (ref 8–23)
CALCIUM SERPL-MCNC: 9.5 MG/DL (ref 8.7–10.5)
CHLORIDE SERPL-SCNC: 104 MMOL/L (ref 95–110)
CO2 SERPL-SCNC: 24 MMOL/L (ref 23–29)
CREAT SERPL-MCNC: 0.7 MG/DL (ref 0.5–1.4)
DIFFERENTIAL METHOD: ABNORMAL
EOSINOPHIL # BLD AUTO: 0.1 K/UL (ref 0–0.5)
EOSINOPHIL NFR BLD: 1.3 % (ref 0–8)
ERYTHROCYTE [DISTWIDTH] IN BLOOD BY AUTOMATED COUNT: 18.6 % (ref 11.5–14.5)
EST. GFR  (AFRICAN AMERICAN): >60 ML/MIN/1.73 M^2
EST. GFR  (NON AFRICAN AMERICAN): >60 ML/MIN/1.73 M^2
GLUCOSE SERPL-MCNC: 96 MG/DL (ref 70–110)
HCT VFR BLD AUTO: 24.6 % (ref 37–48.5)
HCT VFR BLD AUTO: 25.2 % (ref 37–48.5)
HGB BLD-MCNC: 7.2 G/DL (ref 12–16)
HGB BLD-MCNC: 7.5 G/DL (ref 12–16)
IMM GRANULOCYTES # BLD AUTO: 0.04 K/UL (ref 0–0.04)
IMM GRANULOCYTES NFR BLD AUTO: 0.4 % (ref 0–0.5)
INR PPP: 0.9 (ref 0.8–1.2)
IRON SERPL-MCNC: 11 UG/DL (ref 30–160)
LYMPHOCYTES # BLD AUTO: 3 K/UL (ref 1–4.8)
LYMPHOCYTES NFR BLD: 29 % (ref 18–48)
MCH RBC QN AUTO: 22.3 PG (ref 27–31)
MCHC RBC AUTO-ENTMCNC: 29.8 G/DL (ref 32–36)
MCV RBC AUTO: 75 FL (ref 82–98)
MONOCYTES # BLD AUTO: 0.9 K/UL (ref 0.3–1)
MONOCYTES NFR BLD: 9.1 % (ref 4–15)
NEUTROPHILS # BLD AUTO: 6.1 K/UL (ref 1.8–7.7)
NEUTROPHILS NFR BLD: 59.5 % (ref 38–73)
NRBC BLD-RTO: 0 /100 WBC
OB PNL STL: NEGATIVE
PLATELET # BLD AUTO: 458 K/UL (ref 150–450)
PMV BLD AUTO: 9.4 FL (ref 9.2–12.9)
POTASSIUM SERPL-SCNC: 4 MMOL/L (ref 3.5–5.1)
PROT SERPL-MCNC: 6.9 G/DL (ref 6–8.4)
PROTHROMBIN TIME: 9.9 SEC (ref 9–12.5)
RBC # BLD AUTO: 3.36 M/UL (ref 4–5.4)
SATURATED IRON: 2 % (ref 20–50)
SODIUM SERPL-SCNC: 139 MMOL/L (ref 136–145)
TOTAL IRON BINDING CAPACITY: 592 UG/DL (ref 250–450)
TRANSFERRIN SERPL-MCNC: 400 MG/DL (ref 200–375)
WBC # BLD AUTO: 10.22 K/UL (ref 3.9–12.7)

## 2022-01-28 PROCEDURE — 86920 COMPATIBILITY TEST SPIN: CPT | Performed by: FAMILY MEDICINE

## 2022-01-28 PROCEDURE — 82272 OCCULT BLD FECES 1-3 TESTS: CPT | Performed by: FAMILY MEDICINE

## 2022-01-28 PROCEDURE — 85018 HEMOGLOBIN: CPT | Mod: 59 | Performed by: FAMILY MEDICINE

## 2022-01-28 PROCEDURE — 86900 BLOOD TYPING SEROLOGIC ABO: CPT | Performed by: FAMILY MEDICINE

## 2022-01-28 PROCEDURE — 84466 ASSAY OF TRANSFERRIN: CPT | Performed by: FAMILY MEDICINE

## 2022-01-28 PROCEDURE — 85730 THROMBOPLASTIN TIME PARTIAL: CPT | Performed by: FAMILY MEDICINE

## 2022-01-28 PROCEDURE — 36430 TRANSFUSION BLD/BLD COMPNT: CPT

## 2022-01-28 PROCEDURE — 80053 COMPREHEN METABOLIC PANEL: CPT | Performed by: EMERGENCY MEDICINE

## 2022-01-28 PROCEDURE — 86901 BLOOD TYPING SEROLOGIC RH(D): CPT | Performed by: FAMILY MEDICINE

## 2022-01-28 PROCEDURE — 36415 COLL VENOUS BLD VENIPUNCTURE: CPT | Performed by: FAMILY MEDICINE

## 2022-01-28 PROCEDURE — 85014 HEMATOCRIT: CPT | Mod: 59 | Performed by: FAMILY MEDICINE

## 2022-01-28 PROCEDURE — 85610 PROTHROMBIN TIME: CPT | Performed by: FAMILY MEDICINE

## 2022-01-28 PROCEDURE — 99285 EMERGENCY DEPT VISIT HI MDM: CPT | Mod: 25

## 2022-01-28 PROCEDURE — 85025 COMPLETE CBC W/AUTO DIFF WBC: CPT | Performed by: EMERGENCY MEDICINE

## 2022-01-28 PROCEDURE — 82728 ASSAY OF FERRITIN: CPT | Performed by: FAMILY MEDICINE

## 2022-01-28 RX ORDER — HYDROCODONE BITARTRATE AND ACETAMINOPHEN 500; 5 MG/1; MG/1
TABLET ORAL
Status: DISCONTINUED | OUTPATIENT
Start: 2022-01-28 | End: 2022-01-29 | Stop reason: HOSPADM

## 2022-01-28 NOTE — TELEPHONE ENCOUNTER
"Contacted patient, verified  Patient states "I am feeling so bad. I am waiting for my insurance company to approve the iron infusion. I have the shakes, my legs are cramping and I just feel terrible. My blood pressure is up around 157/94. Usually I have low blood pressure so this isn't normal.117/70 is my usual blood pressure. I have been trying to move around at my house but I continue to get out of breath. I am just exhausted. I know this is all because my iron is low. I just don't know what to do at this time."  I advised patient I would reach out quickly to a provider that is in office to see what she should do.  "

## 2022-01-28 NOTE — TELEPHONE ENCOUNTER
Contacted patient and advised of needing to visit nearest ER for symptoms and possibly needing a blood transfusion. Patient agrees and states she will go to the ER now

## 2022-01-28 NOTE — TELEPHONE ENCOUNTER
If she is feeling that bad she needs to go to the ED for evaluation for a blood transfusion - looks like the iron infusion is not ready yet.

## 2022-01-29 VITALS
HEIGHT: 64 IN | WEIGHT: 170 LBS | DIASTOLIC BLOOD PRESSURE: 72 MMHG | TEMPERATURE: 98 F | RESPIRATION RATE: 18 BRPM | BODY MASS INDEX: 29.02 KG/M2 | OXYGEN SATURATION: 98 % | HEART RATE: 68 BPM | SYSTOLIC BLOOD PRESSURE: 120 MMHG

## 2022-01-29 LAB
BLD PROD TYP BPU: NORMAL
BLOOD UNIT EXPIRATION DATE: NORMAL
BLOOD UNIT TYPE CODE: 5100
BLOOD UNIT TYPE: NORMAL
CODING SYSTEM: NORMAL
DISPENSE STATUS: NORMAL
FERRITIN SERPL-MCNC: 5 NG/ML (ref 20–300)
HCT VFR BLD AUTO: 26 % (ref 37–48.5)
HGB BLD-MCNC: 7.9 G/DL (ref 12–16)
NUM UNITS TRANS PACKED RBC: NORMAL

## 2022-01-29 PROCEDURE — 85018 HEMOGLOBIN: CPT | Performed by: FAMILY MEDICINE

## 2022-01-29 PROCEDURE — P9016 RBC LEUKOCYTES REDUCED: HCPCS | Performed by: FAMILY MEDICINE

## 2022-01-29 PROCEDURE — 85014 HEMATOCRIT: CPT | Performed by: FAMILY MEDICINE

## 2022-01-29 NOTE — ED PROVIDER NOTES
Encounter Date: 2022       History     Chief Complaint   Patient presents with    Weakness     Generalized feeling of weakness, SOB on exertion and fatigue for 2 weeks. Pt had an appointment with her PCP on Tuesday with low iron levels. Attempts made to get an iron infusion, but she can't handle it anymore.      Patient comes our facility complaining of generalized weakness, dyspnea on exertion made fatigue over the last 2 weeks.  Patient was seen at her primary care physician's office a couple days prior to today and was found to be anemic with hemoglobin in the sevens.  Patient states that her primary care office told her to come to the ER to be further evaluated.  Patient does have a history of anemia ever since she had a cholecystectomy in May of 2021.  Patient has had transfusion on different occasions and has had 1 iron infusion in 2021. Patient denies any blood per rectum, melena or bright red blood per rectum.  Patient denies any hematemesis.  Patient has had previous rectal exam with no occult blood.  Patient distally complains of some leg cramps.          Review of patient's allergies indicates:  No Known Allergies  Past Medical History:   Diagnosis Date    Anemia     Fatty liver     Former smoker 2009    Osteoarthritis 2010    Pulmonary nodules 2019    Repeat CT in 6 mo    Ulcer of abdomen wall 2016     Past Surgical History:   Procedure Laterality Date    APPENDECTOMY  1993     SECTION  , , 1983    x3    COLONOSCOPY  ~    [facility]: normal findings per patient report    COLONOSCOPY N/A 2020    Procedure: COLONOSCOPY;  Surgeon: Misael Holm MD;  Location: [facility] ENDO;  Service: General;  Laterality: N/A;    DIAGNOSTIC LAPAROSCOPY N/A 2021    Procedure: LAPAROSCOPY, DIAGNOSTIC;  Surgeon: Misael Holm MD;  Location: [facility] OR;  Service: General;  Laterality: N/A;    EPIDURAL STEROID INJECTION INTO LUMBAR SPINE N/A 10/12/2018     Procedure: Injection-steroid-epidural-lumbar;  Surgeon: Kal Johnson MD;  Location: [facility] OR;  Service: Pain Management;  Laterality: N/A;  L5-S1    EPIDURAL STEROID INJECTION INTO LUMBAR SPINE N/A 2019    Procedure: Injection-steroid-epidural-lumbar L5-S1;  Surgeon: Kal Johnson MD;  Location: [facility] OR;  Service: Pain Management;  Laterality: N/A;    ESOPHAGOGASTRODUODENOSCOPY N/A 2020    Procedure: ESOPHAGOGASTRODUODENOSCOPY (EGD);  Surgeon: Misael Holm MD;  Location: [facility] ENDO;  Service: General;  Laterality: N/A;    HYSTERECTOMY  1993    one ovary conserved    LAPAROSCOPIC CHOLECYSTECTOMY N/A 2020    Procedure: CHOLECYSTECTOMY, LAPAROSCOPIC;  Surgeon: Govind Frey MD;  Location: [facility] OR;  Service: General;  Laterality: N/A;    LAPAROSCOPIC LYSIS OF ADHESIONS N/A 2021    Procedure: LYSIS, ADHESIONS, LAPAROSCOPIC;  Surgeon: Misael Holm MD;  Location: [facility] OR;  Service: General;  Laterality: N/A;    UPPER GASTROINTESTINAL ENDOSCOPY       Family History   Problem Relation Age of Onset    Ovarian cancer Mother     Heart disease Mother     Osteoporosis Mother     Arthritis Mother     Hypertension Father     Stroke Father 50    Brain Hemorrhage Father     Aneurysm Father     Osteoarthritis Sister     Arthritis Sister     Hypertension Sister     Obesity Sister     Breast cancer Neg Hx     Colon cancer Neg Hx     Colon polyps Neg Hx     Crohn's disease Neg Hx     Ulcerative colitis Neg Hx      Social History     Tobacco Use    Smoking status: Former Smoker     Packs/day: 1.00     Years: 40.00     Pack years: 40.00     Quit date: 2009     Years since quittin.2    Smokeless tobacco: Never Used    Tobacco comment: quit 2009   Substance Use Topics    Alcohol use: Not Currently     Comment: Not often - one glass of wine every now and then    Drug use: Not Currently     Types: Marijuana     Comment: in 1970s     Review of Systems    Constitutional: Positive for fatigue.   Respiratory:        SAMPSON   Musculoskeletal: Positive for myalgias (leg cramps).   All other systems reviewed and are negative.      Physical Exam     Initial Vitals [01/28/22 1924]   BP Pulse Resp Temp SpO2   126/73 98 18 98.4 °F (36.9 °C) 99 %      MAP       --         Physical Exam    Nursing note and vitals reviewed.  Constitutional: She appears well-developed and well-nourished. She is not diaphoretic. No distress.   HENT:   Head: Normocephalic and atraumatic.   Nose: Nose normal.   Eyes: Conjunctivae and EOM are normal. Right eye exhibits no discharge. Left eye exhibits no discharge. No scleral icterus.   Neck: Neck supple.   Normal range of motion.  Cardiovascular: Normal rate, regular rhythm, normal heart sounds and intact distal pulses.   No murmur heard.  Pulmonary/Chest: Breath sounds normal. No respiratory distress. She has no wheezes. She has no rhonchi. She has no rales. She exhibits no tenderness.   Abdominal: Abdomen is soft. Bowel sounds are normal. She exhibits no distension. There is no abdominal tenderness.   Genitourinary:    Genitourinary Comments: No rectal hemorrhoids.  No gross blood on MELLISSA.  Rectal tone within normal limits.     Musculoskeletal:         General: No tenderness or edema. Normal range of motion.      Cervical back: Normal range of motion and neck supple.     Lymphadenopathy:     She has no cervical adenopathy.   Neurological: She is alert and oriented to person, place, and time. She has normal strength. No sensory deficit. GCS score is 15. GCS eye subscore is 4. GCS verbal subscore is 5. GCS motor subscore is 6.   Skin: Skin is warm and dry. Capillary refill takes less than 2 seconds. No rash noted. No erythema.   Psychiatric: She has a normal mood and affect. Her behavior is normal. Judgment and thought content normal.         ED Course   Procedures  Labs Reviewed   CBC W/ AUTO DIFFERENTIAL - Abnormal; Notable for the following  components:       Result Value    RBC 3.36 (*)     Hemoglobin 7.5 (*)     Hematocrit 25.2 (*)     MCV 75 (*)     MCH 22.3 (*)     MCHC 29.8 (*)     RDW 18.6 (*)     Platelets 458 (*)     All other components within normal limits   IRON AND TIBC - Abnormal; Notable for the following components:    Iron 11 (*)     Transferrin 400 (*)     TIBC 592 (*)     Saturated Iron 2 (*)     All other components within normal limits   HEMOGLOBIN - Abnormal; Notable for the following components:    Hemoglobin 7.2 (*)     All other components within normal limits   HEMATOCRIT - Abnormal; Notable for the following components:    Hematocrit 24.6 (*)     All other components within normal limits   HEMOGLOBIN - Abnormal; Notable for the following components:    Hemoglobin 7.9 (*)     All other components within normal limits    Narrative:     Recoll. [id number] by [facility] at 01/29/2022 03:46, reason: Specimen   clotted   HEMATOCRIT - Abnormal; Notable for the following components:    Hematocrit 26.0 (*)     All other components within normal limits    Narrative:     Recoll. [id number] by [facility] at 01/29/2022 03:46, reason: Specimen   clotted   COMPREHENSIVE METABOLIC PANEL   PROTIME-INR   APTT   OCCULT BLOOD X 1, STOOL   FERRITIN   TYPE & SCREEN   PREPARE RBC SOFT          Imaging Results    None          Medications   0.9%  NaCl infusion (for blood administration) (has no administration in time range)     Medical Decision Making:   ED Management:  Patient comes our facility with anemia and symptoms of mild dyspnea on exertion and fatigue.  The patient has a history of iron-deficiency anemia, iron infusions, and transfusions of packed red blood cells in the past.  Patient has been worked up thus far by her primary care physician.  Patient denies any evaluation by Hematology specialist.  I transfused 1 unit packed red blood cells with improved hemoglobin of 7.9.  Patient had decreased symptoms and felt well enough to go home.  I have made a  referral to Hematology for patient follow-up with.  Patient is instructed to follow-up with them and return to ER if her symptoms return or worsen.                      Clinical Impression:   Final diagnoses:  [D50.9] Iron deficiency anemia, unspecified iron deficiency anemia type (Primary)          ED Disposition Condition    Discharge Stable        ED Prescriptions     None        Follow-up Information    None     Follow-up with hematology.  Return to ER if symptoms worsen.     Jaret Donnelly MD  01/29/22 0439

## 2022-01-31 ENCOUNTER — TELEPHONE (OUTPATIENT)
Dept: HEMATOLOGY/ONCOLOGY | Facility: CLINIC | Age: 67
End: 2022-01-31
Payer: COMMERCIAL

## 2022-01-31 NOTE — TELEPHONE ENCOUNTER
Just called the pt to make sure that the pt got scheduled from the pt portal. Pt verbalized and understanding.

## 2022-02-01 ENCOUNTER — TELEPHONE (OUTPATIENT)
Dept: HEMATOLOGY/ONCOLOGY | Facility: CLINIC | Age: 67
End: 2022-02-01
Payer: COMMERCIAL

## 2022-02-01 NOTE — TELEPHONE ENCOUNTER
Received message from Dr Carr's nurse, Erica and she stated the pt was scheduled on Dr Carr's [facility] today for anemia, incorrectly scheduled.  Called pt and she stated she just got of the hospital this week and received units of blood for anemia.  She said she scheduled on first available which was Dr Carr.  I informed her Dr Carr sees oncology only and not anemia type and that we have specific providers that can see her for the anemia.  Pt requested [place] provider.  Informed her I will reach out to [facility] office and someone will be calling her back to schedule from [facility].  Pt verbalized understanding.    [place] navigator informed of pt's request.

## 2022-02-01 NOTE — TELEPHONE ENCOUNTER
----- Message from Bella Cabral sent at 2/1/2022 12:22 PM CST -----  Type:Needs Medical Advice    Who Called:PT  Best call back number:[phone]  Additional Info: Requesting a call back regarding#PT referred by ED, ready to schedule her 1st appt.  Please Advise- Thank you    PT lives in [place]. We don't have a contact for new Pts out there.

## 2022-02-02 ENCOUNTER — OFFICE VISIT (OUTPATIENT)
Dept: HEMATOLOGY/ONCOLOGY | Facility: CLINIC | Age: 67
End: 2022-02-02
Payer: COMMERCIAL

## 2022-02-02 VITALS
TEMPERATURE: 98 F | OXYGEN SATURATION: 100 % | DIASTOLIC BLOOD PRESSURE: 60 MMHG | HEIGHT: 64 IN | SYSTOLIC BLOOD PRESSURE: 127 MMHG | WEIGHT: 176.81 LBS | HEART RATE: 80 BPM | BODY MASS INDEX: 30.19 KG/M2 | RESPIRATION RATE: 20 BRPM

## 2022-02-02 DIAGNOSIS — K56.0 PARALYTIC ILEUS: ICD-10-CM

## 2022-02-02 DIAGNOSIS — D50.8 IRON DEFICIENCY ANEMIA SECONDARY TO INADEQUATE DIETARY IRON INTAKE: ICD-10-CM

## 2022-02-02 DIAGNOSIS — E78.5 DYSLIPIDEMIA: Primary | ICD-10-CM

## 2022-02-02 DIAGNOSIS — D50.9 IRON DEFICIENCY ANEMIA, UNSPECIFIED IRON DEFICIENCY ANEMIA TYPE: ICD-10-CM

## 2022-02-02 DIAGNOSIS — D64.9 SEVERE ANEMIA: ICD-10-CM

## 2022-02-02 PROCEDURE — 3074F PR MOST RECENT SYSTOLIC BLOOD PRESSURE < 130 MM HG: ICD-10-PCS | Mod: CPTII,S$GLB,, | Performed by: INTERNAL MEDICINE

## 2022-02-02 PROCEDURE — 3074F SYST BP LT 130 MM HG: CPT | Mod: CPTII,S$GLB,, | Performed by: INTERNAL MEDICINE

## 2022-02-02 PROCEDURE — 3008F PR BODY MASS INDEX (BMI) DOCUMENTED: ICD-10-PCS | Mod: CPTII,S$GLB,, | Performed by: INTERNAL MEDICINE

## 2022-02-02 PROCEDURE — 1159F PR MEDICATION LIST DOCUMENTED IN MEDICAL RECORD: ICD-10-PCS | Mod: CPTII,S$GLB,, | Performed by: INTERNAL MEDICINE

## 2022-02-02 PROCEDURE — 99999 PR PBB SHADOW E&M-EST. PATIENT-LVL IV: ICD-10-PCS | Mod: PBBFAC,,, | Performed by: INTERNAL MEDICINE

## 2022-02-02 PROCEDURE — 99204 PR OFFICE/OUTPT VISIT, NEW, LEVL IV, 45-59 MIN: ICD-10-PCS | Mod: S$GLB,,, | Performed by: INTERNAL MEDICINE

## 2022-02-02 PROCEDURE — 1159F MED LIST DOCD IN RCRD: CPT | Mod: CPTII,S$GLB,, | Performed by: INTERNAL MEDICINE

## 2022-02-02 PROCEDURE — 99204 OFFICE O/P NEW MOD 45 MIN: CPT | Mod: S$GLB,,, | Performed by: INTERNAL MEDICINE

## 2022-02-02 PROCEDURE — 3078F PR MOST RECENT DIASTOLIC BLOOD PRESSURE < 80 MM HG: ICD-10-PCS | Mod: CPTII,S$GLB,, | Performed by: INTERNAL MEDICINE

## 2022-02-02 PROCEDURE — 99999 PR PBB SHADOW E&M-EST. PATIENT-LVL IV: CPT | Mod: PBBFAC,,, | Performed by: INTERNAL MEDICINE

## 2022-02-02 PROCEDURE — 3008F BODY MASS INDEX DOCD: CPT | Mod: CPTII,S$GLB,, | Performed by: INTERNAL MEDICINE

## 2022-02-02 PROCEDURE — 3078F DIAST BP <80 MM HG: CPT | Mod: CPTII,S$GLB,, | Performed by: INTERNAL MEDICINE

## 2022-02-02 RX ORDER — EPINEPHRINE 0.3 MG/.3ML
0.3 INJECTION SUBCUTANEOUS ONCE AS NEEDED
Status: CANCELLED | OUTPATIENT
Start: 2022-02-02

## 2022-02-02 RX ORDER — HEPARIN 100 UNIT/ML
500 SYRINGE INTRAVENOUS
Status: CANCELLED | OUTPATIENT
Start: 2022-02-02

## 2022-02-02 RX ORDER — SODIUM CHLORIDE 0.9 % (FLUSH) 0.9 %
10 SYRINGE (ML) INJECTION
Status: CANCELLED | OUTPATIENT
Start: 2022-02-02

## 2022-02-02 RX ORDER — DIPHENHYDRAMINE HYDROCHLORIDE 50 MG/ML
50 INJECTION INTRAMUSCULAR; INTRAVENOUS ONCE AS NEEDED
Status: CANCELLED | OUTPATIENT
Start: 2022-02-02

## 2022-02-02 RX ORDER — METHYLPREDNISOLONE SOD SUCC 125 MG
125 VIAL (EA) INJECTION ONCE AS NEEDED
Status: CANCELLED | OUTPATIENT
Start: 2022-02-02

## 2022-02-02 NOTE — TELEPHONE ENCOUNTER
Staff msg was sent to [facility] staff for auth and scheduling of iron orders. See below          ----- Message from Mayra Bower MD sent at 2/2/2022 10:32 AM CST -----  Ferrlecit x 8 doses, recheck cbc, cmp iron ferritn b12 see me VV in 3-4 weeks with  iron infusion in [place]

## 2022-02-02 NOTE — PROGRESS NOTES
Subjective:       Patient ID: Pham Licea is a 66 y.o. female.    Chief Complaint: Anemia         2020 had gall bladder surgery, thern she had a bowel obstruction, no surgery then, had 2 feet of bowels removed after a second bowel obstruction  Esophageal ulcers and dudenal ulcers- with bleeding in 2017  Recd blood in [facility] this weekend, pt went to ED because she was shaky and week and couldn't think was found to be anemic in ed  Past Medical History:   Diagnosis Date    Anemia     Fatty liver 2015    Former smoker     Osteoarthritis 2010    Pulmonary nodules 2019    Repeat CT in 6 mo    Ulcer of abdomen wall 2016     Past Surgical History:   Procedure Laterality Date    APPENDECTOMY       SECTION  , , 1983    x3    COLONOSCOPY  ~    [facility]: normal findings per patient report    COLONOSCOPY N/A 2020    Procedure: COLONOSCOPY;  Surgeon: Misael Holm MD;  Location: [facility] ENDO;  Service: General;  Laterality: N/A;    DIAGNOSTIC LAPAROSCOPY N/A 2021    Procedure: LAPAROSCOPY, DIAGNOSTIC;  Surgeon: Misael Holm MD;  Location: [facility] OR;  Service: General;  Laterality: N/A;    EPIDURAL STEROID INJECTION INTO LUMBAR SPINE N/A 10/12/2018    Procedure: Injection-steroid-epidural-lumbar;  Surgeon: Kal Johnson MD;  Location: [facility] OR;  Service: Pain Management;  Laterality: N/A;  L5-S1    EPIDURAL STEROID INJECTION INTO LUMBAR SPINE N/A 2019    Procedure: Injection-steroid-epidural-lumbar L5-S1;  Surgeon: Kal Johnson MD;  Location: [facility] OR;  Service: Pain Management;  Laterality: N/A;    ESOPHAGOGASTRODUODENOSCOPY N/A 2020    Procedure: ESOPHAGOGASTRODUODENOSCOPY (EGD);  Surgeon: Misael Holm MD;  Location: [facility] ENDO;  Service: General;  Laterality: N/A;    HYSTERECTOMY      one ovary conserved    LAPAROSCOPIC CHOLECYSTECTOMY N/A 2020    Procedure: CHOLECYSTECTOMY, LAPAROSCOPIC;  Surgeon: Govind Frey MD;   Location: [facility] OR;  Service: General;  Laterality: N/A;    LAPAROSCOPIC LYSIS OF ADHESIONS N/A 2021    Procedure: LYSIS, ADHESIONS, LAPAROSCOPIC;  Surgeon: Misael Holm MD;  Location: [facility] OR;  Service: General;  Laterality: N/A;    UPPER GASTROINTESTINAL ENDOSCOPY       Family History   Problem Relation Age of Onset    Ovarian cancer Mother     Heart disease Mother     Osteoporosis Mother     Arthritis Mother     Hypertension Father     Stroke Father 50    Brain Hemorrhage Father     Aneurysm Father     Osteoarthritis Sister     Arthritis Sister     Hypertension Sister     Obesity Sister     Breast cancer Neg Hx     Colon cancer Neg Hx     Colon polyps Neg Hx     Crohn's disease Neg Hx     Ulcerative colitis Neg Hx       Social History     Socioeconomic History    Marital status:     Number of children: 4    Highest education level: Some college, no degree   Occupational History    Occupation: sale specialist-    Tobacco Use    Smoking status: Former Smoker     Packs/day: 1.00     Years: 40.00     Pack years: 40.00     Quit date: 2009     Years since quittin.2    Smokeless tobacco: Never Used    Tobacco comment: quit    Substance and Sexual Activity    Alcohol use: Not Currently     Comment: Not often - one glass of wine every now and then    Drug use: Not Currently     Types: Marijuana     Comment: in     Sexual activity: Not Currently     Social Determinants of Health     Financial Resource Strain: Low Risk     Difficulty of Paying Living Expenses: Not very hard   Food Insecurity: No Food Insecurity    Worried About Running Out of Food in the Last Year: Never true    Ran Out of Food in the Last Year: Never true   Transportation Needs: No Transportation Needs    Lack of Transportation (Medical): No    Lack of Transportation (Non-Medical): No   Physical Activity: Inactive    Days of Exercise per Week: 0 days    Minutes of Exercise per  Session: 0 min   Stress: Stress Concern Present    Feeling of Stress : To some extent   Social Connections: Unknown    Frequency of Communication with Friends and Family: More than three times a week    Frequency of Social Gatherings with Friends and Family: Once a week    Active Member of Clubs or Organizations: Yes    Attends Club or Organization Meetings: More than 4 times per year    Marital Status:    Housing Stability: Low Risk     Unable to Pay for Housing in the Last Year: No    Number of Places Lived in the Last Year: 1    Unstable Housing in the Last Year: No     Review of patient's allergies indicates:  No Known Allergies    Current Outpatient Medications:     diclofenac sodium (VOLTAREN) 1 % Gel, Apply 2 g topically 2 (two) times daily as needed., Disp: , Rfl:     docusate sodium (COLACE) 100 MG capsule, Take 100 mg by mouth 2 (two) times daily., Disp: , Rfl:     lactulose (CHRONULAC) 10 gram/15 mL solution, Take 10 g by mouth 2 (two) times daily. , Disp: , Rfl:     ondansetron (ZOFRAN-ODT) 8 MG TbDL, Take 1 tablet (8 mg total) by mouth every 6 (six) hours as needed (nausea or vomiting)., Disp: 30 tablet, Rfl: 1    pantoprazole (PROTONIX) 20 MG tablet, Take 1 tablet (20 mg total) by mouth once daily. (Patient not taking: Reported on 2/2/2022), Disp: 90 tablet, Rfl: 3    vitamin D (VITAMIN D3) 1000 units Tab, Take 3,000 Units by mouth once daily., Disp: , Rfl:   Review of Systems   Constitutional: Positive for malaise/fatigue. Negative for weight loss.        Mostly good appetite lost weight with her sx   HENT: Negative for hearing loss.    Eyes: Negative for blurred vision and double vision.   Gastrointestinal: Positive for constipation.        Takes lactulose twice a day and colace twice a day   Musculoskeletal: Negative for back pain, myalgias and neck pain.        Leg cramps and shaky   Skin: Negative for rash.   Neurological: Negative for dizziness, weakness and headaches.    Endo/Heme/Allergies: Does not bruise/bleed easily.     Physical Exam    Wt Readings from Last 3 Encounters:   02/02/22 80.2 kg (176 lb 12.9 oz)   01/28/22 77.1 kg (170 lb)   01/25/22 79.8 kg (176 lb)     Temp Readings from Last 3 Encounters:   02/02/22 97.7 °F (36.5 °C) (Temporal)   01/29/22 98.2 °F (36.8 °C) (Oral)   12/21/21 98.5 °F (36.9 °C) (Oral)     BP Readings from Last 3 Encounters:   02/02/22 127/60   01/29/22 120/72   01/25/22 112/70     Pulse Readings from Last 3 Encounters:   02/02/22 80   01/29/22 68   01/25/22 70     Lab Results   Component Value Date    WBC 10.22 01/28/2022    HGB 7.9 (L) 01/29/2022    HCT 26.0 (L) 01/29/2022    MCV 75 (L) 01/28/2022     (H) 01/28/2022       BMP  Lab Results   Component Value Date     01/28/2022    K 4.0 01/28/2022     01/28/2022    CO2 24 01/28/2022    BUN 21 01/28/2022    CREATININE 0.7 01/28/2022    CALCIUM 9.5 01/28/2022    ANIONGAP 11 01/28/2022    ESTGFRAFRICA >60.0 01/28/2022    EGFRNONAA >60.0 01/28/2022     Lab Results   Component Value Date    IRON 11 (L) 01/28/2022    TIBC 592 (H) 01/28/2022    FERRITIN 5 (L) 01/28/2022         Patient Active Problem List   Diagnosis    Primary osteoarthritis involving multiple joints    Obesity (BMI 30.0-34.9)    Gastroesophageal reflux disease    Right knee pain    Chronic midline low back pain    Acute tear medial meniscus, right, sequela    Primary osteoarthritis of right knee    Lumbar radiculitis    Trigger middle finger of right hand    Nausea    Abdominal pain    Encounter for postoperative care    Intestinal obstruction    Hypokalemia    Constipation    History of cholecystectomy    Right upper quadrant pain    History of hysterectomy    Rectal bleeding    Hemorrhoids    Mitral valve prolapse    Coronary artery calcification    Anemia    SAMPSON (dyspnea on exertion), noted 2010    History of smoking 25-50 pack years, 46 py, quit 2009    Other emphysema    NSAID long-term  use, started 2017    Bandemia    Family history of premature CAD    Cardiovascular event risk, ASCVD 10-year risk 4.8%, 2019    Abdominal obesity    Dyslipidemia, baseline LDL-C 112, HDL-C 49    Iron deficiency anemia secondary to inadequate dietary iron intake        Assessment and Plan   NATHAN: pt had partail bowel removal from opbstructions so anticipate this will be recurrent  Infusional iron 2 doses return to clinic 1 month after with CBC CMP iron studies B12  Patient to continue follow-up of her DJD with PCP

## 2022-02-02 NOTE — Clinical Note
Ferrlecit x 8 doses, recheck cbc, cmp iron ferritn b12 see me VV in 3-4 weeks with  iron infusion in [place]

## 2022-02-03 ENCOUNTER — INFUSION (OUTPATIENT)
Dept: INFUSION THERAPY | Facility: HOSPITAL | Age: 67
End: 2022-02-03
Payer: COMMERCIAL

## 2022-02-03 VITALS
RESPIRATION RATE: 18 BRPM | HEIGHT: 64 IN | DIASTOLIC BLOOD PRESSURE: 55 MMHG | HEART RATE: 83 BPM | BODY MASS INDEX: 30.19 KG/M2 | WEIGHT: 176.81 LBS | OXYGEN SATURATION: 100 % | SYSTOLIC BLOOD PRESSURE: 105 MMHG | TEMPERATURE: 98 F

## 2022-02-03 DIAGNOSIS — D50.8 IRON DEFICIENCY ANEMIA SECONDARY TO INADEQUATE DIETARY IRON INTAKE: Primary | ICD-10-CM

## 2022-02-03 PROCEDURE — 63600175 PHARM REV CODE 636 W HCPCS: Performed by: INTERNAL MEDICINE

## 2022-02-03 PROCEDURE — 25000003 PHARM REV CODE 250: Performed by: INTERNAL MEDICINE

## 2022-02-03 PROCEDURE — 96365 THER/PROPH/DIAG IV INF INIT: CPT

## 2022-02-03 RX ORDER — METHYLPREDNISOLONE SOD SUCC 125 MG
125 VIAL (EA) INJECTION ONCE AS NEEDED
Status: CANCELLED | OUTPATIENT
Start: 2022-02-10

## 2022-02-03 RX ORDER — DIPHENHYDRAMINE HYDROCHLORIDE 50 MG/ML
50 INJECTION INTRAMUSCULAR; INTRAVENOUS ONCE AS NEEDED
Status: DISCONTINUED | OUTPATIENT
Start: 2022-02-03 | End: 2022-02-03 | Stop reason: HOSPADM

## 2022-02-03 RX ORDER — HEPARIN 100 UNIT/ML
500 SYRINGE INTRAVENOUS
Status: CANCELLED | OUTPATIENT
Start: 2022-02-10

## 2022-02-03 RX ORDER — SODIUM CHLORIDE 0.9 % (FLUSH) 0.9 %
10 SYRINGE (ML) INJECTION
Status: CANCELLED | OUTPATIENT
Start: 2022-02-10

## 2022-02-03 RX ORDER — SODIUM CHLORIDE 0.9 % (FLUSH) 0.9 %
10 SYRINGE (ML) INJECTION
Status: DISCONTINUED | OUTPATIENT
Start: 2022-02-03 | End: 2022-02-03 | Stop reason: HOSPADM

## 2022-02-03 RX ORDER — EPINEPHRINE 0.3 MG/.3ML
0.3 INJECTION SUBCUTANEOUS ONCE AS NEEDED
Status: CANCELLED | OUTPATIENT
Start: 2022-02-10

## 2022-02-03 RX ORDER — DIPHENHYDRAMINE HYDROCHLORIDE 50 MG/ML
50 INJECTION INTRAMUSCULAR; INTRAVENOUS ONCE AS NEEDED
Status: CANCELLED | OUTPATIENT
Start: 2022-02-10

## 2022-02-03 RX ORDER — EPINEPHRINE 0.3 MG/.3ML
0.3 INJECTION SUBCUTANEOUS ONCE AS NEEDED
Status: DISCONTINUED | OUTPATIENT
Start: 2022-02-03 | End: 2022-02-03 | Stop reason: HOSPADM

## 2022-02-03 RX ORDER — METHYLPREDNISOLONE SOD SUCC 125 MG
125 VIAL (EA) INJECTION ONCE AS NEEDED
Status: DISCONTINUED | OUTPATIENT
Start: 2022-02-03 | End: 2022-02-03 | Stop reason: HOSPADM

## 2022-02-03 RX ADMIN — SODIUM CHLORIDE: 9 INJECTION, SOLUTION INTRAVENOUS at 10:02

## 2022-02-03 RX ADMIN — SODIUM CHLORIDE 125 MG: 9 INJECTION, SOLUTION INTRAVENOUS at 10:02

## 2022-02-03 NOTE — PLAN OF CARE
"Problem: Adult Inpatient Plan of Care  Goal: Patient-Specific Goal (Individualized)  Outcome: Ongoing, Progressing  Flowsheets (Taken 2/3/2022 1030)  Anxieties, Fears or Concerns: Denies  Individualized Care Needs: Recliner, warmed blanket, TV, snacks.  Patient-Specific Goals (Include Timeframe): Patient will have no s/s of adverse medication reactions during or post infusion. /69 (Patient Position: Sitting)   Pulse 77   Temp 98.2 °F (36.8 °C)   Resp 20   Ht 5' 4" (1.626 m)   Wt 80.2 kg (176 lb 12.9 oz)   LMP  (LMP Unknown)   SpO2 100%   BMI 30.35 kg/m² Patient arrived in [place] for Ferrlecit infusion #1/8, identified by name and , pleasant and appropriate with slow and steady gait observed. PIV started to left lower F/A x2 attempts with #24 jelco, immediate flashback observed, site secured, flushed without difficulty, and patient tolerated procedure well. All questions and concerns addressed, appointments for infusions scheduled out. Patient resting in recliner reading, no acute or respiratory distress observed, and will continue to monitor.           "

## 2022-02-03 NOTE — PLAN OF CARE
"Problem: Adult Inpatient Plan of Care  Goal: Plan of Care Review  Outcome: Ongoing, Progressing  Flowsheets (Taken 2/3/2022 1203)  Plan of Care Reviewed With: patient  BP (!) 105/55 (BP Location: Right arm, Patient Position: Sitting)   Pulse 83   Temp 98.2 °F (36.8 °C)   Resp 18   Ht 5' 4" (1.626 m)   Wt 80.2 kg (176 lb 12.9 oz)   LMP  (LMP Unknown)   SpO2 100%   BMI 30.35 kg/m²   Patient tolerated Ferrlecit infusion without adverse medication reactions, and no acute or respiratory distress noted. Patient ambulated to private vehicle per self with slow and steady gait observed.        "

## 2022-02-04 ENCOUNTER — PATIENT MESSAGE (OUTPATIENT)
Dept: FAMILY MEDICINE | Facility: CLINIC | Age: 67
End: 2022-02-04
Payer: COMMERCIAL

## 2022-02-04 DIAGNOSIS — K21.9 GASTROESOPHAGEAL REFLUX DISEASE WITHOUT ESOPHAGITIS: ICD-10-CM

## 2022-02-04 RX ORDER — PANTOPRAZOLE SODIUM 20 MG/1
20 TABLET, DELAYED RELEASE ORAL DAILY
Qty: 90 TABLET | Refills: 3 | Status: SHIPPED | OUTPATIENT
Start: 2022-02-04 | End: 2023-03-10 | Stop reason: SDUPTHER

## 2022-02-10 ENCOUNTER — INFUSION (OUTPATIENT)
Dept: INFUSION THERAPY | Facility: HOSPITAL | Age: 67
End: 2022-02-10
Payer: COMMERCIAL

## 2022-02-10 VITALS
HEART RATE: 76 BPM | SYSTOLIC BLOOD PRESSURE: 117 MMHG | RESPIRATION RATE: 18 BRPM | DIASTOLIC BLOOD PRESSURE: 62 MMHG | TEMPERATURE: 98 F | OXYGEN SATURATION: 97 %

## 2022-02-10 DIAGNOSIS — D50.8 IRON DEFICIENCY ANEMIA SECONDARY TO INADEQUATE DIETARY IRON INTAKE: Primary | ICD-10-CM

## 2022-02-10 PROCEDURE — 25000003 PHARM REV CODE 250: Performed by: INTERNAL MEDICINE

## 2022-02-10 PROCEDURE — 96365 THER/PROPH/DIAG IV INF INIT: CPT

## 2022-02-10 PROCEDURE — 63600175 PHARM REV CODE 636 W HCPCS: Performed by: INTERNAL MEDICINE

## 2022-02-10 RX ORDER — HEPARIN 100 UNIT/ML
500 SYRINGE INTRAVENOUS
Status: CANCELLED | OUTPATIENT
Start: 2022-02-17

## 2022-02-10 RX ORDER — SODIUM CHLORIDE 0.9 % (FLUSH) 0.9 %
10 SYRINGE (ML) INJECTION
Status: CANCELLED | OUTPATIENT
Start: 2022-02-17

## 2022-02-10 RX ORDER — SODIUM CHLORIDE 0.9 % (FLUSH) 0.9 %
10 SYRINGE (ML) INJECTION
Status: DISCONTINUED | OUTPATIENT
Start: 2022-02-10 | End: 2022-02-10 | Stop reason: HOSPADM

## 2022-02-10 RX ORDER — DIPHENHYDRAMINE HYDROCHLORIDE 50 MG/ML
50 INJECTION INTRAMUSCULAR; INTRAVENOUS ONCE AS NEEDED
Status: CANCELLED | OUTPATIENT
Start: 2022-02-17

## 2022-02-10 RX ORDER — EPINEPHRINE 0.3 MG/.3ML
0.3 INJECTION SUBCUTANEOUS ONCE AS NEEDED
Status: CANCELLED | OUTPATIENT
Start: 2022-02-17

## 2022-02-10 RX ORDER — METHYLPREDNISOLONE SOD SUCC 125 MG
125 VIAL (EA) INJECTION ONCE AS NEEDED
Status: CANCELLED | OUTPATIENT
Start: 2022-02-17

## 2022-02-10 RX ADMIN — SODIUM CHLORIDE 125 MG: 9 INJECTION, SOLUTION INTRAVENOUS at 10:02

## 2022-02-10 NOTE — PLAN OF CARE
Pleasant alert and oriented patient ambulated independently to clinic for IV iron infusion - pt with obvious dyspnea - voices fatigued already today - pt has received infusions in the past - vu of side effects, benefits.  Scheduled for next visit.

## 2022-02-17 ENCOUNTER — INFUSION (OUTPATIENT)
Dept: INFUSION THERAPY | Facility: HOSPITAL | Age: 67
End: 2022-02-17
Attending: INTERNAL MEDICINE
Payer: COMMERCIAL

## 2022-02-17 VITALS
BODY MASS INDEX: 29.09 KG/M2 | HEIGHT: 64 IN | OXYGEN SATURATION: 100 % | TEMPERATURE: 98 F | HEART RATE: 74 BPM | RESPIRATION RATE: 20 BRPM | DIASTOLIC BLOOD PRESSURE: 62 MMHG | SYSTOLIC BLOOD PRESSURE: 121 MMHG | WEIGHT: 170.38 LBS

## 2022-02-17 DIAGNOSIS — D50.8 IRON DEFICIENCY ANEMIA SECONDARY TO INADEQUATE DIETARY IRON INTAKE: Primary | ICD-10-CM

## 2022-02-17 PROCEDURE — 25000003 PHARM REV CODE 250: Performed by: INTERNAL MEDICINE

## 2022-02-17 PROCEDURE — 63600175 PHARM REV CODE 636 W HCPCS: Performed by: INTERNAL MEDICINE

## 2022-02-17 PROCEDURE — 96365 THER/PROPH/DIAG IV INF INIT: CPT

## 2022-02-17 RX ORDER — EPINEPHRINE 0.3 MG/.3ML
0.3 INJECTION SUBCUTANEOUS ONCE AS NEEDED
Status: CANCELLED | OUTPATIENT
Start: 2022-02-24

## 2022-02-17 RX ORDER — SODIUM CHLORIDE 0.9 % (FLUSH) 0.9 %
10 SYRINGE (ML) INJECTION
Status: DISCONTINUED | OUTPATIENT
Start: 2022-02-17 | End: 2022-02-17 | Stop reason: HOSPADM

## 2022-02-17 RX ORDER — METHYLPREDNISOLONE SOD SUCC 125 MG
125 VIAL (EA) INJECTION ONCE AS NEEDED
Status: CANCELLED | OUTPATIENT
Start: 2022-02-24

## 2022-02-17 RX ORDER — SODIUM CHLORIDE 0.9 % (FLUSH) 0.9 %
10 SYRINGE (ML) INJECTION
Status: CANCELLED | OUTPATIENT
Start: 2022-02-24

## 2022-02-17 RX ORDER — DIPHENHYDRAMINE HYDROCHLORIDE 50 MG/ML
50 INJECTION INTRAMUSCULAR; INTRAVENOUS ONCE AS NEEDED
Status: CANCELLED | OUTPATIENT
Start: 2022-02-24

## 2022-02-17 RX ORDER — HEPARIN 100 UNIT/ML
500 SYRINGE INTRAVENOUS
Status: CANCELLED | OUTPATIENT
Start: 2022-02-24

## 2022-02-17 RX ADMIN — SODIUM CHLORIDE 125 MG: 9 INJECTION, SOLUTION INTRAVENOUS at 10:02

## 2022-02-17 RX ADMIN — SODIUM CHLORIDE: 0.9 INJECTION, SOLUTION INTRAVENOUS at 10:02

## 2022-02-17 NOTE — PLAN OF CARE
"Problem: Fatigue  Goal: Improved Activity Tolerance  Outcome: Ongoing, Progressing  Intervention: Promote Improved Energy  Flowsheets (Taken 2/17/2022 1038)  Fatigue Management: activity schedule adjusted  Sleep/Rest Enhancement:   noise level reduced   relaxation techniques promoted  Activity Management: Up in chair - L1    /68 (BP Location: Left arm, Patient Position: Sitting)   Pulse 75   Temp 98 °F (36.7 °C)   Resp 18   Ht 5' 4" (1.626 m)   Wt 77.3 kg (170 lb 5.6 oz)   LMP  (LMP Unknown)   SpO2 100%   BMI 29.24 kg/m²     PT AAOX3, VSS, NO ACUTE DISTRESS NOTED, PIV STARTED TO LEFT HAND, PT TOLERATED WELL. PT SCHEDULED FOR IV FERRLECIT INFUSION, WILL MONITOR FOR S/SX ADVERSE REACTIONS, POSITIONED FOR COMFORT IN RECLINER CHAIR.      "

## 2022-02-17 NOTE — PLAN OF CARE
"Problem: Adult Inpatient Plan of Care  Goal: Plan of Care Review  Outcome: Ongoing, Progressing  Flowsheets (Taken 2/17/2022 1150)  Plan of Care Reviewed With: patient    /62 (BP Location: Right arm, Patient Position: Sitting)   Pulse 74   Temp 98 °F (36.7 °C)   Resp 20   Ht 5' 4" (1.626 m)   Wt 77.3 kg (170 lb 5.6 oz)   LMP  (LMP Unknown)   SpO2 100%   BMI 29.24 kg/m²     PT INFUSION COMPLETE, NO INTOLERANCE OR ADVERSE REACTIONS NOTED, PIV DC'D, GELCO INTACT, BANDAGE APPLIED, PT RESCHEDULED FOR F/U. D/C TO HOME IN CARE OF SELF. INSTRUCTED TO NOTIFY MD OF S/SX ADVERSE REACTIONS, VERBALIZES UNDERSTANDING.      "

## 2022-02-23 ENCOUNTER — CLINICAL SUPPORT (OUTPATIENT)
Dept: REHABILITATION | Facility: HOSPITAL | Age: 67
End: 2022-02-23
Attending: SURGERY
Payer: COMMERCIAL

## 2022-02-23 DIAGNOSIS — M62.89 PELVIC FLOOR DYSFUNCTION IN FEMALE: ICD-10-CM

## 2022-02-23 PROCEDURE — 97112 NEUROMUSCULAR REEDUCATION: CPT | Mod: PN

## 2022-02-23 PROCEDURE — 97162 PT EVAL MOD COMPLEX 30 MIN: CPT | Mod: PN

## 2022-02-23 NOTE — PATIENT INSTRUCTIONS
Home Exercise Program: 02/23/2022    DIAPHRAGMATIC BREATHING     The diaphragm is a dome shaped muscle that forms the floor of the rib cage. It is the most efficient muscle for breathing and relaxation, although most people are not used to using the diaphragm. Diaphragmatic or belly breathing is an important technique to learn because it helps settle down or relax the autonomic nervous system. The correct use of diaphragmatic breathing can help to quiet brain activity resulting in the relaxation of all the muscles and organs of the body. This is accomplished by slow rhythmic breathing concentrated in the diaphragm muscle rather than the chest.    How to do proper relaxation breathing:    Start by lying on your back or reclining in a chair in a relaxed position. Place one hand on your chest and the other on your abdomen.  Relax your jaw by placing your tongue on the floor of your mouth and keeping your teeth slightly apart.   Take a deep breath in, letting the abdomen expand and rise while you keep your upper chest, neck and shoulders relaxed.   As you breathe out, allow your abdomen and chest to fall. Exhale completely.  It doesn't matter if you breathe in/out through your nose and/or mouth. Do whichever feels comfortable.  Remember to breathe slowly.  Do not force your breathing. Do not hold your breath.  Repeat for 5 minutes every day.          Bowel function - consider the following recommendations for optimizing bowel function. Good bowel health is important for overall pelvic floor function.     Adequate fluid intake is necessary for proper bowel function. Goal this week is to drink 64 ounces/day. Try completing your water bottle one time by lunch and second bottle by dinner time.     Fiber adds bulk to stool and promotes more frequent and regular bowel movements. Strategies to increase fiber intake:  One cup of high fiber cereal every day.  1/2 to 1 cup of prune juice or several stewed prunes every day,  followed by a cup of hot water or tea.   2 cups of fruit or 2 1/2 cups vegetables, 6-8 oz high fiber grains daily  Fiber supplements, I.e. Metamucil, Citrucel, Konsyl, Benefiber  2 tablespoons of flax seed meal (can be added to cereal, smoothies, yogurt, oatmeal, etc)     Positioning and techniques for elimination     Bowel Movement Mechanics  1. Sit on the toilet comfortably with legs and buttocks relaxed.  2. Put your feet on a waste basket or squatty potty  3. Lean forward while keeping your back straight, forearms rest on knees.  4. Keep your knees apart.  5. Fully relax pelvic floor muscles - think about softening, opening, dropping  6. Use gentle belly breaths and bulge lower abdominals like making a beer belly  7. Keep belly big on exhalation  8. Exhale like blowing out birthday candles  9. Keep breathing like this through entire bowel movement  10. Make sure to give enough time, ok for it to take several minutes     Do not strain and Do not hold your breath.

## 2022-02-24 ENCOUNTER — INFUSION (OUTPATIENT)
Dept: INFUSION THERAPY | Facility: HOSPITAL | Age: 67
End: 2022-02-24
Payer: COMMERCIAL

## 2022-02-24 VITALS
SYSTOLIC BLOOD PRESSURE: 119 MMHG | HEART RATE: 66 BPM | DIASTOLIC BLOOD PRESSURE: 56 MMHG | TEMPERATURE: 98 F | RESPIRATION RATE: 18 BRPM

## 2022-02-24 DIAGNOSIS — D50.8 IRON DEFICIENCY ANEMIA SECONDARY TO INADEQUATE DIETARY IRON INTAKE: Primary | ICD-10-CM

## 2022-02-24 PROCEDURE — 63600175 PHARM REV CODE 636 W HCPCS: Performed by: INTERNAL MEDICINE

## 2022-02-24 PROCEDURE — 25000003 PHARM REV CODE 250: Performed by: INTERNAL MEDICINE

## 2022-02-24 PROCEDURE — 96365 THER/PROPH/DIAG IV INF INIT: CPT

## 2022-02-24 RX ORDER — HEPARIN 100 UNIT/ML
500 SYRINGE INTRAVENOUS
Status: CANCELLED | OUTPATIENT
Start: 2022-03-03

## 2022-02-24 RX ORDER — SODIUM CHLORIDE 0.9 % (FLUSH) 0.9 %
10 SYRINGE (ML) INJECTION
Status: CANCELLED | OUTPATIENT
Start: 2022-03-03

## 2022-02-24 RX ORDER — DIPHENHYDRAMINE HYDROCHLORIDE 50 MG/ML
50 INJECTION INTRAMUSCULAR; INTRAVENOUS ONCE AS NEEDED
Status: CANCELLED | OUTPATIENT
Start: 2022-03-03

## 2022-02-24 RX ORDER — METHYLPREDNISOLONE SOD SUCC 125 MG
125 VIAL (EA) INJECTION ONCE AS NEEDED
Status: CANCELLED | OUTPATIENT
Start: 2022-03-03

## 2022-02-24 RX ORDER — EPINEPHRINE 0.3 MG/.3ML
0.3 INJECTION SUBCUTANEOUS ONCE AS NEEDED
Status: CANCELLED | OUTPATIENT
Start: 2022-03-03

## 2022-02-24 RX ORDER — SODIUM CHLORIDE 0.9 % (FLUSH) 0.9 %
10 SYRINGE (ML) INJECTION
Status: DISCONTINUED | OUTPATIENT
Start: 2022-02-24 | End: 2022-02-24 | Stop reason: HOSPADM

## 2022-02-24 RX ADMIN — SODIUM CHLORIDE 125 MG: 9 INJECTION, SOLUTION INTRAVENOUS at 10:02

## 2022-02-24 NOTE — PLAN OF CARE
[facility]  Pelvic Health Physical Therapy Initial Evaluation    Date: 2022   Name: Pham Licea  Clinic Number: [id number]  Therapy Diagnosis:   Encounter Diagnosis   Name Primary?    Pelvic floor dysfunction in female      Physician: John Tyler MD    Physician Orders: PT Eval and Treat   Medical Diagnosis from Referral: Pelvic floor dysfunction in female [M62.89]  Evaluation Date: 2022  Authorization Period Expiration: 2022  Plan of Care Expiration: 2022  Visit # / Visits authorized:     Time In: 9:30  Time Out: 10:30  Total Appointment Time (timed & untimed codes): 60 minutes    Precautions: universal    Subjective     Date of onset: within the last year    History of current condition - Elicia reports:  after surgery to remove bowel obstruction last May started having difficulty with defecation; her doctor felt that PT to build the muscles up would help. She would have to assist herself with going to the bathroom. Had gallbladder removed in 2020, had first bowel obstruction end of Sept; 2021 started having more problems, in May they took out 2 feet of the lower small intestine. She can no longer absorb iron so she is doing iron infusions now. She's been taking laxatives and stool softeners so she's not having to assist herself anymore    OB/GYN History: , vaginal delivery, episiotomy, caesarean and menopause via hysterectomy   (first was , last 3 were Caesareans).   Sexually active? No      Pain:  Location: rectum    Current 0/10, worst 9/10, best 0/10   Description: ice pick being shoved rectally - happened once recently but not much since resolving the bowel obstruction.   Aggravating Factors/Activities that cause symptoms: not sure, hasn't happened in a while and wasn't paying attention to what she was doing at the time   Easing Factors: walking    Bladder/Bowel History:    Frequency of urination: been the case for a while   Daytime: at  "least once/hour          Nighttime: at least 2-3x/night    Difficulty initiating urine stream: No   Urine stream: strong   Complete emptying: Yes normally, occasionally she feels like she needs to go back again   Bladder leakage: Not usually  - only a few drops if she's waited too long   Frequency of incidents: rare    Amount leaked (urine): few drops   Urinary Urgency: Yes sometimes if she waits too long    Frequency of bowel movements: every morning    Difficulty initiating BM: Not usually, only if it's a hard stool. Previously had to move her "butt cheeks" around, especially the left one   Quality/Shape of BM: [place] Stool Chart type 4; if she misses a dose of her medication it's a type 2   Does Patient Feel Empty after BM? Yes usually; sometimes if it's a harder stool she'll have to come back 20 min later and finish   Fiber Supplements or Laxative Use? 10 mL of lactelous and 1 Colace in AM and PM    Colon leakage: No         Medical History: Elicia  has a past medical history of Anemia, Fatty liver (), Former smoker (), Osteoarthritis (), Pulmonary nodules (2019), and Ulcer of abdomen wall ().     Surgical History: Pham Licea  has a past surgical history that includes Upper gastrointestinal endoscopy (); Colonoscopy (~); Epidural steroid injection into lumbar spine (N/A, 10/12/2018); Epidural steroid injection into lumbar spine (N/A, 2019);  section (, , ); Hysterectomy (); Appendectomy (); Laparoscopic cholecystectomy (N/A, 2020); Colonoscopy (N/A, 2020); Esophagogastroduodenoscopy (N/A, 2020); Diagnostic laparoscopy (N/A, 2021); and Laparoscopic lysis of adhesions (N/A, 2021).    Medications: Pham has a current medication list which includes the following prescription(s): diclofenac sodium, docusate sodium, lactulose, ondansetron, pantoprazole, and vitamin d.    Allergies: Review of patient's " allergies indicates:  No Known Allergies       Prior Therapy/Previous treatment included: none   Current exercise: hasn't been due to surgeries and low iron making her short of breath.   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 likes ice cream      Pts goals: determine if there's anything else she can do to improve her defecation.     OBJECTIVE     Informed verbal consent provided 2/23/2022 prior to intravaginal treatment.  Chaperone: declined    ABDOMINAL WALL ASSESSMENT  Scarring: longitudinal scar at umbilicus well-healed; small incisions from laparoscopy well-healed; Caesarean/hysterectomy scar TTP and redness of surrounding skin due to fold in lower abdominal wall.  Pelvic Floor Muscle and Transverse Abdominus Synergy: TBA  Diastasis: TBA    BREATHING MECHANICS ASSESSMENT   Thorax Assessment During Quiet Respiration: WNL excursion of ribcage  Thorax Assessment During Deep Respiration: Decreased excursion of abdominal wall  and Decreased excursion bilaterally of lateral ribs     VAGINAL PELVIC FLOOR EXAM    EXTERNAL ASSESSMENT  Introitus: WNL  Skin condition: redness noted  Scarring: none   Sensation: WNL   Pain: none  Voluntary contraction: visible lift  Voluntary relaxation: visible drop  Involuntary contraction: nil  Bearing down: visible drop  Perineal descent: absent  Comments: slow release after Kegel      INTERNAL ASSESSMENT  Pain: tender areas noted as follows: throughout - mild/mod   Sensation: able to localized pressure appropriately   Vaginal vault: roomy   Muscle Bulk: atrophy with areas of tension  Muscle Power: 2/5     Quality of contraction: decreased hold and incomplete relaxation   Specificity: WNL   Coordination: tends to hold breath during PFM contration    Prolapse check: none      RECTAL PELVIC FLOOR EXAM- deferred due to time      TREATMENT     Treatment Time In: 10:00  Treatment Time Out: 10:30  Total Treatment time (time-based codes) separate from Evaluation: 30 minutes      Neuromuscular Re-education to develop Coordination, Control and Down training for 30 minutes including:   Diaphragmatic breathing   Proper bowel positioning/bearing down mechanics      Patient Education provided:   general anatomy/physiology of urinary/ bowel  system and benefits of treatment were discussed with the pt. Additionally, anatomy/physiology of pelvic floor, diaphragmatic breathing and proper bearing down techniques were reviewed.     Home Exercises provided:  Written Home Exercises provided: yes.  Exercises were reviewed and Elicia was able to demonstrate them prior to the end of the session.    Elicia demonstrated good  understanding of the education provided.     See EMR under Patient Instructions for exercises provided 2/23/2022.    Assessment     Pham is a 66 y.o. female referred to outpatient Physical Therapy with a medical diagnosis of Pelvic floor dysfunction in female [M62.89]. Pt presents with poor knowledge of body mechanics and posture, adhered abdominal scar, poor trunk stability, pelvic floor tenderness, decreased pelvic muscle strength, decreased endurance of the pelvic muscles, decreased phasic ability of the pelvic muscles, increased tension of the pelvic muscles, poor quality of pelvic muscle contraction, increased frequency of urination, increased nocturia, poor coordination of pelvic floor muscles during ADL's leading to urinary or fecal leakage, poor diet, dysfunctional voiding, dysfunctional defecation and unable to co-contract or co-relax abdominal wall and pelvic floor muscles. Patient presents with abdominal restrictions, abnormal defecation and voiding; pt will benefit from intermittent PT sessions to progress pt EDU and HEP to facilitate improved  bowel/bladder function     Pt prognosis is Good.   Pt will benefit from skilled outpatient Physical Therapy to address the deficits stated above and in the chart below, provide pt/family education, and to maximize pt's level of independence.     Plan of care discussed with patient: Yes  Pt's spiritual, cultural and educational needs considered and patient is agreeable to the plan of care and goals as stated below:     Anticipated Barriers for therapy: none    Goals:  Short Term Goals: 6 weeks   - Pt will demonstrate excellent knowledge and adherence to HEP to facilitate optimal recovery.  - Pt will demonstrate proper PFM contraction, relaxation, and lengthening coordinated with TA and breath for improved muscle coordination needed for functional activity.      Long Term Goals: 12 weeks   - Pt will demonstrate excellent knowledge and adherence to HEP for continued self-maintenance of symptoms.  - Pt will report voiding interval of 2-3 hours for improved ADL tolerance.  - Pt will report ability to delay urinary urge for at least 15 minutes to maintain continence with ADL/IADLs.   - Pt will report little (drops) to no incidence of urinary incontinence 7/7 days for improved hygiene and ADL/IADL tolerance.   - Pt will demonstrate PFM strength of at least 3/5 MMT for improved strength needed to maintain continence.   - Pt will report bearing down appropriately 100% of the time for improved bowel function and decreased stress on adjacent pelvic structures.         Plan     Plan of care Certification: 2/23/2022 to 5/20/2022.    Outpatient Physical Therapy 1 times weekly for 12 weeks to include the following interventions: therapeutic exercises, therapeutic activity, neuromuscular re-education, manual therapy, modalities PRN, patient/family education, dry needling and self care/home management    Berenice Menendez, PT

## 2022-02-25 ENCOUNTER — HOSPITAL ENCOUNTER (OUTPATIENT)
Dept: RADIOLOGY | Facility: HOSPITAL | Age: 67
Discharge: HOME | End: 2022-02-25
Attending: FAMILY MEDICINE
Payer: COMMERCIAL

## 2022-02-25 DIAGNOSIS — Z87.891 PERSONAL HISTORY OF NICOTINE DEPENDENCE: ICD-10-CM

## 2022-02-25 PROCEDURE — 71271 CT THORAX LUNG CANCER SCR C-: CPT | Mod: TC

## 2022-02-25 PROCEDURE — 71271 CT THORAX LUNG CANCER SCR C-: CPT | Mod: 26,,, | Performed by: RADIOLOGY

## 2022-02-25 PROCEDURE — 71271 CT CHEST LUNG SCREENING LOW DOSE: ICD-10-PCS | Mod: 26,,, | Performed by: RADIOLOGY

## 2022-03-03 ENCOUNTER — INFUSION (OUTPATIENT)
Dept: INFUSION THERAPY | Facility: HOSPITAL | Age: 67
End: 2022-03-03
Payer: COMMERCIAL

## 2022-03-03 VITALS
SYSTOLIC BLOOD PRESSURE: 109 MMHG | HEART RATE: 62 BPM | DIASTOLIC BLOOD PRESSURE: 59 MMHG | TEMPERATURE: 98 F | RESPIRATION RATE: 18 BRPM

## 2022-03-03 DIAGNOSIS — D50.8 IRON DEFICIENCY ANEMIA SECONDARY TO INADEQUATE DIETARY IRON INTAKE: Primary | ICD-10-CM

## 2022-03-03 PROCEDURE — 96365 THER/PROPH/DIAG IV INF INIT: CPT

## 2022-03-03 PROCEDURE — 25000003 PHARM REV CODE 250: Performed by: INTERNAL MEDICINE

## 2022-03-03 PROCEDURE — 63600175 PHARM REV CODE 636 W HCPCS: Performed by: INTERNAL MEDICINE

## 2022-03-03 RX ORDER — SODIUM CHLORIDE 0.9 % (FLUSH) 0.9 %
10 SYRINGE (ML) INJECTION
Status: CANCELLED | OUTPATIENT
Start: 2022-03-10

## 2022-03-03 RX ORDER — EPINEPHRINE 0.3 MG/.3ML
0.3 INJECTION SUBCUTANEOUS ONCE AS NEEDED
Status: CANCELLED | OUTPATIENT
Start: 2022-03-10

## 2022-03-03 RX ORDER — DIPHENHYDRAMINE HYDROCHLORIDE 50 MG/ML
50 INJECTION INTRAMUSCULAR; INTRAVENOUS ONCE AS NEEDED
Status: CANCELLED | OUTPATIENT
Start: 2022-03-10

## 2022-03-03 RX ORDER — SODIUM CHLORIDE 0.9 % (FLUSH) 0.9 %
10 SYRINGE (ML) INJECTION
Status: DISCONTINUED | OUTPATIENT
Start: 2022-03-03 | End: 2022-03-03 | Stop reason: HOSPADM

## 2022-03-03 RX ORDER — METHYLPREDNISOLONE SOD SUCC 125 MG
125 VIAL (EA) INJECTION ONCE AS NEEDED
Status: CANCELLED | OUTPATIENT
Start: 2022-03-10

## 2022-03-03 RX ORDER — HEPARIN 100 UNIT/ML
500 SYRINGE INTRAVENOUS
Status: CANCELLED | OUTPATIENT
Start: 2022-03-10

## 2022-03-03 RX ADMIN — SODIUM CHLORIDE 125 MG: 9 INJECTION, SOLUTION INTRAVENOUS at 09:03

## 2022-03-03 NOTE — PLAN OF CARE
Pleasant alert and oriented patient ambulated independently to clinic for IV iron infusion - pt tolerated well - discharged home with no questions or concerns.  Pt scheduled for next infusion.

## 2022-03-08 DIAGNOSIS — Z87.891 PERSONAL HISTORY OF NICOTINE DEPENDENCE: Primary | ICD-10-CM

## 2022-03-09 DIAGNOSIS — Z12.31 OTHER SCREENING MAMMOGRAM: ICD-10-CM

## 2022-03-10 ENCOUNTER — INFUSION (OUTPATIENT)
Dept: INFUSION THERAPY | Facility: HOSPITAL | Age: 67
End: 2022-03-10
Attending: FAMILY MEDICINE
Payer: COMMERCIAL

## 2022-03-10 DIAGNOSIS — D50.8 IRON DEFICIENCY ANEMIA SECONDARY TO INADEQUATE DIETARY IRON INTAKE: Primary | ICD-10-CM

## 2022-03-10 PROCEDURE — 25000003 PHARM REV CODE 250: Performed by: INTERNAL MEDICINE

## 2022-03-10 PROCEDURE — 96365 THER/PROPH/DIAG IV INF INIT: CPT

## 2022-03-10 PROCEDURE — 63600175 PHARM REV CODE 636 W HCPCS: Performed by: INTERNAL MEDICINE

## 2022-03-10 RX ORDER — DIPHENHYDRAMINE HYDROCHLORIDE 50 MG/ML
50 INJECTION INTRAMUSCULAR; INTRAVENOUS ONCE AS NEEDED
Status: CANCELLED | OUTPATIENT
Start: 2022-03-17

## 2022-03-10 RX ORDER — SODIUM CHLORIDE 0.9 % (FLUSH) 0.9 %
10 SYRINGE (ML) INJECTION
Status: DISCONTINUED | OUTPATIENT
Start: 2022-03-10 | End: 2022-03-10 | Stop reason: HOSPADM

## 2022-03-10 RX ORDER — SODIUM CHLORIDE 0.9 % (FLUSH) 0.9 %
10 SYRINGE (ML) INJECTION
Status: CANCELLED | OUTPATIENT
Start: 2022-03-17

## 2022-03-10 RX ORDER — METHYLPREDNISOLONE SOD SUCC 125 MG
125 VIAL (EA) INJECTION ONCE AS NEEDED
Status: CANCELLED | OUTPATIENT
Start: 2022-03-17

## 2022-03-10 RX ORDER — EPINEPHRINE 0.3 MG/.3ML
0.3 INJECTION SUBCUTANEOUS ONCE AS NEEDED
Status: CANCELLED | OUTPATIENT
Start: 2022-03-17

## 2022-03-10 RX ORDER — HEPARIN 100 UNIT/ML
500 SYRINGE INTRAVENOUS
Status: CANCELLED | OUTPATIENT
Start: 2022-03-17

## 2022-03-10 RX ADMIN — SODIUM CHLORIDE: 9 INJECTION, SOLUTION INTRAVENOUS at 10:03

## 2022-03-10 RX ADMIN — SODIUM CHLORIDE 125 MG: 9 INJECTION, SOLUTION INTRAVENOUS at 10:03

## 2022-03-14 ENCOUNTER — TELEPHONE (OUTPATIENT)
Dept: FAMILY MEDICINE | Facility: CLINIC | Age: 67
End: 2022-03-14
Payer: COMMERCIAL

## 2022-03-14 ENCOUNTER — PATIENT MESSAGE (OUTPATIENT)
Dept: ADMINISTRATIVE | Facility: HOSPITAL | Age: 67
End: 2022-03-14
Payer: COMMERCIAL

## 2022-03-15 ENCOUNTER — HOSPITAL ENCOUNTER (INPATIENT)
Facility: HOSPITAL | Age: 67
LOS: 8 days | Discharge: HOME | DRG: 337 | End: 2022-03-23
Attending: FAMILY MEDICINE | Admitting: HOSPITALIST
Payer: COMMERCIAL

## 2022-03-15 DIAGNOSIS — K56.609 SBO (SMALL BOWEL OBSTRUCTION): Primary | ICD-10-CM

## 2022-03-15 DIAGNOSIS — D50.8 IRON DEFICIENCY ANEMIA SECONDARY TO INADEQUATE DIETARY IRON INTAKE: ICD-10-CM

## 2022-03-15 DIAGNOSIS — D64.9 SEVERE ANEMIA: ICD-10-CM

## 2022-03-15 DIAGNOSIS — K56.51: ICD-10-CM

## 2022-03-15 DIAGNOSIS — K56.600 PARTIAL INTESTINAL OBSTRUCTION, UNSPECIFIED CAUSE: ICD-10-CM

## 2022-03-15 DIAGNOSIS — R10.9 ABDOMINAL PAIN, UNSPECIFIED ABDOMINAL LOCATION: ICD-10-CM

## 2022-03-15 DIAGNOSIS — R07.9 CHEST PAIN: ICD-10-CM

## 2022-03-15 DIAGNOSIS — K46.0 OBSTRUCTION CONCURRENT WITH AND DUE TO INTERNAL HERNIA OF ABDOMEN: ICD-10-CM

## 2022-03-15 LAB
ALBUMIN SERPL BCP-MCNC: 4.7 G/DL (ref 3.5–5.2)
ALP SERPL-CCNC: 80 U/L (ref 55–135)
ALT SERPL W/O P-5'-P-CCNC: 13 U/L (ref 10–44)
ANION GAP SERPL CALC-SCNC: 19 MMOL/L (ref 8–16)
AST SERPL-CCNC: 15 U/L (ref 10–40)
BASOPHILS # BLD AUTO: 0.03 K/UL (ref 0–0.2)
BASOPHILS NFR BLD: 0.2 % (ref 0–1.9)
BILIRUB SERPL-MCNC: 0.4 MG/DL (ref 0.1–1)
BUN SERPL-MCNC: 15 MG/DL (ref 8–23)
CALCIUM SERPL-MCNC: 10.2 MG/DL (ref 8.7–10.5)
CHLORIDE SERPL-SCNC: 100 MMOL/L (ref 95–110)
CO2 SERPL-SCNC: 21 MMOL/L (ref 23–29)
CREAT SERPL-MCNC: 0.8 MG/DL (ref 0.5–1.4)
DIFFERENTIAL METHOD: ABNORMAL
EOSINOPHIL # BLD AUTO: 0 K/UL (ref 0–0.5)
EOSINOPHIL NFR BLD: 0.1 % (ref 0–8)
ERYTHROCYTE [DISTWIDTH] IN BLOOD BY AUTOMATED COUNT: 25.7 % (ref 11.5–14.5)
EST. GFR  (AFRICAN AMERICAN): >60 ML/MIN/1.73 M^2
EST. GFR  (NON AFRICAN AMERICAN): >60 ML/MIN/1.73 M^2
GLUCOSE SERPL-MCNC: 149 MG/DL (ref 70–110)
HCT VFR BLD AUTO: 40.4 % (ref 37–48.5)
HGB BLD-MCNC: 12.2 G/DL (ref 12–16)
IMM GRANULOCYTES # BLD AUTO: 0.07 K/UL (ref 0–0.04)
IMM GRANULOCYTES NFR BLD AUTO: 0.5 % (ref 0–0.5)
LIPASE SERPL-CCNC: 15 U/L (ref 4–60)
LYMPHOCYTES # BLD AUTO: 1.1 K/UL (ref 1–4.8)
LYMPHOCYTES NFR BLD: 8.5 % (ref 18–48)
MCH RBC QN AUTO: 24 PG (ref 27–31)
MCHC RBC AUTO-ENTMCNC: 30.2 G/DL (ref 32–36)
MCV RBC AUTO: 80 FL (ref 82–98)
MONOCYTES # BLD AUTO: 0.8 K/UL (ref 0.3–1)
MONOCYTES NFR BLD: 5.6 % (ref 4–15)
NEUTROPHILS # BLD AUTO: 11.3 K/UL (ref 1.8–7.7)
NEUTROPHILS NFR BLD: 85.1 % (ref 38–73)
NRBC BLD-RTO: 0 /100 WBC
PLATELET # BLD AUTO: 347 K/UL (ref 150–450)
PMV BLD AUTO: 9.6 FL (ref 9.2–12.9)
POTASSIUM SERPL-SCNC: 3.7 MMOL/L (ref 3.5–5.1)
PROT SERPL-MCNC: 7.7 G/DL (ref 6–8.4)
RBC # BLD AUTO: 5.08 M/UL (ref 4–5.4)
SARS-COV-2 RDRP RESP QL NAA+PROBE: NEGATIVE
SODIUM SERPL-SCNC: 140 MMOL/L (ref 136–145)
WBC # BLD AUTO: 13.35 K/UL (ref 3.9–12.7)

## 2022-03-15 PROCEDURE — 96361 HYDRATE IV INFUSION ADD-ON: CPT

## 2022-03-15 PROCEDURE — 99222 1ST HOSP IP/OBS MODERATE 55: CPT | Mod: ,,, | Performed by: SURGERY

## 2022-03-15 PROCEDURE — 99223 1ST HOSP IP/OBS HIGH 75: CPT | Mod: ,,, | Performed by: FAMILY MEDICINE

## 2022-03-15 PROCEDURE — 85025 COMPLETE CBC W/AUTO DIFF WBC: CPT | Performed by: FAMILY MEDICINE

## 2022-03-15 PROCEDURE — 99223 PR INITIAL HOSPITAL CARE,LEVL III: ICD-10-PCS | Mod: ,,, | Performed by: FAMILY MEDICINE

## 2022-03-15 PROCEDURE — 11000001 HC ACUTE MED/SURG PRIVATE ROOM

## 2022-03-15 PROCEDURE — 96374 THER/PROPH/DIAG INJ IV PUSH: CPT

## 2022-03-15 PROCEDURE — 74176 CT ABD & PELVIS W/O CONTRAST: CPT | Mod: 26,,, | Performed by: RADIOLOGY

## 2022-03-15 PROCEDURE — U0002 COVID-19 LAB TEST NON-CDC: HCPCS | Performed by: FAMILY MEDICINE

## 2022-03-15 PROCEDURE — 63600175 PHARM REV CODE 636 W HCPCS: Performed by: FAMILY MEDICINE

## 2022-03-15 PROCEDURE — C9113 INJ PANTOPRAZOLE SODIUM, VIA: HCPCS | Performed by: FAMILY MEDICINE

## 2022-03-15 PROCEDURE — 99285 EMERGENCY DEPT VISIT HI MDM: CPT | Mod: 25

## 2022-03-15 PROCEDURE — 25000003 PHARM REV CODE 250: Performed by: FAMILY MEDICINE

## 2022-03-15 PROCEDURE — 83690 ASSAY OF LIPASE: CPT | Performed by: FAMILY MEDICINE

## 2022-03-15 PROCEDURE — 99222 PR INITIAL HOSPITAL CARE,LEVL II: ICD-10-PCS | Mod: ,,, | Performed by: SURGERY

## 2022-03-15 PROCEDURE — 94761 N-INVAS EAR/PLS OXIMETRY MLT: CPT

## 2022-03-15 PROCEDURE — 80053 COMPREHEN METABOLIC PANEL: CPT | Performed by: FAMILY MEDICINE

## 2022-03-15 PROCEDURE — 74176 CT ABD & PELVIS W/O CONTRAST: CPT | Mod: TC

## 2022-03-15 PROCEDURE — 74176 CT ABDOMEN PELVIS WITHOUT CONTRAST: ICD-10-PCS | Mod: 26,,, | Performed by: RADIOLOGY

## 2022-03-15 RX ORDER — HYDROMORPHONE HYDROCHLORIDE 2 MG/ML
1 INJECTION, SOLUTION INTRAMUSCULAR; INTRAVENOUS; SUBCUTANEOUS EVERY 4 HOURS PRN
Status: DISCONTINUED | OUTPATIENT
Start: 2022-03-15 | End: 2022-03-18

## 2022-03-15 RX ORDER — GLUCAGON 1 MG
1 KIT INJECTION
Status: DISCONTINUED | OUTPATIENT
Start: 2022-03-15 | End: 2022-03-23 | Stop reason: HOSPADM

## 2022-03-15 RX ORDER — IBUPROFEN 200 MG
24 TABLET ORAL
Status: DISCONTINUED | OUTPATIENT
Start: 2022-03-15 | End: 2022-03-23 | Stop reason: HOSPADM

## 2022-03-15 RX ORDER — ONDANSETRON 2 MG/ML
4 INJECTION INTRAMUSCULAR; INTRAVENOUS EVERY 8 HOURS PRN
Status: DISCONTINUED | OUTPATIENT
Start: 2022-03-15 | End: 2022-03-23 | Stop reason: HOSPADM

## 2022-03-15 RX ORDER — NALOXONE HCL 0.4 MG/ML
0.02 VIAL (ML) INJECTION
Status: DISCONTINUED | OUTPATIENT
Start: 2022-03-15 | End: 2022-03-23 | Stop reason: HOSPADM

## 2022-03-15 RX ORDER — MORPHINE SULFATE 4 MG/ML
2 INJECTION, SOLUTION INTRAMUSCULAR; INTRAVENOUS EVERY 4 HOURS PRN
Status: DISCONTINUED | OUTPATIENT
Start: 2022-03-15 | End: 2022-03-18

## 2022-03-15 RX ORDER — SODIUM CHLORIDE, SODIUM LACTATE, POTASSIUM CHLORIDE, CALCIUM CHLORIDE 600; 310; 30; 20 MG/100ML; MG/100ML; MG/100ML; MG/100ML
INJECTION, SOLUTION INTRAVENOUS CONTINUOUS
Status: DISCONTINUED | OUTPATIENT
Start: 2022-03-15 | End: 2022-03-23

## 2022-03-15 RX ORDER — PANTOPRAZOLE SODIUM 40 MG/10ML
40 INJECTION, POWDER, LYOPHILIZED, FOR SOLUTION INTRAVENOUS DAILY
Status: DISCONTINUED | OUTPATIENT
Start: 2022-03-15 | End: 2022-03-23 | Stop reason: HOSPADM

## 2022-03-15 RX ORDER — SODIUM CHLORIDE 0.9 % (FLUSH) 0.9 %
10 SYRINGE (ML) INJECTION
Status: DISCONTINUED | OUTPATIENT
Start: 2022-03-15 | End: 2022-03-23 | Stop reason: HOSPADM

## 2022-03-15 RX ORDER — HYDROMORPHONE HYDROCHLORIDE 2 MG/ML
1 INJECTION, SOLUTION INTRAMUSCULAR; INTRAVENOUS; SUBCUTANEOUS
Status: COMPLETED | OUTPATIENT
Start: 2022-03-15 | End: 2022-03-15

## 2022-03-15 RX ORDER — SODIUM CHLORIDE 0.9 % (FLUSH) 0.9 %
10 SYRINGE (ML) INJECTION EVERY 8 HOURS PRN
Status: DISCONTINUED | OUTPATIENT
Start: 2022-03-15 | End: 2022-03-23 | Stop reason: HOSPADM

## 2022-03-15 RX ORDER — ONDANSETRON 2 MG/ML
4 INJECTION INTRAMUSCULAR; INTRAVENOUS
Status: COMPLETED | OUTPATIENT
Start: 2022-03-15 | End: 2022-03-15

## 2022-03-15 RX ORDER — IBUPROFEN 200 MG
16 TABLET ORAL
Status: DISCONTINUED | OUTPATIENT
Start: 2022-03-15 | End: 2022-03-23 | Stop reason: HOSPADM

## 2022-03-15 RX ORDER — PROMETHAZINE HYDROCHLORIDE 12.5 MG/1
25 TABLET ORAL EVERY 6 HOURS PRN
Status: DISCONTINUED | OUTPATIENT
Start: 2022-03-15 | End: 2022-03-23 | Stop reason: HOSPADM

## 2022-03-15 RX ORDER — IPRATROPIUM BROMIDE AND ALBUTEROL SULFATE 2.5; .5 MG/3ML; MG/3ML
3 SOLUTION RESPIRATORY (INHALATION) EVERY 6 HOURS PRN
Status: DISCONTINUED | OUTPATIENT
Start: 2022-03-15 | End: 2022-03-23 | Stop reason: HOSPADM

## 2022-03-15 RX ADMIN — SODIUM CHLORIDE, SODIUM LACTATE, POTASSIUM CHLORIDE, AND CALCIUM CHLORIDE: .6; .31; .03; .02 INJECTION, SOLUTION INTRAVENOUS at 02:03

## 2022-03-15 RX ADMIN — HYDROMORPHONE HYDROCHLORIDE 1 MG: 2 INJECTION, SOLUTION INTRAMUSCULAR; INTRAVENOUS; SUBCUTANEOUS at 02:03

## 2022-03-15 RX ADMIN — PANTOPRAZOLE SODIUM 40 MG: 40 INJECTION, POWDER, FOR SOLUTION INTRAVENOUS at 02:03

## 2022-03-15 RX ADMIN — SODIUM CHLORIDE 500 ML: 0.9 INJECTION, SOLUTION INTRAVENOUS at 02:03

## 2022-03-15 RX ADMIN — ONDANSETRON 4 MG: 2 INJECTION INTRAMUSCULAR; INTRAVENOUS at 05:03

## 2022-03-15 NOTE — ED NOTES
Received report from YULISSA, RN. Pt AOx4, VSS, NAD. Call light within reach, no complaints at this time. Awaiting room availability for admit.

## 2022-03-15 NOTE — ED TRIAGE NOTES
Patient to ED via [facility] with c/o abdominal pain, nausea and vomiting x 4 hours. Pt reports about 10 episodes vomiting since 2200 last pm. Pt reports pain at 10/10.

## 2022-03-15 NOTE — H&P
[facility]  History & Physical    Patient Name: Pham Licea  MRN: [id number]  Patient Class: IP- Inpatient  Admission Date: 3/15/2022  Attending Physician: Loc Frias MD   Primary Care Provider: Deven Emerson MD         Patient information was obtained from patient and ER records.     Subjective:     Principal Problem:SBO (small bowel obstruction)    Chief Complaint:   Chief Complaint   Patient presents with    Abdominal Pain    Vomiting    Nausea     Pt reports abdominal pain, nausea and vomiting that started about 4 hours pta, pt reports pain at 10/10. Pt reports about 10 episodes of vomiting since 2200 last pm        HPI: Patient 66-year-old female with past medical history of anemia, hepatic steatosis, former smoker, who presents with several hours history of abdominal pain vomiting.  She previously was admitted for exploratory laparotomy with enterectomy for bowel obstruction. Continued to have abdominal pain and subsequently developed another bowel obstruction which resolved with supportive care. She has been on a daily bowel regimen and has continued to have bowel movements daily however last night she developed severe crampy abdominal pain and vomiting. Was not able to tolerate PO intake so came straight to the ER. In the ER, CT abd/pel showed evidence of developing SBO so hospital team called for admission.      Past Medical History:   Diagnosis Date    Anemia     Fatty liver 2015    Former smoker     Osteoarthritis 2010    Pulmonary nodules 2019    Repeat CT in 6 mo    Ulcer of abdomen wall 2016       Past Surgical History:   Procedure Laterality Date    APPENDECTOMY  1993     SECTION  , , 1983    x3    COLONOSCOPY  ~    [facility]: normal findings per patient report    COLONOSCOPY N/A 2020    Procedure: COLONOSCOPY;  Surgeon: Misael Holm MD;  [facility]: [facility];  Service: General;  Laterality: N/A;     DIAGNOSTIC LAPAROSCOPY N/A 5/4/2021    Procedure: LAPAROSCOPY, DIAGNOSTIC;  Surgeon: Misael Holm MD;  Location: [facility] OR;  Service: General;  Laterality: N/A;    EPIDURAL STEROID INJECTION INTO LUMBAR SPINE N/A 10/12/2018    Procedure: Injection-steroid-epidural-lumbar;  Surgeon: Kal Johnson MD;  Location: [facility] OR;  Service: Pain Management;  Laterality: N/A;  L5-S1    EPIDURAL STEROID INJECTION INTO LUMBAR SPINE N/A 5/31/2019    Procedure: Injection-steroid-epidural-lumbar L5-S1;  Surgeon: Kal Johnson MD;  Location: [facility] OR;  Service: Pain Management;  Laterality: N/A;    ESOPHAGOGASTRODUODENOSCOPY N/A 11/9/2020    Procedure: ESOPHAGOGASTRODUODENOSCOPY (EGD);  Surgeon: Misael Holm MD;  Location: [facility] ENDO;  Service: General;  Laterality: N/A;    HYSTERECTOMY  1993    one ovary conserved    LAPAROSCOPIC CHOLECYSTECTOMY N/A 9/2/2020    Procedure: CHOLECYSTECTOMY, LAPAROSCOPIC;  Surgeon: Govind Frey MD;  Location: [facility] OR;  Service: General;  Laterality: N/A;    LAPAROSCOPIC LYSIS OF ADHESIONS N/A 5/4/2021    Procedure: LYSIS, ADHESIONS, LAPAROSCOPIC;  Surgeon: Misael Holm MD;  Location: [facility] OR;  Service: General;  Laterality: N/A;    UPPER GASTROINTESTINAL ENDOSCOPY  2016       Review of patient's allergies indicates:  No Known Allergies    No current facility-administered medications on file prior to encounter.     Current Outpatient Medications on File Prior to Encounter   Medication Sig    diclofenac sodium (VOLTAREN) 1 % Gel Apply 2 g topically 2 (two) times daily as needed.    docusate sodium (COLACE) 100 MG capsule Take 100 mg by mouth 2 (two) times daily.    lactulose (CHRONULAC) 10 gram/15 mL solution Take 10 g by mouth 2 (two) times daily.     ondansetron (ZOFRAN-ODT) 8 MG TbDL Take 1 tablet (8 mg total) by mouth every 6 (six) hours as needed (nausea or vomiting).    pantoprazole (PROTONIX) 20 MG tablet Take 1 tablet (20 mg total) by mouth once daily.     vitamin D (VITAMIN D3) 1000 units Tab Take 3,000 Units by mouth once daily.     Family History       Problem Relation (Age of Onset)    Aneurysm Father    Arthritis Mother, Sister    Brain Hemorrhage Father    Heart disease Mother    Hypertension Father, Sister    Obesity Sister    Osteoarthritis Sister    Osteoporosis Mother    Ovarian cancer Mother    Stroke Father (50)          Tobacco Use    Smoking status: Former Smoker     Packs/day: 1.00     Years: 40.00     Pack years: 40.00     Quit date: 2009     Years since quittin.3    Smokeless tobacco: Never Used    Tobacco comment: quit    Substance and Sexual Activity    Alcohol use: Not Currently     Comment: Not often - one glass of wine every now and then    Drug use: Not Currently     Types: Marijuana     Comment: in     Sexual activity: Not Currently     Review of Systems   Constitutional:  Negative for fatigue and fever.   HENT: Negative.     Eyes:  Negative for visual disturbance.   Cardiovascular:  Negative for chest pain.   Gastrointestinal:  Positive for abdominal pain, nausea and vomiting.   Endocrine: Negative.    Genitourinary: Negative.    Musculoskeletal: Negative.    Skin: Negative.    Allergic/Immunologic: Negative.    Neurological: Negative.    Hematological: Negative.    Psychiatric/Behavioral: Negative.     Objective:     Vital Signs (Most Recent):  Temp: 98.4 °F (36.9 °C) (03/15/22 1524)  Pulse: 75 (03/15/22 1524)  Resp: 19 (03/15/22 1524)  BP: (!) 105/54 (03/15/22 1524)  SpO2: 96 % (03/15/22 1524)   Vital Signs (24h Range):  Temp:  [98.3 °F (36.8 °C)-99.4 °F (37.4 °C)] 98.4 °F (36.9 °C)  Pulse:  [67-93] 75  Resp:  [11-21] 19  SpO2:  [95 %-100 %] 96 %  BP: (105-136)/() 105/54     Weight: 77.1 kg (170 lb 1 oz)  Body mass index is 29.19 kg/m².    Physical Exam  Vitals reviewed.   Constitutional:       General: She is not in acute distress.     Appearance: She is not toxic-appearing or diaphoretic.   HENT:      Head:  Normocephalic and atraumatic.      Right Ear: External ear normal.      Left Ear: External ear normal.      Nose:      Comments: NG tube in place     Mouth/Throat:      Mouth: Mucous membranes are moist.   Eyes:      Conjunctiva/sclera: Conjunctivae normal.   Cardiovascular:      Rate and Rhythm: Normal rate and regular rhythm.      Pulses: Normal pulses.      Heart sounds: No murmur heard.    No gallop.   Pulmonary:      Effort: Pulmonary effort is normal. No respiratory distress.      Breath sounds: No wheezing or rales.   Abdominal:      Comments: Distant bowel sounds, generalized tenderness to palpation mild, no guarding, no mass   Musculoskeletal:      Left lower leg: No edema.   Skin:     General: Skin is warm and dry.   Neurological:      Mental Status: She is alert and oriented to person, place, and time. Mental status is at baseline.   Psychiatric:         Mood and Affect: Mood normal.           Significant Labs: All pertinent labs within the past 24 hours have been reviewed.  CBC:   Recent Labs   Lab 03/15/22  0207   WBC 13.35*   HGB 12.2   HCT 40.4        CMP:   Recent Labs   Lab 03/15/22  0207      K 3.7      CO2 21*   *   BUN 15   CREATININE 0.8   CALCIUM 10.2   PROT 7.7   ALBUMIN 4.7   BILITOT 0.4   ALKPHOS 80   AST 15   ALT 13   ANIONGAP 19*   EGFRNONAA >60.0       Significant Imaging: I have reviewed all pertinent imaging results/findings within the past 24 hours.  CT Abdomen Pelvis  Without Contrast   Final Result   Abnormal      1. Previous cholecystectomy.   2. Diverticulosis.   3. Previous small bowel resection with suspected developing small bowel obstruction at the level of the anastomosis.  Close interval follow-up is recommended.   4. Diverticulosis.   5. Prior hysterectomy.   This report was flagged in Epic as abnormal.      The preliminary and final reports are concordant.         Electronically signed by: Alan Montenegro   Date:    03/15/2022   Time:    10:36             Assessment/Plan:     SBO (small bowel obstruction)  Admit to inpatient  IVFs  NG tube to suction  General surgery consulted, appreciate their recommendations  NPO for bowel rest  Daily labs   IV/PO pain medications and antiemetics      Mitral valve prolapse  Monitor for fluid overload, shortness of breath, fatigue, weakness        VTE Risk Mitigation (From admission, onward)         Ordered     IP VTE LOW RISK PATIENT  Once         03/15/22 1107                   Eloise Coombs MD  Department of [facility]   [facility]

## 2022-03-15 NOTE — SUBJECTIVE & OBJECTIVE
Past Medical History:   Diagnosis Date    Anemia     Fatty liver 2015    Former smoker 2009    Osteoarthritis 2010    Pulmonary nodules 2019    Repeat CT in 6 mo    Ulcer of abdomen wall 2016       Past Surgical History:   Procedure Laterality Date    APPENDECTOMY  1993     SECTION  , , 1983    x3    COLONOSCOPY  ~2014    [facility]: normal findings per patient report    COLONOSCOPY N/A 2020    Procedure: COLONOSCOPY;  Surgeon: Misael Holm MD;  Location: [facility] ENDO;  Service: General;  Laterality: N/A;    DIAGNOSTIC LAPAROSCOPY N/A 2021    Procedure: LAPAROSCOPY, DIAGNOSTIC;  Surgeon: Misael Holm MD;  Location: [facility] OR;  Service: General;  Laterality: N/A;    EPIDURAL STEROID INJECTION INTO LUMBAR SPINE N/A 10/12/2018    Procedure: Injection-steroid-epidural-lumbar;  Surgeon: Kal Johnson MD;  Location: [facility] OR;  Service: Pain Management;  Laterality: N/A;  L5-S1    EPIDURAL STEROID INJECTION INTO LUMBAR SPINE N/A 2019    Procedure: Injection-steroid-epidural-lumbar L5-S1;  Surgeon: Kal Johnson MD;  Location: [facility] OR;  Service: Pain Management;  Laterality: N/A;    ESOPHAGOGASTRODUODENOSCOPY N/A 2020    Procedure: ESOPHAGOGASTRODUODENOSCOPY (EGD);  Surgeon: Misael Holm MD;  Location: [facility];  Service: General;  Laterality: N/A;    HYSTERECTOMY      one ovary conserved    LAPAROSCOPIC CHOLECYSTECTOMY N/A 2020    Procedure: CHOLECYSTECTOMY, LAPAROSCOPIC;  Surgeon: Govind Frey MD;  Location: [facility] OR;  Service: General;  Laterality: N/A;    LAPAROSCOPIC LYSIS OF ADHESIONS N/A 2021    Procedure: LYSIS, ADHESIONS, LAPAROSCOPIC;  Surgeon: Misael Holm MD;  Location: [facility] OR;  Service: General;  Laterality: N/A;    UPPER GASTROINTESTINAL ENDOSCOPY         Review of patient's allergies indicates:  No Known Allergies    No current facility-administered medications on file prior to encounter.     Current Outpatient Medications  on File Prior to Encounter   Medication Sig    diclofenac sodium (VOLTAREN) 1 % Gel Apply 2 g topically 2 (two) times daily as needed.    docusate sodium (COLACE) 100 MG capsule Take 100 mg by mouth 2 (two) times daily.    lactulose (CHRONULAC) 10 gram/15 mL solution Take 10 g by mouth 2 (two) times daily.     ondansetron (ZOFRAN-ODT) 8 MG TbDL Take 1 tablet (8 mg total) by mouth every 6 (six) hours as needed (nausea or vomiting).    pantoprazole (PROTONIX) 20 MG tablet Take 1 tablet (20 mg total) by mouth once daily.    vitamin D (VITAMIN D3) 1000 units Tab Take 3,000 Units by mouth once daily.     Family History       Problem Relation (Age of Onset)    Aneurysm Father    Arthritis Mother, Sister    Brain Hemorrhage Father    Heart disease Mother    Hypertension Father, Sister    Obesity Sister    Osteoarthritis Sister    Osteoporosis Mother    Ovarian cancer Mother    Stroke Father (50)          Tobacco Use    Smoking status: Former Smoker     Packs/day: 1.00     Years: 40.00     Pack years: 40.00     Quit date: 2009     Years since quittin.3    Smokeless tobacco: Never Used    Tobacco comment: quit 2009   Substance and Sexual Activity    Alcohol use: Not Currently     Comment: Not often - one glass of wine every now and then    Drug use: Not Currently     Types: Marijuana     Comment: in     Sexual activity: Not Currently     Review of Systems   Constitutional:  Negative for fatigue and fever.   HENT: Negative.     Eyes:  Negative for visual disturbance.   Cardiovascular:  Negative for chest pain.   Gastrointestinal:  Positive for abdominal pain, nausea and vomiting.   Endocrine: Negative.    Genitourinary: Negative.    Musculoskeletal: Negative.    Skin: Negative.    Allergic/Immunologic: Negative.    Neurological: Negative.    Hematological: Negative.    Psychiatric/Behavioral: Negative.     Objective:     Vital Signs (Most Recent):  Temp: 98.4 °F (36.9 °C) (03/15/22 1524)  Pulse: 75 (03/15/22  1524)  Resp: 19 (03/15/22 1524)  BP: (!) 105/54 (03/15/22 1524)  SpO2: 96 % (03/15/22 1524)   Vital Signs (24h Range):  Temp:  [98.3 °F (36.8 °C)-99.4 °F (37.4 °C)] 98.4 °F (36.9 °C)  Pulse:  [67-93] 75  Resp:  [11-21] 19  SpO2:  [95 %-100 %] 96 %  BP: (105-136)/() 105/54     Weight: 77.1 kg (170 lb 1 oz)  Body mass index is 29.19 kg/m².    Physical Exam  Vitals reviewed.   Constitutional:       General: She is not in acute distress.     Appearance: She is not toxic-appearing or diaphoretic.   HENT:      Head: Normocephalic and atraumatic.      Right Ear: External ear normal.      Left Ear: External ear normal.      Nose:      Comments: NG tube in place     Mouth/Throat:      Mouth: Mucous membranes are moist.   Eyes:      Conjunctiva/sclera: Conjunctivae normal.   Cardiovascular:      Rate and Rhythm: Normal rate and regular rhythm.      Pulses: Normal pulses.      Heart sounds: No murmur heard.    No gallop.   Pulmonary:      Effort: Pulmonary effort is normal. No respiratory distress.      Breath sounds: No wheezing or rales.   Abdominal:      Comments: Distant bowel sounds, generalized tenderness to palpation mild, no guarding, no mass   Musculoskeletal:      Left lower leg: No edema.   Skin:     General: Skin is warm and dry.   Neurological:      Mental Status: She is alert and oriented to person, place, and time. Mental status is at baseline.   Psychiatric:         Mood and Affect: Mood normal.           Significant Labs: All pertinent labs within the past 24 hours have been reviewed.  CBC:   Recent Labs   Lab 03/15/22  0207   WBC 13.35*   HGB 12.2   HCT 40.4        CMP:   Recent Labs   Lab 03/15/22  0207      K 3.7      CO2 21*   *   BUN 15   CREATININE 0.8   CALCIUM 10.2   PROT 7.7   ALBUMIN 4.7   BILITOT 0.4   ALKPHOS 80   AST 15   ALT 13   ANIONGAP 19*   EGFRNONAA >60.0       Significant Imaging: I have reviewed all pertinent imaging results/findings within the past 24  hours.  CT Abdomen Pelvis  Without Contrast   Final Result   Abnormal      1. Previous cholecystectomy.   2. Diverticulosis.   3. Previous small bowel resection with suspected developing small bowel obstruction at the level of the anastomosis.  Close interval follow-up is recommended.   4. Diverticulosis.   5. Prior hysterectomy.   This report was flagged in Epic as abnormal.      The preliminary and final reports are concordant.         Electronically signed by: Alan Montenegro   Date:    03/15/2022   Time:    10:36

## 2022-03-15 NOTE — ED PROVIDER NOTES
Encounter Date: 3/15/2022       History     Chief Complaint   Patient presents with    Abdominal Pain    Vomiting    Nausea     Pt reports abdominal pain, nausea and vomiting that started about 4 hours pta, pt reports pain at 10/10. Pt reports about 10 episodes of vomiting since 2200 last pm     66-year-old female presents per EMS status post complaining nausea vomiting which began approximately 4 hours prior to arrival she has had 10 episodes of vomiting since 10:00 p.m. last night, she has had a past history of similar severe episodes of abdominal pain and has had a small-bowel obstruction in the past resulting in a partial small-bowel resection 4 ft she has been followed by GI specialist Dr. Solomon Blakely and by general surgeon Dr. Holm who happens to be on-call at [facility] patient denies any unusual food recently and has had no abdominal trauma        Review of patient's allergies indicates:  No Known Allergies  Past Medical History:   Diagnosis Date    Anemia     Fatty liver 2015    Former smoker     Osteoarthritis 2010    Pulmonary nodules 2019    Repeat CT in 6 mo    Ulcer of abdomen wall 2016     Past Surgical History:   Procedure Laterality Date    APPENDECTOMY       SECTION  , , 1983    x3    COLONOSCOPY  ~    [facility]: normal findings per patient report    COLONOSCOPY N/A 2020    Procedure: COLONOSCOPY;  Surgeon: Misael Holm MD;  Location: [facility] ENDO;  Service: General;  Laterality: N/A;    DIAGNOSTIC LAPAROSCOPY N/A 2021    Procedure: LAPAROSCOPY, DIAGNOSTIC;  Surgeon: Misael Holm MD;  Location: [facility] OR;  Service: General;  Laterality: N/A;    EPIDURAL STEROID INJECTION INTO LUMBAR SPINE N/A 10/12/2018    Procedure: Injection-steroid-epidural-lumbar;  Surgeon: Kal Johnson MD;  Location: [facility] OR;  Service: Pain Management;  Laterality: N/A;  L5-S1    EPIDURAL STEROID INJECTION INTO LUMBAR SPINE N/A 2019     Procedure: Injection-steroid-epidural-lumbar L5-S1;  Surgeon: Kal Johnson MD;  Location: [facility] OR;  Service: Pain Management;  Laterality: N/A;    ESOPHAGOGASTRODUODENOSCOPY N/A 2020    Procedure: ESOPHAGOGASTRODUODENOSCOPY (EGD);  Surgeon: Misael Holm MD;  Location: [facility] ENDO;  Service: General;  Laterality: N/A;    HYSTERECTOMY  1993    one ovary conserved    LAPAROSCOPIC CHOLECYSTECTOMY N/A 2020    Procedure: CHOLECYSTECTOMY, LAPAROSCOPIC;  Surgeon: Govind Frey MD;  Location: [facility] OR;  Service: General;  Laterality: N/A;    LAPAROSCOPIC LYSIS OF ADHESIONS N/A 2021    Procedure: LYSIS, ADHESIONS, LAPAROSCOPIC;  Surgeon: Misael Holm MD;  Location: [facility] OR;  Service: General;  Laterality: N/A;    UPPER GASTROINTESTINAL ENDOSCOPY       Family History   Problem Relation Age of Onset    Ovarian cancer Mother     Heart disease Mother     Osteoporosis Mother     Arthritis Mother     Hypertension Father     Stroke Father 50    Brain Hemorrhage Father     Aneurysm Father     Osteoarthritis Sister     Arthritis Sister     Hypertension Sister     Obesity Sister     Breast cancer Neg Hx     Colon cancer Neg Hx     Colon polyps Neg Hx     Crohn's disease Neg Hx     Ulcerative colitis Neg Hx      Social History     Tobacco Use    Smoking status: Former Smoker     Packs/day: 1.00     Years: 40.00     Pack years: 40.00     Quit date: 2009     Years since quittin.3    Smokeless tobacco: Never Used    Tobacco comment: quit 2009   Substance Use Topics    Alcohol use: Not Currently     Comment: Not often - one glass of wine every now and then    Drug use: Not Currently     Types: Marijuana     Comment: in 1970s     Review of Systems   Constitutional: Negative for fever.   HENT: Negative for sore throat.    Respiratory: Negative for shortness of breath.    Cardiovascular: Negative for chest pain.   Gastrointestinal: Positive for abdominal pain, nausea and  vomiting. Negative for abdominal distention and diarrhea.   Genitourinary: Negative for dysuria.   Musculoskeletal: Negative for back pain.   Skin: Negative for rash.   Neurological: Negative for weakness.   Hematological: Does not bruise/bleed easily.       Physical Exam     Initial Vitals [03/15/22 0200]   BP Pulse Resp Temp SpO2   (!) 134/123 93 18 98.3 °F (36.8 °C) 100 %      MAP       --         Physical Exam    Nursing note and vitals reviewed.  Constitutional: She appears well-developed and well-nourished. She is not diaphoretic. No distress.   HENT:   Head: Normocephalic and atraumatic.   Eyes: Pupils are equal, round, and reactive to light. Right eye exhibits no discharge. Left eye exhibits no discharge.   Neck: No tracheal deviation present. No JVD present.   Cardiovascular: Exam reveals no friction rub.    No murmur heard.  Pulmonary/Chest: No stridor. No respiratory distress. She has no wheezes. She has no rales.   Abdominal: She exhibits no distension and no mass. There is abdominal tenderness.   Bowel sounds are increased There is guarding. There is no rebound.   Musculoskeletal:         General: Normal range of motion.     Neurological: She is alert.   Skin: Skin is warm.   Psychiatric: She has a normal mood and affect.         ED Course   Procedures  Labs Reviewed   CBC W/ AUTO DIFFERENTIAL - Abnormal; Notable for the following components:       Result Value    WBC 13.35 (*)     MCV 80 (*)     MCH 24.0 (*)     MCHC 30.2 (*)     RDW 25.7 (*)     Gran # (ANC) 11.3 (*)     Immature Grans (Abs) 0.07 (*)     Gran % 85.1 (*)     Lymph % 8.5 (*)     All other components within normal limits   COMPREHENSIVE METABOLIC PANEL - Abnormal; Notable for the following components:    CO2 21 (*)     Glucose 149 (*)     Anion Gap 19 (*)     All other components within normal limits   LIPASE          Imaging Results          CT Abdomen Pelvis  Without Contrast (In process)                  Medications   HYDROmorphone  (PF) injection 1 mg (1 mg Intravenous Given 3/15/22 0207)   sodium chloride 0.9% bolus 500 mL (0 mLs Intravenous Stopped 3/15/22 0326)     Medical Decision Making:   ED Management:  Case discussed with general surgeon Dr. Holm  Hospitalist Dr. JOSE cabrera at 4:45 a.m.             ED Course as of 03/15/22 0500   Tue Mar 15, 2022   0438 CT consistent with SBO [WK]      ED Course User Index  [WK] Donnell De La Torre MD             Clinical Impression:   Final diagnoses:  [K56.609] SBO (small bowel obstruction) (Primary)  [R10.9] Abdominal pain, unspecified abdominal location          ED Disposition Condition    Admit               Donnell De La Torre MD  03/15/22 2315

## 2022-03-15 NOTE — PLAN OF CARE
03/15/22 1210   Discharge Assessment   Assessment Type Discharge Planning Assessment   Confirmed/corrected address, phone number and insurance Yes   Confirmed Demographics Correct on Facesheet   Source of Information patient   When was your last doctors appointment?   (Dr Bower in Feb)   Communicated GABBY with patient/caregiver Date not available/Unable to determine   Reason For Admission abdominal pain, throwing up   Lives With alone   Do you expect to return to your current living situation? Yes   Do you have help at home or someone to help you manage your care at home? No   Prior to hospitilization cognitive status: Alert/Oriented   Current cognitive status: Alert/Oriented   Walking or Climbing Stairs Difficulty none   Dressing/Bathing Difficulty none   Home Accessibility stairs to enter home   Number of Stairs, Main Entrance other (see comments)  (13)   Home Layout Able to live on 1st floor   Equipment Currently Used at Home none   Readmission within 30 days? No   Patient currently being followed by outpatient case management? No   Do you currently have service(s) that help you manage your care at home? No   Do you take prescription medications? Yes   Do you have prescription coverage? Yes   Coverage [employer]   Do you have any problems affording any of your prescribed medications? No  (will use GoodRx if medication is cheaper than using insurance)   Who is going to help you get home at discharge? daughter Magali   How do you get to doctors appointments? car, drives self   Are you on dialysis? No   Do you take coumadin? No   Discharge Plan A Home   DME Needed Upon Discharge  none   Discharge Plan discussed with: Patient   Discharge Barriers Identified None   Patient lives at home alone. She works from home for [employer]. She is independent & denies using any medical equipment at home. She uses Dr Kerns for primary care. Her daughter Magali moved to [place] but is coming home to be with her. Denies any other  needs at this time.

## 2022-03-15 NOTE — CONSULTS
[facility] Surg  General Surgery  Consult Note    Patient Name: Pham Licea  MRN: [id number]  Admission Date: 3/15/2022  Attending Physician: Loc Frias MD   Consult Physician: Misael Holm MD  Primary Care Provider: Deven Emerson MD    Patient information was obtained from patient.     Subjective:     Reason for consultation: Bowel obstruction    History of Present Illness:  Pham Licea is a 66 y.o. female with a history of anemia previous laparotomy with small-bowel resection for strictures presented to the ER with evidence of abdominal pain nausea vomiting x1.  Patient stated her last bowel movement was yesterday.  No fevers or chills.  No under code were all foods meet etcetera.  Patient stated that her belly started hurting yesterday afternoon.  Presented to the ER last night.  CT scan was conducted which showed evidence of small-bowel obstruction.  No fevers chills heart rate racing etcetera.  Patient subsequently admitted to the medical service.  IV fluids initiated.  Bowel rest initiated.  This morning surgery on duty called in consultation.    Review of patient's allergies indicates:  No Known Allergies    Past Medical History:   Diagnosis Date    Anemia     Fatty liver     Former smoker 2009    Osteoarthritis 2010    Pulmonary nodules 2019    Repeat CT in 6 mo    Ulcer of abdomen wall 2016     Past Surgical History:   Procedure Laterality Date    APPENDECTOMY  1993     SECTION  , , 1983    x3    COLONOSCOPY  ~    [facility]: normal findings per patient report    COLONOSCOPY N/A 2020    Procedure: COLONOSCOPY;  Surgeon: Misael Holm MD;  Location: [facility] ENDO;  Service: General;  Laterality: N/A;    DIAGNOSTIC LAPAROSCOPY N/A 2021    Procedure: LAPAROSCOPY, DIAGNOSTIC;  Surgeon: Misael Holm MD;  Location: [facility] OR;  Service: General;  Laterality: N/A;    EPIDURAL STEROID INJECTION INTO LUMBAR SPINE N/A  10/12/2018    Procedure: Injection-steroid-epidural-lumbar;  Surgeon: Kal Johnson MD;  Location: [facility] OR;  Service: Pain Management;  Laterality: N/A;  L5-S1    EPIDURAL STEROID INJECTION INTO LUMBAR SPINE N/A 2019    Procedure: Injection-steroid-epidural-lumbar L5-S1;  Surgeon: Kal Johnson MD;  Location: [facility] OR;  Service: Pain Management;  Laterality: N/A;    ESOPHAGOGASTRODUODENOSCOPY N/A 2020    Procedure: ESOPHAGOGASTRODUODENOSCOPY (EGD);  Surgeon: Misael Holm MD;  Location: [facility] ENDO;  Service: General;  Laterality: N/A;    HYSTERECTOMY  1993    one ovary conserved    LAPAROSCOPIC CHOLECYSTECTOMY N/A 2020    Procedure: CHOLECYSTECTOMY, LAPAROSCOPIC;  Surgeon: Govind Frey MD;  Location: [facility] OR;  Service: General;  Laterality: N/A;    LAPAROSCOPIC LYSIS OF ADHESIONS N/A 2021    Procedure: LYSIS, ADHESIONS, LAPAROSCOPIC;  Surgeon: Misael Holm MD;  Location: [facility] OR;  Service: General;  Laterality: N/A;    UPPER GASTROINTESTINAL ENDOSCOPY       Family History     Problem Relation (Age of Onset)    Aneurysm Father    Arthritis Mother, Sister    Brain Hemorrhage Father    Heart disease Mother    Hypertension Father, Sister    Obesity Sister    Osteoarthritis Sister    Osteoporosis Mother    Ovarian cancer Mother    Stroke Father (50)        Tobacco Use    Smoking status: Former Smoker     Packs/day: 1.00     Years: 40.00     Pack years: 40.00     Quit date: 2009     Years since quittin.3    Smokeless tobacco: Never Used    Tobacco comment: quit    Substance and Sexual Activity    Alcohol use: Not Currently     Comment: Not often - one glass of wine every now and then    Drug use: Not Currently     Types: Marijuana     Comment: in     Sexual activity: Not Currently     Review of Systems   Constitutional: Negative for activity change, appetite change, chills and fever.   HENT: Negative for congestion, dental problem and ear discharge.     Eyes: Negative for discharge and itching.   Respiratory: Negative for apnea, choking and chest tightness.    Cardiovascular: Negative for chest pain and leg swelling.   Gastrointestinal: Positive for abdominal pain and nausea. Negative for abdominal distention, anal bleeding, constipation and diarrhea.   Endocrine: Negative for cold intolerance and heat intolerance.   Genitourinary: Negative for difficulty urinating and dyspareunia.   Musculoskeletal: Negative for arthralgias and back pain.   Skin: Negative for color change and pallor.   Neurological: Negative for dizziness and facial asymmetry.   Hematological: Negative for adenopathy. Does not bruise/bleed easily.   Psychiatric/Behavioral: Negative for agitation and behavioral problems.     Objective:     Vital Signs (Most Recent):  Temp: 99.4 °F (37.4 °C) (03/15/22 1029)  Pulse: 76 (03/15/22 1029)  Resp: 17 (03/15/22 1029)  BP: (!) 123/59 (03/15/22 1029)  SpO2: 98 % (03/15/22 1029) Vital Signs (24h Range):  Temp:  [98.3 °F (36.8 °C)-99.4 °F (37.4 °C)] 99.4 °F (37.4 °C)  Pulse:  [67-93] 76  Resp:  [11-21] 17  SpO2:  [95 %-100 %] 98 %  BP: (107-136)/() 123/59     Weight: 77.1 kg (170 lb 1 oz)  Body mass index is 29.19 kg/m².    Physical Exam  Constitutional:       General: She is not in acute distress.     Appearance: She is well-developed.   HENT:      Head: Normocephalic and atraumatic.   Eyes:      Pupils: Pupils are equal, round, and reactive to light.   Neck:      Thyroid: No thyromegaly.   Cardiovascular:      Rate and Rhythm: Normal rate and regular rhythm.   Pulmonary:      Effort: Pulmonary effort is normal.      Breath sounds: Normal breath sounds.   Abdominal:      General: Bowel sounds are normal. There is no distension.      Palpations: Abdomen is soft.      Tenderness: There is abdominal tenderness (Mild) in the right upper quadrant, epigastric area and left upper quadrant.       Musculoskeletal:         General: No deformity. Normal range of  motion.      Cervical back: Normal range of motion and neck supple.   Skin:     General: Skin is warm.      Capillary Refill: Capillary refill takes less than 2 seconds.      Findings: No erythema.   Neurological:      Mental Status: She is alert and oriented to person, place, and time.      Cranial Nerves: No cranial nerve deficit.   Psychiatric:         Behavior: Behavior normal.         Significant Labs:  CBC:   Recent Labs   Lab 03/15/22  0207   WBC 13.35*   RBC 5.08   HGB 12.2   HCT 40.4      MCV 80*   MCH 24.0*   MCHC 30.2*     BMP:   Recent Labs   Lab 03/15/22  0207   *      K 3.7      CO2 21*   BUN 15   CREATININE 0.8   CALCIUM 10.2     CMP:   Recent Labs   Lab 03/15/22  0207   *   CALCIUM 10.2   ALBUMIN 4.7   PROT 7.7      K 3.7   CO2 21*      BUN 15   CREATININE 0.8   ALKPHOS 80   ALT 13   AST 15   BILITOT 0.4     LFTs:   Recent Labs   Lab 03/15/22  0207   ALT 13   AST 15   ALKPHOS 80   BILITOT 0.4   PROT 7.7   ALBUMIN 4.7     Coagulation: No results for input(s): LABPROT, INR, APTT in the last 168 hours.  Specimen (24h ago, onward)            None        No results for input(s): COLORU, CLARITYU, SPECGRAV, PHUR, PROTEINUA, GLUCOSEU, BILIRUBINCON, BLOODU, WBCU, RBCU, BACTERIA, MUCUS, NITRITE, LEUKOCYTESUR, UROBILINOGEN, HYALINECASTS in the last 168 hours.    Significant Diagnostics:  I have reviewed all pertinent imaging results/findings within the past 24 hours.  CT: I have reviewed all pertinent results/findings within the past 24 hours and my personal findings are:  CT scan reviewed.  Evidence of dilated proximal bowel and decompressed distal bowel.  No evidence of free air or free fluid.    Assessment:   Pham Licea is a 66 y.o. female who presents with small-bowel obstruction..    There are no hospital problems to display for this patient.    VTE Risk Mitigation (From admission, onward)         Ordered     IP VTE LOW RISK PATIENT  Once          03/15/22 1107                Medical Decision Making/Plan:  Small-bowel obstruction.  Cannot determine partial versus complete.  Likely related to adhesive disease.  At this point there is no contraindication to proceeding with non operative care.  IV fluids, bowel rest, nasogastric tube placement of patient with return of emesis.  Close monitoring.  Daily labs.  Surgery to follow with you.  Thank you for consultation.  Please call with questions.    Misael Holm MD  General Surgery  [facility]

## 2022-03-15 NOTE — HPI
Patient 66-year-old female with past medical history of anemia, hepatic steatosis, former smoker, who presents with several hours history of abdominal pain vomiting.  She previously was admitted for exploratory laparotomy with enterectomy for bowel obstruction. Continued to have abdominal pain and subsequently developed another bowel obstruction which resolved with supportive care. She has been on a daily bowel regimen and has continued to have bowel movements daily however last night she developed severe crampy abdominal pain and vomiting. Was not able to tolerate PO intake so came straight to the ER. In the ER, CT abd/pel showed evidence of developing SBO so hospital team called for admission.

## 2022-03-16 ENCOUNTER — PATIENT MESSAGE (OUTPATIENT)
Dept: HEMATOLOGY/ONCOLOGY | Facility: CLINIC | Age: 67
End: 2022-03-16
Payer: COMMERCIAL

## 2022-03-16 LAB
ALBUMIN SERPL BCP-MCNC: 3.2 G/DL (ref 3.5–5.2)
ALP SERPL-CCNC: 58 U/L (ref 55–135)
ALT SERPL W/O P-5'-P-CCNC: 11 U/L (ref 10–44)
ANION GAP SERPL CALC-SCNC: 8 MMOL/L (ref 8–16)
AST SERPL-CCNC: 13 U/L (ref 10–40)
BASOPHILS # BLD AUTO: 0.02 K/UL (ref 0–0.2)
BASOPHILS NFR BLD: 0.4 % (ref 0–1.9)
BILIRUB SERPL-MCNC: 0.5 MG/DL (ref 0.1–1)
BUN SERPL-MCNC: 13 MG/DL (ref 8–23)
CALCIUM SERPL-MCNC: 8.5 MG/DL (ref 8.7–10.5)
CHLORIDE SERPL-SCNC: 107 MMOL/L (ref 95–110)
CO2 SERPL-SCNC: 25 MMOL/L (ref 23–29)
CREAT SERPL-MCNC: 0.6 MG/DL (ref 0.5–1.4)
DIFFERENTIAL METHOD: ABNORMAL
EOSINOPHIL # BLD AUTO: 0.1 K/UL (ref 0–0.5)
EOSINOPHIL NFR BLD: 2.2 % (ref 0–8)
ERYTHROCYTE [DISTWIDTH] IN BLOOD BY AUTOMATED COUNT: 27 % (ref 11.5–14.5)
EST. GFR  (AFRICAN AMERICAN): >60 ML/MIN/1.73 M^2
EST. GFR  (NON AFRICAN AMERICAN): >60 ML/MIN/1.73 M^2
GLUCOSE SERPL-MCNC: 84 MG/DL (ref 70–110)
HCT VFR BLD AUTO: 32.9 % (ref 37–48.5)
HGB BLD-MCNC: 9.7 G/DL (ref 12–16)
IMM GRANULOCYTES # BLD AUTO: 0.01 K/UL (ref 0–0.04)
IMM GRANULOCYTES NFR BLD AUTO: 0.2 % (ref 0–0.5)
LYMPHOCYTES # BLD AUTO: 1.8 K/UL (ref 1–4.8)
LYMPHOCYTES NFR BLD: 32.5 % (ref 18–48)
MAGNESIUM SERPL-MCNC: 1.9 MG/DL (ref 1.6–2.6)
MCH RBC QN AUTO: 24.3 PG (ref 27–31)
MCHC RBC AUTO-ENTMCNC: 29.5 G/DL (ref 32–36)
MCV RBC AUTO: 82 FL (ref 82–98)
MONOCYTES # BLD AUTO: 0.7 K/UL (ref 0.3–1)
MONOCYTES NFR BLD: 12.7 % (ref 4–15)
NEUTROPHILS # BLD AUTO: 2.9 K/UL (ref 1.8–7.7)
NEUTROPHILS NFR BLD: 52 % (ref 38–73)
NRBC BLD-RTO: 0 /100 WBC
PHOSPHATE SERPL-MCNC: 2.6 MG/DL (ref 2.7–4.5)
PLATELET # BLD AUTO: 264 K/UL (ref 150–450)
PMV BLD AUTO: 9.8 FL (ref 9.2–12.9)
POTASSIUM SERPL-SCNC: 4.1 MMOL/L (ref 3.5–5.1)
PROT SERPL-MCNC: 5.3 G/DL (ref 6–8.4)
RBC # BLD AUTO: 4 M/UL (ref 4–5.4)
SODIUM SERPL-SCNC: 140 MMOL/L (ref 136–145)
WBC # BLD AUTO: 5.5 K/UL (ref 3.9–12.7)

## 2022-03-16 PROCEDURE — 84100 ASSAY OF PHOSPHORUS: CPT | Performed by: FAMILY MEDICINE

## 2022-03-16 PROCEDURE — 99233 SBSQ HOSP IP/OBS HIGH 50: CPT | Mod: 24,,, | Performed by: STUDENT IN AN ORGANIZED HEALTH CARE EDUCATION/TRAINING PROGRAM

## 2022-03-16 PROCEDURE — 83735 ASSAY OF MAGNESIUM: CPT | Performed by: FAMILY MEDICINE

## 2022-03-16 PROCEDURE — 25500020 PHARM REV CODE 255: Performed by: FAMILY MEDICINE

## 2022-03-16 PROCEDURE — 99233 SBSQ HOSP IP/OBS HIGH 50: CPT | Mod: ,,, | Performed by: FAMILY MEDICINE

## 2022-03-16 PROCEDURE — 99233 PR SUBSEQUENT HOSPITAL CARE,LEVL III: ICD-10-PCS | Mod: 24,,, | Performed by: STUDENT IN AN ORGANIZED HEALTH CARE EDUCATION/TRAINING PROGRAM

## 2022-03-16 PROCEDURE — 36415 COLL VENOUS BLD VENIPUNCTURE: CPT | Performed by: FAMILY MEDICINE

## 2022-03-16 PROCEDURE — 63600175 PHARM REV CODE 636 W HCPCS: Performed by: FAMILY MEDICINE

## 2022-03-16 PROCEDURE — A9698 NON-RAD CONTRAST MATERIALNOC: HCPCS | Performed by: FAMILY MEDICINE

## 2022-03-16 PROCEDURE — 99233 PR SUBSEQUENT HOSPITAL CARE,LEVL III: ICD-10-PCS | Mod: ,,, | Performed by: FAMILY MEDICINE

## 2022-03-16 PROCEDURE — 85025 COMPLETE CBC W/AUTO DIFF WBC: CPT | Performed by: FAMILY MEDICINE

## 2022-03-16 PROCEDURE — 99900035 HC TECH TIME PER 15 MIN (STAT)

## 2022-03-16 PROCEDURE — 11000001 HC ACUTE MED/SURG PRIVATE ROOM

## 2022-03-16 PROCEDURE — C9113 INJ PANTOPRAZOLE SODIUM, VIA: HCPCS | Performed by: FAMILY MEDICINE

## 2022-03-16 PROCEDURE — 80053 COMPREHEN METABOLIC PANEL: CPT | Performed by: FAMILY MEDICINE

## 2022-03-16 RX ADMIN — ONDANSETRON 4 MG: 2 INJECTION INTRAMUSCULAR; INTRAVENOUS at 10:03

## 2022-03-16 RX ADMIN — ONDANSETRON 4 MG: 2 INJECTION INTRAMUSCULAR; INTRAVENOUS at 06:03

## 2022-03-16 RX ADMIN — PANTOPRAZOLE SODIUM 40 MG: 40 INJECTION, POWDER, FOR SOLUTION INTRAVENOUS at 09:03

## 2022-03-16 RX ADMIN — IOHEXOL 1000 ML: 9 SOLUTION ORAL at 03:03

## 2022-03-16 RX ADMIN — SODIUM CHLORIDE, SODIUM LACTATE, POTASSIUM CHLORIDE, AND CALCIUM CHLORIDE: .6; .31; .03; .02 INJECTION, SOLUTION INTRAVENOUS at 01:03

## 2022-03-16 RX ADMIN — HYDROMORPHONE HYDROCHLORIDE 1 MG: 2 INJECTION, SOLUTION INTRAMUSCULAR; INTRAVENOUS; SUBCUTANEOUS at 07:03

## 2022-03-16 RX ADMIN — SODIUM CHLORIDE, SODIUM LACTATE, POTASSIUM CHLORIDE, AND CALCIUM CHLORIDE: .6; .31; .03; .02 INJECTION, SOLUTION INTRAVENOUS at 11:03

## 2022-03-16 RX ADMIN — HYDROMORPHONE HYDROCHLORIDE 1 MG: 2 INJECTION, SOLUTION INTRAMUSCULAR; INTRAVENOUS; SUBCUTANEOUS at 09:03

## 2022-03-16 NOTE — PROGRESS NOTES
[facility] Surg  General Surgery  Daily Note    Patient Name: Pham Licea  MRN: [id number]  Admission Date: 3/15/2022  Attending Physician: Eloise Coombs MD   Consult Physician: Lila Marquez MD  Primary Care Provider: Deven Emerson MD    Subjective:     Principle Problem: SBO (small bowel obstruction)    Last 24 hour history:  Acute issues overnight.  Denies nausea or vomiting.  Still complains of some abdominal pain that is crampy in nature.  She is passing a little gas.  No BM.    Objective:     Vital Signs (Most Recent):  Temp: 96.9 °F (36.1 °C) (03/16/22 1016)  Pulse: 65 (03/16/22 1016)  Resp: 18 (03/16/22 1016)  BP: (!) 87/49 (03/16/22 1016)  SpO2: 95 % (03/16/22 1016) Vital Signs (24h Range):  Temp:  [96.9 °F (36.1 °C)-98.4 °F (36.9 °C)] 96.9 °F (36.1 °C)  Pulse:  [65-89] 65  Resp:  [17-19] 18  SpO2:  [94 %-97 %] 95 %  BP: ()/(49-56) 87/49     Intake/Output last 24 hours:    Intake/Output Summary (Last 24 hours) at 3/16/2022 1410  Last data filed at 3/16/2022 1131  Gross per 24 hour   Intake 1990.47 ml   Output --   Net 1990.47 ml       I/O last 3 completed shifts:  In: 1969.1 [I.V.:1469.1; IV Piggyback:500]  Out: -   I/O this shift:  In: 521.4 [I.V.:521.4]  Out: -     Weight: 77.1 kg (170 lb 1 oz)  Body mass index is 29.19 kg/m².    Gen: Wd Wn female currently in NAD  Heent: Nc/At, MMM  Eyes: Perrl, Eomi  Cv: RRR, no  M/g/r  Lung: Non-labored breathing, clear bilaterally  Abd: Soft, non-tender, non-distended  Skin: No rashes bruises or abrasions  Neuro: Afocal  Ext: No cyanosis clubbing or edema  Muscular: Good ROM, no tenderness    Significant Labs:  CBC:   Recent Labs   Lab 03/16/22  0610   WBC 5.50   RBC 4.00   HGB 9.7*   HCT 32.9*      MCV 82   MCH 24.3*   MCHC 29.5*     BMP:   Recent Labs   Lab 03/16/22  0610   GLU 84      K 4.1      CO2 25   BUN 13   CREATININE 0.6   CALCIUM 8.5*   MG 1.9     CMP:   Recent Labs   Lab 03/16/22  0610   GLU 84   CALCIUM  8.5*   ALBUMIN 3.2*   PROT 5.3*      K 4.1   CO2 25      BUN 13   CREATININE 0.6   ALKPHOS 58   ALT 11   AST 13   BILITOT 0.5     LFTs:   Recent Labs   Lab 03/16/22  0610   ALT 11   AST 13   ALKPHOS 58   BILITOT 0.5   PROT 5.3*   ALBUMIN 3.2*     Coagulation: No results for input(s): LABPROT, INR, APTT in the last 168 hours.  Specimen (24h ago, onward)            None        No results for input(s): COLORU, CLARITYU, SPECGRAV, PHUR, PROTEINUA, GLUCOSEU, BILIRUBINCON, BLOODU, WBCU, RBCU, BACTERIA, MUCUS, NITRITE, LEUKOCYTESUR, UROBILINOGEN, HYALINECASTS in the last 168 hours.    Cultures:    Microbiology Results (last 7 days)     ** No results found for the last 168 hours. **          Significant Diagnostics:  I have reviewed all pertinent imaging results/findings within the past 24 hours.    Assessment:   Pham Licea is a 66 y.o. female with small-bowel obstruction.    Active Diagnoses:    Diagnosis Date Noted POA    PRINCIPAL PROBLEM:  SBO (small bowel obstruction) [K56.609] 03/15/2022 Yes    Mitral valve prolapse [I34.1] 03/23/2021 Yes      Problems Resolved During this Admission:     VTE Risk Mitigation (From admission, onward)         Ordered     IP VTE LOW RISK PATIENT  Once         03/15/22 1107                Medical Decision Making/Plan:  I have ordered an upper GI study with small bowel follow-through to evaluate the site of obstruction and for possible resolution.  Patient has to take lactulose every day to have a bowel movement and she would like to take her lactulose now.  I explained to her that we need to rule out if she has a partial or full bowel obstruction prior to giving her this medication to avoid complications.  She understands.    Lila Marquez MD  General Surgery  [facility]

## 2022-03-16 NOTE — PROGRESS NOTES
[facility] Medicine  Progress Note    Patient Name: Pham Licea  MRN: [id number]  Patient Class: IP- Inpatient   Admission Date: 3/15/2022  Length of Stay: 1 days  Attending Physician: Eloise Coombs MD  Primary Care Provider: Deven Emerson MD        Subjective:     Principal Problem:SBO (small bowel obstruction)        HPI:  Patient 66-year-old female with past medical history of anemia, hepatic steatosis, former smoker, who presents with several hours history of abdominal pain vomiting.  She previously was admitted for exploratory laparotomy with enterectomy for bowel obstruction. Continued to have abdominal pain and subsequently developed another bowel obstruction which resolved with supportive care. She has been on a daily bowel regimen and has continued to have bowel movements daily however last night she developed severe crampy abdominal pain and vomiting. Was not able to tolerate PO intake so came straight to the ER. In the ER, CT abd/pel showed evidence of developing SBO so hospital team called for admission.      Overview/Hospital Course:  No notes on file    Interval History: still with cramping abdominal pain    Review of Systems   Constitutional:  Negative for fatigue and fever.   HENT: Negative.     Eyes:  Negative for visual disturbance.   Cardiovascular:  Negative for chest pain.   Gastrointestinal:  Positive for abdominal pain and nausea. Negative for vomiting.   Endocrine: Negative.    Genitourinary: Negative.    Musculoskeletal: Negative.    Skin: Negative.    Allergic/Immunologic: Negative.    Neurological: Negative.    Hematological: Negative.    Psychiatric/Behavioral: Negative.     Objective:     Vital Signs (Most Recent):  Temp: 96.9 °F (36.1 °C) (03/16/22 1016)  Pulse: 65 (03/16/22 1016)  Resp: 18 (03/16/22 1016)  BP: (!) 87/49 (03/16/22 1016)  SpO2: 95 % (03/16/22 1016)   Vital Signs (24h Range):  Temp:  [96.9 °F (36.1 °C)-99.4 °F (37.4 °C)] 96.9 °F (36.1  °C)  Pulse:  [65-89] 65  Resp:  [17-19] 18  SpO2:  [94 %-98 %] 95 %  BP: ()/(49-59) 87/49     Weight: 77.1 kg (170 lb 1 oz)  Body mass index is 29.19 kg/m².    Intake/Output Summary (Last 24 hours) at 3/16/2022 1022  Last data filed at 3/16/2022 0524  Gross per 24 hour   Intake 1469.11 ml   Output --   Net 1469.11 ml      Physical Exam  Vitals reviewed.   Constitutional:       General: She is not in acute distress.     Appearance: She is not toxic-appearing or diaphoretic.   HENT:      Head: Normocephalic and atraumatic.      Right Ear: External ear normal.      Left Ear: External ear normal.      Nose: Nose normal.      Mouth/Throat:      Mouth: Mucous membranes are moist.   Eyes:      Conjunctiva/sclera: Conjunctivae normal.   Cardiovascular:      Rate and Rhythm: Normal rate and regular rhythm.      Pulses: Normal pulses.      Heart sounds: No murmur heard.    No gallop.   Pulmonary:      Effort: Pulmonary effort is normal. No respiratory distress.      Breath sounds: No wheezing or rales.   Abdominal:      Comments: Distant bowel sounds, lower quadrant tenderness to palpation, no guarding, no mass   Musculoskeletal:      Right lower leg: No edema.      Left lower leg: No edema.   Skin:     General: Skin is warm and dry.   Neurological:      Mental Status: She is alert and oriented to person, place, and time. Mental status is at baseline.   Psychiatric:         Mood and Affect: Mood normal.       Significant Labs: All pertinent labs within the past 24 hours have been reviewed.  CBC:   Recent Labs   Lab 03/15/22  0207 03/16/22  0610   WBC 13.35* 5.50   HGB 12.2 9.7*   HCT 40.4 32.9*    264     CMP:   Recent Labs   Lab 03/15/22  0207 03/16/22  0610    140   K 3.7 4.1    107   CO2 21* 25   * 84   BUN 15 13   CREATININE 0.8 0.6   CALCIUM 10.2 8.5*   PROT 7.7 5.3*   ALBUMIN 4.7 3.2*   BILITOT 0.4 0.5   ALKPHOS 80 58   AST 15 13   ALT 13 11   ANIONGAP 19* 8   EGFRNONAA >60.0 >60.0        Significant Imaging: I have reviewed all pertinent imaging results/findings within the past 24 hours.  CT Abdomen Pelvis  Without Contrast   Final Result   Abnormal      1. Previous cholecystectomy.   2. Diverticulosis.   3. Previous small bowel resection with suspected developing small bowel obstruction at the level of the anastomosis.  Close interval follow-up is recommended.   4. Diverticulosis.   5. Prior hysterectomy.   This report was flagged in Epic as abnormal.      The preliminary and final reports are concordant.         Electronically signed by: Alan Montenegro   Date:    03/15/2022   Time:    10:36              Assessment/Plan:      * SBO (small bowel obstruction)  Admitted to inpatient  [facility]  General surgery consulted, appreciate their recommendations  NPO for bowel rest  Daily labs   IV/PO pain medications and antiemetics  Has not passed flatus or had BM      Mitral valve prolapse  Monitor for fluid overload, shortness of breath, fatigue, weakness        VTE Risk Mitigation (From admission, onward)         Ordered     IP VTE LOW RISK PATIENT  Once         03/15/22 1107                Discharge Planning   GABBY:      Code Status: Full Code   Is the patient medically ready for discharge?:     Reason for patient still in hospital (select all that apply): Treatment  Discharge Plan A: Home                  Eloise Coombs MD  Department of Hospital Medicine   [facility]

## 2022-03-16 NOTE — PLAN OF CARE
Problem: Adult Inpatient Plan of Care  Goal: Absence of Hospital-Acquired Illness or Injury  Outcome: Ongoing, Progressing  Goal: Optimal Comfort and Wellbeing  Outcome: Ongoing, Progressing     Problem: Fluid Deficit (Intestinal Obstruction)  Goal: Fluid Balance  Outcome: Ongoing, Progressing     Problem: Infection (Intestinal Obstruction)  Goal: Absence of Infection Signs and Symptoms  Outcome: Ongoing, Progressing     Problem: Nausea and Vomiting (Intestinal Obstruction)  Goal: Nausea and Vomiting Relief  Outcome: Ongoing, Progressing     Problem: Pain (Intestinal Obstruction)  Goal: Acceptable Pain Control  Outcome: Ongoing, Progressing

## 2022-03-16 NOTE — SUBJECTIVE & OBJECTIVE
Interval History: still with cramping abdominal pain    Review of Systems   Constitutional:  Negative for fatigue and fever.   HENT: Negative.     Eyes:  Negative for visual disturbance.   Cardiovascular:  Negative for chest pain.   Gastrointestinal:  Positive for abdominal pain and nausea. Negative for vomiting.   Endocrine: Negative.    Genitourinary: Negative.    Musculoskeletal: Negative.    Skin: Negative.    Allergic/Immunologic: Negative.    Neurological: Negative.    Hematological: Negative.    Psychiatric/Behavioral: Negative.     Objective:     Vital Signs (Most Recent):  Temp: 96.9 °F (36.1 °C) (03/16/22 1016)  Pulse: 65 (03/16/22 1016)  Resp: 18 (03/16/22 1016)  BP: (!) 87/49 (03/16/22 1016)  SpO2: 95 % (03/16/22 1016)   Vital Signs (24h Range):  Temp:  [96.9 °F (36.1 °C)-99.4 °F (37.4 °C)] 96.9 °F (36.1 °C)  Pulse:  [65-89] 65  Resp:  [17-19] 18  SpO2:  [94 %-98 %] 95 %  BP: ()/(49-59) 87/49     Weight: 77.1 kg (170 lb 1 oz)  Body mass index is 29.19 kg/m².    Intake/Output Summary (Last 24 hours) at 3/16/2022 1022  Last data filed at 3/16/2022 0524  Gross per 24 hour   Intake 1469.11 ml   Output --   Net 1469.11 ml      Physical Exam  Vitals reviewed.   Constitutional:       General: She is not in acute distress.     Appearance: She is not toxic-appearing or diaphoretic.   HENT:      Head: Normocephalic and atraumatic.      Right Ear: External ear normal.      Left Ear: External ear normal.      Nose: Nose normal.      Mouth/Throat:      Mouth: Mucous membranes are moist.   Eyes:      Conjunctiva/sclera: Conjunctivae normal.   Cardiovascular:      Rate and Rhythm: Normal rate and regular rhythm.      Pulses: Normal pulses.      Heart sounds: No murmur heard.    No gallop.   Pulmonary:      Effort: Pulmonary effort is normal. No respiratory distress.      Breath sounds: No wheezing or rales.   Abdominal:      Comments: Distant bowel sounds, lower quadrant tenderness to palpation, no guarding, no  mass   Musculoskeletal:      Right lower leg: No edema.      Left lower leg: No edema.   Skin:     General: Skin is warm and dry.   Neurological:      Mental Status: She is alert and oriented to person, place, and time. Mental status is at baseline.   Psychiatric:         Mood and Affect: Mood normal.       Significant Labs: All pertinent labs within the past 24 hours have been reviewed.  CBC:   Recent Labs   Lab 03/15/22  0207 03/16/22  0610   WBC 13.35* 5.50   HGB 12.2 9.7*   HCT 40.4 32.9*    264     CMP:   Recent Labs   Lab 03/15/22  0207 03/16/22  0610    140   K 3.7 4.1    107   CO2 21* 25   * 84   BUN 15 13   CREATININE 0.8 0.6   CALCIUM 10.2 8.5*   PROT 7.7 5.3*   ALBUMIN 4.7 3.2*   BILITOT 0.4 0.5   ALKPHOS 80 58   AST 15 13   ALT 13 11   ANIONGAP 19* 8   EGFRNONAA >60.0 >60.0       Significant Imaging: I have reviewed all pertinent imaging results/findings within the past 24 hours.  CT Abdomen Pelvis  Without Contrast   Final Result   Abnormal      1. Previous cholecystectomy.   2. Diverticulosis.   3. Previous small bowel resection with suspected developing small bowel obstruction at the level of the anastomosis.  Close interval follow-up is recommended.   4. Diverticulosis.   5. Prior hysterectomy.   This report was flagged in Epic as abnormal.      The preliminary and final reports are concordant.         Electronically signed by: Alan Montenegro   Date:    03/15/2022   Time:    10:36

## 2022-03-16 NOTE — ASSESSMENT & PLAN NOTE
Admitted to inpatient  IVFs  General surgery consulted, appreciate their recommendations  NPO for bowel rest  Daily labs   IV/PO pain medications and antiemetics  Has not passed flatus or had BM

## 2022-03-17 ENCOUNTER — ANESTHESIA EVENT (OUTPATIENT)
Dept: SURGERY | Facility: HOSPITAL | Age: 67
DRG: 337 | End: 2022-03-17
Payer: COMMERCIAL

## 2022-03-17 ENCOUNTER — TELEPHONE (OUTPATIENT)
Dept: HEMATOLOGY/ONCOLOGY | Facility: CLINIC | Age: 67
End: 2022-03-17
Payer: COMMERCIAL

## 2022-03-17 ENCOUNTER — ANESTHESIA (OUTPATIENT)
Dept: SURGERY | Facility: HOSPITAL | Age: 67
DRG: 337 | End: 2022-03-17
Payer: COMMERCIAL

## 2022-03-17 LAB
ALBUMIN SERPL BCP-MCNC: 3.2 G/DL (ref 3.5–5.2)
ALP SERPL-CCNC: 80 U/L (ref 55–135)
ALT SERPL W/O P-5'-P-CCNC: 18 U/L (ref 10–44)
ANION GAP SERPL CALC-SCNC: 10 MMOL/L (ref 8–16)
AST SERPL-CCNC: 25 U/L (ref 10–40)
BASOPHILS # BLD AUTO: 0.03 K/UL (ref 0–0.2)
BASOPHILS NFR BLD: 0.4 % (ref 0–1.9)
BILIRUB SERPL-MCNC: 0.3 MG/DL (ref 0.1–1)
BUN SERPL-MCNC: 9 MG/DL (ref 8–23)
CALCIUM SERPL-MCNC: 8.6 MG/DL (ref 8.7–10.5)
CHLORIDE SERPL-SCNC: 107 MMOL/L (ref 95–110)
CO2 SERPL-SCNC: 24 MMOL/L (ref 23–29)
CREAT SERPL-MCNC: 0.6 MG/DL (ref 0.5–1.4)
DIFFERENTIAL METHOD: ABNORMAL
EOSINOPHIL # BLD AUTO: 0.1 K/UL (ref 0–0.5)
EOSINOPHIL NFR BLD: 1.3 % (ref 0–8)
ERYTHROCYTE [DISTWIDTH] IN BLOOD BY AUTOMATED COUNT: 26.3 % (ref 11.5–14.5)
EST. GFR  (AFRICAN AMERICAN): >60 ML/MIN/1.73 M^2
EST. GFR  (NON AFRICAN AMERICAN): >60 ML/MIN/1.73 M^2
GLUCOSE SERPL-MCNC: 75 MG/DL (ref 70–110)
HCT VFR BLD AUTO: 32.1 % (ref 37–48.5)
HGB BLD-MCNC: 9.5 G/DL (ref 12–16)
IMM GRANULOCYTES # BLD AUTO: 0.02 K/UL (ref 0–0.04)
IMM GRANULOCYTES NFR BLD AUTO: 0.3 % (ref 0–0.5)
LYMPHOCYTES # BLD AUTO: 2 K/UL (ref 1–4.8)
LYMPHOCYTES NFR BLD: 27.5 % (ref 18–48)
MAGNESIUM SERPL-MCNC: 1.7 MG/DL (ref 1.6–2.6)
MCH RBC QN AUTO: 24.4 PG (ref 27–31)
MCHC RBC AUTO-ENTMCNC: 29.6 G/DL (ref 32–36)
MCV RBC AUTO: 83 FL (ref 82–98)
MONOCYTES # BLD AUTO: 0.7 K/UL (ref 0.3–1)
MONOCYTES NFR BLD: 9.9 % (ref 4–15)
NEUTROPHILS # BLD AUTO: 4.4 K/UL (ref 1.8–7.7)
NEUTROPHILS NFR BLD: 60.6 % (ref 38–73)
NRBC BLD-RTO: 0 /100 WBC
PHOSPHATE SERPL-MCNC: 3.1 MG/DL (ref 2.7–4.5)
PLATELET # BLD AUTO: 252 K/UL (ref 150–450)
PMV BLD AUTO: 10.1 FL (ref 9.2–12.9)
POTASSIUM SERPL-SCNC: 4 MMOL/L (ref 3.5–5.1)
PROT SERPL-MCNC: 5.7 G/DL (ref 6–8.4)
RBC # BLD AUTO: 3.89 M/UL (ref 4–5.4)
SODIUM SERPL-SCNC: 141 MMOL/L (ref 136–145)
WBC # BLD AUTO: 7.2 K/UL (ref 3.9–12.7)

## 2022-03-17 PROCEDURE — 25000003 PHARM REV CODE 250

## 2022-03-17 PROCEDURE — 25000003 PHARM REV CODE 250: Performed by: FAMILY MEDICINE

## 2022-03-17 PROCEDURE — 36000706: Performed by: STUDENT IN AN ORGANIZED HEALTH CARE EDUCATION/TRAINING PROGRAM

## 2022-03-17 PROCEDURE — 99233 PR SUBSEQUENT HOSPITAL CARE,LEVL III: ICD-10-PCS | Mod: 57,,, | Performed by: STUDENT IN AN ORGANIZED HEALTH CARE EDUCATION/TRAINING PROGRAM

## 2022-03-17 PROCEDURE — 36000707: Performed by: STUDENT IN AN ORGANIZED HEALTH CARE EDUCATION/TRAINING PROGRAM

## 2022-03-17 PROCEDURE — 63600175 PHARM REV CODE 636 W HCPCS: Performed by: ANESTHESIOLOGY

## 2022-03-17 PROCEDURE — 25000003 PHARM REV CODE 250: Performed by: NURSE ANESTHETIST, CERTIFIED REGISTERED

## 2022-03-17 PROCEDURE — 63600175 PHARM REV CODE 636 W HCPCS: Performed by: FAMILY MEDICINE

## 2022-03-17 PROCEDURE — 27201423 OPTIME MED/SURG SUP & DEVICES STERILE SUPPLY: Performed by: STUDENT IN AN ORGANIZED HEALTH CARE EDUCATION/TRAINING PROGRAM

## 2022-03-17 PROCEDURE — 71000039 HC RECOVERY, EACH ADD'L HOUR: Performed by: STUDENT IN AN ORGANIZED HEALTH CARE EDUCATION/TRAINING PROGRAM

## 2022-03-17 PROCEDURE — D9220A PRA ANESTHESIA: ICD-10-PCS | Mod: CRNA,,, | Performed by: NURSE ANESTHETIST, CERTIFIED REGISTERED

## 2022-03-17 PROCEDURE — 63600175 PHARM REV CODE 636 W HCPCS: Performed by: NURSE ANESTHETIST, CERTIFIED REGISTERED

## 2022-03-17 PROCEDURE — D9220A PRA ANESTHESIA: ICD-10-PCS | Mod: ANES,,, | Performed by: ANESTHESIOLOGY

## 2022-03-17 PROCEDURE — 84100 ASSAY OF PHOSPHORUS: CPT | Performed by: FAMILY MEDICINE

## 2022-03-17 PROCEDURE — 99232 SBSQ HOSP IP/OBS MODERATE 35: CPT | Mod: ,,, | Performed by: HOSPITALIST

## 2022-03-17 PROCEDURE — 99233 SBSQ HOSP IP/OBS HIGH 50: CPT | Mod: 57,,, | Performed by: STUDENT IN AN ORGANIZED HEALTH CARE EDUCATION/TRAINING PROGRAM

## 2022-03-17 PROCEDURE — C9113 INJ PANTOPRAZOLE SODIUM, VIA: HCPCS | Performed by: STUDENT IN AN ORGANIZED HEALTH CARE EDUCATION/TRAINING PROGRAM

## 2022-03-17 PROCEDURE — 85025 COMPLETE CBC W/AUTO DIFF WBC: CPT | Performed by: FAMILY MEDICINE

## 2022-03-17 PROCEDURE — 99232 PR SUBSEQUENT HOSPITAL CARE,LEVL II: ICD-10-PCS | Mod: ,,, | Performed by: HOSPITALIST

## 2022-03-17 PROCEDURE — 37000009 HC ANESTHESIA EA ADD 15 MINS: Performed by: STUDENT IN AN ORGANIZED HEALTH CARE EDUCATION/TRAINING PROGRAM

## 2022-03-17 PROCEDURE — 63600175 PHARM REV CODE 636 W HCPCS: Performed by: STUDENT IN AN ORGANIZED HEALTH CARE EDUCATION/TRAINING PROGRAM

## 2022-03-17 PROCEDURE — 37000008 HC ANESTHESIA 1ST 15 MINUTES: Performed by: STUDENT IN AN ORGANIZED HEALTH CARE EDUCATION/TRAINING PROGRAM

## 2022-03-17 PROCEDURE — 44005 FREEING OF BOWEL ADHESION: CPT | Mod: ,,, | Performed by: STUDENT IN AN ORGANIZED HEALTH CARE EDUCATION/TRAINING PROGRAM

## 2022-03-17 PROCEDURE — 11000001 HC ACUTE MED/SURG PRIVATE ROOM

## 2022-03-17 PROCEDURE — 36415 COLL VENOUS BLD VENIPUNCTURE: CPT | Performed by: FAMILY MEDICINE

## 2022-03-17 PROCEDURE — 71000033 HC RECOVERY, INTIAL HOUR: Performed by: STUDENT IN AN ORGANIZED HEALTH CARE EDUCATION/TRAINING PROGRAM

## 2022-03-17 PROCEDURE — D9220A PRA ANESTHESIA: Mod: ANES,,, | Performed by: ANESTHESIOLOGY

## 2022-03-17 PROCEDURE — 83735 ASSAY OF MAGNESIUM: CPT | Performed by: FAMILY MEDICINE

## 2022-03-17 PROCEDURE — D9220A PRA ANESTHESIA: Mod: CRNA,,, | Performed by: NURSE ANESTHETIST, CERTIFIED REGISTERED

## 2022-03-17 PROCEDURE — 44005 PR FREEING BOWEL ADHESION,ENTEROLYSIS: ICD-10-PCS | Mod: ,,, | Performed by: STUDENT IN AN ORGANIZED HEALTH CARE EDUCATION/TRAINING PROGRAM

## 2022-03-17 PROCEDURE — 80053 COMPREHEN METABOLIC PANEL: CPT | Performed by: FAMILY MEDICINE

## 2022-03-17 PROCEDURE — 43752 NASAL/OROGASTRIC W/TUBE PLMT: CPT

## 2022-03-17 RX ORDER — SODIUM CHLORIDE, SODIUM LACTATE, POTASSIUM CHLORIDE, CALCIUM CHLORIDE 600; 310; 30; 20 MG/100ML; MG/100ML; MG/100ML; MG/100ML
125 INJECTION, SOLUTION INTRAVENOUS CONTINUOUS
Status: DISCONTINUED | OUTPATIENT
Start: 2022-03-17 | End: 2022-03-17

## 2022-03-17 RX ORDER — KETOROLAC TROMETHAMINE 30 MG/ML
15 INJECTION, SOLUTION INTRAMUSCULAR; INTRAVENOUS ONCE
Status: DISCONTINUED | OUTPATIENT
Start: 2022-03-17 | End: 2022-03-17

## 2022-03-17 RX ORDER — SODIUM CHLORIDE, SODIUM LACTATE, POTASSIUM CHLORIDE, CALCIUM CHLORIDE 600; 310; 30; 20 MG/100ML; MG/100ML; MG/100ML; MG/100ML
INJECTION, SOLUTION INTRAVENOUS CONTINUOUS
Status: DISCONTINUED | OUTPATIENT
Start: 2022-03-17 | End: 2022-03-17

## 2022-03-17 RX ORDER — OXYCODONE AND ACETAMINOPHEN 5; 325 MG/1; MG/1
1 TABLET ORAL
Status: DISCONTINUED | OUTPATIENT
Start: 2022-03-17 | End: 2022-03-17

## 2022-03-17 RX ORDER — SUCCINYLCHOLINE CHLORIDE 20 MG/ML
INJECTION INTRAMUSCULAR; INTRAVENOUS
Status: DISCONTINUED | OUTPATIENT
Start: 2022-03-17 | End: 2022-03-17

## 2022-03-17 RX ORDER — SCOLOPAMINE TRANSDERMAL SYSTEM 1 MG/1
PATCH, EXTENDED RELEASE TRANSDERMAL
Status: COMPLETED
Start: 2022-03-17 | End: 2022-03-20

## 2022-03-17 RX ORDER — SCOLOPAMINE TRANSDERMAL SYSTEM 1 MG/1
1 PATCH, EXTENDED RELEASE TRANSDERMAL
Status: DISCONTINUED | OUTPATIENT
Start: 2022-03-17 | End: 2022-03-23 | Stop reason: HOSPADM

## 2022-03-17 RX ORDER — EPHEDRINE SULFATE 50 MG/ML
INJECTION, SOLUTION INTRAVENOUS
Status: DISCONTINUED | OUTPATIENT
Start: 2022-03-17 | End: 2022-03-17

## 2022-03-17 RX ORDER — PROPOFOL 10 MG/ML
VIAL (ML) INTRAVENOUS
Status: DISCONTINUED | OUTPATIENT
Start: 2022-03-17 | End: 2022-03-17

## 2022-03-17 RX ORDER — LIDOCAINE HYDROCHLORIDE 10 MG/ML
1 INJECTION, SOLUTION EPIDURAL; INFILTRATION; INTRACAUDAL; PERINEURAL ONCE
Status: DISCONTINUED | OUTPATIENT
Start: 2022-03-17 | End: 2022-03-17

## 2022-03-17 RX ORDER — ERTAPENEM 1 G/1
INJECTION, POWDER, LYOPHILIZED, FOR SOLUTION INTRAMUSCULAR; INTRAVENOUS
Status: DISCONTINUED | OUTPATIENT
Start: 2022-03-17 | End: 2022-03-17

## 2022-03-17 RX ORDER — MIDAZOLAM HYDROCHLORIDE 1 MG/ML
INJECTION INTRAMUSCULAR; INTRAVENOUS
Status: DISCONTINUED | OUTPATIENT
Start: 2022-03-17 | End: 2022-03-17

## 2022-03-17 RX ORDER — ACETAMINOPHEN 10 MG/ML
1000 INJECTION, SOLUTION INTRAVENOUS ONCE
Status: COMPLETED | OUTPATIENT
Start: 2022-03-17 | End: 2022-03-17

## 2022-03-17 RX ORDER — MORPHINE SULFATE 4 MG/ML
2 INJECTION, SOLUTION INTRAMUSCULAR; INTRAVENOUS EVERY 5 MIN PRN
Status: DISCONTINUED | OUTPATIENT
Start: 2022-03-17 | End: 2022-03-17

## 2022-03-17 RX ORDER — HYDROMORPHONE HYDROCHLORIDE 2 MG/ML
INJECTION, SOLUTION INTRAMUSCULAR; INTRAVENOUS; SUBCUTANEOUS
Status: DISCONTINUED | OUTPATIENT
Start: 2022-03-17 | End: 2022-03-17

## 2022-03-17 RX ORDER — DEXAMETHASONE SODIUM PHOSPHATE 4 MG/ML
INJECTION, SOLUTION INTRA-ARTICULAR; INTRALESIONAL; INTRAMUSCULAR; INTRAVENOUS; SOFT TISSUE
Status: DISCONTINUED | OUTPATIENT
Start: 2022-03-17 | End: 2022-03-17

## 2022-03-17 RX ORDER — ONDANSETRON 2 MG/ML
4 INJECTION INTRAMUSCULAR; INTRAVENOUS DAILY PRN
Status: DISCONTINUED | OUTPATIENT
Start: 2022-03-17 | End: 2022-03-17

## 2022-03-17 RX ORDER — LIDOCAINE HYDROCHLORIDE 20 MG/ML
INJECTION, SOLUTION EPIDURAL; INFILTRATION; INTRACAUDAL; PERINEURAL
Status: DISCONTINUED | OUTPATIENT
Start: 2022-03-17 | End: 2022-03-17

## 2022-03-17 RX ORDER — MEPERIDINE HYDROCHLORIDE 50 MG/ML
INJECTION INTRAMUSCULAR; INTRAVENOUS; SUBCUTANEOUS
Status: DISCONTINUED | OUTPATIENT
Start: 2022-03-17 | End: 2022-03-17

## 2022-03-17 RX ORDER — ONDANSETRON 2 MG/ML
INJECTION INTRAMUSCULAR; INTRAVENOUS
Status: DISCONTINUED | OUTPATIENT
Start: 2022-03-17 | End: 2022-03-17

## 2022-03-17 RX ORDER — MUPIROCIN 20 MG/G
OINTMENT TOPICAL 2 TIMES DAILY
Status: DISPENSED | OUTPATIENT
Start: 2022-03-17 | End: 2022-03-22

## 2022-03-17 RX ORDER — ROCURONIUM BROMIDE 10 MG/ML
INJECTION, SOLUTION INTRAVENOUS
Status: DISCONTINUED | OUTPATIENT
Start: 2022-03-17 | End: 2022-03-17

## 2022-03-17 RX ADMIN — ACETAMINOPHEN 1000 MG: 10 INJECTION INTRAVENOUS at 10:03

## 2022-03-17 RX ADMIN — MIDAZOLAM HYDROCHLORIDE 1 MG: 1 INJECTION, SOLUTION INTRAMUSCULAR; INTRAVENOUS at 08:03

## 2022-03-17 RX ADMIN — SODIUM CHLORIDE, POTASSIUM CHLORIDE, SODIUM LACTATE AND CALCIUM CHLORIDE: 600; 310; 30; 20 INJECTION, SOLUTION INTRAVENOUS at 09:03

## 2022-03-17 RX ADMIN — MUPIROCIN: 20 OINTMENT TOPICAL at 09:03

## 2022-03-17 RX ADMIN — HYDROMORPHONE HYDROCHLORIDE 1 MG: 2 INJECTION, SOLUTION INTRAMUSCULAR; INTRAVENOUS; SUBCUTANEOUS at 12:03

## 2022-03-17 RX ADMIN — MORPHINE SULFATE 2 MG: 4 INJECTION, SOLUTION INTRAMUSCULAR; INTRAVENOUS at 07:03

## 2022-03-17 RX ADMIN — MORPHINE SULFATE 2 MG: 4 INJECTION, SOLUTION INTRAMUSCULAR; INTRAVENOUS at 11:03

## 2022-03-17 RX ADMIN — MUPIROCIN: 20 OINTMENT TOPICAL at 11:03

## 2022-03-17 RX ADMIN — SUGAMMADEX 200 MG: 100 INJECTION, SOLUTION INTRAVENOUS at 10:03

## 2022-03-17 RX ADMIN — HYDROMORPHONE HYDROCHLORIDE 1 MG: 2 INJECTION, SOLUTION INTRAMUSCULAR; INTRAVENOUS; SUBCUTANEOUS at 09:03

## 2022-03-17 RX ADMIN — SODIUM CHLORIDE, SODIUM LACTATE, POTASSIUM CHLORIDE, AND CALCIUM CHLORIDE: .6; .31; .03; .02 INJECTION, SOLUTION INTRAVENOUS at 11:03

## 2022-03-17 RX ADMIN — MEPERIDINE HYDROCHLORIDE 50 MG: 50 INJECTION INTRAMUSCULAR; INTRAVENOUS; SUBCUTANEOUS at 08:03

## 2022-03-17 RX ADMIN — MIDAZOLAM HYDROCHLORIDE 2 MG: 1 INJECTION, SOLUTION INTRAMUSCULAR; INTRAVENOUS at 08:03

## 2022-03-17 RX ADMIN — LIDOCAINE HYDROCHLORIDE 100 MG: 20 INJECTION, SOLUTION EPIDURAL; INFILTRATION; INTRACAUDAL; PERINEURAL at 09:03

## 2022-03-17 RX ADMIN — HYDROMORPHONE HYDROCHLORIDE 1 MG: 2 INJECTION, SOLUTION INTRAMUSCULAR; INTRAVENOUS; SUBCUTANEOUS at 05:03

## 2022-03-17 RX ADMIN — HYDROMORPHONE HYDROCHLORIDE 0.2 MG: 2 INJECTION INTRAMUSCULAR; INTRAVENOUS; SUBCUTANEOUS at 10:03

## 2022-03-17 RX ADMIN — SCOPALAMINE 1 PATCH: 1 PATCH, EXTENDED RELEASE TRANSDERMAL at 08:03

## 2022-03-17 RX ADMIN — EPHEDRINE SULFATE 15 MG: 50 INJECTION INTRAVENOUS at 09:03

## 2022-03-17 RX ADMIN — ONDANSETRON 4 MG: 2 INJECTION INTRAMUSCULAR; INTRAVENOUS at 03:03

## 2022-03-17 RX ADMIN — SUCCINYLCHOLINE CHLORIDE 120 MG: 20 INJECTION, SOLUTION INTRAMUSCULAR; INTRAVENOUS at 09:03

## 2022-03-17 RX ADMIN — HYDROMORPHONE HYDROCHLORIDE 1 MG: 2 INJECTION, SOLUTION INTRAMUSCULAR; INTRAVENOUS; SUBCUTANEOUS at 01:03

## 2022-03-17 RX ADMIN — PROPOFOL 200 MG: 10 INJECTION, EMULSION INTRAVENOUS at 09:03

## 2022-03-17 RX ADMIN — ONDANSETRON 4 MG: 2 INJECTION INTRAMUSCULAR; INTRAVENOUS at 09:03

## 2022-03-17 RX ADMIN — DEXAMETHASONE SODIUM PHOSPHATE 4 MG: 4 INJECTION, SOLUTION INTRAMUSCULAR; INTRAVENOUS at 09:03

## 2022-03-17 RX ADMIN — ROCURONIUM BROMIDE 30 MG: 10 INJECTION, SOLUTION INTRAVENOUS at 09:03

## 2022-03-17 RX ADMIN — SODIUM CHLORIDE, SODIUM LACTATE, POTASSIUM CHLORIDE, AND CALCIUM CHLORIDE: .6; .31; .03; .02 INJECTION, SOLUTION INTRAVENOUS at 01:03

## 2022-03-17 RX ADMIN — PANTOPRAZOLE SODIUM 40 MG: 40 INJECTION, POWDER, FOR SOLUTION INTRAVENOUS at 01:03

## 2022-03-17 RX ADMIN — SODIUM CHLORIDE, POTASSIUM CHLORIDE, SODIUM LACTATE AND CALCIUM CHLORIDE: 600; 310; 30; 20 INJECTION, SOLUTION INTRAVENOUS at 08:03

## 2022-03-17 RX ADMIN — ERTAPENEM SODIUM 1 G: 1 INJECTION, POWDER, LYOPHILIZED, FOR SOLUTION INTRAMUSCULAR; INTRAVENOUS at 09:03

## 2022-03-17 NOTE — ANESTHESIA POSTPROCEDURE EVALUATION
Anesthesia Post Evaluation    Patient: Pham Licea    Procedure(s) Performed: Procedure(s) (LRB):  LAPAROTOMY, EXPLORATORY (N/A)    Final Anesthesia Type: general      Patient location during evaluation: PACU  Patient participation: Yes- Able to Participate  Level of consciousness: awake and awake and alert  Post-procedure vital signs: reviewed and stable  Pain management: adequate  Airway patency: patent    PONV status at discharge: No PONV  Anesthetic complications: no      Cardiovascular status: blood pressure returned to baseline  Respiratory status: unassisted and spontaneous ventilation  Hydration status: euvolemic  Follow-up not needed.          Vitals Value Taken Time   /79 03/17/22 1212   Temp 35.7 °C (96.2 °F) 03/17/22 1153   Pulse 70 03/17/22 1346   Resp 20 03/17/22 1346   SpO2 100 % 03/17/22 1346   Vitals shown include unvalidated device data.      No case tracking events are documented in the log.      Pain/Derick Score: Pain Rating Prior to Med Admin: 10 (3/17/2022  1:44 PM)  Pain Rating Post Med Admin: 7 (3/17/2022 11:30 AM)  Derick Score: 8 (3/17/2022 11:25 AM)

## 2022-03-17 NOTE — ASSESSMENT & PLAN NOTE
Admitted to inpatient  IVFs  General surgery consulted, appreciate their recommendations  NPO for bowel rest  Daily labs   IV/PO pain medications and antiemetics  Has not passed flatus or had BM  Secondary to adhesions which have been lysed and an internal hernia

## 2022-03-17 NOTE — PLAN OF CARE
Pt reporting some relief from pain meds. Not able to give any further Morphine at this time due to respirations 8-9.

## 2022-03-17 NOTE — ANESTHESIA PROCEDURE NOTES
Intubation    Date/Time: 3/17/2022 9:05 AM  Performed by: Eloise Quiroga CRNA  Authorized by: Johnathon Morrissey MD     Intubation:     Induction:  Rapid sequence induction    Intubated:  Postinduction    Mask Ventilation:  Easy mask    Attempts:  1    Attempted By:  CRNA    Method of Intubation:  Direct    Blade:  [place] 2    Laryngeal View Grade: Grade I - full view of cords      Difficult Airway Encountered?: No      Complications:  None    Airway Device:  Oral endotracheal tube    Airway Device Size:  7.0    Style/Cuff Inflation:  Cuffed (inflated to minimal occlusive pressure)    Tube secured:  21    Secured at:  The lips    Placement Verified By:  Capnometry    Complicating Factors:  None    Findings Post-Intubation:  BS equal bilateral and atraumatic/condition of teeth unchanged

## 2022-03-17 NOTE — PROGRESS NOTES
[facility] Surg  General Surgery  Daily Note    Patient Name: Pham Licea  MRN: [id number]  Admission Date: 3/15/2022  Attending Physician: Eloise Coombs MD   Consult Physician: Lila Marquez MD  Primary Care Provider: Deven Emerson MD    Subjective:     Principle Problem: SBO (small bowel obstruction)    Last 24 hour history:  Patient not feeling well this morning.  She is nauseated and diaphoretic.  CT scan performed yesterday evening reveals a persistent bowel obstruction with a transition point in the right lower quadrant.  The bowel is edematous and there is concern for ischemia.    Objective:     Vital Signs (Most Recent):  Temp: 96.3 °F (35.7 °C) (03/17/22 0656)  Pulse: 64 (03/17/22 0656)  Resp: 16 (03/17/22 0656)  BP: (!) 102/50 (03/17/22 0656)  SpO2: 96 % (03/17/22 0656) Vital Signs (24h Range):  Temp:  [96.3 °F (35.7 °C)-98.3 °F (36.8 °C)] 96.3 °F (35.7 °C)  Pulse:  [59-73] 64  Resp:  [16-19] 16  SpO2:  [95 %-98 %] 96 %  BP: ()/(49-56) 102/50     Intake/Output last 24 hours:    Intake/Output Summary (Last 24 hours) at 3/17/2022 0801  Last data filed at 3/17/2022 0407  Gross per 24 hour   Intake 2975.52 ml   Output --   Net 2975.52 ml       I/O last 3 completed shifts:  In: 4444.6 [P.O.:1000; I.V.:3444.6]  Out: -   No intake/output data recorded.    Weight: 77.1 kg (170 lb 1 oz)  Body mass index is 29.19 kg/m².    Gen: Wd Wn female sitting up in bed with cold washcloth on her chest, appears uncomfortable  Heent: Nc/At, MMM  Eyes: Perrl, Eomi  Cv: RRR, no  M/g/r  Lung: Non-labored breathing, clear bilaterally  Abd: Soft, mildly tender and distended diffusely  Skin: No rashes bruises or abrasions  Neuro: Afocal  Ext: No cyanosis clubbing or edema  Muscular: Good ROM, no tenderness    Significant Labs:  CBC:   Recent Labs   Lab 03/17/22  0634   WBC 7.20   RBC 3.89*   HGB 9.5*   HCT 32.1*      MCV 83   MCH 24.4*   MCHC 29.6*     BMP:   Recent Labs   Lab 03/17/22  0634   GLU  75      K 4.0      CO2 24   BUN 9   CREATININE 0.6   CALCIUM 8.6*   MG 1.7     CMP:   Recent Labs   Lab 03/17/22  0634   GLU 75   CALCIUM 8.6*   ALBUMIN 3.2*   PROT 5.7*      K 4.0   CO2 24      BUN 9   CREATININE 0.6   ALKPHOS 80   ALT 18   AST 25   BILITOT 0.3     LFTs:   Recent Labs   Lab 03/17/22  0634   ALT 18   AST 25   ALKPHOS 80   BILITOT 0.3   PROT 5.7*   ALBUMIN 3.2*     Coagulation: No results for input(s): LABPROT, INR, APTT in the last 168 hours.  Specimen (24h ago, onward)            None        No results for input(s): COLORU, CLARITYU, SPECGRAV, PHUR, PROTEINUA, GLUCOSEU, BILIRUBINCON, BLOODU, WBCU, RBCU, BACTERIA, MUCUS, NITRITE, LEUKOCYTESUR, UROBILINOGEN, HYALINECASTS in the last 168 hours.    Cultures:    Microbiology Results (last 7 days)     ** No results found for the last 168 hours. **          Significant Diagnostics:  I have reviewed and interpreted all pertinent imaging results/findings within the past 24 hours.  CT scan with persistent small-bowel obstruction at the site of a prior anastomosis.  Bowel wall edema and fluid concerning for ischemic bowel.    Assessment:   Pham Licea is a 66 y.o. female with small-bowel obstruction.    Active Diagnoses:    Diagnosis Date Noted POA    PRINCIPAL PROBLEM:  SBO (small bowel obstruction) [K56.609] 03/15/2022 Yes    Mitral valve prolapse [I34.1] 03/23/2021 Yes      Problems Resolved During this Admission:     VTE Risk Mitigation (From admission, onward)         Ordered     IP VTE LOW RISK PATIENT  Once         03/15/22 1107                Medical Decision Making/Plan:  Given the patient's current condition in the findings on the CT scan, we will take her urgently to the operating room for exploratory laparotomy.  Possible bowel resection, possible ostomy.  Risks, benefits, alternatives procedures gets with patient in detail.  All of her questions were answered.  She agrees to proceed.    Lila Marquez,  MD  General Surgery  [facility]

## 2022-03-17 NOTE — PLAN OF CARE
Attempted to call report to nurse that will be receiving pt back to room 115. Was informed that they will call back asap.

## 2022-03-17 NOTE — SUBJECTIVE & OBJECTIVE
Interval History:  Patient is status post laparotomy for small-bowel obstruction due to adhesions along with and internal hernia.  Adhesions were lysed.  Patient is in recovery room complaining of abdominal pain.    Review of Systems   Constitutional: Negative.    HENT: Negative.     Eyes: Negative.    Respiratory: Negative.     Cardiovascular: Negative.    Gastrointestinal:  Positive for abdominal pain.   Endocrine: Negative.    Genitourinary: Negative.    Musculoskeletal: Negative.    Allergic/Immunologic: Negative.    Neurological: Negative.    Hematological: Negative.    Psychiatric/Behavioral: Negative.     Objective:     Vital Signs (Most Recent):  Temp: 97.2 °F (36.2 °C) (03/17/22 1025)  Pulse: 71 (03/17/22 1125)  Resp: (!) 7 (03/17/22 1125)  BP: (!) 109/57 (03/17/22 1125)  SpO2: 96 % (03/17/22 1125)   Vital Signs (24h Range):  Temp:  [96.3 °F (35.7 °C)-98.5 °F (36.9 °C)] 97.2 °F (36.2 °C)  Pulse:  [59-80] 71  Resp:  [7-19] 7  SpO2:  [93 %-98 %] 96 %  BP: (100-127)/(50-64) 109/57     Weight: 77.1 kg (170 lb 1 oz)  Body mass index is 29.19 kg/m².    Intake/Output Summary (Last 24 hours) at 3/17/2022 1152  Last data filed at 3/17/2022 1140  Gross per 24 hour   Intake 4854.16 ml   Output 300 ml   Net 4554.16 ml      Physical Exam  Vitals reviewed.   Constitutional:       Appearance: Normal appearance.   HENT:      Head: Normocephalic and atraumatic.      Right Ear: External ear normal.      Left Ear: External ear normal.      Mouth/Throat:      Mouth: Mucous membranes are moist.   Eyes:      Extraocular Movements: Extraocular movements intact.   Cardiovascular:      Rate and Rhythm: Normal rate and regular rhythm.      Pulses: Normal pulses.      Heart sounds: Normal heart sounds.   Pulmonary:      Effort: Pulmonary effort is normal.      Breath sounds: Normal breath sounds.   Abdominal:      General: Abdomen is flat.      Palpations: Abdomen is soft.      Tenderness: There is abdominal tenderness.    Musculoskeletal:         General: Normal range of motion.      Cervical back: Normal range of motion and neck supple.   Skin:     General: Skin is warm and dry.      Capillary Refill: Capillary refill takes 2 to 3 seconds.   Neurological:      General: No focal deficit present.      Mental Status: She is alert and oriented to person, place, and time. Mental status is at baseline.   Psychiatric:         Mood and Affect: Mood normal.         Behavior: Behavior normal.         Thought Content: Thought content normal.         Judgment: Judgment normal.       Significant Labs: All pertinent labs within the past 24 hours have been reviewed.    Significant Imaging: I have reviewed all pertinent imaging results/findings within the past 24 hours.

## 2022-03-17 NOTE — PLAN OF CARE
Transported pt to room 115 via hospital bed, O2 at 2L NC. Pt in no distress. Bedside report given to WENDI Small. NG suction reconnected.

## 2022-03-17 NOTE — ANESTHESIA PREPROCEDURE EVALUATION
03/17/2022  Pham Licea is a 66 y.o., female.      Pre-op Assessment    I have reviewed the Patient Summary Reports.     I have reviewed the Nursing Notes.    I have reviewed the Medications.     Review of Systems  Anesthesia Hx:  No problems with previous Anesthesia  Neg history of prior surgery. Denies Family Hx of Anesthesia complications.   Denies Personal Hx of Anesthesia complications.   Social:  Smoker    Hematology/Oncology:  Hematology Normal   Oncology Normal     EENT/Dental:EENT/Dental Normal   Cardiovascular:   SAMPSON    Pulmonary:   COPD, mild Shortness of breath    Renal/:  Renal/ Normal     Hepatic/GI:   GERD Liver Disease,    Musculoskeletal:   Arthritis     Neurological:   Neuromuscular Disease,    Endocrine:  Endocrine Normal    Dermatological:  Skin Normal    Psych:  Psychiatric Normal           Physical Exam  General: Alert    Airway:  Mallampati: II   Mouth Opening: Normal  TM Distance: Normal  Neck ROM: Normal ROM    Dental:  Intact        Anesthesia Plan  Type of Anesthesia, risks & benefits discussed:    Anesthesia Type: Gen ETT  Post Op Pain Control Plan: multimodal analgesia  Airway Plan: Direct, Post-Induction  Informed Consent: Informed consent signed with the Patient and all parties understand the risks and agree with anesthesia plan.  All questions answered. Patient consented to blood products? No  ASA Score: 2  Day of Surgery Review of History & Physical: H&P Update referred to the surgeon/provider.    Ready For Surgery From Anesthesia Perspective.     .

## 2022-03-17 NOTE — PROGRESS NOTES
[facility] Medicine  Progress Note    Patient Name: Pham Licea  MRN: [id number]  Patient Class: IP- Inpatient   Admission Date: 3/15/2022  Length of Stay: 2 days  Attending Physician: Eloise Coombs MD  Primary Care Provider: Deven Emerson MD        Subjective:     Principal Problem:SBO (small bowel obstruction)        HPI:  Patient 66-year-old female with past medical history of anemia, hepatic steatosis, former smoker, who presents with several hours history of abdominal pain vomiting.  She previously was admitted for exploratory laparotomy with enterectomy for bowel obstruction. Continued to have abdominal pain and subsequently developed another bowel obstruction which resolved with supportive care. She has been on a daily bowel regimen and has continued to have bowel movements daily however last night she developed severe crampy abdominal pain and vomiting. Was not able to tolerate PO intake so came straight to the ER. In the ER, CT abd/pel showed evidence of developing SBO so hospital team called for admission.      Overview/Hospital Course:  No notes on file    Interval History:  Patient is status post laparotomy for small-bowel obstruction due to adhesions along with and internal hernia.  Adhesions were lysed.  Patient is in recovery room complaining of abdominal pain.    Review of Systems   Constitutional: Negative.    HENT: Negative.     Eyes: Negative.    Respiratory: Negative.     Cardiovascular: Negative.    Gastrointestinal:  Positive for abdominal pain.   Endocrine: Negative.    Genitourinary: Negative.    Musculoskeletal: Negative.    Allergic/Immunologic: Negative.    Neurological: Negative.    Hematological: Negative.    Psychiatric/Behavioral: Negative.     Objective:     Vital Signs (Most Recent):  Temp: 97.2 °F (36.2 °C) (03/17/22 1025)  Pulse: 71 (03/17/22 1125)  Resp: (!) 7 (03/17/22 1125)  BP: (!) 109/57 (03/17/22 1125)  SpO2: 96 % (03/17/22 1125)   Vital  Signs (24h Range):  Temp:  [96.3 °F (35.7 °C)-98.5 °F (36.9 °C)] 97.2 °F (36.2 °C)  Pulse:  [59-80] 71  Resp:  [7-19] 7  SpO2:  [93 %-98 %] 96 %  BP: (100-127)/(50-64) 109/57     Weight: 77.1 kg (170 lb 1 oz)  Body mass index is 29.19 kg/m².    Intake/Output Summary (Last 24 hours) at 3/17/2022 1152  Last data filed at 3/17/2022 1140  Gross per 24 hour   Intake 4854.16 ml   Output 300 ml   Net 4554.16 ml      Physical Exam  Vitals reviewed.   Constitutional:       Appearance: Normal appearance.   HENT:      Head: Normocephalic and atraumatic.      Right Ear: External ear normal.      Left Ear: External ear normal.      Mouth/Throat:      Mouth: Mucous membranes are moist.   Eyes:      Extraocular Movements: Extraocular movements intact.   Cardiovascular:      Rate and Rhythm: Normal rate and regular rhythm.      Pulses: Normal pulses.      Heart sounds: Normal heart sounds.   Pulmonary:      Effort: Pulmonary effort is normal.      Breath sounds: Normal breath sounds.   Abdominal:      General: Abdomen is flat.      Palpations: Abdomen is soft.      Tenderness: There is abdominal tenderness.   Musculoskeletal:         General: Normal range of motion.      Cervical back: Normal range of motion and neck supple.   Skin:     General: Skin is warm and dry.      Capillary Refill: Capillary refill takes 2 to 3 seconds.   Neurological:      General: No focal deficit present.      Mental Status: She is alert and oriented to person, place, and time. Mental status is at baseline.   Psychiatric:         Mood and Affect: Mood normal.         Behavior: Behavior normal.         Thought Content: Thought content normal.         Judgment: Judgment normal.       Significant Labs: All pertinent labs within the past 24 hours have been reviewed.    Significant Imaging: I have reviewed all pertinent imaging results/findings within the past 24 hours.      Assessment/Plan:      * SBO (small bowel obstruction)  Admitted to  inpatient  [facility]  General surgery consulted, appreciate their recommendations  NPO for bowel rest  Daily labs   IV/PO pain medications and antiemetics  Has not passed flatus or had BM  Secondary to adhesions which have been lysed and an internal hernia      Mitral valve prolapse  Monitor for fluid overload, shortness of breath, fatigue, weakness        VTE Risk Mitigation (From admission, onward)         Ordered     IP VTE LOW RISK PATIENT  Once         03/15/22 1107                Discharge Planning   GABBY:      Code Status: Full Code   Is the patient medically ready for discharge?:     Reason for patient still in hospital (select all that apply): Treatment  Discharge Plan A: Home                  Eduar Toledo MD  Department of [facility] Medicine   [facility]

## 2022-03-17 NOTE — OP NOTE
[facility]  General Surgery  Operative Note    SUMMARY     Date of Procedure: 3/17/2022     Procedure: Procedure(s) (LRB):  LAPAROTOMY, EXPLORATORY (N/A)       Surgeon(s) and Role:     * Lila Marquez MD - Primary     * Misael Holm MD - Assisting        Pre-Operative Diagnosis: SBO (small bowel obstruction) [K56.609]    Post-Operative Diagnosis: Post-Op Diagnosis Codes:     * SBO (small bowel obstruction) [K56.609]     * Obstruction concurrent with and due to internal hernia of abdomen [K46.0]    Anesthesia: General    Operative Findings (including complications, if any):  Internal hernia created by postoperative adhesions causing a partial small bowel obstruction    Estimated Blood Loss (EBL): 50 mL           Implants: * No implants in log *    Specimens:   Specimen (24h ago, onward)            None                  Condition: Good    Disposition: PACU - hemodynamically stable.    Indication for procedure:  The patient was admitted to the hospital a couple days ago with abdominal pain, nausea, and vomiting.  She has the history of multiple bowel obstructions and exploratory laparotomy and small-bowel resection for multiple small bowel stenosis these.  A CT scan performed on admission revealed a partial small bowel obstruction.  The patient was not progressing in a repeat scan was ordered which then showed a transition point in the right lower quadrant as well as small bowel edema and fluid within the abdomen concerning for bowel ischemia.  She was taken to the operating room urgently for exploratory laparotomy and possible bowel resection.    Procedure in detail:  The patient was brought to the operating room placed on table in supine position.  General anesthesia was achieved uneventfully.  Abdomen is prepped and draped in sterile fashion.  1 g Invanz IV was dosed for infection prophylaxis.  A time-out was performed and all were in agreement.    A midline incision was made around the left of  the umbilicus involving her prior incision site.  Incision was made with a 10 blade scalpel and it was carried down through the subcutaneous tissues and fascia carefully with Bovie cautery.  There were omental adhesions to the abdominal wall which were carefully taken down with Bovie cautery.  Once the adhesions were taken down we were able to reflect the transverse colon caudad, the small bowel was attempted to eviscerate.  There were more omental adhesions down towards the pelvis which had to be released.  It then became evident that this was the site of an internal hernia.  The omental adhesions to the abdominal wall as well as small bowel adhesions to the mesentery created a partial bowel obstruction.  The bowel proximal to the obstruction was edematous and dilated.  The bowel distal to the obstruction was decompressed.  Once all adhesions had been lysed the small bowel was run from the ligament of Treitz to the terminal ileum.  There were no strictures or compromised bowel noted.  The bowel appeared healthy and pink.  The small bowel was then returned intra-abdominally ensured that the mesentery lay straight.  Omentum was placed back into position.  The fascia was then closed with two #1 looped PDS sutures and running fashion.  The skin was closed with staples.  A sterile dressing was applied.    The patient tolerated the procedure well no immediate complications.  She was extubated taken to PACU in stable condition.  All instrument sponge counts were correct at the end the procedure.  She will return to her hospital burgos for ongoing care.

## 2022-03-18 ENCOUNTER — PATIENT MESSAGE (OUTPATIENT)
Dept: SURGERY | Facility: CLINIC | Age: 67
End: 2022-03-18
Payer: COMMERCIAL

## 2022-03-18 LAB
ALBUMIN SERPL BCP-MCNC: 3 G/DL (ref 3.5–5.2)
ALP SERPL-CCNC: 102 U/L (ref 55–135)
ALT SERPL W/O P-5'-P-CCNC: 43 U/L (ref 10–44)
ANION GAP SERPL CALC-SCNC: 9 MMOL/L (ref 8–16)
AST SERPL-CCNC: 38 U/L (ref 10–40)
BASOPHILS # BLD AUTO: 0.03 K/UL (ref 0–0.2)
BASOPHILS NFR BLD: 0.3 % (ref 0–1.9)
BILIRUB SERPL-MCNC: 0.3 MG/DL (ref 0.1–1)
BUN SERPL-MCNC: 8 MG/DL (ref 8–23)
CALCIUM SERPL-MCNC: 8.7 MG/DL (ref 8.7–10.5)
CHLORIDE SERPL-SCNC: 102 MMOL/L (ref 95–110)
CO2 SERPL-SCNC: 28 MMOL/L (ref 23–29)
CREAT SERPL-MCNC: 0.6 MG/DL (ref 0.5–1.4)
DIFFERENTIAL METHOD: ABNORMAL
EOSINOPHIL # BLD AUTO: 0 K/UL (ref 0–0.5)
EOSINOPHIL NFR BLD: 0.5 % (ref 0–8)
ERYTHROCYTE [DISTWIDTH] IN BLOOD BY AUTOMATED COUNT: 26.5 % (ref 11.5–14.5)
EST. GFR  (AFRICAN AMERICAN): >60 ML/MIN/1.73 M^2
EST. GFR  (NON AFRICAN AMERICAN): >60 ML/MIN/1.73 M^2
GLUCOSE SERPL-MCNC: 89 MG/DL (ref 70–110)
HCT VFR BLD AUTO: 30.6 % (ref 37–48.5)
HGB BLD-MCNC: 9.1 G/DL (ref 12–16)
IMM GRANULOCYTES # BLD AUTO: 0.02 K/UL (ref 0–0.04)
IMM GRANULOCYTES NFR BLD AUTO: 0.2 % (ref 0–0.5)
LYMPHOCYTES # BLD AUTO: 1.8 K/UL (ref 1–4.8)
LYMPHOCYTES NFR BLD: 20.8 % (ref 18–48)
MAGNESIUM SERPL-MCNC: 1.6 MG/DL (ref 1.6–2.6)
MCH RBC QN AUTO: 24.6 PG (ref 27–31)
MCHC RBC AUTO-ENTMCNC: 29.7 G/DL (ref 32–36)
MCV RBC AUTO: 83 FL (ref 82–98)
MONOCYTES # BLD AUTO: 0.9 K/UL (ref 0.3–1)
MONOCYTES NFR BLD: 10.6 % (ref 4–15)
NEUTROPHILS # BLD AUTO: 5.9 K/UL (ref 1.8–7.7)
NEUTROPHILS NFR BLD: 67.6 % (ref 38–73)
NRBC BLD-RTO: 0 /100 WBC
PHOSPHATE SERPL-MCNC: 3.1 MG/DL (ref 2.7–4.5)
PLATELET # BLD AUTO: 242 K/UL (ref 150–450)
PMV BLD AUTO: 9.9 FL (ref 9.2–12.9)
POTASSIUM SERPL-SCNC: 3.6 MMOL/L (ref 3.5–5.1)
PROT SERPL-MCNC: 5.4 G/DL (ref 6–8.4)
RBC # BLD AUTO: 3.7 M/UL (ref 4–5.4)
SODIUM SERPL-SCNC: 139 MMOL/L (ref 136–145)
WBC # BLD AUTO: 8.69 K/UL (ref 3.9–12.7)

## 2022-03-18 PROCEDURE — 99024 PR POST-OP FOLLOW-UP VISIT: ICD-10-PCS | Mod: ,,, | Performed by: STUDENT IN AN ORGANIZED HEALTH CARE EDUCATION/TRAINING PROGRAM

## 2022-03-18 PROCEDURE — 85025 COMPLETE CBC W/AUTO DIFF WBC: CPT | Performed by: STUDENT IN AN ORGANIZED HEALTH CARE EDUCATION/TRAINING PROGRAM

## 2022-03-18 PROCEDURE — 25000003 PHARM REV CODE 250: Performed by: FAMILY MEDICINE

## 2022-03-18 PROCEDURE — 99232 PR SUBSEQUENT HOSPITAL CARE,LEVL II: ICD-10-PCS | Mod: ,,, | Performed by: HOSPITALIST

## 2022-03-18 PROCEDURE — 63600175 PHARM REV CODE 636 W HCPCS: Performed by: STUDENT IN AN ORGANIZED HEALTH CARE EDUCATION/TRAINING PROGRAM

## 2022-03-18 PROCEDURE — 80053 COMPREHEN METABOLIC PANEL: CPT | Performed by: STUDENT IN AN ORGANIZED HEALTH CARE EDUCATION/TRAINING PROGRAM

## 2022-03-18 PROCEDURE — 94761 N-INVAS EAR/PLS OXIMETRY MLT: CPT

## 2022-03-18 PROCEDURE — 99232 SBSQ HOSP IP/OBS MODERATE 35: CPT | Mod: ,,, | Performed by: HOSPITALIST

## 2022-03-18 PROCEDURE — 36415 COLL VENOUS BLD VENIPUNCTURE: CPT | Performed by: STUDENT IN AN ORGANIZED HEALTH CARE EDUCATION/TRAINING PROGRAM

## 2022-03-18 PROCEDURE — 99024 POSTOP FOLLOW-UP VISIT: CPT | Mod: ,,, | Performed by: STUDENT IN AN ORGANIZED HEALTH CARE EDUCATION/TRAINING PROGRAM

## 2022-03-18 PROCEDURE — 11000001 HC ACUTE MED/SURG PRIVATE ROOM

## 2022-03-18 PROCEDURE — C9113 INJ PANTOPRAZOLE SODIUM, VIA: HCPCS | Performed by: STUDENT IN AN ORGANIZED HEALTH CARE EDUCATION/TRAINING PROGRAM

## 2022-03-18 PROCEDURE — 84100 ASSAY OF PHOSPHORUS: CPT | Performed by: STUDENT IN AN ORGANIZED HEALTH CARE EDUCATION/TRAINING PROGRAM

## 2022-03-18 PROCEDURE — 83735 ASSAY OF MAGNESIUM: CPT | Performed by: STUDENT IN AN ORGANIZED HEALTH CARE EDUCATION/TRAINING PROGRAM

## 2022-03-18 RX ORDER — HYDROMORPHONE HYDROCHLORIDE 2 MG/ML
1 INJECTION, SOLUTION INTRAMUSCULAR; INTRAVENOUS; SUBCUTANEOUS
Status: DISCONTINUED | OUTPATIENT
Start: 2022-03-18 | End: 2022-03-23 | Stop reason: HOSPADM

## 2022-03-18 RX ORDER — KETOROLAC TROMETHAMINE 30 MG/ML
30 INJECTION, SOLUTION INTRAMUSCULAR; INTRAVENOUS EVERY 6 HOURS
Status: COMPLETED | OUTPATIENT
Start: 2022-03-18 | End: 2022-03-21

## 2022-03-18 RX ADMIN — HYDROMORPHONE HYDROCHLORIDE 1 MG: 2 INJECTION, SOLUTION INTRAMUSCULAR; INTRAVENOUS; SUBCUTANEOUS at 07:03

## 2022-03-18 RX ADMIN — MORPHINE SULFATE 2 MG: 4 INJECTION, SOLUTION INTRAMUSCULAR; INTRAVENOUS at 10:03

## 2022-03-18 RX ADMIN — KETOROLAC TROMETHAMINE 30 MG: 30 INJECTION, SOLUTION INTRAMUSCULAR; INTRAVENOUS at 11:03

## 2022-03-18 RX ADMIN — SODIUM CHLORIDE, SODIUM LACTATE, POTASSIUM CHLORIDE, AND CALCIUM CHLORIDE: .6; .31; .03; .02 INJECTION, SOLUTION INTRAVENOUS at 08:03

## 2022-03-18 RX ADMIN — HYDROMORPHONE HYDROCHLORIDE 1 MG: 2 INJECTION, SOLUTION INTRAMUSCULAR; INTRAVENOUS; SUBCUTANEOUS at 12:03

## 2022-03-18 RX ADMIN — HYDROMORPHONE HYDROCHLORIDE 1 MG: 2 INJECTION, SOLUTION INTRAMUSCULAR; INTRAVENOUS; SUBCUTANEOUS at 03:03

## 2022-03-18 RX ADMIN — KETOROLAC TROMETHAMINE 30 MG: 30 INJECTION, SOLUTION INTRAMUSCULAR; INTRAVENOUS at 06:03

## 2022-03-18 RX ADMIN — HYDROMORPHONE HYDROCHLORIDE 1 MG: 2 INJECTION, SOLUTION INTRAMUSCULAR; INTRAVENOUS; SUBCUTANEOUS at 02:03

## 2022-03-18 RX ADMIN — MORPHINE SULFATE 2 MG: 4 INJECTION, SOLUTION INTRAMUSCULAR; INTRAVENOUS at 04:03

## 2022-03-18 RX ADMIN — PANTOPRAZOLE SODIUM 40 MG: 40 INJECTION, POWDER, FOR SOLUTION INTRAVENOUS at 08:03

## 2022-03-18 RX ADMIN — SODIUM CHLORIDE, SODIUM LACTATE, POTASSIUM CHLORIDE, AND CALCIUM CHLORIDE: .6; .31; .03; .02 INJECTION, SOLUTION INTRAVENOUS at 06:03

## 2022-03-18 RX ADMIN — MUPIROCIN: 20 OINTMENT TOPICAL at 08:03

## 2022-03-18 RX ADMIN — MUPIROCIN: 20 OINTMENT TOPICAL at 11:03

## 2022-03-18 NOTE — PLAN OF CARE
03/18/22 1020   Discharge Reassessment   Did the patient's condition or plan change since previous assessment? No   Discharge Plan discussed with: Patient   Communicated GABBY with patient/caregiver No   Discharge Plan A Home with family   DME Needed Upon Discharge  none   Discharge Barriers Identified None   Why the patient remains in the hospital Requires continued medical care   Post-Acute Status   Discharge Delays None known at this time   Patient's daughter at bedside. Patient had surgery yesterday. States having some pain. Denies any needs at this time. Will continue to follow.

## 2022-03-18 NOTE — PLAN OF CARE
Problem: Adult Inpatient Plan of Care  Goal: Plan of Care Review  Outcome: Ongoing, Progressing     Problem: Adult Inpatient Plan of Care  Goal: Patient-Specific Goal (Individualized)  Outcome: Ongoing, Progressing     Problem: Adult Inpatient Plan of Care  Goal: Absence of Hospital-Acquired Illness or Injury  Outcome: Ongoing, Progressing     Problem: Adult Inpatient Plan of Care  Goal: Optimal Comfort and Wellbeing  Outcome: Ongoing, Progressing     Problem: Adult Inpatient Plan of Care  Goal: Readiness for Transition of Care  Outcome: Ongoing, Progressing     Problem: Fluid Deficit (Intestinal Obstruction)  Goal: Fluid Balance  Outcome: Ongoing, Progressing     Problem: Infection (Intestinal Obstruction)  Goal: Absence of Infection Signs and Symptoms  Outcome: Ongoing, Progressing

## 2022-03-18 NOTE — PROGRESS NOTES
[facility]  Progress Note    Patient Name: Pham Licea  MRN: [id number]  Patient Class: IP- Inpatient   Admission Date: 3/15/2022  Length of Stay: 3 days  Attending Physician: Eloise Coombs MD  Primary Care Provider: Deven Emerson MD        Subjective:     Principal Problem:SBO (small bowel obstruction)        HPI:  Patient 66-year-old female with past medical history of anemia, hepatic steatosis, former smoker, who presents with several hours history of abdominal pain vomiting.  She previously was admitted for exploratory laparotomy with enterectomy for bowel obstruction. Continued to have abdominal pain and subsequently developed another bowel obstruction which resolved with supportive care. She has been on a daily bowel regimen and has continued to have bowel movements daily however last night she developed severe crampy abdominal pain and vomiting. Was not able to tolerate PO intake so came straight to the ER. In the ER, CT abd/pel showed evidence of developing SBO so hospital team called for admission.      Overview/Hospital Course:  No notes on file    Interval History:  She still has an NG tube being.  She has not passed any gas has not had a bowel movement.  She denies nausea, vomiting, fever or chills.    Review of Systems   Constitutional: Negative.    HENT: Negative.     Eyes: Negative.    Respiratory: Negative.     Cardiovascular: Negative.    Gastrointestinal:  Positive for abdominal pain. Negative for nausea and vomiting.   Endocrine: Negative.    Genitourinary: Negative.    Musculoskeletal: Negative.    Skin: Negative.    Allergic/Immunologic: Negative.    Neurological: Negative.    Hematological: Negative.    Psychiatric/Behavioral: Negative.     Objective:     Vital Signs (Most Recent):  Temp: 99.5 °F (37.5 °C) (03/18/22 1131)  Pulse: 80 (03/18/22 1131)  Resp: 18 (03/18/22 1221)  BP: 133/63 (03/18/22 1131)  SpO2: 96 % (03/18/22 1131) Vital Signs (24h  Range):  Temp:  [97.1 °F (36.2 °C)-99.5 °F (37.5 °C)] 99.5 °F (37.5 °C)  Pulse:  [67-80] 80  Resp:  [16-20] 18  SpO2:  [94 %-98 %] 96 %  BP: (101-133)/(54-63) 133/63     Weight: 77.1 kg (170 lb 1 oz)  Body mass index is 29.19 kg/m².    Intake/Output Summary (Last 24 hours) at 3/18/2022 1333  Last data filed at 3/18/2022 0519  Gross per 24 hour   Intake 2332.86 ml   Output 1350 ml   Net 982.86 ml      Physical Exam  Constitutional:       Appearance: Normal appearance.   HENT:      Head: Normocephalic and atraumatic.      Nose: Nose normal.      Mouth/Throat:      Mouth: Mucous membranes are moist.   Eyes:      Extraocular Movements: Extraocular movements intact.   Cardiovascular:      Rate and Rhythm: Normal rate and regular rhythm.      Pulses: Normal pulses.      Heart sounds: Normal heart sounds.   Pulmonary:      Effort: Pulmonary effort is normal.      Breath sounds: Normal breath sounds.   Abdominal:      General: Abdomen is flat.      Palpations: Abdomen is soft.      Tenderness: There is abdominal tenderness.      Comments: No bowel sounds heard   Musculoskeletal:         General: Normal range of motion.      Cervical back: Normal range of motion and neck supple.   Skin:     General: Skin is warm.      Capillary Refill: Capillary refill takes 2 to 3 seconds.   Neurological:      General: No focal deficit present.      Mental Status: She is alert and oriented to person, place, and time. Mental status is at baseline.   Psychiatric:         Mood and Affect: Mood normal.         Behavior: Behavior normal.         Thought Content: Thought content normal.       Significant Labs: All pertinent labs within the past 24 hours have been reviewed.    Significant Imaging: I have reviewed all pertinent imaging results/findings within the past 24 hours.      Assessment/Plan:      * SBO (small bowel obstruction)  Admitted to inpatient  [facility]  General surgery consulted, appreciate their recommendations  NPO for bowel  rest  Daily labs   IV/PO pain medications and antiemetics  Has not passed flatus or had BM  Secondary to adhesions which have been lysed and an internal hernia      Obstruction concurrent with and due to internal hernia of abdomen        Partial intestinal obstruction due to peritoneal adhesions        Mitral valve prolapse  Monitor for fluid overload, shortness of breath, fatigue, weakness        VTE Risk Mitigation (From admission, onward)         Ordered     IP VTE LOW RISK PATIENT  Once         03/15/22 1107                Discharge Planning   GABBY:      Code Status: Full Code   Is the patient medically ready for discharge?:     Reason for patient still in hospital (select all that apply): Treatment  Discharge Plan A: Home                  Eduar Toledo MD  Department of Hospital Medicine   [facility]

## 2022-03-18 NOTE — PLAN OF CARE
Problem: Adult Inpatient Plan of Care  Goal: Absence of Hospital-Acquired Illness or Injury  Outcome: Ongoing, Progressing  Goal: Optimal Comfort and Wellbeing  Outcome: Ongoing, Progressing     Problem: Fluid Deficit (Intestinal Obstruction)  Goal: Fluid Balance  Outcome: Ongoing, Progressing     Problem: Infection (Intestinal Obstruction)  Goal: Absence of Infection Signs and Symptoms  Outcome: Ongoing, Progressing     Problem: Nausea and Vomiting (Intestinal Obstruction)  Goal: Nausea and Vomiting Relief  Outcome: Ongoing, Progressing     Problem: Pain (Intestinal Obstruction)  Goal: Acceptable Pain Control  Outcome: Ongoing, Progressing     Problem: Fall Injury Risk  Goal: Absence of Fall and Fall-Related Injury  Outcome: Ongoing, Progressing

## 2022-03-18 NOTE — PROGRESS NOTES
"Surgery note    Postop day 1 status post exploratory laparotomy and lysis of adhesions for internal hernia causing bowel obstruction.  No acute events overnight.  No flatus or BM yet.  Minimal nasogastric tube output.  She is getting up to go to the bathroom.    Blood pressure 133/63, pulse 80, temperature 99.5 °F (37.5 °C), resp. rate 20, height 5' 4" (1.626 m), weight 77.1 kg (170 lb 1 oz), SpO2 96 %, not currently breastfeeding.    Abdomen soft, appropriately tender to palpation, incision with staples intact, no signs of infection    Lab Results   Component Value Date    WBC 8.69 03/18/2022    HGB 9.1 (L) 03/18/2022    HCT 30.6 (L) 03/18/2022    MCV 83 03/18/2022     03/18/2022       BMP  Lab Results   Component Value Date     03/18/2022    K 3.6 03/18/2022     03/18/2022    CO2 28 03/18/2022    BUN 8 03/18/2022    CREATININE 0.6 03/18/2022    CALCIUM 8.7 03/18/2022    ANIONGAP 9 03/18/2022    ESTGFRAFRICA >60.0 03/18/2022    EGFRNONAA >60.0 03/18/2022     Assessment/plan:  66-year-old female status post exploratory laparotomy for lysis of adhesions and reduction of internal hernia.  Continue nasogastric tube for decompression.  Up in out of bed as much as possible.  Continue DVT, GI, pulmonary prophylaxis.  Patient is asking about her iron infusion.  Okay for her to receive inpatient.  "

## 2022-03-18 NOTE — PLAN OF CARE
Problem: Adult Inpatient Plan of Care  Goal: Plan of Care Review  Outcome: Ongoing, Progressing  Goal: Patient-Specific Goal (Individualized)  Outcome: Ongoing, Progressing  Goal: Absence of Hospital-Acquired Illness or Injury  Outcome: Ongoing, Progressing  Goal: Optimal Comfort and Wellbeing  Outcome: Ongoing, Progressing  Goal: Readiness for Transition of Care  Outcome: Ongoing, Progressing     Problem: Fluid Deficit (Intestinal Obstruction)  Goal: Fluid Balance  Outcome: Ongoing, Progressing     Problem: Infection (Intestinal Obstruction)  Goal: Absence of Infection Signs and Symptoms  Outcome: Ongoing, Progressing     Problem: Nausea and Vomiting (Intestinal Obstruction)  Goal: Nausea and Vomiting Relief  Outcome: Ongoing, Progressing     Problem: Pain (Intestinal Obstruction)  Goal: Acceptable Pain Control  Outcome: Ongoing, Progressing     Problem: Infection  Goal: Absence of Infection Signs and Symptoms  Outcome: Ongoing, Progressing

## 2022-03-18 NOTE — NURSING
Dr. Marquez notified that the patient is complaining of pain and Morphine was given at 1006. See new orders.

## 2022-03-18 NOTE — SUBJECTIVE & OBJECTIVE
Interval History:  She still has an NG tube being.  She has not passed any gas has not had a bowel movement.  She denies nausea, vomiting, fever or chills.    Review of Systems   Constitutional: Negative.    HENT: Negative.     Eyes: Negative.    Respiratory: Negative.     Cardiovascular: Negative.    Gastrointestinal:  Positive for abdominal pain. Negative for nausea and vomiting.   Endocrine: Negative.    Genitourinary: Negative.    Musculoskeletal: Negative.    Skin: Negative.    Allergic/Immunologic: Negative.    Neurological: Negative.    Hematological: Negative.    Psychiatric/Behavioral: Negative.     Objective:     Vital Signs (Most Recent):  Temp: 99.5 °F (37.5 °C) (03/18/22 1131)  Pulse: 80 (03/18/22 1131)  Resp: 18 (03/18/22 1221)  BP: 133/63 (03/18/22 1131)  SpO2: 96 % (03/18/22 1131) Vital Signs (24h Range):  Temp:  [97.1 °F (36.2 °C)-99.5 °F (37.5 °C)] 99.5 °F (37.5 °C)  Pulse:  [67-80] 80  Resp:  [16-20] 18  SpO2:  [94 %-98 %] 96 %  BP: (101-133)/(54-63) 133/63     Weight: 77.1 kg (170 lb 1 oz)  Body mass index is 29.19 kg/m².    Intake/Output Summary (Last 24 hours) at 3/18/2022 1333  Last data filed at 3/18/2022 0519  Gross per 24 hour   Intake 2332.86 ml   Output 1350 ml   Net 982.86 ml      Physical Exam  Constitutional:       Appearance: Normal appearance.   HENT:      Head: Normocephalic and atraumatic.      Nose: Nose normal.      Mouth/Throat:      Mouth: Mucous membranes are moist.   Eyes:      Extraocular Movements: Extraocular movements intact.   Cardiovascular:      Rate and Rhythm: Normal rate and regular rhythm.      Pulses: Normal pulses.      Heart sounds: Normal heart sounds.   Pulmonary:      Effort: Pulmonary effort is normal.      Breath sounds: Normal breath sounds.   Abdominal:      General: Abdomen is flat.      Palpations: Abdomen is soft.      Tenderness: There is abdominal tenderness.      Comments: No bowel sounds heard   Musculoskeletal:         General: Normal range of  motion.      Cervical back: Normal range of motion and neck supple.   Skin:     General: Skin is warm.      Capillary Refill: Capillary refill takes 2 to 3 seconds.   Neurological:      General: No focal deficit present.      Mental Status: She is alert and oriented to person, place, and time. Mental status is at baseline.   Psychiatric:         Mood and Affect: Mood normal.         Behavior: Behavior normal.         Thought Content: Thought content normal.       Significant Labs: All pertinent labs within the past 24 hours have been reviewed.    Significant Imaging: I have reviewed all pertinent imaging results/findings within the past 24 hours.

## 2022-03-19 LAB
ALBUMIN SERPL BCP-MCNC: 2.9 G/DL (ref 3.5–5.2)
ALP SERPL-CCNC: 79 U/L (ref 55–135)
ALT SERPL W/O P-5'-P-CCNC: 31 U/L (ref 10–44)
ANION GAP SERPL CALC-SCNC: 14 MMOL/L (ref 8–16)
AST SERPL-CCNC: 25 U/L (ref 10–40)
BASOPHILS # BLD AUTO: 0.06 K/UL (ref 0–0.2)
BASOPHILS NFR BLD: 0.9 % (ref 0–1.9)
BILIRUB SERPL-MCNC: 0.2 MG/DL (ref 0.1–1)
BUN SERPL-MCNC: 8 MG/DL (ref 8–23)
CALCIUM SERPL-MCNC: 8.5 MG/DL (ref 8.7–10.5)
CHLORIDE SERPL-SCNC: 103 MMOL/L (ref 95–110)
CO2 SERPL-SCNC: 23 MMOL/L (ref 23–29)
CREAT SERPL-MCNC: 0.6 MG/DL (ref 0.5–1.4)
DIFFERENTIAL METHOD: ABNORMAL
EOSINOPHIL # BLD AUTO: 0.2 K/UL (ref 0–0.5)
EOSINOPHIL NFR BLD: 2.5 % (ref 0–8)
ERYTHROCYTE [DISTWIDTH] IN BLOOD BY AUTOMATED COUNT: 26.7 % (ref 11.5–14.5)
EST. GFR  (AFRICAN AMERICAN): >60 ML/MIN/1.73 M^2
EST. GFR  (NON AFRICAN AMERICAN): >60 ML/MIN/1.73 M^2
GLUCOSE SERPL-MCNC: 68 MG/DL (ref 70–110)
HCT VFR BLD AUTO: 29.4 % (ref 37–48.5)
HGB BLD-MCNC: 8.8 G/DL (ref 12–16)
IMM GRANULOCYTES # BLD AUTO: 0.02 K/UL (ref 0–0.04)
IMM GRANULOCYTES NFR BLD AUTO: 0.3 % (ref 0–0.5)
LYMPHOCYTES # BLD AUTO: 1.6 K/UL (ref 1–4.8)
LYMPHOCYTES NFR BLD: 24.1 % (ref 18–48)
MCH RBC QN AUTO: 24.5 PG (ref 27–31)
MCHC RBC AUTO-ENTMCNC: 29.9 G/DL (ref 32–36)
MCV RBC AUTO: 82 FL (ref 82–98)
MONOCYTES # BLD AUTO: 0.6 K/UL (ref 0.3–1)
MONOCYTES NFR BLD: 8.9 % (ref 4–15)
NEUTROPHILS # BLD AUTO: 4.3 K/UL (ref 1.8–7.7)
NEUTROPHILS NFR BLD: 63.3 % (ref 38–73)
NRBC BLD-RTO: 0 /100 WBC
PLATELET # BLD AUTO: 233 K/UL (ref 150–450)
PMV BLD AUTO: 9.5 FL (ref 9.2–12.9)
POTASSIUM SERPL-SCNC: 3.7 MMOL/L (ref 3.5–5.1)
PROT SERPL-MCNC: 5.5 G/DL (ref 6–8.4)
RBC # BLD AUTO: 3.59 M/UL (ref 4–5.4)
SODIUM SERPL-SCNC: 140 MMOL/L (ref 136–145)
WBC # BLD AUTO: 6.75 K/UL (ref 3.9–12.7)

## 2022-03-19 PROCEDURE — 80053 COMPREHEN METABOLIC PANEL: CPT | Performed by: HOSPITALIST

## 2022-03-19 PROCEDURE — 99024 PR POST-OP FOLLOW-UP VISIT: ICD-10-PCS | Mod: ,,, | Performed by: SURGERY

## 2022-03-19 PROCEDURE — 99232 SBSQ HOSP IP/OBS MODERATE 35: CPT | Mod: ,,, | Performed by: HOSPITALIST

## 2022-03-19 PROCEDURE — 94761 N-INVAS EAR/PLS OXIMETRY MLT: CPT

## 2022-03-19 PROCEDURE — 63600175 PHARM REV CODE 636 W HCPCS: Performed by: STUDENT IN AN ORGANIZED HEALTH CARE EDUCATION/TRAINING PROGRAM

## 2022-03-19 PROCEDURE — C9113 INJ PANTOPRAZOLE SODIUM, VIA: HCPCS | Performed by: STUDENT IN AN ORGANIZED HEALTH CARE EDUCATION/TRAINING PROGRAM

## 2022-03-19 PROCEDURE — 11000001 HC ACUTE MED/SURG PRIVATE ROOM

## 2022-03-19 PROCEDURE — 85025 COMPLETE CBC W/AUTO DIFF WBC: CPT | Performed by: HOSPITALIST

## 2022-03-19 PROCEDURE — 99024 POSTOP FOLLOW-UP VISIT: CPT | Mod: ,,, | Performed by: SURGERY

## 2022-03-19 PROCEDURE — 36415 COLL VENOUS BLD VENIPUNCTURE: CPT | Performed by: HOSPITALIST

## 2022-03-19 PROCEDURE — 99232 PR SUBSEQUENT HOSPITAL CARE,LEVL II: ICD-10-PCS | Mod: ,,, | Performed by: HOSPITALIST

## 2022-03-19 RX ADMIN — PANTOPRAZOLE SODIUM 40 MG: 40 INJECTION, POWDER, FOR SOLUTION INTRAVENOUS at 08:03

## 2022-03-19 RX ADMIN — HYDROMORPHONE HYDROCHLORIDE 1 MG: 2 INJECTION, SOLUTION INTRAMUSCULAR; INTRAVENOUS; SUBCUTANEOUS at 01:03

## 2022-03-19 RX ADMIN — KETOROLAC TROMETHAMINE 30 MG: 30 INJECTION, SOLUTION INTRAMUSCULAR; INTRAVENOUS at 05:03

## 2022-03-19 RX ADMIN — HYDROMORPHONE HYDROCHLORIDE 1 MG: 2 INJECTION, SOLUTION INTRAMUSCULAR; INTRAVENOUS; SUBCUTANEOUS at 07:03

## 2022-03-19 RX ADMIN — SODIUM CHLORIDE, SODIUM LACTATE, POTASSIUM CHLORIDE, AND CALCIUM CHLORIDE: .6; .31; .03; .02 INJECTION, SOLUTION INTRAVENOUS at 11:03

## 2022-03-19 RX ADMIN — MUPIROCIN: 20 OINTMENT TOPICAL at 09:03

## 2022-03-19 RX ADMIN — HYDROMORPHONE HYDROCHLORIDE 1 MG: 2 INJECTION, SOLUTION INTRAMUSCULAR; INTRAVENOUS; SUBCUTANEOUS at 08:03

## 2022-03-19 RX ADMIN — ONDANSETRON 4 MG: 2 INJECTION INTRAMUSCULAR; INTRAVENOUS at 04:03

## 2022-03-19 RX ADMIN — ONDANSETRON 4 MG: 2 INJECTION INTRAMUSCULAR; INTRAVENOUS at 03:03

## 2022-03-19 RX ADMIN — SODIUM CHLORIDE, SODIUM LACTATE, POTASSIUM CHLORIDE, AND CALCIUM CHLORIDE: .6; .31; .03; .02 INJECTION, SOLUTION INTRAVENOUS at 03:03

## 2022-03-19 RX ADMIN — HYDROMORPHONE HYDROCHLORIDE 1 MG: 2 INJECTION, SOLUTION INTRAMUSCULAR; INTRAVENOUS; SUBCUTANEOUS at 12:03

## 2022-03-19 RX ADMIN — MUPIROCIN: 20 OINTMENT TOPICAL at 08:03

## 2022-03-19 RX ADMIN — KETOROLAC TROMETHAMINE 30 MG: 30 INJECTION, SOLUTION INTRAMUSCULAR; INTRAVENOUS at 11:03

## 2022-03-19 NOTE — PLAN OF CARE
Problem: Adult Inpatient Plan of Care  Goal: Plan of Care Review  Outcome: Ongoing, Progressing     Problem: Adult Inpatient Plan of Care  Goal: Patient-Specific Goal (Individualized)  Outcome: Ongoing, Progressing     Problem: Adult Inpatient Plan of Care  Goal: Absence of Hospital-Acquired Illness or Injury  Outcome: Ongoing, Progressing     Problem: Adult Inpatient Plan of Care  Goal: Optimal Comfort and Wellbeing  Outcome: Ongoing, Progressing     Problem: Adult Inpatient Plan of Care  Goal: Readiness for Transition of Care  Outcome: Ongoing, Progressing     Problem: Fluid Deficit (Intestinal Obstruction)  Goal: Fluid Balance  Outcome: Ongoing, Progressing

## 2022-03-19 NOTE — PLAN OF CARE
03/19/22 0940   Discharge Reassessment   Assessment Type Discharge Planning Reassessment   Did the patient's condition or plan change since previous assessment? No   Discharge Plan discussed with: Patient   Communicated GABBY with patient/caregiver Date not available/Unable to determine   Discharge Plan A Home   DME Needed Upon Discharge  none   Discharge Barriers Identified None   Why the patient remains in the hospital Requires continued medical care   Post-Acute Status   Discharge Delays None known at this time   Patient just returned back to bed. States yesterday was rough but today is better. She was independent prior to having surgery. Her daughter Magali from [place] will be staying with her once she goes home. Denies any needs at this time. Will continue to follow.

## 2022-03-19 NOTE — PROGRESS NOTES
"Surgery note    Postop day 2 status post exploratory laparotomy and lysis of adhesions for internal hernia causing bowel obstruction.  No issues.  Afebrile.  Vital signs stable.  Pain well controlled.  No flatus or bowel movement yet.  No nausea vomiting.  500 cc documented NG tube output.  No other new complaints or issues today.    Blood pressure (!) 145/65, pulse 72, temperature 99 °F (37.2 °C), temperature source Oral, resp. rate 18, height 5' 4" (1.626 m), weight 77.1 kg (170 lb 1 oz), SpO2 95 %, not currently breastfeeding.    Abdomen soft, appropriately tender to palpation, incision with staples intact, no signs of infection    Lab Results   Component Value Date    WBC 6.75 03/19/2022    HGB 8.8 (L) 03/19/2022    HCT 29.4 (L) 03/19/2022    MCV 82 03/19/2022     03/19/2022       BMP  Lab Results   Component Value Date     03/19/2022    K 3.7 03/19/2022     03/19/2022    CO2 23 03/19/2022    BUN 8 03/19/2022    CREATININE 0.6 03/19/2022    CALCIUM 8.5 (L) 03/19/2022    ANIONGAP 14 03/19/2022    ESTGFRAFRICA >60.0 03/19/2022    EGFRNONAA >60.0 03/19/2022     Assessment/plan:  66-year-old female status post exploratory laparotomy for lysis of adhesions and reduction of internal hernia.  Continue nasogastric tube for decompression.  Up in out of bed as much as possible.  Continue DVT, GI, pulmonary prophylaxis.  Further management patient to depend upon clinical course.  "

## 2022-03-19 NOTE — SUBJECTIVE & OBJECTIVE
Interval History:  She states that she feels like she is going to pass gas.  Denies having a bowel movement.  Also denies nausea, vomiting, fever or chills.    Review of Systems   Constitutional: Negative.    HENT: Negative.     Eyes: Negative.    Respiratory: Negative.     Cardiovascular: Negative.    Gastrointestinal:  Positive for abdominal pain. Negative for nausea and vomiting.   Endocrine: Negative.    Genitourinary: Negative.    Musculoskeletal: Negative.    Skin:  Positive for wound.   Allergic/Immunologic: Negative.    Neurological: Negative.    Hematological: Negative.    Psychiatric/Behavioral: Negative.     Objective:     Vital Signs (Most Recent):  Temp: 99 °F (37.2 °C) (03/19/22 0736)  Pulse: 72 (03/19/22 0839)  Resp: 18 (03/19/22 0839)  BP: (!) 145/65 (03/19/22 0736)  SpO2: 95 % (03/19/22 0839)   Vital Signs (24h Range):  Temp:  [97.9 °F (36.6 °C)-99.5 °F (37.5 °C)] 99 °F (37.2 °C)  Pulse:  [61-80] 72  Resp:  [16-20] 18  SpO2:  [93 %-97 %] 95 %  BP: (110-145)/(53-65) 145/65     Weight: 77.1 kg (170 lb 1 oz)  Body mass index is 29.19 kg/m².    Intake/Output Summary (Last 24 hours) at 3/19/2022 1014  Last data filed at 3/19/2022 0600  Gross per 24 hour   Intake --   Output 500 ml   Net -500 ml      Physical Exam  Constitutional:       Appearance: Normal appearance.   HENT:      Head: Normocephalic and atraumatic.      Nose: Nose normal.      Mouth/Throat:      Mouth: Mucous membranes are moist.   Eyes:      Extraocular Movements: Extraocular movements intact.   Cardiovascular:      Rate and Rhythm: Normal rate and regular rhythm.      Pulses: Normal pulses.      Heart sounds: Normal heart sounds.   Pulmonary:      Effort: Pulmonary effort is normal.      Breath sounds: Normal breath sounds.   Abdominal:      General: Abdomen is flat. There is no distension (Bowel sounds are hypoactive).      Palpations: Abdomen is soft.   Musculoskeletal:         General: Normal range of motion.      Cervical back:  Normal range of motion.   Skin:     General: Skin is warm.      Capillary Refill: Capillary refill takes 2 to 3 seconds.   Neurological:      General: No focal deficit present.      Mental Status: She is alert and oriented to person, place, and time. Mental status is at baseline.   Psychiatric:         Mood and Affect: Mood normal.         Behavior: Behavior normal.         Thought Content: Thought content normal.       Significant Labs: All pertinent labs within the past 24 hours have been reviewed.    Significant Imaging: I have reviewed all pertinent imaging results/findings within the past 24 hours.

## 2022-03-19 NOTE — ASSESSMENT & PLAN NOTE
Admitted to inpatient  [facility]  General surgery consulted, appreciate their recommendations  NPO for bowel rest  Daily labs   IV/PO pain medications and antiemetics  Has not passed flatus or had BM  Secondary to adhesions which have been lysed and an internal hernia  Possible DC NG tube today if okay with surgery  Move to clear liquid diet

## 2022-03-19 NOTE — PROGRESS NOTES
[facility] Medicine  Progress Note    Patient Name: Pham Licea  MRN: [id number]  Patient Class: IP- Inpatient   Admission Date: 3/15/2022  Length of Stay: 4 days  Attending Physician: Eloise Coombs MD  Primary Care Provider: Deven Emerson MD        Subjective:     Principal Problem:SBO (small bowel obstruction)        HPI:  Patient 66-year-old female with past medical history of anemia, hepatic steatosis, former smoker, who presents with several hours history of abdominal pain vomiting.  She previously was admitted for exploratory laparotomy with enterectomy for bowel obstruction. Continued to have abdominal pain and subsequently developed another bowel obstruction which resolved with supportive care. She has been on a daily bowel regimen and has continued to have bowel movements daily however last night she developed severe crampy abdominal pain and vomiting. Was not able to tolerate PO intake so came straight to the ER. In the ER, CT abd/pel showed evidence of developing SBO so hospital team called for admission.      Overview/Hospital Course:  No notes on file    Interval History:  She states that she feels like she is going to pass gas.  Denies having a bowel movement.  Also denies nausea, vomiting, fever or chills.    Review of Systems   Constitutional: Negative.    HENT: Negative.     Eyes: Negative.    Respiratory: Negative.     Cardiovascular: Negative.    Gastrointestinal:  Positive for abdominal pain. Negative for nausea and vomiting.   Endocrine: Negative.    Genitourinary: Negative.    Musculoskeletal: Negative.    Skin:  Positive for wound.   Allergic/Immunologic: Negative.    Neurological: Negative.    Hematological: Negative.    Psychiatric/Behavioral: Negative.     Objective:     Vital Signs (Most Recent):  Temp: 99 °F (37.2 °C) (03/19/22 0736)  Pulse: 72 (03/19/22 0839)  Resp: 18 (03/19/22 0839)  BP: (!) 145/65 (03/19/22 0736)  SpO2: 95 % (03/19/22 0839)   Vital  Signs (24h Range):  Temp:  [97.9 °F (36.6 °C)-99.5 °F (37.5 °C)] 99 °F (37.2 °C)  Pulse:  [61-80] 72  Resp:  [16-20] 18  SpO2:  [93 %-97 %] 95 %  BP: (110-145)/(53-65) 145/65     Weight: 77.1 kg (170 lb 1 oz)  Body mass index is 29.19 kg/m².    Intake/Output Summary (Last 24 hours) at 3/19/2022 1014  Last data filed at 3/19/2022 0600  Gross per 24 hour   Intake --   Output 500 ml   Net -500 ml      Physical Exam  Constitutional:       Appearance: Normal appearance.   HENT:      Head: Normocephalic and atraumatic.      Nose: Nose normal.      Mouth/Throat:      Mouth: Mucous membranes are moist.   Eyes:      Extraocular Movements: Extraocular movements intact.   Cardiovascular:      Rate and Rhythm: Normal rate and regular rhythm.      Pulses: Normal pulses.      Heart sounds: Normal heart sounds.   Pulmonary:      Effort: Pulmonary effort is normal.      Breath sounds: Normal breath sounds.   Abdominal:      General: Abdomen is flat. There is no distension (Bowel sounds are hypoactive).      Palpations: Abdomen is soft.   Musculoskeletal:         General: Normal range of motion.      Cervical back: Normal range of motion.   Skin:     General: Skin is warm.      Capillary Refill: Capillary refill takes 2 to 3 seconds.   Neurological:      General: No focal deficit present.      Mental Status: She is alert and oriented to person, place, and time. Mental status is at baseline.   Psychiatric:         Mood and Affect: Mood normal.         Behavior: Behavior normal.         Thought Content: Thought content normal.       Significant Labs: All pertinent labs within the past 24 hours have been reviewed.    Significant Imaging: I have reviewed all pertinent imaging results/findings within the past 24 hours.      Assessment/Plan:      * SBO (small bowel obstruction)  Admitted to inpatient  [facility]  General surgery consulted, appreciate their recommendations  NPO for bowel rest  Daily labs   IV/PO pain medications and  antiemetics  Has not passed flatus or had BM  Secondary to adhesions which have been lysed and an internal hernia  Possible DC NG tube today if okay with surgery  Move to clear liquid diet      Obstruction concurrent with and due to internal hernia of abdomen        Partial intestinal obstruction due to peritoneal adhesions        Mitral valve prolapse  Monitor for fluid overload, shortness of breath, fatigue, weakness        VTE Risk Mitigation (From admission, onward)         Ordered     IP VTE LOW RISK PATIENT  Once         03/15/22 1107                Discharge Planning   GABBY:      Code Status: Full Code   Is the patient medically ready for discharge?:     Reason for patient still in hospital (select all that apply): Treatment  Discharge Plan A: Home with family   Discharge Delays: None known at this time              Eduar Toledo MD  Department of [facility] Medicine   [facility]

## 2022-03-20 PROCEDURE — C9113 INJ PANTOPRAZOLE SODIUM, VIA: HCPCS | Performed by: STUDENT IN AN ORGANIZED HEALTH CARE EDUCATION/TRAINING PROGRAM

## 2022-03-20 PROCEDURE — 11000001 HC ACUTE MED/SURG PRIVATE ROOM

## 2022-03-20 PROCEDURE — 99232 SBSQ HOSP IP/OBS MODERATE 35: CPT | Mod: ,,, | Performed by: HOSPITALIST

## 2022-03-20 PROCEDURE — 99232 PR SUBSEQUENT HOSPITAL CARE,LEVL II: ICD-10-PCS | Mod: ,,, | Performed by: HOSPITALIST

## 2022-03-20 PROCEDURE — 99024 POSTOP FOLLOW-UP VISIT: CPT | Mod: ,,, | Performed by: SURGERY

## 2022-03-20 PROCEDURE — 63600175 PHARM REV CODE 636 W HCPCS: Performed by: SURGERY

## 2022-03-20 PROCEDURE — 94761 N-INVAS EAR/PLS OXIMETRY MLT: CPT

## 2022-03-20 PROCEDURE — 63600175 PHARM REV CODE 636 W HCPCS: Performed by: STUDENT IN AN ORGANIZED HEALTH CARE EDUCATION/TRAINING PROGRAM

## 2022-03-20 PROCEDURE — 25000003 PHARM REV CODE 250: Performed by: SURGERY

## 2022-03-20 PROCEDURE — 99024 PR POST-OP FOLLOW-UP VISIT: ICD-10-PCS | Mod: ,,, | Performed by: SURGERY

## 2022-03-20 RX ADMIN — IRON SUCROSE 200 MG: 20 INJECTION, SOLUTION INTRAVENOUS at 11:03

## 2022-03-20 RX ADMIN — MUPIROCIN: 20 OINTMENT TOPICAL at 08:03

## 2022-03-20 RX ADMIN — KETOROLAC TROMETHAMINE 30 MG: 30 INJECTION, SOLUTION INTRAMUSCULAR; INTRAVENOUS at 12:03

## 2022-03-20 RX ADMIN — SODIUM CHLORIDE, SODIUM LACTATE, POTASSIUM CHLORIDE, AND CALCIUM CHLORIDE: .6; .31; .03; .02 INJECTION, SOLUTION INTRAVENOUS at 12:03

## 2022-03-20 RX ADMIN — ONDANSETRON 4 MG: 2 INJECTION INTRAMUSCULAR; INTRAVENOUS at 03:03

## 2022-03-20 RX ADMIN — HYDROMORPHONE HYDROCHLORIDE 1 MG: 2 INJECTION, SOLUTION INTRAMUSCULAR; INTRAVENOUS; SUBCUTANEOUS at 03:03

## 2022-03-20 RX ADMIN — KETOROLAC TROMETHAMINE 30 MG: 30 INJECTION, SOLUTION INTRAMUSCULAR; INTRAVENOUS at 06:03

## 2022-03-20 RX ADMIN — PANTOPRAZOLE SODIUM 40 MG: 40 INJECTION, POWDER, FOR SOLUTION INTRAVENOUS at 09:03

## 2022-03-20 RX ADMIN — KETOROLAC TROMETHAMINE 30 MG: 30 INJECTION, SOLUTION INTRAMUSCULAR; INTRAVENOUS at 11:03

## 2022-03-20 RX ADMIN — SODIUM CHLORIDE, SODIUM LACTATE, POTASSIUM CHLORIDE, AND CALCIUM CHLORIDE: .6; .31; .03; .02 INJECTION, SOLUTION INTRAVENOUS at 07:03

## 2022-03-20 RX ADMIN — HYDROMORPHONE HYDROCHLORIDE 1 MG: 2 INJECTION, SOLUTION INTRAMUSCULAR; INTRAVENOUS; SUBCUTANEOUS at 07:03

## 2022-03-20 RX ADMIN — HYDROMORPHONE HYDROCHLORIDE 1 MG: 2 INJECTION, SOLUTION INTRAMUSCULAR; INTRAVENOUS; SUBCUTANEOUS at 02:03

## 2022-03-20 NOTE — PLAN OF CARE
Problem: Adult Inpatient Plan of Care  Goal: Plan of Care Review  Outcome: Ongoing, Progressing  Goal: Patient-Specific Goal (Individualized)  Outcome: Ongoing, Progressing  Goal: Absence of Hospital-Acquired Illness or Injury  Outcome: Ongoing, Progressing  Goal: Optimal Comfort and Wellbeing  Outcome: Ongoing, Progressing  Goal: Readiness for Transition of Care  Outcome: Ongoing, Progressing     Problem: Fluid Deficit (Intestinal Obstruction)  Goal: Fluid Balance  Outcome: Ongoing, Progressing     Problem: Infection (Intestinal Obstruction)  Goal: Absence of Infection Signs and Symptoms  Outcome: Ongoing, Progressing     Problem: Nausea and Vomiting (Intestinal Obstruction)  Goal: Nausea and Vomiting Relief  Outcome: Ongoing, Progressing     Problem: Pain (Intestinal Obstruction)  Goal: Acceptable Pain Control  Outcome: Ongoing, Progressing     Problem: Infection  Goal: Absence of Infection Signs and Symptoms  Outcome: Ongoing, Progressing     Problem: Fall Injury Risk  Goal: Absence of Fall and Fall-Related Injury  Outcome: Ongoing, Progressing

## 2022-03-20 NOTE — SUBJECTIVE & OBJECTIVE
Interval History:  She still has a NG tube.  She is passing gas.  No bowel movement .  Her abdominal pain is 5/10 on pain intensity scale.    Review of Systems   Constitutional: Negative.    HENT: Negative.     Eyes: Negative.    Respiratory: Negative.     Cardiovascular: Negative.    Gastrointestinal:  Positive for abdominal pain. Negative for nausea and vomiting.   Endocrine: Negative.    Genitourinary: Negative.    Musculoskeletal: Negative.    Skin: Negative.    Allergic/Immunologic: Negative.    Neurological: Negative.    Hematological: Negative.    Psychiatric/Behavioral: Negative.     Objective:     Vital Signs (Most Recent):  Temp: 97.5 °F (36.4 °C) (03/20/22 0717)  Pulse: 72 (03/20/22 0844)  Resp: 18 (03/20/22 0844)  BP: (!) 121/58 (03/20/22 0717)  SpO2: 95 % (03/20/22 0844)   Vital Signs (24h Range):  Temp:  [96.6 °F (35.9 °C)-98.8 °F (37.1 °C)] 97.5 °F (36.4 °C)  Pulse:  [53-74] 72  Resp:  [16-18] 18  SpO2:  [91 %-95 %] 95 %  BP: (106-133)/(53-60) 121/58     Weight: 77.1 kg (170 lb 1 oz)  Body mass index is 29.19 kg/m².    Intake/Output Summary (Last 24 hours) at 3/20/2022 0950  Last data filed at 3/20/2022 0639  Gross per 24 hour   Intake 4883.3 ml   Output 300 ml   Net 4583.3 ml      Physical Exam  Constitutional:       Appearance: Normal appearance.   HENT:      Head: Normocephalic and atraumatic.      Right Ear: External ear normal.      Left Ear: External ear normal.      Nose: Nose normal.   Eyes:      Extraocular Movements: Extraocular movements intact.   Cardiovascular:      Rate and Rhythm: Normal rate and regular rhythm.      Pulses: Normal pulses.      Heart sounds: Normal heart sounds.   Pulmonary:      Effort: Pulmonary effort is normal.      Breath sounds: Normal breath sounds.   Abdominal:      General: Abdomen is flat. Bowel sounds are normal.      Palpations: Abdomen is soft.   Musculoskeletal:         General: Normal range of motion.      Cervical back: Normal range of motion and neck  supple.   Skin:     General: Skin is warm.      Capillary Refill: Capillary refill takes 2 to 3 seconds.   Neurological:      General: No focal deficit present.      Mental Status: She is alert and oriented to person, place, and time.   Psychiatric:         Mood and Affect: Mood normal.         Behavior: Behavior normal.         Thought Content: Thought content normal.       Significant Labs: All pertinent labs within the past 24 hours have been reviewed.    Significant Imaging: I have reviewed all pertinent imaging results/findings within the past 24 hours.

## 2022-03-20 NOTE — PROGRESS NOTES
"Surgery note    Postop day 3 status post exploratory laparotomy and lysis of adhesions for internal hernia causing bowel obstruction.  No issues.  Afebrile.  Vital signs stable.  Pain well controlled.  Passing flatus.  No bowel movement yet.  No nausea vomiting.  Minimal NG tube output.  Patient hungry.  No other new issues or complaints.    Blood pressure (!) 121/58, pulse 72, temperature 97.5 °F (36.4 °C), temperature source Oral, resp. rate 18, height 5' 4" (1.626 m), weight 77.1 kg (170 lb 1 oz), SpO2 95 %, not currently breastfeeding.    Abdomen soft, appropriately tender to palpation, incision with staples intact, no signs of infection    Lab Results   Component Value Date    WBC 6.75 03/19/2022    HGB 8.8 (L) 03/19/2022    HCT 29.4 (L) 03/19/2022    MCV 82 03/19/2022     03/19/2022       BMP  Lab Results   Component Value Date     03/19/2022    K 3.7 03/19/2022     03/19/2022    CO2 23 03/19/2022    BUN 8 03/19/2022    CREATININE 0.6 03/19/2022    CALCIUM 8.5 (L) 03/19/2022    ANIONGAP 14 03/19/2022    ESTGFRAFRICA >60.0 03/19/2022    EGFRNONAA >60.0 03/19/2022     Assessment/plan:  66-year-old female status post exploratory laparotomy for lysis of adhesions and reduction of internal hernia.  Discontinue nasogastric tube.  Ice chips and popsicles okay.  Venafer infusion today as she missed her weekly dose in [place] due to this hospital admission.  Continue DVT GI pulmonary prophylaxis.  Further management patient depend upon clinical course.  "

## 2022-03-20 NOTE — PROGRESS NOTES
[facility] Medicine  Progress Note    Patient Name: Pham Licea  MRN: [id number]  Patient Class: IP- Inpatient   Admission Date: 3/15/2022  Length of Stay: 5 days  Attending Physician: Eloise Coombs MD  Primary Care Provider: Deven Emerson MD        Subjective:     Principal Problem:SBO (small bowel obstruction)        HPI:  Patient 66-year-old female with past medical history of anemia, hepatic steatosis, former smoker, who presents with several hours history of abdominal pain vomiting.  She previously was admitted for exploratory laparotomy with enterectomy for bowel obstruction. Continued to have abdominal pain and subsequently developed another bowel obstruction which resolved with supportive care. She has been on a daily bowel regimen and has continued to have bowel movements daily however last night she developed severe crampy abdominal pain and vomiting. Was not able to tolerate PO intake so came straight to the ER. In the ER, CT abd/pel showed evidence of developing SBO so hospital team called for admission.      Overview/Hospital Course:  No notes on file    Interval History:  She still has a NG tube.  She is passing gas.  No bowel movement .  Her abdominal pain is 5/10 on pain intensity scale.    Review of Systems   Constitutional: Negative.    HENT: Negative.     Eyes: Negative.    Respiratory: Negative.     Cardiovascular: Negative.    Gastrointestinal:  Positive for abdominal pain. Negative for nausea and vomiting.   Endocrine: Negative.    Genitourinary: Negative.    Musculoskeletal: Negative.    Skin: Negative.    Allergic/Immunologic: Negative.    Neurological: Negative.    Hematological: Negative.    Psychiatric/Behavioral: Negative.     Objective:     Vital Signs (Most Recent):  Temp: 97.5 °F (36.4 °C) (03/20/22 0717)  Pulse: 72 (03/20/22 0844)  Resp: 18 (03/20/22 0844)  BP: (!) 121/58 (03/20/22 0717)  SpO2: 95 % (03/20/22 0844)   Vital Signs (24h Range):  Temp:   [96.6 °F (35.9 °C)-98.8 °F (37.1 °C)] 97.5 °F (36.4 °C)  Pulse:  [53-74] 72  Resp:  [16-18] 18  SpO2:  [91 %-95 %] 95 %  BP: (106-133)/(53-60) 121/58     Weight: 77.1 kg (170 lb 1 oz)  Body mass index is 29.19 kg/m².    Intake/Output Summary (Last 24 hours) at 3/20/2022 0950  Last data filed at 3/20/2022 0639  Gross per 24 hour   Intake 4883.3 ml   Output 300 ml   Net 4583.3 ml      Physical Exam  Constitutional:       Appearance: Normal appearance.   HENT:      Head: Normocephalic and atraumatic.      Right Ear: External ear normal.      Left Ear: External ear normal.      Nose: Nose normal.   Eyes:      Extraocular Movements: Extraocular movements intact.   Cardiovascular:      Rate and Rhythm: Normal rate and regular rhythm.      Pulses: Normal pulses.      Heart sounds: Normal heart sounds.   Pulmonary:      Effort: Pulmonary effort is normal.      Breath sounds: Normal breath sounds.   Abdominal:      General: Abdomen is flat. Bowel sounds are normal.      Palpations: Abdomen is soft.   Musculoskeletal:         General: Normal range of motion.      Cervical back: Normal range of motion and neck supple.   Skin:     General: Skin is warm.      Capillary Refill: Capillary refill takes 2 to 3 seconds.   Neurological:      General: No focal deficit present.      Mental Status: She is alert and oriented to person, place, and time.   Psychiatric:         Mood and Affect: Mood normal.         Behavior: Behavior normal.         Thought Content: Thought content normal.       Significant Labs: All pertinent labs within the past 24 hours have been reviewed.    Significant Imaging: I have reviewed all pertinent imaging results/findings within the past 24 hours.      Assessment/Plan:      * SBO (small bowel obstruction)  Admitted to inpatient  [facility]  General surgery consulted, appreciate their recommendations  NPO for bowel rest  Daily labs   IV/PO pain medications and antiemetics  Has not passed flatus or had BM  Secondary  to adhesions which have been lysed and an internal hernia  Possible DC NG tube today if okay with surgery  Move to clear liquid diet      Obstruction concurrent with and due to internal hernia of abdomen        Partial intestinal obstruction due to peritoneal adhesions        Mitral valve prolapse  Monitor for fluid overload, shortness of breath, fatigue, weakness        VTE Risk Mitigation (From admission, onward)         Ordered     IP VTE LOW RISK PATIENT  Once         03/15/22 1107                Discharge Planning   GABBY:      Code Status: Full Code   Is the patient medically ready for discharge?:     Reason for patient still in hospital (select all that apply): Treatment  Discharge Plan A: Home   Discharge Delays: None known at this time              Eduar Toledo MD  Department of Hospital Medicine   [facility], [facility]

## 2022-03-21 PROBLEM — K56.609 SBO (SMALL BOWEL OBSTRUCTION): Status: RESOLVED | Noted: 2022-03-15 | Resolved: 2022-03-21

## 2022-03-21 PROCEDURE — 25000003 PHARM REV CODE 250: Performed by: STUDENT IN AN ORGANIZED HEALTH CARE EDUCATION/TRAINING PROGRAM

## 2022-03-21 PROCEDURE — 99232 PR SUBSEQUENT HOSPITAL CARE,LEVL II: ICD-10-PCS | Mod: ,,, | Performed by: HOSPITALIST

## 2022-03-21 PROCEDURE — 63600175 PHARM REV CODE 636 W HCPCS: Performed by: STUDENT IN AN ORGANIZED HEALTH CARE EDUCATION/TRAINING PROGRAM

## 2022-03-21 PROCEDURE — 99024 PR POST-OP FOLLOW-UP VISIT: ICD-10-PCS | Mod: ,,, | Performed by: STUDENT IN AN ORGANIZED HEALTH CARE EDUCATION/TRAINING PROGRAM

## 2022-03-21 PROCEDURE — 99232 SBSQ HOSP IP/OBS MODERATE 35: CPT | Mod: ,,, | Performed by: HOSPITALIST

## 2022-03-21 PROCEDURE — C9113 INJ PANTOPRAZOLE SODIUM, VIA: HCPCS | Performed by: STUDENT IN AN ORGANIZED HEALTH CARE EDUCATION/TRAINING PROGRAM

## 2022-03-21 PROCEDURE — 99024 POSTOP FOLLOW-UP VISIT: CPT | Mod: ,,, | Performed by: STUDENT IN AN ORGANIZED HEALTH CARE EDUCATION/TRAINING PROGRAM

## 2022-03-21 PROCEDURE — 11000001 HC ACUTE MED/SURG PRIVATE ROOM

## 2022-03-21 RX ORDER — HYDROCODONE BITARTRATE AND ACETAMINOPHEN 7.5; 325 MG/1; MG/1
1 TABLET ORAL EVERY 6 HOURS PRN
Qty: 24 TABLET | Refills: 0 | Status: SHIPPED | OUTPATIENT
Start: 2022-03-21 | End: 2022-03-23 | Stop reason: SDUPTHER

## 2022-03-21 RX ORDER — MAG HYDROX/ALUMINUM HYD/SIMETH 200-200-20
30 SUSPENSION, ORAL (FINAL DOSE FORM) ORAL EVERY 6 HOURS PRN
Status: DISCONTINUED | OUTPATIENT
Start: 2022-03-21 | End: 2022-03-23 | Stop reason: HOSPADM

## 2022-03-21 RX ADMIN — SODIUM CHLORIDE, SODIUM LACTATE, POTASSIUM CHLORIDE, AND CALCIUM CHLORIDE: .6; .31; .03; .02 INJECTION, SOLUTION INTRAVENOUS at 09:03

## 2022-03-21 RX ADMIN — ONDANSETRON 4 MG: 2 INJECTION INTRAMUSCULAR; INTRAVENOUS at 08:03

## 2022-03-21 RX ADMIN — HYDROMORPHONE HYDROCHLORIDE 1 MG: 2 INJECTION, SOLUTION INTRAMUSCULAR; INTRAVENOUS; SUBCUTANEOUS at 08:03

## 2022-03-21 RX ADMIN — HYDROMORPHONE HYDROCHLORIDE 1 MG: 2 INJECTION, SOLUTION INTRAMUSCULAR; INTRAVENOUS; SUBCUTANEOUS at 03:03

## 2022-03-21 RX ADMIN — SODIUM CHLORIDE, SODIUM LACTATE, POTASSIUM CHLORIDE, AND CALCIUM CHLORIDE: .6; .31; .03; .02 INJECTION, SOLUTION INTRAVENOUS at 12:03

## 2022-03-21 RX ADMIN — KETOROLAC TROMETHAMINE 30 MG: 30 INJECTION, SOLUTION INTRAMUSCULAR; INTRAVENOUS at 05:03

## 2022-03-21 RX ADMIN — PANTOPRAZOLE SODIUM 40 MG: 40 INJECTION, POWDER, FOR SOLUTION INTRAVENOUS at 08:03

## 2022-03-21 RX ADMIN — MUPIROCIN: 20 OINTMENT TOPICAL at 08:03

## 2022-03-21 RX ADMIN — MUPIROCIN: 20 OINTMENT TOPICAL at 09:03

## 2022-03-21 RX ADMIN — SODIUM CHLORIDE, SODIUM LACTATE, POTASSIUM CHLORIDE, AND CALCIUM CHLORIDE: .6; .31; .03; .02 INJECTION, SOLUTION INTRAVENOUS at 07:03

## 2022-03-21 RX ADMIN — ALUMINUM HYDROXIDE, MAGNESIUM HYDROXIDE, AND SIMETHICONE 30 ML: 200; 200; 20 SUSPENSION ORAL at 02:03

## 2022-03-21 RX ADMIN — ALUMINUM HYDROXIDE, MAGNESIUM HYDROXIDE, AND SIMETHICONE 30 ML: 200; 200; 20 SUSPENSION ORAL at 08:03

## 2022-03-21 RX ADMIN — HYDROMORPHONE HYDROCHLORIDE 1 MG: 2 INJECTION, SOLUTION INTRAMUSCULAR; INTRAVENOUS; SUBCUTANEOUS at 02:03

## 2022-03-21 NOTE — CONSULTS
Nutrition Services    Assessment: Ms. Licea is s/p ex lap and lysis of adhesions following internal hernia/SBO.   -No bowel movement yet.   -Saw pt this morning, + complaints of indigestion with liquids. Has not had a solid meal in ~1 week. Think pt would benefit from moving to a solid diet vs liquid to have more substance and to ease the indigestion.   -On liquid antacid - rec to continue.     PMHx:   Patient Active Problem List   Diagnosis    Primary osteoarthritis involving multiple joints    Obesity (BMI 30.0-34.9)    Gastroesophageal reflux disease    Right knee pain    Chronic midline low back pain    Acute tear medial meniscus, right, sequela    Primary osteoarthritis of right knee    Lumbar radiculitis    Trigger middle finger of right hand    Nausea    Abdominal pain    Encounter for postoperative care    Intestinal obstruction    Hypokalemia    Constipation    History of cholecystectomy    Right upper quadrant pain    History of hysterectomy    Rectal bleeding    Hemorrhoids    Mitral valve prolapse    Coronary artery calcification    Anemia    SAMPSON (dyspnea on exertion), noted 2010    History of smoking 25-50 pack years, 46 py, quit 2009    Other emphysema    NSAID long-term use, started 2017    Bandemia    Family history of premature CAD    Cardiovascular event risk, ASCVD 10-year risk 4.8%, 2019    Abdominal obesity    Dyslipidemia, baseline LDL-C 112, HDL-C 49    Iron deficiency anemia secondary to inadequate dietary iron intake    Severe anemia       Labs:  BMP:   Lab Results   Component Value Date     03/19/2022    CALCIUM 8.5 (L) 03/19/2022    MG 1.6 03/18/2022    PHOS 3.1 03/18/2022     LFTs:   Lab Results   Component Value Date    PROT 5.5 (L) 03/19/2022    ALBUMIN 2.9 (L) 03/19/2022    BILITOT 0.2 03/19/2022    AST 25 03/19/2022    ALKPHOS 79 03/19/2022    ALT 31 03/19/2022     DM:   Lab Results   Component Value Date    HGBA1C 5.3 08/23/2019    LDLCALC  "126.4 03/26/2021    CREATININE 0.6 03/19/2022     Cardiac: No results found for: TROPONINI, CKTOTAL, CKMB, BNP  CBC:   Lab Results   Component Value Date    WBC 6.75 03/19/2022    HGB 8.8 (L) 03/19/2022    HCT 29.4 (L) 03/19/2022     03/19/2022    MCV 82 03/19/2022    RDW 26.7 (H) 03/19/2022     Iron Profile:   Lab Results   Component Value Date    WBC 6.75 03/19/2022    HGB 8.8 (L) 03/19/2022    HCT 29.4 (L) 03/19/2022     03/19/2022    MCV 82 03/19/2022    RDW 26.7 (H) 03/19/2022     Lipid Profile:   Lab Results   Component Value Date    CHOL 206 (H) 03/26/2021    HDL 50 03/26/2021    TRIG 148 03/26/2021    CHOLHDL 24.3 03/26/2021       Meds:    mupirocin   Nasal BID    pantoprazole  40 mg Intravenous Daily    scopolamine  1 patch Transdermal Q3 Days     Continuous Infusions:    lactated ringers 100 mL/hr at 03/21/22 0906       Intake & Output:    Intake/Output Summary (Last 24 hours) at 3/21/2022 1401  Last data filed at 3/21/2022 1300  Gross per 24 hour   Intake 3446.32 ml   Output --   Net 3446.32 ml       Ht: 5' 4"  Wt: 170 lbs  Wt Readings from Last 3 Encounters:   03/15/22 1029 77.1 kg (170 lb 1 oz)   03/15/22 0200 77.1 kg (170 lb)   03/10/22 0957 77.3 kg (170 lb 6.7 oz)   02/17/22 1000 77.3 kg (170 lb 5.6 oz)     IBW: [unfilled] lbs  Body mass index is 29.19 kg/m².  BMI Level: Overweigh    Last Bowel Movement on 3/14      Caloric & Protein Goals: 1900 kcals/day; 70-80 gm protein/day    Diet: Full Liquid Diet    Nutrition Assessment / Plan:  + s/p ex lap and lysis of adhesions following internal hernia/SBO. No bowel movement yet.   -Reports complaints of indigestion with liquids. Has not had a solid meal in ~1 week. Think pt would benefit from moving to a solid diet vs liquid to have more substance and to ease the indigestion.   -On liquid antacid - rec to continue.   -Discussed easy to tolerate meals and foods to avoid for her indigestion and post op.   -Recommend advancing to Regular " Diet.        Araceli Huang, MS, RD, [facility]  [phone]

## 2022-03-21 NOTE — PROGRESS NOTES
[facility] Medicine  Progress Note    Patient Name: Pham Licea  MRN: [id number]  Patient Class: IP- Inpatient   Admission Date: 3/15/2022  Length of Stay: 6 days  Attending Physician: Eloise Coombs MD  Primary Care Provider: Deven Emerson MD        Subjective:     Principal Problem:SBO (small bowel obstruction)        HPI:  Patient 66-year-old female with past medical history of anemia, hepatic steatosis, former smoker, who presents with several hours history of abdominal pain vomiting.  She previously was admitted for exploratory laparotomy with enterectomy for bowel obstruction. Continued to have abdominal pain and subsequently developed another bowel obstruction which resolved with supportive care. She has been on a daily bowel regimen and has continued to have bowel movements daily however last night she developed severe crampy abdominal pain and vomiting. Was not able to tolerate PO intake so came straight to the ER. In the ER, CT abd/pel showed evidence of developing SBO so hospital team called for admission.      Overview/Hospital Course:  No notes on file    Interval History:  She is having less pain but has burning in her abdomen when drinking liquids.  She denies fever, chills, nausea, vomiting or diarrhea.    Review of Systems   Constitutional: Negative.    HENT: Negative.     Eyes: Negative.    Respiratory: Negative.     Cardiovascular: Negative.    Gastrointestinal:  Positive for abdominal pain.   Endocrine: Negative.    Genitourinary: Negative.    Musculoskeletal: Negative.    Skin:  Positive for wound.   Neurological: Negative.    Hematological: Negative.    Psychiatric/Behavioral: Negative.     Objective:     Vital Signs (Most Recent):  Temp: 96.8 °F (36 °C) (03/21/22 1030)  Pulse: 63 (03/21/22 1030)  Resp: 18 (03/21/22 1030)  BP: (!) 108/53 (03/21/22 1030)  SpO2: 95 % (03/21/22 1030)   Vital Signs (24h Range):  Temp:  [96.7 °F (35.9 °C)-98.2 °F (36.8 °C)] 96.8  °F (36 °C)  Pulse:  [55-63] 63  Resp:  [16-18] 18  SpO2:  [92 %-97 %] 95 %  BP: (108-140)/(53-65) 108/53     Weight: 77.1 kg (170 lb 1 oz)  Body mass index is 29.19 kg/m².    Intake/Output Summary (Last 24 hours) at 3/21/2022 1304  Last data filed at 3/21/2022 0800  Gross per 24 hour   Intake 3176.32 ml   Output --   Net 3176.32 ml      Physical Exam  Constitutional:       Appearance: Normal appearance.   HENT:      Head: Normocephalic and atraumatic.      Nose: Nose normal.      Mouth/Throat:      Mouth: Mucous membranes are moist.   Eyes:      Extraocular Movements: Extraocular movements intact.   Cardiovascular:      Rate and Rhythm: Normal rate.      Pulses: Normal pulses.      Heart sounds: Normal heart sounds.   Pulmonary:      Effort: Pulmonary effort is normal.      Breath sounds: Normal breath sounds.   Abdominal:      General: Abdomen is flat. Bowel sounds are normal.      Palpations: Abdomen is soft.   Musculoskeletal:         General: Normal range of motion.      Cervical back: Normal range of motion.   Skin:     General: Skin is warm.      Capillary Refill: Capillary refill takes 2 to 3 seconds.   Neurological:      General: No focal deficit present.      Mental Status: She is alert and oriented to person, place, and time.   Psychiatric:         Mood and Affect: Mood normal.         Behavior: Behavior normal.         Thought Content: Thought content normal.       Significant Labs: All pertinent labs within the past 24 hours have been reviewed.    Significant Imaging: I have reviewed all pertinent imaging results/findings within the past 24 hours.      Assessment/Plan:      * SBO (small bowel obstruction)  Admitted to inpatient  [facility]  General surgery consulted, appreciate their recommendations  NPO for bowel rest  Daily labs   IV/PO pain medications and antiemetics  Has not passed flatus or had BM  Secondary to adhesions which have been lysed and an internal hernia  Possible DC NG tube today if okay with  surgery  Move to clear liquid diet      Obstruction concurrent with and due to internal hernia of abdomen        Partial intestinal obstruction due to peritoneal adhesions        Mitral valve prolapse  Monitor for fluid overload, shortness of breath, fatigue, weakness        VTE Risk Mitigation (From admission, onward)         Ordered     IP VTE LOW RISK PATIENT  Once         03/15/22 1107                Discharge Planning   GABBY:      Code Status: Full Code   Is the patient medically ready for discharge?:     Reason for patient still in hospital (select all that apply): Treatment  Discharge Plan A: Home   Discharge Delays: None known at this time              Eduar Toledo MD  Department of Hospital Medicine   [facility] Surg

## 2022-03-21 NOTE — SUBJECTIVE & OBJECTIVE
Interval History:  She is having less pain but has burning in her abdomen when drinking liquids.  She denies fever, chills, nausea, vomiting or diarrhea.    Review of Systems   Constitutional: Negative.    HENT: Negative.     Eyes: Negative.    Respiratory: Negative.     Cardiovascular: Negative.    Gastrointestinal:  Positive for abdominal pain.   Endocrine: Negative.    Genitourinary: Negative.    Musculoskeletal: Negative.    Skin:  Positive for wound.   Neurological: Negative.    Hematological: Negative.    Psychiatric/Behavioral: Negative.     Objective:     Vital Signs (Most Recent):  Temp: 96.8 °F (36 °C) (03/21/22 1030)  Pulse: 63 (03/21/22 1030)  Resp: 18 (03/21/22 1030)  BP: (!) 108/53 (03/21/22 1030)  SpO2: 95 % (03/21/22 1030)   Vital Signs (24h Range):  Temp:  [96.7 °F (35.9 °C)-98.2 °F (36.8 °C)] 96.8 °F (36 °C)  Pulse:  [55-63] 63  Resp:  [16-18] 18  SpO2:  [92 %-97 %] 95 %  BP: (108-140)/(53-65) 108/53     Weight: 77.1 kg (170 lb 1 oz)  Body mass index is 29.19 kg/m².    Intake/Output Summary (Last 24 hours) at 3/21/2022 1304  Last data filed at 3/21/2022 0800  Gross per 24 hour   Intake 3176.32 ml   Output --   Net 3176.32 ml      Physical Exam  Constitutional:       Appearance: Normal appearance.   HENT:      Head: Normocephalic and atraumatic.      Nose: Nose normal.      Mouth/Throat:      Mouth: Mucous membranes are moist.   Eyes:      Extraocular Movements: Extraocular movements intact.   Cardiovascular:      Rate and Rhythm: Normal rate.      Pulses: Normal pulses.      Heart sounds: Normal heart sounds.   Pulmonary:      Effort: Pulmonary effort is normal.      Breath sounds: Normal breath sounds.   Abdominal:      General: Abdomen is flat. Bowel sounds are normal.      Palpations: Abdomen is soft.   Musculoskeletal:         General: Normal range of motion.      Cervical back: Normal range of motion.   Skin:     General: Skin is warm.      Capillary Refill: Capillary refill takes 2 to 3  seconds.   Neurological:      General: No focal deficit present.      Mental Status: She is alert and oriented to person, place, and time.   Psychiatric:         Mood and Affect: Mood normal.         Behavior: Behavior normal.         Thought Content: Thought content normal.       Significant Labs: All pertinent labs within the past 24 hours have been reviewed.    Significant Imaging: I have reviewed all pertinent imaging results/findings within the past 24 hours.

## 2022-03-21 NOTE — PROGRESS NOTES
"Surgery note    Postop day 4 status post exploratory laparotomy and lysis of adhesions for internal hernia causing bowel obstruction.  No issues.  Afebrile.  Vital signs stable.  Pain well controlled.  Passing flatus.  No bowel movement yet.  No nausea vomiting.  Tolerating clear liquids but having some indigestion.    Blood pressure (!) 108/53, pulse 63, temperature 96.8 °F (36 °C), temperature source Oral, resp. rate 18, height 5' 4" (1.626 m), weight 77.1 kg (170 lb 1 oz), SpO2 95 %, not currently breastfeeding.    Abdomen soft, appropriately tender to palpation, incision with staples intact, no signs of infection    Lab Results   Component Value Date    WBC 6.75 03/19/2022    HGB 8.8 (L) 03/19/2022    HCT 29.4 (L) 03/19/2022    MCV 82 03/19/2022     03/19/2022       BMP  Lab Results   Component Value Date     03/19/2022    K 3.7 03/19/2022     03/19/2022    CO2 23 03/19/2022    BUN 8 03/19/2022    CREATININE 0.6 03/19/2022    CALCIUM 8.5 (L) 03/19/2022    ANIONGAP 14 03/19/2022    ESTGFRAFRICA >60.0 03/19/2022    EGFRNONAA >60.0 03/19/2022     Assessment/plan:  66-year-old female status post exploratory laparotomy for lysis of adhesions and reduction of internal hernia.  Advance diet as tolerated.  Continue DVT GI pulmonary prophylaxis.  Further management patient depend upon clinical course.  "

## 2022-03-22 PROBLEM — Z87.19 S/P SMALL BOWEL OBSTRUCTION: Status: ACTIVE | Noted: 2022-03-22

## 2022-03-22 PROCEDURE — 25000003 PHARM REV CODE 250: Performed by: HOSPITALIST

## 2022-03-22 PROCEDURE — 63600175 PHARM REV CODE 636 W HCPCS: Performed by: STUDENT IN AN ORGANIZED HEALTH CARE EDUCATION/TRAINING PROGRAM

## 2022-03-22 PROCEDURE — 25000003 PHARM REV CODE 250: Performed by: STUDENT IN AN ORGANIZED HEALTH CARE EDUCATION/TRAINING PROGRAM

## 2022-03-22 PROCEDURE — 99232 SBSQ HOSP IP/OBS MODERATE 35: CPT | Mod: ,,, | Performed by: HOSPITALIST

## 2022-03-22 PROCEDURE — C9113 INJ PANTOPRAZOLE SODIUM, VIA: HCPCS | Performed by: STUDENT IN AN ORGANIZED HEALTH CARE EDUCATION/TRAINING PROGRAM

## 2022-03-22 PROCEDURE — 99232 PR SUBSEQUENT HOSPITAL CARE,LEVL II: ICD-10-PCS | Mod: ,,, | Performed by: HOSPITALIST

## 2022-03-22 PROCEDURE — 11000001 HC ACUTE MED/SURG PRIVATE ROOM

## 2022-03-22 RX ORDER — HYDROCODONE BITARTRATE AND ACETAMINOPHEN 5; 325 MG/1; MG/1
1 TABLET ORAL EVERY 4 HOURS PRN
Status: DISCONTINUED | OUTPATIENT
Start: 2022-03-22 | End: 2022-03-23 | Stop reason: HOSPADM

## 2022-03-22 RX ADMIN — ONDANSETRON 4 MG: 2 INJECTION INTRAMUSCULAR; INTRAVENOUS at 01:03

## 2022-03-22 RX ADMIN — HYDROMORPHONE HYDROCHLORIDE 1 MG: 2 INJECTION, SOLUTION INTRAMUSCULAR; INTRAVENOUS; SUBCUTANEOUS at 01:03

## 2022-03-22 RX ADMIN — ALUMINUM HYDROXIDE, MAGNESIUM HYDROXIDE, AND SIMETHICONE 30 ML: 200; 200; 20 SUSPENSION ORAL at 08:03

## 2022-03-22 RX ADMIN — PANTOPRAZOLE SODIUM 40 MG: 40 INJECTION, POWDER, FOR SOLUTION INTRAVENOUS at 09:03

## 2022-03-22 RX ADMIN — HYDROCODONE BITARTRATE AND ACETAMINOPHEN 1 TABLET: 5; 325 TABLET ORAL at 07:03

## 2022-03-22 RX ADMIN — ALUMINUM HYDROXIDE, MAGNESIUM HYDROXIDE, AND SIMETHICONE 30 ML: 200; 200; 20 SUSPENSION ORAL at 05:03

## 2022-03-22 RX ADMIN — ONDANSETRON 4 MG: 2 INJECTION INTRAMUSCULAR; INTRAVENOUS at 10:03

## 2022-03-22 RX ADMIN — HYDROMORPHONE HYDROCHLORIDE 1 MG: 2 INJECTION, SOLUTION INTRAMUSCULAR; INTRAVENOUS; SUBCUTANEOUS at 03:03

## 2022-03-22 RX ADMIN — SODIUM CHLORIDE, SODIUM LACTATE, POTASSIUM CHLORIDE, AND CALCIUM CHLORIDE: .6; .31; .03; .02 INJECTION, SOLUTION INTRAVENOUS at 05:03

## 2022-03-22 RX ADMIN — SODIUM CHLORIDE, SODIUM LACTATE, POTASSIUM CHLORIDE, AND CALCIUM CHLORIDE: .6; .31; .03; .02 INJECTION, SOLUTION INTRAVENOUS at 03:03

## 2022-03-22 RX ADMIN — HYDROMORPHONE HYDROCHLORIDE 1 MG: 2 INJECTION, SOLUTION INTRAMUSCULAR; INTRAVENOUS; SUBCUTANEOUS at 05:03

## 2022-03-22 RX ADMIN — ONDANSETRON 4 MG: 2 INJECTION INTRAMUSCULAR; INTRAVENOUS at 08:03

## 2022-03-22 RX ADMIN — HYDROMORPHONE HYDROCHLORIDE 1 MG: 2 INJECTION, SOLUTION INTRAMUSCULAR; INTRAVENOUS; SUBCUTANEOUS at 08:03

## 2022-03-22 RX ADMIN — HYDROMORPHONE HYDROCHLORIDE 1 MG: 2 INJECTION, SOLUTION INTRAMUSCULAR; INTRAVENOUS; SUBCUTANEOUS at 09:03

## 2022-03-22 NOTE — PLAN OF CARE
Problem: Adult Inpatient Plan of Care  Goal: Plan of Care Review  Outcome: Ongoing, Progressing  Goal: Patient-Specific Goal (Individualized)  Outcome: Ongoing, Progressing  Goal: Absence of Hospital-Acquired Illness or Injury  Outcome: Ongoing, Progressing  Goal: Optimal Comfort and Wellbeing  Outcome: Ongoing, Progressing  Goal: Readiness for Transition of Care  Outcome: Ongoing, Progressing     Problem: Fluid Deficit (Intestinal Obstruction)  Goal: Fluid Balance  Outcome: Ongoing, Progressing     Problem: Infection (Intestinal Obstruction)  Goal: Absence of Infection Signs and Symptoms  Outcome: Ongoing, Progressing     Problem: Nausea and Vomiting (Intestinal Obstruction)  Goal: Nausea and Vomiting Relief  Outcome: Ongoing, Progressing     Problem: Pain (Intestinal Obstruction)  Goal: Acceptable Pain Control  Outcome: Ongoing, Progressing

## 2022-03-22 NOTE — PROGRESS NOTES
[facility] Medicine  Progress Note    Patient Name: Pham Licea  MRN: [id number]  Patient Class: IP- Inpatient   Admission Date: 3/15/2022  Length of Stay: 7 days  Attending Physician: Eloise Coombs MD  Primary Care Provider: Deven Emerson MD        Subjective:     Principal Problem:SBO (small bowel obstruction)        HPI:  Patient 66-year-old female with past medical history of anemia, hepatic steatosis, former smoker, who presents with several hours history of abdominal pain vomiting.  She previously was admitted for exploratory laparotomy with enterectomy for bowel obstruction. Continued to have abdominal pain and subsequently developed another bowel obstruction which resolved with supportive care. She has been on a daily bowel regimen and has continued to have bowel movements daily however last night she developed severe crampy abdominal pain and vomiting. Was not able to tolerate PO intake so came straight to the ER. In the ER, CT abd/pel showed evidence of developing SBO so hospital team called for admission.      Overview/Hospital Course:  No notes on file    Interval History:  She is tolerating present diet and passing gas but has not had a bowel movement.  Abdominal pain is 5/10 on pain intensity scale.  She denies nausea, vomiting, fever or chills.    Review of Systems   Constitutional: Negative.    HENT: Negative.     Eyes: Negative.    Respiratory: Negative.     Cardiovascular: Negative.    Gastrointestinal:  Positive for abdominal pain. Negative for diarrhea, nausea and vomiting.   Endocrine: Negative.    Genitourinary: Negative.    Musculoskeletal: Negative.    Skin:  Positive for wound.   Allergic/Immunologic: Negative.    Neurological: Negative.    Hematological: Negative.    Psychiatric/Behavioral: Negative.     Objective:     Vital Signs (Most Recent):  Temp: 97.2 °F (36.2 °C) (03/22/22 1553)  Pulse: 63 (03/22/22 1553)  Resp: 17 (03/22/22 1553)  BP: (!) 109/55  (03/22/22 1553)  SpO2: 95 % (03/22/22 1553)   Vital Signs (24h Range):  Temp:  [96.2 °F (35.7 °C)-97.6 °F (36.4 °C)] 97.2 °F (36.2 °C)  Pulse:  [52-67] 63  Resp:  [15-18] 17  SpO2:  [92 %-97 %] 95 %  BP: (107-126)/(53-62) 109/55     Weight: 77.1 kg (170 lb 1 oz)  Body mass index is 29.19 kg/m².    Intake/Output Summary (Last 24 hours) at 3/22/2022 1713  Last data filed at 3/22/2022 0541  Gross per 24 hour   Intake 2144.57 ml   Output --   Net 2144.57 ml      Physical Exam  Vitals and nursing note reviewed.   Constitutional:       Appearance: Normal appearance.   HENT:      Head: Normocephalic and atraumatic.      Right Ear: External ear normal.      Nose: Nose normal.      Mouth/Throat:      Mouth: Mucous membranes are moist.   Eyes:      Extraocular Movements: Extraocular movements intact.   Cardiovascular:      Rate and Rhythm: Normal rate and regular rhythm.      Pulses: Normal pulses.      Heart sounds: Normal heart sounds.   Pulmonary:      Effort: Pulmonary effort is normal.      Breath sounds: Normal breath sounds.   Abdominal:      General: Abdomen is flat. Bowel sounds are normal.      Palpations: Abdomen is soft.   Musculoskeletal:         General: Normal range of motion.      Cervical back: Normal range of motion and neck supple.   Skin:     General: Skin is warm.   Neurological:      General: No focal deficit present.      Mental Status: She is alert and oriented to person, place, and time.   Psychiatric:         Mood and Affect: Mood normal.         Behavior: Behavior normal.       Significant Labs: All pertinent labs within the past 24 hours have been reviewed.    Significant Imaging: I have reviewed all pertinent imaging results/findings within the past 24 hours.      Assessment/Plan:      S/p small bowel obstruction  Continues supportive care  Possible discharge tomorrow if she has a bowel movement      Mitral valve prolapse  Monitor for fluid overload, shortness of breath, fatigue,  weakness        VTE Risk Mitigation (From admission, onward)         Ordered     IP VTE LOW RISK PATIENT  Once         03/15/22 1107                Discharge Planning   GABBY: 3/21/2022     Code Status: Full Code   Is the patient medically ready for discharge?:     Reason for patient still in hospital (select all that apply): Treatment  Discharge Plan A: Home   Discharge Delays: None known at this time              Eduar Toledo MD  Department of [facility] Medicine   [facility]

## 2022-03-22 NOTE — SUBJECTIVE & OBJECTIVE
Interval History:  She is tolerating present diet and passing gas but has not had a bowel movement.  Abdominal pain is 5/10 on pain intensity scale.  She denies nausea, vomiting, fever or chills.    Review of Systems   Constitutional: Negative.    HENT: Negative.     Eyes: Negative.    Respiratory: Negative.     Cardiovascular: Negative.    Gastrointestinal:  Positive for abdominal pain. Negative for diarrhea, nausea and vomiting.   Endocrine: Negative.    Genitourinary: Negative.    Musculoskeletal: Negative.    Skin:  Positive for wound.   Allergic/Immunologic: Negative.    Neurological: Negative.    Hematological: Negative.    Psychiatric/Behavioral: Negative.     Objective:     Vital Signs (Most Recent):  Temp: 97.2 °F (36.2 °C) (03/22/22 1553)  Pulse: 63 (03/22/22 1553)  Resp: 17 (03/22/22 1553)  BP: (!) 109/55 (03/22/22 1553)  SpO2: 95 % (03/22/22 1553)   Vital Signs (24h Range):  Temp:  [96.2 °F (35.7 °C)-97.6 °F (36.4 °C)] 97.2 °F (36.2 °C)  Pulse:  [52-67] 63  Resp:  [15-18] 17  SpO2:  [92 %-97 %] 95 %  BP: (107-126)/(53-62) 109/55     Weight: 77.1 kg (170 lb 1 oz)  Body mass index is 29.19 kg/m².    Intake/Output Summary (Last 24 hours) at 3/22/2022 1713  Last data filed at 3/22/2022 0541  Gross per 24 hour   Intake 2144.57 ml   Output --   Net 2144.57 ml      Physical Exam  Vitals and nursing note reviewed.   Constitutional:       Appearance: Normal appearance.   HENT:      Head: Normocephalic and atraumatic.      Right Ear: External ear normal.      Nose: Nose normal.      Mouth/Throat:      Mouth: Mucous membranes are moist.   Eyes:      Extraocular Movements: Extraocular movements intact.   Cardiovascular:      Rate and Rhythm: Normal rate and regular rhythm.      Pulses: Normal pulses.      Heart sounds: Normal heart sounds.   Pulmonary:      Effort: Pulmonary effort is normal.      Breath sounds: Normal breath sounds.   Abdominal:      General: Abdomen is flat. Bowel sounds are normal.       Palpations: Abdomen is soft.   Musculoskeletal:         General: Normal range of motion.      Cervical back: Normal range of motion and neck supple.   Skin:     General: Skin is warm.   Neurological:      General: No focal deficit present.      Mental Status: She is alert and oriented to person, place, and time.   Psychiatric:         Mood and Affect: Mood normal.         Behavior: Behavior normal.       Significant Labs: All pertinent labs within the past 24 hours have been reviewed.    Significant Imaging: I have reviewed all pertinent imaging results/findings within the past 24 hours.

## 2022-03-22 NOTE — PROGRESS NOTES
"Surgery note    Postop day 5 status post exploratory laparotomy and lysis of adhesions for internal hernia causing bowel obstruction.  No issues.  Afebrile.  Vital signs stable.  Pain well controlled.  Passing flatus.  No bowel movement yet.  No nausea vomiting.  Tolerating clear liquids some bloating today, indigestion.    Blood pressure (!) 114/55, pulse (!) 57, temperature 96.2 °F (35.7 °C), temperature source Oral, resp. rate 16, height 5' 4" (1.626 m), weight 77.1 kg (170 lb 1 oz), SpO2 (!) 94 %, not currently breastfeeding.    Abdomen soft, appropriately tender to palpation, incision with staples intact, no signs of infection    Lab Results   Component Value Date    WBC 6.75 03/19/2022    HGB 8.8 (L) 03/19/2022    HCT 29.4 (L) 03/19/2022    MCV 82 03/19/2022     03/19/2022       BMP  Lab Results   Component Value Date     03/19/2022    K 3.7 03/19/2022     03/19/2022    CO2 23 03/19/2022    BUN 8 03/19/2022    CREATININE 0.6 03/19/2022    CALCIUM 8.5 (L) 03/19/2022    ANIONGAP 14 03/19/2022    ESTGFRAFRICA >60.0 03/19/2022    EGFRNONAA >60.0 03/19/2022     Assessment/plan:  66-year-old female status post exploratory laparotomy for lysis of adhesions and reduction of internal hernia.  Stay on liquid diet today.  Up ambulating in halls.  Possible postoperative ileus at this point to be expected.  Continue GI, Pulmonary, and DVT prophylaxis.  Further management patient to depend upon clinical course.  "

## 2022-03-23 ENCOUNTER — PATIENT MESSAGE (OUTPATIENT)
Dept: FAMILY MEDICINE | Facility: CLINIC | Age: 67
End: 2022-03-23
Payer: COMMERCIAL

## 2022-03-23 VITALS
HEIGHT: 64 IN | BODY MASS INDEX: 29.03 KG/M2 | RESPIRATION RATE: 18 BRPM | TEMPERATURE: 98 F | OXYGEN SATURATION: 91 % | DIASTOLIC BLOOD PRESSURE: 64 MMHG | SYSTOLIC BLOOD PRESSURE: 131 MMHG | HEART RATE: 62 BPM | WEIGHT: 170.06 LBS

## 2022-03-23 DIAGNOSIS — R11.2 INTRACTABLE VOMITING WITH NAUSEA, UNSPECIFIED VOMITING TYPE: ICD-10-CM

## 2022-03-23 LAB
BASOPHILS # BLD AUTO: 0.04 K/UL (ref 0–0.2)
BASOPHILS NFR BLD: 0.8 % (ref 0–1.9)
DIFFERENTIAL METHOD: ABNORMAL
EOSINOPHIL # BLD AUTO: 0.2 K/UL (ref 0–0.5)
EOSINOPHIL NFR BLD: 3.4 % (ref 0–8)
ERYTHROCYTE [DISTWIDTH] IN BLOOD BY AUTOMATED COUNT: 26.5 % (ref 11.5–14.5)
HCT VFR BLD AUTO: 32.1 % (ref 37–48.5)
HGB BLD-MCNC: 9.7 G/DL (ref 12–16)
IMM GRANULOCYTES # BLD AUTO: 0.01 K/UL (ref 0–0.04)
IMM GRANULOCYTES NFR BLD AUTO: 0.2 % (ref 0–0.5)
IRON SERPL-MCNC: 28 UG/DL (ref 30–160)
LYMPHOCYTES # BLD AUTO: 1.6 K/UL (ref 1–4.8)
LYMPHOCYTES NFR BLD: 31.1 % (ref 18–48)
MCH RBC QN AUTO: 24.9 PG (ref 27–31)
MCHC RBC AUTO-ENTMCNC: 30.2 G/DL (ref 32–36)
MCV RBC AUTO: 82 FL (ref 82–98)
MONOCYTES # BLD AUTO: 0.5 K/UL (ref 0.3–1)
MONOCYTES NFR BLD: 9.4 % (ref 4–15)
NEUTROPHILS # BLD AUTO: 2.9 K/UL (ref 1.8–7.7)
NEUTROPHILS NFR BLD: 55.1 % (ref 38–73)
NRBC BLD-RTO: 0 /100 WBC
PLATELET # BLD AUTO: 259 K/UL (ref 150–450)
PMV BLD AUTO: 9.6 FL (ref 9.2–12.9)
RBC # BLD AUTO: 3.9 M/UL (ref 4–5.4)
SATURATED IRON: 8 % (ref 20–50)
TOTAL IRON BINDING CAPACITY: 330 UG/DL (ref 250–450)
TRANSFERRIN SERPL-MCNC: 223 MG/DL (ref 200–375)
WBC # BLD AUTO: 5.24 K/UL (ref 3.9–12.7)

## 2022-03-23 PROCEDURE — 25000003 PHARM REV CODE 250: Performed by: FAMILY MEDICINE

## 2022-03-23 PROCEDURE — 99239 PR HOSPITAL DISCHARGE DAY,>30 MIN: ICD-10-PCS | Mod: ,,, | Performed by: FAMILY MEDICINE

## 2022-03-23 PROCEDURE — 99239 HOSP IP/OBS DSCHRG MGMT >30: CPT | Mod: ,,, | Performed by: FAMILY MEDICINE

## 2022-03-23 PROCEDURE — 63600175 PHARM REV CODE 636 W HCPCS: Performed by: STUDENT IN AN ORGANIZED HEALTH CARE EDUCATION/TRAINING PROGRAM

## 2022-03-23 PROCEDURE — 25000003 PHARM REV CODE 250: Performed by: HOSPITALIST

## 2022-03-23 PROCEDURE — 99024 PR POST-OP FOLLOW-UP VISIT: ICD-10-PCS | Mod: ,,, | Performed by: STUDENT IN AN ORGANIZED HEALTH CARE EDUCATION/TRAINING PROGRAM

## 2022-03-23 PROCEDURE — 84466 ASSAY OF TRANSFERRIN: CPT | Performed by: FAMILY MEDICINE

## 2022-03-23 PROCEDURE — C9113 INJ PANTOPRAZOLE SODIUM, VIA: HCPCS | Performed by: STUDENT IN AN ORGANIZED HEALTH CARE EDUCATION/TRAINING PROGRAM

## 2022-03-23 PROCEDURE — 25000003 PHARM REV CODE 250: Performed by: STUDENT IN AN ORGANIZED HEALTH CARE EDUCATION/TRAINING PROGRAM

## 2022-03-23 PROCEDURE — 99900035 HC TECH TIME PER 15 MIN (STAT)

## 2022-03-23 PROCEDURE — 97166 OT EVAL MOD COMPLEX 45 MIN: CPT

## 2022-03-23 PROCEDURE — 94761 N-INVAS EAR/PLS OXIMETRY MLT: CPT

## 2022-03-23 PROCEDURE — 85025 COMPLETE CBC W/AUTO DIFF WBC: CPT | Performed by: FAMILY MEDICINE

## 2022-03-23 PROCEDURE — 97161 PT EVAL LOW COMPLEX 20 MIN: CPT

## 2022-03-23 PROCEDURE — 99024 POSTOP FOLLOW-UP VISIT: CPT | Mod: ,,, | Performed by: STUDENT IN AN ORGANIZED HEALTH CARE EDUCATION/TRAINING PROGRAM

## 2022-03-23 PROCEDURE — 36415 COLL VENOUS BLD VENIPUNCTURE: CPT | Performed by: FAMILY MEDICINE

## 2022-03-23 PROCEDURE — 97116 GAIT TRAINING THERAPY: CPT

## 2022-03-23 RX ORDER — HYDROCODONE BITARTRATE AND ACETAMINOPHEN 7.5; 325 MG/1; MG/1
1 TABLET ORAL EVERY 6 HOURS PRN
Qty: 24 TABLET | Refills: 0 | Status: SHIPPED | OUTPATIENT
Start: 2022-03-23 | End: 2022-08-25

## 2022-03-23 RX ORDER — BISACODYL 10 MG
10 SUPPOSITORY, RECTAL RECTAL DAILY PRN
Qty: 15 SUPPOSITORY | Refills: 0 | Status: SHIPPED | OUTPATIENT
Start: 2022-03-23 | End: 2022-05-25

## 2022-03-23 RX ORDER — BISACODYL 10 MG
10 SUPPOSITORY, RECTAL RECTAL DAILY PRN
Status: DISCONTINUED | OUTPATIENT
Start: 2022-03-23 | End: 2022-03-23 | Stop reason: HOSPADM

## 2022-03-23 RX ORDER — ONDANSETRON 4 MG/1
4 TABLET, FILM COATED ORAL EVERY 6 HOURS PRN
Qty: 30 TABLET | Refills: 1 | Status: SHIPPED | OUTPATIENT
Start: 2022-03-23 | End: 2022-03-24 | Stop reason: ALTCHOICE

## 2022-03-23 RX ADMIN — HYDROCODONE BITARTRATE AND ACETAMINOPHEN 1 TABLET: 5; 325 TABLET ORAL at 12:03

## 2022-03-23 RX ADMIN — HYDROMORPHONE HYDROCHLORIDE 1 MG: 2 INJECTION, SOLUTION INTRAMUSCULAR; INTRAVENOUS; SUBCUTANEOUS at 01:03

## 2022-03-23 RX ADMIN — BISACODYL 10 MG: 10 SUPPOSITORY RECTAL at 12:03

## 2022-03-23 RX ADMIN — HYDROMORPHONE HYDROCHLORIDE 1 MG: 2 INJECTION, SOLUTION INTRAMUSCULAR; INTRAVENOUS; SUBCUTANEOUS at 07:03

## 2022-03-23 RX ADMIN — SCOPALAMINE 1 PATCH: 1 PATCH, EXTENDED RELEASE TRANSDERMAL at 08:03

## 2022-03-23 RX ADMIN — PANTOPRAZOLE SODIUM 40 MG: 40 INJECTION, POWDER, FOR SOLUTION INTRAVENOUS at 08:03

## 2022-03-23 RX ADMIN — ALUMINUM HYDROXIDE, MAGNESIUM HYDROXIDE, AND SIMETHICONE 30 ML: 200; 200; 20 SUSPENSION ORAL at 04:03

## 2022-03-23 RX ADMIN — SODIUM CHLORIDE, SODIUM LACTATE, POTASSIUM CHLORIDE, AND CALCIUM CHLORIDE: .6; .31; .03; .02 INJECTION, SOLUTION INTRAVENOUS at 01:03

## 2022-03-23 NOTE — NURSING
New orders received for discharge, patient is agreeable with discharge. PIV removed, catheter tip intact. Dressing applied. Discharge teaching done at bedside, verbalized understanding. Presciptions given to patient to be filled at their pharmacy of choice or e-scripted to pharmacy of choice. Medications reviewed, appointments given. Will d/c home with all belongings per w/c.

## 2022-03-23 NOTE — PT/OT/SLP EVAL
Physical Therapy Evaluation    Patient Name:  Pham Licea   MRN:  [id number]    Recommendations:     Discharge Recommendations:   (home with family support)   Discharge Equipment Recommendations: walker, rolling   Barriers to discharge: None    Assessment:     Pham Licea is a 66 y.o. female admitted with a medical diagnosis of SBO (small bowel obstruction).  She presents with the following impairments/functional limitations:  gait instability, impaired balance, pain, weakness, other (comment) (s/p abdominal surgery). Patient screened for equipment needs prior to impending discharge this afternoon. She is s/p abdominal surgery with pain difficulty standing greater than 2-3 minutes without having to sit down due to fatigue and LE weakness. Patient was instructed in use of RW during gait and transfers and was able to ambulate 75' with SBA demonstrating improved posture and gait stability. RW recommended for fall prevention as she regains her strength. Patient appropriate for D/C home with family and RW.    Rehab Prognosis: Good; patient would benefit from acute skilled PT services to address these deficits and reach maximum level of function.    Recent Surgery: Procedure(s) (LRB):  LAPAROTOMY, EXPLORATORY (N/A) 6 Days Post-Op    Plan:     During this hospitalization, patient to be seen  (IE only, patient is discharging home this afternoon) to address the identified rehab impairments via gait training and progress toward the following goals:    · Plan of Care Expires:  03/23/22    Subjective     Chief Complaint: fatigue, abdominal pain  Patient/Family Comments/goals: patient reporting subjective weakness in her legs when at the sink this morning for ~2 minutes  Pain/Comfort:  · Pain Rating 1:  (abdominal pain with activity, none at rest; no numerical pain rating provided)    Patients cultural, spiritual, Jewish conflicts given the current situation: no    Living Environment:  Patient lives alone in  a SS raised home with 13 steps to enter, b/l HR's present.  Prior to admission, patients level of function was independent ADL's and IADL's.  Equipment used at home: none.  DME owned (not currently used): none.  Upon discharge, patient will have assistance from her son and daughter.    Objective:     Communicated with RN prior to session.  Patient found HOB elevated with peripheral IV  upon PT entry to room.    General Precautions: Standard, fall   Orthopedic Precautions:N/A   Braces: N/A  Respiratory Status: Room air    Exams:  · Cognitive Exam:  Patient is oriented to Person, Place, Time and Situation  · LUE ROM: WFL  · LUE Strength: in 4-/5 to 4/5 range  · RLE ROM: WFL  · RLE Strength: in 4-/5 to 4/5 range    Functional Mobility:  · Bed Mobility:     · Supine to Sit: modified independence  · Sit to Supine: modified independence  · Transfers:  Sit to Stand:  stand by assistance with no AD  · Gait: patient ambulated 50' in hallway with CGA to HHA and railing, she was then instructed use of RW during gait and transfers and was able to ambulate 75' with SBA  · Balance: static/dynamic sitting good/good-, static/dynamic standing good-/fair+    Therapeutic Activities and Exercises:  Patient was instructed in use of RW during gait and transfers and was able to ambulate 75' with SBA demonstrating improved posture and gait stability.      Patient left sitting edge of bed with call button in reach, RN and MD notified and daughter present.    GOALS:   Patient seen for IE only    History:     Past Medical History:   Diagnosis Date    Anemia     Fatty liver 2015    Former smoker 2009    Osteoarthritis 2010    Pulmonary nodules 2019    Repeat CT in 6 mo    Ulcer of abdomen wall 2016       Past Surgical History:   Procedure Laterality Date    APPENDECTOMY  1993     SECTION  , , 1983    x3    COLONOSCOPY  ~    [facility]: normal findings per patient report    COLONOSCOPY N/A 2020    Procedure:  COLONOSCOPY;  Surgeon: Misael Holm MD;  Location: [facility] ENDO;  Service: General;  Laterality: N/A;    DIAGNOSTIC LAPAROSCOPY N/A 5/4/2021    Procedure: LAPAROSCOPY, DIAGNOSTIC;  Surgeon: Misael Holm MD;  Location: [facility] OR;  Service: General;  Laterality: N/A;    EPIDURAL STEROID INJECTION INTO LUMBAR SPINE N/A 10/12/2018    Procedure: Injection-steroid-epidural-lumbar;  Surgeon: Kal Johnson MD;  Location: [facility] OR;  Service: Pain Management;  Laterality: N/A;  L5-S1    EPIDURAL STEROID INJECTION INTO LUMBAR SPINE N/A 5/31/2019    Procedure: Injection-steroid-epidural-lumbar L5-S1;  Surgeon: Kal Johnson MD;  Location: [facility] OR;  Service: Pain Management;  Laterality: N/A;    ESOPHAGOGASTRODUODENOSCOPY N/A 11/9/2020    Procedure: ESOPHAGOGASTRODUODENOSCOPY (EGD);  Surgeon: Misael Holm MD;  Location: [facility] ENDO;  Service: General;  Laterality: N/A;    HYSTERECTOMY  1993    one ovary conserved    LAPAROSCOPIC CHOLECYSTECTOMY N/A 9/2/2020    Procedure: CHOLECYSTECTOMY, LAPAROSCOPIC;  Surgeon: Govind Frey MD;  Location: [facility] OR;  Service: General;  Laterality: N/A;    LAPAROSCOPIC LYSIS OF ADHESIONS N/A 5/4/2021    Procedure: LYSIS, ADHESIONS, LAPAROSCOPIC;  Surgeon: Misael Holm MD;  Location: [facility] OR;  Service: General;  Laterality: N/A;    UPPER GASTROINTESTINAL ENDOSCOPY  2016       Time Tracking:     PT Received On: 03/23/22  PT Start Time: 1304     PT Stop Time: 1327  PT Total Time (min): 23 min     Billable Minutes: Evaluation 8 min and Gait Training 15 min      03/23/2022

## 2022-03-23 NOTE — PT/OT/SLP EVAL
"Occupational Therapy   Evaluation    Name: Pham Licea  MRN: [id number]  Admitting Diagnosis:  SBO (small bowel obstruction)  Recent Surgery: Procedure(s) (LRB):  LAPAROTOMY, EXPLORATORY (N/A) 6 Days Post-Op    Recommendations:     Discharge Recommendations:  home w/ family   Discharge Equipment Recommendations:   RW  Barriers to discharge:   13 steps to enter home    Assessment:     Pham Licea is a 66 y.o. female with a medical diagnosis of SBO (small bowel obstruction).  She presents with decreased adl performance. Performance deficits affecting function:  decreased strength and decreased functional activity tolerance.      Rehab Prognosis: Good; patient would benefit from acute skilled OT services to address these deficits and reach maximum level of function.       Plan:     Patient to be seen   1x to address the above listed problems via  ot evaluation  · Plan of Care Expires:  upon dc   · Plan of Care Reviewed with:  patient and nursing    Subjective     Chief Complaint: SBO   Patient/Family Comments/goals: "to go homel. "    Occupational Profile:  Living Environment: patient lives in raised house alone w/ 13 steps to enter.    Previous level of function: Ind w/ all adls and Iadls.   Roles and Routines: works from home.   Equipment Used at Home:   none  Assistance upon Discharge: daughter will stay w/ patient for a few days after dc.   Pain/Comfort:  ·  Patient w/ no c/o pain this date.     Patients cultural, spiritual, Jain conflicts given the current situation:  will not effect treatment.     Objective:     Communicated with: nursing prior to session.  Patient found up in chair with   upon OT entry to room.    General Precautions: Standard,     Orthopedic Precautions:    Braces:    Respiratory Status: Room air    Occupational Performance:    Bed Mobility:    · Patient completed Rolling/Turning to Left with  independence  · Patient completed Rolling/Turning to Right with " independence  · Patient completed Scooting/Bridging with independence  · Patient completed Supine to Sit with independence  · Patient completed Sit to Supine with independence    Functional Mobility/Transfers:  · Patient completed Sit <> Stand Transfer with independence  with  no assistive device   · Patient completed Bed <> Chair Transfer using Stand Pivot technique with independence with no assistive device  · Patient completed Toilet Transfer Stand Pivot technique with independence with  no AD  · Functional Mobility: patient ambulating around room.        Activities of Daily Living:  · Feeding:  independence sit  · Grooming: independence in stand at sink  · Bathing: independence w/ bed bath secondary to wound  · Upper Body Dressing: independence from sit  · Lower Body Dressing: independence from sit  · Toileting: independence at regular toilet.      Cognitive/Visual Perceptual:  Cognitive/Psychosocial Skills:     -       Oriented to: Person, Place, Time and Situation     Physical Exam:  Patient w/ AROM WFL'S ALL PLANES. STRENGTH GROSSLY 4/5. PATIENT STATIC/DYNAMIC SITTING BALANCE IS GOOD+/GOOD.  PATIENT STATIC/DYNAMIC STAND BALANCE IS GOOD/FAIR+    Treatment & Education:  PATIENT PERFORMED INITIAL OT EVAL THIS DATE TO ASSESS PATIENT'S AROM , STRENGTH, BALANCE AND NEED FOR ANY AE UPON DC.   Education:    Patient left up in chair with all lines intact    GOALS:   1. PATIENT WILL HAVE APPROPRIATE AE UPON DC .  PATIENT WILL REQUIRE RW FOR OPTIMAL SAFETY DURING STANDING ADL TASKS.     History:     Past Medical History:   Diagnosis Date    Anemia     Fatty liver 2015    Former smoker 2009    Osteoarthritis 2010    Pulmonary nodules 2019    Repeat CT in 6 mo    Ulcer of abdomen wall 2016       Past Surgical History:   Procedure Laterality Date    APPENDECTOMY  1993     SECTION  , , 1983    x3    COLONOSCOPY  ~    [facility]: normal findings per patient report    COLONOSCOPY N/A 2020     Procedure: COLONOSCOPY;  Surgeon: Misael Holm MD;  Location: [facility] ENDO;  Service: General;  Laterality: N/A;    DIAGNOSTIC LAPAROSCOPY N/A 5/4/2021    Procedure: LAPAROSCOPY, DIAGNOSTIC;  Surgeon: Misael Holm MD;  Location: [facility] OR;  Service: General;  Laterality: N/A;    EPIDURAL STEROID INJECTION INTO LUMBAR SPINE N/A 10/12/2018    Procedure: Injection-steroid-epidural-lumbar;  Surgeon: Kal Johnson MD;  Location: [facility] OR;  Service: Pain Management;  Laterality: N/A;  L5-S1    EPIDURAL STEROID INJECTION INTO LUMBAR SPINE N/A 5/31/2019    Procedure: Injection-steroid-epidural-lumbar L5-S1;  Surgeon: Kal Johnson MD;  Location: [facility] OR;  Service: Pain Management;  Laterality: N/A;    ESOPHAGOGASTRODUODENOSCOPY N/A 11/9/2020    Procedure: ESOPHAGOGASTRODUODENOSCOPY (EGD);  Surgeon: Misael Holm MD;  Location: [facility] ENDO;  Service: General;  Laterality: N/A;    HYSTERECTOMY  1993    one ovary conserved    LAPAROSCOPIC CHOLECYSTECTOMY N/A 9/2/2020    Procedure: CHOLECYSTECTOMY, LAPAROSCOPIC;  Surgeon: Govind Frey MD;  Location: [facility] OR;  Service: General;  Laterality: N/A;    LAPAROSCOPIC LYSIS OF ADHESIONS N/A 5/4/2021    Procedure: LYSIS, ADHESIONS, LAPAROSCOPIC;  Surgeon: Misael Holm MD;  Location: [facility] OR;  Service: General;  Laterality: N/A;    UPPER GASTROINTESTINAL ENDOSCOPY  2016       Time Tracking:     OT Date of Treatment:  03/23/2022  OT Start Time:  13:20  OT Stop Time:  13:35  OT Total Time (min):  15    Billable Minutes:Evaluation 15    3/23/2022

## 2022-03-23 NOTE — PLAN OF CARE
03/23/22 1434   Final Note   Assessment Type Final Discharge Note   Anticipated Discharge Disposition Home   What phone number can be called within the next 1-3 days to see how you are doing after discharge? [phone]   Hospital Resources/Appts/Education Provided Appointments scheduled and added to AVS   Post-Acute Status   Discharge Delays None known at this time   Verbal & written follow up appointments with Dr Kerns & Dr Marquez provided to patient. Demonstrated understanding by verbal feedback. Also provided with wheelchair from [facility]. Denies any other needs at this time.

## 2022-03-23 NOTE — PROGRESS NOTES
"Surgery note    Postop day 6 status post exploratory laparotomy and lysis of adhesions for internal hernia causing bowel obstruction.  No issues.  Afebrile.  Vital signs stable.  Pain well controlled.  Passing flatus.  No bowel movement yet. Complaining of nausea today.    Blood pressure 131/64, pulse 62, temperature 98 °F (36.7 °C), temperature source Oral, resp. rate 15, height 5' 4" (1.626 m), weight 77.1 kg (170 lb 1 oz), SpO2 (!) 91 %, not currently breastfeeding.    Abdomen soft, appropriately tender to palpation, incision with staples intact, no signs of infection    Lab Results   Component Value Date    WBC 5.24 03/23/2022    HGB 9.7 (L) 03/23/2022    HCT 32.1 (L) 03/23/2022    MCV 82 03/23/2022     03/23/2022       BMP  Lab Results   Component Value Date     03/19/2022    K 3.7 03/19/2022     03/19/2022    CO2 23 03/19/2022    BUN 8 03/19/2022    CREATININE 0.6 03/19/2022    CALCIUM 8.5 (L) 03/19/2022    ANIONGAP 14 03/19/2022    ESTGFRAFRICA >60.0 03/19/2022    EGFRNONAA >60.0 03/19/2022     Assessment/plan:  66-year-old female status post exploratory laparotomy for lysis of adhesions and reduction of internal hernia.  Stay on liquid diet today.  Up ambulating in halls.  Possible postoperative ileus at this point to be expected.  Continue GI, Pulmonary, and DVT prophylaxis.  Further management patient to depend upon clinical course.  "

## 2022-03-23 NOTE — PLAN OF CARE
Problem: Adult Inpatient Plan of Care  Goal: Plan of Care Review  Outcome: Ongoing, Progressing  Goal: Patient-Specific Goal (Individualized)  Outcome: Ongoing, Progressing  Goal: Absence of Hospital-Acquired Illness or Injury  Outcome: Ongoing, Progressing  Goal: Optimal Comfort and Wellbeing  Outcome: Ongoing, Progressing  Goal: Readiness for Transition of Care  Outcome: Ongoing, Progressing     Problem: Nausea and Vomiting (Intestinal Obstruction)  Goal: Nausea and Vomiting Relief  Outcome: Ongoing, Progressing     Problem: Pain (Intestinal Obstruction)  Goal: Acceptable Pain Control  Outcome: Ongoing, Progressing

## 2022-03-23 NOTE — PLAN OF CARE
Problem: Adult Inpatient Plan of Care  Goal: Plan of Care Review  Outcome: Met     Problem: Adult Inpatient Plan of Care  Goal: Patient-Specific Goal (Individualized)  Outcome: Met     Problem: Adult Inpatient Plan of Care  Goal: Absence of Hospital-Acquired Illness or Injury  Outcome: Met     Problem: Adult Inpatient Plan of Care  Goal: Optimal Comfort and Wellbeing  Outcome: Met     Problem: Adult Inpatient Plan of Care  Goal: Readiness for Transition of Care  Outcome: Met     Problem: Fluid Deficit (Intestinal Obstruction)  Goal: Fluid Balance  Outcome: Met     Problem: Infection (Intestinal Obstruction)  Goal: Absence of Infection Signs and Symptoms  Outcome: Met

## 2022-03-23 NOTE — PLAN OF CARE
03/23/22 1150   Discharge Reassessment   Did the patient's condition or plan change since previous assessment? No   Discharge Plan discussed with: Patient   Communicated GABBY with patient/caregiver Yes   Discharge Plan A Home with family   DME Needed Upon Discharge  none   Discharge Barriers Identified None   Why the patient remains in the hospital Requires continued medical care   Post-Acute Status   Discharge Delays None known at this time   Patient resting in bed inquiring if her hematologist could be notified of her labs. States she is getting shaky when standing up for a few minutes & doesn't want it to progress to leg cramps. Encouraged her to send a message through her chart & she should be able to see her labs. Discussed with Dr Coombs who states she will talk to patient. Will continue to follow.

## 2022-03-24 ENCOUNTER — PATIENT MESSAGE (OUTPATIENT)
Dept: FAMILY MEDICINE | Facility: CLINIC | Age: 67
End: 2022-03-24
Payer: COMMERCIAL

## 2022-03-24 RX ORDER — ONDANSETRON 8 MG/1
8 TABLET, ORALLY DISINTEGRATING ORAL EVERY 6 HOURS PRN
Qty: 30 TABLET | Refills: 1 | Status: SHIPPED | OUTPATIENT
Start: 2022-03-24 | End: 2022-10-12 | Stop reason: SDUPTHER

## 2022-03-25 ENCOUNTER — PATIENT MESSAGE (OUTPATIENT)
Dept: SURGERY | Facility: CLINIC | Age: 67
End: 2022-03-25
Payer: COMMERCIAL

## 2022-03-25 ENCOUNTER — TELEPHONE (OUTPATIENT)
Dept: SURGERY | Facility: CLINIC | Age: 67
End: 2022-03-25
Payer: COMMERCIAL

## 2022-03-25 NOTE — TELEPHONE ENCOUNTER
Addressed over [facility]Path[facility]    ----- Message from Fabrice Flynn sent at 3/25/2022 12:56 PM CDT -----  Type: Patient Call Back         Who called:Patient          What is the request in detail:Patient called in regarding a few questions and concerns          Can the clinic reply by [facility]?no          Would the patient rather a call back or a response via [facility]?call back          Best call back number:[phone] (mobile)          Additional Information:           Thank You

## 2022-03-28 ENCOUNTER — INFUSION (OUTPATIENT)
Dept: INFUSION THERAPY | Facility: HOSPITAL | Age: 67
End: 2022-03-28
Payer: COMMERCIAL

## 2022-03-28 VITALS
RESPIRATION RATE: 18 BRPM | DIASTOLIC BLOOD PRESSURE: 72 MMHG | BODY MASS INDEX: 29.1 KG/M2 | TEMPERATURE: 98 F | WEIGHT: 170.44 LBS | HEART RATE: 77 BPM | HEIGHT: 64 IN | SYSTOLIC BLOOD PRESSURE: 129 MMHG | OXYGEN SATURATION: 98 %

## 2022-03-28 VITALS — HEART RATE: 60 BPM | DIASTOLIC BLOOD PRESSURE: 63 MMHG | SYSTOLIC BLOOD PRESSURE: 130 MMHG | RESPIRATION RATE: 20 BRPM

## 2022-03-28 DIAGNOSIS — D50.8 IRON DEFICIENCY ANEMIA SECONDARY TO INADEQUATE DIETARY IRON INTAKE: Primary | ICD-10-CM

## 2022-03-28 PROCEDURE — 63600175 PHARM REV CODE 636 W HCPCS: Performed by: INTERNAL MEDICINE

## 2022-03-28 PROCEDURE — 96365 THER/PROPH/DIAG IV INF INIT: CPT

## 2022-03-28 PROCEDURE — 25000003 PHARM REV CODE 250: Performed by: INTERNAL MEDICINE

## 2022-03-28 RX ORDER — SODIUM CHLORIDE 0.9 % (FLUSH) 0.9 %
10 SYRINGE (ML) INJECTION
Status: CANCELLED | OUTPATIENT
Start: 2022-03-31

## 2022-03-28 RX ORDER — EPINEPHRINE 0.3 MG/.3ML
0.3 INJECTION SUBCUTANEOUS ONCE AS NEEDED
Status: CANCELLED | OUTPATIENT
Start: 2022-03-31

## 2022-03-28 RX ORDER — HEPARIN 100 UNIT/ML
500 SYRINGE INTRAVENOUS
Status: CANCELLED | OUTPATIENT
Start: 2022-03-31

## 2022-03-28 RX ORDER — METHYLPREDNISOLONE SOD SUCC 125 MG
125 VIAL (EA) INJECTION ONCE AS NEEDED
Status: CANCELLED | OUTPATIENT
Start: 2022-03-31

## 2022-03-28 RX ORDER — DIPHENHYDRAMINE HYDROCHLORIDE 50 MG/ML
50 INJECTION INTRAMUSCULAR; INTRAVENOUS ONCE AS NEEDED
Status: CANCELLED | OUTPATIENT
Start: 2022-03-31

## 2022-03-28 RX ADMIN — SODIUM CHLORIDE 125 MG: 9 INJECTION, SOLUTION INTRAVENOUS at 10:03

## 2022-03-28 NOTE — PLAN OF CARE
Pleasant alert and oriented patient ambulated with cane to infusion for ferrelicit - pt is s/p abdominal surgery from SBO - recovering well with increased weakness.  Pt tolerated infusion well - discharged home with no concerns. Pt scheduled for next infusion.

## 2022-03-29 ENCOUNTER — OFFICE VISIT (OUTPATIENT)
Dept: FAMILY MEDICINE | Facility: CLINIC | Age: 67
End: 2022-03-29
Payer: COMMERCIAL

## 2022-03-29 ENCOUNTER — PATIENT MESSAGE (OUTPATIENT)
Dept: FAMILY MEDICINE | Facility: CLINIC | Age: 67
End: 2022-03-29
Payer: COMMERCIAL

## 2022-03-29 ENCOUNTER — OFFICE VISIT (OUTPATIENT)
Dept: HEMATOLOGY/ONCOLOGY | Facility: CLINIC | Age: 67
End: 2022-03-29
Payer: COMMERCIAL

## 2022-03-29 VITALS
HEIGHT: 64 IN | SYSTOLIC BLOOD PRESSURE: 112 MMHG | BODY MASS INDEX: 29.43 KG/M2 | RESPIRATION RATE: 16 BRPM | OXYGEN SATURATION: 97 % | WEIGHT: 172.38 LBS | HEART RATE: 80 BPM | DIASTOLIC BLOOD PRESSURE: 68 MMHG | TEMPERATURE: 98 F

## 2022-03-29 DIAGNOSIS — D50.9 IRON DEFICIENCY ANEMIA, UNSPECIFIED IRON DEFICIENCY ANEMIA TYPE: Primary | ICD-10-CM

## 2022-03-29 DIAGNOSIS — K56.51 INTESTINAL ADHESIONS WITH PARTIAL OBSTRUCTION: Primary | ICD-10-CM

## 2022-03-29 DIAGNOSIS — Z09 HOSPITAL DISCHARGE FOLLOW-UP: Primary | ICD-10-CM

## 2022-03-29 DIAGNOSIS — D50.8 IRON DEFICIENCY ANEMIA SECONDARY TO INADEQUATE DIETARY IRON INTAKE: ICD-10-CM

## 2022-03-29 DIAGNOSIS — Z87.891 HISTORY OF SMOKING 25-50 PACK YEARS: ICD-10-CM

## 2022-03-29 DIAGNOSIS — K64.1 GRADE II HEMORRHOIDS: ICD-10-CM

## 2022-03-29 DIAGNOSIS — D64.9 SEVERE ANEMIA: ICD-10-CM

## 2022-03-29 DIAGNOSIS — E78.5 DYSLIPIDEMIA: ICD-10-CM

## 2022-03-29 PROCEDURE — 3074F PR MOST RECENT SYSTOLIC BLOOD PRESSURE < 130 MM HG: ICD-10-PCS | Mod: CPTII,S$GLB,, | Performed by: FAMILY MEDICINE

## 2022-03-29 PROCEDURE — 1111F DSCHRG MED/CURRENT MED MERGE: CPT | Mod: CPTII,95,, | Performed by: INTERNAL MEDICINE

## 2022-03-29 PROCEDURE — 99213 PR OFFICE/OUTPT VISIT, EST, LEVL III, 20-29 MIN: ICD-10-PCS | Mod: S$GLB,,, | Performed by: FAMILY MEDICINE

## 2022-03-29 PROCEDURE — 99999 PR PBB SHADOW E&M-EST. PATIENT-LVL IV: CPT | Mod: PBBFAC,,, | Performed by: FAMILY MEDICINE

## 2022-03-29 PROCEDURE — 1111F PR DISCHARGE MEDS RECONCILED W/ CURRENT OUTPATIENT MED LIST: ICD-10-PCS | Mod: CPTII,95,, | Performed by: INTERNAL MEDICINE

## 2022-03-29 PROCEDURE — 99214 PR OFFICE/OUTPT VISIT, EST, LEVL IV, 30-39 MIN: ICD-10-PCS | Mod: 95,,, | Performed by: INTERNAL MEDICINE

## 2022-03-29 PROCEDURE — 3008F BODY MASS INDEX DOCD: CPT | Mod: CPTII,S$GLB,, | Performed by: FAMILY MEDICINE

## 2022-03-29 PROCEDURE — 99214 OFFICE O/P EST MOD 30 MIN: CPT | Mod: 95,,, | Performed by: INTERNAL MEDICINE

## 2022-03-29 PROCEDURE — 3074F SYST BP LT 130 MM HG: CPT | Mod: CPTII,S$GLB,, | Performed by: FAMILY MEDICINE

## 2022-03-29 PROCEDURE — 3008F PR BODY MASS INDEX (BMI) DOCUMENTED: ICD-10-PCS | Mod: CPTII,S$GLB,, | Performed by: FAMILY MEDICINE

## 2022-03-29 PROCEDURE — 3078F DIAST BP <80 MM HG: CPT | Mod: CPTII,S$GLB,, | Performed by: FAMILY MEDICINE

## 2022-03-29 PROCEDURE — 99213 OFFICE O/P EST LOW 20 MIN: CPT | Mod: S$GLB,,, | Performed by: FAMILY MEDICINE

## 2022-03-29 PROCEDURE — 99999 PR PBB SHADOW E&M-EST. PATIENT-LVL IV: ICD-10-PCS | Mod: PBBFAC,,, | Performed by: FAMILY MEDICINE

## 2022-03-29 PROCEDURE — 3078F PR MOST RECENT DIASTOLIC BLOOD PRESSURE < 80 MM HG: ICD-10-PCS | Mod: CPTII,S$GLB,, | Performed by: FAMILY MEDICINE

## 2022-03-29 NOTE — PROGRESS NOTES
[facility] Note    Subjective      Ms. Licea is a 66 y.o. female who presents to clinic for a hospital follow up.     Patient admitted 3/15/22 and discharged 3/23/22  Admitted for SBO and underwent exploratory laparotomy and lysis of adhesions for internal hernia.   Overall doing well  Tolerating PO intake        [facility] Pham has a past medical history of Anemia, Fatty liver (), Former smoker (), Osteoarthritis (), Pulmonary nodules (2019), and Ulcer of abdomen wall ().   PSXH Pham has a past surgical history that includes Upper gastrointestinal endoscopy (); Colonoscopy (~); Epidural steroid injection into lumbar spine (N/A, 10/12/2018); Epidural steroid injection into lumbar spine (N/A, 2019);  section (, , ); Hysterectomy (); Appendectomy (); Laparoscopic cholecystectomy (N/A, 2020); Colonoscopy (N/A, 2020); Esophagogastroduodenoscopy (N/A, 2020); Diagnostic laparoscopy (N/A, 2021); and Laparoscopic lysis of adhesions (N/A, 2021).    Pham's family history includes Aneurysm in her father; Arthritis in her mother and sister; Brain Hemorrhage in her father; Heart disease in her mother; Hypertension in her father and sister; Obesity in her sister; Osteoarthritis in her sister; Osteoporosis in her mother; Ovarian cancer in her mother; Stroke (age of onset: 50) in her father.    Pham reports that she quit smoking about 12 years ago. She has a 40.00 pack-year smoking history. She has never used smokeless tobacco. She reports previous alcohol use. She reports previous drug use. Drug: Marijuana.   WIL Nath has No Known Allergies.   MED Pham has a current medication list which includes the following prescription(s): bisacodyl, diclofenac sodium, docusate sodium, hydrocodone-acetaminophen, ondansetron, pantoprazole, vitamin d, atorvastatin, and constulose.     Review of Systems   Constitutional: Negative  "for activity change, appetite change, chills, fatigue and fever.   Eyes: Negative for visual disturbance.   Respiratory: Negative for cough and shortness of breath.    Cardiovascular: Negative for chest pain, palpitations and leg swelling.   Gastrointestinal: Negative for abdominal pain, nausea and vomiting.   Skin: Negative for wound.   Neurological: Negative for dizziness and headaches.   Psychiatric/Behavioral: Negative for confusion.     Objective     /68 (BP Location: Left arm, Patient Position: Sitting, BP Method: Medium (Manual))   Pulse 80   Temp 97.9 °F (36.6 °C) (Oral)   Resp 16   Ht 5' 4" (1.626 m)   Wt 78.2 kg (172 lb 6 oz)   LMP  (LMP Unknown)   SpO2 97%   BMI 29.59 kg/m²     Physical Exam   Constitutional: She appears well-developed and well-nourished.  Non-toxic appearance. She does not appear ill. No distress.   HENT:   Head: Normocephalic and atraumatic.   Eyes: Right eye exhibits no discharge. Left eye exhibits no discharge.   Cardiovascular: Normal rate, regular rhythm, normal heart sounds and normal pulses. Exam reveals no gallop and no friction rub.   No murmur heard.  Pulmonary/Chest: Effort normal and breath sounds normal. No respiratory distress. She has no wheezes. She has no rhonchi. She has no rales.   Abdominal: Normal appearance.   Musculoskeletal:      Cervical back: Neck supple.   Lymphadenopathy:     She has no cervical adenopathy.   Neurological: She is alert.   Skin: Skin is warm and dry. Capillary refill takes less than 2 seconds. She is not diaphoretic.   Psychiatric: Her behavior is normal. Mood, judgment and thought content normal.   Vitals reviewed.     Assessment/Plan     Pham was seen today for hospital follow up.    Diagnoses and all orders for this visit:    Hospital discharge follow-up    -records reviewed by me    Future Appointments   Date Time Provider Department Center   4/12/2022 12:45 PM John Tyler MD [facility] [place] [facility]   4/18/2022  9:00 " AM [facility], LABORATORY [facility] LAB [facility]   4/21/2022  3:20 PM Mayra Bower MD [facility] at [facility]   7/25/2022  3:40 PM Deven Emerson MD [facility]   8/25/2022  8:40 AM Deven Emerson MD [facility]       Deven Emerson MD  Family Medicine [facility]

## 2022-03-29 NOTE — PROGRESS NOTES
Subjective:    The patient location is: home  Visit type: Virtual visit with synchronous audio and video  Face-to-face or time spent with patient on the encounter:20min  Total time spent on and for  this encounter which includes non face-to-face time preparing to see patient, review of tests, obtaining and or reviewing separately obtained records documenting clinical information in the electronic or other health records, independently interpreting results which is not separately reported ,and communicating results to the patient/family/caregiver and in care coordination and treatment planning/communicating with pharmacy for prescriptions/addressing social needs/arranging follow-up and or referrals :25 min    Each patient I provide medical services by telemedicine is:  (1) informed of the relationship between the physician and patient and the respective role of any other health care provider with respect to management of the patient; and (2) notified that he or she may decline to receive medical services by telemedicine and may withdraw from such care at any time.  This is a video visit therefore some elements of the physical exam such as vital signs, heart sounds are breath sounds are not included and may be included if found in recent clinic notes of other providers assessing same patient. Any symptoms or signs that were visualized were stated by the patient may be included in this note.     Patient ID: Pham Licea is a 66 y.o. female.    Chief Complaint:      sept 2020 had gall bladder surgery, thern she had a bowel obstruction, no surgery then, had 2 feet of bowels removed after a second bowel obstruction  Esophageal ulcers and dudenal ulcers- with bleeding in 2017  Recd blood in [facility] this weekend, pt went to ED because she was shaky and week and couldn't think was found to be anemic in ed   pt readmitted 3/22 had repeat bowel obstruction and continues iron infusion d/c home last week  Past Medical  History:   Diagnosis Date    Anemia     Fatty liver 2015    Former smoker 2009    Osteoarthritis 2010    Pulmonary nodules 2019    Repeat CT in 6 mo    Ulcer of abdomen wall 2016     Past Surgical History:   Procedure Laterality Date    APPENDECTOMY  1993     SECTION  , , 1983    x3    COLONOSCOPY  ~2014    [facility]: normal findings per patient report    COLONOSCOPY N/A 2020    Procedure: COLONOSCOPY;  Surgeon: Misael Holm MD;  Location: [facility];  Service: General;  Laterality: N/A;    DIAGNOSTIC LAPAROSCOPY N/A 2021    Procedure: LAPAROSCOPY, DIAGNOSTIC;  Surgeon: Misael Holm MD;  Location: [facility];  Service: General;  Laterality: N/A;    EPIDURAL STEROID INJECTION INTO LUMBAR SPINE N/A 10/12/2018    Procedure: Injection-steroid-epidural-lumbar;  Surgeon: Kal Johnson MD;  Location: [facility];  Service: Pain Management;  Laterality: N/A;  L5-S1    EPIDURAL STEROID INJECTION INTO LUMBAR SPINE N/A 2019    Procedure: Injection-steroid-epidural-lumbar L5-S1;  Surgeon: Kal Johnson MD;  Location: [facility];  Service: Pain Management;  Laterality: N/A;    ESOPHAGOGASTRODUODENOSCOPY N/A 2020    Procedure: ESOPHAGOGASTRODUODENOSCOPY (EGD);  Surgeon: Misael Holm MD;  Location: [facility];  Service: General;  Laterality: N/A;    HYSTERECTOMY  1993    one ovary conserved    LAPAROSCOPIC CHOLECYSTECTOMY N/A 2020    Procedure: CHOLECYSTECTOMY, LAPAROSCOPIC;  Surgeon: Govind Frey MD;  Location: [facility];  Service: General;  Laterality: N/A;    LAPAROSCOPIC LYSIS OF ADHESIONS N/A 2021    Procedure: LYSIS, ADHESIONS, LAPAROSCOPIC;  Surgeon: Misael Holm MD;  Location: [facility];  Service: General;  Laterality: N/A;    UPPER GASTROINTESTINAL ENDOSCOPY  2016     Family History   Problem Relation Age of Onset    Ovarian cancer Mother     Heart disease Mother     Osteoporosis Mother     Arthritis Mother     Hypertension Father      Stroke Father 50    Brain Hemorrhage Father     Aneurysm Father     Osteoarthritis Sister     Arthritis Sister     Hypertension Sister     Obesity Sister     Breast cancer Neg Hx     Colon cancer Neg Hx     Colon polyps Neg Hx     Crohn's disease Neg Hx     Ulcerative colitis Neg Hx       Social History     Socioeconomic History    Marital status:     Number of children: 4    Highest education level: Some college, no degree   Occupational History    Occupation: sale specialist-    Tobacco Use    Smoking status: Former Smoker     Packs/day: 1.00     Years: 40.00     Pack years: 40.00     Quit date: 2009     Years since quittin.4    Smokeless tobacco: Never Used    Tobacco comment: quit 2009   Substance and Sexual Activity    Alcohol use: Not Currently     Comment: Not often - one glass of wine every now and then    Drug use: Not Currently     Types: Marijuana     Comment: in     Sexual activity: Not Currently     Social Determinants of Health     Financial Resource Strain: Unknown    Difficulty of Paying Living Expenses: Patient refused   Food Insecurity: Unknown    Worried About Running Out of Food in the Last Year: Patient refused    Ran Out of Food in the Last Year: Patient refused   Transportation Needs: Unknown    Lack of Transportation (Medical): Patient refused    Lack of Transportation (Non-Medical): Patient refused   Physical Activity: Inactive    Days of Exercise per Week: 0 days    Minutes of Exercise per Session: 0 min   Stress: Stress Concern Present    Feeling of Stress : To some extent   Social Connections: Unknown    Frequency of Communication with Friends and Family: More than three times a week    Frequency of Social Gatherings with Friends and Family: Twice a week    Active Member of Clubs or Organizations: Yes    Attends Club or Organization Meetings: More than 4 times per year    Marital Status:    Housing Stability: Low Risk      Unable to Pay for Housing in the Last Year: No    Number of Places Lived in the Last Year: 1    Unstable Housing in the Last Year: No     Review of patient's allergies indicates:  No Known Allergies    Current Outpatient Medications:     bisacodyL (DULCOLAX) 10 mg Supp, Place 1 suppository (10 mg total) rectally daily as needed (constipation)., Disp: 15 suppository, Rfl: 0    diclofenac sodium (VOLTAREN) 1 % Gel, Apply 2 g topically 2 (two) times daily as needed., Disp: , Rfl:     docusate sodium (COLACE) 100 MG capsule, Take 100 mg by mouth 2 (two) times daily., Disp: , Rfl:     HYDROcodone-acetaminophen (NORCO) 7.5-325 mg per tablet, Take 1 tablet by mouth every 6 (six) hours as needed for Pain., Disp: 24 tablet, Rfl: 0    lactulose (CHRONULAC) 10 gram/15 mL solution, Take 10 g by mouth 2 (two) times daily. , Disp: , Rfl:     ondansetron (ZOFRAN-ODT) 8 MG TbDL, Take 1 tablet (8 mg total) by mouth every 6 (six) hours as needed (nausea or vomiting)., Disp: 30 tablet, Rfl: 1    pantoprazole (PROTONIX) 20 MG tablet, Take 1 tablet (20 mg total) by mouth once daily., Disp: 90 tablet, Rfl: 3    vitamin D (VITAMIN D3) 1000 units Tab, Take 3,000 Units by mouth once daily., Disp: , Rfl:   Review of Systems   Constitutional: Positive for malaise/fatigue. Negative for weight loss.        Mostly good appetite lost weight with her sx   HENT: Negative for hearing loss.    Eyes: Negative for blurred vision and double vision.   Gastrointestinal: Positive for constipation.        Takes lactulose twice a day and colace twice a day   Musculoskeletal: Negative for back pain, myalgias and neck pain.        Leg cramps and shaky   Skin: Negative for rash.   Neurological: Negative for dizziness, weakness and headaches.   Endo/Heme/Allergies: Does not bruise/bleed easily.   recent bowel obstruction 3/22  Physical Exam    Wt Readings from Last 3 Encounters:   03/29/22 78.2 kg (172 lb 6 oz)   03/15/22 77.1 kg (170 lb 1 oz)    03/10/22 77.3 kg (170 lb 6.7 oz)     Temp Readings from Last 3 Encounters:   03/29/22 97.9 °F (36.6 °C) (Oral)   03/23/22 98 °F (36.7 °C) (Oral)   03/28/22 98.1 °F (36.7 °C) (Oral)     BP Readings from Last 3 Encounters:   03/29/22 112/68   03/28/22 130/63   03/23/22 131/64     Pulse Readings from Last 3 Encounters:   03/29/22 80   03/28/22 60   03/23/22 62     Lab Results   Component Value Date    WBC 5.24 03/23/2022    HGB 9.7 (L) 03/23/2022    HCT 32.1 (L) 03/23/2022    MCV 82 03/23/2022     03/23/2022       BMP  Lab Results   Component Value Date     03/19/2022    K 3.7 03/19/2022     03/19/2022    CO2 23 03/19/2022    BUN 8 03/19/2022    CREATININE 0.6 03/19/2022    CALCIUM 8.5 (L) 03/19/2022    ANIONGAP 14 03/19/2022    ESTGFRAFRICA >60.0 03/19/2022    EGFRNONAA >60.0 03/19/2022     Lab Results   Component Value Date    IRON 28 (L) 03/23/2022    TIBC 330 03/23/2022    FERRITIN 5 (L) 01/28/2022         Patient Active Problem List   Diagnosis    Primary osteoarthritis involving multiple joints    Obesity (BMI 30.0-34.9)    Gastroesophageal reflux disease    Right knee pain    Chronic midline low back pain    Acute tear medial meniscus, right, sequela    Primary osteoarthritis of right knee    Lumbar radiculitis    Trigger middle finger of right hand    Nausea    Abdominal pain    Encounter for postoperative care    Intestinal obstruction    Hypokalemia    Constipation    History of cholecystectomy    Right upper quadrant pain    History of hysterectomy    Rectal bleeding    Hemorrhoids    Mitral valve prolapse    Coronary artery calcification    Anemia    SAMPSON (dyspnea on exertion), noted 2010    History of smoking 25-50 pack years, 46 py, quit 2009    Other emphysema    NSAID long-term use, started 2017    Bandemia    Family history of premature CAD    Cardiovascular event risk, ASCVD 10-year risk 4.8%, 2019    Abdominal obesity    Dyslipidemia, baseline LDL-C  112, HDL-C 49    Iron deficiency anemia secondary to inadequate dietary iron intake    Severe anemia    S/p small bowel obstruction        Assessment and Plan   NATHAN: pt had partail bowel removal from opbstructions so anticipate this will be recurrent     Patient to continue follow-up of her DJD with PCP  Bowel obs again 3/22 repeat sx and adhesiolysis, still on iron infusion due for 8th dose this week   pt advised to complete iron and redraw labs in about 3 weeks and recheck iron levels

## 2022-03-30 ENCOUNTER — PATIENT MESSAGE (OUTPATIENT)
Dept: REHABILITATION | Facility: HOSPITAL | Age: 67
End: 2022-03-30
Payer: COMMERCIAL

## 2022-03-30 ENCOUNTER — PATIENT MESSAGE (OUTPATIENT)
Dept: SURGERY | Facility: CLINIC | Age: 67
End: 2022-03-30
Payer: COMMERCIAL

## 2022-03-30 ENCOUNTER — OFFICE VISIT (OUTPATIENT)
Dept: SURGERY | Facility: CLINIC | Age: 67
End: 2022-03-30
Payer: COMMERCIAL

## 2022-03-30 VITALS
SYSTOLIC BLOOD PRESSURE: 125 MMHG | RESPIRATION RATE: 17 BRPM | DIASTOLIC BLOOD PRESSURE: 78 MMHG | HEART RATE: 90 BPM | BODY MASS INDEX: 29.19 KG/M2 | HEIGHT: 64 IN | OXYGEN SATURATION: 99 % | WEIGHT: 171 LBS

## 2022-03-30 DIAGNOSIS — Z98.890 S/P EXPLORATORY LAPAROTOMY: ICD-10-CM

## 2022-03-30 DIAGNOSIS — K56.51 INTESTINAL ADHESIONS WITH PARTIAL OBSTRUCTION: Primary | ICD-10-CM

## 2022-03-30 PROCEDURE — 99024 POSTOP FOLLOW-UP VISIT: CPT | Mod: S$GLB,,, | Performed by: STUDENT IN AN ORGANIZED HEALTH CARE EDUCATION/TRAINING PROGRAM

## 2022-03-30 PROCEDURE — 3008F BODY MASS INDEX DOCD: CPT | Mod: CPTII,S$GLB,, | Performed by: STUDENT IN AN ORGANIZED HEALTH CARE EDUCATION/TRAINING PROGRAM

## 2022-03-30 PROCEDURE — 3074F SYST BP LT 130 MM HG: CPT | Mod: CPTII,S$GLB,, | Performed by: STUDENT IN AN ORGANIZED HEALTH CARE EDUCATION/TRAINING PROGRAM

## 2022-03-30 PROCEDURE — 3078F PR MOST RECENT DIASTOLIC BLOOD PRESSURE < 80 MM HG: ICD-10-PCS | Mod: CPTII,S$GLB,, | Performed by: STUDENT IN AN ORGANIZED HEALTH CARE EDUCATION/TRAINING PROGRAM

## 2022-03-30 PROCEDURE — 99999 PR PBB SHADOW E&M-EST. PATIENT-LVL III: CPT | Mod: PBBFAC,,, | Performed by: STUDENT IN AN ORGANIZED HEALTH CARE EDUCATION/TRAINING PROGRAM

## 2022-03-30 PROCEDURE — 1159F MED LIST DOCD IN RCRD: CPT | Mod: CPTII,S$GLB,, | Performed by: STUDENT IN AN ORGANIZED HEALTH CARE EDUCATION/TRAINING PROGRAM

## 2022-03-30 PROCEDURE — 3078F DIAST BP <80 MM HG: CPT | Mod: CPTII,S$GLB,, | Performed by: STUDENT IN AN ORGANIZED HEALTH CARE EDUCATION/TRAINING PROGRAM

## 2022-03-30 PROCEDURE — 1159F PR MEDICATION LIST DOCUMENTED IN MEDICAL RECORD: ICD-10-PCS | Mod: CPTII,S$GLB,, | Performed by: STUDENT IN AN ORGANIZED HEALTH CARE EDUCATION/TRAINING PROGRAM

## 2022-03-30 PROCEDURE — 99999 PR PBB SHADOW E&M-EST. PATIENT-LVL III: ICD-10-PCS | Mod: PBBFAC,,, | Performed by: STUDENT IN AN ORGANIZED HEALTH CARE EDUCATION/TRAINING PROGRAM

## 2022-03-30 PROCEDURE — 3008F PR BODY MASS INDEX (BMI) DOCUMENTED: ICD-10-PCS | Mod: CPTII,S$GLB,, | Performed by: STUDENT IN AN ORGANIZED HEALTH CARE EDUCATION/TRAINING PROGRAM

## 2022-03-30 PROCEDURE — 3074F PR MOST RECENT SYSTOLIC BLOOD PRESSURE < 130 MM HG: ICD-10-PCS | Mod: CPTII,S$GLB,, | Performed by: STUDENT IN AN ORGANIZED HEALTH CARE EDUCATION/TRAINING PROGRAM

## 2022-03-30 PROCEDURE — 99024 PR POST-OP FOLLOW-UP VISIT: ICD-10-PCS | Mod: S$GLB,,, | Performed by: STUDENT IN AN ORGANIZED HEALTH CARE EDUCATION/TRAINING PROGRAM

## 2022-03-30 NOTE — PROGRESS NOTES
15 staples removed from patient's abdomen. No bleeding noted. Incision dry and intact. Patient under no distress. Ambulated from clinic.

## 2022-03-30 NOTE — PROGRESS NOTES
"[facility] General Surgery  Follow-up    Subjective:     Chief Complaint:  Postop visit    HPI:  Pham Licea is a 66 y.o. female here today for postop visit.  Patient was recently hospitalized for small-bowel obstruction.  She ultimately underwent exploratory laparotomy with lysis of adhesions as the adhesions had created an internal hernia causing a partial intestinal obstruction.  Patient had a postoperative ileus and remained in the hospital for several days after surgery.  She reports overall doing well since discharge.  Still having some abdominal pain.  She is able to take her home medications and is having bowel function.  Appetite is not yet fully returned but she is tolerating diet with no nausea or vomiting.  Denies fevers, chills, or incisional problems.        Objective:      Vitals:    03/30/22 1125   BP: 125/78   Pulse: 90   Resp: 17   SpO2: 99%   Weight: 77.6 kg (171 lb)   Height: 5' 4" (1.626 m)     Physical Exam  Abdominal:      Comments: Incisions healing well with no signs of infection            Assessment:       1. Intestinal adhesions with partial obstruction    2. S/P exploratory laparotomy        Plan:   Intestinal adhesions with partial obstruction    S/P exploratory laparotomy        Medical Decision Making/Counseling:  Recovering well postop.  Staples removed today in clinic.  Continue no heavy lifting for another 4 weeks.  Keep follow-up with PCP.  Follow-up with general surgery as needed.    Patient instructed that best way to communicate with my office staff is for patient to get on the [facility] patient portal to expedite communication and communication issues that may occur.  Patient was given instructions on how to get on the portal.  I encouraged patient to obtain portal access as well.  Ultimately it is up to the patient to obtain access.  Patient voiced understanding.      " Statement Selected

## 2022-03-31 ENCOUNTER — PATIENT MESSAGE (OUTPATIENT)
Dept: FAMILY MEDICINE | Facility: CLINIC | Age: 67
End: 2022-03-31
Payer: COMMERCIAL

## 2022-03-31 RX ORDER — ATORVASTATIN CALCIUM 10 MG/1
10 TABLET, FILM COATED ORAL DAILY
Qty: 90 TABLET | Refills: 3 | Status: SHIPPED | OUTPATIENT
Start: 2022-03-31 | End: 2023-04-05

## 2022-04-04 ENCOUNTER — PATIENT MESSAGE (OUTPATIENT)
Dept: ADMINISTRATIVE | Facility: HOSPITAL | Age: 67
End: 2022-04-04
Payer: COMMERCIAL

## 2022-04-06 RX ORDER — LACTULOSE 10 G/15ML
SOLUTION ORAL
Qty: 946 ML | Refills: 0 | Status: SHIPPED | OUTPATIENT
Start: 2022-04-06 | End: 2022-06-08

## 2022-04-12 ENCOUNTER — OFFICE VISIT (OUTPATIENT)
Dept: SURGERY | Facility: CLINIC | Age: 67
End: 2022-04-12
Payer: COMMERCIAL

## 2022-04-12 VITALS
DIASTOLIC BLOOD PRESSURE: 67 MMHG | TEMPERATURE: 98 F | SYSTOLIC BLOOD PRESSURE: 134 MMHG | HEIGHT: 64 IN | BODY MASS INDEX: 29.28 KG/M2 | WEIGHT: 171.5 LBS | HEART RATE: 75 BPM

## 2022-04-12 DIAGNOSIS — L98.9 SKIN LESION: Primary | ICD-10-CM

## 2022-04-12 PROCEDURE — 3078F PR MOST RECENT DIASTOLIC BLOOD PRESSURE < 80 MM HG: ICD-10-PCS | Mod: CPTII,S$GLB,, | Performed by: SURGERY

## 2022-04-12 PROCEDURE — 99213 OFFICE O/P EST LOW 20 MIN: CPT | Mod: S$GLB,,, | Performed by: SURGERY

## 2022-04-12 PROCEDURE — 1111F PR DISCHARGE MEDS RECONCILED W/ CURRENT OUTPATIENT MED LIST: ICD-10-PCS | Mod: CPTII,S$GLB,, | Performed by: SURGERY

## 2022-04-12 PROCEDURE — 99213 PR OFFICE/OUTPT VISIT, EST, LEVL III, 20-29 MIN: ICD-10-PCS | Mod: S$GLB,,, | Performed by: SURGERY

## 2022-04-12 PROCEDURE — 3078F DIAST BP <80 MM HG: CPT | Mod: CPTII,S$GLB,, | Performed by: SURGERY

## 2022-04-12 PROCEDURE — 1159F MED LIST DOCD IN RCRD: CPT | Mod: CPTII,S$GLB,, | Performed by: SURGERY

## 2022-04-12 PROCEDURE — 3008F BODY MASS INDEX DOCD: CPT | Mod: CPTII,S$GLB,, | Performed by: SURGERY

## 2022-04-12 PROCEDURE — 1111F DSCHRG MED/CURRENT MED MERGE: CPT | Mod: CPTII,S$GLB,, | Performed by: SURGERY

## 2022-04-12 PROCEDURE — 99999 PR PBB SHADOW E&M-EST. PATIENT-LVL III: ICD-10-PCS | Mod: PBBFAC,,, | Performed by: SURGERY

## 2022-04-12 PROCEDURE — 99999 PR PBB SHADOW E&M-EST. PATIENT-LVL III: CPT | Mod: PBBFAC,,, | Performed by: SURGERY

## 2022-04-12 PROCEDURE — 3075F PR MOST RECENT SYSTOLIC BLOOD PRESS GE 130-139MM HG: ICD-10-PCS | Mod: CPTII,S$GLB,, | Performed by: SURGERY

## 2022-04-12 PROCEDURE — 3008F PR BODY MASS INDEX (BMI) DOCUMENTED: ICD-10-PCS | Mod: CPTII,S$GLB,, | Performed by: SURGERY

## 2022-04-12 PROCEDURE — 3075F SYST BP GE 130 - 139MM HG: CPT | Mod: CPTII,S$GLB,, | Performed by: SURGERY

## 2022-04-12 PROCEDURE — 1159F PR MEDICATION LIST DOCUMENTED IN MEDICAL RECORD: ICD-10-PCS | Mod: CPTII,S$GLB,, | Performed by: SURGERY

## 2022-04-12 NOTE — PROGRESS NOTES
Pleasant 66-year-old female returns for a follow-up evaluation.  Patient was seen by me initially for evaluation workup of possible colonic inertia.  Patient required exploratory laparotomy proximally 6 weeks ago secondary to a bowel obstruction.  She has recovered well from this.  She does present also slightly concerned about a lesion on her right buttock.  She notes that it is a skin lesion that seems of gotten larger.  No other new complaints.  Patient did have Sitz marker study performed in December with no retained Sitz markers noted.    AFVSS  AAox3  Soft/nt/nd  Buttock: 2 cm lesion on the R upper buttock.  Appears benign    A/P:  Lesion of the buttocks  Discussion with the patient.  Uncertain as to what this lesion is exactly.  I have offered surgical excision of time convenient to the patient.  I did discuss with this appears benign but of surgery desired would be happy to remove in the office.  She will consider this and will follow up with me on a p.r.n. basis

## 2022-04-13 NOTE — DISCHARGE SUMMARY
[facility] Medicine  Discharge Summary      Patient Name: Pham Licea  MRN: [id number]  Patient Class: IP- Inpatient  Admission Date: 3/15/2022  Hospital Length of Stay: 8 days  Discharge Date and Time: 3/23/2022  2:30 PM  Attending Physician: Eloise Coombs MD  Discharging Provider: Eloise Coombs MD  Primary Care Provider: Deven Emerson MD      HPI:   Patient 66-year-old female with past medical history of anemia, hepatic steatosis, former smoker, who presents with several hours history of abdominal pain vomiting.  She previously was admitted for exploratory laparotomy with enterectomy for bowel obstruction. Continued to have abdominal pain and subsequently developed another bowel obstruction which resolved with supportive care. She has been on a daily bowel regimen and has continued to have bowel movements daily however last night she developed severe crampy abdominal pain and vomiting. Was not able to tolerate PO intake so came straight to the ER. In the ER, CT abd/pel showed evidence of developing SBO so hospital team called for admission.        Procedure(s) (LRB):  LAPAROTOMY, EXPLORATORY (N/A)        Goals of Care Treatment Preferences:  Code Status: Full Code      Consults:   Consults (From admission, onward)        Status Ordering Provider     Inpatient consult to Dietitian  Once        Provider:  (Not yet assigned)    IFEOMA Edwards          Hospital Course:   Patient admitted and monitored. Repeat CT scan showed transition point, small bowel edema and fluid in abdomen concerning for bowel ischemia so she was taken urgently for ex-lap and found to have an internal hernia created by post-operative adhesions which is what caused her small bowel obstruction. Subsequently underwent lysis of adhesions which resolved obstruction. She was monitored post-operatively until return of bowel function. Discharged home in good condition.  "Follow up scheduled.  S/p small bowel obstruction  Continues supportive care  Possible discharge tomorrow if she has a bowel movement      Mitral valve prolapse  Monitor for fluid overload, shortness of breath, fatigue, weakness            Final Active Diagnoses:    Diagnosis Date Noted POA    S/p small bowel obstruction [Z87.19] 03/22/2022 Not Applicable    Mitral valve prolapse [I34.1] 03/23/2021 Yes      Problems Resolved During this Admission:    Diagnosis Date Noted Date Resolved POA    PRINCIPAL PROBLEM:  SBO (small bowel obstruction) [K56.609] 03/15/2022 03/21/2022 Yes    Partial intestinal obstruction due to peritoneal adhesions [K56.51]  03/21/2022 Yes    Obstruction concurrent with and due to internal hernia of abdomen [K46.0]  03/21/2022 Yes       Discharged Condition: good    Disposition: Home or Self Care    Follow Up:   Follow-up Information     Lila Marquez MD. Go on 3/30/2022.    Specialty: General Surgery  Why: appointment Wednesday March 30th at 11:15am for surgery follow up  Contact information:  [address] [facility] [address]  [phone]             Deven Emerson MD. Go on 3/29/2022.    Specialty: Family Medicine  Why: appointment Tuesday March 29th at 11:00am for hospital follow up  Contact information:  [address] [facility] [place] [address]  [phone]                       Patient Instructions:      WALKER FOR HOME USE     Order Specific Question Answer Comments   Type of Walker: Adult (5'4"-6'6")    With wheels? Yes    Height: 5' 4" (1.626 m)    Weight: 77.1 kg (170 lb 1 oz)    Length of need (1-99 months): 12    Does patient have medical equipment at home? none    Please check all that apply: Patient is unable to safely ambulate without equipment.    Please check all that apply: Patient's condition impairs ambulation.      Diet full liquid   Order Comments: Advance slowly as tolerated     Lifting restrictions   Order Comments: No heavy >10 lbs     Notify " your health care provider if you experience any of the following:  persistent nausea and vomiting or diarrhea     Notify your health care provider if you experience any of the following:  severe uncontrolled pain       Significant Diagnostic Studies:   Results for orders placed or performed during the hospital encounter of 03/15/22   Complete Blood Count (CBC)   Result Value Ref Range    WBC 13.35 (H) 3.90 - 12.70 K/uL    RBC 5.08 4.00 - 5.40 M/uL    Hemoglobin 12.2 12.0 - 16.0 g/dL    Hematocrit 40.4 37.0 - 48.5 %    MCV 80 (L) 82 - 98 fL    MCH 24.0 (L) 27.0 - 31.0 pg    MCHC 30.2 (L) 32.0 - 36.0 g/dL    RDW 25.7 (H) 11.5 - 14.5 %    Platelets 347 150 - 450 K/uL    MPV 9.6 9.2 - 12.9 fL    Immature Granulocytes 0.5 0.0 - 0.5 %    Gran # (ANC) 11.3 (H) 1.8 - 7.7 K/uL    Immature Grans (Abs) 0.07 (H) 0.00 - 0.04 K/uL    Lymph # 1.1 1.0 - 4.8 K/uL    Mono # 0.8 0.3 - 1.0 K/uL    Eos # 0.0 0.0 - 0.5 K/uL    Baso # 0.03 0.00 - 0.20 K/uL    nRBC 0 0 /100 WBC    Gran % 85.1 (H) 38.0 - 73.0 %    Lymph % 8.5 (L) 18.0 - 48.0 %    Mono % 5.6 4.0 - 15.0 %    Eosinophil % 0.1 0.0 - 8.0 %    Basophil % 0.2 0.0 - 1.9 %    Differential Method Automated    Comprehensive Metabolic Panel (CMP)   Result Value Ref Range    Sodium 140 136 - 145 mmol/L    Potassium 3.7 3.5 - 5.1 mmol/L    Chloride 100 95 - 110 mmol/L    CO2 21 (L) 23 - 29 mmol/L    Glucose 149 (H) 70 - 110 mg/dL    BUN 15 8 - 23 mg/dL    Creatinine 0.8 0.5 - 1.4 mg/dL    Calcium 10.2 8.7 - 10.5 mg/dL    Total Protein 7.7 6.0 - 8.4 g/dL    Albumin 4.7 3.5 - 5.2 g/dL    Total Bilirubin 0.4 0.1 - 1.0 mg/dL    Alkaline Phosphatase 80 55 - 135 U/L    AST 15 10 - 40 U/L    ALT 13 10 - 44 U/L    Anion Gap 19 (H) 8 - 16 mmol/L    eGFR if African American >60.0 >60 mL/min/1.73 m^2    eGFR if non African American >60.0 >60 mL/min/1.73 m^2   Lipase   Result Value Ref Range    Lipase 15 4 - 60 U/L   COVID-19 Rapid Screening   Result Value Ref Range    SARS-CoV-2 RNA, Amplification,  Qual Negative Negative   Comprehensive Metabolic Panel (CMP)   Result Value Ref Range    Sodium 140 136 - 145 mmol/L    Potassium 4.1 3.5 - 5.1 mmol/L    Chloride 107 95 - 110 mmol/L    CO2 25 23 - 29 mmol/L    Glucose 84 70 - 110 mg/dL    BUN 13 8 - 23 mg/dL    Creatinine 0.6 0.5 - 1.4 mg/dL    Calcium 8.5 (L) 8.7 - 10.5 mg/dL    Total Protein 5.3 (L) 6.0 - 8.4 g/dL    Albumin 3.2 (L) 3.5 - 5.2 g/dL    Total Bilirubin 0.5 0.1 - 1.0 mg/dL    Alkaline Phosphatase 58 55 - 135 U/L    AST 13 10 - 40 U/L    ALT 11 10 - 44 U/L    Anion Gap 8 8 - 16 mmol/L    eGFR if African American >60.0 >60 mL/min/1.73 m^2    eGFR if non African American >60.0 >60 mL/min/1.73 m^2   Magnesium   Result Value Ref Range    Magnesium 1.9 1.6 - 2.6 mg/dL   Phosphorus   Result Value Ref Range    Phosphorus 2.6 (L) 2.7 - 4.5 mg/dL   CBC with Automated Differential   Result Value Ref Range    WBC 5.50 3.90 - 12.70 K/uL    RBC 4.00 4.00 - 5.40 M/uL    Hemoglobin 9.7 (L) 12.0 - 16.0 g/dL    Hematocrit 32.9 (L) 37.0 - 48.5 %    MCV 82 82 - 98 fL    MCH 24.3 (L) 27.0 - 31.0 pg    MCHC 29.5 (L) 32.0 - 36.0 g/dL    RDW 27.0 (H) 11.5 - 14.5 %    Platelets 264 150 - 450 K/uL    MPV 9.8 9.2 - 12.9 fL    Immature Granulocytes 0.2 0.0 - 0.5 %    Gran # (ANC) 2.9 1.8 - 7.7 K/uL    Immature Grans (Abs) 0.01 0.00 - 0.04 K/uL    Lymph # 1.8 1.0 - 4.8 K/uL    Mono # 0.7 0.3 - 1.0 K/uL    Eos # 0.1 0.0 - 0.5 K/uL    Baso # 0.02 0.00 - 0.20 K/uL    nRBC 0 0 /100 WBC    Gran % 52.0 38.0 - 73.0 %    Lymph % 32.5 18.0 - 48.0 %    Mono % 12.7 4.0 - 15.0 %    Eosinophil % 2.2 0.0 - 8.0 %    Basophil % 0.4 0.0 - 1.9 %    Differential Method Automated    Comprehensive Metabolic Panel (CMP)   Result Value Ref Range    Sodium 141 136 - 145 mmol/L    Potassium 4.0 3.5 - 5.1 mmol/L    Chloride 107 95 - 110 mmol/L    CO2 24 23 - 29 mmol/L    Glucose 75 70 - 110 mg/dL    BUN 9 8 - 23 mg/dL    Creatinine 0.6 0.5 - 1.4 mg/dL    Calcium 8.6 (L) 8.7 - 10.5 mg/dL    Total Protein  5.7 (L) 6.0 - 8.4 g/dL    Albumin 3.2 (L) 3.5 - 5.2 g/dL    Total Bilirubin 0.3 0.1 - 1.0 mg/dL    Alkaline Phosphatase 80 55 - 135 U/L    AST 25 10 - 40 U/L    ALT 18 10 - 44 U/L    Anion Gap 10 8 - 16 mmol/L    eGFR if African American >60.0 >60 mL/min/1.73 m^2    eGFR if non African American >60.0 >60 mL/min/1.73 m^2   Magnesium   Result Value Ref Range    Magnesium 1.7 1.6 - 2.6 mg/dL   Phosphorus   Result Value Ref Range    Phosphorus 3.1 2.7 - 4.5 mg/dL   CBC with Automated Differential   Result Value Ref Range    WBC 7.20 3.90 - 12.70 K/uL    RBC 3.89 (L) 4.00 - 5.40 M/uL    Hemoglobin 9.5 (L) 12.0 - 16.0 g/dL    Hematocrit 32.1 (L) 37.0 - 48.5 %    MCV 83 82 - 98 fL    MCH 24.4 (L) 27.0 - 31.0 pg    MCHC 29.6 (L) 32.0 - 36.0 g/dL    RDW 26.3 (H) 11.5 - 14.5 %    Platelets 252 150 - 450 K/uL    MPV 10.1 9.2 - 12.9 fL    Immature Granulocytes 0.3 0.0 - 0.5 %    Gran # (ANC) 4.4 1.8 - 7.7 K/uL    Immature Grans (Abs) 0.02 0.00 - 0.04 K/uL    Lymph # 2.0 1.0 - 4.8 K/uL    Mono # 0.7 0.3 - 1.0 K/uL    Eos # 0.1 0.0 - 0.5 K/uL    Baso # 0.03 0.00 - 0.20 K/uL    nRBC 0 0 /100 WBC    Gran % 60.6 38.0 - 73.0 %    Lymph % 27.5 18.0 - 48.0 %    Mono % 9.9 4.0 - 15.0 %    Eosinophil % 1.3 0.0 - 8.0 %    Basophil % 0.4 0.0 - 1.9 %    Differential Method Automated    Comprehensive Metabolic Panel (CMP)   Result Value Ref Range    Sodium 139 136 - 145 mmol/L    Potassium 3.6 3.5 - 5.1 mmol/L    Chloride 102 95 - 110 mmol/L    CO2 28 23 - 29 mmol/L    Glucose 89 70 - 110 mg/dL    BUN 8 8 - 23 mg/dL    Creatinine 0.6 0.5 - 1.4 mg/dL    Calcium 8.7 8.7 - 10.5 mg/dL    Total Protein 5.4 (L) 6.0 - 8.4 g/dL    Albumin 3.0 (L) 3.5 - 5.2 g/dL    Total Bilirubin 0.3 0.1 - 1.0 mg/dL    Alkaline Phosphatase 102 55 - 135 U/L    AST 38 10 - 40 U/L    ALT 43 10 - 44 U/L    Anion Gap 9 8 - 16 mmol/L    eGFR if African American >60.0 >60 mL/min/1.73 m^2    eGFR if non African American >60.0 >60 mL/min/1.73 m^2   Magnesium   Result Value  Ref Range    Magnesium 1.6 1.6 - 2.6 mg/dL   Phosphorus   Result Value Ref Range    Phosphorus 3.1 2.7 - 4.5 mg/dL   CBC with Automated Differential   Result Value Ref Range    WBC 8.69 3.90 - 12.70 K/uL    RBC 3.70 (L) 4.00 - 5.40 M/uL    Hemoglobin 9.1 (L) 12.0 - 16.0 g/dL    Hematocrit 30.6 (L) 37.0 - 48.5 %    MCV 83 82 - 98 fL    MCH 24.6 (L) 27.0 - 31.0 pg    MCHC 29.7 (L) 32.0 - 36.0 g/dL    RDW 26.5 (H) 11.5 - 14.5 %    Platelets 242 150 - 450 K/uL    MPV 9.9 9.2 - 12.9 fL    Immature Granulocytes 0.2 0.0 - 0.5 %    Gran # (ANC) 5.9 1.8 - 7.7 K/uL    Immature Grans (Abs) 0.02 0.00 - 0.04 K/uL    Lymph # 1.8 1.0 - 4.8 K/uL    Mono # 0.9 0.3 - 1.0 K/uL    Eos # 0.0 0.0 - 0.5 K/uL    Baso # 0.03 0.00 - 0.20 K/uL    nRBC 0 0 /100 WBC    Gran % 67.6 38.0 - 73.0 %    Lymph % 20.8 18.0 - 48.0 %    Mono % 10.6 4.0 - 15.0 %    Eosinophil % 0.5 0.0 - 8.0 %    Basophil % 0.3 0.0 - 1.9 %    Differential Method Automated    CBC auto differential   Result Value Ref Range    WBC 6.75 3.90 - 12.70 K/uL    RBC 3.59 (L) 4.00 - 5.40 M/uL    Hemoglobin 8.8 (L) 12.0 - 16.0 g/dL    Hematocrit 29.4 (L) 37.0 - 48.5 %    MCV 82 82 - 98 fL    MCH 24.5 (L) 27.0 - 31.0 pg    MCHC 29.9 (L) 32.0 - 36.0 g/dL    RDW 26.7 (H) 11.5 - 14.5 %    Platelets 233 150 - 450 K/uL    MPV 9.5 9.2 - 12.9 fL    Immature Granulocytes 0.3 0.0 - 0.5 %    Gran # (ANC) 4.3 1.8 - 7.7 K/uL    Immature Grans (Abs) 0.02 0.00 - 0.04 K/uL    Lymph # 1.6 1.0 - 4.8 K/uL    Mono # 0.6 0.3 - 1.0 K/uL    Eos # 0.2 0.0 - 0.5 K/uL    Baso # 0.06 0.00 - 0.20 K/uL    nRBC 0 0 /100 WBC    Gran % 63.3 38.0 - 73.0 %    Lymph % 24.1 18.0 - 48.0 %    Mono % 8.9 4.0 - 15.0 %    Eosinophil % 2.5 0.0 - 8.0 %    Basophil % 0.9 0.0 - 1.9 %    Differential Method Automated    Comprehensive metabolic panel   Result Value Ref Range    Sodium 140 136 - 145 mmol/L    Potassium 3.7 3.5 - 5.1 mmol/L    Chloride 103 95 - 110 mmol/L    CO2 23 23 - 29 mmol/L    Glucose 68 (L) 70 - 110 mg/dL     BUN 8 8 - 23 mg/dL    Creatinine 0.6 0.5 - 1.4 mg/dL    Calcium 8.5 (L) 8.7 - 10.5 mg/dL    Total Protein 5.5 (L) 6.0 - 8.4 g/dL    Albumin 2.9 (L) 3.5 - 5.2 g/dL    Total Bilirubin 0.2 0.1 - 1.0 mg/dL    Alkaline Phosphatase 79 55 - 135 U/L    AST 25 10 - 40 U/L    ALT 31 10 - 44 U/L    Anion Gap 14 8 - 16 mmol/L    eGFR if African American >60.0 >60 mL/min/1.73 m^2    eGFR if non African American >60.0 >60 mL/min/1.73 m^2   CBC auto differential   Result Value Ref Range    WBC 5.24 3.90 - 12.70 K/uL    RBC 3.90 (L) 4.00 - 5.40 M/uL    Hemoglobin 9.7 (L) 12.0 - 16.0 g/dL    Hematocrit 32.1 (L) 37.0 - 48.5 %    MCV 82 82 - 98 fL    MCH 24.9 (L) 27.0 - 31.0 pg    MCHC 30.2 (L) 32.0 - 36.0 g/dL    RDW 26.5 (H) 11.5 - 14.5 %    Platelets 259 150 - 450 K/uL    MPV 9.6 9.2 - 12.9 fL    Immature Granulocytes 0.2 0.0 - 0.5 %    Gran # (ANC) 2.9 1.8 - 7.7 K/uL    Immature Grans (Abs) 0.01 0.00 - 0.04 K/uL    Lymph # 1.6 1.0 - 4.8 K/uL    Mono # 0.5 0.3 - 1.0 K/uL    Eos # 0.2 0.0 - 0.5 K/uL    Baso # 0.04 0.00 - 0.20 K/uL    nRBC 0 0 /100 WBC    Gran % 55.1 38.0 - 73.0 %    Lymph % 31.1 18.0 - 48.0 %    Mono % 9.4 4.0 - 15.0 %    Eosinophil % 3.4 0.0 - 8.0 %    Basophil % 0.8 0.0 - 1.9 %    Differential Method Automated    Iron and TIBC   Result Value Ref Range    Iron 28 (L) 30 - 160 ug/dL    Transferrin 223 200 - 375 mg/dL    TIBC 330 250 - 450 ug/dL    Saturated Iron 8 (L) 20 - 50 %     CT Abdomen Pelvis  Without Contrast   Final Result   Abnormal      1. Persistent partial small bowel obstruction with transition point within the lower mid abdomen at the site of surgical anastomosis from prior bowel resection.   2. Interval development of edematous bowel wall thickening involving the dilated loops of small bowel within the central abdomen with adjacent inflammatory stranding and fluid.  These findings are concerning for vascular compromise or compromise of the bowel venous drainage.   3. Mild bladder wall thickening.   This may be secondary to a lack of adequate distention.  Differential diagnosis includes cystitis.  Correlate clinically with UA.   Close interval follow-up is recommended.      This report was flagged in Epic as abnormal.      The preliminary and final reports are concordant.         Electronically signed by: Alan Montenegro   Date:    03/17/2022   Time:    05:58      CT Abdomen Pelvis  Without Contrast   Final Result   Abnormal      1. Previous cholecystectomy.   2. Diverticulosis.   3. Previous small bowel resection with suspected developing small bowel obstruction at the level of the anastomosis.  Close interval follow-up is recommended.   4. Diverticulosis.   5. Prior hysterectomy.   This report was flagged in Epic as abnormal.      The preliminary and final reports are concordant.         Electronically signed by: Alan Montenegro   Date:    03/15/2022   Time:    10:36      RADIOLOGY REPORT   Final Result            Pending Diagnostic Studies:     None         Medications:  Reconciled Home Medications:      Medication List      START taking these medications    bisacodyL 10 mg Supp  Commonly known as: DULCOLAX  Place 1 suppository (10 mg total) rectally daily as needed (constipation).     HYDROcodone-acetaminophen 7.5-325 mg per tablet  Commonly known as: NORCO  Take 1 tablet by mouth every 6 (six) hours as needed for Pain.        CONTINUE taking these medications    diclofenac sodium 1 % Gel  Commonly known as: VOLTAREN  Apply 2 g topically 2 (two) times daily as needed.     docusate sodium 100 MG capsule  Commonly known as: COLACE  Take 100 mg by mouth 2 (two) times daily.     pantoprazole 20 MG tablet  Commonly known as: PROTONIX  Take 1 tablet (20 mg total) by mouth once daily.     vitamin D 1000 units Tab  Commonly known as: VITAMIN D3  Take 3,000 Units by mouth once daily.        STOP taking these medications    lactulose 10 gram/15 mL solution  Commonly known as: CHRONULAC            Indwelling  Lines/Drains at time of discharge:   Lines/Drains/Airways       None                   Time spent on the discharge of patient: 75 minutes         Eloise Coombs MD  Department of Hospital Medicine  [facility]

## 2022-04-15 ENCOUNTER — PATIENT MESSAGE (OUTPATIENT)
Dept: SURGERY | Facility: CLINIC | Age: 67
End: 2022-04-15
Payer: COMMERCIAL

## 2022-04-18 ENCOUNTER — LAB VISIT (OUTPATIENT)
Dept: LAB | Facility: HOSPITAL | Age: 67
End: 2022-04-18
Attending: FAMILY MEDICINE
Payer: COMMERCIAL

## 2022-04-18 DIAGNOSIS — E78.5 DYSLIPIDEMIA: ICD-10-CM

## 2022-04-18 DIAGNOSIS — D64.9 SEVERE ANEMIA: ICD-10-CM

## 2022-04-18 DIAGNOSIS — D50.9 IRON DEFICIENCY ANEMIA, UNSPECIFIED IRON DEFICIENCY ANEMIA TYPE: ICD-10-CM

## 2022-04-18 LAB
ALBUMIN SERPL BCP-MCNC: 4.6 G/DL (ref 3.5–5.2)
ALP SERPL-CCNC: 72 U/L (ref 55–135)
ALT SERPL W/O P-5'-P-CCNC: 14 U/L (ref 10–44)
ANION GAP SERPL CALC-SCNC: 11 MMOL/L (ref 8–16)
AST SERPL-CCNC: 19 U/L (ref 10–40)
BASOPHILS # BLD AUTO: 0.04 K/UL (ref 0–0.2)
BASOPHILS NFR BLD: 0.8 % (ref 0–1.9)
BILIRUB SERPL-MCNC: 0.4 MG/DL (ref 0.1–1)
BUN SERPL-MCNC: 10 MG/DL (ref 8–23)
CALCIUM SERPL-MCNC: 10 MG/DL (ref 8.7–10.5)
CHLORIDE SERPL-SCNC: 106 MMOL/L (ref 95–110)
CO2 SERPL-SCNC: 25 MMOL/L (ref 23–29)
CREAT SERPL-MCNC: 0.7 MG/DL (ref 0.5–1.4)
DIFFERENTIAL METHOD: ABNORMAL
EOSINOPHIL # BLD AUTO: 0.1 K/UL (ref 0–0.5)
EOSINOPHIL NFR BLD: 1.9 % (ref 0–8)
ERYTHROCYTE [DISTWIDTH] IN BLOOD BY AUTOMATED COUNT: 21 % (ref 11.5–14.5)
EST. GFR  (AFRICAN AMERICAN): >60 ML/MIN/1.73 M^2
EST. GFR  (NON AFRICAN AMERICAN): >60 ML/MIN/1.73 M^2
FERRITIN SERPL-MCNC: 121 NG/ML (ref 20–300)
GLUCOSE SERPL-MCNC: 99 MG/DL (ref 70–110)
HCT VFR BLD AUTO: 42.8 % (ref 37–48.5)
HGB BLD-MCNC: 13.4 G/DL (ref 12–16)
IMM GRANULOCYTES # BLD AUTO: 0.01 K/UL (ref 0–0.04)
IMM GRANULOCYTES NFR BLD AUTO: 0.2 % (ref 0–0.5)
IRON SERPL-MCNC: 98 UG/DL (ref 30–160)
LYMPHOCYTES # BLD AUTO: 2 K/UL (ref 1–4.8)
LYMPHOCYTES NFR BLD: 38.2 % (ref 18–48)
MCH RBC QN AUTO: 26.2 PG (ref 27–31)
MCHC RBC AUTO-ENTMCNC: 31.3 G/DL (ref 32–36)
MCV RBC AUTO: 84 FL (ref 82–98)
MONOCYTES # BLD AUTO: 0.4 K/UL (ref 0.3–1)
MONOCYTES NFR BLD: 8.3 % (ref 4–15)
NEUTROPHILS # BLD AUTO: 2.7 K/UL (ref 1.8–7.7)
NEUTROPHILS NFR BLD: 50.6 % (ref 38–73)
NRBC BLD-RTO: 0 /100 WBC
PLATELET # BLD AUTO: 266 K/UL (ref 150–450)
PMV BLD AUTO: 9.8 FL (ref 9.2–12.9)
POTASSIUM SERPL-SCNC: 3.9 MMOL/L (ref 3.5–5.1)
PROT SERPL-MCNC: 7.6 G/DL (ref 6–8.4)
RBC # BLD AUTO: 5.11 M/UL (ref 4–5.4)
SATURATED IRON: 24 % (ref 20–50)
SODIUM SERPL-SCNC: 142 MMOL/L (ref 136–145)
TOTAL IRON BINDING CAPACITY: 410 UG/DL (ref 250–450)
TRANSFERRIN SERPL-MCNC: 277 MG/DL (ref 200–375)
VIT B12 SERPL-MCNC: 245 PG/ML (ref 210–950)
WBC # BLD AUTO: 5.31 K/UL (ref 3.9–12.7)

## 2022-04-18 PROCEDURE — 36415 COLL VENOUS BLD VENIPUNCTURE: CPT | Performed by: INTERNAL MEDICINE

## 2022-04-18 PROCEDURE — 85025 COMPLETE CBC W/AUTO DIFF WBC: CPT | Performed by: INTERNAL MEDICINE

## 2022-04-18 PROCEDURE — 82728 ASSAY OF FERRITIN: CPT | Performed by: INTERNAL MEDICINE

## 2022-04-18 PROCEDURE — 80053 COMPREHEN METABOLIC PANEL: CPT | Performed by: INTERNAL MEDICINE

## 2022-04-18 PROCEDURE — 82607 VITAMIN B-12: CPT | Performed by: INTERNAL MEDICINE

## 2022-04-18 PROCEDURE — 84466 ASSAY OF TRANSFERRIN: CPT | Performed by: INTERNAL MEDICINE

## 2022-04-21 ENCOUNTER — OFFICE VISIT (OUTPATIENT)
Dept: HEMATOLOGY/ONCOLOGY | Facility: CLINIC | Age: 67
End: 2022-04-21
Payer: COMMERCIAL

## 2022-04-21 DIAGNOSIS — K56.49 OTHER IMPACTION OF INTESTINE: Primary | ICD-10-CM

## 2022-04-21 DIAGNOSIS — D50.8 IRON DEFICIENCY ANEMIA SECONDARY TO INADEQUATE DIETARY IRON INTAKE: ICD-10-CM

## 2022-04-21 DIAGNOSIS — Z87.19 S/P SMALL BOWEL OBSTRUCTION: ICD-10-CM

## 2022-04-21 DIAGNOSIS — E53.8 B12 DEFICIENCY: ICD-10-CM

## 2022-04-21 PROCEDURE — 99214 PR OFFICE/OUTPT VISIT, EST, LEVL IV, 30-39 MIN: ICD-10-PCS | Mod: 95,,, | Performed by: INTERNAL MEDICINE

## 2022-04-21 PROCEDURE — 1111F DSCHRG MED/CURRENT MED MERGE: CPT | Mod: CPTII,95,, | Performed by: INTERNAL MEDICINE

## 2022-04-21 PROCEDURE — 99214 OFFICE O/P EST MOD 30 MIN: CPT | Mod: 95,,, | Performed by: INTERNAL MEDICINE

## 2022-04-21 PROCEDURE — 1111F PR DISCHARGE MEDS RECONCILED W/ CURRENT OUTPATIENT MED LIST: ICD-10-PCS | Mod: CPTII,95,, | Performed by: INTERNAL MEDICINE

## 2022-04-21 NOTE — PROGRESS NOTES
Subjective:    The patient location is: home  Visit type: Virtual visit with synchronous audio and video  Face-to-face or time spent with patient on the encounter:20min  Total time spent on and for  this encounter which includes non face-to-face time preparing to see patient, review of tests, obtaining and or reviewing separately obtained records documenting clinical information in the electronic or other health records, independently interpreting results which is not separately reported ,and communicating results to the patient/family/caregiver and in care coordination and treatment planning/communicating with pharmacy for prescriptions/addressing social needs/arranging follow-up and or referrals :25 min    Each patient I provide medical services by telemedicine is:  (1) informed of the relationship between the physician and patient and the respective role of any other health care provider with respect to management of the patient; and (2) notified that he or she may decline to receive medical services by telemedicine and may withdraw from such care at any time.  This is a video visit therefore some elements of the physical exam such as vital signs, heart sounds are breath sounds are not included and may be included if found in recent clinic notes of other providers assessing same patient. Any symptoms or signs that were visualized were stated by the patient may be included in this note.     Patient ID: Pham Licea is a 66 y.o. female.    Chief Complaint:      sept 2020 had gall bladder surgery, thern she had a bowel obstruction, no surgery then, had 2 feet of bowels removed after a second bowel obstruction  Esophageal ulcers and dudenal ulcers- with bleeding in 2017  Recd blood in [facility] this weekend, pt went to ED because she was shaky and week and couldn't think was found to be anemic in ed   pt readmitted 3/22 had repeat bowel obstruction and continues iron infusion d/c home last week  Past Medical  History:   Diagnosis Date    Anemia     Fatty liver 2015    Former smoker 2009    Osteoarthritis 2010    Pulmonary nodules 2019    Repeat CT in 6 mo    Ulcer of abdomen wall 2016     Past Surgical History:   Procedure Laterality Date    APPENDECTOMY  1993     SECTION  , , 1983    x3    COLONOSCOPY  ~2014    [facility]: normal findings per patient report    COLONOSCOPY N/A 2020    Procedure: COLONOSCOPY;  Surgeon: Misael Holm MD;  Location: [facility];  Service: General;  Laterality: N/A;    DIAGNOSTIC LAPAROSCOPY N/A 2021    Procedure: LAPAROSCOPY, DIAGNOSTIC;  Surgeon: Misael Holm MD;  Location: [facility] OR;  Service: General;  Laterality: N/A;    EPIDURAL STEROID INJECTION INTO LUMBAR SPINE N/A 10/12/2018    Procedure: Injection-steroid-epidural-lumbar;  Surgeon: Kal Johnson MD;  [facility];  Service: Pain Management;  Laterality: N/A;  L5-S1    EPIDURAL STEROID INJECTION INTO LUMBAR SPINE N/A 2019    Procedure: Injection-steroid-epidural-lumbar L5-S1;  Surgeon: Kal Johnson MD;  [facility];  Service: Pain Management;  Laterality: N/A;    ESOPHAGOGASTRODUODENOSCOPY N/A 2020    Procedure: ESOPHAGOGASTRODUODENOSCOPY (EGD);  Surgeon: Misael Holm MD;  Location: [facility];  Service: General;  Laterality: N/A;    HYSTERECTOMY  1993    one ovary conserved    LAPAROSCOPIC CHOLECYSTECTOMY N/A 2020    Procedure: CHOLECYSTECTOMY, LAPAROSCOPIC;  Surgeon: Govind Frey MD;  Location: [facility] OR;  Service: General;  Laterality: N/A;    LAPAROSCOPIC LYSIS OF ADHESIONS N/A 2021    Procedure: LYSIS, ADHESIONS, LAPAROSCOPIC;  Surgeon: Misael Holm MD;  Location: [facility] OR;  Service: General;  Laterality: N/A;    UPPER GASTROINTESTINAL ENDOSCOPY  2016     Family History   Problem Relation Age of Onset    Ovarian cancer Mother     Heart disease Mother     Osteoporosis Mother     Arthritis Mother     Hypertension Father      Stroke Father 50    Brain Hemorrhage Father     Aneurysm Father     Osteoarthritis Sister     Arthritis Sister     Hypertension Sister     Obesity Sister     Breast cancer Neg Hx     Colon cancer Neg Hx     Colon polyps Neg Hx     Crohn's disease Neg Hx     Ulcerative colitis Neg Hx       Social History     Socioeconomic History    Marital status:     Number of children: 4    Highest education level: Some college, no degree   Occupational History    Occupation: sale specialist-    Tobacco Use    Smoking status: Former Smoker     Packs/day: 1.00     Years: 40.00     Pack years: 40.00     Quit date: 2009     Years since quittin.4    Smokeless tobacco: Never Used    Tobacco comment: quit 2009   Substance and Sexual Activity    Alcohol use: Not Currently     Comment: Not often - one glass of wine every now and then    Drug use: Not Currently     Types: Marijuana     Comment: in     Sexual activity: Not Currently     Social Determinants of Health     Financial Resource Strain: Low Risk     Difficulty of Paying Living Expenses: Not very hard   Food Insecurity: No Food Insecurity    Worried About Running Out of Food in the Last Year: Never true    Ran Out of Food in the Last Year: Never true   Transportation Needs: No Transportation Needs    Lack of Transportation (Medical): No    Lack of Transportation (Non-Medical): No   Physical Activity: Inactive    Days of Exercise per Week: 0 days    Minutes of Exercise per Session: 0 min   Stress: No Stress Concern Present    Feeling of Stress : Only a little   Social Connections: Unknown    Frequency of Communication with Friends and Family: More than three times a week    Frequency of Social Gatherings with Friends and Family: More than three times a week    Active Member of Clubs or Organizations: Yes    Attends Club or Organization Meetings: More than 4 times per year    Marital Status:    Housing Stability:  Low Risk     Unable to Pay for Housing in the Last Year: No    Number of Places Lived in the Last Year: 1    Unstable Housing in the Last Year: No     Review of patient's allergies indicates:  No Known Allergies    Current Outpatient Medications:     atorvastatin (LIPITOR) 10 MG tablet, Take 1 tablet (10 mg total) by mouth once daily., Disp: 90 tablet, Rfl: 3    bisacodyL (DULCOLAX) 10 mg Supp, Place 1 suppository (10 mg total) rectally daily as needed (constipation). (Patient not taking: Reported on 4/12/2022), Disp: 15 suppository, Rfl: 0    CONSTULOSE 10 gram/15 mL solution, TAKE 10 ML BY MOUTH THREE TIMES DAILY, Disp: 946 mL, Rfl: 0    diclofenac sodium (VOLTAREN) 1 % Gel, Apply 2 g topically 2 (two) times daily as needed., Disp: , Rfl:     docusate sodium (COLACE) 100 MG capsule, Take 100 mg by mouth 2 (two) times daily., Disp: , Rfl:     HYDROcodone-acetaminophen (NORCO) 7.5-325 mg per tablet, Take 1 tablet by mouth every 6 (six) hours as needed for Pain. (Patient not taking: Reported on 4/12/2022), Disp: 24 tablet, Rfl: 0    ondansetron (ZOFRAN-ODT) 8 MG TbDL, Take 1 tablet (8 mg total) by mouth every 6 (six) hours as needed (nausea or vomiting). (Patient not taking: Reported on 4/12/2022), Disp: 30 tablet, Rfl: 1    pantoprazole (PROTONIX) 20 MG tablet, Take 1 tablet (20 mg total) by mouth once daily., Disp: 90 tablet, Rfl: 3    vitamin D (VITAMIN D3) 1000 units Tab, Take 3,000 Units by mouth once daily., Disp: , Rfl:   Review of Systems   Constitutional: Positive for malaise/fatigue. Negative for weight loss.        Mostly good appetite lost weight with her sx   HENT: Negative for hearing loss.    Eyes: Negative for blurred vision and double vision.   Gastrointestinal: Positive for constipation.        Takes lactulose twice a day and colace twice a day   Musculoskeletal: Negative for back pain, myalgias and neck pain.        Leg cramps and shaky   Skin: Negative for rash.   Neurological:  Negative for dizziness, weakness and headaches.   Endo/Heme/Allergies: Does not bruise/bleed easily.   recent bowel obstruction 3/22  Physical Exam    Wt Readings from Last 3 Encounters:   04/12/22 77.8 kg (171 lb 8.3 oz)   03/30/22 77.6 kg (171 lb)   03/29/22 78.2 kg (172 lb 6 oz)     Temp Readings from Last 3 Encounters:   04/12/22 98 °F (36.7 °C) (Oral)   03/29/22 97.9 °F (36.6 °C) (Oral)   03/23/22 98 °F (36.7 °C) (Oral)     BP Readings from Last 3 Encounters:   04/12/22 134/67   03/30/22 125/78   03/29/22 112/68     Pulse Readings from Last 3 Encounters:   04/12/22 75   03/30/22 90   03/29/22 80     Lab Results   Component Value Date    WBC 5.31 04/18/2022    HGB 13.4 04/18/2022    HCT 42.8 04/18/2022    MCV 84 04/18/2022     04/18/2022       BMP  Lab Results   Component Value Date     04/18/2022    K 3.9 04/18/2022     04/18/2022    CO2 25 04/18/2022    BUN 10 04/18/2022    CREATININE 0.7 04/18/2022    CALCIUM 10.0 04/18/2022    ANIONGAP 11 04/18/2022    ESTGFRAFRICA >60.0 04/18/2022    EGFRNONAA >60.0 04/18/2022     Lab Results   Component Value Date    IRON 98 04/18/2022    TIBC 410 04/18/2022    FERRITIN 121 04/18/2022         Patient Active Problem List   Diagnosis    Primary osteoarthritis involving multiple joints    Obesity (BMI 30.0-34.9)    Gastroesophageal reflux disease    Right knee pain    Chronic midline low back pain    Acute tear medial meniscus, right, sequela    Primary osteoarthritis of right knee    Lumbar radiculitis    Trigger middle finger of right hand    Nausea    Abdominal pain    Encounter for postoperative care    Intestinal obstruction    Hypokalemia    Constipation    History of cholecystectomy    Right upper quadrant pain    History of hysterectomy    Rectal bleeding    Hemorrhoids    Mitral valve prolapse    Coronary artery calcification    Anemia    SAMPSON (dyspnea on exertion), noted 2010    History of smoking 25-50 pack years, 46 py,  quit 2009    Other emphysema    NSAID long-term use, started 2017    Bandemia    Family history of premature CAD    Cardiovascular event risk, ASCVD 10-year risk 4.8%, 2019    Abdominal obesity    Dyslipidemia, baseline LDL-C 112, HDL-C 49    Iron deficiency anemia secondary to inadequate dietary iron intake    Severe anemia    S/p small bowel obstruction        Assessment and Plan   NATHAN: pt had partail bowel removal from opbstructions so anticipate this will be recurrent   recvd infusinal iron with excellent response   Recheck in 3 months with cbc, cmp, iron fwerritn b12  Patient to continue follow-up of her DJD with PCP  Bowel obs again 3/22 repeat sx and adhesiolysis,    pt advised to complete iron and redraw labs in about 3 weeks and recheck iron levels

## 2022-04-26 ENCOUNTER — PATIENT MESSAGE (OUTPATIENT)
Dept: FAMILY MEDICINE | Facility: CLINIC | Age: 67
End: 2022-04-26
Payer: COMMERCIAL

## 2022-05-03 ENCOUNTER — PATIENT OUTREACH (OUTPATIENT)
Dept: ADMINISTRATIVE | Facility: OTHER | Age: 67
End: 2022-05-03
Payer: COMMERCIAL

## 2022-05-03 NOTE — PROGRESS NOTES
Chart was reviewed for overdue Proactive [facility] Encounters (MARCIO)  topics  Updates were requested from care everywhere  Health Maintenance has been updated  LIN[place] immunization registry triggered

## 2022-05-05 DIAGNOSIS — M25.531 RIGHT WRIST PAIN: ICD-10-CM

## 2022-05-05 DIAGNOSIS — M79.641 RIGHT HAND PAIN: Primary | ICD-10-CM

## 2022-05-06 DIAGNOSIS — M79.642 LEFT HAND PAIN: Primary | ICD-10-CM

## 2022-05-09 ENCOUNTER — HOSPITAL ENCOUNTER (OUTPATIENT)
Dept: RADIOLOGY | Facility: HOSPITAL | Age: 67
Discharge: HOME | End: 2022-05-09
Attending: ORTHOPAEDIC SURGERY
Payer: COMMERCIAL

## 2022-05-09 DIAGNOSIS — M79.642 LEFT HAND PAIN: ICD-10-CM

## 2022-05-09 PROCEDURE — 73110 X-RAY EXAM OF WRIST: CPT | Mod: 26,LT,, | Performed by: RADIOLOGY

## 2022-05-09 PROCEDURE — 73130 X-RAY EXAM OF HAND: CPT | Mod: 26,LT,, | Performed by: RADIOLOGY

## 2022-05-09 PROCEDURE — 73130 X-RAY EXAM OF HAND: CPT | Mod: TC,FY,LT

## 2022-05-09 PROCEDURE — 73110 XR WRIST COMPLETE 3 VIEWS LEFT: ICD-10-PCS | Mod: 26,LT,, | Performed by: RADIOLOGY

## 2022-05-09 PROCEDURE — 73110 X-RAY EXAM OF WRIST: CPT | Mod: TC,FY,LT

## 2022-05-09 PROCEDURE — 73130 XR HAND COMPLETE 3 VIEW LEFT: ICD-10-PCS | Mod: 26,LT,, | Performed by: RADIOLOGY

## 2022-05-18 ENCOUNTER — HOSPITAL ENCOUNTER (OUTPATIENT)
Dept: RADIOLOGY | Facility: HOSPITAL | Age: 67
Discharge: HOME | End: 2022-05-18
Attending: PHYSICIAN ASSISTANT
Payer: COMMERCIAL

## 2022-05-18 ENCOUNTER — OFFICE VISIT (OUTPATIENT)
Dept: ORTHOPEDICS | Facility: CLINIC | Age: 67
End: 2022-05-18
Payer: COMMERCIAL

## 2022-05-18 VITALS — WEIGHT: 171.5 LBS | HEIGHT: 64 IN | BODY MASS INDEX: 29.28 KG/M2

## 2022-05-18 DIAGNOSIS — M19.049 HAND ARTHRITIS: ICD-10-CM

## 2022-05-18 DIAGNOSIS — M18.12 ARTHRITIS OF CARPOMETACARPAL (CMC) JOINT OF LEFT THUMB: ICD-10-CM

## 2022-05-18 DIAGNOSIS — M79.632 LEFT FOREARM PAIN: ICD-10-CM

## 2022-05-18 DIAGNOSIS — M79.632 LEFT FOREARM PAIN: Primary | ICD-10-CM

## 2022-05-18 DIAGNOSIS — R22.32 MASS OF LEFT FOREARM: ICD-10-CM

## 2022-05-18 PROCEDURE — 99999 PR PBB SHADOW E&M-EST. PATIENT-LVL III: CPT | Mod: PBBFAC,,, | Performed by: PHYSICIAN ASSISTANT

## 2022-05-18 PROCEDURE — 3008F PR BODY MASS INDEX (BMI) DOCUMENTED: ICD-10-PCS | Mod: CPTII,S$GLB,, | Performed by: PHYSICIAN ASSISTANT

## 2022-05-18 PROCEDURE — 99204 PR OFFICE/OUTPT VISIT, NEW, LEVL IV, 45-59 MIN: ICD-10-PCS | Mod: 25,S$GLB,, | Performed by: PHYSICIAN ASSISTANT

## 2022-05-18 PROCEDURE — 20600 DRAIN/INJ JOINT/BURSA W/O US: CPT | Mod: LT,S$GLB,, | Performed by: PHYSICIAN ASSISTANT

## 2022-05-18 PROCEDURE — 1159F MED LIST DOCD IN RCRD: CPT | Mod: CPTII,S$GLB,, | Performed by: PHYSICIAN ASSISTANT

## 2022-05-18 PROCEDURE — 20600 SMALL JOINT ASPIRATION/INJECTION: L THUMB CMC: ICD-10-PCS | Mod: LT,S$GLB,, | Performed by: PHYSICIAN ASSISTANT

## 2022-05-18 PROCEDURE — 1159F PR MEDICATION LIST DOCUMENTED IN MEDICAL RECORD: ICD-10-PCS | Mod: CPTII,S$GLB,, | Performed by: PHYSICIAN ASSISTANT

## 2022-05-18 PROCEDURE — 99204 OFFICE O/P NEW MOD 45 MIN: CPT | Mod: 25,S$GLB,, | Performed by: PHYSICIAN ASSISTANT

## 2022-05-18 PROCEDURE — 73090 XR FOREARM LEFT: ICD-10-PCS | Mod: 26,LT,, | Performed by: RADIOLOGY

## 2022-05-18 PROCEDURE — 73090 X-RAY EXAM OF FOREARM: CPT | Mod: 26,LT,, | Performed by: RADIOLOGY

## 2022-05-18 PROCEDURE — 99999 PR PBB SHADOW E&M-EST. PATIENT-LVL III: ICD-10-PCS | Mod: PBBFAC,,, | Performed by: PHYSICIAN ASSISTANT

## 2022-05-18 PROCEDURE — 73090 X-RAY EXAM OF FOREARM: CPT | Mod: TC,PN,LT

## 2022-05-18 PROCEDURE — 3008F BODY MASS INDEX DOCD: CPT | Mod: CPTII,S$GLB,, | Performed by: PHYSICIAN ASSISTANT

## 2022-05-18 NOTE — PROCEDURES
Small Joint Aspiration/Injection: L thumb CMC    Date/Time: 5/18/2022 8:30 AM  Performed by: Jamie L. Russo-Digeorge, PA-C  Authorized by: Jamie L. Russo-Digeorge, PA-C     Consent Done?:  Yes (Verbal)  Indications:  Pain  Site marked: the procedure site was marked    Timeout: prior to procedure the correct patient, procedure, and site was verified    Prep: patient was prepped and draped in usual sterile fashion      Local anesthetic:  Lidocaine 1% without epinephrine  Location:  Thumb  Site:  L thumb CMC  Ultrasonic guidance for needle placement?: No    Needle size:  25 G  Approach:  Dorsal  Medications:  10 mg triamcinolone acetonide 10 mg/mL  Patient tolerance:  Patient tolerated the procedure well with no immediate complications

## 2022-05-18 NOTE — PROGRESS NOTES
[facility]  History & Physical  Orthopedics    SUBJECTIVE:      Chief Complaint: Left wrist and forearm pain    Referring Provider: Deven Emreson MD Dr. Dunbar is the supervising physician for this encounter/patient    History of Present Illness:  Patient is a 66 y.o. right hand dominant female who presents today with complaints of left wrist and forearm pain. History of left distal radius fracture from 2021, treated conservatively with [facility] in [place], [place]. She has generalized hand arthritis and was also treated for left thumb CMC arthritis with steroid injection in the past. She wear thumb spica support brace, she takes Mobic daily and uses Voltaren gel. She would like a repeat CMC injection today. She also mentions a left forearm mass that started within the last year, no injury. She does note that she will get a sharp stabbing pain at the mass with any resisted rotation of the wrist, such as lifting a heavy pot to pour.     Onset of symptoms/DOI was 1 year.    Symptoms are aggravated by activity.    Symptoms are alleviated by rest, immobilization and medication.    Symptoms consist of pain.    The patient rates their pain as a 5/10.    Attempted treatment(s) and/or interventions include activity modifications, rest, anti-inflammatory medications, immobilization and steroid injection.     The patient denies any fevers, chills, N/V, D/C and presents for evaluation.       Past Medical History:   Diagnosis Date    Anemia     Fatty liver 2015    Former smoker 2009    Osteoarthritis 2010    Pulmonary nodules 2019    Repeat CT in 6 mo    Ulcer of abdomen wall 2016     Past Surgical History:   Procedure Laterality Date    APPENDECTOMY  1993     SECTION  , , 1983    x3    COLONOSCOPY  ~    [facility]: normal findings per patient report    COLONOSCOPY N/A 2020    Procedure: COLONOSCOPY;  Surgeon: Misael Holm MD;  Location:  [facility] ENDO;  Service: General;  Laterality: N/A;    DIAGNOSTIC LAPAROSCOPY N/A 5/4/2021    Procedure: LAPAROSCOPY, DIAGNOSTIC;  Surgeon: Misael Holm MD;  Location: [facility] OR;  Service: General;  Laterality: N/A;    EPIDURAL STEROID INJECTION INTO LUMBAR SPINE N/A 10/12/2018    Procedure: Injection-steroid-epidural-lumbar;  Surgeon: Kal Johnson MD;  [facility];  Service: Pain Management;  Laterality: N/A;  L5-S1    EPIDURAL STEROID INJECTION INTO LUMBAR SPINE N/A 5/31/2019    Procedure: Injection-steroid-epidural-lumbar L5-S1;  Surgeon: Kal Johnson MD;  [facility];  Service: Pain Management;  Laterality: N/A;    ESOPHAGOGASTRODUODENOSCOPY N/A 11/9/2020    Procedure: ESOPHAGOGASTRODUODENOSCOPY (EGD);  Surgeon: Misael Holm MD;  [facility];  Service: General;  Laterality: N/A;    HYSTERECTOMY  1993    one ovary conserved    LAPAROSCOPIC CHOLECYSTECTOMY N/A 9/2/2020    Procedure: CHOLECYSTECTOMY, LAPAROSCOPIC;  Surgeon: Govind Frey MD;  Location: [facility] OR;  Service: General;  Laterality: N/A;    LAPAROSCOPIC LYSIS OF ADHESIONS N/A 5/4/2021    Procedure: LYSIS, ADHESIONS, LAPAROSCOPIC;  Surgeon: Misael Holm MD;  Location: [facility] OR;  Service: General;  Laterality: N/A;    UPPER GASTROINTESTINAL ENDOSCOPY  2016     Review of patient's allergies indicates:  No Known Allergies  Social History     Social History Narrative    Not on file     Family History   Problem Relation Age of Onset    Ovarian cancer Mother     Heart disease Mother     Osteoporosis Mother     Arthritis Mother     Hypertension Father     Stroke Father 50    Brain Hemorrhage Father     Aneurysm Father     Osteoarthritis Sister     Arthritis Sister     Hypertension Sister     Obesity Sister     Breast cancer Neg Hx     Colon cancer Neg Hx     Colon polyps Neg Hx     Crohn's disease Neg Hx     Ulcerative colitis Neg Hx          Current Outpatient Medications:     atorvastatin  "(LIPITOR) 10 MG tablet, Take 1 tablet (10 mg total) by mouth once daily., Disp: 90 tablet, Rfl: 3    CONSTULOSE 10 gram/15 mL solution, TAKE 10 ML BY MOUTH THREE TIMES DAILY, Disp: 946 mL, Rfl: 0    diclofenac sodium (VOLTAREN) 1 % Gel, Apply 2 g topically 2 (two) times daily as needed., Disp: , Rfl:     docusate sodium (COLACE) 100 MG capsule, Take 100 mg by mouth 2 (two) times daily., Disp: , Rfl:     HYDROcodone-acetaminophen (NORCO) 7.5-325 mg per tablet, Take 1 tablet by mouth every 6 (six) hours as needed for Pain., Disp: 24 tablet, Rfl: 0    ondansetron (ZOFRAN-ODT) 8 MG TbDL, Take 1 tablet (8 mg total) by mouth every 6 (six) hours as needed (nausea or vomiting)., Disp: 30 tablet, Rfl: 1    pantoprazole (PROTONIX) 20 MG tablet, Take 1 tablet (20 mg total) by mouth once daily., Disp: 90 tablet, Rfl: 3    vitamin D (VITAMIN D3) 1000 units Tab, Take 3,000 Units by mouth once daily., Disp: , Rfl:     bisacodyL (DULCOLAX) 10 mg Supp, Place 1 suppository (10 mg total) rectally daily as needed (constipation)., Disp: 15 suppository, Rfl: 0      Review of Systems:  Constitutional: no fever or chills  Eyes: no visual changes  ENT: no nasal congestion or sore throat  Respiratory: no cough or shortness of breath  Cardiovascular: no chest pain  Gastrointestinal: no nausea or vomiting, tolerating diet  Musculoskeletal: pain and soreness    OBJECTIVE:      Vital Signs (Most Recent):  Vitals:    05/18/22 0847   Weight: 77.8 kg (171 lb 8.3 oz)   Height: 5' 4" (1.626 m)     Body mass index is 29.44 kg/m².      Physical Exam:  Constitutional: The patient appears well-developed and well-nourished. No distress.   Skin: No lesions appreciated  Head: Normocephalic and atraumatic.   Nose: Nose normal.   Ears: No deformities seen  Eyes: Conjunctivae and EOM are normal.   Neck: No tracheal deviation present.   Cardiovascular: Normal rate and intact distal pulses.    Pulmonary/Chest: Effort normal. No respiratory distress. "   Abdominal: There is no guarding.   Neurological: The patient is alert.   Psychiatric: The patient has a normal mood and affect.     Left Hand/Wrist Examination:    Observation/Inspection:  Swelling  Notable soft tissue mass to the radial forearm/mid shaft   Deformity  none  Discoloration  none     Scars   none    Atrophy  none    HAND/WRIST EXAMINATION:  Finkelstein's Test   Neg  WHAT Test    Neg  Snuff box tenderness   Neg  Hilliard's Test    Neg  Hook of Hamate Tenderness  Neg  CMC grind    POS/Pain  Circumduction test   Pain  TTP to the CMC. NTTP to radial styloid/first dorsal compartment. Forearm mass is soft and non-tender to palpation, non-mobile (feels like lipoma)    Neurovascular Exam:  Digits WWP, brisk CR < 3s throughout  NVI motor/LTS to M/R/U nerves, radial pulse 2+  Tinel's Test - Carpal Tunnel  Neg  Tinel's Test - Cubital Tunnel  Neg  Phalen's Test    Neg  Median Nerve Compression Test Neg    ROM hand full, painless    ROM wrist full, painless    ROM elbow full, painless    Abdomen not guarded  Respirations nonlabored  Perfusion intact    Diagnostic Results:     Imaging - I independently viewed the patient's imaging as well as the radiology report.      Left Wrist Xray 5/9/22  FINDINGS:  At the rest there is mild dorsal angulation of the radius and slight irregularity of the dorsal surface.  Soft tissue swelling however is not seen.  This is the sequela of the fracture identified July 21, 2021.  A fracture line is not presently seen.  The carpals appear intact.  Narrowing is noted at the radiocarpal joint, triscaphe and 1st carpal metacarpal joint consistent with DJD.     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  Mild dorsal angulation of the radius secondary to prior fracture of July 2021.  No acute fracture seen    Left Hand Xray 5/9/22  FINDINGS:  A fracture of the bones of the left hand is not seen.  Juxta-articular bone erosion or Osteoporosis is not seen.  Soft tissue swelling is not noted.  There  is narrowing of the radiocarpal joint and degenerative changes at the triscaphe and 1st carpal metacarpal joint.  Mild degenerative changes seen at the 1st metacarpophalangeal joint and the interphalangeal joint of the thumb.  More significant osteoarthritic changes are noted at the D IP joints of the index, middle, ring and little finger.     Impression:     Osteoarthritic changes at the wrist and fingers as described.  Plain film evidence of erosive or inflammatory arthritis is not seen      ASSESSMENT/PLAN:      66 y.o. yo female with Left hand generalized arthritis, Left CMC arthritis. Left forearm soft tissue mass    Plan: The patient and I had a thorough discussion today.  We discussed the working diagnosis as well as several other potential alternative diagnoses.  Treatment options were discussed, both conservative and surgical.  Conservative treatment options would include things such as activity modifications, workplace modifications, a period of rest, oral vs topical OTC and prescription anti-inflammatory medications, occupational therapy, splinting/bracing, immobilization, corticosteroid injections, and others.  Surgical options were discussed as well.     At this time, the patient would like to proceed with a trial of repeat CMC steroid injection, performed today without issues. Continue with Mobic/Votlaren gel, brace as needed for comfort. Left forearm films today, MRI ordered for further evaluation for possible surgical planning.    Should the patient's symptoms worsen, persist, or fail to improve they should return for reevaluation and I would be happy to see them back anytime.           Please do not hesitate to reach out to us via email, phone, or MyChart with any questions, concerns, or feedback.

## 2022-05-24 ENCOUNTER — PATIENT MESSAGE (OUTPATIENT)
Dept: FAMILY MEDICINE | Facility: CLINIC | Age: 67
End: 2022-05-24
Payer: COMMERCIAL

## 2022-05-24 ENCOUNTER — TELEPHONE (OUTPATIENT)
Dept: FAMILY MEDICINE | Facility: CLINIC | Age: 67
End: 2022-05-24
Payer: COMMERCIAL

## 2022-05-24 NOTE — TELEPHONE ENCOUNTER
----- Message from Fabriec Flynn sent at 5/24/2022  1:08 PM CDT -----  Type:  Sooner Apoointment Request    Caller is requesting a sooner appointment.  Caller declined first available appointment listed below.  Caller will not accept being placed on the waitlist and is requesting a message be sent to doctor.  Name of Caller:Pham Licea     When is the first available appointment?6/8/22    Symptoms: GI issues     Would the patient rather a call back or a response via [facility]? Call back     Best Call Back Number:[phone] (mobile)     Additional Information:Pt states that she need to be seen on today

## 2022-05-25 ENCOUNTER — HOSPITAL ENCOUNTER (OUTPATIENT)
Dept: RADIOLOGY | Facility: HOSPITAL | Age: 67
Discharge: HOME | End: 2022-05-25
Attending: FAMILY MEDICINE
Payer: COMMERCIAL

## 2022-05-25 ENCOUNTER — HOSPITAL ENCOUNTER (EMERGENCY)
Facility: HOSPITAL | Age: 67
Discharge: HOME | End: 2022-05-25
Attending: EMERGENCY MEDICINE
Payer: COMMERCIAL

## 2022-05-25 ENCOUNTER — PATIENT MESSAGE (OUTPATIENT)
Dept: FAMILY MEDICINE | Facility: CLINIC | Age: 67
End: 2022-05-25

## 2022-05-25 ENCOUNTER — OFFICE VISIT (OUTPATIENT)
Dept: FAMILY MEDICINE | Facility: CLINIC | Age: 67
End: 2022-05-25
Payer: COMMERCIAL

## 2022-05-25 VITALS
HEART RATE: 76 BPM | DIASTOLIC BLOOD PRESSURE: 75 MMHG | OXYGEN SATURATION: 97 % | WEIGHT: 177.38 LBS | SYSTOLIC BLOOD PRESSURE: 124 MMHG | DIASTOLIC BLOOD PRESSURE: 70 MMHG | BODY MASS INDEX: 30.28 KG/M2 | BODY MASS INDEX: 29.02 KG/M2 | RESPIRATION RATE: 18 BRPM | SYSTOLIC BLOOD PRESSURE: 116 MMHG | WEIGHT: 170 LBS | RESPIRATION RATE: 16 BRPM | OXYGEN SATURATION: 98 % | HEART RATE: 101 BPM | TEMPERATURE: 98 F | HEIGHT: 64 IN | HEIGHT: 64 IN

## 2022-05-25 DIAGNOSIS — R11.2 INTRACTABLE VOMITING WITH NAUSEA, UNSPECIFIED VOMITING TYPE: ICD-10-CM

## 2022-05-25 DIAGNOSIS — R10.30 LOWER ABDOMINAL PAIN: ICD-10-CM

## 2022-05-25 DIAGNOSIS — R10.84 GENERALIZED ABDOMINAL PAIN: Primary | ICD-10-CM

## 2022-05-25 DIAGNOSIS — R10.30 LOWER ABDOMINAL PAIN: Primary | ICD-10-CM

## 2022-05-25 DIAGNOSIS — R82.81 PYURIA: ICD-10-CM

## 2022-05-25 DIAGNOSIS — K52.9 ENTERITIS: ICD-10-CM

## 2022-05-25 LAB
ALBUMIN SERPL BCP-MCNC: 4 G/DL (ref 3.5–5.2)
ALP SERPL-CCNC: 74 U/L (ref 55–135)
ALT SERPL W/O P-5'-P-CCNC: 10 U/L (ref 10–44)
ANION GAP SERPL CALC-SCNC: 14 MMOL/L (ref 8–16)
AST SERPL-CCNC: 15 U/L (ref 10–40)
BACTERIA #/AREA URNS HPF: ABNORMAL /HPF
BASOPHILS # BLD AUTO: 0.03 K/UL (ref 0–0.2)
BASOPHILS NFR BLD: 0.4 % (ref 0–1.9)
BILIRUB SERPL-MCNC: 0.4 MG/DL (ref 0.1–1)
BILIRUB UR QL STRIP: NEGATIVE
BUN SERPL-MCNC: 18 MG/DL (ref 8–23)
CALCIUM SERPL-MCNC: 9.2 MG/DL (ref 8.7–10.5)
CHLORIDE SERPL-SCNC: 103 MMOL/L (ref 95–110)
CLARITY UR: CLEAR
CO2 SERPL-SCNC: 21 MMOL/L (ref 23–29)
COLOR UR: YELLOW
CREAT SERPL-MCNC: 0.8 MG/DL (ref 0.5–1.4)
DIFFERENTIAL METHOD: ABNORMAL
EOSINOPHIL # BLD AUTO: 0.1 K/UL (ref 0–0.5)
EOSINOPHIL NFR BLD: 1.1 % (ref 0–8)
ERYTHROCYTE [DISTWIDTH] IN BLOOD BY AUTOMATED COUNT: 16.8 % (ref 11.5–14.5)
EST. GFR  (AFRICAN AMERICAN): >60 ML/MIN/1.73 M^2
EST. GFR  (NON AFRICAN AMERICAN): >60 ML/MIN/1.73 M^2
GLUCOSE SERPL-MCNC: 95 MG/DL (ref 70–110)
GLUCOSE UR QL STRIP: NEGATIVE
HCT VFR BLD AUTO: 34.8 % (ref 37–48.5)
HGB BLD-MCNC: 11.3 G/DL (ref 12–16)
HGB UR QL STRIP: ABNORMAL
IMM GRANULOCYTES # BLD AUTO: 0.03 K/UL (ref 0–0.04)
IMM GRANULOCYTES NFR BLD AUTO: 0.4 % (ref 0–0.5)
KETONES UR QL STRIP: NEGATIVE
LEUKOCYTE ESTERASE UR QL STRIP: ABNORMAL
LIPASE SERPL-CCNC: 15 U/L (ref 4–60)
LYMPHOCYTES # BLD AUTO: 2.6 K/UL (ref 1–4.8)
LYMPHOCYTES NFR BLD: 30.7 % (ref 18–48)
MCH RBC QN AUTO: 27.2 PG (ref 27–31)
MCHC RBC AUTO-ENTMCNC: 32.5 G/DL (ref 32–36)
MCV RBC AUTO: 84 FL (ref 82–98)
MICROSCOPIC COMMENT: ABNORMAL
MONOCYTES # BLD AUTO: 0.7 K/UL (ref 0.3–1)
MONOCYTES NFR BLD: 8.2 % (ref 4–15)
NEUTROPHILS # BLD AUTO: 4.9 K/UL (ref 1.8–7.7)
NEUTROPHILS NFR BLD: 59.2 % (ref 38–73)
NITRITE UR QL STRIP: NEGATIVE
NRBC BLD-RTO: 0 /100 WBC
PH UR STRIP: 6 [PH] (ref 5–8)
PLATELET # BLD AUTO: 323 K/UL (ref 150–450)
PMV BLD AUTO: 9.7 FL (ref 9.2–12.9)
POTASSIUM SERPL-SCNC: 3.4 MMOL/L (ref 3.5–5.1)
PROT SERPL-MCNC: 6.8 G/DL (ref 6–8.4)
PROT UR QL STRIP: NEGATIVE
RBC # BLD AUTO: 4.16 M/UL (ref 4–5.4)
RBC #/AREA URNS HPF: 4 /HPF (ref 0–4)
SODIUM SERPL-SCNC: 138 MMOL/L (ref 136–145)
SP GR UR STRIP: <=1.005 (ref 1–1.03)
SQUAMOUS #/AREA URNS HPF: ABNORMAL /HPF
URN SPEC COLLECT METH UR: ABNORMAL
UROBILINOGEN UR STRIP-ACNC: NEGATIVE EU/DL
WBC # BLD AUTO: 8.31 K/UL (ref 3.9–12.7)
WBC #/AREA URNS HPF: 25 /HPF (ref 0–5)
WBC CLUMPS URNS QL MICRO: ABNORMAL

## 2022-05-25 PROCEDURE — 74176 CT ABD & PELVIS W/O CONTRAST: CPT | Mod: TC

## 2022-05-25 PROCEDURE — 83690 ASSAY OF LIPASE: CPT | Mod: 91 | Performed by: NURSE PRACTITIONER

## 2022-05-25 PROCEDURE — 81000 URINALYSIS NONAUTO W/SCOPE: CPT | Performed by: NURSE PRACTITIONER

## 2022-05-25 PROCEDURE — 1159F MED LIST DOCD IN RCRD: CPT | Mod: CPTII,,, | Performed by: FAMILY MEDICINE

## 2022-05-25 PROCEDURE — 1159F PR MEDICATION LIST DOCUMENTED IN MEDICAL RECORD: ICD-10-PCS | Mod: CPTII,,, | Performed by: FAMILY MEDICINE

## 2022-05-25 PROCEDURE — 80053 COMPREHEN METABOLIC PANEL: CPT | Mod: 91 | Performed by: NURSE PRACTITIONER

## 2022-05-25 PROCEDURE — 3074F PR MOST RECENT SYSTOLIC BLOOD PRESSURE < 130 MM HG: ICD-10-PCS | Mod: CPTII,,, | Performed by: FAMILY MEDICINE

## 2022-05-25 PROCEDURE — 25000003 PHARM REV CODE 250: Performed by: FAMILY MEDICINE

## 2022-05-25 PROCEDURE — 87086 URINE CULTURE/COLONY COUNT: CPT | Performed by: NURSE PRACTITIONER

## 2022-05-25 PROCEDURE — 3078F DIAST BP <80 MM HG: CPT | Mod: CPTII,,, | Performed by: FAMILY MEDICINE

## 2022-05-25 PROCEDURE — 99214 PR OFFICE/OUTPT VISIT, EST, LEVL IV, 30-39 MIN: ICD-10-PCS | Mod: ,,, | Performed by: FAMILY MEDICINE

## 2022-05-25 PROCEDURE — 74019 RADEX ABDOMEN 2 VIEWS: CPT | Mod: TC,FY

## 2022-05-25 PROCEDURE — 74176 CT ABDOMEN PELVIS WITHOUT CONTRAST: ICD-10-PCS | Mod: 26,,, | Performed by: RADIOLOGY

## 2022-05-25 PROCEDURE — 99999 PR PBB SHADOW E&M-EST. PATIENT-LVL IV: ICD-10-PCS | Mod: PBBFAC,,, | Performed by: FAMILY MEDICINE

## 2022-05-25 PROCEDURE — 74019 XR ABDOMEN FLAT AND ERECT: ICD-10-PCS | Mod: 26,,, | Performed by: RADIOLOGY

## 2022-05-25 PROCEDURE — 3078F PR MOST RECENT DIASTOLIC BLOOD PRESSURE < 80 MM HG: ICD-10-PCS | Mod: CPTII,,, | Performed by: FAMILY MEDICINE

## 2022-05-25 PROCEDURE — 74019 RADEX ABDOMEN 2 VIEWS: CPT | Mod: 26,,, | Performed by: RADIOLOGY

## 2022-05-25 PROCEDURE — 99214 OFFICE O/P EST MOD 30 MIN: CPT | Mod: ,,, | Performed by: FAMILY MEDICINE

## 2022-05-25 PROCEDURE — 99284 EMERGENCY DEPT VISIT MOD MDM: CPT | Mod: 25

## 2022-05-25 PROCEDURE — 74176 CT ABD & PELVIS W/O CONTRAST: CPT | Mod: 26,,, | Performed by: RADIOLOGY

## 2022-05-25 PROCEDURE — 85025 COMPLETE CBC W/AUTO DIFF WBC: CPT | Mod: 91 | Performed by: NURSE PRACTITIONER

## 2022-05-25 PROCEDURE — 3074F SYST BP LT 130 MM HG: CPT | Mod: CPTII,,, | Performed by: FAMILY MEDICINE

## 2022-05-25 PROCEDURE — 99999 PR PBB SHADOW E&M-EST. PATIENT-LVL IV: CPT | Mod: PBBFAC,,, | Performed by: FAMILY MEDICINE

## 2022-05-25 PROCEDURE — 3008F PR BODY MASS INDEX (BMI) DOCUMENTED: ICD-10-PCS | Mod: CPTII,,, | Performed by: FAMILY MEDICINE

## 2022-05-25 PROCEDURE — 3008F BODY MASS INDEX DOCD: CPT | Mod: CPTII,,, | Performed by: FAMILY MEDICINE

## 2022-05-25 RX ORDER — PROMETHAZINE HYDROCHLORIDE 25 MG/1
25 SUPPOSITORY RECTAL 3 TIMES DAILY PRN
Qty: 8 SUPPOSITORY | Refills: 0 | Status: SHIPPED | OUTPATIENT
Start: 2022-05-25 | End: 2023-04-05

## 2022-05-25 RX ORDER — ONDANSETRON 4 MG/1
8 TABLET, ORALLY DISINTEGRATING ORAL EVERY 8 HOURS PRN
Qty: 10 TABLET | Refills: 1 | Status: SHIPPED | OUTPATIENT
Start: 2022-05-25 | End: 2022-05-25 | Stop reason: SDUPTHER

## 2022-05-25 RX ORDER — CIPROFLOXACIN 250 MG/1
500 TABLET, FILM COATED ORAL
Status: COMPLETED | OUTPATIENT
Start: 2022-05-25 | End: 2022-05-25

## 2022-05-25 RX ORDER — ONDANSETRON 4 MG/1
8 TABLET, ORALLY DISINTEGRATING ORAL EVERY 8 HOURS PRN
Qty: 10 TABLET | Refills: 1 | Status: ON HOLD | OUTPATIENT
Start: 2022-05-25 | End: 2022-12-22 | Stop reason: HOSPADM

## 2022-05-25 RX ORDER — CIPROFLOXACIN 500 MG/1
500 TABLET ORAL 2 TIMES DAILY
Qty: 10 TABLET | Refills: 0 | Status: SHIPPED | OUTPATIENT
Start: 2022-05-25 | End: 2022-05-31

## 2022-05-25 RX ORDER — MELOXICAM 15 MG/1
15 TABLET ORAL DAILY
COMMUNITY
Start: 2022-05-14 | End: 2022-09-27

## 2022-05-25 RX ORDER — ONDANSETRON 4 MG/1
4 TABLET, FILM COATED ORAL EVERY 8 HOURS PRN
Qty: 12 TABLET | Refills: 0 | Status: SHIPPED | OUTPATIENT
Start: 2022-05-25 | End: 2022-05-25 | Stop reason: ALTCHOICE

## 2022-05-25 RX ADMIN — CIPROFLOXACIN HYDROCHLORIDE 500 MG: 250 TABLET, FILM COATED ORAL at 08:05

## 2022-05-25 NOTE — PROGRESS NOTES
[facility] - Clinic Note    Subjective      Ms. Licea is a 66 y.o. female who presents to clinic with complaints of abdominal pain.     Patient reports lower abdominal pain for the past day.   Associated with nausea and vomiting.   She has a history of multiple SBOs.   She reports that she did not want to go to the ED again as it is the same routine for hours.       PMH Pham has a past medical history of Anemia, Fatty liver (), Former smoker (), Osteoarthritis (), Pulmonary nodules (2019), and Ulcer of abdomen wall ().   PSXH Pham has a past surgical history that includes Upper gastrointestinal endoscopy (); Colonoscopy (~); Epidural steroid injection into lumbar spine (N/A, 10/12/2018); Epidural steroid injection into lumbar spine (N/A, 2019);  section (, , ); Hysterectomy (); Appendectomy (); Laparoscopic cholecystectomy (N/A, 2020); Colonoscopy (N/A, 2020); Esophagogastroduodenoscopy (N/A, 2020); Diagnostic laparoscopy (N/A, 2021); and Laparoscopic lysis of adhesions (N/A, 2021).    Pham's family history includes Aneurysm in her father; Arthritis in her mother and sister; Brain Hemorrhage in her father; Heart disease in her mother; Hypertension in her father and sister; Obesity in her sister; Osteoarthritis in her sister; Osteoporosis in her mother; Ovarian cancer in her mother; Stroke (age of onset: 50) in her father.    Pham reports that she quit smoking about 12 years ago. She has a 40.00 pack-year smoking history. She has never used smokeless tobacco. She reports previous alcohol use. She reports previous drug use. Drug: Marijuana.   ALG Pham has No Known Allergies.   MED Pham has a current medication list which includes the following prescription(s): atorvastatin, constulose, diclofenac sodium, hydrocodone-acetaminophen, ondansetron, pantoprazole, vitamin d, and meloxicam.     Review of Systems  "  Constitutional: Negative for activity change, appetite change, chills, fatigue and fever.   Eyes: Negative for visual disturbance.   Respiratory: Negative for cough and shortness of breath.    Cardiovascular: Negative for chest pain, palpitations and leg swelling.   Gastrointestinal: Positive for abdominal pain, nausea and vomiting.   Skin: Negative for wound.   Neurological: Negative for dizziness and headaches.   Psychiatric/Behavioral: Negative for confusion.     Objective     /75 (BP Location: Left arm, Patient Position: Sitting, BP Method: Medium (Automatic))   Pulse 76   Resp 16   Ht 5' 4" (1.626 m)   Wt 80.5 kg (177 lb 6 oz)   LMP  (LMP Unknown)   SpO2 97%   BMI 30.45 kg/m²     Physical Exam   Constitutional: She appears well-developed and well-nourished.  Non-toxic appearance. She does not appear ill. No distress.   HENT:   Head: Normocephalic and atraumatic.   Eyes: Right eye exhibits no discharge. Left eye exhibits no discharge.   Cardiovascular: Normal rate, regular rhythm, normal heart sounds and normal pulses. Exam reveals no gallop and no friction rub.   No murmur heard.  Pulmonary/Chest: Effort normal and breath sounds normal. No respiratory distress. She has no wheezes. She has no rhonchi. She has no rales.   Abdominal: Normal appearance.   Musculoskeletal:      Cervical back: Neck supple.   Lymphadenopathy:     She has no cervical adenopathy.   Neurological: She is alert.   Skin: Skin is warm and dry. Capillary refill takes less than 2 seconds. She is not diaphoretic.   Psychiatric: Her behavior is normal. Mood, judgment and thought content normal.   Vitals reviewed.     Assessment/Plan     Pham was seen today for abdominal pain.    Diagnoses and all orders for this visit:    Lower abdominal pain  -     X-Ray Abdomen Flat And Erect; Future  -     Comprehensive metabolic panel; Future  -     Lipase; Future  -     CBC auto differential; Future    Intractable vomiting with nausea, " unspecified vomiting type  -     X-Ray Abdomen Flat And Erect; Future  -     Comprehensive metabolic panel; Future  -     Lipase; Future  -     CBC auto differential; Future      Future Appointments   Date Time Provider Department Center   6/1/2022  8:00 AM [facility] MRI1 350 LB LIMIT [facility]   7/25/2022  9:00 AM [facility], LABORATORY [facility] LAB [facility]   7/28/2022  3:00 PM Mayra Bower MD [facility] HEM ON O at [facility]   8/25/2022  8:40 AM Deven Emerson MD [facility]       Deven Emerson MD  Family Medicine  [facility]

## 2022-05-25 NOTE — ED PROVIDER NOTES
Encounter Date: 2022       History     Chief Complaint   Patient presents with    Abdominal Pain     The history is provided by the patient.   Abdominal Pain  The current episode started two days ago. The onset of the illness was gradual. The abdominal pain is located in the periumbilical region. The abdominal pain does not radiate. The severity of the abdominal pain is 7/10. The abdominal pain is relieved by nothing. The other symptoms of the illness do not include fever, fatigue, jaundice, melena, nausea, vomiting, diarrhea, dysuria, hematemesis, hematochezia, vaginal discharge or vaginal bleeding.   Risk factors for an acute abdominal problem include a history of abdominal surgery (recurrent bowel obstruction ).     Review of patient's allergies indicates:  No Known Allergies  Past Medical History:   Diagnosis Date    Anemia     Fatty liver     Former smoker     Osteoarthritis 2010    Pulmonary nodules 2019    Repeat CT in 6 mo    Ulcer of abdomen wall 2016     Past Surgical History:   Procedure Laterality Date    APPENDECTOMY  1993     SECTION  , , 1983    x3    COLONOSCOPY  ~    [facility]: normal findings per patient report    COLONOSCOPY N/A 2020    Procedure: COLONOSCOPY;  Surgeon: Misael Holm MD;  Location: [facility] ENDO;  Service: General;  Laterality: N/A;    DIAGNOSTIC LAPAROSCOPY N/A 2021    Procedure: LAPAROSCOPY, DIAGNOSTIC;  Surgeon: Misael Holm MD;  Location: [facility] OR;  Service: General;  Laterality: N/A;    EPIDURAL STEROID INJECTION INTO LUMBAR SPINE N/A 10/12/2018    Procedure: Injection-steroid-epidural-lumbar;  Surgeon: Kal Johnson MD;  Location: [facility] OR;  Service: Pain Management;  Laterality: N/A;  L5-S1    EPIDURAL STEROID INJECTION INTO LUMBAR SPINE N/A 2019    Procedure: Injection-steroid-epidural-lumbar L5-S1;  Surgeon: Kal Johnson MD;  Location: [facility] OR;  Service: Pain Management;  Laterality: N/A;     ESOPHAGOGASTRODUODENOSCOPY N/A 2020    Procedure: ESOPHAGOGASTRODUODENOSCOPY (EGD);  Surgeon: Misael Holm MD;  Location: [facility] ENDO;  Service: General;  Laterality: N/A;    HYSTERECTOMY  1993    one ovary conserved    LAPAROSCOPIC CHOLECYSTECTOMY N/A 2020    Procedure: CHOLECYSTECTOMY, LAPAROSCOPIC;  Surgeon: Govind Frey MD;  Location: [facility] OR;  Service: General;  Laterality: N/A;    LAPAROSCOPIC LYSIS OF ADHESIONS N/A 2021    Procedure: LYSIS, ADHESIONS, LAPAROSCOPIC;  Surgeon: Misael Holm MD;  Location: [facility] OR;  Service: General;  Laterality: N/A;    UPPER GASTROINTESTINAL ENDOSCOPY  2016     Family History   Problem Relation Age of Onset    Ovarian cancer Mother     Heart disease Mother     Osteoporosis Mother     Arthritis Mother     Hypertension Father     Stroke Father 50    Brain Hemorrhage Father     Aneurysm Father     Osteoarthritis Sister     Arthritis Sister     Hypertension Sister     Obesity Sister     Breast cancer Neg Hx     Colon cancer Neg Hx     Colon polyps Neg Hx     Crohn's disease Neg Hx     Ulcerative colitis Neg Hx      Social History     Tobacco Use    Smoking status: Former Smoker     Packs/day: 1.00     Years: 40.00     Pack years: 40.00     Quit date: 2009     Years since quittin.5    Smokeless tobacco: Never Used    Tobacco comment: quit 2009   Substance Use Topics    Alcohol use: Not Currently     Comment: Not often - one glass of wine every now and then    Drug use: Not Currently     Types: Marijuana     Comment: in 1970s     Review of Systems   Constitutional: Negative for fatigue and fever.   Gastrointestinal: Positive for abdominal pain. Negative for diarrhea, hematemesis, hematochezia, jaundice, melena, nausea and vomiting.   Genitourinary: Negative for dysuria, vaginal bleeding and vaginal discharge.   All other systems reviewed and are negative.      Physical Exam     Initial Vitals   BP Pulse Resp Temp  SpO2   05/25/22 1722 05/25/22 1721 05/25/22 1721 05/25/22 1721 05/25/22 1721   133/81 101 18 97.9 °F (36.6 °C) 98 %      MAP       --                Physical Exam    Nursing note and vitals reviewed.  Constitutional: She appears well-developed and well-nourished. No distress.   HENT:   Head: Normocephalic and atraumatic.   Eyes: EOM are normal. Pupils are equal, round, and reactive to light.   Neck:   Normal range of motion.  Cardiovascular: Normal rate and regular rhythm.   No murmur heard.  Pulmonary/Chest: Breath sounds normal. No respiratory distress. She has no wheezes. She has no rhonchi. She has no rales. She exhibits no tenderness.   Abdominal: There is abdominal tenderness in the periumbilical area.   No right CVA tenderness.  No left CVA tenderness.   Musculoskeletal:         General: Normal range of motion.      Cervical back: Normal range of motion.     Neurological: She is alert and oriented to person, place, and time. She has normal strength. GCS score is 15. GCS eye subscore is 4. GCS verbal subscore is 5. GCS motor subscore is 6.   Skin: Skin is warm and dry.   Psychiatric: She has a normal mood and affect.         ED Course   Procedures  Labs Reviewed   CBC W/ AUTO DIFFERENTIAL - Abnormal; Notable for the following components:       Result Value    Hemoglobin 11.3 (*)     Hematocrit 34.8 (*)     RDW 16.8 (*)     All other components within normal limits   COMPREHENSIVE METABOLIC PANEL - Abnormal; Notable for the following components:    Potassium 3.4 (*)     CO2 21 (*)     All other components within normal limits   URINALYSIS, REFLEX TO URINE CULTURE - Abnormal; Notable for the following components:    Occult Blood UA Trace (*)     Leukocytes, UA 1+ (*)     All other components within normal limits    Narrative:     Preferred Collection Type->Urine, Clean Catch  Specimen Source->Urine   URINALYSIS MICROSCOPIC - Abnormal; Notable for the following components:    WBC, UA 25 (*)     Bacteria Few (*)      All other components within normal limits    Narrative:     Preferred Collection Type->Urine, Clean Catch  Specimen Source->Urine   CULTURE, URINE   LIPASE          Imaging Results          CT Abdomen Pelvis  Without Contrast (Final result)  Result time 05/25/22 18:32:03    Final result by Donnell Ramirez IV, MD (05/25/22 18:32:03)                 Impression:      1. Multiple dilated loops of small bowel are noted that are fluid-filled left upper quadrant with the suggestion of bowel wall thickening near the level of the umbilicus in the midline and extending into the left dilated loops of bowel.  This raises the concern for vascular injury and or bowel infarction/inflammation possibly associated with obstruction.  Surgical consultation is suggested.  Post-contrast oral and intravenous imaging may be of use for confirmation given that this exam is severely hindered.  2. A 2nd region of concern is noted within the right hemipelvis where a dilated loop of small bowel is noted without obvious bowel wall thickening.  This region is nonspecific and could relate to colon, small bowel, ileus, or obstruction.  Further evaluation and clinical correlation is necessary      Electronically signed by: Donnell Ramirez MD  Date:    05/25/2022  Time:    18:32             Narrative:    EXAMINATION:  CT ABDOMEN PELVIS WITHOUT CONTRAST    CLINICAL HISTORY:  Abdominal pain, acute, nonlocalized;    TECHNIQUE:  Low dose axial images, sagittal and coronal reformations were obtained from the lung bases to the pubic symphysis.  . along the pelvis on the right a loop of dilated small bowel appears to be present measuring 3.7 cm with an apparent transition zone.  No obvious bowel wall thickening is noted but evaluation is significantly hindered by the lack of intravenous and oral contrast.    COMPARISON:  Plain film of 05/25/2022 and CT of 03/16/2022    FINDINGS:  Calcifications are noted in the region the mitral valve, please correlate for  valve disease.    Surgical clips are noted in the right upper quadrant of the abdomen suggestive cholecystectomy clips.    The adrenal glands appear appropriate.    Neither kidney demonstrates evidence of obstruction.  No intrarenal stones are convincingly noted.  The ureters are normal in caliber no calcifications are noted within the bladder to suggest recently passed stones.  Bladder wall thickening is noted as can be seen with urinary tract infection, postobstructive change, or neurogenic bladder.  The uterus is not visualized consistent with hysterectomy.    Postsurgical changes are noted with a surgical line in the left lower quadrant.  This evaluation is significantly hindered by the lack of oral and intravenous contrast.    A mildly prominent loop of bowel is noted in the right lower quadrant in the pelvis, this only appears to represent 1 loop of bowel and could even relate to colon.  Oral and intravenous contrast administration may be of use.  This will also allow for delayed imaging in which this area can be reassessed to determine whether this relates to peristalsis or ileus, small bowel or colon rather than fixed obstruction from adhesions.    Dilated loops of small bowel are also noted in the left upper quadrant with a diameter of 3.6 cm.  A discrete transition known is difficult to identify but but may be present.  There is the suggestion of bowel wall thickening near the level of the umbilicus near the midline extending to the left as on image 47 sequence 2..  This further raises the concern for small bowel obstruction and or vascular injury in this region.  Post-contrast imaging or oral contrast imaging may be of use for confirmation.    An anterolisthesis is noted of the L4 on L5 vertebral body of 4 mm.  Intervertebral disc height loss noted at the L4-L5 level.                                 Medications   ciprofloxacin HCl tablet 500 mg (has no administration in time range)     Medical Decision  Making:   ED Management:  Patient steadily improved in the ED, discussed her findings on CT scan, the care was handed off to me at 6:40 p.m. per nurse practitioner Estela at her request             ED Course as of 05/25/22 2023   Wed May 25, 2022   1927 On my exam the patient has some abdominal pain right greater than left there is no tanisha guarding bowel sounds are increased no signs of peritonitis but the abdominal exam is not normal [WK]   2018 Patient is up ambulatory, states that she feels much better, she prefers to be follow-up as an outpatient, she states that her symptoms redevelop she will follow-up with her colorectal surgeon in [place] Dr. TEMI horton or her general surgeon Dr. Holm, I have attempted to contact Dr. Holm and he has not available at this time and is not on-call [WK]   2020 Abdomen re-examined she is soft nontender with normal bowel sounds [WK]      ED Course User Index  [WK] Donnell De La Torre MD             Clinical Impression:   Final diagnoses:  [R10.84] Generalized abdominal pain (Primary)  [K52.9] Enteritis  [R82.81] Pyuria          ED Disposition Condition    Discharge Stable        ED Prescriptions     Medication Sig Dispense Start Date End Date Auth. Provider    ondansetron (ZOFRAN) 4 MG tablet Take 1 tablet (4 mg total) by mouth every 8 (eight) hours as needed for Nausea. 12 tablet 5/25/2022  Donnell De La Torre MD    promethazine (PHENERGAN) 25 MG suppository Place 1 suppository (25 mg total) rectally 3 (three) times daily as needed for Nausea. 8 suppository 5/25/2022  Donnell De La Torre MD    ciprofloxacin HCl (CIPRO) 500 MG tablet Take 1 tablet (500 mg total) by mouth 2 (two) times daily. for 5 days 10 tablet 5/25/2022 5/30/2022 Donnell De La Torre MD        Follow-up Information    None          Donnell De La Torre MD  05/25/22 2023

## 2022-05-26 ENCOUNTER — OFFICE VISIT (OUTPATIENT)
Dept: HEMATOLOGY/ONCOLOGY | Facility: CLINIC | Age: 67
End: 2022-05-26
Payer: COMMERCIAL

## 2022-05-26 ENCOUNTER — PATIENT MESSAGE (OUTPATIENT)
Dept: SURGERY | Facility: CLINIC | Age: 67
End: 2022-05-26
Payer: COMMERCIAL

## 2022-05-26 ENCOUNTER — PATIENT MESSAGE (OUTPATIENT)
Dept: HEMATOLOGY/ONCOLOGY | Facility: CLINIC | Age: 67
End: 2022-05-26

## 2022-05-26 DIAGNOSIS — D50.8 IRON DEFICIENCY ANEMIA SECONDARY TO INADEQUATE DIETARY IRON INTAKE: ICD-10-CM

## 2022-05-26 DIAGNOSIS — Z87.19 S/P SMALL BOWEL OBSTRUCTION: Primary | ICD-10-CM

## 2022-05-26 DIAGNOSIS — K56.51 INTESTINAL ADHESIONS WITH PARTIAL OBSTRUCTION: ICD-10-CM

## 2022-05-26 DIAGNOSIS — E53.8 B12 DEFICIENCY: ICD-10-CM

## 2022-05-26 DIAGNOSIS — E78.5 DYSLIPIDEMIA: ICD-10-CM

## 2022-05-26 DIAGNOSIS — R10.10 PAIN OF UPPER ABDOMEN: ICD-10-CM

## 2022-05-26 PROCEDURE — 99214 OFFICE O/P EST MOD 30 MIN: CPT | Mod: 95,,, | Performed by: INTERNAL MEDICINE

## 2022-05-26 PROCEDURE — 99214 PR OFFICE/OUTPT VISIT, EST, LEVL IV, 30-39 MIN: ICD-10-PCS | Mod: 95,,, | Performed by: INTERNAL MEDICINE

## 2022-05-26 NOTE — PROGRESS NOTES
Subjective:    The patient location is: home  Visit type: Virtual visit with synchronous audio and video  Face-to-face or time spent with patient on the encounter:20min  Total time spent on and for  this encounter which includes non face-to-face time preparing to see patient, review of tests, obtaining and or reviewing separately obtained records documenting clinical information in the electronic or other health records, independently interpreting results which is not separately reported ,and communicating results to the patient/family/caregiver and in care coordination and treatment planning/communicating with pharmacy for prescriptions/addressing social needs/arranging follow-up and or referrals :25 min    Each patient I provide medical services by telemedicine is:  (1) informed of the relationship between the physician and patient and the respective role of any other health care provider with respect to management of the patient; and (2) notified that he or she may decline to receive medical services by telemedicine and may withdraw from such care at any time.  This is a video visit therefore some elements of the physical exam such as vital signs, heart sounds are breath sounds are not included and may be included if found in recent clinic notes of other providers assessing same patient. Any symptoms or signs that were visualized were stated by the patient may be included in this note.     Patient ID: Pham Licea is a 66 y.o. female.    Chief Complaint:      sept 2020 had gall bladder surgery, thern she had a bowel obstruction, no surgery then, had 2 feet of bowels removed after a second bowel obstruction  Esophageal ulcers and dudenal ulcers- with bleeding in 2017  Recd blood in [facility] this weekend, pt went to ED because she was shaky and week and couldn't think was found to be anemic in ed   pt readmitted 3/22 had repeat bowel obstruction   Seen at emergency room May 25, 2022 with CT scan of abdomen  which shows partial obstruction patient having significant abdominal pain.  Discomfort belching similar to her prior obstructive symptoms  Past Medical History:   Diagnosis Date    Anemia     Fatty liver 2015    Former smoker 2009    Osteoarthritis 2010    Pulmonary nodules 2019    Repeat CT in 6 mo    Ulcer of abdomen wall 2016     Past Surgical History:   Procedure Laterality Date    APPENDECTOMY  1993     SECTION  , , 1983    x3    COLONOSCOPY  ~2014    [facility]: normal findings per patient report    COLONOSCOPY N/A 2020    Procedure: COLONOSCOPY;  Surgeon: Misael Holm MD;  Location: [facility] ENDO;  Service: General;  Laterality: N/A;    DIAGNOSTIC LAPAROSCOPY N/A 2021    Procedure: LAPAROSCOPY, DIAGNOSTIC;  Surgeon: Misael Holm MD;  Location: [facility] OR;  Service: General;  Laterality: N/A;    EPIDURAL STEROID INJECTION INTO LUMBAR SPINE N/A 10/12/2018    Procedure: Injection-steroid-epidural-lumbar;  Surgeon: Kal Johnson MD;  Location: [facility] OR;  Service: Pain Management;  Laterality: N/A;  L5-S1    EPIDURAL STEROID INJECTION INTO LUMBAR SPINE N/A 2019    Procedure: Injection-steroid-epidural-lumbar L5-S1;  Surgeon: Kal Johnson MD;  Location: [facility] OR;  Service: Pain Management;  Laterality: N/A;    ESOPHAGOGASTRODUODENOSCOPY N/A 2020    Procedure: ESOPHAGOGASTRODUODENOSCOPY (EGD);  Surgeon: Misael Holm MD;  Location: [facility];  Service: General;  Laterality: N/A;    HYSTERECTOMY      one ovary conserved    LAPAROSCOPIC CHOLECYSTECTOMY N/A 2020    Procedure: CHOLECYSTECTOMY, LAPAROSCOPIC;  Surgeon: Govind Frey MD;  Location: [facility] OR;  Service: General;  Laterality: N/A;    LAPAROSCOPIC LYSIS OF ADHESIONS N/A 2021    Procedure: LYSIS, ADHESIONS, LAPAROSCOPIC;  Surgeon: Misael Holm MD;  Location: [facility] OR;  Service: General;  Laterality: N/A;    UPPER GASTROINTESTINAL ENDOSCOPY  2016     Family History    Problem Relation Age of Onset    Ovarian cancer Mother     Heart disease Mother     Osteoporosis Mother     Arthritis Mother     Hypertension Father     Stroke Father 50    Brain Hemorrhage Father     Aneurysm Father     Osteoarthritis Sister     Arthritis Sister     Hypertension Sister     Obesity Sister     Breast cancer Neg Hx     Colon cancer Neg Hx     Colon polyps Neg Hx     Crohn's disease Neg Hx     Ulcerative colitis Neg Hx       Social History     Socioeconomic History    Marital status:     Number of children: 4    Highest education level: Some college, no degree   Occupational History    Occupation: sale specialist-    Tobacco Use    Smoking status: Former Smoker     Packs/day: 1.00     Years: 40.00     Pack years: 40.00     Quit date: 2009     Years since quittin.5    Smokeless tobacco: Never Used    Tobacco comment: quit 2009   Substance and Sexual Activity    Alcohol use: Not Currently     Comment: Not often - one glass of wine every now and then    Drug use: Not Currently     Types: Marijuana     Comment: in     Sexual activity: Not Currently     Social Determinants of Health     Financial Resource Strain: Low Risk     Difficulty of Paying Living Expenses: Not hard at all   Food Insecurity: No Food Insecurity    Worried About Running Out of Food in the Last Year: Never true    Ran Out of Food in the Last Year: Never true   Transportation Needs: No Transportation Needs    Lack of Transportation (Medical): No    Lack of Transportation (Non-Medical): No   Physical Activity: Inactive    Days of Exercise per Week: 0 days    Minutes of Exercise per Session: 0 min   Stress: Stress Concern Present    Feeling of Stress : Rather much   Social Connections: Unknown    Frequency of Communication with Friends and Family: More than three times a week    Frequency of Social Gatherings with Friends and Family: Once a week    Active Member of Clubs or  Organizations: Yes    Attends Club or Organization Meetings: More than 4 times per year    Marital Status:    Housing Stability: Low Risk     Unable to Pay for Housing in the Last Year: No    Number of Places Lived in the Last Year: 1    Unstable Housing in the Last Year: No     Review of patient's allergies indicates:  No Known Allergies    Current Outpatient Medications:     atorvastatin (LIPITOR) 10 MG tablet, Take 1 tablet (10 mg total) by mouth once daily., Disp: 90 tablet, Rfl: 3    ciprofloxacin HCl (CIPRO) 500 MG tablet, Take 1 tablet (500 mg total) by mouth 2 (two) times daily. for 5 days, Disp: 10 tablet, Rfl: 0    CONSTULOSE 10 gram/15 mL solution, TAKE 10 ML BY MOUTH THREE TIMES DAILY, Disp: 946 mL, Rfl: 0    diclofenac sodium (VOLTAREN) 1 % Gel, Apply 2 g topically 2 (two) times daily as needed., Disp: , Rfl:     HYDROcodone-acetaminophen (NORCO) 7.5-325 mg per tablet, Take 1 tablet by mouth every 6 (six) hours as needed for Pain., Disp: 24 tablet, Rfl: 0    meloxicam (MOBIC) 15 MG tablet, Take 15 mg by mouth once daily., Disp: , Rfl:     ondansetron (ZOFRAN-ODT) 4 MG TbDL, Take 2 tablets (8 mg total) by mouth every 8 (eight) hours as needed., Disp: 10 tablet, Rfl: 1    ondansetron (ZOFRAN-ODT) 8 MG TbDL, Take 1 tablet (8 mg total) by mouth every 6 (six) hours as needed (nausea or vomiting)., Disp: 30 tablet, Rfl: 1    pantoprazole (PROTONIX) 20 MG tablet, Take 1 tablet (20 mg total) by mouth once daily., Disp: 90 tablet, Rfl: 3    promethazine (PHENERGAN) 25 MG suppository, Place 1 suppository (25 mg total) rectally 3 (three) times daily as needed for Nausea., Disp: 8 suppository, Rfl: 0    vitamin D (VITAMIN D3) 1000 units Tab, Take 3,000 Units by mouth once daily., Disp: , Rfl:   No current facility-administered medications for this visit.  Review of Systems   Constitutional: Positive for malaise/fatigue. Negative for weight loss.        Mostly good appetite lost weight with  her sx   HENT: Negative for hearing loss.    Eyes: Negative for blurred vision and double vision.   Gastrointestinal: Positive for constipation.        Takes lactulose twice a day and colace twice a day   Musculoskeletal: Negative for back pain, myalgias and neck pain.        Leg cramps and shaky   Skin: Negative for rash.   Neurological: Negative for dizziness, weakness and headaches.   Endo/Heme/Allergies: Does not bruise/bleed easily.   recent bowel obstruction 3/22 with repeat abdominal pain discomfort emergency room visit last night the outpatient surgical appointment  Physical Exam    Wt Readings from Last 3 Encounters:   05/25/22 77.1 kg (170 lb)   05/25/22 80.5 kg (177 lb 6 oz)   05/18/22 77.8 kg (171 lb 8.3 oz)     Temp Readings from Last 3 Encounters:   05/25/22 97.9 °F (36.6 °C) (Oral)   04/12/22 98 °F (36.7 °C) (Oral)   03/29/22 97.9 °F (36.6 °C) (Oral)     BP Readings from Last 3 Encounters:   05/25/22 124/70   05/25/22 116/75   04/12/22 134/67     Pulse Readings from Last 3 Encounters:   05/25/22 101   05/25/22 76   04/12/22 75     Lab Results   Component Value Date    WBC 8.31 05/25/2022    HGB 11.3 (L) 05/25/2022    HCT 34.8 (L) 05/25/2022    MCV 84 05/25/2022     05/25/2022       BMP  Lab Results   Component Value Date     05/25/2022    K 3.4 (L) 05/25/2022     05/25/2022    CO2 21 (L) 05/25/2022    BUN 18 05/25/2022    CREATININE 0.8 05/25/2022    CALCIUM 9.2 05/25/2022    ANIONGAP 14 05/25/2022    ESTGFRAFRICA >60.0 05/25/2022    EGFRNONAA >60.0 05/25/2022     Lab Results   Component Value Date    IRON 98 04/18/2022    TIBC 410 04/18/2022    FERRITIN 121 04/18/2022       Impression:  CT scan abdomen May 25, 2022     1. Multiple dilated loops of small bowel are noted that are fluid-filled left upper quadrant with the suggestion of bowel wall thickening near the level of the umbilicus in the midline and extending into the left dilated loops of bowel.  This raises the concern for  vascular injury and or bowel infarction/inflammation possibly associated with obstruction.  Surgical consultation is suggested.  Post-contrast oral and intravenous imaging may be of use for confirmation given that this exam is severely hindered.  2. A 2nd region of concern is noted within the right hemipelvis where a dilated loop of small bowel is noted without obvious bowel wall thickening.  This region is nonspecific and could relate to colon, small bowel, ileus, or obstruction.  Further evaluation and clinical correlation is necessary     Patient Active Problem List   Diagnosis    Primary osteoarthritis involving multiple joints    Obesity (BMI 30.0-34.9)    Gastroesophageal reflux disease    Right knee pain    Chronic midline low back pain    Acute tear medial meniscus, right, sequela    Primary osteoarthritis of right knee    Lumbar radiculitis    Trigger middle finger of right hand    Nausea    Abdominal pain    Encounter for postoperative care    Intestinal obstruction    Hypokalemia    Constipation    History of cholecystectomy    Right upper quadrant pain    History of hysterectomy    Rectal bleeding    Hemorrhoids    Mitral valve prolapse    Coronary artery calcification    Anemia    SAMPSON (dyspnea on exertion), noted 2010    History of smoking 25-50 pack years, 46 py, quit 2009    Other emphysema    NSAID long-term use, started 2017    Bandemia    Family history of premature CAD    Cardiovascular event risk, ASCVD 10-year risk 4.8%, 2019    Abdominal obesity    Dyslipidemia, baseline LDL-C 112, HDL-C 49    Iron deficiency anemia secondary to inadequate dietary iron intake    Severe anemia    S/p small bowel obstruction        Assessment and Plan   NATHAN: pt had partail bowel removal from opbstructions so anticipate this will be recurrent   recvd infusinal iron with excellent response   None needed this visit however patient has made recent emergency room visit last night May  25, 2022 I have messaged Dr. Jai horton for visit with him as soon as possible  Recheck in 1 months with cbc, cmp, iron ferritn b12  Patient to continue follow-up of her DJD with PCP  Bowel obs again 3/22 repeat sx and adhesiolysis,

## 2022-05-27 LAB — BACTERIA UR CULT: NO GROWTH

## 2022-05-30 ENCOUNTER — PATIENT MESSAGE (OUTPATIENT)
Dept: HEMATOLOGY/ONCOLOGY | Facility: CLINIC | Age: 67
End: 2022-05-30
Payer: COMMERCIAL

## 2022-05-31 ENCOUNTER — OFFICE VISIT (OUTPATIENT)
Dept: FAMILY MEDICINE | Facility: CLINIC | Age: 67
End: 2022-05-31
Payer: COMMERCIAL

## 2022-05-31 ENCOUNTER — PATIENT MESSAGE (OUTPATIENT)
Dept: ADMINISTRATIVE | Facility: HOSPITAL | Age: 67
End: 2022-05-31
Payer: COMMERCIAL

## 2022-05-31 VITALS
HEART RATE: 68 BPM | HEIGHT: 64 IN | SYSTOLIC BLOOD PRESSURE: 131 MMHG | DIASTOLIC BLOOD PRESSURE: 76 MMHG | RESPIRATION RATE: 15 BRPM | BODY MASS INDEX: 29.28 KG/M2 | WEIGHT: 171.5 LBS | OXYGEN SATURATION: 97 %

## 2022-05-31 DIAGNOSIS — K56.609 SBO (SMALL BOWEL OBSTRUCTION): Primary | ICD-10-CM

## 2022-05-31 PROCEDURE — 3075F PR MOST RECENT SYSTOLIC BLOOD PRESS GE 130-139MM HG: ICD-10-PCS | Mod: CPTII,S$GLB,, | Performed by: FAMILY MEDICINE

## 2022-05-31 PROCEDURE — 1159F MED LIST DOCD IN RCRD: CPT | Mod: CPTII,S$GLB,, | Performed by: FAMILY MEDICINE

## 2022-05-31 PROCEDURE — 3078F PR MOST RECENT DIASTOLIC BLOOD PRESSURE < 80 MM HG: ICD-10-PCS | Mod: CPTII,S$GLB,, | Performed by: FAMILY MEDICINE

## 2022-05-31 PROCEDURE — 99213 OFFICE O/P EST LOW 20 MIN: CPT | Mod: S$GLB,,, | Performed by: FAMILY MEDICINE

## 2022-05-31 PROCEDURE — 99213 PR OFFICE/OUTPT VISIT, EST, LEVL III, 20-29 MIN: ICD-10-PCS | Mod: S$GLB,,, | Performed by: FAMILY MEDICINE

## 2022-05-31 PROCEDURE — 3078F DIAST BP <80 MM HG: CPT | Mod: CPTII,S$GLB,, | Performed by: FAMILY MEDICINE

## 2022-05-31 PROCEDURE — 99999 PR PBB SHADOW E&M-EST. PATIENT-LVL IV: CPT | Mod: PBBFAC,,, | Performed by: FAMILY MEDICINE

## 2022-05-31 PROCEDURE — 1159F PR MEDICATION LIST DOCUMENTED IN MEDICAL RECORD: ICD-10-PCS | Mod: CPTII,S$GLB,, | Performed by: FAMILY MEDICINE

## 2022-05-31 PROCEDURE — 3008F BODY MASS INDEX DOCD: CPT | Mod: CPTII,S$GLB,, | Performed by: FAMILY MEDICINE

## 2022-05-31 PROCEDURE — 99999 PR PBB SHADOW E&M-EST. PATIENT-LVL IV: ICD-10-PCS | Mod: PBBFAC,,, | Performed by: FAMILY MEDICINE

## 2022-05-31 PROCEDURE — 3075F SYST BP GE 130 - 139MM HG: CPT | Mod: CPTII,S$GLB,, | Performed by: FAMILY MEDICINE

## 2022-05-31 PROCEDURE — 3008F PR BODY MASS INDEX (BMI) DOCUMENTED: ICD-10-PCS | Mod: CPTII,S$GLB,, | Performed by: FAMILY MEDICINE

## 2022-05-31 NOTE — PROGRESS NOTES
[facility] - Clinic Note    Subjective      Ms. Licea is a 66 y.o. female who presents to clinic for a follow up.     Patient was 6 days ago in clinic for an sbo.   She was sent to the ED after imaging and labs.   She was discharged home with cipro for pyuria and phenergan and zofran.  Reports that she has made an upcoming appt with her surgeon.   Feeling better.   Making a BM.    [facility] Pham has a past medical history of Anemia, Fatty liver (), Former smoker (), Osteoarthritis (), Pulmonary nodules (2019), and Ulcer of abdomen wall ().   PSXH Pham has a past surgical history that includes Upper gastrointestinal endoscopy (); Colonoscopy (~); Epidural steroid injection into lumbar spine (N/A, 10/12/2018); Epidural steroid injection into lumbar spine (N/A, 2019);  section (, , ); Hysterectomy (); Appendectomy (); Laparoscopic cholecystectomy (N/A, 2020); Colonoscopy (N/A, 2020); Esophagogastroduodenoscopy (N/A, 2020); Diagnostic laparoscopy (N/A, 2021); and Laparoscopic lysis of adhesions (N/A, 2021).    Pham's family history includes Aneurysm in her father; Arthritis in her mother and sister; Brain Hemorrhage in her father; Heart disease in her mother; Hypertension in her father and sister; Obesity in her sister; Osteoarthritis in her sister; Osteoporosis in her mother; Ovarian cancer in her mother; Stroke (age of onset: 50) in her father.   KARL Nath reports that she quit smoking about 12 years ago. She has a 40.00 pack-year smoking history. She has never used smokeless tobacco. She reports previous alcohol use. She reports previous drug use. Drug: Marijuana.   WIL Nath has No Known Allergies.   ABIMAEL Nath has a current medication list which includes the following prescription(s): atorvastatin, diclofenac sodium, hydrocodone-acetaminophen, meloxicam, ondansetron, ondansetron, pantoprazole, promethazine,  "vitamin d, and constulose.     Review of Systems   Constitutional: Negative for activity change, appetite change, chills, fatigue and fever.   Eyes: Negative for visual disturbance.   Respiratory: Negative for cough and shortness of breath.    Cardiovascular: Negative for chest pain, palpitations and leg swelling.   Gastrointestinal: Negative for abdominal pain, nausea and vomiting.   Skin: Negative for wound.   Neurological: Negative for dizziness and headaches.   Psychiatric/Behavioral: Negative for confusion.     Objective     /76 (BP Location: Left arm, Patient Position: Sitting, BP Method: Medium (Automatic))   Pulse 68   Resp 15   Ht 5' 4" (1.626 m)   Wt 77.8 kg (171 lb 8 oz)   LMP  (LMP Unknown)   SpO2 97%   BMI 29.44 kg/m²     Physical Exam   Constitutional: She appears well-developed and well-nourished.  Non-toxic appearance. She does not appear ill. No distress.   HENT:   Head: Normocephalic and atraumatic.   Eyes: Right eye exhibits no discharge. Left eye exhibits no discharge.   Cardiovascular: Normal rate, regular rhythm, normal heart sounds and normal pulses. Exam reveals no gallop and no friction rub.   No murmur heard.  Pulmonary/Chest: Effort normal and breath sounds normal. No respiratory distress. She has no wheezes. She has no rhonchi. She has no rales.   Abdominal: Normal appearance.   Musculoskeletal:      Cervical back: Neck supple.   Lymphadenopathy:     She has no cervical adenopathy.   Neurological: She is alert.   Skin: Skin is warm and dry. Capillary refill takes less than 2 seconds. She is not diaphoretic.   Psychiatric: Her behavior is normal. Mood, judgment and thought content normal.   Vitals reviewed.     Assessment/Plan     Pham was seen today for follow-up.    Diagnoses and all orders for this visit:    SBO (small bowel obstruction)  -recommended to keep appt with surgery      Future Appointments   Date Time Provider Department Center   6/17/2022  8:45 AM Estiven" Charly OT [facility] REHABOP [facility]   6/21/2022  9:00 AM [facility] LABORATORY [facility]   6/23/2022  2:20 PM [facility]   7/7/2022  1:00 PM Geo Vaughan MD [facility]   8/25/2022  8:40 AM Deven Emerson MD [facility]       Deven Emerson MD  Family Medicine  [facility]

## 2022-06-08 RX ORDER — LACTULOSE 10 G/15ML
SOLUTION ORAL
Qty: 946 ML | Refills: 0 | Status: SHIPPED | OUTPATIENT
Start: 2022-06-08 | End: 2022-08-01

## 2022-06-09 ENCOUNTER — PATIENT MESSAGE (OUTPATIENT)
Dept: ORTHOPEDICS | Facility: CLINIC | Age: 67
End: 2022-06-09
Payer: COMMERCIAL

## 2022-06-10 ENCOUNTER — HOSPITAL ENCOUNTER (OUTPATIENT)
Dept: RADIOLOGY | Facility: HOSPITAL | Age: 67
Discharge: HOME | End: 2022-06-10
Attending: PHYSICIAN ASSISTANT
Payer: COMMERCIAL

## 2022-06-10 ENCOUNTER — PATIENT MESSAGE (OUTPATIENT)
Dept: ORTHOPEDICS | Facility: CLINIC | Age: 67
End: 2022-06-10
Payer: COMMERCIAL

## 2022-06-10 DIAGNOSIS — S56.902D: Primary | ICD-10-CM

## 2022-06-10 DIAGNOSIS — R22.32 MASS OF LEFT FOREARM: ICD-10-CM

## 2022-06-10 DIAGNOSIS — M79.632 LEFT FOREARM PAIN: ICD-10-CM

## 2022-06-10 PROCEDURE — 73220 MRI UPPR EXTREMITY W/O&W/DYE: CPT | Mod: 26,LT,, | Performed by: RADIOLOGY

## 2022-06-10 PROCEDURE — 25500020 PHARM REV CODE 255: Performed by: PHYSICIAN ASSISTANT

## 2022-06-10 PROCEDURE — 73220 MRI FOREARM W WO CONTRAST LEFT: ICD-10-PCS | Mod: 26,LT,, | Performed by: RADIOLOGY

## 2022-06-10 PROCEDURE — A9585 GADOBUTROL INJECTION: HCPCS | Performed by: PHYSICIAN ASSISTANT

## 2022-06-10 PROCEDURE — 73221 MRI JOINT UPR EXTREM W/O DYE: CPT | Mod: TC,LT

## 2022-06-10 RX ORDER — GADOBUTROL 604.72 MG/ML
9 INJECTION INTRAVENOUS
Status: COMPLETED | OUTPATIENT
Start: 2022-06-10 | End: 2022-06-10

## 2022-06-10 RX ADMIN — GADOBUTROL 9 ML: 604.72 INJECTION INTRAVENOUS at 01:06

## 2022-06-13 ENCOUNTER — PATIENT MESSAGE (OUTPATIENT)
Dept: ORTHOPEDICS | Facility: CLINIC | Age: 67
End: 2022-06-13
Payer: COMMERCIAL

## 2022-06-17 ENCOUNTER — CLINICAL SUPPORT (OUTPATIENT)
Dept: REHABILITATION | Facility: HOSPITAL | Age: 67
End: 2022-06-17
Attending: PHYSICIAN ASSISTANT
Payer: COMMERCIAL

## 2022-06-17 DIAGNOSIS — S56.902D: ICD-10-CM

## 2022-06-17 DIAGNOSIS — M79.602 ARM PAIN, LATERAL, LEFT: ICD-10-CM

## 2022-06-17 PROCEDURE — 97166 OT EVAL MOD COMPLEX 45 MIN: CPT | Mod: PN

## 2022-06-17 NOTE — PROGRESS NOTES
"  [facility]  Occupational Therapy Initial Evaluation    Date: 6/17/2022  Name: Pham Licea  Clinic Number: [id number]    Therapy Diagnosis:   Encounter Diagnoses   Name Primary?    Injury of forearm muscle or tendon, left, subsequent encounter     Arm pain, lateral, left      Physician: Russo-Digeorge, Jamie L*    Physician Orders: OT evaluate and treat  Medical Diagnosis: Brachioradialis tear, L forearm  Surgical Procedure and Date: no surgery to date  Evaluation Date: 6/17/2022  Insurance Authorization Period Expiration: 07/05/2022  Plan of Care Certification Period: 09/02/2022  Progress Note Due: 07/17/2022  Date of Return to MD: July 7, with Alexus  Visit # / Visits authorized: 1/12  FOTO: 1/3    Precautions:  Standard    Time In: 8: 00  Time Out: 8: 45  Total Appointment Time (timed & untimed codes): 45 minutes    SUBJECTIVE     Date of Onset:  1 year ago     History of Current Condition/Mechanism of Injury: Elicia reports: that she had a fall a year ago onto outstretched L arm, breaking her L wrist and she was casted for 6 weeks (no surgery performed). It was a "while" after the fall that the patient noticed the lump in her L forearm. The MRI showed a "full thickness tear" of the L brachioradialis muscle. Pt is to go consult with ortho surgeon on July 7th to speak further about surgery/other options to address the tear. Pt states that her doctor wanted to attempt OT for strengthening.    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     Current Functional Status   Home/Living environment: lives alone, performs all home duties " independently      Limitation of Functional Status as follows:   ADLs/IADLs:     - Feeding: pt is independent     - Bathing: pt is independent     - Dressing/Grooming: pt is independent     - Driving: pt is independent       Pain:  Functional Pain Scale Rating 0-10: Current 0/10, worst 10/10  Location: L forearm   Description: shooting, radiates   Aggravating Factors: L elbow flexion   Easing Factors: voltaren gel, heat     Patient's Goals for Therapy: Pt would like to use that L arm without pain    Medical History:   Past Medical History:   Diagnosis Date    Anemia     Fatty liver     Former smoker     Osteoarthritis 2010    Pulmonary nodules 2019    Repeat CT in 6 mo    Ulcer of abdomen wall 2016       Surgical History:    has a past surgical history that includes Upper gastrointestinal endoscopy (); Colonoscopy (~); Epidural steroid injection into lumbar spine (N/A, 10/12/2018); Epidural steroid injection into lumbar spine (N/A, 2019);  section (, , ); Hysterectomy (); Appendectomy (); Laparoscopic cholecystectomy (N/A, 2020); Colonoscopy (N/A, 2020); Esophagogastroduodenoscopy (N/A, 2020); Diagnostic laparoscopy (N/A, 2021); and Laparoscopic lysis of adhesions (N/A, 2021).    Medications:   has a current medication list which includes the following prescription(s): atorvastatin, constulose, diclofenac sodium, hydrocodone-acetaminophen, meloxicam, ondansetron, ondansetron, pantoprazole, promethazine, and vitamin d.    Allergies:   Review of patient's allergies indicates:  No Known Allergies       OBJECTIVE     L hand : 5 lbs   R hand : 35 lbs     L lateral pinch  : 1 lb  R lateral pinch : 6.5 lb    AROM: WFL     MMT:  4-/5 L Elbow flexion  4/5 L Elbow extension     Limitation/Restriction for FOTO Elbow Survey    Therapist reviewed FOTO scores for Pham Licea on 2022.   FOTO documents entered into EPIC - see Media  section.    Limitation Score: score not showing at this time %         Treatment   Total Treatment time (time-based codes) separate from Evaluation: 0 minutes      Patient Education and Home Exercises      Education provided:   - yes the patient was educated on the origin and insertion points of the brachioradialis, and the function of the muscle that was torn. Pt was advised on the OT plan of care and on the HEP given     Written Home Exercises Provided: yes.  Exercises were reviewed and Elicia was able to demonstrate them prior to the end of the session.  Elicia demonstrated good  understanding of the education provided. See EMR under Patient Instructions for exercises provided during therapy sessions.     Pt was advised to perform these exercises free of pain, and to stop performing them if pain occurs.    Patient/Family Education: role of OT, goals for OT, scheduling/cancellations - pt verbalized understanding. Discussed insurance limitations with patient.      ASSESSMENT     Pham Licea is a 66 y.o. female referred to outpatient occupational therapy and presents with a medical diagnosis of L brachioradialis tear.  Patient presents with the following therapy deficits: Decreased ROM, Decreased  strength, Decreased muscle strength, Decreased functional hand use, Increased pain and Diminished/Impaired Coordination and demonstrates limitations as described in the chart below. Following medical record review it is determined that pt will benefit from occupational therapy services in order to maximize pain free and/or functional use of left forearm. The following goals were discussed with the patient and patient is in agreement with them as to be addressed in the treatment plan. The patient's rehab potential is Good.     Anticipated barriers to occupational therapy: none  Pt has no cultural, educational or language barriers to learning provided.    Profile and History Assessment of Occupational Performance  Level of Clinical Decision Making Complexity Score   Occupational Profile:   Pham Licea is a 66 y.o. female who lives alone and is currently employed Pham Licea has difficulty with  ADLs and IADLs as listed previously, which  Affecting herdaily functional abilities.      Comorbidities:    has a past medical history of Anemia, Fatty liver, Former smoker, Osteoarthritis, Pulmonary nodules, and Ulcer of abdomen wall.    Medical and Therapy History Review:   Medical hx reviewed                Performance Deficits    Physical:  Muscle Power/Strength  Muscle Endurance   Strength  Pinch Strength  Gross Motor Coordination  Fine Motor Coordination  Pain    Cognitive:  No Deficits    Psychosocial:    No Deficits     Clinical Decision Making:  moderate    Assessment Process:  Problem-Focused Assessments    Modification/Need for Assistance:  Not Necessary    Intervention Selection:  Several Treatment Options       moderate  Based on PMHX, co morbidities , data from assessments and functional level of assistance required with task and clinical presentation directly impacting function.       The following goals were discussed with the patient and patient is in agreement with them as to be addressed in the treatment plan.     Goals:     Short Term Goals:   1) Pt will Initiate HEP consistently for 2 weeks   2) Pt will reduce pain in the L forearm pain to less than 5/10 when lifting objects over 5 lbs by 4 weeks.   3) Pt will increase L hand functional  strength by 5 # of force in order to A in opening containers for med management or home management tasks by 4 weeks.        Long Term Goals:  Goals to be met by discharge:  1) Pt will report complete [place] with HEP prior to discharge.   2) Pt will increase L elbow flexion/extension MMT to 5/5 prior to discharge.   3) Pt will decrease pain to in L forearm pain to 2/10 when lifting items heavier than 5 lbs by d/c.  4) Pt will increase L hand   strength by 7 # of force or greater by d/c         PLAN   Plan of Care Certification: 6/17/2022 to 09/02/2022.     Outpatient Occupational Therapy 1 times weekly for 12 weeks to include the following interventions: Paraffin, Manual therapy/joint mobilizations, Modalities for pain management, US 3 mhz, Therapeutic exercises/activities., Strengthening, Electrical Modalities and Joint Protection.      Nereyda Dove OT      I CERTIFY THE NEED FOR THESE SERVICES FURNISHED UNDER THIS PLAN OF TREATMENT AND WHILE UNDER MY CARE  Physician's comments:      Physician's Signature: ___________________________________________________

## 2022-06-21 ENCOUNTER — LAB VISIT (OUTPATIENT)
Dept: LAB | Facility: HOSPITAL | Age: 67
End: 2022-06-21
Attending: INTERNAL MEDICINE
Payer: COMMERCIAL

## 2022-06-21 DIAGNOSIS — E53.8 B12 DEFICIENCY: ICD-10-CM

## 2022-06-21 DIAGNOSIS — D50.8 IRON DEFICIENCY ANEMIA SECONDARY TO INADEQUATE DIETARY IRON INTAKE: ICD-10-CM

## 2022-06-21 DIAGNOSIS — R10.10 PAIN OF UPPER ABDOMEN: ICD-10-CM

## 2022-06-21 LAB
ALBUMIN SERPL BCP-MCNC: 4.2 G/DL (ref 3.5–5.2)
ALP SERPL-CCNC: 70 U/L (ref 55–135)
ALT SERPL W/O P-5'-P-CCNC: 12 U/L (ref 10–44)
ANION GAP SERPL CALC-SCNC: 11 MMOL/L (ref 8–16)
AST SERPL-CCNC: 13 U/L (ref 10–40)
BASOPHILS # BLD AUTO: 0.05 K/UL (ref 0–0.2)
BASOPHILS NFR BLD: 1 % (ref 0–1.9)
BILIRUB SERPL-MCNC: 0.7 MG/DL (ref 0.1–1)
BUN SERPL-MCNC: 14 MG/DL (ref 8–23)
CALCIUM SERPL-MCNC: 9.5 MG/DL (ref 8.7–10.5)
CHLORIDE SERPL-SCNC: 107 MMOL/L (ref 95–110)
CO2 SERPL-SCNC: 24 MMOL/L (ref 23–29)
CREAT SERPL-MCNC: 0.7 MG/DL (ref 0.5–1.4)
DIFFERENTIAL METHOD: ABNORMAL
EOSINOPHIL # BLD AUTO: 0.1 K/UL (ref 0–0.5)
EOSINOPHIL NFR BLD: 1.5 % (ref 0–8)
ERYTHROCYTE [DISTWIDTH] IN BLOOD BY AUTOMATED COUNT: 14.5 % (ref 11.5–14.5)
EST. GFR  (AFRICAN AMERICAN): >60 ML/MIN/1.73 M^2
EST. GFR  (NON AFRICAN AMERICAN): >60 ML/MIN/1.73 M^2
FERRITIN SERPL-MCNC: 8 NG/ML (ref 20–300)
GLUCOSE SERPL-MCNC: 102 MG/DL (ref 70–110)
HCT VFR BLD AUTO: 33.3 % (ref 37–48.5)
HGB BLD-MCNC: 10.4 G/DL (ref 12–16)
IMM GRANULOCYTES # BLD AUTO: 0.01 K/UL (ref 0–0.04)
IMM GRANULOCYTES NFR BLD AUTO: 0.2 % (ref 0–0.5)
IRON SERPL-MCNC: 47 UG/DL (ref 30–160)
LYMPHOCYTES # BLD AUTO: 1.4 K/UL (ref 1–4.8)
LYMPHOCYTES NFR BLD: 27.1 % (ref 18–48)
MCH RBC QN AUTO: 27 PG (ref 27–31)
MCHC RBC AUTO-ENTMCNC: 31.2 G/DL (ref 32–36)
MCV RBC AUTO: 87 FL (ref 82–98)
MONOCYTES # BLD AUTO: 0.5 K/UL (ref 0.3–1)
MONOCYTES NFR BLD: 9.3 % (ref 4–15)
NEUTROPHILS # BLD AUTO: 3.2 K/UL (ref 1.8–7.7)
NEUTROPHILS NFR BLD: 60.9 % (ref 38–73)
NRBC BLD-RTO: 0 /100 WBC
PLATELET # BLD AUTO: 319 K/UL (ref 150–450)
PMV BLD AUTO: 9.4 FL (ref 9.2–12.9)
POTASSIUM SERPL-SCNC: 3.9 MMOL/L (ref 3.5–5.1)
PROT SERPL-MCNC: 7 G/DL (ref 6–8.4)
RBC # BLD AUTO: 3.85 M/UL (ref 4–5.4)
SATURATED IRON: 9 % (ref 20–50)
SODIUM SERPL-SCNC: 142 MMOL/L (ref 136–145)
TOTAL IRON BINDING CAPACITY: 511 UG/DL (ref 250–450)
TRANSFERRIN SERPL-MCNC: 345 MG/DL (ref 200–375)
VIT B12 SERPL-MCNC: 484 PG/ML (ref 210–950)
WBC # BLD AUTO: 5.17 K/UL (ref 3.9–12.7)

## 2022-06-21 PROCEDURE — 84466 ASSAY OF TRANSFERRIN: CPT | Performed by: INTERNAL MEDICINE

## 2022-06-21 PROCEDURE — 82728 ASSAY OF FERRITIN: CPT | Performed by: INTERNAL MEDICINE

## 2022-06-21 PROCEDURE — 80053 COMPREHEN METABOLIC PANEL: CPT | Performed by: INTERNAL MEDICINE

## 2022-06-21 PROCEDURE — 82607 VITAMIN B-12: CPT | Performed by: INTERNAL MEDICINE

## 2022-06-21 PROCEDURE — 85025 COMPLETE CBC W/AUTO DIFF WBC: CPT | Performed by: INTERNAL MEDICINE

## 2022-06-21 PROCEDURE — 36415 COLL VENOUS BLD VENIPUNCTURE: CPT | Performed by: INTERNAL MEDICINE

## 2022-06-22 ENCOUNTER — CLINICAL SUPPORT (OUTPATIENT)
Dept: REHABILITATION | Facility: HOSPITAL | Age: 67
End: 2022-06-22
Attending: PHYSICIAN ASSISTANT
Payer: COMMERCIAL

## 2022-06-22 DIAGNOSIS — M79.602 ARM PAIN, LATERAL, LEFT: Primary | ICD-10-CM

## 2022-06-22 PROCEDURE — 97110 THERAPEUTIC EXERCISES: CPT | Mod: PN

## 2022-06-22 PROCEDURE — 97140 MANUAL THERAPY 1/> REGIONS: CPT | Mod: PN

## 2022-06-22 NOTE — PROGRESS NOTES
Occupational Therapy Daily Treatment Note   Name: Pham Licea 1955  MRN: [id number]    Visit Date: 6/22/2022  Visit #: 2/12  Authorization period Expiration: 07/05/2022   Plan of Care Expiration: 09/02/2022  Precautions: standard; no heavy lifting using the L UE    Time In: 8: 45  Time Out: 9: 30  Total 1:1 Treatment Time: 45 min    Treatment Diagnosis:   Encounter Diagnosis   Name Primary?    Arm pain, lateral, left Yes     Physician: Russo-Digeorge, Jamie L*    Subjective   Pt reports: that she is having trouble performing everyday activities   She was compliant with home exercise program.     Pain Scale:  3/10 on VAS currently at rest  Pain Location: L elbow /forearm    Objective   Elicia received therapeutic exercises to develop strength, endurance and ROM for 25 minutes including:  -L forearm pronation/supination using weighted handle for 10 reps  -L wrist AROM for radial/ulnar deviation for 10 reps.  -L wrist AROM for wrist flexion/extension for 10 reps.  -L hand  strength using 3 lb gripper for 10 reps.  -L hand 3 point pinch using yellow resistance clip for 10 reps    Elicia received the following manual therapy techniques: Soft tissue Mobilization were applied to the: L elbow, forearm  for 15  minutes.   Pt received soft tissue mobilization to L elbow and forearm in order to decrease swelling, and alleviate pain     Elicia received the following direct contact modalities after being cleared for contraindications:  Heat pack was applied to the L elbow prior to mobilization for 5 mins    Home Exercises and Education Provided     Education provided re:   - progress towards goals   - role of therapy in multi - disciplinary team, goals for therapy  Pt educated on condition, POC, and expectations in therapy.  No spiritual or educational barriers to learning provided    Home exercises:  Pt will be provided HEP during course of treatment with progressions as appropriate. Pt  was advised to perform these exercises free of pain, and to stop performing them if pain occurs.   Elicia demonstrated good  understanding of the education provided.     Assessment   Elicia could not tolerate more than 10 reps in a row for each exercise due to muscle weakness. Pt needed min A through the motion of pronation/supination and could not tolerate any weight on the handle. Pt  remains weak and impacts her daily functions including house work and self care tasks.  OT to continue monitoring progress with no updates to goals at this time.     Pt prognosis is Good. Pt will continue to benefit from skilled outpatient physical therapy to address the deficits listed in the problem list chart on initial evaluation, provide pt/family education and to maximize pt's level of independence in the home and community environment.     Medical necessity is demonstrated by the impairments and functional limitations listed on the Initial Evaluation.     Anticipated barriers to occupational therapy: full thickness tear  Pt's spiritual, cultural and educational needs considered and pt agreeable to plan of care and goals.    Goals     Short Term Goals:   1) Pt will Initiate HEP consistently for 2 weeks   2) Pt will reduce pain in the L forearm pain to less than 5/10 when lifting objects over 5 lbs by 4 weeks.   3) Pt will increase L hand functional  strength by 5 # of force in order to A in opening containers for med management or home management tasks by 4 weeks.      Long Term Goals:  Goals to be met by discharge:  1) Pt will report complete [place] with HEP prior to discharge.   2) Pt will increase L elbow flexion/extension MMT to 5/5 prior to discharge.   3) Pt will decrease pain to in L forearm pain to 2/10 when lifting items heavier than 5 lbs by d/c.  4) Pt will increase L hand  strength by 7 # of force or greater by d/c      Plan   Continue with established Plan of Care towards Occupational Therapy goals.    Discussed Plan of Care with patient: Yes    Nereyda Dove, OT  6/22/2022

## 2022-06-23 ENCOUNTER — OFFICE VISIT (OUTPATIENT)
Dept: HEMATOLOGY/ONCOLOGY | Facility: CLINIC | Age: 67
End: 2022-06-23
Payer: COMMERCIAL

## 2022-06-23 DIAGNOSIS — D50.8 IRON DEFICIENCY ANEMIA SECONDARY TO INADEQUATE DIETARY IRON INTAKE: ICD-10-CM

## 2022-06-23 DIAGNOSIS — K62.5 RECTAL BLEEDING: Primary | ICD-10-CM

## 2022-06-23 PROCEDURE — 99214 PR OFFICE/OUTPT VISIT, EST, LEVL IV, 30-39 MIN: ICD-10-PCS | Mod: 95,,, | Performed by: INTERNAL MEDICINE

## 2022-06-23 PROCEDURE — 99214 OFFICE O/P EST MOD 30 MIN: CPT | Mod: 95,,, | Performed by: INTERNAL MEDICINE

## 2022-06-23 NOTE — Clinical Note
Please arrange for Ferrlecit to be given at [facility] orders are in see me virtually 1 month with CBC CMP iron studies ferritin

## 2022-06-24 RX ORDER — EPINEPHRINE 0.3 MG/.3ML
0.3 INJECTION SUBCUTANEOUS ONCE AS NEEDED
Status: CANCELLED | OUTPATIENT
Start: 2022-06-24

## 2022-06-24 RX ORDER — DIPHENHYDRAMINE HYDROCHLORIDE 50 MG/ML
50 INJECTION INTRAMUSCULAR; INTRAVENOUS ONCE AS NEEDED
Status: CANCELLED | OUTPATIENT
Start: 2022-06-24

## 2022-06-24 RX ORDER — METHYLPREDNISOLONE SOD SUCC 125 MG
125 VIAL (EA) INJECTION ONCE AS NEEDED
Status: CANCELLED | OUTPATIENT
Start: 2022-06-24

## 2022-06-24 RX ORDER — SODIUM CHLORIDE 0.9 % (FLUSH) 0.9 %
10 SYRINGE (ML) INJECTION
Status: CANCELLED | OUTPATIENT
Start: 2022-06-24

## 2022-06-24 RX ORDER — HEPARIN 100 UNIT/ML
500 SYRINGE INTRAVENOUS
Status: CANCELLED | OUTPATIENT
Start: 2022-06-24

## 2022-06-24 NOTE — PROGRESS NOTES
Subjective:    The patient location is: home  Visit type: Virtual visit with synchronous audio and video  Face-to-face or time spent with patient on the encounter:20min  Total time spent on and for  this encounter which includes non face-to-face time preparing to see patient, review of tests, obtaining and or reviewing separately obtained records documenting clinical information in the electronic or other health records, independently interpreting results which is not separately reported ,and communicating results to the patient/family/caregiver and in care coordination and treatment planning/communicating with pharmacy for prescriptions/addressing social needs/arranging follow-up and or referrals :25 min    Each patient I provide medical services by telemedicine is:  (1) informed of the relationship between the physician and patient and the respective role of any other health care provider with respect to management of the patient; and (2) notified that he or she may decline to receive medical services by telemedicine and may withdraw from such care at any time.  This is a video visit therefore some elements of the physical exam such as vital signs, heart sounds are breath sounds are not included and may be included if found in recent clinic notes of other providers assessing same patient. Any symptoms or signs that were visualized were stated by the patient may be included in this note.     Patient ID: Pham Licea is a 66 y.o. female.    Chief Complaint:      sept 2020 had gall bladder surgery, thern she had a bowel obstruction, no surgery then, had 2 feet of bowels removed after a second bowel obstruction  Esophageal ulcers and dudenal ulcers- with bleeding in 2017  Recd blood in [facility] this weekend, pt went to ED because she was shaky and week and couldn't think was found to be anemic in ed   pt readmitted 3/22 had repeat bowel obstruction   Seen at emergency room May 25, 2022 with CT scan of abdomen  which shows partial obstruction patient having significant abdominal pain.  Discomfort belching similar to her prior obstructive symptoms  Past Medical History:   Diagnosis Date    Anemia     Fatty liver 2015    Former smoker 2009    Osteoarthritis 2010    Pulmonary nodules 2019    Repeat CT in 6 mo    Ulcer of abdomen wall 2016     Past Surgical History:   Procedure Laterality Date    APPENDECTOMY  1993     SECTION  , , 1983    x3    COLONOSCOPY  ~2014    [facility]: normal findings per patient report    COLONOSCOPY N/A 2020    Procedure: COLONOSCOPY;  Surgeon: Misael Holm MD;  Location: [facility] ENDO;  Service: General;  Laterality: N/A;    DIAGNOSTIC LAPAROSCOPY N/A 2021    Procedure: LAPAROSCOPY, DIAGNOSTIC;  Surgeon: Misael Holm MD;  Location: [facility] OR;  Service: General;  Laterality: N/A;    EPIDURAL STEROID INJECTION INTO LUMBAR SPINE N/A 10/12/2018    Procedure: Injection-steroid-epidural-lumbar;  Surgeon: Kal Johnson MD;  Location: [facility] OR;  Service: Pain Management;  Laterality: N/A;  L5-S1    EPIDURAL STEROID INJECTION INTO LUMBAR SPINE N/A 2019    Procedure: Injection-steroid-epidural-lumbar L5-S1;  Surgeon: Kal Johnson MD;  Location: [facility] OR;  Service: Pain Management;  Laterality: N/A;    ESOPHAGOGASTRODUODENOSCOPY N/A 2020    Procedure: ESOPHAGOGASTRODUODENOSCOPY (EGD);  Surgeon: Misael Holm MD;  Location: [facility];  Service: General;  Laterality: N/A;    HYSTERECTOMY      one ovary conserved    LAPAROSCOPIC CHOLECYSTECTOMY N/A 2020    Procedure: CHOLECYSTECTOMY, LAPAROSCOPIC;  Surgeon: Govind Frey MD;  Location: [facility] OR;  Service: General;  Laterality: N/A;    LAPAROSCOPIC LYSIS OF ADHESIONS N/A 2021    Procedure: LYSIS, ADHESIONS, LAPAROSCOPIC;  Surgeon: Misael Holm MD;  Location: [facility] OR;  Service: General;  Laterality: N/A;    UPPER GASTROINTESTINAL ENDOSCOPY  2016     Family History    Problem Relation Age of Onset    Ovarian cancer Mother     Heart disease Mother     Osteoporosis Mother     Arthritis Mother     Hypertension Father     Stroke Father 50    Brain Hemorrhage Father     Aneurysm Father     Osteoarthritis Sister     Arthritis Sister     Hypertension Sister     Obesity Sister     Breast cancer Neg Hx     Colon cancer Neg Hx     Colon polyps Neg Hx     Crohn's disease Neg Hx     Ulcerative colitis Neg Hx       Social History     Socioeconomic History    Marital status:     Number of children: 4    Highest education level: Some college, no degree   Occupational History    Occupation: sale specialist-    Tobacco Use    Smoking status: Former Smoker     Packs/day: 1.00     Years: 40.00     Pack years: 40.00     Quit date: 2009     Years since quittin.6    Smokeless tobacco: Never Used    Tobacco comment: quit 2009   Substance and Sexual Activity    Alcohol use: Not Currently     Comment: Not often - one glass of wine every now and then    Drug use: Not Currently     Types: Marijuana     Comment: in     Sexual activity: Not Currently     Social Determinants of Health     Financial Resource Strain: Low Risk     Difficulty of Paying Living Expenses: Not hard at all   Food Insecurity: No Food Insecurity    Worried About Running Out of Food in the Last Year: Never true    Ran Out of Food in the Last Year: Never true   Transportation Needs: No Transportation Needs    Lack of Transportation (Medical): No    Lack of Transportation (Non-Medical): No   Physical Activity: Inactive    Days of Exercise per Week: 0 days    Minutes of Exercise per Session: 0 min   Stress: Stress Concern Present    Feeling of Stress : Rather much   Social Connections: Unknown    Frequency of Communication with Friends and Family: More than three times a week    Frequency of Social Gatherings with Friends and Family: Once a week    Active Member of Clubs or  Organizations: Yes    Attends Club or Organization Meetings: More than 4 times per year    Marital Status:    Housing Stability: Low Risk     Unable to Pay for Housing in the Last Year: No    Number of Places Lived in the Last Year: 1    Unstable Housing in the Last Year: No     Review of patient's allergies indicates:  No Known Allergies    Current Outpatient Medications:     atorvastatin (LIPITOR) 10 MG tablet, Take 1 tablet (10 mg total) by mouth once daily., Disp: 90 tablet, Rfl: 3    CONSTULOSE 10 gram/15 mL solution, TAKE 10 ML BY MOUTH  THREE TIMES DAILY, Disp: 946 mL, Rfl: 0    diclofenac sodium (VOLTAREN) 1 % Gel, Apply 2 g topically 2 (two) times daily as needed., Disp: , Rfl:     HYDROcodone-acetaminophen (NORCO) 7.5-325 mg per tablet, Take 1 tablet by mouth every 6 (six) hours as needed for Pain., Disp: 24 tablet, Rfl: 0    meloxicam (MOBIC) 15 MG tablet, Take 15 mg by mouth once daily., Disp: , Rfl:     ondansetron (ZOFRAN-ODT) 4 MG TbDL, Take 2 tablets (8 mg total) by mouth every 8 (eight) hours as needed., Disp: 10 tablet, Rfl: 1    ondansetron (ZOFRAN-ODT) 8 MG TbDL, Take 1 tablet (8 mg total) by mouth every 6 (six) hours as needed (nausea or vomiting)., Disp: 30 tablet, Rfl: 1    pantoprazole (PROTONIX) 20 MG tablet, Take 1 tablet (20 mg total) by mouth once daily., Disp: 90 tablet, Rfl: 3    promethazine (PHENERGAN) 25 MG suppository, Place 1 suppository (25 mg total) rectally 3 (three) times daily as needed for Nausea., Disp: 8 suppository, Rfl: 0    vitamin D (VITAMIN D3) 1000 units Tab, Take 3,000 Units by mouth once daily., Disp: , Rfl:   Review of Systems   Constitutional: Positive for malaise/fatigue. Negative for weight loss.        Mostly good appetite lost weight with her sx   HENT: Negative for hearing loss.    Eyes: Negative for blurred vision and double vision.   Gastrointestinal: Positive for constipation.        Takes lactulose twice a day and colace twice a day    Musculoskeletal: Negative for back pain, myalgias and neck pain.        Leg cramps and shaky   Skin: Negative for rash.   Neurological: Negative for dizziness, weakness and headaches.   Endo/Heme/Allergies: Does not bruise/bleed easily.   recent bowel obstruction 3/22 with repeat abdominal pain discomfort emergency room visit last night the outpatient surgical appointment  Physical Exam    Wt Readings from Last 3 Encounters:   05/31/22 77.8 kg (171 lb 8 oz)   05/25/22 77.1 kg (170 lb)   05/25/22 80.5 kg (177 lb 6 oz)     Temp Readings from Last 3 Encounters:   05/25/22 97.9 °F (36.6 °C) (Oral)   04/12/22 98 °F (36.7 °C) (Oral)   03/29/22 97.9 °F (36.6 °C) (Oral)     BP Readings from Last 3 Encounters:   05/31/22 131/76   05/25/22 124/70   05/25/22 116/75     Pulse Readings from Last 3 Encounters:   05/31/22 68   05/25/22 101   05/25/22 76     Lab Results   Component Value Date    WBC 5.17 06/21/2022    HGB 10.4 (L) 06/21/2022    HCT 33.3 (L) 06/21/2022    MCV 87 06/21/2022     06/21/2022       BMP  Lab Results   Component Value Date     06/21/2022    K 3.9 06/21/2022     06/21/2022    CO2 24 06/21/2022    BUN 14 06/21/2022    CREATININE 0.7 06/21/2022    CALCIUM 9.5 06/21/2022    ANIONGAP 11 06/21/2022    ESTGFRAFRICA >60.0 06/21/2022    EGFRNONAA >60.0 06/21/2022     Lab Results   Component Value Date    IRON 47 06/21/2022    TIBC 511 (H) 06/21/2022    FERRITIN 8 (L) 06/21/2022       Impression:  CT scan abdomen May 25, 2022     1. Multiple dilated loops of small bowel are noted that are fluid-filled left upper quadrant with the suggestion of bowel wall thickening near the level of the umbilicus in the midline and extending into the left dilated loops of bowel.  This raises the concern for vascular injury and or bowel infarction/inflammation possibly associated with obstruction.  Surgical consultation is suggested.  Post-contrast oral and intravenous imaging may be of use for confirmation given  that this exam is severely hindered.  2. A 2nd region of concern is noted within the right hemipelvis where a dilated loop of small bowel is noted without obvious bowel wall thickening.  This region is nonspecific and could relate to colon, small bowel, ileus, or obstruction.  Further evaluation and clinical correlation is necessary     Patient Active Problem List   Diagnosis    Primary osteoarthritis involving multiple joints    Obesity (BMI 30.0-34.9)    Gastroesophageal reflux disease    Right knee pain    Chronic midline low back pain    Acute tear medial meniscus, right, sequela    Primary osteoarthritis of right knee    Lumbar radiculitis    Trigger middle finger of right hand    Nausea    Abdominal pain    Encounter for postoperative care    Intestinal obstruction    Hypokalemia    Constipation    History of cholecystectomy    Right upper quadrant pain    History of hysterectomy    Rectal bleeding    Hemorrhoids    Mitral valve prolapse    Coronary artery calcification    Anemia    SAMPSON (dyspnea on exertion), noted 2010    History of smoking 25-50 pack years, 46 py, quit 2009    Other emphysema    NSAID long-term use, started 2017    Bandemia    Family history of premature CAD    Cardiovascular event risk, ASCVD 10-year risk 4.8%, 2019    Abdominal obesity    Dyslipidemia, baseline LDL-C 112, HDL-C 49    Iron deficiency anemia secondary to inadequate dietary iron intake    Severe anemia    S/p small bowel obstruction    Arm pain, lateral, left        Assessment and Plan   NATHAN: pt had partail bowel removal from opbstructions so anticipate this will be recurrent   recvd infusinal iron with excellent response will need to repeat  Orders will be placed for Mathieu  None needed this visit however patient has made recent emergency room visit last night May 25, 2022 I have messaged Dr. Jai horton for visit with him as soon as possible  Recheck in 1 months with cbc, cmp, iron  bradly   Slight B12 deficiency also continue to monitor most recent test June 21, 2022 acceptable  Patient to continue follow-up of her DJD with PCP  Bowel obs again 3/22 repeat sx and adhesiolysis,

## 2022-06-27 ENCOUNTER — HOSPITAL ENCOUNTER (EMERGENCY)
Facility: HOSPITAL | Age: 67
Discharge: LEFT BEFORE DISCHARGE | End: 2022-06-27
Attending: EMERGENCY MEDICINE
Payer: COMMERCIAL

## 2022-06-27 ENCOUNTER — PATIENT MESSAGE (OUTPATIENT)
Dept: SURGERY | Facility: CLINIC | Age: 67
End: 2022-06-27
Payer: COMMERCIAL

## 2022-06-27 VITALS
TEMPERATURE: 98 F | WEIGHT: 170 LBS | OXYGEN SATURATION: 96 % | HEART RATE: 77 BPM | SYSTOLIC BLOOD PRESSURE: 127 MMHG | RESPIRATION RATE: 14 BRPM | HEIGHT: 64 IN | BODY MASS INDEX: 29.02 KG/M2 | DIASTOLIC BLOOD PRESSURE: 64 MMHG

## 2022-06-27 DIAGNOSIS — R10.9 ABDOMINAL PAIN: ICD-10-CM

## 2022-06-27 DIAGNOSIS — K56.600 PARTIAL SMALL BOWEL OBSTRUCTION: Primary | ICD-10-CM

## 2022-06-27 DIAGNOSIS — K56.609 SMALL BOWEL OBSTRUCTION: ICD-10-CM

## 2022-06-27 LAB
ALBUMIN SERPL BCP-MCNC: 4.2 G/DL (ref 3.5–5.2)
ALP SERPL-CCNC: 86 U/L (ref 55–135)
ALT SERPL W/O P-5'-P-CCNC: 10 U/L (ref 10–44)
ANION GAP SERPL CALC-SCNC: 15 MMOL/L (ref 8–16)
AST SERPL-CCNC: 12 U/L (ref 10–40)
BACTERIA #/AREA URNS HPF: ABNORMAL /HPF
BASOPHILS # BLD AUTO: 0.04 K/UL (ref 0–0.2)
BASOPHILS NFR BLD: 0.3 % (ref 0–1.9)
BILIRUB SERPL-MCNC: 0.6 MG/DL (ref 0.1–1)
BILIRUB UR QL STRIP: NEGATIVE
BUN SERPL-MCNC: 11 MG/DL (ref 8–23)
CALCIUM SERPL-MCNC: 9.7 MG/DL (ref 8.7–10.5)
CHLORIDE SERPL-SCNC: 105 MMOL/L (ref 95–110)
CLARITY UR: CLEAR
CO2 SERPL-SCNC: 21 MMOL/L (ref 23–29)
COLOR UR: YELLOW
CREAT SERPL-MCNC: 0.7 MG/DL (ref 0.5–1.4)
DIFFERENTIAL METHOD: ABNORMAL
EOSINOPHIL # BLD AUTO: 0 K/UL (ref 0–0.5)
EOSINOPHIL NFR BLD: 0.1 % (ref 0–8)
ERYTHROCYTE [DISTWIDTH] IN BLOOD BY AUTOMATED COUNT: 13.9 % (ref 11.5–14.5)
EST. GFR  (AFRICAN AMERICAN): >60 ML/MIN/1.73 M^2
EST. GFR  (NON AFRICAN AMERICAN): >60 ML/MIN/1.73 M^2
GLUCOSE SERPL-MCNC: 138 MG/DL (ref 70–110)
GLUCOSE UR QL STRIP: NEGATIVE
HCT VFR BLD AUTO: 36.3 % (ref 37–48.5)
HGB BLD-MCNC: 11.7 G/DL (ref 12–16)
HGB UR QL STRIP: ABNORMAL
IMM GRANULOCYTES # BLD AUTO: 0.08 K/UL (ref 0–0.04)
IMM GRANULOCYTES NFR BLD AUTO: 0.5 % (ref 0–0.5)
KETONES UR QL STRIP: ABNORMAL
LACTATE SERPL-SCNC: 4.6 MMOL/L (ref 0.5–2.2)
LEUKOCYTE ESTERASE UR QL STRIP: ABNORMAL
LIPASE SERPL-CCNC: 17 U/L (ref 4–60)
LYMPHOCYTES # BLD AUTO: 1.4 K/UL (ref 1–4.8)
LYMPHOCYTES NFR BLD: 9.4 % (ref 18–48)
MCH RBC QN AUTO: 27.2 PG (ref 27–31)
MCHC RBC AUTO-ENTMCNC: 32.2 G/DL (ref 32–36)
MCV RBC AUTO: 84 FL (ref 82–98)
MICROSCOPIC COMMENT: ABNORMAL
MONOCYTES # BLD AUTO: 0.7 K/UL (ref 0.3–1)
MONOCYTES NFR BLD: 4.2 % (ref 4–15)
NEUTROPHILS # BLD AUTO: 13.1 K/UL (ref 1.8–7.7)
NEUTROPHILS NFR BLD: 85.5 % (ref 38–73)
NITRITE UR QL STRIP: NEGATIVE
NRBC BLD-RTO: 0 /100 WBC
PH UR STRIP: 8 [PH] (ref 5–8)
PLATELET # BLD AUTO: 393 K/UL (ref 150–450)
PMV BLD AUTO: 10 FL (ref 9.2–12.9)
POTASSIUM SERPL-SCNC: 3.5 MMOL/L (ref 3.5–5.1)
PROT SERPL-MCNC: 7.2 G/DL (ref 6–8.4)
PROT UR QL STRIP: NEGATIVE
RBC # BLD AUTO: 4.3 M/UL (ref 4–5.4)
RBC #/AREA URNS HPF: 2 /HPF (ref 0–4)
SODIUM SERPL-SCNC: 141 MMOL/L (ref 136–145)
SP GR UR STRIP: 1.02 (ref 1–1.03)
URN SPEC COLLECT METH UR: ABNORMAL
UROBILINOGEN UR STRIP-ACNC: NEGATIVE EU/DL
WBC # BLD AUTO: 15.34 K/UL (ref 3.9–12.7)
WBC #/AREA URNS HPF: 7 /HPF (ref 0–5)

## 2022-06-27 PROCEDURE — 96375 TX/PRO/DX INJ NEW DRUG ADDON: CPT

## 2022-06-27 PROCEDURE — 96365 THER/PROPH/DIAG IV INF INIT: CPT

## 2022-06-27 PROCEDURE — 71045 X-RAY EXAM CHEST 1 VIEW: CPT | Mod: 26,,, | Performed by: RADIOLOGY

## 2022-06-27 PROCEDURE — 93010 ELECTROCARDIOGRAM REPORT: CPT | Mod: ,,, | Performed by: INTERNAL MEDICINE

## 2022-06-27 PROCEDURE — 74177 CT ABDOMEN PELVIS WITH CONTRAST: ICD-10-PCS | Mod: 26,,, | Performed by: RADIOLOGY

## 2022-06-27 PROCEDURE — 83690 ASSAY OF LIPASE: CPT | Performed by: EMERGENCY MEDICINE

## 2022-06-27 PROCEDURE — 25000003 PHARM REV CODE 250: Performed by: EMERGENCY MEDICINE

## 2022-06-27 PROCEDURE — A9698 NON-RAD CONTRAST MATERIALNOC: HCPCS | Performed by: EMERGENCY MEDICINE

## 2022-06-27 PROCEDURE — 96376 TX/PRO/DX INJ SAME DRUG ADON: CPT

## 2022-06-27 PROCEDURE — 80053 COMPREHEN METABOLIC PANEL: CPT | Performed by: EMERGENCY MEDICINE

## 2022-06-27 PROCEDURE — 74018 XR NON-RADIOLOGIST PERFORMED NG/GASTRIC TUBE CHECK: ICD-10-PCS | Mod: 26,,, | Performed by: RADIOLOGY

## 2022-06-27 PROCEDURE — 81000 URINALYSIS NONAUTO W/SCOPE: CPT | Performed by: EMERGENCY MEDICINE

## 2022-06-27 PROCEDURE — 74177 CT ABD & PELVIS W/CONTRAST: CPT | Mod: TC

## 2022-06-27 PROCEDURE — 63600175 PHARM REV CODE 636 W HCPCS: Performed by: EMERGENCY MEDICINE

## 2022-06-27 PROCEDURE — 74018 RADEX ABDOMEN 1 VIEW: CPT | Mod: TC,FY

## 2022-06-27 PROCEDURE — 71045 XR CHEST 1 VIEW FOR LINE/TUBE PLACEMENT: ICD-10-PCS | Mod: 26,,, | Performed by: RADIOLOGY

## 2022-06-27 PROCEDURE — 83605 ASSAY OF LACTIC ACID: CPT | Performed by: EMERGENCY MEDICINE

## 2022-06-27 PROCEDURE — 96361 HYDRATE IV INFUSION ADD-ON: CPT

## 2022-06-27 PROCEDURE — 74018 RADEX ABDOMEN 1 VIEW: CPT | Mod: 26,,, | Performed by: RADIOLOGY

## 2022-06-27 PROCEDURE — 99285 EMERGENCY DEPT VISIT HI MDM: CPT | Mod: 25

## 2022-06-27 PROCEDURE — 25500020 PHARM REV CODE 255: Performed by: EMERGENCY MEDICINE

## 2022-06-27 PROCEDURE — 93005 ELECTROCARDIOGRAM TRACING: CPT

## 2022-06-27 PROCEDURE — 74177 CT ABD & PELVIS W/CONTRAST: CPT | Mod: 26,,, | Performed by: RADIOLOGY

## 2022-06-27 PROCEDURE — 93010 EKG 12-LEAD: ICD-10-PCS | Mod: ,,, | Performed by: INTERNAL MEDICINE

## 2022-06-27 PROCEDURE — 85025 COMPLETE CBC W/AUTO DIFF WBC: CPT | Performed by: EMERGENCY MEDICINE

## 2022-06-27 RX ORDER — ONDANSETRON 2 MG/ML
4 INJECTION INTRAMUSCULAR; INTRAVENOUS
Status: COMPLETED | OUTPATIENT
Start: 2022-06-27 | End: 2022-06-27

## 2022-06-27 RX ORDER — HYDROMORPHONE HYDROCHLORIDE 2 MG/ML
1 INJECTION, SOLUTION INTRAMUSCULAR; INTRAVENOUS; SUBCUTANEOUS
Status: COMPLETED | OUTPATIENT
Start: 2022-06-27 | End: 2022-06-27

## 2022-06-27 RX ORDER — HYDROMORPHONE HYDROCHLORIDE 2 MG/ML
0.5 INJECTION, SOLUTION INTRAMUSCULAR; INTRAVENOUS; SUBCUTANEOUS
Status: COMPLETED | OUTPATIENT
Start: 2022-06-27 | End: 2022-06-27

## 2022-06-27 RX ORDER — HYDROMORPHONE HYDROCHLORIDE 2 MG/ML
0.5 INJECTION, SOLUTION INTRAMUSCULAR; INTRAVENOUS; SUBCUTANEOUS
Status: DISCONTINUED | OUTPATIENT
Start: 2022-06-27 | End: 2022-06-27

## 2022-06-27 RX ADMIN — IOHEXOL 1000 ML: 9 SOLUTION ORAL at 09:06

## 2022-06-27 RX ADMIN — SODIUM CHLORIDE 1000 ML: 0.9 INJECTION, SOLUTION INTRAVENOUS at 08:06

## 2022-06-27 RX ADMIN — IOHEXOL 75 ML: 350 INJECTION, SOLUTION INTRAVENOUS at 09:06

## 2022-06-27 RX ADMIN — HYDROMORPHONE HYDROCHLORIDE 0.5 MG: 2 INJECTION INTRAMUSCULAR; INTRAVENOUS; SUBCUTANEOUS at 08:06

## 2022-06-27 RX ADMIN — ONDANSETRON 4 MG: 2 INJECTION INTRAMUSCULAR; INTRAVENOUS at 07:06

## 2022-06-27 RX ADMIN — PROMETHAZINE HYDROCHLORIDE 12.5 MG: 25 INJECTION INTRAMUSCULAR; INTRAVENOUS at 08:06

## 2022-06-27 RX ADMIN — HYDROMORPHONE HYDROCHLORIDE 1 MG: 2 INJECTION INTRAMUSCULAR; INTRAVENOUS; SUBCUTANEOUS at 07:06

## 2022-06-27 RX ADMIN — SODIUM CHLORIDE 1000 ML: 0.9 INJECTION, SOLUTION INTRAVENOUS at 07:06

## 2022-06-27 NOTE — LETTER
Patient: Elicia Licea  YOB: 1955  Date: 6/27/2022 Time: 1:49 PM  Location: [facility]    Leaving the [facility] Against Medical Advice    Chart #:[id number]    This will certify that I, the undersigned,    ______________________________________________________________________    A patient in the above named [facility] center, having requested discharge and removal from the medical [facility] against the advice of my attending physician(s), hereby release [facility], its physicians, officers and employees, severally and individually, from any and all liability of any nature whatsoever for any injury or harm or complication of any kind that may result directly or indirectly, by reason of my terminating my stay as a patient at [facility] and my departure from [facility], and hereby waive any and all rights of action I may now have or later acquire as a result of my voluntary departure from [facility] and the termination of my stay as a patient therein.    This release is made with the full knowledge of the danger that may result from the action which I am taking.      Date:_______________________                         ___________________________                                                                                    Patient/Legal Representative    Witness:        ____________________________                          ___________________________  Nurse                                                                        Physician

## 2022-06-27 NOTE — ED NOTES
Ng to right nare advanced approx 5 inches per xray recommendation. Pt requesting more pain meds. Dr. Melgar notified.

## 2022-06-27 NOTE — TELEPHONE ENCOUNTER
Per Dr Holm, writer spoke to patient and let her know that Dr Holm was down in surgery at the time the ER was calling him, as he is scheduled on every Monday for procedures. Writer let patient know that Dr Holm has called in a Rx Cipro 500 mg, BID x 10 days. Pt was advised per Dr Holm that she is to be on a clear liquid diet (Gatorade,Powerade,Pedialyte, chicken broth) for the next 2 days and a soft diet through out the weekend. Pt advised to slowly increase diet as tolerated starting on Monday 07/04/22. Pt scheduled for f/u on 07/12/22 for hospital f/u. Pt advised to report to the ER of needed. Patient expressed verbal understanding.

## 2022-06-27 NOTE — ED TRIAGE NOTES
Pt here with spouse, pt states she woke up around 1 AM with epigastric abdominal pain and vomiting. Pt states she has a history of bowel obstruction. Pt is actively vomiting, guarding. Pt is tender to touch in the epigastric region. Pt is alert and oriented, respirations are even unlabored. Will continue to monitor.

## 2022-06-27 NOTE — ED NOTES
"Pt refusing pain medicine at this time stating," I'm leaving take this all off I'll go home and die." When asked the situation patient states," you guys need to take [facility] off the sign because your an urgent care. My surgeon is here and you tell me I have to go to [place]." I explained to patient that we have no surgeon on call today. I explained that a courteous call was placed to dr. Holm regardless of call status but he is not obligated to answer. I explained that in order to be admitted to the facility she had to have a surgeon follow her in case bowel rest didn't improve her symptoms. Pt still refuses to be transferred or stay at this time. Pt waiting for ride home and requesting all devices be removed and ama paper ready.   "

## 2022-06-27 NOTE — ED PROVIDER NOTES
Encounter Date: 2022       History     Chief Complaint   Patient presents with    Abdominal Pain     Pt states severe abdominal pain that started around 1 AM with associated nausea.      66-year-old female here complaining of severe abdominal pain with nausea.  She states her abdomen hurts in the middle region.  Symptoms started at around 1:00 a.m..  She has associated nausea and vomiting..  Last bowel movement was yesterday and was normal.  Patient denies any fever or chills.  No dark or bloody stool.  No dark or bloody vomitus.  Patient states she has a history of several bowel obstructions secondary to adhesions from previous surgeries.  Last obstruction was in March of this year.  She sees Dr. Holm for this.  She has been using Zofran 0 DT without relief of the vomiting.  Denies fever.  Patient also states she has history of chronic anemia, and is scheduled for iron infusions soon.        Review of patient's allergies indicates:  No Known Allergies  Past Medical History:   Diagnosis Date    Anemia     Fatty liver     Former smoker     Osteoarthritis     Pulmonary nodules 2019    Repeat CT in 6 mo    Ulcer of abdomen wall 2016     Past Surgical History:   Procedure Laterality Date    APPENDECTOMY  1993     SECTION  , , 1983    x3    COLONOSCOPY  ~    [facility]: normal findings per patient report    COLONOSCOPY N/A 2020    Procedure: COLONOSCOPY;  Surgeon: Misael Holm MD;  Location: [facility] ENDO;  Service: General;  Laterality: N/A;    DIAGNOSTIC LAPAROSCOPY N/A 2021    Procedure: LAPAROSCOPY, DIAGNOSTIC;  Surgeon: Misael Holm MD;  Location: [facility] OR;  Service: General;  Laterality: N/A;    EPIDURAL STEROID INJECTION INTO LUMBAR SPINE N/A 10/12/2018    Procedure: Injection-steroid-epidural-lumbar;  Surgeon: Kal Johnson MD;  Location: [facility] OR;  Service: Pain Management;  Laterality: N/A;  L5-S1    EPIDURAL STEROID INJECTION INTO  LUMBAR SPINE N/A 2019    Procedure: Injection-steroid-epidural-lumbar L5-S1;  Surgeon: Kal Johnson MD;  Location: [facility] OR;  Service: Pain Management;  Laterality: N/A;    ESOPHAGOGASTRODUODENOSCOPY N/A 2020    Procedure: ESOPHAGOGASTRODUODENOSCOPY (EGD);  Surgeon: Misael Holm MD;  Location: [facility] ENDO;  Service: General;  Laterality: N/A;    HYSTERECTOMY  1993    one ovary conserved    LAPAROSCOPIC CHOLECYSTECTOMY N/A 2020    Procedure: CHOLECYSTECTOMY, LAPAROSCOPIC;  Surgeon: Govind Frey MD;  Location: [facility] OR;  Service: General;  Laterality: N/A;    LAPAROSCOPIC LYSIS OF ADHESIONS N/A 2021    Procedure: LYSIS, ADHESIONS, LAPAROSCOPIC;  Surgeon: Misael Holm MD;  Location: [facility] OR;  Service: General;  Laterality: N/A;    UPPER GASTROINTESTINAL ENDOSCOPY       Family History   Problem Relation Age of Onset    Ovarian cancer Mother     Heart disease Mother     Osteoporosis Mother     Arthritis Mother     Hypertension Father     Stroke Father 50    Brain Hemorrhage Father     Aneurysm Father     Osteoarthritis Sister     Arthritis Sister     Hypertension Sister     Obesity Sister     Breast cancer Neg Hx     Colon cancer Neg Hx     Colon polyps Neg Hx     Crohn's disease Neg Hx     Ulcerative colitis Neg Hx      Social History     Tobacco Use    Smoking status: Former Smoker     Packs/day: 1.00     Years: 40.00     Pack years: 40.00     Quit date: 2009     Years since quittin.6    Smokeless tobacco: Never Used    Tobacco comment: quit 2009   Substance Use Topics    Alcohol use: Not Currently     Comment: Not often - one glass of wine every now and then    Drug use: Not Currently     Types: Marijuana     Comment: in 1970s     Review of Systems   Constitutional: Negative.    HENT: Negative.    Eyes: Negative.    Respiratory: Negative.    Cardiovascular: Negative.    Gastrointestinal: Positive for abdominal pain, nausea and vomiting.  Negative for blood in stool, constipation and diarrhea.   Endocrine: Negative.    Genitourinary: Negative.    Musculoskeletal: Negative.    Skin: Negative.    Neurological: Negative.    Psychiatric/Behavioral: Negative.        Physical Exam     Initial Vitals [06/27/22 0650]   BP Pulse Resp Temp SpO2   (!) 109/90 92 18 97.6 °F (36.4 °C) 100 %      MAP       --         Physical Exam    Nursing note and vitals reviewed.  Constitutional: She appears well-developed and well-nourished. She is not diaphoretic. She appears distressed (Very uncomfortable, actively vomiting.).   HENT:   Head: Normocephalic and atraumatic.   Nose: Nose normal.   Mouth/Throat: Oropharynx is clear and moist.   Eyes: Conjunctivae and EOM are normal. Pupils are equal, round, and reactive to light. No scleral icterus.   Neck: Neck supple. No JVD present.   Normal range of motion.  Cardiovascular: Normal rate, regular rhythm, normal heart sounds and intact distal pulses.   No murmur heard.  Pulmonary/Chest: Breath sounds normal. No stridor. No respiratory distress. She has no wheezes. She has no rhonchi. She has no rales.   Abdominal: Abdomen is soft. She exhibits no distension and no mass. There is abdominal tenderness.   Bowel sounds are present but significantly diminished.  Abdomen is generally tender with mild guarding. There is guarding. There is no rebound.   Musculoskeletal:         General: No tenderness or edema. Normal range of motion.      Cervical back: Normal range of motion and neck supple.           ED Course   Procedures  Labs Reviewed   CBC W/ AUTO DIFFERENTIAL - Abnormal; Notable for the following components:       Result Value    WBC 15.34 (*)     Hemoglobin 11.7 (*)     Hematocrit 36.3 (*)     Gran # (ANC) 13.1 (*)     Immature Grans (Abs) 0.08 (*)     Gran % 85.5 (*)     Lymph % 9.4 (*)     All other components within normal limits    Narrative:     For upper or mid abdominal pain.   COMPREHENSIVE METABOLIC PANEL - Abnormal;  Notable for the following components:    CO2 21 (*)     Glucose 138 (*)     All other components within normal limits    Narrative:     For upper or mid abdominal pain.   URINALYSIS, REFLEX TO URINE CULTURE - Abnormal; Notable for the following components:    Ketones, UA Trace (*)     Occult Blood UA Trace (*)     Leukocytes, UA Trace (*)     All other components within normal limits    Narrative:     In and Out Cath as needed it patient unable to void  Preferred Collection Type->Urine, Clean Catch  Specimen Source->Urine   LACTIC ACID, PLASMA - Abnormal; Notable for the following components:    Lactate (Lactic Acid) 4.6 (*)     All other components within normal limits    Narrative:        LA critical result(s) called and verbal readback obtained from   Gwen at 0815. pj by [facility] 06/27/2022 08:15   URINALYSIS MICROSCOPIC - Abnormal; Notable for the following components:    WBC, UA 7 (*)     Bacteria Few (*)     All other components within normal limits    Narrative:     In and Out Cath as needed it patient unable to void  Preferred Collection Type->Urine, Clean Catch  Specimen Source->Urine   LIPASE    Narrative:     For upper or mid abdominal pain.     EKG Readings: (Independently Interpreted)   EKG personally reviewed by me shows normal sinus rhythm, nonspecific ST abnormality, 99 beats per minute.  MO interval 112, . No obvious ST elevations or depressions.  No arrhythmia.       ECG Results          EKG 12-lead (Final result)  Result time 06/27/22 12:11:11    Final result by Interface, Lab In [facility] (06/27/22 12:11:11)                 Narrative:    Test Reason : R10.9,    Vent. Rate : 099 BPM     Atrial Rate : 099 BPM     P-R Int : 112 ms          QRS Dur : 084 ms      QT Int : 364 ms       P-R-T Axes : 067 080 043 degrees     QTc Int : 467 ms    Normal sinus rhythm  Nonspecific ST abnormality  Abnormal ECG  When compared with ECG of 26-MAR-2021 09:21,  No significant change was found  Confirmed by Antoni BAINS,  Donis ([id number]) on 6/27/2022 12:10:56 PM    Referred By: [email or web address]   SELF           Confirmed By:Donis Corrales MD                            Imaging Results          X-Ray Chest 1 View for Line/Tube Placement (Final result)  Result time 06/27/22 13:01:28    Final result by Paco Blackwood Jr., MD (06/27/22 13:01:28)                 Impression:      NG tube coiled in the stomach in good position.  Otherwise negative chest.      Electronically signed by: Paco Blackwood MD  Date:    06/27/2022  Time:    13:01             Narrative:    EXAMINATION:  XR CHEST 1 VIEW FOR LINE/TUBE PLACEMENT    CLINICAL HISTORY:  ng tube placement post advancement;    TECHNIQUE:  Single frontal portable view of the chest was performed.    COMPARISON:  None    FINDINGS:  The NG tube is coiled in the stomach in good position.  The mediastinal and cardiac size and contour normal.  No intrapulmonary masses or infiltrates are seen.  There is no pneumothorax or pleural effusion                               X-ray Abdomen for NG Tube Placement (Nursing should notify Radiology after placement) (Final result)  Result time 06/27/22 11:51:07    Final result by Paco Blackwood Jr., MD (06/27/22 11:51:07)                 Impression:      NG tube has been advanced down the esophagus and ends at the GE junction.  This should be advanced 4 or 5 inches further to coil in the stomach.  Prior cholecystectomy.  Otherwise negative abdomen x-ray.      Electronically signed by: Paco Blackwood MD  Date:    06/27/2022  Time:    11:51             Narrative:    EXAMINATION:  XR NON-RADIOLOGIST PERFORMED NG/GASTRIC TUBE CHECK    CLINICAL HISTORY:  tube placement;    TECHNIQUE:  AP view of the lower chest and abdomen is provided.    COMPARISON:  Chest x-ray of June 27, 2022 at 10:57    FINDINGS:  An NG tube ends at the GE junction.  The side hole remains in the lower esophagus.  Surgical clips in the right upper quadrant are consistent with prior cholecystectomy.   Contrast is seen in the kidneys and bladder from recent CT scan.  The bowel gas pattern is within normal limits.  No free air is seen.                                X-Ray Chest 1 View for Line/Tube Placement (Final result)  Result time 06/27/22 11:02:40    Final result by Paco Blackwood Jr., MD (06/27/22 11:02:40)                 Impression:      The NG tube is noted ending in the superior mediastinum.  While it may be in the proximal esophagus placement in the trachea is not ruled out.    This report was flagged in Epic as abnormal.      Electronically signed by: Paco Blackwood MD  Date:    06/27/2022  Time:    11:02             Narrative:    EXAMINATION:  XR CHEST 1 VIEW FOR LINE/TUBE PLACEMENT    CLINICAL HISTORY:  ng tube placement;    TECHNIQUE:  Single frontal portable view of the chest was performed.    COMPARISON:  None    FINDINGS:  An NG tube ends in the superior mediastinum and does not reach the stomach.  It may be in the esophagus but placement in the trachea is not ruled out.  The cardiac size and contours normal.  No intrapulmonary mass or infiltrate is seen.  No pneumothorax or pleural effusion is noted.                                CT Abdomen Pelvis With Contrast (Final result)  Result time 06/27/22 10:14:12    Final result by Paco Blackwood Jr., MD (06/27/22 10:14:12)                 Impression:      There are loops of dilated small bowel with air-fluid levels identified in the left abdomen whereas the distal ileum and terminal ileum and the duodenum are not dilated or edematous.  This is a probable partial small bowel obstruction.  Small amount of free fluid is seen in the pelvic cul de sac.  Prior hysterectomy.  Prior cholecystectomy.    Small infiltrate atelectasis identified at the left lung base, new.    This report was flagged in Epic as abnormal.      Electronically signed by: Paco Blackwood MD  Date:    06/27/2022  Time:    10:14             Narrative:    EXAMINATION:  CT  ABDOMEN PELVIS WITH CONTRAST    CLINICAL HISTORY:  Abdominal pain, acute, nonlocalized;    TECHNIQUE:  Low dose axial images, sagittal and coronal reformations were obtained from the lung bases to the pubic symphysis following the IV administration of 75 mL of Omnipaque 350 and 1 L omni 9 oral contrast.    COMPARISON:  CT abdomen pelvis of May 25, 2022. CT abdomen pelvis of March 16, 2022    FINDINGS:  There is a new small band of infiltrate and atelectasis at the left lung base.  Consolidation or solid mass is not seen however.  Nude    the liver is of normal size contour and CT density without focal defect.  The gallbladder is absent with surgical clips noted.  The pancreas is of normal contour and CT density without edema or mass.  The spleen is of normal size and CT density.    The adrenal glands are not enlarged.  The kidneys are of normal size contour and contrast enhancement without mass stone or hydronephrosis.  The abdominal aorta and inferior vena cava are of normal caliber.    The stomach is of normal configuration.  The duodenum and proximal jejunum are of normal caliber without distention or mass.  At the mid jejunum there is thickening of the bowel wall of a few loops and several other loops which show air-fluid levels and distention up to 3.8 cm in diameter.  The distal ileum and terminal ileum are of normal caliber without dilation or edema.  The colon is of normal configuration without distention or mass.  There are few diverticuli noted in the descending and sigmoid colon without CT evidence of diverticulitis.    The bladder is of normal contour without mass or asymmetry.  A uterus is not seen.  A small collection of free fluid is identified in the right pelvis.                              X-Rays:   Independently Interpreted Readings:   Other Readings:  CT abdomen pelvis, personally reviewed by me, shows small-bowel obstruction at the mid jejunum.    Medications   ondansetron injection 4 mg (4 mg  Intravenous Given 6/27/22 0720)   HYDROmorphone (PF) injection 1 mg (1 mg Intravenous Given 6/27/22 0720)   sodium chloride 0.9% bolus 1,000 mL (0 mLs Intravenous Stopped 6/27/22 0839)   sodium chloride 0.9% bolus 1,000 mL (0 mLs Intravenous Stopped 6/27/22 1014)   iohexoL (OMNIPAQUE 9) oral solution 1,000 mL (1,000 mLs Oral Given 6/27/22 0944)   iohexoL (OMNIPAQUE 350) injection 75 mL (75 mLs Intravenous Given 6/27/22 0945)   HYDROmorphone (PF) injection 0.5 mg (0.5 mg Intravenous Given 6/27/22 0848)   promethazine (PHENERGAN) 12.5 mg in dextrose 5 % 50 mL IVPB (0 mg Intravenous Stopped 6/27/22 1014)     Medical Decision Making:   Differential Diagnosis:   Gastritis, gastroenteritis, bowel obstruction, ileus, colitis, diverticulitis, etc.  ED Management:  CT abdomen pelvis shows small bowel obstruction.  Unfortunately no surgery is available here today, so patient will need to be transferred to another facility.  Lactic acid is elevated and white count is elevated as well, although I do not suspect infection.  Her pain was treated with hydromorphone, nausea was treated with Zofran and Phenergan.  NG tube is being placed.  [facility] was contacted and told of the need to transfer this patient.  She is receiving IV fluids.     14:00- patient stated that she is tired of waiting.  She does not believe that we do not have a surgeon on-call.  Explained to her that I tried to call Dr. Holm, who is not on-call, but I got no answer.  I did leave a message with Dr. Holm stating that 1 of his patients was here and was asking for him.  She decided to leave AMA.  Because she has been given pain medications, the patient could not sign herself out, she called her ex  came to the emergency department.  I explained the dangers of leaving AMA in front of the patient and her ex- and she stated that she understands these dangers fully, and still elects to leave AMA to go to another hospital.   She stated that she understood about the dangers of bowel obstructions and said that I have been dealing with this for 3 years.  She appears to be clear-headed in I believe that she is capable of making that decision to leave AM.  Her ex- believes this is well, and he volunteered to sign her out.  She was also told that if she has any trouble being seen and any other facilities, she can always come back here, where we can resume the transfer process.                      Clinical Impression:   Final diagnoses:  [R10.9] Abdominal pain  [K56.609] Small bowel obstruction  [K56.600] Partial small bowel obstruction (Primary)          ED Disposition Condition    KARINEA               Arnulfo Melgar MD  06/27/22 1056

## 2022-06-27 NOTE — ED NOTES
"Pt significant other at bedside with Dr. Melgar and myself. Situation explained in depth to patient and family. Patient and family verbalized understanding that she could potentially die if her status worsens.  Patient states," I'm not being transferred anywhere, I've been through this before. I'll call my surgeon.Take this stuff out and let me go or I will."  "

## 2022-06-27 NOTE — DISCHARGE INSTRUCTIONS
Go immediately to another hospital emergency department.  If you have any trouble at all being seen in a timely manner, or any other problems getting prompt medical care, you can return here.

## 2022-06-28 ENCOUNTER — TELEPHONE (OUTPATIENT)
Dept: HEMATOLOGY/ONCOLOGY | Facility: CLINIC | Age: 67
End: 2022-06-28
Payer: COMMERCIAL

## 2022-06-28 NOTE — TELEPHONE ENCOUNTER
----- Message from Mayra Bower MD sent at 6/24/2022  1:01 AM CDT -----  Please arrange for Ferrlecit to be given at [facility] orders are in see me virtually 1 month with CBC CMP iron studies ferritin

## 2022-06-29 ENCOUNTER — TELEPHONE (OUTPATIENT)
Dept: HEMATOLOGY/ONCOLOGY | Facility: CLINIC | Age: 67
End: 2022-06-29
Payer: COMMERCIAL

## 2022-06-29 ENCOUNTER — PATIENT MESSAGE (OUTPATIENT)
Dept: HEMATOLOGY/ONCOLOGY | Facility: CLINIC | Age: 67
End: 2022-06-29
Payer: COMMERCIAL

## 2022-06-29 ENCOUNTER — OFFICE VISIT (OUTPATIENT)
Dept: FAMILY MEDICINE | Facility: CLINIC | Age: 67
End: 2022-06-29
Payer: COMMERCIAL

## 2022-06-29 VITALS
BODY MASS INDEX: 29.53 KG/M2 | RESPIRATION RATE: 16 BRPM | OXYGEN SATURATION: 97 % | DIASTOLIC BLOOD PRESSURE: 84 MMHG | HEIGHT: 64 IN | SYSTOLIC BLOOD PRESSURE: 134 MMHG | WEIGHT: 173 LBS | HEART RATE: 68 BPM

## 2022-06-29 DIAGNOSIS — K56.609 SBO (SMALL BOWEL OBSTRUCTION): Primary | ICD-10-CM

## 2022-06-29 PROCEDURE — 99999 PR PBB SHADOW E&M-EST. PATIENT-LVL III: CPT | Mod: PBBFAC,,, | Performed by: FAMILY MEDICINE

## 2022-06-29 PROCEDURE — 1159F PR MEDICATION LIST DOCUMENTED IN MEDICAL RECORD: ICD-10-PCS | Mod: CPTII,S$GLB,, | Performed by: FAMILY MEDICINE

## 2022-06-29 PROCEDURE — 99999 PR PBB SHADOW E&M-EST. PATIENT-LVL III: ICD-10-PCS | Mod: PBBFAC,,, | Performed by: FAMILY MEDICINE

## 2022-06-29 PROCEDURE — 3008F PR BODY MASS INDEX (BMI) DOCUMENTED: ICD-10-PCS | Mod: CPTII,S$GLB,, | Performed by: FAMILY MEDICINE

## 2022-06-29 PROCEDURE — 3075F SYST BP GE 130 - 139MM HG: CPT | Mod: CPTII,S$GLB,, | Performed by: FAMILY MEDICINE

## 2022-06-29 PROCEDURE — 3079F DIAST BP 80-89 MM HG: CPT | Mod: CPTII,S$GLB,, | Performed by: FAMILY MEDICINE

## 2022-06-29 PROCEDURE — 99212 PR OFFICE/OUTPT VISIT, EST, LEVL II, 10-19 MIN: ICD-10-PCS | Mod: S$GLB,,, | Performed by: FAMILY MEDICINE

## 2022-06-29 PROCEDURE — 3079F PR MOST RECENT DIASTOLIC BLOOD PRESSURE 80-89 MM HG: ICD-10-PCS | Mod: CPTII,S$GLB,, | Performed by: FAMILY MEDICINE

## 2022-06-29 PROCEDURE — 3008F BODY MASS INDEX DOCD: CPT | Mod: CPTII,S$GLB,, | Performed by: FAMILY MEDICINE

## 2022-06-29 PROCEDURE — 99212 OFFICE O/P EST SF 10 MIN: CPT | Mod: S$GLB,,, | Performed by: FAMILY MEDICINE

## 2022-06-29 PROCEDURE — 3075F PR MOST RECENT SYSTOLIC BLOOD PRESS GE 130-139MM HG: ICD-10-PCS | Mod: CPTII,S$GLB,, | Performed by: FAMILY MEDICINE

## 2022-06-29 PROCEDURE — 1159F MED LIST DOCD IN RCRD: CPT | Mod: CPTII,S$GLB,, | Performed by: FAMILY MEDICINE

## 2022-06-29 RX ORDER — CIPROFLOXACIN 500 MG/1
500 TABLET ORAL 2 TIMES DAILY
COMMUNITY
Start: 2022-06-27 | End: 2022-08-25

## 2022-06-29 NOTE — PROGRESS NOTES
[facility] - Clinic Note    Subjective      Ms. Licea is a 66 y.o. female who presents to clinic for an ED follow up.     Patient was seen in the ED on 22 due to a SBO.   She left [place] as she did not want to be transferred to [place].   She is under the care of Dr. Holm who has sent in cipro for her.   She is taking the abx and on a soft diet.   She is upset and tearful today as she feels like she has no quality of life due to recurrent SBO. She would like to know if she has any other options so she does not have to have recurrent episodes.    PMH Pham has a past medical history of Anemia, Fatty liver (), Former smoker (), Osteoarthritis (), Pulmonary nodules (2019), and Ulcer of abdomen wall ().   PSXH Pham has a past surgical history that includes Upper gastrointestinal endoscopy (); Colonoscopy (~); Epidural steroid injection into lumbar spine (N/A, 10/12/2018); Epidural steroid injection into lumbar spine (N/A, 2019);  section (, , ); Hysterectomy (); Appendectomy (); Laparoscopic cholecystectomy (N/A, 2020); Colonoscopy (N/A, 2020); Esophagogastroduodenoscopy (N/A, 2020); Diagnostic laparoscopy (N/A, 2021); and Laparoscopic lysis of adhesions (N/A, 2021).    Pham's family history includes Aneurysm in her father; Arthritis in her mother and sister; Brain Hemorrhage in her father; Heart disease in her mother; Hypertension in her father and sister; Obesity in her sister; Osteoarthritis in her sister; Osteoporosis in her mother; Ovarian cancer in her mother; Stroke (age of onset: 50) in her father.   KARL Nath reports that she quit smoking about 12 years ago. She has a 40.00 pack-year smoking history. She has never used smokeless tobacco. She reports previous alcohol use. She reports previous drug use. Drug: Marijuana.   ALG Pham has No Known Allergies.   MED Pham has a current medication list  "which includes the following prescription(s): atorvastatin, ciprofloxacin hcl, constulose, diclofenac sodium, hydrocodone-acetaminophen, meloxicam, ondansetron, ondansetron, pantoprazole, promethazine, and vitamin d.     Review of Systems   Constitutional: Negative for activity change, appetite change, chills, fatigue and fever.   Eyes: Negative for visual disturbance.   Respiratory: Negative for cough and shortness of breath.    Cardiovascular: Negative for chest pain, palpitations and leg swelling.   Gastrointestinal: Negative for abdominal pain, nausea and vomiting.   Skin: Negative for wound.   Neurological: Negative for dizziness and headaches.   Psychiatric/Behavioral: Negative for confusion.     Objective     /84 (BP Location: Left arm, Patient Position: Sitting, BP Method: Medium (Automatic))   Pulse 68   Resp 16   Ht 5' 4" (1.626 m)   Wt 78.5 kg (173 lb)   LMP  (LMP Unknown)   SpO2 97%   BMI 29.70 kg/m²     Physical Exam   Constitutional: She appears well-developed and well-nourished.  Non-toxic appearance. She does not appear ill. No distress.   HENT:   Head: Normocephalic and atraumatic.   Eyes: Right eye exhibits no discharge. Left eye exhibits no discharge.   Cardiovascular: Normal rate, regular rhythm, normal heart sounds and normal pulses. Exam reveals no gallop and no friction rub.   No murmur heard.  Pulmonary/Chest: Effort normal and breath sounds normal. No respiratory distress. She has no wheezes. She has no rhonchi. She has no rales.   Abdominal: Normal appearance.   Musculoskeletal:      Cervical back: Neck supple.   Lymphadenopathy:     She has no cervical adenopathy.   Neurological: She is alert.   Skin: Skin is warm and dry. Capillary refill takes less than 2 seconds. She is not diaphoretic.   Psychiatric: Her behavior is normal. Mood, judgment and thought content normal.   Tearful affect   Vitals reviewed.     Assessment/Plan     Pham was seen today for " follow-up.    Diagnoses and all orders for this visit:    SBO (small bowel obstruction)  -will discussed with Dr. Holm about further plan of care of her recurrent SBOs.  -keep f/u appt with Dr. Holm    Future Appointments   Date Time Provider Department Center   6/30/2022  3:30 PM Nereyda Dove OT [facility]   7/7/2022  1:00 PM Geo Vaughan MD [facility]   7/12/2022  9:00 AM [facility]   8/25/2022  8:40 AM Deven Emerson MD [facility]       Deven Emerson MD  Family Medicine  [facility]

## 2022-06-30 ENCOUNTER — CLINICAL SUPPORT (OUTPATIENT)
Dept: REHABILITATION | Facility: HOSPITAL | Age: 67
End: 2022-06-30
Attending: PHYSICIAN ASSISTANT
Payer: COMMERCIAL

## 2022-06-30 DIAGNOSIS — M79.602 ARM PAIN, LATERAL, LEFT: Primary | ICD-10-CM

## 2022-06-30 PROCEDURE — 97110 THERAPEUTIC EXERCISES: CPT | Mod: PN

## 2022-06-30 NOTE — PROGRESS NOTES
Occupational Therapy Daily Treatment Note   Name: Pham Licea 1955  MRN: [id number]    Visit Date: 6/30/2022  Visit #: 3 /12  Authorization period Expiration: 07/05/2022   Plan of Care Expiration: 09/02/2022  Precautions: standard; no heavy lifting using the L UE    Time In: 3: 30  Time Out: 4: 15  Total 1:1 Treatment Time: 45 min    Treatment Diagnosis:   Encounter Diagnosis   Name Primary?    Arm pain, lateral, left Yes     Physician: Russo-Digeorge, Jamie L*    Subjective   Pt reports: feeling weak today due to low iron; will have an infusion tomorrow and goes to see doctor on the 7th  She was compliant with home exercise program.     Pain Scale:   1 /10 on VAS currently at rest  Pain Location: L elbow /forearm    Objective   Elicia received therapeutic exercises to develop strength, endurance and ROM for 40 minutes including:  -L forearm pronation/supination using weighted handle for 10 reps  -L wrist AROM for radial/ulnar deviation for 10 reps.  -L wrist AROM for forearm pronation/supination for 20 reps.  -L wrist AROM for wrist flexion/extension for 10 reps.  -L hand  strength using 1.5 lb gripper for 10 reps.  -L hand 3 point pinch using yellow resistance clip for 10 reps    Elicia received the following direct contact modalities after being cleared for contraindications:  Heat pack was applied to the L elbow prior to mobilization for 5 mins    Home Exercises and Education Provided     Education provided re:   - progress towards goals   - role of therapy in multi - disciplinary team, goals for therapy  Pt educated on condition, POC, and expectations in therapy.  No spiritual or educational barriers to learning provided    Home exercises:  Pt will be provided HEP during course of treatment with progressions as appropriate. Pt was advised to perform these exercises free of pain, and to stop performing them if pain occurs.   Elicia demonstrated good  understanding of the  education provided.     Assessment   Elicia could not tolerate more than 10 reps in a row for each exercise due to muscle weakness. Pt needed min A through the motion of pronation/supination and could not tolerate any weight on the handle. Pt  remains weak and impacts her daily functions including house work and self care tasks.  OT to continue monitoring progress with no updates to goals at this time.     Pt prognosis is Good. Pt will continue to benefit from skilled outpatient physical therapy to address the deficits listed in the problem list chart on initial evaluation, provide pt/family education and to maximize pt's level of independence in the home and community environment.     Medical necessity is demonstrated by the impairments and functional limitations listed on the Initial Evaluation.     Anticipated barriers to occupational therapy: full thickness tear  Pt's spiritual, cultural and educational needs considered and pt agreeable to plan of care and goals.    Goals     Short Term Goals:   1) Pt will Initiate HEP consistently for 2 weeks   2) Pt will reduce pain in the L forearm pain to less than 5/10 when lifting objects over 5 lbs by 4 weeks.   3) Pt will increase L hand functional  strength by 5 # of force in order to A in opening containers for med management or home management tasks by 4 weeks.      Long Term Goals:  Goals to be met by discharge:  1) Pt will report complete [place] with HEP prior to discharge.   2) Pt will increase L elbow flexion/extension MMT to 5/5 prior to discharge.   3) Pt will decrease pain to in L forearm pain to 2/10 when lifting items heavier than 5 lbs by d/c.  4) Pt will increase L hand  strength by 7 # of force or greater by d/c      Plan   Continue with established Plan of Care towards Occupational Therapy goals.   Discussed Plan of Care with patient: Yes    Nereyda Dove OT  6/30/2022

## 2022-07-01 ENCOUNTER — INFUSION (OUTPATIENT)
Dept: INFUSION THERAPY | Facility: HOSPITAL | Age: 67
End: 2022-07-01
Payer: COMMERCIAL

## 2022-07-01 VITALS
HEART RATE: 68 BPM | DIASTOLIC BLOOD PRESSURE: 73 MMHG | BODY MASS INDEX: 29.1 KG/M2 | OXYGEN SATURATION: 100 % | HEIGHT: 64 IN | SYSTOLIC BLOOD PRESSURE: 134 MMHG | WEIGHT: 170.44 LBS | TEMPERATURE: 99 F | RESPIRATION RATE: 20 BRPM

## 2022-07-01 DIAGNOSIS — D50.8 IRON DEFICIENCY ANEMIA SECONDARY TO INADEQUATE DIETARY IRON INTAKE: Primary | ICD-10-CM

## 2022-07-01 PROCEDURE — 25000003 PHARM REV CODE 250: Performed by: INTERNAL MEDICINE

## 2022-07-01 PROCEDURE — 63600175 PHARM REV CODE 636 W HCPCS: Performed by: INTERNAL MEDICINE

## 2022-07-01 PROCEDURE — A4216 STERILE WATER/SALINE, 10 ML: HCPCS | Performed by: INTERNAL MEDICINE

## 2022-07-01 PROCEDURE — 96365 THER/PROPH/DIAG IV INF INIT: CPT

## 2022-07-01 RX ORDER — DIPHENHYDRAMINE HYDROCHLORIDE 50 MG/ML
50 INJECTION INTRAMUSCULAR; INTRAVENOUS ONCE AS NEEDED
Status: DISCONTINUED | OUTPATIENT
Start: 2022-07-01 | End: 2022-07-01 | Stop reason: HOSPADM

## 2022-07-01 RX ORDER — SODIUM CHLORIDE 0.9 % (FLUSH) 0.9 %
10 SYRINGE (ML) INJECTION
Status: CANCELLED | OUTPATIENT
Start: 2022-07-08

## 2022-07-01 RX ORDER — METHYLPREDNISOLONE SOD SUCC 125 MG
125 VIAL (EA) INJECTION ONCE AS NEEDED
Status: DISCONTINUED | OUTPATIENT
Start: 2022-07-01 | End: 2022-07-01 | Stop reason: HOSPADM

## 2022-07-01 RX ORDER — EPINEPHRINE 0.3 MG/.3ML
0.3 INJECTION SUBCUTANEOUS ONCE AS NEEDED
Status: CANCELLED | OUTPATIENT
Start: 2022-07-08

## 2022-07-01 RX ORDER — DIPHENHYDRAMINE HYDROCHLORIDE 50 MG/ML
50 INJECTION INTRAMUSCULAR; INTRAVENOUS ONCE AS NEEDED
Status: CANCELLED | OUTPATIENT
Start: 2022-07-08

## 2022-07-01 RX ORDER — HEPARIN 100 UNIT/ML
500 SYRINGE INTRAVENOUS
Status: CANCELLED | OUTPATIENT
Start: 2022-07-08

## 2022-07-01 RX ORDER — SODIUM CHLORIDE 0.9 % (FLUSH) 0.9 %
10 SYRINGE (ML) INJECTION
Status: DISCONTINUED | OUTPATIENT
Start: 2022-07-01 | End: 2022-07-01 | Stop reason: HOSPADM

## 2022-07-01 RX ORDER — EPINEPHRINE 0.3 MG/.3ML
0.3 INJECTION SUBCUTANEOUS ONCE AS NEEDED
Status: DISCONTINUED | OUTPATIENT
Start: 2022-07-01 | End: 2022-07-01 | Stop reason: HOSPADM

## 2022-07-01 RX ORDER — METHYLPREDNISOLONE SOD SUCC 125 MG
125 VIAL (EA) INJECTION ONCE AS NEEDED
Status: CANCELLED | OUTPATIENT
Start: 2022-07-08

## 2022-07-01 RX ADMIN — SODIUM CHLORIDE: 9 INJECTION, SOLUTION INTRAVENOUS at 12:07

## 2022-07-01 RX ADMIN — Medication 10 ML: at 01:07

## 2022-07-01 RX ADMIN — Medication 10 ML: at 12:07

## 2022-07-01 RX ADMIN — SODIUM CHLORIDE 125 MG: 9 INJECTION, SOLUTION INTRAVENOUS at 12:07

## 2022-07-01 NOTE — PLAN OF CARE
Pleasant alert and oriented patient ambulated to clinic for ferelicit infusion - pt tolerated well - discharged home with no concerns - pt to RTC next week.

## 2022-07-01 NOTE — PLAN OF CARE
"Problem: Adult Inpatient Plan of Care  Goal: Plan of Care Review  Flowsheets (Taken 7/1/2022 1237)  Plan of Care Reviewed With: patient  BP (!) 141/66 (BP Location: Left arm, Patient Position: Sitting)   Pulse 72   Temp 98.7 °F (37.1 °C) (Oral)   Resp 20   Ht 5' 4" (1.626 m)   Wt 77.3 kg (170 lb 6.7 oz)   LMP  (LMP Unknown)   SpO2 100%   BMI 29.25 kg/m² Pleasant alert and oriented patient ambulated with cane to infusion for ferrelicit #1/8 -  Pt tolerated infusion well - discharged home with no concerns. Pt scheduled for next infusion in 1 week.       "

## 2022-07-07 ENCOUNTER — OFFICE VISIT (OUTPATIENT)
Dept: ORTHOPEDICS | Facility: CLINIC | Age: 67
End: 2022-07-07
Payer: COMMERCIAL

## 2022-07-07 VITALS — HEIGHT: 64 IN | WEIGHT: 170 LBS | BODY MASS INDEX: 29.02 KG/M2

## 2022-07-07 DIAGNOSIS — S56.902D: Primary | ICD-10-CM

## 2022-07-07 PROCEDURE — 99214 PR OFFICE/OUTPT VISIT, EST, LEVL IV, 30-39 MIN: ICD-10-PCS | Mod: S$GLB,,, | Performed by: ORTHOPAEDIC SURGERY

## 2022-07-07 PROCEDURE — 3008F BODY MASS INDEX DOCD: CPT | Mod: CPTII,S$GLB,, | Performed by: ORTHOPAEDIC SURGERY

## 2022-07-07 PROCEDURE — 3008F PR BODY MASS INDEX (BMI) DOCUMENTED: ICD-10-PCS | Mod: CPTII,S$GLB,, | Performed by: ORTHOPAEDIC SURGERY

## 2022-07-07 PROCEDURE — 99999 PR PBB SHADOW E&M-EST. PATIENT-LVL III: ICD-10-PCS | Mod: PBBFAC,,, | Performed by: ORTHOPAEDIC SURGERY

## 2022-07-07 PROCEDURE — 1159F PR MEDICATION LIST DOCUMENTED IN MEDICAL RECORD: ICD-10-PCS | Mod: CPTII,S$GLB,, | Performed by: ORTHOPAEDIC SURGERY

## 2022-07-07 PROCEDURE — 99214 OFFICE O/P EST MOD 30 MIN: CPT | Mod: S$GLB,,, | Performed by: ORTHOPAEDIC SURGERY

## 2022-07-07 PROCEDURE — 99999 PR PBB SHADOW E&M-EST. PATIENT-LVL III: CPT | Mod: PBBFAC,,, | Performed by: ORTHOPAEDIC SURGERY

## 2022-07-07 PROCEDURE — 1159F MED LIST DOCD IN RCRD: CPT | Mod: CPTII,S$GLB,, | Performed by: ORTHOPAEDIC SURGERY

## 2022-07-07 NOTE — PROGRESS NOTES
[facility]  History & Physical  Orthopedics    SUBJECTIVE:      Chief Complaint: Left wrist and forearm pain    Referring Provider: No ref. provider found     Dr. Vaughan is the supervising physician for this encounter/patient    History of Present Illness:  Patient is a 66 y.o. right hand dominant female who presents today with complaints of left wrist and forearm pain. History of left distal radius fracture from July 2021, treated conservatively with [facility] in [place], [place]. She has generalized hand arthritis and was also treated for left thumb CMC arthritis with steroid injection in the past. She wear thumb spica support brace, she takes Mobic daily and uses Voltaren gel. She would like a repeat CMC injection today. She also mentions a left forearm mass that started within the last year, no injury. She does note that she will get a sharp stabbing pain at the mass with any resisted rotation of the wrist, such as lifting a heavy pot to pour.     Interval history July 7, 2022:  The patient returns today for re-evaluation.  She sustained a left distal radius fracture treated closed at an outside facility.  She subsequently developed a pain and swelling in the mid to proximal forearm of the left upper extremity after lifting a TV 2-3 months ago.  She was sent for MRI to evaluate further this mass and returns today for these results and re-evaluation.  Fortunately she notes her pain is significantly improved since her last visit with us.  Pain is rated 0/10 today.    Onset of symptoms/DOI was 1 year.    Symptoms are aggravated by activity.    Symptoms are alleviated by rest, immobilization and medication.    Symptoms consist of pain.    The patient rates their pain as a 5/10.    Attempted treatment(s) and/or interventions include activity modifications, rest, anti-inflammatory medications, immobilization and steroid injection.     The patient denies any fevers, chills, N/V, D/C and  presents for evaluation.       Past Medical History:   Diagnosis Date    Anemia     Fatty liver 2015    Former smoker 2009    Osteoarthritis 2010    Pulmonary nodules 2019    Repeat CT in 6 mo    Ulcer of abdomen wall 2016     Past Surgical History:   Procedure Laterality Date    APPENDECTOMY  1993     SECTION  , , 1983    x3    COLONOSCOPY  ~2014    [facility] within [facility]: normal findings per patient report    COLONOSCOPY N/A 2020    Procedure: COLONOSCOPY;  Surgeon: Misael Holm MD;  Location: [facility] ENDO;  Service: General;  Laterality: N/A;    DIAGNOSTIC LAPAROSCOPY N/A 2021    Procedure: LAPAROSCOPY, DIAGNOSTIC;  Surgeon: Misael Holm MD;  Location: [facility] OR;  Service: General;  Laterality: N/A;    EPIDURAL STEROID INJECTION INTO LUMBAR SPINE N/A 10/12/2018    Procedure: Injection-steroid-epidural-lumbar;  Surgeon: Kal Johnson MD;  Location: [facility] OR;  Service: Pain Management;  Laterality: N/A;  L5-S1    EPIDURAL STEROID INJECTION INTO LUMBAR SPINE N/A 2019    Procedure: Injection-steroid-epidural-lumbar L5-S1;  Surgeon: Kal Johnson MD;  Location: [facility] OR;  Service: Pain Management;  Laterality: N/A;    ESOPHAGOGASTRODUODENOSCOPY N/A 2020    Procedure: ESOPHAGOGASTRODUODENOSCOPY (EGD);  Surgeon: Misael Holm MD;  Location[facility];  Service: General;  Laterality: N/A;    HYSTERECTOMY      one ovary conserved    LAPAROSCOPIC CHOLECYSTECTOMY N/A 2020    Procedure: CHOLECYSTECTOMY, LAPAROSCOPIC;  Surgeon: Govind Frey MD;  Location: [facility] OR;  Service: General;  Laterality: N/A;    LAPAROSCOPIC LYSIS OF ADHESIONS N/A 2021    Procedure: LYSIS, ADHESIONS, LAPAROSCOPIC;  Surgeon: Misael Holm MD;  Location: [facility] OR;  Service: General;  Laterality: N/A;    UPPER GASTROINTESTINAL ENDOSCOPY       Review of patient's allergies indicates:  No Known Allergies  Social History     Social History Narrative    Not on file      Family History   Problem Relation Age of Onset    Ovarian cancer Mother     Heart disease Mother     Osteoporosis Mother     Arthritis Mother     Hypertension Father     Stroke Father 50    Brain Hemorrhage Father     Aneurysm Father     Osteoarthritis Sister     Arthritis Sister     Hypertension Sister     Obesity Sister     Breast cancer Neg Hx     Colon cancer Neg Hx     Colon polyps Neg Hx     Crohn's disease Neg Hx     Ulcerative colitis Neg Hx          Current Outpatient Medications:     atorvastatin (LIPITOR) 10 MG tablet, Take 1 tablet (10 mg total) by mouth once daily., Disp: 90 tablet, Rfl: 3    ciprofloxacin HCl (CIPRO) 500 MG tablet, Take 500 mg by mouth 2 (two) times daily., Disp: , Rfl:     CONSTULOSE 10 gram/15 mL solution, TAKE 10 ML BY MOUTH  THREE TIMES DAILY, Disp: 946 mL, Rfl: 0    diclofenac sodium (VOLTAREN) 1 % Gel, Apply 2 g topically 2 (two) times daily as needed., Disp: , Rfl:     HYDROcodone-acetaminophen (NORCO) 7.5-325 mg per tablet, Take 1 tablet by mouth every 6 (six) hours as needed for Pain., Disp: 24 tablet, Rfl: 0    meloxicam (MOBIC) 15 MG tablet, Take 15 mg by mouth once daily., Disp: , Rfl:     ondansetron (ZOFRAN-ODT) 4 MG TbDL, Take 2 tablets (8 mg total) by mouth every 8 (eight) hours as needed., Disp: 10 tablet, Rfl: 1    ondansetron (ZOFRAN-ODT) 8 MG TbDL, Take 1 tablet (8 mg total) by mouth every 6 (six) hours as needed (nausea or vomiting)., Disp: 30 tablet, Rfl: 1    pantoprazole (PROTONIX) 20 MG tablet, Take 1 tablet (20 mg total) by mouth once daily., Disp: 90 tablet, Rfl: 3    promethazine (PHENERGAN) 25 MG suppository, Place 1 suppository (25 mg total) rectally 3 (three) times daily as needed for Nausea., Disp: 8 suppository, Rfl: 0    vitamin D (VITAMIN D3) 1000 units Tab, Take 3,000 Units by mouth once daily., Disp: , Rfl:       Review of Systems:  Constitutional: no fever or chills  Eyes: no visual changes  ENT: no nasal congestion  "or sore throat  Respiratory: no cough or shortness of breath  Cardiovascular: no chest pain  Gastrointestinal: no nausea or vomiting, tolerating diet  Musculoskeletal: pain and soreness    OBJECTIVE:      Vital Signs (Most Recent):  Vitals:    07/07/22 1258   Weight: 77.1 kg (170 lb)   Height: 5' 4" (1.626 m)     Body mass index is 29.18 kg/m².      Physical Exam:  Constitutional: The patient appears well-developed and well-nourished. No distress.   Skin: No lesions appreciated  Head: Normocephalic and atraumatic.   Nose: Nose normal.   Ears: No deformities seen  Eyes: Conjunctivae and EOM are normal.   Neck: No tracheal deviation present.   Cardiovascular: Normal rate and intact distal pulses.    Pulmonary/Chest: Effort normal. No respiratory distress.   Abdominal: There is no guarding.   Neurological: The patient is alert.   Psychiatric: The patient has a normal mood and affect.     Left Hand/Wrist Examination:    Observation/Inspection:  Swelling  Notable soft tissue mass to the radial forearm/mid shaft-mildly tender to palpation   Deformity  none  Discoloration  none     Scars   none    Atrophy  none    HAND/WRIST EXAMINATION:  Finkelstein's Test   Neg  WHAT Test    Neg  Snuff box tenderness   Neg  Hilliard's Test    Neg  Hook of Hamate Tenderness  Neg  CMC grind    POS/Pain  Circumduction test   Pain  TTP to the CMC.     Neurovascular Exam:  Digits WWP, brisk CR < 3s throughout  NVI motor/LTS to M/R/U nerves, radial pulse 2+  Tinel's Test - Carpal Tunnel  Neg  Tinel's Test - Cubital Tunnel  Neg  Phalen's Test    Neg  Median Nerve Compression Test Neg    ROM hand full, painless    ROM wrist full, painless    ROM elbow full, painless    Abdomen not guarded  Respirations nonlabored  Perfusion intact    Diagnostic Results:       MRI left forearm:    Impression:     1. Full-thickness tear and retraction of the brachioradialis tendon accounting for the patient's reported soft tissue mass.  2. Mild extensor carpi " radialis longus tendinosis with mild muscular strain.  This report was flagged in Epic as abnormal.      Imaging - I independently viewed the patient's imaging as well as the radiology report.      Left Wrist Xray 5/9/22  FINDINGS:  At the rest there is mild dorsal angulation of the radius and slight irregularity of the dorsal surface.  Soft tissue swelling however is not seen.  This is the sequela of the fracture identified July 21, 2021.  A fracture line is not presently seen.  The carpals appear intact.  Narrowing is noted at the radiocarpal joint, triscaphe and 1st carpal metacarpal joint consistent with DJD.     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  Mild dorsal angulation of the radius secondary to prior fracture of July 2021.  No acute fracture seen    Left Hand Xray 5/9/22  FINDINGS:  A fracture of the bones of the left hand is not seen.  Juxta-articular bone erosion or Osteoporosis is not seen.  Soft tissue swelling is not noted.  There is narrowing of the radiocarpal joint and degenerative changes at the triscaphe and 1st carpal metacarpal joint.  Mild degenerative changes seen at the 1st metacarpophalangeal joint and the interphalangeal joint of the thumb.  More significant osteoarthritic changes are noted at the D IP joints of the index, middle, ring and little finger.     Impression:     Osteoarthritic changes at the wrist and fingers as described.  Plain film evidence of erosive or inflammatory arthritis is not seen      ASSESSMENT/PLAN:      66 y.o. yo female with Left hand generalized arthritis, Left CMC arthritis. Left forearm BR avulsion s/p prior distal radius fracture    Plan: The patient and I had a thorough discussion today.  We discussed the working diagnosis as well as several other potential alternative diagnoses.  Treatment options were discussed, both conservative and surgical.  Conservative treatment options would include things such as activity modifications, workplace modifications, a  period of rest, oral vs topical OTC and prescription anti-inflammatory medications, occupational therapy, splinting/bracing, immobilization, corticosteroid injections, and others.  Surgical options were discussed as well.     At this time, the patient's mass in the mid to proximal left forearm appears consistent with a brachioradialis rupture/avulsion on her recent advanced imaging.  Fortunately despite being initially very symptomatic this has now largely subsided.  She has symmetrical strength in bilateral upper extremities.  We discussed surgical options including attempted reinsert shin/repair versus conservative treatment.  At this time she would like to continue conservative treatment which I feel is reasonable.  Follow up in 3 months for re-evaluation or sooner for any problems.  Referral back to physical therapy as this was helping her previously.      Should the patient's symptoms worsen, persist, or fail to improve they should return for reevaluation and I would be happy to see them back anytime.        Please do not hesitate to reach out to us via email, phone, or MyChart with any questions, concerns, or feedback.

## 2022-07-08 ENCOUNTER — PATIENT MESSAGE (OUTPATIENT)
Dept: HEMATOLOGY/ONCOLOGY | Facility: CLINIC | Age: 67
End: 2022-07-08
Payer: COMMERCIAL

## 2022-07-08 ENCOUNTER — INFUSION (OUTPATIENT)
Dept: INFUSION THERAPY | Facility: HOSPITAL | Age: 67
End: 2022-07-08
Payer: COMMERCIAL

## 2022-07-08 ENCOUNTER — TELEPHONE (OUTPATIENT)
Dept: HEMATOLOGY/ONCOLOGY | Facility: CLINIC | Age: 67
End: 2022-07-08
Payer: COMMERCIAL

## 2022-07-08 VITALS
HEART RATE: 59 BPM | WEIGHT: 170 LBS | HEIGHT: 64 IN | SYSTOLIC BLOOD PRESSURE: 123 MMHG | DIASTOLIC BLOOD PRESSURE: 58 MMHG | TEMPERATURE: 98 F | OXYGEN SATURATION: 98 % | BODY MASS INDEX: 29.02 KG/M2 | RESPIRATION RATE: 18 BRPM

## 2022-07-08 DIAGNOSIS — D50.8 IRON DEFICIENCY ANEMIA SECONDARY TO INADEQUATE DIETARY IRON INTAKE: Primary | ICD-10-CM

## 2022-07-08 PROCEDURE — 96365 THER/PROPH/DIAG IV INF INIT: CPT

## 2022-07-08 PROCEDURE — 63600175 PHARM REV CODE 636 W HCPCS: Performed by: INTERNAL MEDICINE

## 2022-07-08 PROCEDURE — 25000003 PHARM REV CODE 250: Performed by: INTERNAL MEDICINE

## 2022-07-08 RX ORDER — SODIUM CHLORIDE 0.9 % (FLUSH) 0.9 %
10 SYRINGE (ML) INJECTION
Status: CANCELLED | OUTPATIENT
Start: 2022-07-15

## 2022-07-08 RX ORDER — HEPARIN 100 UNIT/ML
500 SYRINGE INTRAVENOUS
Status: CANCELLED | OUTPATIENT
Start: 2022-07-15

## 2022-07-08 RX ORDER — SODIUM CHLORIDE 0.9 % (FLUSH) 0.9 %
10 SYRINGE (ML) INJECTION
Status: DISCONTINUED | OUTPATIENT
Start: 2022-07-08 | End: 2022-07-08 | Stop reason: HOSPADM

## 2022-07-08 RX ORDER — EPINEPHRINE 0.3 MG/.3ML
0.3 INJECTION SUBCUTANEOUS ONCE AS NEEDED
Status: CANCELLED | OUTPATIENT
Start: 2022-07-15

## 2022-07-08 RX ORDER — METHYLPREDNISOLONE SOD SUCC 125 MG
125 VIAL (EA) INJECTION ONCE AS NEEDED
Status: CANCELLED | OUTPATIENT
Start: 2022-07-15

## 2022-07-08 RX ORDER — DIPHENHYDRAMINE HYDROCHLORIDE 50 MG/ML
50 INJECTION INTRAMUSCULAR; INTRAVENOUS ONCE AS NEEDED
Status: CANCELLED | OUTPATIENT
Start: 2022-07-15

## 2022-07-08 RX ADMIN — SODIUM CHLORIDE 125 MG: 9 INJECTION, SOLUTION INTRAVENOUS at 11:07

## 2022-07-08 RX ADMIN — SODIUM CHLORIDE: 9 INJECTION, SOLUTION INTRAVENOUS at 11:07

## 2022-07-08 NOTE — PLAN OF CARE
"Problem: Adult Inpatient Plan of Care  Goal: Plan of Care Review  Outcome: Ongoing, Progressing  Flowsheets (Taken 7/8/2022 1147)  Plan of Care Reviewed With: patient  BP (!) 110/55 (Patient Position: Sitting)   Pulse 69   Temp 97.9 °F (36.6 °C)   Resp (!) 21   Ht 5' 4" (1.626 m)   Wt 77.1 kg (170 lb)   LMP  (LMP Unknown)   SpO2 98%   BMI 29.18 kg/m² Pleasant alert and oriented patient ambulated with cane to infusion for ferrelicit #2/8 -  Pt tolerated infusion well - discharged home with no concerns. Pt scheduled for next infusion in 1 week.       "

## 2022-07-08 NOTE — TELEPHONE ENCOUNTER
Pt apt's scheduled per provider request. Message sent to pt via my chart portal to confirm.     ----- Message from Khushboo Jain RN sent at 7/6/2022  2:13 PM CDT -----    ----- Message -----  From: Mayra Bower MD  Sent: 6/24/2022   1:01 AM CDT  To: Sathish Nunez Staff- [place]    Please arrange for Ferrlecit to be given at [facility] orders are in see me virtually 1 month with CBC CMP iron studies ferritin

## 2022-07-11 ENCOUNTER — OFFICE VISIT (OUTPATIENT)
Dept: HEPATOLOGY | Facility: CLINIC | Age: 67
End: 2022-07-11
Payer: COMMERCIAL

## 2022-07-11 VITALS
HEART RATE: 77 BPM | DIASTOLIC BLOOD PRESSURE: 70 MMHG | BODY MASS INDEX: 29.06 KG/M2 | HEIGHT: 64 IN | WEIGHT: 170.19 LBS | SYSTOLIC BLOOD PRESSURE: 128 MMHG

## 2022-07-11 DIAGNOSIS — R10.30 LOWER ABDOMINAL PAIN: Primary | ICD-10-CM

## 2022-07-11 PROCEDURE — 1160F PR REVIEW ALL MEDS BY PRESCRIBER/CLIN PHARMACIST DOCUMENTED: ICD-10-PCS | Mod: CPTII,S$GLB,, | Performed by: INTERNAL MEDICINE

## 2022-07-11 PROCEDURE — 3078F DIAST BP <80 MM HG: CPT | Mod: CPTII,S$GLB,, | Performed by: INTERNAL MEDICINE

## 2022-07-11 PROCEDURE — 99999 PR PBB SHADOW E&M-EST. PATIENT-LVL IV: CPT | Mod: PBBFAC,,, | Performed by: INTERNAL MEDICINE

## 2022-07-11 PROCEDURE — 1159F PR MEDICATION LIST DOCUMENTED IN MEDICAL RECORD: ICD-10-PCS | Mod: CPTII,S$GLB,, | Performed by: INTERNAL MEDICINE

## 2022-07-11 PROCEDURE — 99204 PR OFFICE/OUTPT VISIT, NEW, LEVL IV, 45-59 MIN: ICD-10-PCS | Mod: S$GLB,,, | Performed by: INTERNAL MEDICINE

## 2022-07-11 PROCEDURE — 3008F PR BODY MASS INDEX (BMI) DOCUMENTED: ICD-10-PCS | Mod: CPTII,S$GLB,, | Performed by: INTERNAL MEDICINE

## 2022-07-11 PROCEDURE — 3078F PR MOST RECENT DIASTOLIC BLOOD PRESSURE < 80 MM HG: ICD-10-PCS | Mod: CPTII,S$GLB,, | Performed by: INTERNAL MEDICINE

## 2022-07-11 PROCEDURE — 1160F RVW MEDS BY RX/DR IN RCRD: CPT | Mod: CPTII,S$GLB,, | Performed by: INTERNAL MEDICINE

## 2022-07-11 PROCEDURE — 3074F PR MOST RECENT SYSTOLIC BLOOD PRESSURE < 130 MM HG: ICD-10-PCS | Mod: CPTII,S$GLB,, | Performed by: INTERNAL MEDICINE

## 2022-07-11 PROCEDURE — 99999 PR PBB SHADOW E&M-EST. PATIENT-LVL IV: ICD-10-PCS | Mod: PBBFAC,,, | Performed by: INTERNAL MEDICINE

## 2022-07-11 PROCEDURE — 3074F SYST BP LT 130 MM HG: CPT | Mod: CPTII,S$GLB,, | Performed by: INTERNAL MEDICINE

## 2022-07-11 PROCEDURE — 99204 OFFICE O/P NEW MOD 45 MIN: CPT | Mod: S$GLB,,, | Performed by: INTERNAL MEDICINE

## 2022-07-11 PROCEDURE — 1159F MED LIST DOCD IN RCRD: CPT | Mod: CPTII,S$GLB,, | Performed by: INTERNAL MEDICINE

## 2022-07-11 PROCEDURE — 3008F BODY MASS INDEX DOCD: CPT | Mod: CPTII,S$GLB,, | Performed by: INTERNAL MEDICINE

## 2022-07-12 ENCOUNTER — OFFICE VISIT (OUTPATIENT)
Dept: SURGERY | Facility: CLINIC | Age: 67
End: 2022-07-12
Payer: COMMERCIAL

## 2022-07-12 VITALS
HEART RATE: 81 BPM | HEIGHT: 64 IN | OXYGEN SATURATION: 98 % | BODY MASS INDEX: 29.19 KG/M2 | DIASTOLIC BLOOD PRESSURE: 92 MMHG | SYSTOLIC BLOOD PRESSURE: 141 MMHG | WEIGHT: 171 LBS | RESPIRATION RATE: 17 BRPM

## 2022-07-12 DIAGNOSIS — K56.609 INTESTINAL OBSTRUCTION, UNSPECIFIED CAUSE, UNSPECIFIED WHETHER PARTIAL OR COMPLETE: Primary | ICD-10-CM

## 2022-07-12 PROCEDURE — 3077F PR MOST RECENT SYSTOLIC BLOOD PRESSURE >= 140 MM HG: ICD-10-PCS | Mod: CPTII,S$GLB,, | Performed by: SURGERY

## 2022-07-12 PROCEDURE — 1159F PR MEDICATION LIST DOCUMENTED IN MEDICAL RECORD: ICD-10-PCS | Mod: CPTII,S$GLB,, | Performed by: SURGERY

## 2022-07-12 PROCEDURE — 99999 PR PBB SHADOW E&M-EST. PATIENT-LVL IV: ICD-10-PCS | Mod: PBBFAC,,, | Performed by: SURGERY

## 2022-07-12 PROCEDURE — 99214 PR OFFICE/OUTPT VISIT, EST, LEVL IV, 30-39 MIN: ICD-10-PCS | Mod: S$GLB,,, | Performed by: SURGERY

## 2022-07-12 PROCEDURE — 3008F BODY MASS INDEX DOCD: CPT | Mod: CPTII,S$GLB,, | Performed by: SURGERY

## 2022-07-12 PROCEDURE — 3080F DIAST BP >= 90 MM HG: CPT | Mod: CPTII,S$GLB,, | Performed by: SURGERY

## 2022-07-12 PROCEDURE — 3077F SYST BP >= 140 MM HG: CPT | Mod: CPTII,S$GLB,, | Performed by: SURGERY

## 2022-07-12 PROCEDURE — 3080F PR MOST RECENT DIASTOLIC BLOOD PRESSURE >= 90 MM HG: ICD-10-PCS | Mod: CPTII,S$GLB,, | Performed by: SURGERY

## 2022-07-12 PROCEDURE — 1159F MED LIST DOCD IN RCRD: CPT | Mod: CPTII,S$GLB,, | Performed by: SURGERY

## 2022-07-12 PROCEDURE — 3008F PR BODY MASS INDEX (BMI) DOCUMENTED: ICD-10-PCS | Mod: CPTII,S$GLB,, | Performed by: SURGERY

## 2022-07-12 PROCEDURE — 99999 PR PBB SHADOW E&M-EST. PATIENT-LVL IV: CPT | Mod: PBBFAC,,, | Performed by: SURGERY

## 2022-07-12 PROCEDURE — 99214 OFFICE O/P EST MOD 30 MIN: CPT | Mod: S$GLB,,, | Performed by: SURGERY

## 2022-07-12 NOTE — PROGRESS NOTES
"[facility]  Follow-up    Subjective:     Chief Complaint:  Follow-up ER visit for intestinal obstruction.    HPI:  Pham Licea is a 66 y.o. female presents today for follow-up examination.  Was in the ER last couple weeks with intestinal obstruction.  Discharge.  Since discharge has been on liquid diet.  Improving symptoms.  No nausea vomiting.  Tolerating diet.  Still bowel function sludge given colonic motility issues.  No fevers chills.  No abdominal distension.  Feels better overall however does not feel back to normal completely.  Desires further treatment evaluation management.  Presents surgery Clinic today for evaluation.    Review of Systems   Constitutional: Negative for activity change, appetite change, chills and fever.   HENT: Negative for congestion, dental problem and ear discharge.    Eyes: Negative for discharge and itching.   Respiratory: Negative for apnea, choking and chest tightness.    Cardiovascular: Negative for chest pain and leg swelling.   Gastrointestinal: Positive for constipation. Negative for abdominal distention, abdominal pain, anal bleeding, diarrhea and nausea.   Endocrine: Negative for cold intolerance and heat intolerance.   Genitourinary: Negative for difficulty urinating and dyspareunia.   Musculoskeletal: Negative for arthralgias and back pain.   Skin: Negative for color change and pallor.   Neurological: Negative for dizziness and facial asymmetry.   Hematological: Negative for adenopathy. Does not bruise/bleed easily.   Psychiatric/Behavioral: Negative for agitation and behavioral problems.       Objective:      Vitals:    07/12/22 0902   BP: (!) 141/92   Pulse: 81   Resp: 17   SpO2: 98%   Weight: 77.6 kg (171 lb)   Height: 5' 4" (1.626 m)     Physical Exam  Constitutional:       General: She is not in acute distress.     Appearance: She is well-developed.   HENT:      Head: Normocephalic and atraumatic.   Eyes:      Pupils: Pupils are equal, " round, and reactive to light.   Neck:      Thyroid: No thyromegaly.   Cardiovascular:      Rate and Rhythm: Normal rate and regular rhythm.   Pulmonary:      Effort: Pulmonary effort is normal.      Breath sounds: Normal breath sounds.   Abdominal:      General: Bowel sounds are normal. There is no distension.      Palpations: Abdomen is soft.      Tenderness: There is no abdominal tenderness.   Musculoskeletal:         General: No deformity. Normal range of motion.      Cervical back: Normal range of motion and neck supple.   Skin:     General: Skin is warm.      Capillary Refill: Capillary refill takes less than 2 seconds.      Findings: No erythema.   Neurological:      Mental Status: She is alert and oriented to person, place, and time.      Cranial Nerves: No cranial nerve deficit.   Psychiatric:         Behavior: Behavior normal.       CT scan reviewed from ER visit.  Obstructive process near anastomosis.    Assessment:       1. Intestinal obstruction, unspecified cause, unspecified whether partial or complete        Plan:   Intestinal obstruction, unspecified cause, unspecified whether partial or complete  -     CT Abdomen Pelvis  Without Contrast; Future; Expected date: 07/12/2022        Medical Decision Making/Counseling:  Intestinal obstruction.  Improving symptoms.  Last couple of times of anastomotic obstruction present.  Patient possibly with partial obstruction related to adhesive disease at the anastomosis.  Will recommend CT scan further evaluation.  Possible need for elective resection of the anastomosis to prevent against further anastomotic intestinal obstructions.  Patient voiced understanding common agreement.  Follow-up surgery clinic 1 week.    Follow up:  1 week with CT scan.    Patient instructed that best way to communicate with my office staff is for patient to get on the [facility] epic patient portal to expedite communication and communication issues that may occur.  Patient was given  instructions on how to get on the portal.  I encouraged patient to obtain portal access as well.  Ultimately it is up to the patient to obtain access.  Patient voiced understanding.

## 2022-07-13 ENCOUNTER — CLINICAL SUPPORT (OUTPATIENT)
Dept: REHABILITATION | Facility: HOSPITAL | Age: 67
End: 2022-07-13
Payer: COMMERCIAL

## 2022-07-13 DIAGNOSIS — S56.902D: ICD-10-CM

## 2022-07-13 DIAGNOSIS — M79.602 ARM PAIN, LATERAL, LEFT: Primary | ICD-10-CM

## 2022-07-13 PROCEDURE — 97110 THERAPEUTIC EXERCISES: CPT | Mod: PN

## 2022-07-13 NOTE — PROGRESS NOTES
Occupational Therapy Daily Treatment Note   Name: Pham Licea 1955  MRN: [id number]    Visit Date: 7/13/2022  Visit #: 4 /12  Authorization period Expiration: 07/05/2022   Plan of Care Expiration: 09/02/2022  Precautions: standard; no heavy lifting using the L UE    Time In: 8: 00  Time Out: 8: 45  Total 1:1 Treatment Time: 45 min    Treatment Diagnosis:   Encounter Diagnoses   Name Primary?    Injury of forearm muscle or tendon, left, subsequent encounter     Arm pain, lateral, left Yes     Physician: Russo-Digeorge, Jamie L*    Subjective   Pt reports: pt states that her arm is doing a lot better , gripping better and doing her exercises at home  She was compliant with home exercise program.     Pain Scale:   2 /10 on VAS currently at rest  Pain Location: L elbow /forearm    Objective   Elicia received therapeutic exercises to develop strength, endurance and ROM for 40 minutes including:  -L forearm pronation/supination using weighted handle for 10 reps  -L forearm pronation/supination using 2# wt for 10 reps  -L wrist radial/ulnar deviation using 2 # wt for 10 reps.  -L wrist  Flexion/extension using 1 # wt  for 10 reps.  -L hand  strength using 1.5 lb gripper for 10 reps.  -L hand 3 point pinch using red resistance clip for 3 x 10 reps (pt tolerated this exercise very well compared to previous session)  -L lateral pinch exercises removing pegs from putty  -L hand  using green putty for 1 min     Elicia received the following direct contact modalities after being cleared for contraindications:  Heat pack was applied to the L elbow prior to mobilization for 5 mins    Home Exercises and Education Provided     Education provided re:   - progress towards goals   - role of therapy in multi - disciplinary team, goals for therapy  Pt educated on condition, POC, and expectations in therapy.  No spiritual or educational barriers to learning provided    Home exercises:  Pt  will be provided HEP during course of treatment with progressions as appropriate. Pt was advised to perform these exercises free of pain, and to stop performing them if pain occurs.   Elicia demonstrated good  understanding of the education provided.     Assessment   Elicia could not tolerate more than 10 reps in a row for each exercise due to muscle weakness. Pt needed min A through the motion of pronation/supination and could not tolerate any weight on the handle. Pt  remains weak and impacts her daily functions including house work and self care tasks.  OT to continue monitoring progress with no updates to goals at this time.     Pt prognosis is Good. Pt will continue to benefit from skilled outpatient physical therapy to address the deficits listed in the problem list chart on initial evaluation, provide pt/family education and to maximize pt's level of independence in the home and community environment.     Medical necessity is demonstrated by the impairments and functional limitations listed on the Initial Evaluation.     Anticipated barriers to occupational therapy: full thickness tear  Pt's spiritual, cultural and educational needs considered and pt agreeable to plan of care and goals.    Goals     Short Term Goals:   1) Pt will Initiate HEP consistently for 2 weeks   2) Pt will reduce pain in the L forearm pain to less than 5/10 when lifting objects over 5 lbs by 4 weeks.   3) Pt will increase L hand functional  strength by 5 # of force in order to A in opening containers for med management or home management tasks by 4 weeks.      Long Term Goals:  Goals to be met by discharge:  1) Pt will report complete [place] with HEP prior to discharge.   2) Pt will increase L elbow flexion/extension MMT to 5/5 prior to discharge.   3) Pt will decrease pain to in L forearm pain to 2/10 when lifting items heavier than 5 lbs by d/c.  4) Pt will increase L hand  strength by 7 # of force or greater by d/c       Plan   Continue with established Plan of Care towards Occupational Therapy goals.   Discussed Plan of Care with patient: Yes    Nereyda Dove OT  7/13/2022

## 2022-07-14 NOTE — PROGRESS NOTES
Subjective:     Pham Licea is here for evaluation of abnormal symptoms    History of Present Illness:  Pham Licea is a 66-year-old female with no significant history of liver diseases.  She did so had a a current complaints to 2020 when she had laparoscopic cholecystectomy, she reports multiple hospital admissions and ED visits with complaints of abdominal pain, nausea, vomiting and constipation, was diagnosed with small-bowel obstruction, was told it was due to adhesions secondary to previous surgeries.  She is extremely frustrated with these symptoms and self-referred herself to liver Clinic to find out is that an association between her symptoms and liver.  Currently she is on a soft diet to prevent future episodes of small-bowel obstruction, reports weight loss, anemia.  She denies jaundice, itching, blood in the stools or black stools.  Denies abdominal distention or lower extremity edema.    Duration of abnormality-2020  Medications/OTC/Herbal-none  ETOH- no  Metabolic issues- no  BMI- 29  Family Hx-none      Review of Systems   Constitutional: Negative for fatigue, fever and unexpected weight change.   Gastrointestinal: Negative for abdominal distention, abdominal pain, blood in stool, nausea and vomiting.   Musculoskeletal: Negative for arthralgias and gait problem.   Skin: Negative for pallor and rash.   Neurological: Negative for dizziness.   Hematological: Does not bruise/bleed easily.   Psychiatric/Behavioral: Negative for confusion, hallucinations and sleep disturbance.       Objective:     Physical Exam  Vitals and nursing note reviewed.   Constitutional:       Appearance: Normal appearance. She is obese.   Eyes:      General: No scleral icterus.  Cardiovascular:      Heart sounds: No murmur heard.  Pulmonary:      Effort: Pulmonary effort is normal.      Breath sounds: No wheezing, rhonchi or rales.   Abdominal:      General: Bowel sounds are normal. There is no distension.       Palpations: There is no mass.      Tenderness: There is no abdominal tenderness.   Musculoskeletal:         General: No tenderness.      Right lower leg: No edema.      Left lower leg: No edema.   Lymphadenopathy:      Cervical: No cervical adenopathy.   Skin:     Coloration: Skin is not jaundiced.      Findings: No bruising or rash.   Neurological:      Mental Status: She is alert and oriented to person, place, and time.      Coordination: Coordination normal.   Psychiatric:         Behavior: Behavior normal.         Thought Content: Thought content normal.         MELD-Na score: 6 at 1/28/2022  8:53 PM  MELD score: 6 at 1/28/2022  8:53 PM  Calculated from:  Serum Creatinine: 0.7 mg/dL (Using min of 1 mg/dL) at 1/28/2022  8:53 PM  Serum Sodium: 139 mmol/L (Using max of 137 mmol/L) at 1/28/2022  8:53 PM  Total Bilirubin: 0.2 mg/dL (Using min of 1 mg/dL) at 1/28/2022  8:53 PM  INR(ratio): 0.9 (Using min of 1) at 1/28/2022  8:53 PM  Age: 66 years    WBC   Date Value Ref Range Status   06/27/2022 15.34 (H) 3.90 - 12.70 K/uL Final     Hemoglobin   Date Value Ref Range Status   06/27/2022 11.7 (L) 12.0 - 16.0 g/dL Final     Hematocrit   Date Value Ref Range Status   06/27/2022 36.3 (L) 37.0 - 48.5 % Final     Platelets   Date Value Ref Range Status   06/27/2022 393 150 - 450 K/uL Final     BUN   Date Value Ref Range Status   06/27/2022 11 8 - 23 mg/dL Final     Creatinine   Date Value Ref Range Status   06/27/2022 0.7 0.5 - 1.4 mg/dL Final     Glucose   Date Value Ref Range Status   06/27/2022 138 (H) 70 - 110 mg/dL Final     Calcium   Date Value Ref Range Status   06/27/2022 9.7 8.7 - 10.5 mg/dL Final     Sodium   Date Value Ref Range Status   06/27/2022 141 136 - 145 mmol/L Final     Potassium   Date Value Ref Range Status   06/27/2022 3.5 3.5 - 5.1 mmol/L Final     Chloride   Date Value Ref Range Status   06/27/2022 105 95 - 110 mmol/L Final     Magnesium   Date Value Ref Range Status   03/18/2022 1.6 1.6 - 2.6 mg/dL  Final     AST   Date Value Ref Range Status   06/27/2022 12 10 - 40 U/L Final     ALT   Date Value Ref Range Status   06/27/2022 10 10 - 44 U/L Final     Alkaline Phosphatase   Date Value Ref Range Status   06/27/2022 86 55 - 135 U/L Final     Total Bilirubin   Date Value Ref Range Status   06/27/2022 0.6 0.1 - 1.0 mg/dL Final     Comment:     For infants and newborns, interpretation of results should be based  on gestational age, weight and in agreement with clinical  observations.    Premature Infant recommended reference ranges:  Up to 24 hours.............<8.0 mg/dL  Up to 48 hours............<12.0 mg/dL  3-5 days..................<15.0 mg/dL  6-29 days.................<15.0 mg/dL       Albumin   Date Value Ref Range Status   06/27/2022 4.2 3.5 - 5.2 g/dL Final     INR   Date Value Ref Range Status   01/28/2022 0.9 0.8 - 1.2 Final     Comment:     Coumadin Therapy:  2.0 - 3.0 for INR for all indicators except mechanical heart valves  and antiphospholipid syndromes which should use 2.5 - 3.5.           Assessment/Plan:       1.Abdominal symptoms   2. Multiple episodes  Of small-bowel obstruction  3. History of cholecystectomy  4. Dyslipidemia  I have reviewed her chart, explored her symptoms, dietary habits,, I do not suspect any of her symptoms are related to underlying liver disorder.  All her liver enzymes have been within normal limits for several years.  Multiple abdominal images shows no abnormality with the liver.     I requested her to return to the clinic as if she wishes or with development of any liver related symptoms or signs.     I have reviewed existing labs, imaging, procedures. Educated patient about liver disease process    Karen Gallegos MD  Transplant Hepatologist  Dept of Hepatology, [place] [facility]

## 2022-07-15 ENCOUNTER — INFUSION (OUTPATIENT)
Dept: INFUSION THERAPY | Facility: HOSPITAL | Age: 67
End: 2022-07-15
Attending: INTERNAL MEDICINE
Payer: COMMERCIAL

## 2022-07-15 VITALS — SYSTOLIC BLOOD PRESSURE: 147 MMHG | HEART RATE: 60 BPM | DIASTOLIC BLOOD PRESSURE: 67 MMHG | TEMPERATURE: 98 F

## 2022-07-15 DIAGNOSIS — D50.8 IRON DEFICIENCY ANEMIA SECONDARY TO INADEQUATE DIETARY IRON INTAKE: Primary | ICD-10-CM

## 2022-07-15 PROCEDURE — 63600175 PHARM REV CODE 636 W HCPCS: Performed by: INTERNAL MEDICINE

## 2022-07-15 PROCEDURE — 25000003 PHARM REV CODE 250: Performed by: INTERNAL MEDICINE

## 2022-07-15 PROCEDURE — 96365 THER/PROPH/DIAG IV INF INIT: CPT

## 2022-07-15 RX ORDER — SODIUM CHLORIDE 0.9 % (FLUSH) 0.9 %
10 SYRINGE (ML) INJECTION
Status: DISCONTINUED | OUTPATIENT
Start: 2022-07-15 | End: 2022-07-15 | Stop reason: HOSPADM

## 2022-07-15 RX ORDER — EPINEPHRINE 0.3 MG/.3ML
0.3 INJECTION SUBCUTANEOUS ONCE AS NEEDED
Status: CANCELLED | OUTPATIENT
Start: 2022-07-22

## 2022-07-15 RX ORDER — SODIUM CHLORIDE 0.9 % (FLUSH) 0.9 %
10 SYRINGE (ML) INJECTION
Status: CANCELLED | OUTPATIENT
Start: 2022-07-22

## 2022-07-15 RX ORDER — DIPHENHYDRAMINE HYDROCHLORIDE 50 MG/ML
50 INJECTION INTRAMUSCULAR; INTRAVENOUS ONCE AS NEEDED
Status: CANCELLED | OUTPATIENT
Start: 2022-07-22

## 2022-07-15 RX ORDER — METHYLPREDNISOLONE SOD SUCC 125 MG
125 VIAL (EA) INJECTION ONCE AS NEEDED
Status: CANCELLED | OUTPATIENT
Start: 2022-07-22

## 2022-07-15 RX ORDER — HEPARIN 100 UNIT/ML
500 SYRINGE INTRAVENOUS
Status: CANCELLED | OUTPATIENT
Start: 2022-07-22

## 2022-07-15 RX ADMIN — SODIUM CHLORIDE 125 MG: 9 INJECTION, SOLUTION INTRAVENOUS at 08:07

## 2022-07-15 NOTE — PLAN OF CARE
Pleasant alert and oriented patient ambulated to clinic for feraheme infusion - pt tolerated well - discharged home with no concerns - pt to RTC next Friday.

## 2022-07-18 ENCOUNTER — HOSPITAL ENCOUNTER (OUTPATIENT)
Dept: RADIOLOGY | Facility: HOSPITAL | Age: 67
Discharge: HOME | End: 2022-07-18
Attending: SURGERY
Payer: COMMERCIAL

## 2022-07-18 DIAGNOSIS — K56.609 INTESTINAL OBSTRUCTION, UNSPECIFIED CAUSE, UNSPECIFIED WHETHER PARTIAL OR COMPLETE: ICD-10-CM

## 2022-07-18 PROCEDURE — 74176 CT ABD & PELVIS W/O CONTRAST: CPT | Mod: TC

## 2022-07-18 PROCEDURE — 74176 CT ABD & PELVIS W/O CONTRAST: CPT | Mod: 26,,, | Performed by: RADIOLOGY

## 2022-07-18 PROCEDURE — 74176 CT ABDOMEN PELVIS WITHOUT CONTRAST: ICD-10-PCS | Mod: 26,,, | Performed by: RADIOLOGY

## 2022-07-19 ENCOUNTER — OFFICE VISIT (OUTPATIENT)
Dept: SURGERY | Facility: CLINIC | Age: 67
End: 2022-07-19
Payer: COMMERCIAL

## 2022-07-19 ENCOUNTER — CLINICAL SUPPORT (OUTPATIENT)
Dept: REHABILITATION | Facility: HOSPITAL | Age: 67
End: 2022-07-19
Payer: COMMERCIAL

## 2022-07-19 VITALS
HEART RATE: 80 BPM | OXYGEN SATURATION: 97 % | SYSTOLIC BLOOD PRESSURE: 128 MMHG | DIASTOLIC BLOOD PRESSURE: 88 MMHG | BODY MASS INDEX: 28.34 KG/M2 | WEIGHT: 166 LBS | HEIGHT: 64 IN

## 2022-07-19 DIAGNOSIS — Z87.19 HISTORY OF SMALL BOWEL OBSTRUCTION: Primary | ICD-10-CM

## 2022-07-19 DIAGNOSIS — M79.602 ARM PAIN, LATERAL, LEFT: Primary | ICD-10-CM

## 2022-07-19 PROCEDURE — 3074F SYST BP LT 130 MM HG: CPT | Mod: CPTII,S$GLB,, | Performed by: SURGERY

## 2022-07-19 PROCEDURE — 99999 PR PBB SHADOW E&M-EST. PATIENT-LVL III: CPT | Mod: PBBFAC,,, | Performed by: SURGERY

## 2022-07-19 PROCEDURE — 3079F PR MOST RECENT DIASTOLIC BLOOD PRESSURE 80-89 MM HG: ICD-10-PCS | Mod: CPTII,S$GLB,, | Performed by: SURGERY

## 2022-07-19 PROCEDURE — 97140 MANUAL THERAPY 1/> REGIONS: CPT | Mod: PN

## 2022-07-19 PROCEDURE — 1159F MED LIST DOCD IN RCRD: CPT | Mod: CPTII,S$GLB,, | Performed by: SURGERY

## 2022-07-19 PROCEDURE — 99214 PR OFFICE/OUTPT VISIT, EST, LEVL IV, 30-39 MIN: ICD-10-PCS | Mod: S$GLB,,, | Performed by: SURGERY

## 2022-07-19 PROCEDURE — 99214 OFFICE O/P EST MOD 30 MIN: CPT | Mod: S$GLB,,, | Performed by: SURGERY

## 2022-07-19 PROCEDURE — 3008F PR BODY MASS INDEX (BMI) DOCUMENTED: ICD-10-PCS | Mod: CPTII,S$GLB,, | Performed by: SURGERY

## 2022-07-19 PROCEDURE — 97110 THERAPEUTIC EXERCISES: CPT | Mod: PN

## 2022-07-19 PROCEDURE — 1159F PR MEDICATION LIST DOCUMENTED IN MEDICAL RECORD: ICD-10-PCS | Mod: CPTII,S$GLB,, | Performed by: SURGERY

## 2022-07-19 PROCEDURE — 3074F PR MOST RECENT SYSTOLIC BLOOD PRESSURE < 130 MM HG: ICD-10-PCS | Mod: CPTII,S$GLB,, | Performed by: SURGERY

## 2022-07-19 PROCEDURE — 3008F BODY MASS INDEX DOCD: CPT | Mod: CPTII,S$GLB,, | Performed by: SURGERY

## 2022-07-19 PROCEDURE — 3079F DIAST BP 80-89 MM HG: CPT | Mod: CPTII,S$GLB,, | Performed by: SURGERY

## 2022-07-19 PROCEDURE — 99999 PR PBB SHADOW E&M-EST. PATIENT-LVL III: ICD-10-PCS | Mod: PBBFAC,,, | Performed by: SURGERY

## 2022-07-19 NOTE — PROGRESS NOTES
"                            Occupational Therapy Daily Treatment Note   Name: Pham Licea 1955  MRN: [id number]    Visit Date: 7/19/2022  Visit #: 5 /12  Authorization period Expiration: 07/05/2022   Plan of Care Expiration: 09/02/2022  Precautions: standard; no heavy lifting using the L UE    Time In: 11: 00  Time Out: 11: 45  Total 1:1 Treatment Time: 45 min    Treatment Diagnosis:   Encounter Diagnosis   Name Primary?    Arm pain, lateral, left Yes     Physician: Russo-Digeorge, Jamie L*    Subjective   Pt reports: pt states that her arm is doing well, a little sore from moving  She was compliant with home exercise program.     Pain Scale:   2 /10 on VAS currently at rest  Pain Location: L elbow /forearm    Objective   Elicia received therapeutic exercises to develop strength, endurance and ROM for 30 minutes including:  -L forearm pronation/supination using 2 # wt for 3 x 10 reps  -bilateral wrist flexion/extension using 3 # dowel cornelia for 4 x 10 reps  -L biceps curls using 3 # wt for 3 x 10 reps  -L hand  using power web for 15 reps  -Bilateral wrist strengthening using "wringing tool" for 3 x 10 reps    Elicia received the following direct contact modalities after being cleared for contraindications:  Heat pack was applied to the L elbow prior to mobilization for 5 mins    Elicia received manual therapy technique for the L forearm:  Soft tissue mobilization was performed on the pt's L forearm in order to alleviate pain and bring blood flow to the impacted area  Home Exercises and Education Provided     Education provided re:   - progress towards goals   - role of therapy in multi - disciplinary team, goals for therapy  Pt educated on condition, POC, and expectations in therapy.  No spiritual or educational barriers to learning provided    Home exercises:  Pt will be provided HEP during course of treatment with progressions as appropriate. Pt was advised to perform these exercises free of pain, and " to stop performing them if pain occurs.   Elicia demonstrated good  understanding of the education provided.     Assessment   Elicia could not tolerate more than 10 reps in a row for each exercise due to muscle weakness. Pt needed min A through the motion of pronation/supination and could not tolerate any weight on the handle. Pt  remains weak and impacts her daily functions including house work and self care tasks.  OT to continue monitoring progress with no updates to goals at this time.     Pt prognosis is Good. Pt will continue to benefit from skilled outpatient physical therapy to address the deficits listed in the problem list chart on initial evaluation, provide pt/family education and to maximize pt's level of independence in the home and community environment.     Medical necessity is demonstrated by the impairments and functional limitations listed on the Initial Evaluation.     Anticipated barriers to occupational therapy: full thickness tear  Pt's spiritual, cultural and educational needs considered and pt agreeable to plan of care and goals.    Goals     Short Term Goals:   1) Pt will Initiate HEP consistently for 2 weeks   2) Pt will reduce pain in the L forearm pain to less than 5/10 when lifting objects over 5 lbs by 4 weeks.   3) Pt will increase L hand functional  strength by 5 # of force in order to A in opening containers for med management or home management tasks by 4 weeks.      Long Term Goals:  Goals to be met by discharge:  1) Pt will report complete [place] with HEP prior to discharge.   2) Pt will increase L elbow flexion/extension MMT to 5/5 prior to discharge.   3) Pt will decrease pain to in L forearm pain to 2/10 when lifting items heavier than 5 lbs by d/c.  4) Pt will increase L hand  strength by 7 # of force or greater by d/c      Plan   Continue with established Plan of Care towards Occupational Therapy goals.   Discussed Plan of Care with patient: Yes    Nereyda  Derrell, OT  7/19/2022

## 2022-07-19 NOTE — PROGRESS NOTES
"[facility]  Follow-up    Subjective:     Chief Complaint:  Follow-up CT scan, follow-up obstruction.    HPI:  Pham Licea is a 66 y.o. female presents today for follow-up examination.  Underwent CT scan repeat for evaluation of previous obstruction.  Patient since last visit has done very well.  No nausea vomiting.  Tolerating a diet.  Having bowel function.  CT scan with p.o. contrast shows no evidence of dilated small bowel.  Contrast goes to the right colon.  Previous operative report reviewed.  Hand-sewn anastomosis.  Do not believe previous issue of obstruction was related to a intra bowel issues such as stricture etcetera.  Believe it is more related to intra-abdominal scarring.  Patient feels good today.  No other new issues or complaints.    Review of Systems   Constitutional: Negative for activity change, appetite change, chills and fever.   HENT: Negative for congestion, dental problem and ear discharge.    Eyes: Negative for discharge and itching.   Respiratory: Negative for apnea, choking and chest tightness.    Cardiovascular: Negative for chest pain and leg swelling.   Gastrointestinal: Negative for abdominal distention, abdominal pain, anal bleeding, constipation, diarrhea and nausea.   Endocrine: Negative for cold intolerance and heat intolerance.   Genitourinary: Negative for difficulty urinating and dyspareunia.   Musculoskeletal: Negative for arthralgias and back pain.   Skin: Negative for color change and pallor.   Neurological: Negative for dizziness and facial asymmetry.   Hematological: Negative for adenopathy. Does not bruise/bleed easily.   Psychiatric/Behavioral: Negative for agitation and behavioral problems.       Objective:      Vitals:    07/19/22 0816   BP: 128/88   Pulse: 80   SpO2: 97%   Weight: 75.3 kg (166 lb)   Height: 5' 4" (1.626 m)     Physical Exam  Constitutional:       General: She is not in acute distress.     Appearance: She is well-developed. "   HENT:      Head: Normocephalic and atraumatic.   Eyes:      Pupils: Pupils are equal, round, and reactive to light.   Neck:      Thyroid: No thyromegaly.   Cardiovascular:      Rate and Rhythm: Normal rate and regular rhythm.   Pulmonary:      Effort: Pulmonary effort is normal.      Breath sounds: Normal breath sounds.   Abdominal:      General: Bowel sounds are normal. There is no distension.      Palpations: Abdomen is soft.      Tenderness: There is no abdominal tenderness.       Musculoskeletal:         General: No deformity. Normal range of motion.      Cervical back: Normal range of motion and neck supple.   Skin:     General: Skin is warm.      Capillary Refill: Capillary refill takes less than 2 seconds.      Findings: No erythema.   Neurological:      Mental Status: She is alert and oriented to person, place, and time.      Cranial Nerves: No cranial nerve deficit.   Psychiatric:         Behavior: Behavior normal.         Lab Review:   CBC:   Lab Results   Component Value Date    WBC 15.34 (H) 06/27/2022    RBC 4.30 06/27/2022    HGB 11.7 (L) 06/27/2022    HCT 36.3 (L) 06/27/2022     06/27/2022     BMP:   Lab Results   Component Value Date     (H) 06/27/2022     06/27/2022    K 3.5 06/27/2022     06/27/2022    CO2 21 (L) 06/27/2022    BUN 11 06/27/2022    CREATININE 0.7 06/27/2022    CALCIUM 9.7 06/27/2022     Diagnostics Review: CT: Reviewed no evidence of recurrent bowel obstruction.  P.o. contrast flows through the small intestine into the right colon.  No dilated small bowel.     Assessment:       1. History of small bowel obstruction        Plan:   History of small bowel obstruction        Medical Decision Making/Counseling:  CT scan confirms no evidence of residual obstructive process.  No evidence of dilated small bowel.  Recommendation will be slow advancement of diet back to regular diet.  Re-presented surgery Clinic in 3-4 weeks for repeat evaluation.  Patient given  good number to my clinic for her to call if symptoms were to occur.  Consider repeat CT scan with IV contrast if symptoms were to occur.  Consider nasogastric tube placement admission to hospital IV fluid administration.  Follow-up surgery clinic 4 weeks    Follow up:  4 weeks.    Patient instructed that best way to communicate with my office staff is for patient to get on the [facility] patient portal to expedite communication and communication issues that may occur.  Patient was given instructions on how to get on the portal.  I encouraged patient to obtain portal access as well.  Ultimately it is up to the patient to obtain access.  Patient voiced understanding.

## 2022-07-22 ENCOUNTER — INFUSION (OUTPATIENT)
Dept: INFUSION THERAPY | Facility: HOSPITAL | Age: 67
End: 2022-07-22
Payer: COMMERCIAL

## 2022-07-22 VITALS
OXYGEN SATURATION: 99 % | TEMPERATURE: 98 F | BODY MASS INDEX: 28.17 KG/M2 | SYSTOLIC BLOOD PRESSURE: 114 MMHG | HEIGHT: 64 IN | DIASTOLIC BLOOD PRESSURE: 57 MMHG | HEART RATE: 55 BPM | WEIGHT: 165 LBS | RESPIRATION RATE: 20 BRPM

## 2022-07-22 DIAGNOSIS — D50.8 IRON DEFICIENCY ANEMIA SECONDARY TO INADEQUATE DIETARY IRON INTAKE: Primary | ICD-10-CM

## 2022-07-22 PROCEDURE — 25000003 PHARM REV CODE 250: Performed by: INTERNAL MEDICINE

## 2022-07-22 PROCEDURE — 63600175 PHARM REV CODE 636 W HCPCS: Performed by: INTERNAL MEDICINE

## 2022-07-22 PROCEDURE — 96365 THER/PROPH/DIAG IV INF INIT: CPT

## 2022-07-22 RX ORDER — SODIUM CHLORIDE 0.9 % (FLUSH) 0.9 %
10 SYRINGE (ML) INJECTION
Status: DISCONTINUED | OUTPATIENT
Start: 2022-07-22 | End: 2022-07-22 | Stop reason: HOSPADM

## 2022-07-22 RX ORDER — METHYLPREDNISOLONE SOD SUCC 125 MG
125 VIAL (EA) INJECTION ONCE AS NEEDED
Status: CANCELLED | OUTPATIENT
Start: 2022-07-29

## 2022-07-22 RX ORDER — DIPHENHYDRAMINE HYDROCHLORIDE 50 MG/ML
50 INJECTION INTRAMUSCULAR; INTRAVENOUS ONCE AS NEEDED
Status: CANCELLED | OUTPATIENT
Start: 2022-07-29

## 2022-07-22 RX ORDER — HEPARIN 100 UNIT/ML
500 SYRINGE INTRAVENOUS
Status: CANCELLED | OUTPATIENT
Start: 2022-07-29

## 2022-07-22 RX ORDER — EPINEPHRINE 0.3 MG/.3ML
0.3 INJECTION SUBCUTANEOUS ONCE AS NEEDED
Status: CANCELLED | OUTPATIENT
Start: 2022-07-29

## 2022-07-22 RX ORDER — SODIUM CHLORIDE 0.9 % (FLUSH) 0.9 %
10 SYRINGE (ML) INJECTION
Status: CANCELLED | OUTPATIENT
Start: 2022-07-29

## 2022-07-22 RX ADMIN — SODIUM CHLORIDE 125 MG: 9 INJECTION, SOLUTION INTRAVENOUS at 01:07

## 2022-07-22 RX ADMIN — SODIUM CHLORIDE: 9 INJECTION, SOLUTION INTRAVENOUS at 01:07

## 2022-07-22 NOTE — PLAN OF CARE
"Problem: Adult Inpatient Plan of Care  Goal: Plan of Care Review  Outcome: Ongoing, Progressing  Flowsheets (Taken 7/22/2022 1322)  Plan of Care Reviewed With: patient  BP (!) 121/56 (BP Location: Left arm, Patient Position: Sitting)   Pulse 60   Temp 98 °F (36.7 °C) (Oral)   Resp 18   Ht 5' 4" (1.626 m)   Wt 74.8 kg (165 lb)   LMP  (LMP Unknown)   SpO2 99%   BMI 28.32 kg/m² Pleasant alert and oriented patient ambulated to clinic for feraheme infusion - pt tolerated well - discharged home with no concerns - pt to RTC next Friday.       "

## 2022-07-29 ENCOUNTER — INFUSION (OUTPATIENT)
Dept: INFUSION THERAPY | Facility: HOSPITAL | Age: 67
End: 2022-07-29
Payer: COMMERCIAL

## 2022-07-29 VITALS
SYSTOLIC BLOOD PRESSURE: 122 MMHG | DIASTOLIC BLOOD PRESSURE: 58 MMHG | TEMPERATURE: 98 F | HEART RATE: 64 BPM | BODY MASS INDEX: 28.17 KG/M2 | WEIGHT: 165 LBS | OXYGEN SATURATION: 96 % | RESPIRATION RATE: 18 BRPM | HEIGHT: 64 IN

## 2022-07-29 DIAGNOSIS — D50.8 IRON DEFICIENCY ANEMIA SECONDARY TO INADEQUATE DIETARY IRON INTAKE: Primary | ICD-10-CM

## 2022-07-29 PROCEDURE — 96365 THER/PROPH/DIAG IV INF INIT: CPT

## 2022-07-29 PROCEDURE — 25000003 PHARM REV CODE 250: Performed by: INTERNAL MEDICINE

## 2022-07-29 PROCEDURE — 63600175 PHARM REV CODE 636 W HCPCS: Performed by: INTERNAL MEDICINE

## 2022-07-29 RX ORDER — EPINEPHRINE 0.3 MG/.3ML
0.3 INJECTION SUBCUTANEOUS ONCE AS NEEDED
Status: CANCELLED | OUTPATIENT
Start: 2022-08-05

## 2022-07-29 RX ORDER — METHYLPREDNISOLONE SOD SUCC 125 MG
125 VIAL (EA) INJECTION ONCE AS NEEDED
Status: CANCELLED | OUTPATIENT
Start: 2022-08-05

## 2022-07-29 RX ORDER — SODIUM CHLORIDE 0.9 % (FLUSH) 0.9 %
10 SYRINGE (ML) INJECTION
Status: CANCELLED | OUTPATIENT
Start: 2022-08-05

## 2022-07-29 RX ORDER — HEPARIN 100 UNIT/ML
500 SYRINGE INTRAVENOUS
Status: CANCELLED | OUTPATIENT
Start: 2022-08-05

## 2022-07-29 RX ORDER — SODIUM CHLORIDE 0.9 % (FLUSH) 0.9 %
10 SYRINGE (ML) INJECTION
Status: DISCONTINUED | OUTPATIENT
Start: 2022-07-29 | End: 2022-07-29 | Stop reason: HOSPADM

## 2022-07-29 RX ORDER — DIPHENHYDRAMINE HYDROCHLORIDE 50 MG/ML
50 INJECTION INTRAMUSCULAR; INTRAVENOUS ONCE AS NEEDED
Status: CANCELLED | OUTPATIENT
Start: 2022-08-05

## 2022-07-29 RX ADMIN — SODIUM CHLORIDE 125 MG: 9 INJECTION, SOLUTION INTRAVENOUS at 12:07

## 2022-07-29 NOTE — PLAN OF CARE
Pleasant alert and oriented patient ambulated independently to clinic for iron infusion - pt tolerated well - discharged home with no concerns - will RTC next week

## 2022-08-01 ENCOUNTER — LAB VISIT (OUTPATIENT)
Dept: LAB | Facility: HOSPITAL | Age: 67
End: 2022-08-01
Attending: INTERNAL MEDICINE
Payer: MEDICARE

## 2022-08-01 DIAGNOSIS — K62.5 RECTAL BLEEDING: ICD-10-CM

## 2022-08-01 DIAGNOSIS — D50.8 IRON DEFICIENCY ANEMIA SECONDARY TO INADEQUATE DIETARY IRON INTAKE: ICD-10-CM

## 2022-08-01 LAB
ALBUMIN SERPL BCP-MCNC: 4.2 G/DL (ref 3.5–5.2)
ALP SERPL-CCNC: 66 U/L (ref 55–135)
ALT SERPL W/O P-5'-P-CCNC: 12 U/L (ref 10–44)
ANION GAP SERPL CALC-SCNC: 11 MMOL/L (ref 8–16)
AST SERPL-CCNC: 17 U/L (ref 10–40)
BASOPHILS # BLD AUTO: 0.04 K/UL (ref 0–0.2)
BASOPHILS NFR BLD: 0.7 % (ref 0–1.9)
BILIRUB SERPL-MCNC: 0.2 MG/DL (ref 0.1–1)
BUN SERPL-MCNC: 13 MG/DL (ref 8–23)
CALCIUM SERPL-MCNC: 9.4 MG/DL (ref 8.7–10.5)
CHLORIDE SERPL-SCNC: 105 MMOL/L (ref 95–110)
CO2 SERPL-SCNC: 26 MMOL/L (ref 23–29)
CREAT SERPL-MCNC: 0.7 MG/DL (ref 0.5–1.4)
DIFFERENTIAL METHOD: ABNORMAL
EOSINOPHIL # BLD AUTO: 0.1 K/UL (ref 0–0.5)
EOSINOPHIL NFR BLD: 1.4 % (ref 0–8)
ERYTHROCYTE [DISTWIDTH] IN BLOOD BY AUTOMATED COUNT: 15.6 % (ref 11.5–14.5)
EST. GFR  (NO RACE VARIABLE): >60 ML/MIN/1.73 M^2
GLUCOSE SERPL-MCNC: 103 MG/DL (ref 70–110)
HCT VFR BLD AUTO: 36.2 % (ref 37–48.5)
HGB BLD-MCNC: 11.4 G/DL (ref 12–16)
IMM GRANULOCYTES # BLD AUTO: 0.01 K/UL (ref 0–0.04)
IMM GRANULOCYTES NFR BLD AUTO: 0.2 % (ref 0–0.5)
IRON SERPL-MCNC: 43 UG/DL (ref 30–160)
LYMPHOCYTES # BLD AUTO: 2.1 K/UL (ref 1–4.8)
LYMPHOCYTES NFR BLD: 35.9 % (ref 18–48)
MCH RBC QN AUTO: 28.2 PG (ref 27–31)
MCHC RBC AUTO-ENTMCNC: 31.5 G/DL (ref 32–36)
MCV RBC AUTO: 90 FL (ref 82–98)
MONOCYTES # BLD AUTO: 0.5 K/UL (ref 0.3–1)
MONOCYTES NFR BLD: 9 % (ref 4–15)
NEUTROPHILS # BLD AUTO: 3.1 K/UL (ref 1.8–7.7)
NEUTROPHILS NFR BLD: 52.8 % (ref 38–73)
NRBC BLD-RTO: 0 /100 WBC
PLATELET # BLD AUTO: 241 K/UL (ref 150–450)
PMV BLD AUTO: 10.6 FL (ref 9.2–12.9)
POTASSIUM SERPL-SCNC: 3.8 MMOL/L (ref 3.5–5.1)
PROT SERPL-MCNC: 6.7 G/DL (ref 6–8.4)
RBC # BLD AUTO: 4.04 M/UL (ref 4–5.4)
SATURATED IRON: 11 % (ref 20–50)
SODIUM SERPL-SCNC: 142 MMOL/L (ref 136–145)
TOTAL IRON BINDING CAPACITY: 397 UG/DL (ref 250–450)
TRANSFERRIN SERPL-MCNC: 268 MG/DL (ref 200–375)
WBC # BLD AUTO: 5.8 K/UL (ref 3.9–12.7)

## 2022-08-01 PROCEDURE — 85025 COMPLETE CBC W/AUTO DIFF WBC: CPT | Performed by: INTERNAL MEDICINE

## 2022-08-01 PROCEDURE — 84466 ASSAY OF TRANSFERRIN: CPT | Performed by: INTERNAL MEDICINE

## 2022-08-01 PROCEDURE — 80053 COMPREHEN METABOLIC PANEL: CPT | Performed by: INTERNAL MEDICINE

## 2022-08-01 PROCEDURE — 36415 COLL VENOUS BLD VENIPUNCTURE: CPT | Performed by: INTERNAL MEDICINE

## 2022-08-01 PROCEDURE — 82728 ASSAY OF FERRITIN: CPT | Performed by: INTERNAL MEDICINE

## 2022-08-01 RX ORDER — LACTULOSE 10 G/15ML
SOLUTION ORAL
Qty: 948 ML | Refills: 0 | Status: ON HOLD | OUTPATIENT
Start: 2022-08-01 | End: 2022-12-22

## 2022-08-02 LAB — FERRITIN SERPL-MCNC: 237 NG/ML (ref 20–300)

## 2022-08-04 ENCOUNTER — OFFICE VISIT (OUTPATIENT)
Dept: HEMATOLOGY/ONCOLOGY | Facility: CLINIC | Age: 67
End: 2022-08-04
Payer: COMMERCIAL

## 2022-08-04 DIAGNOSIS — E53.8 B12 DEFICIENCY: ICD-10-CM

## 2022-08-04 DIAGNOSIS — D50.8 IRON DEFICIENCY ANEMIA SECONDARY TO INADEQUATE DIETARY IRON INTAKE: ICD-10-CM

## 2022-08-04 DIAGNOSIS — Z87.19 S/P SMALL BOWEL OBSTRUCTION: ICD-10-CM

## 2022-08-04 DIAGNOSIS — E78.5 DYSLIPIDEMIA: Primary | ICD-10-CM

## 2022-08-04 DIAGNOSIS — K56.49 OTHER IMPACTION OF INTESTINE: ICD-10-CM

## 2022-08-04 PROCEDURE — 99214 PR OFFICE/OUTPT VISIT, EST, LEVL IV, 30-39 MIN: ICD-10-PCS | Mod: 95,,, | Performed by: INTERNAL MEDICINE

## 2022-08-04 PROCEDURE — 99214 OFFICE O/P EST MOD 30 MIN: CPT | Mod: 95,,, | Performed by: INTERNAL MEDICINE

## 2022-08-04 NOTE — PROGRESS NOTES
Subjective:    The patient location is: home  Visit type: Virtual visit with synchronous audio and video  Face-to-face or time spent with patient on the encounter:20min  Total time spent on and for  this encounter which includes non face-to-face time preparing to see patient, review of tests, obtaining and or reviewing separately obtained records documenting clinical information in the electronic or other health records, independently interpreting results which is not separately reported ,and communicating results to the patient/family/caregiver and in care coordination and treatment planning/communicating with pharmacy for prescriptions/addressing social needs/arranging follow-up and or referrals :25 min    Each patient I provide medical services by telemedicine is:  (1) informed of the relationship between the physician and patient and the respective role of any other health care provider with respect to management of the patient; and (2) notified that he or she may decline to receive medical services by telemedicine and may withdraw from such care at any time.  This is a video visit therefore some elements of the physical exam such as vital signs, heart sounds are breath sounds are not included and may be included if found in recent clinic notes of other providers assessing same patient. Any symptoms or signs that were visualized were stated by the patient may be included in this note.     Patient ID: Pahm Licea is a 66 y.o. female.    Chief Complaint:      sept 2020 had gall bladder surgery, thern she had a bowel obstruction, no surgery then, had 2 feet of bowels removed after a second bowel obstruction  Esophageal ulcers and dudenal ulcers- with bleeding in 2017  Recd blood in [facility] this weekend, pt went to ED because she was shaky and week and couldn't think was found to be anemic in ed   pt readmitted 3/22 had repeat bowel obstruction   Seen at emergency room May 25, 2022 with CT scan of abdomen  which shows partial obstruction patient having significant abdominal pain.  Discomfort belching similar to her prior obstructive symptoms  Past Medical History:   Diagnosis Date    Anemia     Fatty liver 2015    Former smoker 2009    Osteoarthritis 2010    Pulmonary nodules 2019    Repeat CT in 6 mo    Ulcer of abdomen wall 2016     Past Surgical History:   Procedure Laterality Date    APPENDECTOMY  1993     SECTION  , , 1983    x3    COLONOSCOPY  ~2014    [facility]: normal findings per patient report    COLONOSCOPY N/A 2020    Procedure: COLONOSCOPY;  Surgeon: Misael Holm MD;  [facility];  Service: General;  Laterality: N/A;    DIAGNOSTIC LAPAROSCOPY N/A 2021    Procedure: LAPAROSCOPY, DIAGNOSTIC;  Surgeon: Misael Holm MD;  Location: [facility] OR;  Service: General;  Laterality: N/A;    EPIDURAL STEROID INJECTION INTO LUMBAR SPINE N/A 10/12/2018    Procedure: Injection-steroid-epidural-lumbar;  Surgeon: Kal Johnson MD;  Location: [facility] OR;  Service: Pain Management;  Laterality: N/A;  L5-S1    EPIDURAL STEROID INJECTION INTO LUMBAR SPINE N/A 2019    Procedure: Injection-steroid-epidural-lumbar L5-S1;  Surgeon: Kal Johnson MD;  Location: [facility] OR;  Service: Pain Management;  Laterality: N/A;    ESOPHAGOGASTRODUODENOSCOPY N/A 2020    Procedure: ESOPHAGOGASTRODUODENOSCOPY (EGD);  Surgeon: Misael Holm MD;  Location: [facility];  Service: General;  Laterality: N/A;    HYSTERECTOMY      one ovary conserved    LAPAROSCOPIC CHOLECYSTECTOMY N/A 2020    Procedure: CHOLECYSTECTOMY, LAPAROSCOPIC;  Surgeon: Govind Frey MD;  [facility];  Service: General;  Laterality: N/A;    LAPAROSCOPIC LYSIS OF ADHESIONS N/A 2021    Procedure: LYSIS, ADHESIONS, LAPAROSCOPIC;  Surgeon: Misael Holm MD;  Location[facility] OR;  Service: General;  Laterality: N/A;    UPPER GASTROINTESTINAL ENDOSCOPY  2016     Family History    Problem Relation Age of Onset    Ovarian cancer Mother     Heart disease Mother     Osteoporosis Mother     Arthritis Mother     Hypertension Father     Stroke Father 50    Brain Hemorrhage Father     Aneurysm Father     Osteoarthritis Sister     Arthritis Sister     Hypertension Sister     Obesity Sister     Breast cancer Neg Hx     Colon cancer Neg Hx     Colon polyps Neg Hx     Crohn's disease Neg Hx     Ulcerative colitis Neg Hx       Social History     Socioeconomic History    Marital status:     Number of children: 4    Highest education level: Some college, no degree   Occupational History    Occupation: sale specialist-    Tobacco Use    Smoking status: Former Smoker     Packs/day: 1.00     Years: 40.00     Pack years: 40.00     Quit date: 2009     Years since quittin.7    Smokeless tobacco: Never Used    Tobacco comment: quit 2009   Substance and Sexual Activity    Alcohol use: Not Currently     Comment: Not often - one glass of wine every now and then    Drug use: Not Currently     Types: Marijuana     Comment: in     Sexual activity: Not Currently     Social Determinants of Health     Financial Resource Strain: Low Risk     Difficulty of Paying Living Expenses: Not hard at all   Food Insecurity: No Food Insecurity    Worried About Running Out of Food in the Last Year: Never true    Ran Out of Food in the Last Year: Never true   Transportation Needs: No Transportation Needs    Lack of Transportation (Medical): No    Lack of Transportation (Non-Medical): No   Physical Activity: Inactive    Days of Exercise per Week: 0 days    Minutes of Exercise per Session: 0 min   Stress: Stress Concern Present    Feeling of Stress : To some extent   Social Connections: Unknown    Frequency of Communication with Friends and Family: More than three times a week    Frequency of Social Gatherings with Friends and Family: Once a week    Active Member of Clubs or  Organizations: Yes    Attends Club or Organization Meetings: More than 4 times per year    Marital Status:    Housing Stability: Low Risk     Unable to Pay for Housing in the Last Year: No    Number of Places Lived in the Last Year: 2    Unstable Housing in the Last Year: No     Review of patient's allergies indicates:  No Known Allergies    Current Outpatient Medications:     atorvastatin (LIPITOR) 10 MG tablet, Take 1 tablet (10 mg total) by mouth once daily., Disp: 90 tablet, Rfl: 3    ciprofloxacin HCl (CIPRO) 500 MG tablet, Take 500 mg by mouth 2 (two) times daily., Disp: , Rfl:     CONSTULOSE 10 gram/15 mL solution, TAKE 10 ML BY MOUTH   THREE TIMES DAILY, Disp: 948 mL, Rfl: 0    diclofenac sodium (VOLTAREN) 1 % Gel, Apply 2 g topically 2 (two) times daily as needed., Disp: , Rfl:     HYDROcodone-acetaminophen (NORCO) 7.5-325 mg per tablet, Take 1 tablet by mouth every 6 (six) hours as needed for Pain., Disp: 24 tablet, Rfl: 0    meloxicam (MOBIC) 15 MG tablet, Take 15 mg by mouth once daily., Disp: , Rfl:     ondansetron (ZOFRAN-ODT) 4 MG TbDL, Take 2 tablets (8 mg total) by mouth every 8 (eight) hours as needed., Disp: 10 tablet, Rfl: 1    ondansetron (ZOFRAN-ODT) 8 MG TbDL, Take 1 tablet (8 mg total) by mouth every 6 (six) hours as needed (nausea or vomiting)., Disp: 30 tablet, Rfl: 1    pantoprazole (PROTONIX) 20 MG tablet, Take 1 tablet (20 mg total) by mouth once daily., Disp: 90 tablet, Rfl: 3    promethazine (PHENERGAN) 25 MG suppository, Place 1 suppository (25 mg total) rectally 3 (three) times daily as needed for Nausea., Disp: 8 suppository, Rfl: 0    vitamin D (VITAMIN D3) 1000 units Tab, Take 3,000 Units by mouth once daily., Disp: , Rfl:   Review of Systems   Constitutional: Positive for malaise/fatigue. Negative for weight loss.        Mostly good appetite lost weight with her sx   HENT: Negative for hearing loss.    Eyes: Negative for blurred vision and double vision.    Gastrointestinal: Positive for constipation.        Takes lactulose twice a day and colace twice a day   Musculoskeletal: Negative for back pain, myalgias and neck pain.        Leg cramps and shaky   Skin: Negative for rash.   Neurological: Negative for dizziness, weakness and headaches.   Endo/Heme/Allergies: Does not bruise/bleed easily.   recent bowel obstruction 3/22 with repeat abdominal pain discomfort emergency room visit last night the outpatient surgical appointment  Physical Exam    Wt Readings from Last 3 Encounters:   07/29/22 74.8 kg (165 lb)   07/22/22 74.8 kg (165 lb)   07/19/22 75.3 kg (166 lb)     Temp Readings from Last 3 Encounters:   07/29/22 98.3 °F (36.8 °C) (Oral)   07/22/22 98 °F (36.7 °C) (Oral)   07/15/22 98 °F (36.7 °C)     BP Readings from Last 3 Encounters:   07/29/22 (!) 122/58   07/22/22 (!) 114/57   07/19/22 128/88     Pulse Readings from Last 3 Encounters:   07/29/22 64   07/22/22 (!) 55   07/19/22 80     Lab Results   Component Value Date    WBC 5.80 08/01/2022    HGB 11.4 (L) 08/01/2022    HCT 36.2 (L) 08/01/2022    MCV 90 08/01/2022     08/01/2022       BMP  Lab Results   Component Value Date     08/01/2022    K 3.8 08/01/2022     08/01/2022    CO2 26 08/01/2022    BUN 13 08/01/2022    CREATININE 0.7 08/01/2022    CALCIUM 9.4 08/01/2022    ANIONGAP 11 08/01/2022    ESTGFRAFRICA >60.0 06/27/2022    EGFRNONAA >60.0 06/27/2022     Lab Results   Component Value Date    IRON 43 08/01/2022    TIBC 397 08/01/2022    FERRITIN 237 08/01/2022       Impression:  CT scan abdomen May 25, 2022     1. Multiple dilated loops of small bowel are noted that are fluid-filled left upper quadrant with the suggestion of bowel wall thickening near the level of the umbilicus in the midline and extending into the left dilated loops of bowel.  This raises the concern for vascular injury and or bowel infarction/inflammation possibly associated with obstruction.  Surgical consultation is  suggested.  Post-contrast oral and intravenous imaging may be of use for confirmation given that this exam is severely hindered.  2. A 2nd region of concern is noted within the right hemipelvis where a dilated loop of small bowel is noted without obvious bowel wall thickening.  This region is nonspecific and could relate to colon, small bowel, ileus, or obstruction.  Further evaluation and clinical correlation is necessary     Patient Active Problem List   Diagnosis    Primary osteoarthritis involving multiple joints    Obesity (BMI 30.0-34.9)    Gastroesophageal reflux disease    Right knee pain    Chronic midline low back pain    Acute tear medial meniscus, right, sequela    Primary osteoarthritis of right knee    Lumbar radiculitis    Trigger middle finger of right hand    Nausea    Abdominal pain    Encounter for postoperative care    Intestinal obstruction    Hypokalemia    Constipation    History of cholecystectomy    Right upper quadrant pain    History of hysterectomy    Rectal bleeding    Hemorrhoids    Mitral valve prolapse    Coronary artery calcification    Anemia    SAMPSON (dyspnea on exertion), noted 2010    History of smoking 25-50 pack years, 46 py, quit 2009    Other emphysema    NSAID long-term use, started 2017    Bandemia    Family history of premature CAD    Cardiovascular event risk, ASCVD 10-year risk 4.8%, 2019    Abdominal obesity    Dyslipidemia, baseline LDL-C 112, HDL-C 49    Iron deficiency anemia secondary to inadequate dietary iron intake    Severe anemia    S/p small bowel obstruction    Arm pain, lateral, left        Assessment and Plan   NATHAN: pt had partail bowel removal from opbstructions so anticipate this will be recurrent   recvd infusinal iron with excellent response  Cont to monitor vv in 2 months with cbc, cmp, iron ferritn  Orders will be placed for [place]  None needed this visit however patient has made recent emergency room visit last  night May 25, 2022 I have messaged Dr. Jai horton for visit with him as soon as possible  Recheck in 1 months with cbc, cmp, iron ferritn   Slight B12 deficiency also continue to monitor most recent test June 21, 2022 acceptable  Patient to continue follow-up of her DJD with PCP  Bowel obs again 3/22 repeat sx and adhesiolysis,

## 2022-08-05 ENCOUNTER — PATIENT MESSAGE (OUTPATIENT)
Dept: HEMATOLOGY/ONCOLOGY | Facility: CLINIC | Age: 67
End: 2022-08-05
Payer: MEDICARE

## 2022-08-05 ENCOUNTER — INFUSION (OUTPATIENT)
Dept: INFUSION THERAPY | Facility: HOSPITAL | Age: 67
End: 2022-08-05
Payer: MEDICARE

## 2022-08-05 ENCOUNTER — TELEPHONE (OUTPATIENT)
Dept: HEMATOLOGY/ONCOLOGY | Facility: CLINIC | Age: 67
End: 2022-08-05
Payer: MEDICARE

## 2022-08-05 VITALS
RESPIRATION RATE: 18 BRPM | BODY MASS INDEX: 28.17 KG/M2 | HEART RATE: 62 BPM | HEIGHT: 64 IN | OXYGEN SATURATION: 98 % | DIASTOLIC BLOOD PRESSURE: 58 MMHG | TEMPERATURE: 98 F | WEIGHT: 165 LBS | SYSTOLIC BLOOD PRESSURE: 121 MMHG

## 2022-08-05 DIAGNOSIS — D50.8 IRON DEFICIENCY ANEMIA SECONDARY TO INADEQUATE DIETARY IRON INTAKE: Primary | ICD-10-CM

## 2022-08-05 PROCEDURE — 96365 THER/PROPH/DIAG IV INF INIT: CPT

## 2022-08-05 PROCEDURE — 63600175 PHARM REV CODE 636 W HCPCS: Performed by: INTERNAL MEDICINE

## 2022-08-05 PROCEDURE — 25000003 PHARM REV CODE 250: Performed by: INTERNAL MEDICINE

## 2022-08-05 RX ORDER — EPINEPHRINE 0.3 MG/.3ML
0.3 INJECTION SUBCUTANEOUS ONCE AS NEEDED
Status: CANCELLED | OUTPATIENT
Start: 2022-08-12

## 2022-08-05 RX ORDER — METHYLPREDNISOLONE SOD SUCC 125 MG
125 VIAL (EA) INJECTION ONCE AS NEEDED
Status: CANCELLED | OUTPATIENT
Start: 2022-08-12

## 2022-08-05 RX ORDER — SODIUM CHLORIDE 0.9 % (FLUSH) 0.9 %
10 SYRINGE (ML) INJECTION
Status: DISCONTINUED | OUTPATIENT
Start: 2022-08-05 | End: 2022-08-05 | Stop reason: HOSPADM

## 2022-08-05 RX ORDER — HEPARIN 100 UNIT/ML
500 SYRINGE INTRAVENOUS
Status: CANCELLED | OUTPATIENT
Start: 2022-08-12

## 2022-08-05 RX ORDER — SODIUM CHLORIDE 0.9 % (FLUSH) 0.9 %
10 SYRINGE (ML) INJECTION
Status: CANCELLED | OUTPATIENT
Start: 2022-08-12

## 2022-08-05 RX ORDER — DIPHENHYDRAMINE HYDROCHLORIDE 50 MG/ML
50 INJECTION INTRAMUSCULAR; INTRAVENOUS ONCE AS NEEDED
Status: CANCELLED | OUTPATIENT
Start: 2022-08-12

## 2022-08-05 RX ADMIN — SODIUM CHLORIDE 125 MG: 9 INJECTION, SOLUTION INTRAVENOUS at 12:08

## 2022-08-05 RX ADMIN — SODIUM CHLORIDE: 9 INJECTION, SOLUTION INTRAVENOUS at 11:08

## 2022-08-05 NOTE — TELEPHONE ENCOUNTER
Pt apt's scheduled per provider request. Message sent to pt via my chart portal to confirm.    ----- Message from Pat Grimes MA sent at 8/5/2022  8:26 AM CDT -----    ----- Message -----  From: Mayra Bower MD  Sent: 8/4/2022   5:06 PM CDT  To: aSthish Nunez Staff- [place]    Cbc, cmp, iron ferrirtn b12 vv in 2 months

## 2022-08-05 NOTE — PLAN OF CARE
"Problem: Adult Inpatient Plan of Care  Goal: Plan of Care Review  Outcome: Ongoing, Progressing  Flowsheets (Taken 8/5/2022 1150)  Plan of Care Reviewed With: patient  BP (!) 120/58 (Patient Position: Sitting)   Pulse 64   Temp 98 °F (36.7 °C)   Resp (!) 22   Ht 5' 4" (1.626 m)   Wt 74.8 kg (165 lb)   LMP  (LMP Unknown)   SpO2 98%   BMI 28.32 kg/m² Pleasant alert and oriented patient ambulated independently to clinic for iron infusion - pt tolerated well - discharged home with no concerns - will RTC next week       "

## 2022-08-08 ENCOUNTER — PATIENT MESSAGE (OUTPATIENT)
Dept: HEMATOLOGY/ONCOLOGY | Facility: CLINIC | Age: 67
End: 2022-08-08
Payer: MEDICARE

## 2022-08-12 ENCOUNTER — INFUSION (OUTPATIENT)
Dept: INFUSION THERAPY | Facility: HOSPITAL | Age: 67
End: 2022-08-12
Payer: MEDICARE

## 2022-08-12 VITALS
WEIGHT: 165 LBS | HEIGHT: 64 IN | BODY MASS INDEX: 28.17 KG/M2 | SYSTOLIC BLOOD PRESSURE: 129 MMHG | DIASTOLIC BLOOD PRESSURE: 59 MMHG | OXYGEN SATURATION: 98 % | HEART RATE: 66 BPM | RESPIRATION RATE: 18 BRPM | TEMPERATURE: 98 F

## 2022-08-12 DIAGNOSIS — D50.8 IRON DEFICIENCY ANEMIA SECONDARY TO INADEQUATE DIETARY IRON INTAKE: Primary | ICD-10-CM

## 2022-08-12 PROCEDURE — 63600175 PHARM REV CODE 636 W HCPCS: Performed by: INTERNAL MEDICINE

## 2022-08-12 PROCEDURE — 25000003 PHARM REV CODE 250: Performed by: INTERNAL MEDICINE

## 2022-08-12 PROCEDURE — 96365 THER/PROPH/DIAG IV INF INIT: CPT

## 2022-08-12 RX ORDER — METHYLPREDNISOLONE SOD SUCC 125 MG
125 VIAL (EA) INJECTION ONCE AS NEEDED
Status: CANCELLED | OUTPATIENT
Start: 2022-08-19

## 2022-08-12 RX ORDER — DIPHENHYDRAMINE HYDROCHLORIDE 50 MG/ML
50 INJECTION INTRAMUSCULAR; INTRAVENOUS ONCE AS NEEDED
Status: CANCELLED | OUTPATIENT
Start: 2022-08-19

## 2022-08-12 RX ORDER — SODIUM CHLORIDE 0.9 % (FLUSH) 0.9 %
10 SYRINGE (ML) INJECTION
Status: CANCELLED | OUTPATIENT
Start: 2022-08-19

## 2022-08-12 RX ORDER — SODIUM CHLORIDE 0.9 % (FLUSH) 0.9 %
10 SYRINGE (ML) INJECTION
Status: DISCONTINUED | OUTPATIENT
Start: 2022-08-12 | End: 2022-08-12 | Stop reason: HOSPADM

## 2022-08-12 RX ORDER — HEPARIN 100 UNIT/ML
500 SYRINGE INTRAVENOUS
Status: CANCELLED | OUTPATIENT
Start: 2022-08-19

## 2022-08-12 RX ORDER — EPINEPHRINE 0.3 MG/.3ML
0.3 INJECTION SUBCUTANEOUS ONCE AS NEEDED
Status: CANCELLED | OUTPATIENT
Start: 2022-08-19

## 2022-08-12 RX ADMIN — SODIUM CHLORIDE 125 MG: 9 INJECTION, SOLUTION INTRAVENOUS at 12:08

## 2022-08-12 RX ADMIN — SODIUM CHLORIDE: 9 INJECTION, SOLUTION INTRAVENOUS at 11:08

## 2022-08-12 NOTE — PLAN OF CARE
Problem: Adult Inpatient Plan of Care  Goal: Plan of Care Review  Outcome: Ongoing, Progressing  Flowsheets (Taken 8/12/2022 1308)  Plan of Care Reviewed With: patientPleasant alert and oriented patient ambulated independently to clinic for iron infusion - pt tolerated well - discharged home with no concerns - will RTC next week

## 2022-08-19 ENCOUNTER — INFUSION (OUTPATIENT)
Dept: INFUSION THERAPY | Facility: HOSPITAL | Age: 67
End: 2022-08-19
Attending: INTERNAL MEDICINE
Payer: COMMERCIAL

## 2022-08-19 VITALS
HEIGHT: 64 IN | WEIGHT: 165 LBS | SYSTOLIC BLOOD PRESSURE: 119 MMHG | TEMPERATURE: 98 F | DIASTOLIC BLOOD PRESSURE: 63 MMHG | BODY MASS INDEX: 28.17 KG/M2 | RESPIRATION RATE: 18 BRPM | HEART RATE: 54 BPM | OXYGEN SATURATION: 100 %

## 2022-08-19 DIAGNOSIS — D50.8 IRON DEFICIENCY ANEMIA SECONDARY TO INADEQUATE DIETARY IRON INTAKE: Primary | ICD-10-CM

## 2022-08-19 PROCEDURE — 96365 THER/PROPH/DIAG IV INF INIT: CPT

## 2022-08-19 PROCEDURE — 25000003 PHARM REV CODE 250: Performed by: INTERNAL MEDICINE

## 2022-08-19 PROCEDURE — 63600175 PHARM REV CODE 636 W HCPCS: Performed by: INTERNAL MEDICINE

## 2022-08-19 RX ORDER — METHYLPREDNISOLONE SOD SUCC 125 MG
125 VIAL (EA) INJECTION ONCE AS NEEDED
Status: CANCELLED | OUTPATIENT
Start: 2022-08-26

## 2022-08-19 RX ORDER — SODIUM CHLORIDE 0.9 % (FLUSH) 0.9 %
10 SYRINGE (ML) INJECTION
Status: DISCONTINUED | OUTPATIENT
Start: 2022-08-19 | End: 2022-08-19 | Stop reason: HOSPADM

## 2022-08-19 RX ORDER — EPINEPHRINE 0.3 MG/.3ML
0.3 INJECTION SUBCUTANEOUS ONCE AS NEEDED
Status: DISCONTINUED | OUTPATIENT
Start: 2022-08-19 | End: 2022-08-19 | Stop reason: HOSPADM

## 2022-08-19 RX ORDER — SODIUM CHLORIDE 0.9 % (FLUSH) 0.9 %
10 SYRINGE (ML) INJECTION
Status: CANCELLED | OUTPATIENT
Start: 2022-08-26

## 2022-08-19 RX ORDER — DIPHENHYDRAMINE HYDROCHLORIDE 50 MG/ML
50 INJECTION INTRAMUSCULAR; INTRAVENOUS ONCE AS NEEDED
Status: CANCELLED | OUTPATIENT
Start: 2022-08-26

## 2022-08-19 RX ORDER — HEPARIN 100 UNIT/ML
500 SYRINGE INTRAVENOUS
Status: CANCELLED | OUTPATIENT
Start: 2022-08-26

## 2022-08-19 RX ORDER — DIPHENHYDRAMINE HYDROCHLORIDE 50 MG/ML
50 INJECTION INTRAMUSCULAR; INTRAVENOUS ONCE AS NEEDED
Status: DISCONTINUED | OUTPATIENT
Start: 2022-08-19 | End: 2022-08-19 | Stop reason: HOSPADM

## 2022-08-19 RX ORDER — EPINEPHRINE 0.3 MG/.3ML
0.3 INJECTION SUBCUTANEOUS ONCE AS NEEDED
Status: CANCELLED | OUTPATIENT
Start: 2022-08-26

## 2022-08-19 RX ORDER — METHYLPREDNISOLONE SOD SUCC 125 MG
125 VIAL (EA) INJECTION ONCE AS NEEDED
Status: DISCONTINUED | OUTPATIENT
Start: 2022-08-19 | End: 2022-08-19 | Stop reason: HOSPADM

## 2022-08-19 RX ADMIN — SODIUM CHLORIDE: 0.9 INJECTION, SOLUTION INTRAVENOUS at 11:08

## 2022-08-19 RX ADMIN — SODIUM CHLORIDE 125 MG: 9 INJECTION, SOLUTION INTRAVENOUS at 12:08

## 2022-08-19 NOTE — PLAN OF CARE
"/63 (BP Location: Right arm, Patient Position: Sitting)   Pulse (!) 54   Temp 98 °F (36.7 °C) (Oral)   Resp 18   Ht 5' 4" (1.626 m)   Wt 74.8 kg (165 lb)   LMP  (LMP Unknown)   SpO2 100%   BMI 28.32 kg/m²   Patient ambulatory to [facility] for infusion. Alert and oriented. All vital signs stable. Patient without complaints. Tolerated infusion well. Exited in stable condition.  "

## 2022-08-23 ENCOUNTER — PATIENT MESSAGE (OUTPATIENT)
Dept: FAMILY MEDICINE | Facility: CLINIC | Age: 67
End: 2022-08-23
Payer: MEDICARE

## 2022-08-23 ENCOUNTER — OFFICE VISIT (OUTPATIENT)
Dept: SURGERY | Facility: CLINIC | Age: 67
End: 2022-08-23
Payer: COMMERCIAL

## 2022-08-23 VITALS
DIASTOLIC BLOOD PRESSURE: 65 MMHG | WEIGHT: 165 LBS | OXYGEN SATURATION: 99 % | HEIGHT: 64 IN | HEART RATE: 83 BPM | SYSTOLIC BLOOD PRESSURE: 108 MMHG | BODY MASS INDEX: 28.17 KG/M2

## 2022-08-23 DIAGNOSIS — Z87.19 HISTORY OF SMALL BOWEL OBSTRUCTION: ICD-10-CM

## 2022-08-23 DIAGNOSIS — J06.9 UPPER RESPIRATORY TRACT INFECTION, UNSPECIFIED TYPE: Primary | ICD-10-CM

## 2022-08-23 PROCEDURE — 99215 OFFICE O/P EST HI 40 MIN: CPT | Mod: S$GLB,,, | Performed by: SURGERY

## 2022-08-23 PROCEDURE — 99999 PR PBB SHADOW E&M-EST. PATIENT-LVL III: ICD-10-PCS | Mod: PBBFAC,,, | Performed by: SURGERY

## 2022-08-23 PROCEDURE — 1101F PT FALLS ASSESS-DOCD LE1/YR: CPT | Mod: CPTII,S$GLB,, | Performed by: SURGERY

## 2022-08-23 PROCEDURE — 3078F PR MOST RECENT DIASTOLIC BLOOD PRESSURE < 80 MM HG: ICD-10-PCS | Mod: CPTII,S$GLB,, | Performed by: SURGERY

## 2022-08-23 PROCEDURE — 3288F FALL RISK ASSESSMENT DOCD: CPT | Mod: CPTII,S$GLB,, | Performed by: SURGERY

## 2022-08-23 PROCEDURE — 99999 PR PBB SHADOW E&M-EST. PATIENT-LVL III: CPT | Mod: PBBFAC,,, | Performed by: SURGERY

## 2022-08-23 PROCEDURE — 3074F SYST BP LT 130 MM HG: CPT | Mod: CPTII,S$GLB,, | Performed by: SURGERY

## 2022-08-23 PROCEDURE — 3288F PR FALLS RISK ASSESSMENT DOCUMENTED: ICD-10-PCS | Mod: CPTII,S$GLB,, | Performed by: SURGERY

## 2022-08-23 PROCEDURE — 3008F PR BODY MASS INDEX (BMI) DOCUMENTED: ICD-10-PCS | Mod: CPTII,S$GLB,, | Performed by: SURGERY

## 2022-08-23 PROCEDURE — 1159F MED LIST DOCD IN RCRD: CPT | Mod: CPTII,S$GLB,, | Performed by: SURGERY

## 2022-08-23 PROCEDURE — 3008F BODY MASS INDEX DOCD: CPT | Mod: CPTII,S$GLB,, | Performed by: SURGERY

## 2022-08-23 PROCEDURE — 99215 PR OFFICE/OUTPT VISIT, EST, LEVL V, 40-54 MIN: ICD-10-PCS | Mod: S$GLB,,, | Performed by: SURGERY

## 2022-08-23 PROCEDURE — 3074F PR MOST RECENT SYSTOLIC BLOOD PRESSURE < 130 MM HG: ICD-10-PCS | Mod: CPTII,S$GLB,, | Performed by: SURGERY

## 2022-08-23 PROCEDURE — 1159F PR MEDICATION LIST DOCUMENTED IN MEDICAL RECORD: ICD-10-PCS | Mod: CPTII,S$GLB,, | Performed by: SURGERY

## 2022-08-23 PROCEDURE — 3078F DIAST BP <80 MM HG: CPT | Mod: CPTII,S$GLB,, | Performed by: SURGERY

## 2022-08-23 PROCEDURE — 1101F PR PT FALLS ASSESS DOC 0-1 FALLS W/OUT INJ PAST YR: ICD-10-PCS | Mod: CPTII,S$GLB,, | Performed by: SURGERY

## 2022-08-23 RX ORDER — METHYLPREDNISOLONE 4 MG/1
TABLET ORAL
Qty: 21 EACH | Refills: 0 | Status: SHIPPED | OUTPATIENT
Start: 2022-08-23 | End: 2022-09-08

## 2022-08-23 RX ORDER — AZITHROMYCIN 250 MG/1
TABLET, FILM COATED ORAL
Qty: 6 TABLET | Refills: 0 | Status: SHIPPED | OUTPATIENT
Start: 2022-08-23 | End: 2022-08-28

## 2022-08-23 NOTE — PROGRESS NOTES
[facility] Surgery  Follow-up    Subjective:     Chief Complaint:  Follow-up bowel obstruction.    HPI:  Pham Licea is a 67 y.o. female presents today for follow-up examination.  Patient had bowel obstruction over the summer.  Resolved with non operative therapy.  Patient since has done well.  No nausea vomiting.  Tolerating a soft diet.  Eat some regular food as well per her account.  Takes daily ensure.  No weight loss.  Tolerating a diet having bowel function.  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  Otherwise doing well today.  Only new issue is upper respiratory infection.  Mild bronchitis with upper respiratory symptoms.  No fevers.  Mild production of sputum.  No other new issues or complaints.    Review of Systems   Constitutional: Negative for activity change, appetite change, chills and fever.   HENT: Negative for congestion, dental problem and ear discharge.    Eyes: Negative for discharge and itching.   Respiratory: Positive for cough. Negative for apnea, choking and chest tightness.    Cardiovascular: Negative for chest pain and leg swelling.   Gastrointestinal: Negative for abdominal distention, abdominal pain, anal bleeding, constipation, diarrhea and nausea.   Endocrine: Negative for cold intolerance and heat intolerance.   Genitourinary: Negative for difficulty urinating and dyspareunia.   Musculoskeletal: Negative for arthralgias and back pain.   Skin: Negative for color change and pallor.   Neurological: Negative for dizziness and facial asymmetry.   Hematological: Negative for adenopathy. Does not bruise/bleed easily.   Psychiatric/Behavioral: Negative for agitation and behavioral problems.       Objective:      Vitals:    08/23/22 0756   BP: 108/65   BP Location: Left arm   Patient Position: Sitting   BP Method: Medium (Automatic)   Pulse: 83   SpO2: 99%   Weight: 74.8 kg (165 lb)   Height:  "5' 4" (1.626 m)     Physical Exam  Constitutional:       General: She is not in acute distress.     Appearance: She is well-developed.   HENT:      Head: Normocephalic and atraumatic.   Eyes:      Pupils: Pupils are equal, round, and reactive to light.   Neck:      Thyroid: No thyromegaly.   Cardiovascular:      Rate and Rhythm: Normal rate and regular rhythm.   Pulmonary:      Effort: Pulmonary effort is normal.      Breath sounds: Normal breath sounds.   Abdominal:      General: Bowel sounds are normal. There is no distension.      Palpations: Abdomen is soft.      Tenderness: There is no abdominal tenderness.   Musculoskeletal:         General: No deformity. Normal range of motion.      Cervical back: Normal range of motion and neck supple.   Skin:     General: Skin is warm.      Capillary Refill: Capillary refill takes less than 2 seconds.      Findings: No erythema.   Neurological:      Mental Status: She is alert and oriented to person, place, and time.      Cranial Nerves: No cranial nerve deficit.   Psychiatric:         Behavior: Behavior normal.         Lab Review:   CBC:   Lab Results   Component Value Date    WBC 5.80 08/01/2022    RBC 4.04 08/01/2022    HGB 11.4 (L) 08/01/2022    HCT 36.2 (L) 08/01/2022     08/01/2022     BMP:   Lab Results   Component Value Date     08/01/2022     08/01/2022    K 3.8 08/01/2022     08/01/2022    CO2 26 08/01/2022    BUN 13 08/01/2022    CREATININE 0.7 08/01/2022    CALCIUM 9.4 08/01/2022     Diagnostics Review: Previous CT scans reviewed.  Bowel obstructions reviewed.     Assessment:       1. Upper respiratory tract infection, unspecified type    2. History of small bowel obstruction        Plan:   Upper respiratory tract infection, unspecified type  -     azithromycin (Z-KANDI) 250 MG tablet; Take 2 tablets by mouth on day 1; Take 1 tablet by mouth on days 2-5  Dispense: 6 tablet; Refill: 0  -     methylPREDNISolone (MEDROL DOSEPACK) 4 mg " tablet; use as directed  Dispense: 21 each; Refill: 0    History of small bowel obstruction        Medical Decision Making/Counseling:  From a bowel obstruction standpoint.  Recommendation be patient due increase diet as tolerated.  Follow-up surgery clinic 3 months.  Otherwise before as clinically indicated or needed.      Upper respiratory infections.  New issue.  Recommendation be azithromycin Medrol Dosepak for treatment.  Follow with primary care if symptoms worsen or needs further treatment.    Follow up:  3 months.    Patient instructed that best way to communicate with my office staff is for patient to get on the [facility] epic patient portal to expedite communication and communication issues that may occur.  Patient was given instructions on how to get on the portal.  I encouraged patient to obtain portal access as well.  Ultimately it is up to the patient to obtain access.  Patient voiced understanding.

## 2022-08-24 ENCOUNTER — PATIENT MESSAGE (OUTPATIENT)
Dept: ADMINISTRATIVE | Facility: HOSPITAL | Age: 67
End: 2022-08-24
Payer: MEDICARE

## 2022-08-25 ENCOUNTER — PATIENT MESSAGE (OUTPATIENT)
Dept: FAMILY MEDICINE | Facility: CLINIC | Age: 67
End: 2022-08-25
Payer: MEDICARE

## 2022-08-25 ENCOUNTER — OFFICE VISIT (OUTPATIENT)
Dept: FAMILY MEDICINE | Facility: CLINIC | Age: 67
End: 2022-08-25
Payer: COMMERCIAL

## 2022-08-25 ENCOUNTER — HOSPITAL ENCOUNTER (OUTPATIENT)
Dept: RADIOLOGY | Facility: HOSPITAL | Age: 67
Discharge: HOME | End: 2022-08-25
Attending: FAMILY MEDICINE
Payer: COMMERCIAL

## 2022-08-25 VITALS
HEART RATE: 69 BPM | BODY MASS INDEX: 28.34 KG/M2 | DIASTOLIC BLOOD PRESSURE: 62 MMHG | TEMPERATURE: 99 F | WEIGHT: 166 LBS | OXYGEN SATURATION: 98 % | SYSTOLIC BLOOD PRESSURE: 116 MMHG | HEIGHT: 64 IN

## 2022-08-25 DIAGNOSIS — R06.02 SHORTNESS OF BREATH: ICD-10-CM

## 2022-08-25 DIAGNOSIS — R05.9 COUGH: ICD-10-CM

## 2022-08-25 DIAGNOSIS — U07.1 COVID-19 VIRUS DETECTED: ICD-10-CM

## 2022-08-25 DIAGNOSIS — U07.1 LAB TEST POSITIVE FOR DETECTION OF COVID-19 VIRUS: Primary | ICD-10-CM

## 2022-08-25 LAB
CTP QC/QA: YES
SARS-COV-2 RDRP RESP QL NAA+PROBE: POSITIVE

## 2022-08-25 PROCEDURE — 3074F SYST BP LT 130 MM HG: CPT | Mod: CPTII,S$GLB,, | Performed by: FAMILY MEDICINE

## 2022-08-25 PROCEDURE — 1101F PR PT FALLS ASSESS DOC 0-1 FALLS W/OUT INJ PAST YR: ICD-10-PCS | Mod: CPTII,S$GLB,, | Performed by: FAMILY MEDICINE

## 2022-08-25 PROCEDURE — 99999 PR PBB SHADOW E&M-EST. PATIENT-LVL IV: ICD-10-PCS | Mod: PBBFAC,,, | Performed by: FAMILY MEDICINE

## 2022-08-25 PROCEDURE — 3288F PR FALLS RISK ASSESSMENT DOCUMENTED: ICD-10-PCS | Mod: CPTII,S$GLB,, | Performed by: FAMILY MEDICINE

## 2022-08-25 PROCEDURE — 99214 OFFICE O/P EST MOD 30 MIN: CPT | Mod: S$GLB,,, | Performed by: FAMILY MEDICINE

## 2022-08-25 PROCEDURE — U0002: ICD-10-PCS | Mod: QW,S$GLB,, | Performed by: FAMILY MEDICINE

## 2022-08-25 PROCEDURE — 3078F DIAST BP <80 MM HG: CPT | Mod: CPTII,S$GLB,, | Performed by: FAMILY MEDICINE

## 2022-08-25 PROCEDURE — 3074F PR MOST RECENT SYSTOLIC BLOOD PRESSURE < 130 MM HG: ICD-10-PCS | Mod: CPTII,S$GLB,, | Performed by: FAMILY MEDICINE

## 2022-08-25 PROCEDURE — 99214 PR OFFICE/OUTPT VISIT, EST, LEVL IV, 30-39 MIN: ICD-10-PCS | Mod: S$GLB,,, | Performed by: FAMILY MEDICINE

## 2022-08-25 PROCEDURE — 71046 X-RAY EXAM CHEST 2 VIEWS: CPT | Mod: TC

## 2022-08-25 PROCEDURE — 3288F FALL RISK ASSESSMENT DOCD: CPT | Mod: CPTII,S$GLB,, | Performed by: FAMILY MEDICINE

## 2022-08-25 PROCEDURE — 1159F MED LIST DOCD IN RCRD: CPT | Mod: CPTII,S$GLB,, | Performed by: FAMILY MEDICINE

## 2022-08-25 PROCEDURE — 3008F PR BODY MASS INDEX (BMI) DOCUMENTED: ICD-10-PCS | Mod: CPTII,S$GLB,, | Performed by: FAMILY MEDICINE

## 2022-08-25 PROCEDURE — 3008F BODY MASS INDEX DOCD: CPT | Mod: CPTII,S$GLB,, | Performed by: FAMILY MEDICINE

## 2022-08-25 PROCEDURE — 3078F PR MOST RECENT DIASTOLIC BLOOD PRESSURE < 80 MM HG: ICD-10-PCS | Mod: CPTII,S$GLB,, | Performed by: FAMILY MEDICINE

## 2022-08-25 PROCEDURE — 71046 X-RAY EXAM CHEST 2 VIEWS: CPT | Mod: 26,,, | Performed by: RADIOLOGY

## 2022-08-25 PROCEDURE — U0002 COVID-19 LAB TEST NON-CDC: HCPCS | Mod: QW,S$GLB,, | Performed by: FAMILY MEDICINE

## 2022-08-25 PROCEDURE — 1159F PR MEDICATION LIST DOCUMENTED IN MEDICAL RECORD: ICD-10-PCS | Mod: CPTII,S$GLB,, | Performed by: FAMILY MEDICINE

## 2022-08-25 PROCEDURE — 1101F PT FALLS ASSESS-DOCD LE1/YR: CPT | Mod: CPTII,S$GLB,, | Performed by: FAMILY MEDICINE

## 2022-08-25 PROCEDURE — 71046 XR CHEST PA AND LATERAL: ICD-10-PCS | Mod: 26,,, | Performed by: RADIOLOGY

## 2022-08-25 PROCEDURE — 99999 PR PBB SHADOW E&M-EST. PATIENT-LVL IV: CPT | Mod: PBBFAC,,, | Performed by: FAMILY MEDICINE

## 2022-08-25 RX ORDER — ALBUTEROL SULFATE 90 UG/1
2 AEROSOL, METERED RESPIRATORY (INHALATION) EVERY 6 HOURS PRN
Qty: 6.7 G | Refills: 2 | Status: SHIPPED | OUTPATIENT
Start: 2022-08-25 | End: 2023-04-05

## 2022-08-25 NOTE — PROGRESS NOTES
[facility] - Clinic Note    Subjective      Ms. Licea is a 67 y.o. female who presents to clinic with complaints of shortness of breath.     Reports shortness of breath for the past 4 days.   saw Dr. Holm 2 days ago and was started on zpak and medrol dose pack.   Albuterol inhaler is  therefore has not been taking it.   Admits to congestion, body aches, dry non-productive cough, and shortness of breath.    [facility] Pham has a past medical history of Anemia, Fatty liver (), Former smoker (), Osteoarthritis (), Pulmonary nodules (2019), and Ulcer of abdomen wall ().   PSX Pham has a past surgical history that includes Upper gastrointestinal endoscopy (); Colonoscopy (~); Epidural steroid injection into lumbar spine (N/A, 10/12/2018); Epidural steroid injection into lumbar spine (N/A, 2019);  section (, , ); Hysterectomy (); Appendectomy (); Laparoscopic cholecystectomy (N/A, 2020); Colonoscopy (N/A, 2020); Esophagogastroduodenoscopy (N/A, 2020); Diagnostic laparoscopy (N/A, 2021); and Laparoscopic lysis of adhesions (N/A, 2021).    Pham's family history includes Aneurysm in her father; Arthritis in her mother and sister; Brain Hemorrhage in her father; Heart disease in her mother; Hypertension in her father and sister; Obesity in her sister; Osteoarthritis in her sister; Osteoporosis in her mother; Ovarian cancer in her mother; Stroke (age of onset: 50) in her father.    Pham reports that she quit smoking about 12 years ago. She has a 40.00 pack-year smoking history. She has never used smokeless tobacco. She reports previous alcohol use. She reports previous drug use. Drug: Marijuana.   ALG Pham has No Known Allergies.   ABIMAEL Nath has a current medication list which includes the following prescription(s): atorvastatin, azithromycin, constulose, diclofenac sodium, meloxicam, methylprednisolone,  "ondansetron, ondansetron, pantoprazole, promethazine, vitamin d, albuterol, and nirmatrelvir-ritonavir.     Review of Systems   Constitutional: Negative for activity change, appetite change, chills, fatigue and fever.   HENT: Positive for congestion. Negative for ear discharge, ear pain, postnasal drip, rhinorrhea, sinus pressure, sinus pain and sore throat.    Eyes: Negative for visual disturbance.   Respiratory: Positive for cough and shortness of breath.    Cardiovascular: Negative for chest pain, palpitations and leg swelling.   Gastrointestinal: Negative for abdominal pain, nausea and vomiting.   Musculoskeletal: Positive for myalgias.   Skin: Negative for wound.   Neurological: Negative for dizziness and headaches.   Psychiatric/Behavioral: Negative for confusion.     Objective     /62 (BP Location: Left arm, Patient Position: Sitting, BP Method: Medium (Manual))   Pulse 69   Temp 98.6 °F (37 °C)   Ht 5' 4" (1.626 m)   Wt 75.3 kg (166 lb)   LMP  (LMP Unknown)   SpO2 98%   BMI 28.49 kg/m²     Physical Exam   Constitutional: She appears well-developed and well-nourished.  Non-toxic appearance. She does not appear ill. No distress.   HENT:   Head: Normocephalic and atraumatic.   Eyes: Right eye exhibits no discharge. Left eye exhibits no discharge.   Cardiovascular: Normal rate, regular rhythm, normal heart sounds and normal pulses. Exam reveals no gallop and no friction rub.   No murmur heard.Pulmonary:      Effort: Pulmonary effort is normal. No respiratory distress.      Breath sounds: No wheezing, rhonchi or rales.      Comments: Decreased breath sounds in the lower lobes    Abdominal: Normal appearance.   Musculoskeletal:      Cervical back: Neck supple.   Lymphadenopathy:     She has no cervical adenopathy.   Neurological: She is alert.   Skin: Skin is warm and dry. Capillary refill takes less than 2 seconds. She is not diaphoretic.   Psychiatric: Her behavior is normal. Mood, judgment and " thought content normal.   Vitals reviewed.     Assessment/Plan     Pham was seen today for follow-up and shortness of breath.    Diagnoses and all orders for this visit:    Lab test positive for detection of COVID-19 virus  -     nirmatrelvir-ritonavir 300 mg (150 mg x 2)-100 mg copackaged tablets (EUA); Take 3 tablets by mouth 2 (two) times daily for 5 days. Each dose contains 2 nirmatrelvir (pink tablets) and 1 ritonavir (white tablet). Take all 3 tablets together    Shortness of breath  -     POCT COVID-19 Rapid Screening  -     X-Ray Chest PA And Lateral; Future  -     albuterol (VENTOLIN HFA) 90 mcg/actuation inhaler; Inhale 2 puffs into the lungs every 6 (six) hours as needed for Wheezing or Shortness of Breath. Rescue    Cough  -     POCT COVID-19 Rapid Screening  -     X-Ray Chest PA And Lateral; Future      -covid-19 rapid test positive.   -finish zpak and medrol dose pack. paxlovid sent in.   -Obtain CXR and treat as indicated.   -recommended rest, hydration, albuterol TID x 5 days, and vitamin C, D3, and zinc.    Future Appointments   Date Time Provider Department Center   8/31/2022 12:30 PM [facility], LABORATORY [facility] LAB [facility]   9/2/2022  2:20 PM Mayra Bower MD [facility] HEM ON O at [facility]   9/8/2022  1:15 PM [facility] CT1 [facility] CT [facility]   10/6/2022  1:00 PM Geo Vaughan MD [facility] ORTHO [facility]   11/25/2022  8:30 AM Misael Holm MD [facility]       Deven Emerson MD  Family Medicine  [facility]

## 2022-08-26 ENCOUNTER — PATIENT MESSAGE (OUTPATIENT)
Dept: HEMATOLOGY/ONCOLOGY | Facility: CLINIC | Age: 67
End: 2022-08-26
Payer: MEDICARE

## 2022-08-29 ENCOUNTER — HOSPITAL ENCOUNTER (EMERGENCY)
Facility: HOSPITAL | Age: 67
Discharge: HOME | End: 2022-08-29
Attending: EMERGENCY MEDICINE
Payer: COMMERCIAL

## 2022-08-29 ENCOUNTER — NURSE TRIAGE (OUTPATIENT)
Dept: ADMINISTRATIVE | Facility: CLINIC | Age: 67
End: 2022-08-29
Payer: MEDICARE

## 2022-08-29 ENCOUNTER — PATIENT MESSAGE (OUTPATIENT)
Dept: SURGERY | Facility: CLINIC | Age: 67
End: 2022-08-29
Payer: MEDICARE

## 2022-08-29 VITALS
DIASTOLIC BLOOD PRESSURE: 73 MMHG | RESPIRATION RATE: 22 BRPM | SYSTOLIC BLOOD PRESSURE: 111 MMHG | WEIGHT: 160 LBS | HEIGHT: 64 IN | BODY MASS INDEX: 27.31 KG/M2 | TEMPERATURE: 98 F | HEART RATE: 66 BPM | OXYGEN SATURATION: 98 %

## 2022-08-29 DIAGNOSIS — R06.02 SHORTNESS OF BREATH: ICD-10-CM

## 2022-08-29 DIAGNOSIS — U07.1 COVID-19: Primary | ICD-10-CM

## 2022-08-29 DIAGNOSIS — R06.02 SOB (SHORTNESS OF BREATH): ICD-10-CM

## 2022-08-29 LAB
ALBUMIN SERPL BCP-MCNC: 4.3 G/DL (ref 3.5–5.2)
ALP SERPL-CCNC: 68 U/L (ref 55–135)
ALT SERPL W/O P-5'-P-CCNC: 9 U/L (ref 10–44)
ANION GAP SERPL CALC-SCNC: 19 MMOL/L (ref 8–16)
AST SERPL-CCNC: 13 U/L (ref 10–40)
BASOPHILS # BLD AUTO: 0.02 K/UL (ref 0–0.2)
BASOPHILS NFR BLD: 0.2 % (ref 0–1.9)
BILIRUB SERPL-MCNC: 0.4 MG/DL (ref 0.1–1)
BNP SERPL-MCNC: 20 PG/ML (ref 0–99)
BUN SERPL-MCNC: 14 MG/DL (ref 8–23)
CALCIUM SERPL-MCNC: 9.2 MG/DL (ref 8.7–10.5)
CHLORIDE SERPL-SCNC: 100 MMOL/L (ref 95–110)
CO2 SERPL-SCNC: 21 MMOL/L (ref 23–29)
CREAT SERPL-MCNC: 0.8 MG/DL (ref 0.5–1.4)
DIFFERENTIAL METHOD: ABNORMAL
EOSINOPHIL # BLD AUTO: 0 K/UL (ref 0–0.5)
EOSINOPHIL NFR BLD: 0.1 % (ref 0–8)
ERYTHROCYTE [DISTWIDTH] IN BLOOD BY AUTOMATED COUNT: 16 % (ref 11.5–14.5)
EST. GFR  (NO RACE VARIABLE): >60 ML/MIN/1.73 M^2
GLUCOSE SERPL-MCNC: 80 MG/DL (ref 70–110)
HCT VFR BLD AUTO: 42.6 % (ref 37–48.5)
HGB BLD-MCNC: 14.1 G/DL (ref 12–16)
IMM GRANULOCYTES # BLD AUTO: 0.08 K/UL (ref 0–0.04)
IMM GRANULOCYTES NFR BLD AUTO: 0.8 % (ref 0–0.5)
LYMPHOCYTES # BLD AUTO: 3.2 K/UL (ref 1–4.8)
LYMPHOCYTES NFR BLD: 30.8 % (ref 18–48)
MCH RBC QN AUTO: 28.5 PG (ref 27–31)
MCHC RBC AUTO-ENTMCNC: 33.1 G/DL (ref 32–36)
MCV RBC AUTO: 86 FL (ref 82–98)
MONOCYTES # BLD AUTO: 0.9 K/UL (ref 0.3–1)
MONOCYTES NFR BLD: 9.1 % (ref 4–15)
NEUTROPHILS # BLD AUTO: 6.1 K/UL (ref 1.8–7.7)
NEUTROPHILS NFR BLD: 59 % (ref 38–73)
NRBC BLD-RTO: 0 /100 WBC
PLATELET # BLD AUTO: 263 K/UL (ref 150–450)
PMV BLD AUTO: 9.9 FL (ref 9.2–12.9)
POTASSIUM SERPL-SCNC: 3 MMOL/L (ref 3.5–5.1)
PROT SERPL-MCNC: 7.2 G/DL (ref 6–8.4)
RBC # BLD AUTO: 4.95 M/UL (ref 4–5.4)
SODIUM SERPL-SCNC: 140 MMOL/L (ref 136–145)
WBC # BLD AUTO: 10.26 K/UL (ref 3.9–12.7)

## 2022-08-29 PROCEDURE — 71250 CT CHEST WITHOUT CONTRAST: ICD-10-PCS | Mod: 26,,, | Performed by: RADIOLOGY

## 2022-08-29 PROCEDURE — 93010 ELECTROCARDIOGRAM REPORT: CPT | Mod: ,,, | Performed by: INTERNAL MEDICINE

## 2022-08-29 PROCEDURE — 93010 EKG 12-LEAD: ICD-10-PCS | Mod: ,,, | Performed by: INTERNAL MEDICINE

## 2022-08-29 PROCEDURE — 71250 CT THORAX DX C-: CPT | Mod: TC

## 2022-08-29 PROCEDURE — 99285 EMERGENCY DEPT VISIT HI MDM: CPT | Mod: 25

## 2022-08-29 PROCEDURE — 80053 COMPREHEN METABOLIC PANEL: CPT | Performed by: CLINICAL NURSE SPECIALIST

## 2022-08-29 PROCEDURE — 85025 COMPLETE CBC W/AUTO DIFF WBC: CPT | Performed by: CLINICAL NURSE SPECIALIST

## 2022-08-29 PROCEDURE — 71250 CT THORAX DX C-: CPT | Mod: 26,,, | Performed by: RADIOLOGY

## 2022-08-29 PROCEDURE — 83880 ASSAY OF NATRIURETIC PEPTIDE: CPT | Performed by: CLINICAL NURSE SPECIALIST

## 2022-08-29 PROCEDURE — 93005 ELECTROCARDIOGRAM TRACING: CPT

## 2022-08-29 RX ORDER — DEXTROMETHORPHAN HYDROBROMIDE, GUAIFENESIN, PHENYLEPHRINE HYDROCHLORIDE 17.5; 400; 1 MG/1; MG/1; MG/1
1 TABLET ORAL 2 TIMES DAILY PRN
Qty: 20 TABLET | Refills: 1 | Status: SHIPPED | OUTPATIENT
Start: 2022-08-29 | End: 2022-10-12

## 2022-08-29 NOTE — ED TRIAGE NOTES
Patient arrives to triage reporting shortness of breath. Patient's spo2 is 100% Resp are 18 and non labored. Patient is afebrile. No coughing while in triage. Patient is on paxlovid and she completed the azithromycin and prednisone .

## 2022-08-29 NOTE — ED PROVIDER NOTES
Encounter Date: 2022       History     Chief Complaint   Patient presents with    COVID-19 Concerns    Shortness of Breath     Patient reports feeling like she isn't getting adequate oxygen. Diagnosed with Covid 4 days ago.Sp02 is 100% on room air.      67-year-old white female presents emergency room with complaints of shortness of breath.  Patient reports she was diagnosed with COVID-19 on  and treated with paxlovid.  Prior to diagnosis of COVID 19 on  patient was treated with azithromycin and prednisone.  Patient is currently saturating 100% on room air.  Respirations are increased and slightly labored.  Patient appears anxious.  Breath sounds decreased bilaterally and mildly coarse in the bases.    Review of patient's allergies indicates:  No Known Allergies  Past Medical History:   Diagnosis Date    Anemia     Fatty liver     Former smoker     Osteoarthritis 2010    Pulmonary nodules 2019    Repeat CT in 6 mo    Ulcer of abdomen wall 2016     Past Surgical History:   Procedure Laterality Date    APPENDECTOMY  1993     SECTION  , , 1983    x3    COLONOSCOPY  ~    [facility]: normal findings per patient report    COLONOSCOPY N/A 2020    Procedure: COLONOSCOPY;  Surgeon: Misael Holm MD;  Location: [facility] ENDO;  Service: General;  Laterality: N/A;    DIAGNOSTIC LAPAROSCOPY N/A 2021    Procedure: LAPAROSCOPY, DIAGNOSTIC;  Surgeon: Misael Holm MD;  Location: [facility] OR;  Service: General;  Laterality: N/A;    EPIDURAL STEROID INJECTION INTO LUMBAR SPINE N/A 10/12/2018    Procedure: Injection-steroid-epidural-lumbar;  Surgeon: Kal Johnson MD;  Location: [facility] OR;  Service: Pain Management;  Laterality: N/A;  L5-S1    EPIDURAL STEROID INJECTION INTO LUMBAR SPINE N/A 2019    Procedure: Injection-steroid-epidural-lumbar L5-S1;  Surgeon: Kal Johnson MD;  Location: [facility] OR;  Service: Pain Management;  Laterality: N/A;    ESOPHAGOGASTRODUODENOSCOPY N/A  2020    Procedure: ESOPHAGOGASTRODUODENOSCOPY (EGD);  Surgeon: Misael Holm MD;  Location: [facility] ENDO;  Service: General;  Laterality: N/A;    HYSTERECTOMY  1993    one ovary conserved    LAPAROSCOPIC CHOLECYSTECTOMY N/A 2020    Procedure: CHOLECYSTECTOMY, LAPAROSCOPIC;  Surgeon: Govind Frey MD;  Location: [facility] OR;  Service: General;  Laterality: N/A;    LAPAROSCOPIC LYSIS OF ADHESIONS N/A 2021    Procedure: LYSIS, ADHESIONS, LAPAROSCOPIC;  Surgeon: Misael Holm MD;  Location: [facility] OR;  Service: General;  Laterality: N/A;    UPPER GASTROINTESTINAL ENDOSCOPY  2016     Family History   Problem Relation Age of Onset    Ovarian cancer Mother     Heart disease Mother     Osteoporosis Mother     Arthritis Mother     Hypertension Father     Stroke Father 50    Brain Hemorrhage Father     Aneurysm Father     Osteoarthritis Sister     Arthritis Sister     Hypertension Sister     Obesity Sister     Breast cancer Neg Hx     Colon cancer Neg Hx     Colon polyps Neg Hx     Crohn's disease Neg Hx     Ulcerative colitis Neg Hx      Social History     Tobacco Use    Smoking status: Former     Packs/day: 1.00     Years: 40.00     Pack years: 40.00     Types: Cigarettes     Quit date: 2009     Years since quittin.8    Smokeless tobacco: Never    Tobacco comments:     quit 2009   Substance Use Topics    Alcohol use: Not Currently     Comment: Not often - one glass of wine every now and then    Drug use: Not Currently     Types: Marijuana     Comment: in 1970s     Review of Systems   Constitutional:  Positive for activity change, appetite change and fatigue.   Respiratory:  Positive for shortness of breath.    All other systems reviewed and are negative.    Physical Exam     Initial Vitals [22 1041]   BP Pulse Resp Temp SpO2   136/87 77 18 97.9 °F (36.6 °C) 100 %      MAP       --         Physical Exam    Nursing note and vitals reviewed.  Pulmonary/Chest: She has no wheezes.    Increased respiratory rate of 22 breaths per minute.  Decreased breath sounds bilaterally, coarse lung sounds to bilateral bases.   Musculoskeletal:         General: Normal range of motion.     Neurological: She is alert and oriented to person, place, and time.   Skin: Skin is warm and dry. Capillary refill takes less than 2 seconds.   Psychiatric:   Patient appears anxious       ED Course   Procedures  Labs Reviewed   CBC W/ AUTO DIFFERENTIAL - Abnormal; Notable for the following components:       Result Value    RDW 16.0 (*)     Immature Granulocytes 0.8 (*)     Immature Grans (Abs) 0.08 (*)     All other components within normal limits   COMPREHENSIVE METABOLIC PANEL - Abnormal; Notable for the following components:    Potassium 3.0 (*)     CO2 21 (*)     ALT 9 (*)     Anion Gap 19 (*)     All other components within normal limits   B-TYPE NATRIURETIC PEPTIDE     EKG Readings: (Independently Interpreted)   Rhythm: Normal Sinus Rhythm. Heart Rate: 61. Axis: Normal.   ECG Results              EKG 12-lead (In process)  Result time 08/29/22 13:35:55      In process by Interface, Lab In [facility] (08/29/22 13:35:55)                   Narrative:    Test Reason : R06.02,    Vent. Rate : 061 BPM     Atrial Rate : 061 BPM     P-R Int : 138 ms          QRS Dur : 090 ms      QT Int : 430 ms       P-R-T Axes : 060 052 040 degrees     QTc Int : 432 ms    Normal sinus rhythm  Normal ECG  When compared with ECG of 27-JUN-2022 06:59,  Vent. rate has decreased BY  38 BPM    Referred By: [email or web address]   SELF           Confirmed By:                                   Imaging Results              CT Chest Without Contrast (Final result)  Result time 08/29/22 11:56:26      Final result by Alan Montenegro MD (08/29/22 11:56:26)                   Impression:      Small bilateral pulmonary nodules.  Follow-up per Fleischner guidelines.  For multiple solid nodules with any 6 mm or greater, Fleischner Society guidelines recommend follow up  with non-contrast chest CT at 3-6 months and 18-24 months after discovery.      Electronically signed by: Alan Lan  Date:    08/29/2022  Time:    11:56               Narrative:    EXAMINATION:  CT CHEST WITHOUT CONTRAST    CLINICAL HISTORY:  Respiratory illness, nondiagnostic xray;    TECHNIQUE:  Low dose axial images, sagittal and coronal reformations were obtained from the thoracic inlet to the lung bases. Contrast was not administered.    COMPARISON:  02/25/2022.    FINDINGS:  There are small pulmonary nodules ranging in size from 3-8 mm.  No focal consolidation.  No pleural pericardial effusions.  No suspicious endobronchial lesion.  No significant axillary or intrathoracic lymphadenopathy.    Limited evaluation of the upper abdomen demonstrates a small hiatal hernia.  Previous cholecystectomy.                                       Medications - No data to display  Medical Decision Making:   Initial Assessment:   67 year-old white female presents emergency room with complaints of shortness of breath.  Patient reports she was diagnosed with COVID-19 on 08/25 and treated with paxlovid.  Patient states she lives alone and has been caring for herself while sick with COVID.  Prior to diagnosis of COVID 19 on 08/25 patient was treated with azithromycin and prednisone.  Patient is currently saturating 100% on room air.  Respirations are increased and slightly labored.  Patient appears anxious.  Breath sounds decreased bilaterally and mildly coarse in the bases.  Patient is a former smoker.    Differential Diagnosis:   Pulmonary embolism  Anxiety  Pneumonia  Clinical Tests:   Lab Tests: Ordered  Radiological Study: Ordered  Medical Tests: Ordered  ED Management:  Will order CBC, CMP, BNP, EKG, and CT chest  Patient advised of incidental CT finding of pulmonary nodules, and patient replied she was already aware and receives CT of chest every 3-6 months as recommended by the radiologist.                    Clinical  Impression:   Final diagnoses:  [R06.02] Shortness of breath  [U07.1] COVID-19 (Primary)  [R06.02] SOB (shortness of breath)        ED Disposition Condition    Discharge Stable          ED Prescriptions       Medication Sig Dispense Start Date End Date Auth. Provider    phenylephrine-DM-guaiFENesin (DECONEX DMX) 10-17.5-400 mg Tab Take 1 tablet by mouth 2 (two) times daily as needed (cough congestion). 20 tablet 8/29/2022 -- Joy Holbrook NP          Follow-up Information       Follow up With Specialties Details Why Contact Info    Deven Emerson MD Family Medicine  As needed [address] [facility] [place] [address] [phone]               Joy Holbrook NP  08/29/22 [id number]       Joy Holbrook NP  08/29/22 [id number]

## 2022-08-29 NOTE — DISCHARGE INSTRUCTIONS
Increase clear liquids.  Utilize inhaler as ordered for shortness of breath.  Follow-up primary care as needed.

## 2022-08-29 NOTE — TELEPHONE ENCOUNTER
[facility] call  Called pt back, pt noticeably winded/SOB, offered triage multiple times and pt declined, stated she spoke with nurse at her doctors office and would like to wait till she heart from them. Offered triage again and declined. Advised pt if she decides she would like triage to please reach back out to us. verbalized understanding. Will route message to PCP.  Reason for Disposition   Nursing judgment    Protocols used: Information Only Call - No Triage-A-[place]

## 2022-08-31 DIAGNOSIS — Z78.0 MENOPAUSE: ICD-10-CM

## 2022-09-01 ENCOUNTER — PATIENT MESSAGE (OUTPATIENT)
Dept: FAMILY MEDICINE | Facility: CLINIC | Age: 67
End: 2022-09-01
Payer: MEDICARE

## 2022-09-01 ENCOUNTER — PATIENT MESSAGE (OUTPATIENT)
Dept: HEMATOLOGY/ONCOLOGY | Facility: CLINIC | Age: 67
End: 2022-09-01
Payer: MEDICARE

## 2022-09-05 ENCOUNTER — LAB VISIT (OUTPATIENT)
Dept: LAB | Facility: HOSPITAL | Age: 67
End: 2022-09-05
Attending: INTERNAL MEDICINE
Payer: COMMERCIAL

## 2022-09-05 DIAGNOSIS — D50.8 IRON DEFICIENCY ANEMIA SECONDARY TO INADEQUATE DIETARY IRON INTAKE: ICD-10-CM

## 2022-09-05 DIAGNOSIS — E53.8 B12 DEFICIENCY: ICD-10-CM

## 2022-09-05 LAB
ALBUMIN SERPL BCP-MCNC: 3.9 G/DL (ref 3.5–5.2)
ALP SERPL-CCNC: 65 U/L (ref 55–135)
ALT SERPL W/O P-5'-P-CCNC: 13 U/L (ref 10–44)
ANION GAP SERPL CALC-SCNC: 15 MMOL/L (ref 8–16)
AST SERPL-CCNC: 14 U/L (ref 10–40)
BASOPHILS # BLD AUTO: 0.06 K/UL (ref 0–0.2)
BASOPHILS NFR BLD: 0.6 % (ref 0–1.9)
BILIRUB SERPL-MCNC: 0.2 MG/DL (ref 0.1–1)
BUN SERPL-MCNC: 9 MG/DL (ref 8–23)
CALCIUM SERPL-MCNC: 9.4 MG/DL (ref 8.7–10.5)
CHLORIDE SERPL-SCNC: 103 MMOL/L (ref 95–110)
CO2 SERPL-SCNC: 24 MMOL/L (ref 23–29)
CREAT SERPL-MCNC: 0.7 MG/DL (ref 0.5–1.4)
DIFFERENTIAL METHOD: ABNORMAL
EOSINOPHIL # BLD AUTO: 0.1 K/UL (ref 0–0.5)
EOSINOPHIL NFR BLD: 1.1 % (ref 0–8)
ERYTHROCYTE [DISTWIDTH] IN BLOOD BY AUTOMATED COUNT: 15.9 % (ref 11.5–14.5)
EST. GFR  (NO RACE VARIABLE): >60 ML/MIN/1.73 M^2
FERRITIN SERPL-MCNC: 249 NG/ML (ref 20–300)
GLUCOSE SERPL-MCNC: 128 MG/DL (ref 70–110)
HCT VFR BLD AUTO: 40.4 % (ref 37–48.5)
HGB BLD-MCNC: 13.2 G/DL (ref 12–16)
IMM GRANULOCYTES # BLD AUTO: 0.05 K/UL (ref 0–0.04)
IMM GRANULOCYTES NFR BLD AUTO: 0.5 % (ref 0–0.5)
IRON SERPL-MCNC: 92 UG/DL (ref 30–160)
LYMPHOCYTES # BLD AUTO: 2.5 K/UL (ref 1–4.8)
LYMPHOCYTES NFR BLD: 26.3 % (ref 18–48)
MCH RBC QN AUTO: 28.5 PG (ref 27–31)
MCHC RBC AUTO-ENTMCNC: 32.7 G/DL (ref 32–36)
MCV RBC AUTO: 87 FL (ref 82–98)
MONOCYTES # BLD AUTO: 0.8 K/UL (ref 0.3–1)
MONOCYTES NFR BLD: 7.9 % (ref 4–15)
NEUTROPHILS # BLD AUTO: 6.1 K/UL (ref 1.8–7.7)
NEUTROPHILS NFR BLD: 63.6 % (ref 38–73)
NRBC BLD-RTO: 0 /100 WBC
PLATELET # BLD AUTO: 276 K/UL (ref 150–450)
PMV BLD AUTO: 9.5 FL (ref 9.2–12.9)
POTASSIUM SERPL-SCNC: 3.7 MMOL/L (ref 3.5–5.1)
PROT SERPL-MCNC: 6.9 G/DL (ref 6–8.4)
RBC # BLD AUTO: 4.63 M/UL (ref 4–5.4)
SATURATED IRON: 27 % (ref 20–50)
SODIUM SERPL-SCNC: 142 MMOL/L (ref 136–145)
TOTAL IRON BINDING CAPACITY: 340 UG/DL (ref 250–450)
TRANSFERRIN SERPL-MCNC: 230 MG/DL (ref 200–375)
VIT B12 SERPL-MCNC: 380 PG/ML (ref 210–950)
WBC # BLD AUTO: 9.61 K/UL (ref 3.9–12.7)

## 2022-09-05 PROCEDURE — 80053 COMPREHEN METABOLIC PANEL: CPT | Performed by: INTERNAL MEDICINE

## 2022-09-05 PROCEDURE — 36415 COLL VENOUS BLD VENIPUNCTURE: CPT | Performed by: INTERNAL MEDICINE

## 2022-09-05 PROCEDURE — 84466 ASSAY OF TRANSFERRIN: CPT | Performed by: INTERNAL MEDICINE

## 2022-09-05 PROCEDURE — 85025 COMPLETE CBC W/AUTO DIFF WBC: CPT | Performed by: INTERNAL MEDICINE

## 2022-09-05 PROCEDURE — 82728 ASSAY OF FERRITIN: CPT | Performed by: INTERNAL MEDICINE

## 2022-09-05 PROCEDURE — 82607 VITAMIN B-12: CPT | Performed by: INTERNAL MEDICINE

## 2022-09-07 ENCOUNTER — OFFICE VISIT (OUTPATIENT)
Dept: HEMATOLOGY/ONCOLOGY | Facility: CLINIC | Age: 67
End: 2022-09-07
Payer: COMMERCIAL

## 2022-09-07 ENCOUNTER — PATIENT MESSAGE (OUTPATIENT)
Dept: FAMILY MEDICINE | Facility: CLINIC | Age: 67
End: 2022-09-07
Payer: MEDICARE

## 2022-09-07 ENCOUNTER — PATIENT MESSAGE (OUTPATIENT)
Dept: HEMATOLOGY/ONCOLOGY | Facility: CLINIC | Age: 67
End: 2022-09-07

## 2022-09-07 DIAGNOSIS — D50.8 IRON DEFICIENCY ANEMIA SECONDARY TO INADEQUATE DIETARY IRON INTAKE: Primary | ICD-10-CM

## 2022-09-07 PROCEDURE — 1159F PR MEDICATION LIST DOCUMENTED IN MEDICAL RECORD: ICD-10-PCS | Mod: CPTII,95,, | Performed by: NURSE PRACTITIONER

## 2022-09-07 PROCEDURE — 1160F RVW MEDS BY RX/DR IN RCRD: CPT | Mod: CPTII,95,, | Performed by: NURSE PRACTITIONER

## 2022-09-07 PROCEDURE — 99214 OFFICE O/P EST MOD 30 MIN: CPT | Mod: 95,,, | Performed by: NURSE PRACTITIONER

## 2022-09-07 PROCEDURE — 1159F MED LIST DOCD IN RCRD: CPT | Mod: CPTII,95,, | Performed by: NURSE PRACTITIONER

## 2022-09-07 PROCEDURE — 99214 PR OFFICE/OUTPT VISIT, EST, LEVL IV, 30-39 MIN: ICD-10-PCS | Mod: 95,,, | Performed by: NURSE PRACTITIONER

## 2022-09-07 PROCEDURE — 1160F PR REVIEW ALL MEDS BY PRESCRIBER/CLIN PHARMACIST DOCUMENTED: ICD-10-PCS | Mod: CPTII,95,, | Performed by: NURSE PRACTITIONER

## 2022-09-07 NOTE — TELEPHONE ENCOUNTER
It is not a hard rule but is the general recommendation to wait 3 months to promote the best immune response.

## 2022-09-07 NOTE — PROGRESS NOTES
Subjective:    The patient location is: home  Visit type: Virtual visit with synchronous audio and video  Face-to-face or time spent with patient on the encounter:20min  Total time spent on and for  this encounter which includes non face-to-face time preparing to see patient, review of tests, obtaining and or reviewing separately obtained records documenting clinical information in the electronic or other health records, independently interpreting results which is not separately reported ,and communicating results to the patient/family/caregiver and in care coordination and treatment planning/communicating with pharmacy for prescriptions/addressing social needs/arranging follow-up and or referrals :25 min    Each patient I provide medical services by telemedicine is:  (1) informed of the relationship between the physician and patient and the respective role of any other health care provider with respect to management of the patient; and (2) notified that he or she may decline to receive medical services by telemedicine and may withdraw from such care at any time.  This is a video visit therefore some elements of the physical exam such as vital signs, heart sounds are breath sounds are not included and may be included if found in recent clinic notes of other providers assessing same patient. Any symptoms or signs that were visualized were stated by the patient may be included in this note.     Patient ID: Pham Licea is a 67 y.o. female.    Chief Complaint:      sept 2020 had gall bladder surgery, thern she had a bowel obstruction, no surgery then, had 2 feet of bowels removed after a second bowel obstruction  Esophageal ulcers and dudenal ulcers- with bleeding in 2017  Recd blood in [facility] this weekend, pt went to ED because she was shaky and week and couldn't think was found to be anemic in ed   pt readmitted 3/22 had repeat bowel obstruction   Seen at emergency room May 25, 2022 with CT scan of abdomen  which shows partial obstruction patient having significant abdominal pain.  Discomfort belching similar to her prior obstructive symptoms    2022:  She returns today follow-up and is without complaint.  She is feeling well    Past Medical History:   Diagnosis Date    Anemia     Fatty liver     Former smoker     Osteoarthritis 2010    Pulmonary nodules 2019    Repeat CT in 6 mo    Ulcer of abdomen wall 2016     Past Surgical History:   Procedure Laterality Date    APPENDECTOMY  1993     SECTION  , , 1983    x3    COLONOSCOPY  ~2014    [facility]: normal findings per patient report    COLONOSCOPY N/A 2020    Procedure: COLONOSCOPY;  Surgeon: Misael Holm MD;  Location: [facility] ENDO;  Service: General;  Laterality: N/A;    DIAGNOSTIC LAPAROSCOPY N/A 2021    Procedure: LAPAROSCOPY, DIAGNOSTIC;  Surgeon: Misael Holm MD;  Location: [facility] OR;  Service: General;  Laterality: N/A;    EPIDURAL STEROID INJECTION INTO LUMBAR SPINE N/A 10/12/2018    Procedure: Injection-steroid-epidural-lumbar;  Surgeon: Kal Johnson MD;  Location: [facility] OR;  Service: Pain Management;  Laterality: N/A;  L5-S1    EPIDURAL STEROID INJECTION INTO LUMBAR SPINE N/A 2019    Procedure: Injection-steroid-epidural-lumbar L5-S1;  Surgeon: Kal Johnson MD;  Location: [facility] OR;  Service: Pain Management;  Laterality: N/A;    ESOPHAGOGASTRODUODENOSCOPY N/A 2020    Procedure: ESOPHAGOGASTRODUODENOSCOPY (EGD);  Surgeon: Misael Holm MD;  Location: [facility];  Service: General;  Laterality: N/A;    HYSTERECTOMY      one ovary conserved    LAPAROSCOPIC CHOLECYSTECTOMY N/A 2020    Procedure: CHOLECYSTECTOMY, LAPAROSCOPIC;  Surgeon: Govind Frey MD;  Location: [facility] OR;  Service: General;  Laterality: N/A;    LAPAROSCOPIC LYSIS OF ADHESIONS N/A 2021    Procedure: LYSIS, ADHESIONS, LAPAROSCOPIC;  Surgeon: Misael Holm MD;  Location: [facility] OR;  Service: General;   Laterality: N/A;    UPPER GASTROINTESTINAL ENDOSCOPY  2016     Family History   Problem Relation Age of Onset    Ovarian cancer Mother     Heart disease Mother     Osteoporosis Mother     Arthritis Mother     Hypertension Father     Stroke Father 50    Brain Hemorrhage Father     Aneurysm Father     Osteoarthritis Sister     Arthritis Sister     Hypertension Sister     Obesity Sister     Breast cancer Neg Hx     Colon cancer Neg Hx     Colon polyps Neg Hx     Crohn's disease Neg Hx     Ulcerative colitis Neg Hx       Social History     Socioeconomic History    Marital status:     Number of children: 4    Highest education level: Some college, no degree   Occupational History    Occupation: sale specialist-    Tobacco Use    Smoking status: Former     Packs/day: 1.00     Years: 40.00     Pack years: 40.00     Types: Cigarettes     Quit date: 2009     Years since quittin.8    Smokeless tobacco: Never    Tobacco comments:     quit    Substance and Sexual Activity    Alcohol use: Not Currently     Comment: Not often - one glass of wine every now and then    Drug use: Not Currently     Types: Marijuana     Comment: in     Sexual activity: Not Currently     Social Determinants of Health     Financial Resource Strain: Low Risk     Difficulty of Paying Living Expenses: Not very hard   Food Insecurity: No Food Insecurity    Worried About Running Out of Food in the Last Year: Never true    Ran Out of Food in the Last Year: Never true   Transportation Needs: No Transportation Needs    Lack of Transportation (Medical): No    Lack of Transportation (Non-Medical): No   Physical Activity: Inactive    Days of Exercise per Week: 0 days    Minutes of Exercise per Session: 0 min   Stress: Stress Concern Present    Feeling of Stress : To some extent   Social Connections: Unknown    Frequency of Communication with Friends and Family: More than three times a week    Frequency of Social Gatherings with Friends  and Family: Once a week    Active Member of Clubs or Organizations: Yes    Attends Club or Organization Meetings: More than 4 times per year    Marital Status:    Housing Stability: Low Risk     Unable to Pay for Housing in the Last Year: No    Number of Places Lived in the Last Year: 2    Unstable Housing in the Last Year: No     Review of patient's allergies indicates:  No Known Allergies    Current Outpatient Medications:     albuterol (VENTOLIN HFA) 90 mcg/actuation inhaler, Inhale 2 puffs into the lungs every 6 (six) hours as needed for Wheezing or Shortness of Breath. Rescue, Disp: 6.7 g, Rfl: 2    atorvastatin (LIPITOR) 10 MG tablet, Take 1 tablet (10 mg total) by mouth once daily., Disp: 90 tablet, Rfl: 3    CONSTULOSE 10 gram/15 mL solution, TAKE 10 ML BY MOUTH   THREE TIMES DAILY, Disp: 948 mL, Rfl: 0    diclofenac sodium (VOLTAREN) 1 % Gel, Apply 2 g topically 2 (two) times daily as needed., Disp: , Rfl:     meloxicam (MOBIC) 15 MG tablet, Take 15 mg by mouth once daily., Disp: , Rfl:     methylPREDNISolone (MEDROL DOSEPACK) 4 mg tablet, use as directed, Disp: 21 each, Rfl: 0    ondansetron (ZOFRAN-ODT) 4 MG TbDL, Take 2 tablets (8 mg total) by mouth every 8 (eight) hours as needed., Disp: 10 tablet, Rfl: 1    ondansetron (ZOFRAN-ODT) 8 MG TbDL, Take 1 tablet (8 mg total) by mouth every 6 (six) hours as needed (nausea or vomiting)., Disp: 30 tablet, Rfl: 1    pantoprazole (PROTONIX) 20 MG tablet, Take 1 tablet (20 mg total) by mouth once daily., Disp: 90 tablet, Rfl: 3    phenylephrine-DM-guaiFENesin (DECONEX DMX) 10-17.5-400 mg Tab, Take 1 tablet by mouth 2 (two) times daily as needed (cough congestion)., Disp: 20 tablet, Rfl: 1    promethazine (PHENERGAN) 25 MG suppository, Place 1 suppository (25 mg total) rectally 3 (three) times daily as needed for Nausea., Disp: 8 suppository, Rfl: 0    vitamin D (VITAMIN D3) 1000 units Tab, Take 3,000 Units by mouth once daily., Disp: , Rfl:     Review of  Systems   Constitutional: Negative.    HENT: Negative.     Eyes: Negative.    Respiratory: Negative.     Cardiovascular: Negative.    Gastrointestinal: Negative.    Genitourinary: Negative.    Musculoskeletal: Negative.    Skin: Negative.    Neurological: Negative.    Endo/Heme/Allergies: Negative.    Psychiatric/Behavioral: Negative.        Physical Exam    Wt Readings from Last 3 Encounters:   08/29/22 72.6 kg (160 lb)   08/25/22 75.3 kg (166 lb)   08/23/22 74.8 kg (165 lb)     Temp Readings from Last 3 Encounters:   08/29/22 97.9 °F (36.6 °C)   08/25/22 98.6 °F (37 °C)   08/19/22 98 °F (36.7 °C) (Oral)     BP Readings from Last 3 Encounters:   08/29/22 111/73   08/25/22 116/62   08/23/22 108/65     Pulse Readings from Last 3 Encounters:   08/29/22 66   08/25/22 69   08/23/22 83     Lab Results   Component Value Date    WBC 9.61 09/05/2022    HGB 13.2 09/05/2022    HCT 40.4 09/05/2022    MCV 87 09/05/2022     09/05/2022       BMP  Lab Results   Component Value Date     09/05/2022    K 3.7 09/05/2022     09/05/2022    CO2 24 09/05/2022    BUN 9 09/05/2022    CREATININE 0.7 09/05/2022    CALCIUM 9.4 09/05/2022    ANIONGAP 15 09/05/2022    ESTGFRAFRICA >60.0 06/27/2022    EGFRNONAA >60.0 06/27/2022     Lab Results   Component Value Date    IRON 92 09/05/2022    TIBC 340 09/05/2022    FERRITIN 249 09/05/2022       Impression:  CT scan abdomen May 25, 2022     1. Multiple dilated loops of small bowel are noted that are fluid-filled left upper quadrant with the suggestion of bowel wall thickening near the level of the umbilicus in the midline and extending into the left dilated loops of bowel.  This raises the concern for vascular injury and or bowel infarction/inflammation possibly associated with obstruction.  Surgical consultation is suggested.  Post-contrast oral and intravenous imaging may be of use for confirmation given that this exam is severely hindered.  2. A 2nd region of concern is noted  within the right hemipelvis where a dilated loop of small bowel is noted without obvious bowel wall thickening.  This region is nonspecific and could relate to colon, small bowel, ileus, or obstruction.  Further evaluation and clinical correlation is necessary     Patient Active Problem List   Diagnosis    Primary osteoarthritis involving multiple joints    Obesity (BMI 30.0-34.9)    Gastroesophageal reflux disease    Right knee pain    Chronic midline low back pain    Acute tear medial meniscus, right, sequela    Primary osteoarthritis of right knee    Lumbar radiculitis    Trigger middle finger of right hand    Nausea    Abdominal pain    Encounter for postoperative care    Intestinal obstruction    Hypokalemia    Constipation    History of cholecystectomy    Right upper quadrant pain    History of hysterectomy    Rectal bleeding    Hemorrhoids    Mitral valve prolapse    Coronary artery calcification    Anemia    SAMPSON (dyspnea on exertion), noted 2010    History of smoking 25-50 pack years, 46 py, quit 2009    Other emphysema    NSAID long-term use, started 2017    Bandemia    Family history of premature CAD    Cardiovascular event risk, ASCVD 10-year risk 4.8%, 2019    Abdominal obesity    Dyslipidemia, baseline LDL-C 112, HDL-C 49    Iron deficiency anemia secondary to inadequate dietary iron intake    Severe anemia    S/p small bowel obstruction    Arm pain, lateral, left        Assessment and Plan   NATHAN: pt had partail bowel removal from opbstructions so anticipate this will be recurrent  -status post IV iron with excellent response  -we will monitor her counts closely  -repeat labs in 3 weeks    B12 deficiency:   -B12 level now low normal  -continue B12 supplement    Patient to continue follow-up of her DJD with PCP  Bowel obs again 3/22 repeat sx and adhesiolysis,

## 2022-09-13 ENCOUNTER — TELEPHONE (OUTPATIENT)
Dept: SURGERY | Facility: CLINIC | Age: 67
End: 2022-09-13
Payer: MEDICARE

## 2022-09-13 ENCOUNTER — PATIENT MESSAGE (OUTPATIENT)
Dept: SURGERY | Facility: CLINIC | Age: 67
End: 2022-09-13

## 2022-09-13 ENCOUNTER — OFFICE VISIT (OUTPATIENT)
Dept: SURGERY | Facility: CLINIC | Age: 67
End: 2022-09-13
Payer: COMMERCIAL

## 2022-09-13 VITALS
OXYGEN SATURATION: 97 % | WEIGHT: 170 LBS | HEIGHT: 64 IN | DIASTOLIC BLOOD PRESSURE: 67 MMHG | HEART RATE: 79 BPM | BODY MASS INDEX: 29.02 KG/M2 | SYSTOLIC BLOOD PRESSURE: 105 MMHG | TEMPERATURE: 98 F

## 2022-09-13 DIAGNOSIS — Z87.19 HISTORY OF SMALL BOWEL OBSTRUCTION: Primary | ICD-10-CM

## 2022-09-13 DIAGNOSIS — R10.9 ABDOMINAL PAIN, UNSPECIFIED ABDOMINAL LOCATION: ICD-10-CM

## 2022-09-13 DIAGNOSIS — K56.609 SBO (SMALL BOWEL OBSTRUCTION): Primary | ICD-10-CM

## 2022-09-13 PROCEDURE — 3008F BODY MASS INDEX DOCD: CPT | Mod: CPTII,S$GLB,, | Performed by: SURGERY

## 2022-09-13 PROCEDURE — 3078F DIAST BP <80 MM HG: CPT | Mod: CPTII,S$GLB,, | Performed by: SURGERY

## 2022-09-13 PROCEDURE — 3288F FALL RISK ASSESSMENT DOCD: CPT | Mod: CPTII,S$GLB,, | Performed by: SURGERY

## 2022-09-13 PROCEDURE — 1159F PR MEDICATION LIST DOCUMENTED IN MEDICAL RECORD: ICD-10-PCS | Mod: CPTII,S$GLB,, | Performed by: SURGERY

## 2022-09-13 PROCEDURE — 1101F PT FALLS ASSESS-DOCD LE1/YR: CPT | Mod: CPTII,S$GLB,, | Performed by: SURGERY

## 2022-09-13 PROCEDURE — 3008F PR BODY MASS INDEX (BMI) DOCUMENTED: ICD-10-PCS | Mod: CPTII,S$GLB,, | Performed by: SURGERY

## 2022-09-13 PROCEDURE — 3074F PR MOST RECENT SYSTOLIC BLOOD PRESSURE < 130 MM HG: ICD-10-PCS | Mod: CPTII,S$GLB,, | Performed by: SURGERY

## 2022-09-13 PROCEDURE — 1101F PR PT FALLS ASSESS DOC 0-1 FALLS W/OUT INJ PAST YR: ICD-10-PCS | Mod: CPTII,S$GLB,, | Performed by: SURGERY

## 2022-09-13 PROCEDURE — 1159F MED LIST DOCD IN RCRD: CPT | Mod: CPTII,S$GLB,, | Performed by: SURGERY

## 2022-09-13 PROCEDURE — 99999 PR PBB SHADOW E&M-EST. PATIENT-LVL IV: CPT | Mod: PBBFAC,,, | Performed by: SURGERY

## 2022-09-13 PROCEDURE — 99214 OFFICE O/P EST MOD 30 MIN: CPT | Mod: S$GLB,,, | Performed by: SURGERY

## 2022-09-13 PROCEDURE — 3078F PR MOST RECENT DIASTOLIC BLOOD PRESSURE < 80 MM HG: ICD-10-PCS | Mod: CPTII,S$GLB,, | Performed by: SURGERY

## 2022-09-13 PROCEDURE — 3074F SYST BP LT 130 MM HG: CPT | Mod: CPTII,S$GLB,, | Performed by: SURGERY

## 2022-09-13 PROCEDURE — 99214 PR OFFICE/OUTPT VISIT, EST, LEVL IV, 30-39 MIN: ICD-10-PCS | Mod: S$GLB,,, | Performed by: SURGERY

## 2022-09-13 PROCEDURE — 99999 PR PBB SHADOW E&M-EST. PATIENT-LVL IV: ICD-10-PCS | Mod: PBBFAC,,, | Performed by: SURGERY

## 2022-09-13 PROCEDURE — 3288F PR FALLS RISK ASSESSMENT DOCUMENTED: ICD-10-PCS | Mod: CPTII,S$GLB,, | Performed by: SURGERY

## 2022-09-13 RX ORDER — ONDANSETRON HYDROCHLORIDE 8 MG/1
8 TABLET, FILM COATED ORAL EVERY 8 HOURS PRN
Qty: 60 TABLET | Refills: 2 | Status: SHIPPED | OUTPATIENT
Start: 2022-09-13 | End: 2022-09-23

## 2022-09-13 RX ORDER — OXYCODONE AND ACETAMINOPHEN 5; 325 MG/1; MG/1
1 TABLET ORAL EVERY 6 HOURS PRN
Qty: 30 TABLET | Refills: 0 | Status: SHIPPED | OUTPATIENT
Start: 2022-09-13 | End: 2022-09-23

## 2022-09-13 NOTE — PROGRESS NOTES
"  [facility]  Follow-up    Subjective:     Chief Complaint:  Doc I think I am obstructed again.    HPI:  Pham Licea is a 67 y.o. female presents today for follow-up examination.  Patient with history of bowel obstructions.  Adhesive.  Previous history of small-bowel strictures.  She stated last night she started having abdominal cramps.  Nausea vomiting, green emesis.  This morning she feels better however abdominal cramps still present.  She is passing flatus.  Is having bowel movements.  Today compared to yesterday, she feels much better.  She presents today as established    Review of Systems   Constitutional:  Negative for activity change, appetite change, chills and fever.   HENT:  Negative for congestion, dental problem and ear discharge.    Eyes:  Negative for discharge and itching.   Respiratory:  Negative for apnea, choking and chest tightness.    Cardiovascular:  Negative for chest pain and leg swelling.   Gastrointestinal:  Positive for abdominal pain, nausea and vomiting. Negative for abdominal distention, anal bleeding, constipation and diarrhea.   Endocrine: Negative for cold intolerance and heat intolerance.   Genitourinary:  Negative for difficulty urinating and dyspareunia.   Musculoskeletal:  Negative for arthralgias and back pain.   Skin:  Negative for color change and pallor.   Neurological:  Negative for dizziness and facial asymmetry.   Hematological:  Negative for adenopathy. Does not bruise/bleed easily.   Psychiatric/Behavioral:  Negative for agitation and behavioral problems.      Objective:      Vitals:    09/13/22 1257   BP: 105/67   BP Location: Right arm   Patient Position: Sitting   BP Method: Medium (Automatic)   Pulse: 79   Temp: 97.8 °F (36.6 °C)   SpO2: 97%   Weight: 77.1 kg (170 lb)   Height: 5' 4" (1.626 m)     Physical Exam  Constitutional:       General: She is not in acute distress.     Appearance: She is well-developed.   HENT:      Head: " Normocephalic and atraumatic.   Eyes:      Pupils: Pupils are equal, round, and reactive to light.   Neck:      Thyroid: No thyromegaly.   Cardiovascular:      Rate and Rhythm: Normal rate and regular rhythm.   Pulmonary:      Effort: Pulmonary effort is normal.      Breath sounds: Normal breath sounds.   Abdominal:      General: Bowel sounds are normal. There is no distension.      Palpations: Abdomen is soft.      Tenderness: There is no abdominal tenderness.   Musculoskeletal:         General: No deformity. Normal range of motion.      Cervical back: Normal range of motion and neck supple.   Skin:     General: Skin is warm.      Capillary Refill: Capillary refill takes less than 2 seconds.      Findings: No erythema.   Neurological:      Mental Status: She is alert and oriented to person, place, and time.      Cranial Nerves: No cranial nerve deficit.   Psychiatric:         Behavior: Behavior normal.        Assessment:       1. History of small bowel obstruction    2. Abdominal pain, unspecified abdominal location          Plan:   History of small bowel obstruction  -     oxyCODONE-acetaminophen (PERCOCET) 5-325 mg per tablet; Take 1 tablet by mouth every 6 (six) hours as needed for Pain.  Dispense: 30 tablet; Refill: 0  -     ondansetron (ZOFRAN) 8 MG tablet; Take 1 tablet (8 mg total) by mouth every 8 (eight) hours as needed for Nausea.  Dispense: 60 tablet; Refill: 2    Abdominal pain, unspecified abdominal location  -     oxyCODONE-acetaminophen (PERCOCET) 5-325 mg per tablet; Take 1 tablet by mouth every 6 (six) hours as needed for Pain.  Dispense: 30 tablet; Refill: 0  -     ondansetron (ZOFRAN) 8 MG tablet; Take 1 tablet (8 mg total) by mouth every 8 (eight) hours as needed for Nausea.  Dispense: 60 tablet; Refill: 2        Medical Decision Making/Counseling:  History of bowel obstructions.  Abdominal pain.  Nausea vomiting.  Feels better today.  Offer the patient admission.  She declined.  Patient  tolerating liquids well.  Having flatus and bowel movements.  Will recommend patient do Zofran and Phenergan as needed.  Utilize pain medications if needed.  Clear liquid diet today and through the week.  Soft diet the weekend.  Slowly advanced to regular diet over the next 2 weeks.  In the next 2 weeks the patient has troubles, she was notified to call my office and we will directly admit the patient to the hospital for nasogastric tube decompression, CT scan, etcetera.  She voiced understanding common agreement plan of care.    Follow up:  2 weeks.    Patient instructed that best way to communicate with my office staff is for patient to get on the [facility] patient portal to expedite communication and communication issues that may occur.  Patient was given instructions on how to get on the portal.  I encouraged patient to obtain portal access as well.  Ultimately it is up to the patient to obtain access.  Patient voiced understanding.

## 2022-09-13 NOTE — TELEPHONE ENCOUNTER
Returned call. Scheduled appointment today.     ----- Message from Rema Becerra sent at 9/13/2022  8:46 AM CDT -----  Contact: Pt  Type: Needs Medical Advice    Who Called:Pt  Best Call Back Number:[phone]    Additional Information Requesting a call back regarding Pt was calling to let provider know they had another episode last night pt stated they are still experiencing symptoms of a bowel obstruction pt stated to please call when available Thank you  Please Advise-Thank you

## 2022-09-14 RX ORDER — ONDANSETRON 8 MG/1
8 TABLET, ORALLY DISINTEGRATING ORAL EVERY 6 HOURS PRN
Qty: 60 TABLET | Refills: 3 | Status: SHIPPED | OUTPATIENT
Start: 2022-09-14

## 2022-09-19 ENCOUNTER — OFFICE VISIT (OUTPATIENT)
Dept: FAMILY MEDICINE | Facility: CLINIC | Age: 67
End: 2022-09-19
Payer: COMMERCIAL

## 2022-09-19 ENCOUNTER — PATIENT MESSAGE (OUTPATIENT)
Dept: FAMILY MEDICINE | Facility: CLINIC | Age: 67
End: 2022-09-19
Payer: MEDICARE

## 2022-09-19 VITALS
TEMPERATURE: 98 F | HEART RATE: 71 BPM | OXYGEN SATURATION: 97 % | HEIGHT: 64 IN | WEIGHT: 170 LBS | SYSTOLIC BLOOD PRESSURE: 122 MMHG | DIASTOLIC BLOOD PRESSURE: 83 MMHG | BODY MASS INDEX: 29.02 KG/M2 | RESPIRATION RATE: 16 BRPM

## 2022-09-19 DIAGNOSIS — R49.9 CHANGE IN VOICE: ICD-10-CM

## 2022-09-19 DIAGNOSIS — J04.0 LARYNGITIS: ICD-10-CM

## 2022-09-19 DIAGNOSIS — U07.1 COVID-19: Primary | ICD-10-CM

## 2022-09-19 PROCEDURE — 1101F PT FALLS ASSESS-DOCD LE1/YR: CPT | Mod: CPTII,S$GLB,, | Performed by: STUDENT IN AN ORGANIZED HEALTH CARE EDUCATION/TRAINING PROGRAM

## 2022-09-19 PROCEDURE — 99999 PR PBB SHADOW E&M-EST. PATIENT-LVL V: ICD-10-PCS | Mod: PBBFAC,,, | Performed by: STUDENT IN AN ORGANIZED HEALTH CARE EDUCATION/TRAINING PROGRAM

## 2022-09-19 PROCEDURE — 1160F RVW MEDS BY RX/DR IN RCRD: CPT | Mod: CPTII,S$GLB,, | Performed by: STUDENT IN AN ORGANIZED HEALTH CARE EDUCATION/TRAINING PROGRAM

## 2022-09-19 PROCEDURE — 1159F MED LIST DOCD IN RCRD: CPT | Mod: CPTII,S$GLB,, | Performed by: STUDENT IN AN ORGANIZED HEALTH CARE EDUCATION/TRAINING PROGRAM

## 2022-09-19 PROCEDURE — 3288F PR FALLS RISK ASSESSMENT DOCUMENTED: ICD-10-PCS | Mod: CPTII,S$GLB,, | Performed by: STUDENT IN AN ORGANIZED HEALTH CARE EDUCATION/TRAINING PROGRAM

## 2022-09-19 PROCEDURE — 3288F FALL RISK ASSESSMENT DOCD: CPT | Mod: CPTII,S$GLB,, | Performed by: STUDENT IN AN ORGANIZED HEALTH CARE EDUCATION/TRAINING PROGRAM

## 2022-09-19 PROCEDURE — 99213 PR OFFICE/OUTPT VISIT, EST, LEVL III, 20-29 MIN: ICD-10-PCS | Mod: S$GLB,,, | Performed by: STUDENT IN AN ORGANIZED HEALTH CARE EDUCATION/TRAINING PROGRAM

## 2022-09-19 PROCEDURE — 99213 OFFICE O/P EST LOW 20 MIN: CPT | Mod: S$GLB,,, | Performed by: STUDENT IN AN ORGANIZED HEALTH CARE EDUCATION/TRAINING PROGRAM

## 2022-09-19 PROCEDURE — 3074F SYST BP LT 130 MM HG: CPT | Mod: CPTII,S$GLB,, | Performed by: STUDENT IN AN ORGANIZED HEALTH CARE EDUCATION/TRAINING PROGRAM

## 2022-09-19 PROCEDURE — 3079F DIAST BP 80-89 MM HG: CPT | Mod: CPTII,S$GLB,, | Performed by: STUDENT IN AN ORGANIZED HEALTH CARE EDUCATION/TRAINING PROGRAM

## 2022-09-19 PROCEDURE — 3008F BODY MASS INDEX DOCD: CPT | Mod: CPTII,S$GLB,, | Performed by: STUDENT IN AN ORGANIZED HEALTH CARE EDUCATION/TRAINING PROGRAM

## 2022-09-19 PROCEDURE — 1160F PR REVIEW ALL MEDS BY PRESCRIBER/CLIN PHARMACIST DOCUMENTED: ICD-10-PCS | Mod: CPTII,S$GLB,, | Performed by: STUDENT IN AN ORGANIZED HEALTH CARE EDUCATION/TRAINING PROGRAM

## 2022-09-19 PROCEDURE — 3079F PR MOST RECENT DIASTOLIC BLOOD PRESSURE 80-89 MM HG: ICD-10-PCS | Mod: CPTII,S$GLB,, | Performed by: STUDENT IN AN ORGANIZED HEALTH CARE EDUCATION/TRAINING PROGRAM

## 2022-09-19 PROCEDURE — 1159F PR MEDICATION LIST DOCUMENTED IN MEDICAL RECORD: ICD-10-PCS | Mod: CPTII,S$GLB,, | Performed by: STUDENT IN AN ORGANIZED HEALTH CARE EDUCATION/TRAINING PROGRAM

## 2022-09-19 PROCEDURE — 3008F PR BODY MASS INDEX (BMI) DOCUMENTED: ICD-10-PCS | Mod: CPTII,S$GLB,, | Performed by: STUDENT IN AN ORGANIZED HEALTH CARE EDUCATION/TRAINING PROGRAM

## 2022-09-19 PROCEDURE — 1101F PR PT FALLS ASSESS DOC 0-1 FALLS W/OUT INJ PAST YR: ICD-10-PCS | Mod: CPTII,S$GLB,, | Performed by: STUDENT IN AN ORGANIZED HEALTH CARE EDUCATION/TRAINING PROGRAM

## 2022-09-19 PROCEDURE — 99999 PR PBB SHADOW E&M-EST. PATIENT-LVL V: CPT | Mod: PBBFAC,,, | Performed by: STUDENT IN AN ORGANIZED HEALTH CARE EDUCATION/TRAINING PROGRAM

## 2022-09-19 PROCEDURE — 3074F PR MOST RECENT SYSTOLIC BLOOD PRESSURE < 130 MM HG: ICD-10-PCS | Mod: CPTII,S$GLB,, | Performed by: STUDENT IN AN ORGANIZED HEALTH CARE EDUCATION/TRAINING PROGRAM

## 2022-09-19 NOTE — PROGRESS NOTES
Subjective:       Patient ID: Pham Licea is a 67 y.o. female.    Chief Complaint: Hoarse (Pt states she had covid 8/25/22)    Covid 1 month ago  Horseness started with the covid- especially toward the end and has not changed for the last.  Has not really changed over the last month.  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     Review of Systems   Constitutional:  Negative for activity change and appetite change.   HENT:  Positive for voice change. Negative for sore throat.         Ear itching   Respiratory:  Negative for shortness of breath.    Cardiovascular:  Negative for chest pain.   Gastrointestinal:  Negative for abdominal pain.   Genitourinary:  Negative for dysuria.   Integumentary:  Negative for rash.   Psychiatric/Behavioral:  Negative for sleep disturbance.        Objective:      Physical Exam  Constitutional:       General: She is not in acute distress.     Appearance: Normal appearance. She is not ill-appearing.   HENT:      Right Ear: Tympanic membrane, ear canal and external ear normal. There is no impacted cerumen.      Left Ear: Tympanic membrane, ear canal and external ear normal. There is no impacted cerumen.      Mouth/Throat:      Comments: Horseness of voice  Eyes:      Conjunctiva/sclera: Conjunctivae normal.   Cardiovascular:      Rate and Rhythm: Normal rate and regular rhythm.      Heart sounds: Normal heart sounds. No murmur heard.  Pulmonary:      Effort: Pulmonary effort is normal. No respiratory distress.      Breath sounds: Normal breath sounds.   Abdominal:      Palpations: Abdomen is soft.   Musculoskeletal:      Right lower leg: No edema.      Left lower leg: No edema.   Skin:     General: Skin is warm and dry.   Neurological:      Mental Status: She is alert. Mental status is at baseline.      Gait: Gait normal.   Psychiatric:         Mood and Affect: Mood normal.         Behavior: Behavior normal.          Thought Content: Thought content normal.         Judgment: Judgment normal.       Assessment:       1. COVID-19    2. Laryngitis    3. Change in voice          Plan:       Problem List Items Addressed This Visit    None  Visit Diagnoses       COVID-19    -  Primary    Relevant Orders    Ambulatory referral/consult to ENT    Laryngitis        Relevant Orders    Ambulatory referral/consult to ENT    Change in voice        Relevant Orders    Ambulatory referral/consult to ENT

## 2022-09-27 ENCOUNTER — OFFICE VISIT (OUTPATIENT)
Dept: SURGERY | Facility: CLINIC | Age: 67
End: 2022-09-27
Payer: COMMERCIAL

## 2022-09-27 ENCOUNTER — LAB VISIT (OUTPATIENT)
Dept: LAB | Facility: HOSPITAL | Age: 67
End: 2022-09-27
Attending: FAMILY MEDICINE
Payer: COMMERCIAL

## 2022-09-27 VITALS
HEART RATE: 76 BPM | HEIGHT: 64 IN | SYSTOLIC BLOOD PRESSURE: 110 MMHG | BODY MASS INDEX: 29.17 KG/M2 | DIASTOLIC BLOOD PRESSURE: 70 MMHG | OXYGEN SATURATION: 98 % | WEIGHT: 170.88 LBS

## 2022-09-27 DIAGNOSIS — K56.699 SMALL BOWEL STRICTURE: Primary | ICD-10-CM

## 2022-09-27 DIAGNOSIS — D50.8 IRON DEFICIENCY ANEMIA SECONDARY TO INADEQUATE DIETARY IRON INTAKE: ICD-10-CM

## 2022-09-27 LAB
ALBUMIN SERPL BCP-MCNC: 3.9 G/DL (ref 3.5–5.2)
ALP SERPL-CCNC: 65 U/L (ref 55–135)
ALT SERPL W/O P-5'-P-CCNC: 16 U/L (ref 10–44)
ANION GAP SERPL CALC-SCNC: 12 MMOL/L (ref 8–16)
AST SERPL-CCNC: 17 U/L (ref 10–40)
BASOPHILS # BLD AUTO: 0.04 K/UL (ref 0–0.2)
BASOPHILS NFR BLD: 0.7 % (ref 0–1.9)
BILIRUB SERPL-MCNC: 0.3 MG/DL (ref 0.1–1)
BUN SERPL-MCNC: 8 MG/DL (ref 8–23)
CALCIUM SERPL-MCNC: 9.3 MG/DL (ref 8.7–10.5)
CHLORIDE SERPL-SCNC: 103 MMOL/L (ref 95–110)
CO2 SERPL-SCNC: 27 MMOL/L (ref 23–29)
CREAT SERPL-MCNC: 0.7 MG/DL (ref 0.5–1.4)
DIFFERENTIAL METHOD: ABNORMAL
EOSINOPHIL # BLD AUTO: 0.1 K/UL (ref 0–0.5)
EOSINOPHIL NFR BLD: 1 % (ref 0–8)
ERYTHROCYTE [DISTWIDTH] IN BLOOD BY AUTOMATED COUNT: 16.4 % (ref 11.5–14.5)
EST. GFR  (NO RACE VARIABLE): >60 ML/MIN/1.73 M^2
GLUCOSE SERPL-MCNC: 97 MG/DL (ref 70–110)
HCT VFR BLD AUTO: 37.8 % (ref 37–48.5)
HGB BLD-MCNC: 12.4 G/DL (ref 12–16)
IMM GRANULOCYTES # BLD AUTO: 0.02 K/UL (ref 0–0.04)
IMM GRANULOCYTES NFR BLD AUTO: 0.3 % (ref 0–0.5)
IRON SERPL-MCNC: 55 UG/DL (ref 30–160)
LYMPHOCYTES # BLD AUTO: 2.5 K/UL (ref 1–4.8)
LYMPHOCYTES NFR BLD: 43.6 % (ref 18–48)
MCH RBC QN AUTO: 28.8 PG (ref 27–31)
MCHC RBC AUTO-ENTMCNC: 32.8 G/DL (ref 32–36)
MCV RBC AUTO: 88 FL (ref 82–98)
MONOCYTES # BLD AUTO: 0.6 K/UL (ref 0.3–1)
MONOCYTES NFR BLD: 10.3 % (ref 4–15)
NEUTROPHILS # BLD AUTO: 2.6 K/UL (ref 1.8–7.7)
NEUTROPHILS NFR BLD: 44.1 % (ref 38–73)
NRBC BLD-RTO: 0 /100 WBC
PLATELET # BLD AUTO: 260 K/UL (ref 150–450)
PMV BLD AUTO: 9.4 FL (ref 9.2–12.9)
POTASSIUM SERPL-SCNC: 3.6 MMOL/L (ref 3.5–5.1)
PROT SERPL-MCNC: 6.7 G/DL (ref 6–8.4)
RBC # BLD AUTO: 4.3 M/UL (ref 4–5.4)
SATURATED IRON: 15 % (ref 20–50)
SODIUM SERPL-SCNC: 142 MMOL/L (ref 136–145)
TOTAL IRON BINDING CAPACITY: 363 UG/DL (ref 250–450)
TRANSFERRIN SERPL-MCNC: 245 MG/DL (ref 200–375)
WBC # BLD AUTO: 5.8 K/UL (ref 3.9–12.7)

## 2022-09-27 PROCEDURE — 1101F PT FALLS ASSESS-DOCD LE1/YR: CPT | Mod: CPTII,S$GLB,, | Performed by: SURGERY

## 2022-09-27 PROCEDURE — 3008F BODY MASS INDEX DOCD: CPT | Mod: CPTII,S$GLB,, | Performed by: SURGERY

## 2022-09-27 PROCEDURE — 3074F PR MOST RECENT SYSTOLIC BLOOD PRESSURE < 130 MM HG: ICD-10-PCS | Mod: CPTII,S$GLB,, | Performed by: SURGERY

## 2022-09-27 PROCEDURE — 1159F MED LIST DOCD IN RCRD: CPT | Mod: CPTII,S$GLB,, | Performed by: SURGERY

## 2022-09-27 PROCEDURE — 36415 COLL VENOUS BLD VENIPUNCTURE: CPT | Performed by: NURSE PRACTITIONER

## 2022-09-27 PROCEDURE — 3288F FALL RISK ASSESSMENT DOCD: CPT | Mod: CPTII,S$GLB,, | Performed by: SURGERY

## 2022-09-27 PROCEDURE — 82728 ASSAY OF FERRITIN: CPT | Performed by: NURSE PRACTITIONER

## 2022-09-27 PROCEDURE — 80053 COMPREHEN METABOLIC PANEL: CPT | Performed by: NURSE PRACTITIONER

## 2022-09-27 PROCEDURE — 84466 ASSAY OF TRANSFERRIN: CPT | Performed by: NURSE PRACTITIONER

## 2022-09-27 PROCEDURE — 99999 PR PBB SHADOW E&M-EST. PATIENT-LVL IV: CPT | Mod: PBBFAC,,, | Performed by: SURGERY

## 2022-09-27 PROCEDURE — 99215 OFFICE O/P EST HI 40 MIN: CPT | Mod: S$GLB,,, | Performed by: SURGERY

## 2022-09-27 PROCEDURE — 3074F SYST BP LT 130 MM HG: CPT | Mod: CPTII,S$GLB,, | Performed by: SURGERY

## 2022-09-27 PROCEDURE — 3078F PR MOST RECENT DIASTOLIC BLOOD PRESSURE < 80 MM HG: ICD-10-PCS | Mod: CPTII,S$GLB,, | Performed by: SURGERY

## 2022-09-27 PROCEDURE — 1101F PR PT FALLS ASSESS DOC 0-1 FALLS W/OUT INJ PAST YR: ICD-10-PCS | Mod: CPTII,S$GLB,, | Performed by: SURGERY

## 2022-09-27 PROCEDURE — 1159F PR MEDICATION LIST DOCUMENTED IN MEDICAL RECORD: ICD-10-PCS | Mod: CPTII,S$GLB,, | Performed by: SURGERY

## 2022-09-27 PROCEDURE — 85025 COMPLETE CBC W/AUTO DIFF WBC: CPT | Performed by: NURSE PRACTITIONER

## 2022-09-27 PROCEDURE — 82607 VITAMIN B-12: CPT | Performed by: NURSE PRACTITIONER

## 2022-09-27 PROCEDURE — 3288F PR FALLS RISK ASSESSMENT DOCUMENTED: ICD-10-PCS | Mod: CPTII,S$GLB,, | Performed by: SURGERY

## 2022-09-27 PROCEDURE — 3008F PR BODY MASS INDEX (BMI) DOCUMENTED: ICD-10-PCS | Mod: CPTII,S$GLB,, | Performed by: SURGERY

## 2022-09-27 PROCEDURE — 3078F DIAST BP <80 MM HG: CPT | Mod: CPTII,S$GLB,, | Performed by: SURGERY

## 2022-09-27 PROCEDURE — 99215 PR OFFICE/OUTPT VISIT, EST, LEVL V, 40-54 MIN: ICD-10-PCS | Mod: S$GLB,,, | Performed by: SURGERY

## 2022-09-27 PROCEDURE — 99999 PR PBB SHADOW E&M-EST. PATIENT-LVL IV: ICD-10-PCS | Mod: PBBFAC,,, | Performed by: SURGERY

## 2022-09-27 NOTE — PROGRESS NOTES
[facility]  Follow-up    Subjective:     Chief Complaint:  Follow-up abdominal bloating.    HPI:  Pham Licea is a 67 y.o. female presents today for follow-up examination.  Patient with history of benign esophageal strictures causing small-bowel obstruction previously, 20-30.  Pathology reviewed.  No evidence of infectious etiology or inflammatory significant etiology like Crohn's disease etcetera.  Patient since that time has had a recurrent bowel obstruction.  Underwent lysis of adhesions.  Resolved.  She is had multiple episodes of issues related to her small intestine.  No history of PillCam endoscopy, push enteroscopy, double balloon endoscopy.  She since last visit with me has had troubles of bloating over the weekend and nausea but no vomiting.  She states she is back on a clear liquid diet.  Desires further evaluation.    Review of Systems   Constitutional:  Negative for activity change, appetite change, chills and fever.   HENT:  Negative for congestion, dental problem and ear discharge.    Eyes:  Negative for discharge and itching.   Respiratory:  Negative for apnea, choking and chest tightness.    Cardiovascular:  Negative for chest pain and leg swelling.   Gastrointestinal:  Positive for abdominal distention, abdominal pain and nausea. Negative for anal bleeding, constipation and diarrhea.   Endocrine: Negative for cold intolerance and heat intolerance.   Genitourinary:  Negative for difficulty urinating and dyspareunia.   Musculoskeletal:  Negative for arthralgias and back pain.   Skin:  Negative for color change and pallor.   Neurological:  Negative for dizziness and facial asymmetry.   Hematological:  Negative for adenopathy. Does not bruise/bleed easily.   Psychiatric/Behavioral:  Negative for agitation and behavioral problems.      Objective:      Vitals:    09/27/22 1321   BP: 110/70   BP Location: Right arm   Patient Position: Sitting   BP Method: Medium (Automatic)  "  Pulse: 76   SpO2: 98%   Weight: 77.5 kg (170 lb 13.7 oz)   Height: 5' 4" (1.626 m)     Physical Exam  Constitutional:       General: She is not in acute distress.     Appearance: She is well-developed.   HENT:      Head: Normocephalic and atraumatic.   Eyes:      Pupils: Pupils are equal, round, and reactive to light.   Neck:      Thyroid: No thyromegaly.   Cardiovascular:      Rate and Rhythm: Normal rate and regular rhythm.   Pulmonary:      Effort: Pulmonary effort is normal.      Breath sounds: Normal breath sounds.   Abdominal:      General: Bowel sounds are normal. There is no distension.      Palpations: Abdomen is soft.      Tenderness: There is no abdominal tenderness.       Musculoskeletal:         General: No deformity. Normal range of motion.      Cervical back: Normal range of motion and neck supple.   Skin:     General: Skin is warm.      Capillary Refill: Capillary refill takes less than 2 seconds.      Findings: No erythema.   Neurological:      Mental Status: She is alert and oriented to person, place, and time.      Cranial Nerves: No cranial nerve deficit.   Psychiatric:         Behavior: Behavior normal.        Assessment:       1. Small bowel stricture          Plan:   Small bowel stricture  -     Tissue transglutaminase, IgA; Future; Expected date: 09/27/2022  -     IgA; Future; Expected date: 09/27/2022  -     Ambulatory referral/consult to Gastroenterology; Future; Expected date: 10/04/2022        Medical Decision Making/Counseling:  Complex patient.  Difficult situation.  First surgical intervention was related to recurrent bowel obstructions multiple admissions with no previous surgical intervention.  She underwent diagnostic laparoscopy 1st which converted to laparotomy with no external reason for bowel issues were seen.  Upon manipulation of the bowel with bimanual pressure there was evidence of multiple small bowel strictures, the size of a pinpoint.  This section of small bowel was " resected since the pathology.  Pathology nonspecific.  She since that time has done better however has had an additional episode of bowel obstruction related to adhesive disease which required operative intervention.  Additionally at this operation a partner and I both bimanual exam this entire small bowel without evidence of recurrent strictures etcetera.  The anastomosis appeared widely patent.  Appeared related to adhesive disease.  At this point patient with episodic issues does likely need further evaluation of her small intestine on the inside.  I would recommend either PillCam endoscopy versus push enteroscopy versus double balloon endoscopy.  I have also ordered a celiac panel for further evaluation for possible celiac disease.  Will refer to Dr. Mares for evaluation for push endoscopy versus double balloon endoscopy with biopsies.    Follow up:  6-8 weeks with surgery clinic.    Patient instructed that best way to communicate with my office staff is for patient to get on the [facility] epic patient portal to expedite communication and communication issues that may occur.  Patient was given instructions on how to get on the portal.  I encouraged patient to obtain portal access as well.  Ultimately it is up to the patient to obtain access.  Patient voiced understanding.

## 2022-09-28 ENCOUNTER — PATIENT MESSAGE (OUTPATIENT)
Dept: SURGERY | Facility: CLINIC | Age: 67
End: 2022-09-28
Payer: MEDICARE

## 2022-09-28 ENCOUNTER — TELEPHONE (OUTPATIENT)
Dept: ENDOSCOPY | Facility: HOSPITAL | Age: 67
End: 2022-09-28
Payer: MEDICARE

## 2022-09-28 LAB
FERRITIN SERPL-MCNC: 178 NG/ML (ref 20–300)
VIT B12 SERPL-MCNC: 516 PG/ML (ref 210–950)

## 2022-09-28 NOTE — TELEPHONE ENCOUNTER
Patient says that Dr Misael Holm is referring her for a double balloon for SB obstructions. She has had several SBO over the last 2 years.

## 2022-09-28 NOTE — TELEPHONE ENCOUNTER
----- Message from Nicolle Stephenson sent at 9/28/2022  8:28 AM CDT -----  Regarding: appt  Contact: [phone]  Pt requesting appt from referral on file. Attempted and no access. Pls call

## 2022-09-29 ENCOUNTER — OFFICE VISIT (OUTPATIENT)
Dept: HEMATOLOGY/ONCOLOGY | Facility: CLINIC | Age: 67
End: 2022-09-29
Payer: COMMERCIAL

## 2022-09-29 ENCOUNTER — LAB VISIT (OUTPATIENT)
Dept: LAB | Facility: HOSPITAL | Age: 67
End: 2022-09-29
Attending: SURGERY
Payer: MEDICARE

## 2022-09-29 DIAGNOSIS — Z87.19 S/P SMALL BOWEL OBSTRUCTION: ICD-10-CM

## 2022-09-29 DIAGNOSIS — D50.8 IRON DEFICIENCY ANEMIA SECONDARY TO INADEQUATE DIETARY IRON INTAKE: ICD-10-CM

## 2022-09-29 DIAGNOSIS — E78.5 DYSLIPIDEMIA: Primary | ICD-10-CM

## 2022-09-29 DIAGNOSIS — K56.699 SMALL BOWEL STRICTURE: ICD-10-CM

## 2022-09-29 LAB — IGA SERPL-MCNC: 245 MG/DL (ref 40–350)

## 2022-09-29 PROCEDURE — 83516 IMMUNOASSAY NONANTIBODY: CPT | Performed by: SURGERY

## 2022-09-29 PROCEDURE — 36415 COLL VENOUS BLD VENIPUNCTURE: CPT | Performed by: SURGERY

## 2022-09-29 PROCEDURE — 99214 OFFICE O/P EST MOD 30 MIN: CPT | Mod: 95,,, | Performed by: INTERNAL MEDICINE

## 2022-09-29 PROCEDURE — 82784 ASSAY IGA/IGD/IGG/IGM EACH: CPT | Performed by: SURGERY

## 2022-09-29 PROCEDURE — 99214 PR OFFICE/OUTPT VISIT, EST, LEVL IV, 30-39 MIN: ICD-10-PCS | Mod: 95,,, | Performed by: INTERNAL MEDICINE

## 2022-09-29 NOTE — PROGRESS NOTES
Subjective:    The patient location is: home  Visit type: Virtual visit with synchronous audio and video  Face-to-face or time spent with patient on the encounter:20min  Total time spent on and for  this encounter which includes non face-to-face time preparing to see patient, review of tests, obtaining and or reviewing separately obtained records documenting clinical information in the electronic or other health records, independently interpreting results which is not separately reported ,and communicating results to the patient/family/caregiver and in care coordination and treatment planning/communicating with pharmacy for prescriptions/addressing social needs/arranging follow-up and or referrals :25 min    Each patient I provide medical services by telemedicine is:  (1) informed of the relationship between the physician and patient and the respective role of any other health care provider with respect to management of the patient; and (2) notified that he or she may decline to receive medical services by telemedicine and may withdraw from such care at any time.  This is a video visit therefore some elements of the physical exam such as vital signs, heart sounds are breath sounds are not included and may be included if found in recent clinic notes of other providers assessing same patient. Any symptoms or signs that were visualized were stated by the patient may be included in this note.     Patient ID: Pham Licea is a 67 y.o. female.    Chief Complaint:      sept 2020 had gall bladder surgery, thern she had a bowel obstruction, no surgery then, had 2 feet of bowels removed after a second bowel obstruction  Esophageal ulcers and dudenal ulcers- with bleeding in 2017  Recd blood in [facility] this weekend, pt went to ED because she was shaky and week and couldn't think was found to be anemic in ed   pt readmitted 3/22 had repeat bowel obstruction   Seen at emergency room May 25, 2022 with CT scan of abdomen  which shows partial obstruction patient having significant abdominal pain.  Discomfort belching similar to her prior obstructive symptoms  Past Medical History:   Diagnosis Date    Anemia     Fatty liver 2015    Former smoker 2009    Osteoarthritis 2010    Pulmonary nodules 2019    Repeat CT in 6 mo    Ulcer of abdomen wall 2016     Past Surgical History:   Procedure Laterality Date    APPENDECTOMY  1993     SECTION  , , 1983    x3    COLONOSCOPY  ~2014    [facility]: normal findings per patient report    COLONOSCOPY N/A 2020    Procedure: COLONOSCOPY;  Surgeon: Misael Holm MD;  Location: [facility] ENDO;  Service: General;  Laterality: N/A;    DIAGNOSTIC LAPAROSCOPY N/A 2021    Procedure: LAPAROSCOPY, DIAGNOSTIC;  Surgeon: Misael Holm MD;  Location: [facility] OR;  Service: General;  Laterality: N/A;    EPIDURAL STEROID INJECTION INTO LUMBAR SPINE N/A 10/12/2018    Procedure: Injection-steroid-epidural-lumbar;  Surgeon: Kal Johnson MD;  Location: [facility] OR;  Service: Pain Management;  Laterality: N/A;  L5-S1    EPIDURAL STEROID INJECTION INTO LUMBAR SPINE N/A 2019    Procedure: Injection-steroid-epidural-lumbar L5-S1;  Surgeon: Kal Johnson MD;  Location: [facility] OR;  Service: Pain Management;  Laterality: N/A;    ESOPHAGOGASTRODUODENOSCOPY N/A 2020    Procedure: ESOPHAGOGASTRODUODENOSCOPY (EGD);  Surgeon: Misael Holm MD;  Location: [facility];  Service: General;  Laterality: N/A;    HYSTERECTOMY      one ovary conserved    LAPAROSCOPIC CHOLECYSTECTOMY N/A 2020    Procedure: CHOLECYSTECTOMY, LAPAROSCOPIC;  Surgeon: Govind Frey MD;  Location: [facility] OR;  Service: General;  Laterality: N/A;    LAPAROSCOPIC LYSIS OF ADHESIONS N/A 2021    Procedure: LYSIS, ADHESIONS, LAPAROSCOPIC;  Surgeon: Misael Holm MD;  Location: [facility] OR;  Service: General;  Laterality: N/A;    UPPER GASTROINTESTINAL ENDOSCOPY  2016     Family History   Problem Relation Age  of Onset    Ovarian cancer Mother     Heart disease Mother     Osteoporosis Mother     Arthritis Mother     Hypertension Father     Stroke Father 50    Brain Hemorrhage Father     Aneurysm Father     Osteoarthritis Sister     Arthritis Sister     Hypertension Sister     Obesity Sister     Breast cancer Neg Hx     Colon cancer Neg Hx     Colon polyps Neg Hx     Crohn's disease Neg Hx     Ulcerative colitis Neg Hx       Social History     Socioeconomic History    Marital status:     Number of children: 4    Highest education level: Some college, no degree   Occupational History    Occupation: sale specialist-    Tobacco Use    Smoking status: Former     Packs/day: 1.00     Years: 40.00     Pack years: 40.00     Types: Cigarettes     Quit date: 2009     Years since quittin.9    Smokeless tobacco: Never    Tobacco comments:     quit    Substance and Sexual Activity    Alcohol use: Not Currently     Comment: Not often - one glass of wine every now and then    Drug use: Not Currently     Types: Marijuana     Comment: in     Sexual activity: Not Currently     Social Determinants of Health     Financial Resource Strain: Low Risk     Difficulty of Paying Living Expenses: Not very hard   Food Insecurity: No Food Insecurity    Worried About Running Out of Food in the Last Year: Never true    Ran Out of Food in the Last Year: Never true   Transportation Needs: No Transportation Needs    Lack of Transportation (Medical): No    Lack of Transportation (Non-Medical): No   Physical Activity: Inactive    Days of Exercise per Week: 0 days    Minutes of Exercise per Session: 0 min   Stress: Unknown    Feeling of Stress : Patient refused   Social Connections: Unknown    Frequency of Communication with Friends and Family: More than three times a week    Frequency of Social Gatherings with Friends and Family: Once a week    Active Member of Clubs or Organizations: Yes    Attends Club or Organization Meetings:  More than 4 times per year    Marital Status:    Housing Stability: Low Risk     Unable to Pay for Housing in the Last Year: No    Number of Places Lived in the Last Year: 2    Unstable Housing in the Last Year: No     Review of patient's allergies indicates:  No Known Allergies    Current Outpatient Medications:     albuterol (VENTOLIN HFA) 90 mcg/actuation inhaler, Inhale 2 puffs into the lungs every 6 (six) hours as needed for Wheezing or Shortness of Breath. Rescue, Disp: 6.7 g, Rfl: 2    atorvastatin (LIPITOR) 10 MG tablet, Take 1 tablet (10 mg total) by mouth once daily., Disp: 90 tablet, Rfl: 3    CONSTULOSE 10 gram/15 mL solution, TAKE 10 ML BY MOUTH   THREE TIMES DAILY, Disp: 948 mL, Rfl: 0    diclofenac sodium (VOLTAREN) 1 % Gel, Apply 2 g topically 2 (two) times daily as needed., Disp: , Rfl:     meloxicam (MOBIC) 15 MG tablet, Take 1 tablet by mouth once daily, Disp: 90 tablet, Rfl: 0    ondansetron (ZOFRAN-ODT) 4 MG TbDL, Take 2 tablets (8 mg total) by mouth every 8 (eight) hours as needed., Disp: 10 tablet, Rfl: 1    ondansetron (ZOFRAN-ODT) 8 MG TbDL, Take 1 tablet (8 mg total) by mouth every 6 (six) hours as needed (nausea or vomiting)., Disp: 30 tablet, Rfl: 1    ondansetron (ZOFRAN-ODT) 8 MG TbDL, Take 1 tablet (8 mg total) by mouth every 6 (six) hours as needed., Disp: 60 tablet, Rfl: 3    pantoprazole (PROTONIX) 20 MG tablet, Take 1 tablet (20 mg total) by mouth once daily., Disp: 90 tablet, Rfl: 3    phenylephrine-DM-guaiFENesin (DECONEX DMX) 10-17.5-400 mg Tab, Take 1 tablet by mouth 2 (two) times daily as needed (cough congestion)., Disp: 20 tablet, Rfl: 1    promethazine (PHENERGAN) 25 MG suppository, Place 1 suppository (25 mg total) rectally 3 (three) times daily as needed for Nausea., Disp: 8 suppository, Rfl: 0    vitamin D (VITAMIN D3) 1000 units Tab, Take 3,000 Units by mouth once daily., Disp: , Rfl:   Review of Systems   Constitutional:  Positive for malaise/fatigue. Negative  for weight loss.        Mostly good appetite lost weight with her sx   HENT:  Negative for hearing loss.    Eyes:  Negative for blurred vision and double vision.   Gastrointestinal:  Positive for constipation.        Takes lactulose twice a day and colace twice a day   Musculoskeletal:  Negative for back pain, myalgias and neck pain.        Leg cramps and shaky   Skin:  Negative for rash.   Neurological:  Negative for dizziness, weakness and headaches.   Endo/Heme/Allergies:  Does not bruise/bleed easily. recent bowel obstruction 3/22 with repeat abdominal pain discomfort emergency room visit last night the outpatient surgical appointment  Physical Exam    Wt Readings from Last 3 Encounters:   09/27/22 77.5 kg (170 lb 13.7 oz)   09/19/22 77.1 kg (170 lb)   09/13/22 77.1 kg (170 lb)     Temp Readings from Last 3 Encounters:   09/19/22 98.1 °F (36.7 °C)   09/13/22 97.8 °F (36.6 °C)   08/29/22 97.9 °F (36.6 °C)     BP Readings from Last 3 Encounters:   09/27/22 110/70   09/19/22 122/83   09/13/22 105/67     Pulse Readings from Last 3 Encounters:   09/27/22 76   09/19/22 71   09/13/22 79     Lab Results   Component Value Date    WBC 5.80 09/27/2022    HGB 12.4 09/27/2022    HCT 37.8 09/27/2022    MCV 88 09/27/2022     09/27/2022       BMP  Lab Results   Component Value Date     09/27/2022    K 3.6 09/27/2022     09/27/2022    CO2 27 09/27/2022    BUN 8 09/27/2022    CREATININE 0.7 09/27/2022    CALCIUM 9.3 09/27/2022    ANIONGAP 12 09/27/2022    ESTGFRAFRICA >60.0 06/27/2022    EGFRNONAA >60.0 06/27/2022     Lab Results   Component Value Date    IRON 55 09/27/2022    TIBC 363 09/27/2022    FERRITIN 178 09/27/2022       Impression:  CT scan abdomen May 25, 2022     1. Multiple dilated loops of small bowel are noted that are fluid-filled left upper quadrant with the suggestion of bowel wall thickening near the level of the umbilicus in the midline and extending into the left dilated loops of bowel.   This raises the concern for vascular injury and or bowel infarction/inflammation possibly associated with obstruction.  Surgical consultation is suggested.  Post-contrast oral and intravenous imaging may be of use for confirmation given that this exam is severely hindered.  2. A 2nd region of concern is noted within the right hemipelvis where a dilated loop of small bowel is noted without obvious bowel wall thickening.  This region is nonspecific and could relate to colon, small bowel, ileus, or obstruction.  Further evaluation and clinical correlation is necessary     Patient Active Problem List   Diagnosis    Primary osteoarthritis involving multiple joints    Obesity (BMI 30.0-34.9)    Gastroesophageal reflux disease    Right knee pain    Chronic midline low back pain    Acute tear medial meniscus, right, sequela    Primary osteoarthritis of right knee    Lumbar radiculitis    Trigger middle finger of right hand    Nausea    Abdominal pain    Encounter for postoperative care    Intestinal obstruction    Hypokalemia    Constipation    History of cholecystectomy    Right upper quadrant pain    History of hysterectomy    Rectal bleeding    Hemorrhoids    Mitral valve prolapse    Coronary artery calcification    Anemia    SAMPSON (dyspnea on exertion), noted 2010    History of smoking 25-50 pack years, 46 py, quit 2009    Other emphysema    NSAID long-term use, started 2017    Bandemia    Family history of premature CAD    Cardiovascular event risk, ASCVD 10-year risk 4.8%, 2019    Abdominal obesity    Dyslipidemia, baseline LDL-C 112, HDL-C 49    Iron deficiency anemia secondary to inadequate dietary iron intake    Severe anemia    S/p small bowel obstruction    Arm pain, lateral, left        Assessment and Plan   NATHAN: pt had partail bowel removal from opbstructions so anticipate this will be recurrent since last visit 3 weeks ago patient has had a couple of more ER visits.  Going to main campus for evaluation of small  bowel.  Will keep patient on Q 6 weeks the iron checks   recvd infusinal iron with excellent response   Orders will be placed for Mathieu  I have messaged Dr. Jai horton for visit with him as soon as possible    Slight B12 deficiency also continue to monitor most recent test June 21, 2022 acceptable  Patient to continue follow-up of her DJD with PCP  Bowel obs again 3/22 repeat sx and adhesiolysis,

## 2022-10-04 LAB — TTG IGA SER-ACNC: 7 UNITS

## 2022-10-06 ENCOUNTER — OFFICE VISIT (OUTPATIENT)
Dept: ORTHOPEDICS | Facility: CLINIC | Age: 67
End: 2022-10-06
Payer: COMMERCIAL

## 2022-10-06 VITALS — HEIGHT: 64 IN | BODY MASS INDEX: 29.02 KG/M2 | WEIGHT: 170 LBS

## 2022-10-06 DIAGNOSIS — S56.902D: Primary | ICD-10-CM

## 2022-10-06 PROCEDURE — 99213 OFFICE O/P EST LOW 20 MIN: CPT | Mod: S$GLB,,, | Performed by: ORTHOPAEDIC SURGERY

## 2022-10-06 PROCEDURE — 3288F FALL RISK ASSESSMENT DOCD: CPT | Mod: CPTII,S$GLB,, | Performed by: ORTHOPAEDIC SURGERY

## 2022-10-06 PROCEDURE — 1159F MED LIST DOCD IN RCRD: CPT | Mod: CPTII,S$GLB,, | Performed by: ORTHOPAEDIC SURGERY

## 2022-10-06 PROCEDURE — 1101F PR PT FALLS ASSESS DOC 0-1 FALLS W/OUT INJ PAST YR: ICD-10-PCS | Mod: CPTII,S$GLB,, | Performed by: ORTHOPAEDIC SURGERY

## 2022-10-06 PROCEDURE — 3288F PR FALLS RISK ASSESSMENT DOCUMENTED: ICD-10-PCS | Mod: CPTII,S$GLB,, | Performed by: ORTHOPAEDIC SURGERY

## 2022-10-06 PROCEDURE — 1159F PR MEDICATION LIST DOCUMENTED IN MEDICAL RECORD: ICD-10-PCS | Mod: CPTII,S$GLB,, | Performed by: ORTHOPAEDIC SURGERY

## 2022-10-06 PROCEDURE — 3008F BODY MASS INDEX DOCD: CPT | Mod: CPTII,S$GLB,, | Performed by: ORTHOPAEDIC SURGERY

## 2022-10-06 PROCEDURE — 99213 PR OFFICE/OUTPT VISIT, EST, LEVL III, 20-29 MIN: ICD-10-PCS | Mod: S$GLB,,, | Performed by: ORTHOPAEDIC SURGERY

## 2022-10-06 PROCEDURE — 1101F PT FALLS ASSESS-DOCD LE1/YR: CPT | Mod: CPTII,S$GLB,, | Performed by: ORTHOPAEDIC SURGERY

## 2022-10-06 PROCEDURE — 99999 PR PBB SHADOW E&M-EST. PATIENT-LVL III: CPT | Mod: PBBFAC,,, | Performed by: ORTHOPAEDIC SURGERY

## 2022-10-06 PROCEDURE — 3008F PR BODY MASS INDEX (BMI) DOCUMENTED: ICD-10-PCS | Mod: CPTII,S$GLB,, | Performed by: ORTHOPAEDIC SURGERY

## 2022-10-06 PROCEDURE — 99999 PR PBB SHADOW E&M-EST. PATIENT-LVL III: ICD-10-PCS | Mod: PBBFAC,,, | Performed by: ORTHOPAEDIC SURGERY

## 2022-10-06 NOTE — PROGRESS NOTES
[facility]  History & Physical  Orthopedics    SUBJECTIVE:      Chief Complaint: Left wrist and forearm pain    Referring Provider: No ref. provider found     Dr. Vaughan is the supervising physician for this encounter/patient    History of Present Illness:  Patient is a 67 y.o. right hand dominant female who presents today with complaints of left wrist and forearm pain. History of left distal radius fracture from July 2021, treated conservatively with [facility] in [place], [place]. She has generalized hand arthritis and was also treated for left thumb CMC arthritis with steroid injection in the past. She wear thumb spica support brace, she takes Mobic daily and uses Voltaren gel. She would like a repeat CMC injection today. She also mentions a left forearm mass that started within the last year, no injury. She does note that she will get a sharp stabbing pain at the mass with any resisted rotation of the wrist, such as lifting a heavy pot to pour.     Interval history July 7, 2022:  The patient returns today for re-evaluation.  She sustained a left distal radius fracture treated closed at an outside facility.  She subsequently developed a pain and swelling in the mid to proximal forearm of the left upper extremity after lifting a TV 2-3 months ago.  She was sent for MRI to evaluate further this mass and returns today for these results and re-evaluation.  Fortunately she notes her pain is significantly improved since her last visit with us.  Pain is rated 0/10 today.    Interval history October 6, 2022: The patient returns today for re-evaluation.  She notes that her left forearm is doing very well.  She still has occasional discomfort at times which she is steadily improving with time.  In regards to her CMC arthritis, her prior injection helped significantly and pain has only returned to a rather modest extent.  She has no new complaints and is pleased with her results of conservative  treatment for these issues thus far.    Onset of symptoms/DOI was 1 year.    Symptoms are aggravated by activity.    Symptoms are alleviated by rest, immobilization and medication.    Symptoms consist of pain.    The patient rates their pain as a 5/10.    Attempted treatment(s) and/or interventions include activity modifications, rest, anti-inflammatory medications, immobilization and steroid injection.     The patient denies any fevers, chills, N/V, D/C and presents for evaluation.       Past Medical History:   Diagnosis Date    Anemia     Fatty liver     Former smoker     Osteoarthritis 2010    Pulmonary nodules 2019    Repeat CT in 6 mo    Ulcer of abdomen wall 2016     Past Surgical History:   Procedure Laterality Date    APPENDECTOMY       SECTION  , , 1983    x3    COLONOSCOPY  ~    [facility]: normal findings per patient report    COLONOSCOPY N/A 2020    Procedure: COLONOSCOPY;  Surgeon: Misael Holm MD;  Location: [facility] ENDO;  Service: General;  Laterality: N/A;    DIAGNOSTIC LAPAROSCOPY N/A 2021    Procedure: LAPAROSCOPY, DIAGNOSTIC;  Surgeon: Misael Holm MD;  Location: [facility] OR;  Service: General;  Laterality: N/A;    EPIDURAL STEROID INJECTION INTO LUMBAR SPINE N/A 10/12/2018    Procedure: Injection-steroid-epidural-lumbar;  Surgeon: Kal Johnson MD;  Location: [facility] OR;  Service: Pain Management;  Laterality: N/A;  L5-S1    EPIDURAL STEROID INJECTION INTO LUMBAR SPINE N/A 2019    Procedure: Injection-steroid-epidural-lumbar L5-S1;  Surgeon: Kal Johnson MD;  Location: [facility] OR;  Service: Pain Management;  Laterality: N/A;    ESOPHAGOGASTRODUODENOSCOPY N/A 2020    Procedure: ESOPHAGOGASTRODUODENOSCOPY (EGD);  Surgeon: Misael Holm MD;  Location: [facility] END[facility];  Service: General;  Laterality: N/A;    HYSTERECTOMY      one ovary conserved    LAPAROSCOPIC CHOLECYSTECTOMY N/A 2020    Procedure: CHOLECYSTECTOMY, LAPAROSCOPIC;   Surgeon: Govind Frey MD;  Location: [facility] OR;  Service: General;  Laterality: N/A;    LAPAROSCOPIC LYSIS OF ADHESIONS N/A 5/4/2021    Procedure: LYSIS, ADHESIONS, LAPAROSCOPIC;  Surgeon: Misael Holm MD;  Location: [facility] OR;  Service: General;  Laterality: N/A;    UPPER GASTROINTESTINAL ENDOSCOPY  2016     Review of patient's allergies indicates:  No Known Allergies  Social History     Social History Narrative    Not on file     Family History   Problem Relation Age of Onset    Ovarian cancer Mother     Heart disease Mother     Osteoporosis Mother     Arthritis Mother     Hypertension Father     Stroke Father 50    Brain Hemorrhage Father     Aneurysm Father     Osteoarthritis Sister     Arthritis Sister     Hypertension Sister     Obesity Sister     Breast cancer Neg Hx     Colon cancer Neg Hx     Colon polyps Neg Hx     Crohn's disease Neg Hx     Ulcerative colitis Neg Hx          Current Outpatient Medications:     albuterol (VENTOLIN HFA) 90 mcg/actuation inhaler, Inhale 2 puffs into the lungs every 6 (six) hours as needed for Wheezing or Shortness of Breath. Rescue, Disp: 6.7 g, Rfl: 2    atorvastatin (LIPITOR) 10 MG tablet, Take 1 tablet (10 mg total) by mouth once daily., Disp: 90 tablet, Rfl: 3    CONSTULOSE 10 gram/15 mL solution, TAKE 10 ML BY MOUTH   THREE TIMES DAILY, Disp: 948 mL, Rfl: 0    diclofenac sodium (VOLTAREN) 1 % Gel, Apply 2 g topically 2 (two) times daily as needed., Disp: , Rfl:     meloxicam (MOBIC) 15 MG tablet, Take 1 tablet by mouth once daily, Disp: 90 tablet, Rfl: 0    ondansetron (ZOFRAN-ODT) 4 MG TbDL, Take 2 tablets (8 mg total) by mouth every 8 (eight) hours as needed., Disp: 10 tablet, Rfl: 1    ondansetron (ZOFRAN-ODT) 8 MG TbDL, Take 1 tablet (8 mg total) by mouth every 6 (six) hours as needed (nausea or vomiting)., Disp: 30 tablet, Rfl: 1    ondansetron (ZOFRAN-ODT) 8 MG TbDL, Take 1 tablet (8 mg total) by mouth every 6 (six) hours as needed., Disp: 60 tablet,  "Rfl: 3    pantoprazole (PROTONIX) 20 MG tablet, Take 1 tablet (20 mg total) by mouth once daily., Disp: 90 tablet, Rfl: 3    phenylephrine-DM-guaiFENesin (DECONEX DMX) 10-17.5-400 mg Tab, Take 1 tablet by mouth 2 (two) times daily as needed (cough congestion)., Disp: 20 tablet, Rfl: 1    promethazine (PHENERGAN) 25 MG suppository, Place 1 suppository (25 mg total) rectally 3 (three) times daily as needed for Nausea., Disp: 8 suppository, Rfl: 0    vitamin D (VITAMIN D3) 1000 units Tab, Take 3,000 Units by mouth once daily., Disp: , Rfl:       Review of Systems:  Constitutional: no fever or chills  Eyes: no visual changes  ENT: no nasal congestion or sore throat  Respiratory: no cough or shortness of breath  Cardiovascular: no chest pain  Gastrointestinal: no nausea or vomiting, tolerating diet  Musculoskeletal: pain and soreness    OBJECTIVE:      Vital Signs (Most Recent):  Vitals:    10/06/22 1304   Weight: 77.1 kg (170 lb)   Height: 5' 4" (1.626 m)     Body mass index is 29.18 kg/m².      Physical Exam:  Constitutional: The patient appears well-developed and well-nourished. No distress.   Skin: No lesions appreciated  Head: Normocephalic and atraumatic.   Nose: Nose normal.   Ears: No deformities seen  Eyes: Conjunctivae and EOM are normal.   Neck: No tracheal deviation present.   Cardiovascular: Normal rate and intact distal pulses.    Pulmonary/Chest: Effort normal. No respiratory distress.   Abdominal: There is no guarding.   Neurological: The patient is alert.   Psychiatric: The patient has a normal mood and affect.     Left Hand/Wrist Examination:    Observation/Inspection:  Swelling  Notable soft tissue mass to the radial forearm/mid shaft-mildly tender to palpation   Deformity  none  Discoloration  none     Scars   none    Atrophy  none    HAND/WRIST EXAMINATION:  Finkelstein's Test   Neg  WHAT Test    Neg  Snuff box tenderness   Neg  Hilliard's Test    Neg  Hook of Hamate Tenderness  Neg  CMC " grind    POS/Pain  Circumduction test   Pain  TTP to the CMC.     Neurovascular Exam:  Digits WWP, brisk CR < 3s throughout  NVI motor/LTS to M/R/U nerves, radial pulse 2+  Tinel's Test - Carpal Tunnel  Neg  Tinel's Test - Cubital Tunnel  Neg  Phalen's Test    Neg  Median Nerve Compression Test Neg    ROM hand full, painless    ROM wrist full, painless    ROM elbow full, painless    Abdomen not guarded  Respirations nonlabored  Perfusion intact    Diagnostic Results:       MRI left forearm:    Impression:     1. Full-thickness tear and retraction of the brachioradialis tendon accounting for the patient's reported soft tissue mass.  2. Mild extensor carpi radialis longus tendinosis with mild muscular strain.  This report was flagged in Epic as abnormal.      Imaging - I independently viewed the patient's imaging as well as the radiology report.      Left Wrist Xray 5/9/22  FINDINGS:  At the rest there is mild dorsal angulation of the radius and slight irregularity of the dorsal surface.  Soft tissue swelling however is not seen.  This is the sequela of the fracture identified July 21, 2021.  A fracture line is not presently seen.  The carpals appear intact.  Narrowing is noted at the radiocarpal joint, triscaphe and 1st carpal metacarpal joint consistent with DJD.     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  Mild dorsal angulation of the radius secondary to prior fracture of July 2021.  No acute fracture seen    Left Hand Xray 5/9/22  FINDINGS:  A fracture of the bones of the left hand is not seen.  Juxta-articular bone erosion or Osteoporosis is not seen.  Soft tissue swelling is not noted.  There is narrowing of the radiocarpal joint and degenerative changes at the triscaphe and 1st carpal metacarpal joint.  Mild degenerative changes seen at the 1st metacarpophalangeal joint and the interphalangeal joint of the thumb.  More significant osteoarthritic changes are noted at the D IP joints of the index, middle, ring  and little finger.     Impression:     Osteoarthritic changes at the wrist and fingers as described.  Plain film evidence of erosive or inflammatory arthritis is not seen      ASSESSMENT/PLAN:      67 y.o. yo female with Left hand generalized arthritis, Left CMC arthritis. Left forearm BR avulsion s/p prior distal radius fracture    Plan: The patient and I had a thorough discussion today.  We discussed the working diagnosis as well as several other potential alternative diagnoses.  Treatment options were discussed, both conservative and surgical.  Conservative treatment options would include things such as activity modifications, workplace modifications, a period of rest, oral vs topical OTC and prescription anti-inflammatory medications, occupational therapy, splinting/bracing, immobilization, corticosteroid injections, and others.  Surgical options were discussed as well.     At this time, the patient is doing well with conservative treatment.  Recommend continuation of nonsurgical treatment at this point in time.  We discussed that we could always reinject her left thumb CMC joint and she would like to set up an injection visit for December.  I will see her then we will repeat this corticosteroid injection per her request.  Follow-up in December.    Should the patient's symptoms worsen, persist, or fail to improve they should return for reevaluation and I would be happy to see them back anytime.        Please do not hesitate to reach out to us via email, phone, or MyChart with any questions, concerns, or feedback.

## 2022-10-11 ENCOUNTER — PATIENT MESSAGE (OUTPATIENT)
Dept: ADMINISTRATIVE | Facility: HOSPITAL | Age: 67
End: 2022-10-11
Payer: MEDICARE

## 2022-10-12 ENCOUNTER — LAB VISIT (OUTPATIENT)
Dept: LAB | Facility: HOSPITAL | Age: 67
End: 2022-10-12
Attending: INTERNAL MEDICINE
Payer: COMMERCIAL

## 2022-10-12 ENCOUNTER — OFFICE VISIT (OUTPATIENT)
Dept: GASTROENTEROLOGY | Facility: CLINIC | Age: 67
End: 2022-10-12
Payer: COMMERCIAL

## 2022-10-12 VITALS
WEIGHT: 171.5 LBS | HEART RATE: 72 BPM | SYSTOLIC BLOOD PRESSURE: 123 MMHG | BODY MASS INDEX: 29.28 KG/M2 | DIASTOLIC BLOOD PRESSURE: 76 MMHG | HEIGHT: 64 IN

## 2022-10-12 DIAGNOSIS — K21.00 GASTROESOPHAGEAL REFLUX DISEASE WITH ESOPHAGITIS WITHOUT HEMORRHAGE: ICD-10-CM

## 2022-10-12 DIAGNOSIS — K52.9 ENTERITIS: ICD-10-CM

## 2022-10-12 DIAGNOSIS — K56.699 STRICTURE OF SMALL INTESTINE: ICD-10-CM

## 2022-10-12 DIAGNOSIS — R10.84 GENERALIZED POSTPRANDIAL ABDOMINAL PAIN: ICD-10-CM

## 2022-10-12 DIAGNOSIS — K56.609 SMALL BOWEL OBSTRUCTION: ICD-10-CM

## 2022-10-12 DIAGNOSIS — R10.9 RECURRENT ABDOMINAL PAIN: ICD-10-CM

## 2022-10-12 DIAGNOSIS — D50.9 IRON DEFICIENCY ANEMIA, UNSPECIFIED IRON DEFICIENCY ANEMIA TYPE: ICD-10-CM

## 2022-10-12 DIAGNOSIS — K56.609 SMALL BOWEL OBSTRUCTION: Primary | ICD-10-CM

## 2022-10-12 LAB
CRP SERPL-MCNC: 3.2 MG/L (ref 0–8.2)
ERYTHROCYTE [SEDIMENTATION RATE] IN BLOOD BY PHOTOMETRIC METHOD: <2 MM/HR (ref 0–36)

## 2022-10-12 PROCEDURE — 99999 PR PBB SHADOW E&M-EST. PATIENT-LVL III: CPT | Mod: PBBFAC,,, | Performed by: INTERNAL MEDICINE

## 2022-10-12 PROCEDURE — 99215 PR OFFICE/OUTPT VISIT, EST, LEVL V, 40-54 MIN: ICD-10-PCS | Mod: S$GLB,,, | Performed by: INTERNAL MEDICINE

## 2022-10-12 PROCEDURE — 1160F RVW MEDS BY RX/DR IN RCRD: CPT | Mod: CPTII,S$GLB,, | Performed by: INTERNAL MEDICINE

## 2022-10-12 PROCEDURE — 99999 PR PBB SHADOW E&M-EST. PATIENT-LVL III: ICD-10-PCS | Mod: PBBFAC,,, | Performed by: INTERNAL MEDICINE

## 2022-10-12 PROCEDURE — 36415 COLL VENOUS BLD VENIPUNCTURE: CPT | Performed by: INTERNAL MEDICINE

## 2022-10-12 PROCEDURE — 1160F PR REVIEW ALL MEDS BY PRESCRIBER/CLIN PHARMACIST DOCUMENTED: ICD-10-PCS | Mod: CPTII,S$GLB,, | Performed by: INTERNAL MEDICINE

## 2022-10-12 PROCEDURE — 85652 RBC SED RATE AUTOMATED: CPT | Performed by: INTERNAL MEDICINE

## 2022-10-12 PROCEDURE — 3078F DIAST BP <80 MM HG: CPT | Mod: CPTII,S$GLB,, | Performed by: INTERNAL MEDICINE

## 2022-10-12 PROCEDURE — 3288F FALL RISK ASSESSMENT DOCD: CPT | Mod: CPTII,S$GLB,, | Performed by: INTERNAL MEDICINE

## 2022-10-12 PROCEDURE — 86140 C-REACTIVE PROTEIN: CPT | Performed by: INTERNAL MEDICINE

## 2022-10-12 PROCEDURE — 1101F PR PT FALLS ASSESS DOC 0-1 FALLS W/OUT INJ PAST YR: ICD-10-PCS | Mod: CPTII,S$GLB,, | Performed by: INTERNAL MEDICINE

## 2022-10-12 PROCEDURE — 86039 ANTINUCLEAR ANTIBODIES (ANA): CPT | Performed by: INTERNAL MEDICINE

## 2022-10-12 PROCEDURE — 3288F PR FALLS RISK ASSESSMENT DOCUMENTED: ICD-10-PCS | Mod: CPTII,S$GLB,, | Performed by: INTERNAL MEDICINE

## 2022-10-12 PROCEDURE — 3074F PR MOST RECENT SYSTOLIC BLOOD PRESSURE < 130 MM HG: ICD-10-PCS | Mod: CPTII,S$GLB,, | Performed by: INTERNAL MEDICINE

## 2022-10-12 PROCEDURE — 1101F PT FALLS ASSESS-DOCD LE1/YR: CPT | Mod: CPTII,S$GLB,, | Performed by: INTERNAL MEDICINE

## 2022-10-12 PROCEDURE — 99215 OFFICE O/P EST HI 40 MIN: CPT | Mod: S$GLB,,, | Performed by: INTERNAL MEDICINE

## 2022-10-12 PROCEDURE — 1159F MED LIST DOCD IN RCRD: CPT | Mod: CPTII,S$GLB,, | Performed by: INTERNAL MEDICINE

## 2022-10-12 PROCEDURE — 3078F PR MOST RECENT DIASTOLIC BLOOD PRESSURE < 80 MM HG: ICD-10-PCS | Mod: CPTII,S$GLB,, | Performed by: INTERNAL MEDICINE

## 2022-10-12 PROCEDURE — 86255 FLUORESCENT ANTIBODY SCREEN: CPT | Mod: 59 | Performed by: INTERNAL MEDICINE

## 2022-10-12 PROCEDURE — 86038 ANTINUCLEAR ANTIBODIES: CPT | Performed by: INTERNAL MEDICINE

## 2022-10-12 PROCEDURE — 3074F SYST BP LT 130 MM HG: CPT | Mod: CPTII,S$GLB,, | Performed by: INTERNAL MEDICINE

## 2022-10-12 PROCEDURE — 1159F PR MEDICATION LIST DOCUMENTED IN MEDICAL RECORD: ICD-10-PCS | Mod: CPTII,S$GLB,, | Performed by: INTERNAL MEDICINE

## 2022-10-12 PROCEDURE — 86235 NUCLEAR ANTIGEN ANTIBODY: CPT | Mod: 59 | Performed by: INTERNAL MEDICINE

## 2022-10-12 NOTE — PROGRESS NOTES
[facility] Consultation Note    Reason for Consult:    Chief Complaint   Patient presents with    GI Problem     To discuss double balloon procedure       PCP:   Deven Emerson    Referring MD:  Misael Holm Md  [address] [place] [id number]        HPI:  Pham Licea is a 67 y.o. female here for evaluation of and 2nd opinion for recurrent small-bowel obstruction and small-bowel strictures.  She had been seen by GI at our [place] location, Dr. Blakely, on 03/23/2021.  She has been regularly followed by Dr. Misael Holm, general surgeon in [place].  He has been treating her for recurrent small-bowel obstructions.  She has a complex history and extensive records reviewed.  She reports that her problems really started around the time of a cholecystectomy in September of 2020.  She was having symptoms at that time that were possibly thought due to her gallbladder which prompted removal.  She is concerned that those symptoms may have actually not been due to the gallbladder but rather a small bowel issue.  Recurrent small-bowel obstructions lead to an eventual exploratory laparotomy 05/04/2021.  A long section of the mid small bowel involving the proximal ileum contained multiple strictures, >20, some of which were pinpoint size.  A 60-70 cm length of small bowel was resected at that time with a hand-sewn side-to-side anastomosis performed.  An estimated 350 cm of bowel remained.  Pathology from those samples revealed edema, fibrosis, and chronic inflammation.    She continued to have problems with recurrent bowel obstructive symptoms after her initial surgery.  She eventually had another exploratory laparotomy 03/17/2022 and was found to have an internal hernia from adhesions.  Otherwise, the bowel appeared normal.  After this last surgery, she has continued to have recurrent problems with left-sided abdominal pain, in the mid abdomen near the  umbilicus associated with nausea and vomiting.  Symptoms are worsened with food intake.  She is now really only eating soft foods and liquids.  Most of her foods include things like eggs, grits, and noodles.  Symptoms occur about every 2-3 weeks, and are becoming more frequent.  She generally feels better after vomiting.  The symptoms usually last about 12-24 hours.  She then will eat a clear liquid diet for 3-4 days.  Zofran does not help during this time.  She is on Protonix regularly.  She denies NSAID use.  She denies blood in the stool.  She has constipation and history of rectal prolapse.  She has small stools.  She uses lactulose and Colace every day.    She has iron deficiency anemia for which she is receiving iron infusions about every 2-3 months.          Endoscopic history:  EGD and colonoscopy 11/09/2020 by Dr. Misael Holm.  Esophagus was normal, gastric erythema in the antrum was noted, and the duodenum was normal.  Gastric biopsies revealed reactive changes and no signs of H pylori.  Duodenal biopsies were normal.  Colonoscopy was complete with good bowel preparation.  A 15 mm lipoma was seen in the ascending colon.  Otherwise, the exam was unremarkable, and a 10 year repeat recommended.    EGD 11/04/2016 by Dr. Pink for epigastric pain and melena.  Esophagitis was noted.  Inflammation was seen in the antrum for H biopsies revealed mild nonspecific chronic gastritis.  A few small erosions were seen in the bulb.  Biopsies of the bulb revealed chronic active duodenitis with ulcer.  A single erosion was seen in the 4th portion of the duodenum for which biopsies were normal.    Colonoscopy 04/02/2015 by Dr. Donnell Lomax with [facility] was complete with good bowel preparation.  A lipoma was seen in the ascending colon.  Otherwise, the exam was unremarkable, and a 10 year repeat recommended.          ROS:  Constitutional: No fevers, chills  ENT: No congestion, rhinorrhea, or chronic sinus problems  CV:  No chest pain or palpitations  Pulm: No cough, No shortness of breath  Ophtho: No vision changes or pain  GI: see HPI  Derm: No rash or lesions  Heme: No lymphadenopathy, No bruising  MSK: No arthritis or joint swelling  : No dysuria, No frequent urination        Medical History:  has a past medical history of Anemia, Fatty liver (), Former smoker (), Osteoarthritis (), Pulmonary nodules (2019), and Ulcer of abdomen wall ().    Surgical History:  has a past surgical history that includes Upper gastrointestinal endoscopy (); Colonoscopy (~); Epidural steroid injection into lumbar spine (N/A, 10/12/2018); Epidural steroid injection into lumbar spine (N/A, 2019);  section (, , ); Hysterectomy (); Appendectomy (); Laparoscopic cholecystectomy (N/A, 2020); Colonoscopy (N/A, 2020); Esophagogastroduodenoscopy (N/A, 2020); Diagnostic laparoscopy (N/A, 2021); and Laparoscopic lysis of adhesions (N/A, 2021).    Family History: family history includes Aneurysm in her father; Arthritis in her mother and sister; Brain Hemorrhage in her father; Heart disease in her mother; Hypertension in her father and sister; Obesity in her sister; Osteoarthritis in her sister; Osteoporosis in her mother; Ovarian cancer in her mother; Stroke (age of onset: 50) in her father.    Social History:  reports that she quit smoking about 12 years ago. She has a 40.00 pack-year smoking history. She has never used smokeless tobacco. She reports that she does not currently use alcohol. She reports that she does not currently use drugs after having used the following drugs: Marijuana.    Review of patient's allergies indicates:  No Known Allergies    Prior to Admission medications    Medication Sig Start Date End Date Taking? Authorizing Provider   albuterol (VENTOLIN HFA) 90 mcg/actuation inhaler Inhale 2 puffs into the lungs every 6 (six) hours as needed for Wheezing  "or Shortness of Breath. Rescue 8/25/22  Yes Deven Emerson MD   atorvastatin (LIPITOR) 10 MG tablet Take 1 tablet (10 mg total) by mouth once daily. 3/31/22 3/31/23 Yes Deven Emerson MD   CONSTULOSE 10 gram/15 mL solution TAKE 10 ML BY MOUTH   THREE TIMES DAILY 8/1/22  Yes Solomon Blakely MD   diclofenac sodium (VOLTAREN) 1 % Gel Apply 2 g topically 2 (two) times daily as needed.   Yes Historical Provider   ondansetron (ZOFRAN-ODT) 8 MG TbDL Take 1 tablet (8 mg total) by mouth every 6 (six) hours as needed. 9/14/22  Yes Misael Holm MD   pantoprazole (PROTONIX) 20 MG tablet Take 1 tablet (20 mg total) by mouth once daily. 2/4/22 2/4/23 Yes Deven Emerson MD   promethazine (PHENERGAN) 25 MG suppository Place 1 suppository (25 mg total) rectally 3 (three) times daily as needed for Nausea. 5/25/22  Yes Donnell De La Torre MD   vitamin D (VITAMIN D3) 1000 units Tab Take 3,000 Units by mouth once daily.   Yes Historical Provider   ondansetron (ZOFRAN-ODT) 4 MG TbDL Take 2 tablets (8 mg total) by mouth every 8 (eight) hours as needed.  Patient not taking: Reported on 10/12/2022 5/25/22   Donnell De La Torre MD   meloxicam (MOBIC) 15 MG tablet Take 1 tablet by mouth once daily 9/27/22 10/12/22  Deven Emerson MD   ondansetron (ZOFRAN-ODT) 8 MG TbDL Take 1 tablet (8 mg total) by mouth every 6 (six) hours as needed (nausea or vomiting). 3/24/22 10/12/22  Vikki Matias DO   phenylephrine-DM-guaiFENesin (DECONEX DMX) 10-17.5-400 mg Tab Take 1 tablet by mouth 2 (two) times daily as needed (cough congestion). 8/29/22 10/12/22  Kimaka J. Yusef, NP       Objective Findings:  Vital Signs:  /76   Pulse 72   Ht 5' 4" (1.626 m)   Wt 77.8 kg (171 lb 8.3 oz)   LMP  (LMP Unknown)   BMI 29.44 kg/m²   Body mass index is 29.44 kg/m².      Physical Exam:  General Appearance:  Well appearing in no acute distress, appears stated age  Head:  Normocephalic, atraumatic  Eyes:  No scleral icterus " or pallor, EOMI  Extremities:  No clubbing, cyanosis, or edema  Skin:  No rash  Neurologic:  AAO x 4; CN II-XII intact      Labs:  Lab Results   Component Value Date    WBC 5.80 09/27/2022    HGB 12.4 09/27/2022    HCT 37.8 09/27/2022    MCV 88 09/27/2022    RDW 16.4 (H) 09/27/2022     09/27/2022    GRAN 2.6 09/27/2022    GRAN 44.1 09/27/2022    LYMPH 2.5 09/27/2022    LYMPH 43.6 09/27/2022    MONO 0.6 09/27/2022    MONO 10.3 09/27/2022    EOS 0.1 09/27/2022    BASO 0.04 09/27/2022     Lab Results   Component Value Date     09/27/2022    K 3.6 09/27/2022     09/27/2022    CO2 27 09/27/2022    GLU 97 09/27/2022    BUN 8 09/27/2022    CREATININE 0.7 09/27/2022    CALCIUM 9.3 09/27/2022    PROT 6.7 09/27/2022    ALBUMIN 3.9 09/27/2022    BILITOT 0.3 09/27/2022    ALKPHOS 65 09/27/2022    AST 17 09/27/2022    ALT 16 09/27/2022     Celiac serology panel normal 02/15/2021   H pylori stool antigen negative 02/16/2021   Fecal lactoferrin normal 02/16/2021   Ferritin normal   Tissue transglutaminase IgA normal 09/29/2022  Total IgA level normal 09/29/2022                Imaging:  CT of the abdomen and pelvis without contrast 07/18/2022:   FINDINGS:  Visualized lung bases show very slight dependent hypoventilatory change, no longer the opacification left lung base that was seen on the prior exam.  Liver and spleen are unremarkable in appearance.  There are cholecystectomy clips.  No biliary duct dilatation.  The abdominal aorta tapers without aneurysmal dilatation.  The pancreas is unremarkable appearance.  Renal pelvis mildly prominent but are extrarenal in location and there is no dilatation of intrarenal collecting structures opaque renal stone.  Although not the renal stone protocol CT, as visualized no ureteral dilatation, opaque ureteral stone or ureteral obstruction is evident.  Calcifications posterior to the urinary bladder on the left most likely vascular and appears similar to the prior exam.   Bladder mildly distended at time of the exam and.  Prior hysterectomy.  Adnexal region unremarkable in appearance.  Stomach, bowel, mesentery; postoperative changes in the pelvis.  No bowel obstructive or inflammatory change.  There is 3.2 cm fat density consistent with lipoma in the ascending colon.  The appendix is not seen with certainty but no abnormal appendix inflammatory changes in the appendiceal region is seen. Terminal ileum as visualized is unremarkable appearance.  Mild diverticulosis versus incomplete distention of colon without CT findings of acute diverticulitis.  There is a grade 1 spondylolisthesis L4-L5.  What appears to be diffuse disc bulge L4-5 with probable moderate or mild to moderate degree of spinal canal narrowing.  Impression:  No acute findings.  No bowel obstruction evident.  Findings as detailed above including fat density consistent with lipoma in the ascending colon similar to prior exams.  Postoperative changes.          CT of the abdomen and pelvis with IV and oral contrast 06/27/2022:   FINDINGS:  There is a new small band of infiltrate and atelectasis at the left lung base.  Consolidation or solid mass is not seen however.  Nude  the liver is of normal size contour and CT density without focal defect.  The gallbladder is absent with surgical clips noted.  The pancreas is of normal contour and CT density without edema or mass.  The spleen is of normal size and CT density.  The adrenal glands are not enlarged.  The kidneys are of normal size contour and contrast enhancement without mass stone or hydronephrosis.  The abdominal aorta and inferior vena cava are of normal caliber.  The stomach is of normal configuration.  The duodenum and proximal jejunum are of normal caliber without distention or mass.  At the mid jejunum there is thickening of the bowel wall of a few loops and several other loops which show air-fluid levels and distention up to 3.8 cm in diameter.  The distal ileum  and terminal ileum are of normal caliber without dilation or edema.  The colon is of normal configuration without distention or mass.  There are few diverticuli noted in the descending and sigmoid colon without CT evidence of diverticulitis.  The bladder is of normal contour without mass or asymmetry.  A uterus is not seen.  A small collection of free fluid is identified in the right pelvis.  Impression:  There are loops of dilated small bowel with air-fluid levels identified in the left abdomen whereas the distal ileum and terminal ileum and the duodenum are not dilated or edematous.  This is a probable partial small bowel obstruction.  Small amount of free fluid is seen in the pelvic cul de sac.  Prior hysterectomy.  Prior cholecystectomy.  Small infiltrate atelectasis identified at the left lung base, new.              CT of the abdomen and pelvis without contrast 05/25/2022:   FINDINGS:  Calcifications are noted in the region the mitral valve, please correlate for valve disease.  Surgical clips are noted in the right upper quadrant of the abdomen suggestive cholecystectomy clips.  The adrenal glands appear appropriate.  Neither kidney demonstrates evidence of obstruction.  No intrarenal stones are convincingly noted.  The ureters are normal in caliber no calcifications are noted within the bladder to suggest recently passed stones.  Bladder wall thickening is noted as can be seen with urinary tract infection, postobstructive change, or neurogenic bladder.  The uterus is not visualized consistent with hysterectomy.  Postsurgical changes are noted with a surgical line in the left lower quadrant.  This evaluation is significantly hindered by the lack of oral and intravenous contrast.  A mildly prominent loop of bowel is noted in the right lower quadrant in the pelvis, this only appears to represent 1 loop of bowel and could even relate to colon.  Oral and intravenous contrast administration may be of use.  This  will also allow for delayed imaging in which this area can be reassessed to determine whether this relates to peristalsis or ileus, small bowel or colon rather than fixed obstruction from adhesions.  Dilated loops of small bowel are also noted in the left upper quadrant with a diameter of 3.6 cm.  A discrete transition known is difficult to identify but but may be present.  There is the suggestion of bowel wall thickening near the level of the umbilicus near the midline extending to the left as on image 47 sequence 2..  This further raises the concern for small bowel obstruction and or vascular injury in this region.  Post-contrast imaging or oral contrast imaging may be of use for confirmation.  An anterolisthesis is noted of the L4 on L5 vertebral body of 4 mm.  Intervertebral disc height loss noted at the L4-L5 level.  Impression:  1. Multiple dilated loops of small bowel are noted that are fluid-filled left upper quadrant with the suggestion of bowel wall thickening near the level of the umbilicus in the midline and extending into the left dilated loops of bowel.  This raises the concern for vascular injury and or bowel infarction/inflammation possibly associated with obstruction.  Surgical consultation is suggested.  Post-contrast oral and intravenous imaging may be of use for confirmation given that this exam is severely hindered.  2. A 2nd region of concern is noted within the right hemipelvis where a dilated loop of small bowel is noted without obvious bowel wall thickening.  This region is nonspecific and could relate to colon, small bowel, ileus, or obstruction.  Further evaluation and clinical correlation is necessary                    Assessment:  Pham Licea is a 67 y.o. female with:  1. Small bowel obstruction    2. Stricture of small intestine    3. Enteritis    4. Iron deficiency anemia, unspecified iron deficiency anemia type    5. Recurrent abdominal pain    6. Generalized postprandial  abdominal pain    7. Gastroesophageal reflux disease with esophagitis without hemorrhage      Complex case here for a 2nd opinion regarding recurrent small-bowel obstruction and small-bowel strictures.  Exploratory laparotomy in May of 2021 revealed a long segment of the mid bowel with multiple luminal strictures requiring a resection of a 60-70 cm portion of small bowel.  She has a side-to-side anastomosis, and is reported to have more than 350 cm of small bowel left.  The etiology of these strictures is unclear.  Pathology results from the resection indicated edema, fibrosis, and chronic inflammation; but this is nonspecific.  She denies NSAID use.  There is no evidence of celiac disease based on both serology and histology of the small bowel.  No family history of IBD.  Vascular lesions and vasculitis could potentially cause a similar presentation, but she has no other clinical findings for this.  She had repeat exploratory laparotomy in March of this year for recurrent small-bowel obstruction that was found to be due to an internal hernia secondary to adhesions.  She is still having ongoing symptoms after that surgery.  It is difficult to know if these symptoms are secondary to small bowel strictures, inflammation, or adhesions.  Her symptoms are very typical and classic for partial intestinal obstruction as she is only able to tolerate liquids and soft foods.  Iron deficiency may be a result of underlying GI disorder or process.      Recommendations/Plan:  1. I will get an MR enterography  2. Blood test as noted below and will look for signs of inflammation  3. Stool calprotectin to look for inflammation  4. I will arrange for an antegrade double balloon enteroscopy for direct visualization of the small bowel.  We discussed the procedure in detail and that this could be a challenging and unpredictable procedure given her surgical history.      Follow-up pending results of above.  Vascular evaluation may be  "needed in the future with the CTA of the mesenteric arteries, but will complete the above tests first.      Order summary:  Orders Placed This Encounter    MRI Enterography (XPD)    CATARINO Screen w/Reflex    ANTI-NEUTROPHILIC CYTOPLASMIC ANTIBODY    C-reactive protein    ESR (SEDIMENTATION RATE, MANUAL)    Calprotectin, Stool    Case Request Endoscopy: ENTEROSCOPY, DOUBLE BALLOON, ANTEGRADE         Thank you so much for allowing me to participate in the care of Pham Licea        Collin Mares MD            Addendum 10/20/2022:  Case reviewed by Dr. Santoyo (pathology department) with review of the bowel resection samples.  Per Dr. Santoyo:    "There is no evidence of crohns in this resection.       There is a thickened serosa and mucosal linear ulcers. No obvious organisms.  No transmural chronic inflammation , granulomas, nor perineuritis, or involvement of inflammation beyond a very superficial surface ulcer.     There was a narrowing /stricture described. I dont have a good cause here in the slides. It could be anatomic or something else ? Vascular?     The ulcers are unusual in that they are well defined or linear . If there was global ischemia I would expect more generalized necrosis but  almost flask like. .     No vasculitis, thrombi, or obvious amyloid."  "

## 2022-10-13 ENCOUNTER — LAB VISIT (OUTPATIENT)
Dept: LAB | Facility: HOSPITAL | Age: 67
End: 2022-10-13
Attending: INTERNAL MEDICINE
Payer: COMMERCIAL

## 2022-10-13 DIAGNOSIS — K56.609: ICD-10-CM

## 2022-10-13 DIAGNOSIS — K56.699 STRICTURE OF SMALL INTESTINE: Primary | ICD-10-CM

## 2022-10-13 DIAGNOSIS — K52.9 ENTERITIS: ICD-10-CM

## 2022-10-13 LAB
ANA PATTERN 1: NORMAL
ANA SER QL IF: POSITIVE
ANA TITR SER IF: NORMAL {TITER}

## 2022-10-13 PROCEDURE — 83993 ASSAY FOR CALPROTECTIN FECAL: CPT | Performed by: INTERNAL MEDICINE

## 2022-10-14 LAB
ANTI SM ANTIBODY: 0.07 RATIO (ref 0–0.99)
ANTI SM/RNP ANTIBODY: 0.06 RATIO (ref 0–0.99)
ANTI-SM INTERPRETATION: NEGATIVE
ANTI-SM/RNP INTERPRETATION: NEGATIVE
ANTI-SSA ANTIBODY: 0.07 RATIO (ref 0–0.99)
ANTI-SSA INTERPRETATION: NEGATIVE
ANTI-SSB ANTIBODY: 0.04 RATIO (ref 0–0.99)
ANTI-SSB INTERPRETATION: NEGATIVE
DSDNA AB SER-ACNC: NORMAL [IU]/ML

## 2022-10-17 LAB
ANCA AB TITR SER IF: NORMAL TITER
P-ANCA TITR SER IF: NORMAL TITER

## 2022-10-20 LAB — CALPROTECTIN STL-MCNT: 30.7 MCG/G

## 2022-11-01 ENCOUNTER — HOSPITAL ENCOUNTER (OUTPATIENT)
Dept: RADIOLOGY | Facility: HOSPITAL | Age: 67
Discharge: HOME | End: 2022-11-01
Attending: INTERNAL MEDICINE
Payer: COMMERCIAL

## 2022-11-01 DIAGNOSIS — K56.609 SMALL BOWEL OBSTRUCTION: ICD-10-CM

## 2022-11-01 DIAGNOSIS — K52.9 ENTERITIS: ICD-10-CM

## 2022-11-01 DIAGNOSIS — K56.699 STRICTURE OF SMALL INTESTINE: ICD-10-CM

## 2022-11-01 PROCEDURE — 72197 MRI PELVIS W/O & W/DYE: CPT | Mod: 26,,, | Performed by: RADIOLOGY

## 2022-11-01 PROCEDURE — 25500020 PHARM REV CODE 255: Performed by: INTERNAL MEDICINE

## 2022-11-01 PROCEDURE — 72197 MRI ENTEROGRAPHY (XPD): ICD-10-PCS | Mod: 26,,, | Performed by: RADIOLOGY

## 2022-11-01 PROCEDURE — A9585 GADOBUTROL INJECTION: HCPCS | Performed by: INTERNAL MEDICINE

## 2022-11-01 PROCEDURE — 74183 MRI ABD W/O CNTR FLWD CNTR: CPT | Mod: 26,,, | Performed by: RADIOLOGY

## 2022-11-01 PROCEDURE — 72197 MRI PELVIS W/O & W/DYE: CPT | Mod: TC

## 2022-11-01 PROCEDURE — 74183 MRI ENTEROGRAPHY (XPD): ICD-10-PCS | Mod: 26,,, | Performed by: RADIOLOGY

## 2022-11-01 RX ORDER — GADOBUTROL 604.72 MG/ML
10 INJECTION INTRAVENOUS
Status: COMPLETED | OUTPATIENT
Start: 2022-11-01 | End: 2022-11-01

## 2022-11-01 RX ADMIN — GADOBUTROL 10 ML: 604.72 INJECTION INTRAVENOUS at 05:11

## 2022-11-08 ENCOUNTER — LAB VISIT (OUTPATIENT)
Dept: LAB | Facility: HOSPITAL | Age: 67
End: 2022-11-08
Attending: SURGERY
Payer: MEDICARE

## 2022-11-08 DIAGNOSIS — D50.8 IRON DEFICIENCY ANEMIA SECONDARY TO INADEQUATE DIETARY IRON INTAKE: ICD-10-CM

## 2022-11-08 LAB
ALBUMIN SERPL BCP-MCNC: 4.4 G/DL (ref 3.5–5.2)
ALP SERPL-CCNC: 74 U/L (ref 55–135)
ALT SERPL W/O P-5'-P-CCNC: 10 U/L (ref 10–44)
ANION GAP SERPL CALC-SCNC: 11 MMOL/L (ref 8–16)
AST SERPL-CCNC: 13 U/L (ref 10–40)
BASOPHILS # BLD AUTO: 0.06 K/UL (ref 0–0.2)
BASOPHILS NFR BLD: 0.9 % (ref 0–1.9)
BILIRUB SERPL-MCNC: 0.5 MG/DL (ref 0.1–1)
BUN SERPL-MCNC: 12 MG/DL (ref 8–23)
CALCIUM SERPL-MCNC: 9.9 MG/DL (ref 8.7–10.5)
CHLORIDE SERPL-SCNC: 104 MMOL/L (ref 95–110)
CO2 SERPL-SCNC: 26 MMOL/L (ref 23–29)
CREAT SERPL-MCNC: 0.7 MG/DL (ref 0.5–1.4)
DIFFERENTIAL METHOD: NORMAL
EOSINOPHIL # BLD AUTO: 0.1 K/UL (ref 0–0.5)
EOSINOPHIL NFR BLD: 0.7 % (ref 0–8)
ERYTHROCYTE [DISTWIDTH] IN BLOOD BY AUTOMATED COUNT: 13.4 % (ref 11.5–14.5)
EST. GFR  (NO RACE VARIABLE): >60 ML/MIN/1.73 M^2
FERRITIN SERPL-MCNC: 120 NG/ML (ref 20–300)
GLUCOSE SERPL-MCNC: 83 MG/DL (ref 70–110)
HCT VFR BLD AUTO: 39.5 % (ref 37–48.5)
HGB BLD-MCNC: 13.1 G/DL (ref 12–16)
IMM GRANULOCYTES # BLD AUTO: 0.02 K/UL (ref 0–0.04)
IMM GRANULOCYTES NFR BLD AUTO: 0.3 % (ref 0–0.5)
IRON SERPL-MCNC: 96 UG/DL (ref 30–160)
LYMPHOCYTES # BLD AUTO: 2.5 K/UL (ref 1–4.8)
LYMPHOCYTES NFR BLD: 35.8 % (ref 18–48)
MCH RBC QN AUTO: 30 PG (ref 27–31)
MCHC RBC AUTO-ENTMCNC: 33.2 G/DL (ref 32–36)
MCV RBC AUTO: 91 FL (ref 82–98)
MONOCYTES # BLD AUTO: 0.6 K/UL (ref 0.3–1)
MONOCYTES NFR BLD: 9.2 % (ref 4–15)
NEUTROPHILS # BLD AUTO: 3.7 K/UL (ref 1.8–7.7)
NEUTROPHILS NFR BLD: 53.1 % (ref 38–73)
NRBC BLD-RTO: 0 /100 WBC
PLATELET # BLD AUTO: 256 K/UL (ref 150–450)
PMV BLD AUTO: 9.9 FL (ref 9.2–12.9)
POTASSIUM SERPL-SCNC: 3.7 MMOL/L (ref 3.5–5.1)
PROT SERPL-MCNC: 6.9 G/DL (ref 6–8.4)
RBC # BLD AUTO: 4.36 M/UL (ref 4–5.4)
SATURATED IRON: 23 % (ref 20–50)
SODIUM SERPL-SCNC: 141 MMOL/L (ref 136–145)
TOTAL IRON BINDING CAPACITY: 426 UG/DL (ref 250–450)
TRANSFERRIN SERPL-MCNC: 288 MG/DL (ref 200–375)
WBC # BLD AUTO: 6.87 K/UL (ref 3.9–12.7)

## 2022-11-08 PROCEDURE — 36415 COLL VENOUS BLD VENIPUNCTURE: CPT | Performed by: INTERNAL MEDICINE

## 2022-11-08 PROCEDURE — 80053 COMPREHEN METABOLIC PANEL: CPT | Performed by: INTERNAL MEDICINE

## 2022-11-08 PROCEDURE — 84466 ASSAY OF TRANSFERRIN: CPT | Performed by: INTERNAL MEDICINE

## 2022-11-08 PROCEDURE — 82728 ASSAY OF FERRITIN: CPT | Performed by: INTERNAL MEDICINE

## 2022-11-08 PROCEDURE — 85025 COMPLETE CBC W/AUTO DIFF WBC: CPT | Performed by: INTERNAL MEDICINE

## 2022-11-10 ENCOUNTER — OFFICE VISIT (OUTPATIENT)
Dept: HEMATOLOGY/ONCOLOGY | Facility: CLINIC | Age: 67
End: 2022-11-10
Payer: COMMERCIAL

## 2022-11-10 DIAGNOSIS — K56.1 INTUSSUSCEPTION OF INTESTINE: ICD-10-CM

## 2022-11-10 DIAGNOSIS — D64.9 SEVERE ANEMIA: Primary | ICD-10-CM

## 2022-11-10 PROCEDURE — 99214 PR OFFICE/OUTPT VISIT, EST, LEVL IV, 30-39 MIN: ICD-10-PCS | Mod: 95,,, | Performed by: INTERNAL MEDICINE

## 2022-11-10 PROCEDURE — 99214 OFFICE O/P EST MOD 30 MIN: CPT | Mod: 95,,, | Performed by: INTERNAL MEDICINE

## 2022-11-11 NOTE — PROGRESS NOTES
Subjective:    The patient location is: home  Visit type: Virtual visit with synchronous audio and video  Face-to-face or time spent with patient on the encounter:20min  Total time spent on and for  this encounter which includes non face-to-face time preparing to see patient, review of tests, obtaining and or reviewing separately obtained records documenting clinical information in the electronic or other health records, independently interpreting results which is not separately reported ,and communicating results to the patient/family/caregiver and in care coordination and treatment planning/communicating with pharmacy for prescriptions/addressing social needs/arranging follow-up and or referrals :25 min    Each patient I provide medical services by telemedicine is:  (1) informed of the relationship between the physician and patient and the respective role of any other health care provider with respect to management of the patient; and (2) notified that he or she may decline to receive medical services by telemedicine and may withdraw from such care at any time.  This is a video visit therefore some elements of the physical exam such as vital signs, heart sounds are breath sounds are not included and may be included if found in recent clinic notes of other providers assessing same patient. Any symptoms or signs that were visualized were stated by the patient may be included in this note.     Patient ID: Pham Licea is a 67 y.o. female.    Chief Complaint:      sept 2020 had gall bladder surgery, thern she had a bowel obstruction, no surgery then, had 2 feet of bowels removed after a second bowel obstruction  Esophageal ulcers and dudenal ulcers- with bleeding in 2017  Recd blood in [facility] this weekend, pt went to ED because she was shaky and week and couldn't think was found to be anemic in ed   pt readmitted 3/22 had repeat bowel obstruction   Seen at emergency room May 25, 2022 with CT scan of abdomen  which shows partial obstruction patient having significant abdominal pain.  Discomfort belching similar to her prior obstructive symptoms  Past Medical History:   Diagnosis Date    Anemia     Fatty liver 2015    Former smoker 2009    Osteoarthritis 2010    Pulmonary nodules 2019    Repeat CT in 6 mo    Ulcer of abdomen wall 2016     Past Surgical History:   Procedure Laterality Date    APPENDECTOMY  1993     SECTION  , , 1983    x3    COLONOSCOPY  ~2014    [facility]: normal findings per patient report    COLONOSCOPY N/A 2020    Procedure: COLONOSCOPY;  Surgeon: Misael Holm MD;  Location: [facility] ENDO;  Service: General;  Laterality: N/A;    DIAGNOSTIC LAPAROSCOPY N/A 2021    Procedure: LAPAROSCOPY, DIAGNOSTIC;  Surgeon: Misael Holm MD;  Location: [facility] OR;  Service: General;  Laterality: N/A;    EPIDURAL STEROID INJECTION INTO LUMBAR SPINE N/A 10/12/2018    Procedure: Injection-steroid-epidural-lumbar;  Surgeon: Kal Johnson MD;  Location: [facility] OR;  Service: Pain Management;  Laterality: N/A;  L5-S1    EPIDURAL STEROID INJECTION INTO LUMBAR SPINE N/A 2019    Procedure: Injection-steroid-epidural-lumbar L5-S1;  Surgeon: Kal Johnson MD;  Location: [facility] OR;  Service: Pain Management;  Laterality: N/A;    ESOPHAGOGASTRODUODENOSCOPY N/A 2020    Procedure: ESOPHAGOGASTRODUODENOSCOPY (EGD);  Surgeon: Misael Holm MD;  [facility];  Service: General;  Laterality: N/A;    HYSTERECTOMY      one ovary conserved    LAPAROSCOPIC CHOLECYSTECTOMY N/A 2020    Procedure: CHOLECYSTECTOMY, LAPAROSCOPIC;  Surgeon: Govind Frey MD;  Location: [facility] OR;  Service: General;  Laterality: N/A;    LAPAROSCOPIC LYSIS OF ADHESIONS N/A 2021    Procedure: LYSIS, ADHESIONS, LAPAROSCOPIC;  Surgeon: Misael Holm MD;  Location: [facility] OR;  Service: General;  Laterality: N/A;    UPPER GASTROINTESTINAL ENDOSCOPY  2016     Family History   Problem Relation Age  of Onset    Ovarian cancer Mother     Heart disease Mother     Osteoporosis Mother     Arthritis Mother     Hypertension Father     Stroke Father 50    Brain Hemorrhage Father     Aneurysm Father     Osteoarthritis Sister     Arthritis Sister     Hypertension Sister     Obesity Sister     Breast cancer Neg Hx     Colon cancer Neg Hx     Colon polyps Neg Hx     Crohn's disease Neg Hx     Ulcerative colitis Neg Hx       Social History     Socioeconomic History    Marital status:     Number of children: 4    Highest education level: Some college, no degree   Occupational History    Occupation: sale specialist-    Tobacco Use    Smoking status: Former     Packs/day: 1.00     Years: 40.00     Pack years: 40.00     Types: Cigarettes     Quit date: 2009     Years since quittin.0    Smokeless tobacco: Never    Tobacco comments:     quit    Substance and Sexual Activity    Alcohol use: Not Currently     Comment: Not often - one glass of wine every now and then    Drug use: Not Currently     Types: Marijuana     Comment: in     Sexual activity: Not Currently     Social Determinants of Health     Financial Resource Strain: Low Risk     Difficulty of Paying Living Expenses: Not hard at all   Food Insecurity: No Food Insecurity    Worried About Running Out of Food in the Last Year: Never true    Ran Out of Food in the Last Year: Never true   Transportation Needs: No Transportation Needs    Lack of Transportation (Medical): No    Lack of Transportation (Non-Medical): No   Physical Activity: Inactive    Days of Exercise per Week: 0 days    Minutes of Exercise per Session: 0 min   Stress: Stress Concern Present    Feeling of Stress : To some extent   Social Connections: Unknown    Frequency of Communication with Friends and Family: More than three times a week    Frequency of Social Gatherings with Friends and Family: Once a week    Active Member of Clubs or Organizations: Yes    Attends Club or  Organization Meetings: More than 4 times per year    Marital Status:    Housing Stability: Low Risk     Unable to Pay for Housing in the Last Year: No    Number of Places Lived in the Last Year: 2    Unstable Housing in the Last Year: No     Review of patient's allergies indicates:  No Known Allergies    Current Outpatient Medications:     albuterol (VENTOLIN HFA) 90 mcg/actuation inhaler, Inhale 2 puffs into the lungs every 6 (six) hours as needed for Wheezing or Shortness of Breath. Rescue, Disp: 6.7 g, Rfl: 2    atorvastatin (LIPITOR) 10 MG tablet, Take 1 tablet (10 mg total) by mouth once daily., Disp: 90 tablet, Rfl: 3    CONSTULOSE 10 gram/15 mL solution, TAKE 10 ML BY MOUTH   THREE TIMES DAILY, Disp: 948 mL, Rfl: 0    diclofenac sodium (VOLTAREN) 1 % Gel, Apply 2 g topically 2 (two) times daily as needed., Disp: , Rfl:     ondansetron (ZOFRAN-ODT) 4 MG TbDL, Take 2 tablets (8 mg total) by mouth every 8 (eight) hours as needed. (Patient not taking: Reported on 10/12/2022), Disp: 10 tablet, Rfl: 1    ondansetron (ZOFRAN-ODT) 8 MG TbDL, Take 1 tablet (8 mg total) by mouth every 6 (six) hours as needed., Disp: 60 tablet, Rfl: 3    pantoprazole (PROTONIX) 20 MG tablet, Take 1 tablet (20 mg total) by mouth once daily., Disp: 90 tablet, Rfl: 3    promethazine (PHENERGAN) 25 MG suppository, Place 1 suppository (25 mg total) rectally 3 (three) times daily as needed for Nausea., Disp: 8 suppository, Rfl: 0    vitamin D (VITAMIN D3) 1000 units Tab, Take 3,000 Units by mouth once daily., Disp: , Rfl:   Review of Systems   Constitutional:  Positive for malaise/fatigue. Negative for weight loss.        Mostly good appetite lost weight with her sx   HENT:  Negative for hearing loss.    Eyes:  Negative for blurred vision and double vision.   Gastrointestinal:  Positive for constipation.        Takes lactulose twice a day and colace twice a day   Musculoskeletal:  Negative for back pain, myalgias and neck pain.         Leg cramps and shaky   Skin:  Negative for rash.   Neurological:  Negative for dizziness, weakness and headaches.   Endo/Heme/Allergies:  Does not bruise/bleed easily. recent bowel obstruction 3/22 with repeat abdominal pain discomfort emergency room visit last night the outpatient surgical appointment  Physical Exam    Wt Readings from Last 3 Encounters:   10/12/22 77.8 kg (171 lb 8.3 oz)   10/06/22 77.1 kg (170 lb)   09/27/22 77.5 kg (170 lb 13.7 oz)     Temp Readings from Last 3 Encounters:   09/19/22 98.1 °F (36.7 °C)   09/13/22 97.8 °F (36.6 °C)   08/29/22 97.9 °F (36.6 °C)     BP Readings from Last 3 Encounters:   10/12/22 123/76   09/27/22 110/70   09/19/22 122/83     Pulse Readings from Last 3 Encounters:   10/12/22 72   09/27/22 76   09/19/22 71     Lab Results   Component Value Date    WBC 6.87 11/08/2022    HGB 13.1 11/08/2022    HCT 39.5 11/08/2022    MCV 91 11/08/2022     11/08/2022       BMP  Lab Results   Component Value Date     11/08/2022    K 3.7 11/08/2022     11/08/2022    CO2 26 11/08/2022    BUN 12 11/08/2022    CREATININE 0.7 11/08/2022    CALCIUM 9.9 11/08/2022    ANIONGAP 11 11/08/2022    ESTGFRAFRICA >60.0 06/27/2022    EGFRNONAA >60.0 06/27/2022     Lab Results   Component Value Date    IRON 96 11/08/2022    TIBC 426 11/08/2022    FERRITIN 120 11/08/2022       Impression:  CT scan abdomen May 25, 2022     1. Multiple dilated loops of small bowel are noted that are fluid-filled left upper quadrant with the suggestion of bowel wall thickening near the level of the umbilicus in the midline and extending into the left dilated loops of bowel.  This raises the concern for vascular injury and or bowel infarction/inflammation possibly associated with obstruction.  Surgical consultation is suggested.  Post-contrast oral and intravenous imaging may be of use for confirmation given that this exam is severely hindered.  2. A 2nd region of concern is noted within the right hemipelvis  where a dilated loop of small bowel is noted without obvious bowel wall thickening.  This region is nonspecific and could relate to colon, small bowel, ileus, or obstruction.  Further evaluation and clinical correlation is necessary     Patient Active Problem List   Diagnosis    Primary osteoarthritis involving multiple joints    Obesity (BMI 30.0-34.9)    Gastroesophageal reflux disease    Right knee pain    Chronic midline low back pain    Acute tear medial meniscus, right, sequela    Primary osteoarthritis of right knee    Lumbar radiculitis    Trigger middle finger of right hand    Nausea    Abdominal pain    Encounter for postoperative care    Intestinal obstruction    Hypokalemia    Constipation    History of cholecystectomy    Right upper quadrant pain    History of hysterectomy    Rectal bleeding    Hemorrhoids    Mitral valve prolapse    Coronary artery calcification    Anemia    SAMPSON (dyspnea on exertion), noted 2010    History of smoking 25-50 pack years, 46 py, quit 2009    Other emphysema    NSAID long-term use, started 2017    Bandemia    Family history of premature CAD    Cardiovascular event risk, ASCVD 10-year risk 4.8%, 2019    Abdominal obesity    Dyslipidemia, baseline LDL-C 112, HDL-C 49    Iron deficiency anemia secondary to inadequate dietary iron intake    Severe anemia    S/p small bowel obstruction    Arm pain, lateral, left        Assessment and Plan   NATHAN: pt had partail bowel removal from opbstructions with recurrneces Bowel obs again 3/22 repeat sx and adhesiolysis, Going to [facility] for evaluation of [facility] bowel.  Will keep patient on Q 6 weeks the iron checks   recvd infusinal iron with excellent response   Orders will be placed for [facility] if infusion needed      Slight B12 deficiency also continue to monitor most recent test June 21, 2022 acceptable  Patient to continue follow-up of her DJD with PCP

## 2022-11-14 ENCOUNTER — PATIENT MESSAGE (OUTPATIENT)
Dept: GASTROENTEROLOGY | Facility: CLINIC | Age: 67
End: 2022-11-14
Payer: MEDICARE

## 2022-11-14 ENCOUNTER — TELEPHONE (OUTPATIENT)
Dept: ENDOSCOPY | Facility: HOSPITAL | Age: 67
End: 2022-11-14
Payer: MEDICARE

## 2022-11-14 ENCOUNTER — PATIENT MESSAGE (OUTPATIENT)
Dept: ENDOSCOPY | Facility: HOSPITAL | Age: 67
End: 2022-11-14
Payer: MEDICARE

## 2022-11-14 ENCOUNTER — PATIENT MESSAGE (OUTPATIENT)
Dept: SURGERY | Facility: CLINIC | Age: 67
End: 2022-11-14
Payer: MEDICARE

## 2022-11-14 NOTE — TELEPHONE ENCOUNTER
Telephoned pt to schedule antegrade double balloon enteroscopy.  Spoke with pt and procedure scheduled for 12/22/22 at 9:00am.  Reviewed medical history and medications.  Instructed on procedure and preparation.  Pt verbalized understanding.  Copy of instructions sent via patient portal.

## 2022-11-21 ENCOUNTER — PATIENT MESSAGE (OUTPATIENT)
Dept: HEMATOLOGY/ONCOLOGY | Facility: CLINIC | Age: 67
End: 2022-11-21
Payer: MEDICARE

## 2022-11-21 ENCOUNTER — PATIENT MESSAGE (OUTPATIENT)
Dept: ORTHOPEDICS | Facility: CLINIC | Age: 67
End: 2022-11-21
Payer: MEDICARE

## 2022-11-28 ENCOUNTER — PATIENT MESSAGE (OUTPATIENT)
Dept: ENDOSCOPY | Facility: HOSPITAL | Age: 67
End: 2022-11-28
Payer: MEDICARE

## 2022-12-01 ENCOUNTER — OFFICE VISIT (OUTPATIENT)
Dept: ORTHOPEDICS | Facility: CLINIC | Age: 67
End: 2022-12-01
Payer: COMMERCIAL

## 2022-12-01 VITALS — HEIGHT: 64 IN | BODY MASS INDEX: 29.19 KG/M2 | WEIGHT: 171 LBS

## 2022-12-01 DIAGNOSIS — M18.12 ARTHRITIS OF CARPOMETACARPAL (CMC) JOINT OF LEFT THUMB: Primary | ICD-10-CM

## 2022-12-01 PROCEDURE — 1101F PR PT FALLS ASSESS DOC 0-1 FALLS W/OUT INJ PAST YR: ICD-10-PCS | Mod: CPTII,S$GLB,, | Performed by: ORTHOPAEDIC SURGERY

## 2022-12-01 PROCEDURE — 99499 NO LOS: ICD-10-PCS | Mod: S$GLB,,, | Performed by: ORTHOPAEDIC SURGERY

## 2022-12-01 PROCEDURE — 99499 UNLISTED E&M SERVICE: CPT | Mod: S$GLB,,, | Performed by: ORTHOPAEDIC SURGERY

## 2022-12-01 PROCEDURE — 99999 PR PBB SHADOW E&M-EST. PATIENT-LVL III: CPT | Mod: PBBFAC,,, | Performed by: ORTHOPAEDIC SURGERY

## 2022-12-01 PROCEDURE — 3288F FALL RISK ASSESSMENT DOCD: CPT | Mod: CPTII,S$GLB,, | Performed by: ORTHOPAEDIC SURGERY

## 2022-12-01 PROCEDURE — 1101F PT FALLS ASSESS-DOCD LE1/YR: CPT | Mod: CPTII,S$GLB,, | Performed by: ORTHOPAEDIC SURGERY

## 2022-12-01 PROCEDURE — 99999 PR PBB SHADOW E&M-EST. PATIENT-LVL III: ICD-10-PCS | Mod: PBBFAC,,, | Performed by: ORTHOPAEDIC SURGERY

## 2022-12-01 PROCEDURE — 3008F PR BODY MASS INDEX (BMI) DOCUMENTED: ICD-10-PCS | Mod: CPTII,S$GLB,, | Performed by: ORTHOPAEDIC SURGERY

## 2022-12-01 PROCEDURE — 20600 DRAIN/INJ JOINT/BURSA W/O US: CPT | Mod: LT,S$GLB,, | Performed by: ORTHOPAEDIC SURGERY

## 2022-12-01 PROCEDURE — 1159F PR MEDICATION LIST DOCUMENTED IN MEDICAL RECORD: ICD-10-PCS | Mod: CPTII,S$GLB,, | Performed by: ORTHOPAEDIC SURGERY

## 2022-12-01 PROCEDURE — 1159F MED LIST DOCD IN RCRD: CPT | Mod: CPTII,S$GLB,, | Performed by: ORTHOPAEDIC SURGERY

## 2022-12-01 PROCEDURE — 3288F PR FALLS RISK ASSESSMENT DOCUMENTED: ICD-10-PCS | Mod: CPTII,S$GLB,, | Performed by: ORTHOPAEDIC SURGERY

## 2022-12-01 PROCEDURE — 3008F BODY MASS INDEX DOCD: CPT | Mod: CPTII,S$GLB,, | Performed by: ORTHOPAEDIC SURGERY

## 2022-12-01 PROCEDURE — 20600 SMALL JOINT ASPIRATION/INJECTION: L THUMB CMC: ICD-10-PCS | Mod: LT,S$GLB,, | Performed by: ORTHOPAEDIC SURGERY

## 2022-12-01 RX ORDER — TRIAMCINOLONE ACETONIDE 40 MG/ML
40 INJECTION, SUSPENSION INTRA-ARTICULAR; INTRAMUSCULAR
Status: DISCONTINUED | OUTPATIENT
Start: 2022-12-01 | End: 2022-12-01 | Stop reason: HOSPADM

## 2022-12-01 RX ADMIN — TRIAMCINOLONE ACETONIDE 40 MG: 40 INJECTION, SUSPENSION INTRA-ARTICULAR; INTRAMUSCULAR at 09:12

## 2022-12-01 NOTE — PROCEDURES
Small Joint Aspiration/Injection: L thumb CMC    Date/Time: 12/1/2022 9:45 AM  Performed by: Geo Vaughan MD  Authorized by: Geo Vaughan MD     Consent Done?:  Yes (Verbal)  Indications:  Pain  Site marked: the procedure site was marked    Timeout: prior to procedure the correct patient, procedure, and site was verified    Prep: patient was prepped and draped in usual sterile fashion      Location:  Thumb  Site:  L thumb CMC  Ultrasonic guidance for needle placement?: No    Needle size:  25 G  Approach:  Dorsal  Medications:  40 mg triamcinolone acetonide 40 mg/mL  Patient tolerance:  Patient tolerated the procedure well with no immediate complications

## 2022-12-02 ENCOUNTER — TELEPHONE (OUTPATIENT)
Dept: HEMATOLOGY/ONCOLOGY | Facility: CLINIC | Age: 67
End: 2022-12-02
Payer: MEDICARE

## 2022-12-02 NOTE — TELEPHONE ENCOUNTER
Spoke to pt and notified dr out appt 12/29/22 and will need to reschedule, lab appt 12/14/22 and dr appt 12/16/22.

## 2022-12-14 ENCOUNTER — LAB VISIT (OUTPATIENT)
Dept: LAB | Facility: HOSPITAL | Age: 67
End: 2022-12-14
Attending: INTERNAL MEDICINE
Payer: COMMERCIAL

## 2022-12-14 DIAGNOSIS — D64.9 SEVERE ANEMIA: ICD-10-CM

## 2022-12-14 LAB
ALBUMIN SERPL BCP-MCNC: 3.9 G/DL (ref 3.5–5.2)
ALP SERPL-CCNC: 72 U/L (ref 55–135)
ALT SERPL W/O P-5'-P-CCNC: 11 U/L (ref 10–44)
ANION GAP SERPL CALC-SCNC: 7 MMOL/L (ref 8–16)
AST SERPL-CCNC: 13 U/L (ref 10–40)
BASOPHILS # BLD AUTO: 0.07 K/UL (ref 0–0.2)
BASOPHILS NFR BLD: 0.9 % (ref 0–1.9)
BILIRUB SERPL-MCNC: 0.3 MG/DL (ref 0.1–1)
BUN SERPL-MCNC: 12 MG/DL (ref 8–23)
CALCIUM SERPL-MCNC: 9.1 MG/DL (ref 8.7–10.5)
CHLORIDE SERPL-SCNC: 108 MMOL/L (ref 95–110)
CO2 SERPL-SCNC: 26 MMOL/L (ref 23–29)
CREAT SERPL-MCNC: 0.7 MG/DL (ref 0.5–1.4)
DIFFERENTIAL METHOD: NORMAL
EOSINOPHIL # BLD AUTO: 0.1 K/UL (ref 0–0.5)
EOSINOPHIL NFR BLD: 1.1 % (ref 0–8)
ERYTHROCYTE [DISTWIDTH] IN BLOOD BY AUTOMATED COUNT: 12.9 % (ref 11.5–14.5)
EST. GFR  (NO RACE VARIABLE): >60 ML/MIN/1.73 M^2
FERRITIN SERPL-MCNC: 90 NG/ML (ref 20–300)
GLUCOSE SERPL-MCNC: 104 MG/DL (ref 70–110)
HCT VFR BLD AUTO: 38.8 % (ref 37–48.5)
HGB BLD-MCNC: 12.9 G/DL (ref 12–16)
IMM GRANULOCYTES # BLD AUTO: 0.02 K/UL (ref 0–0.04)
IMM GRANULOCYTES NFR BLD AUTO: 0.3 % (ref 0–0.5)
IRON SERPL-MCNC: 75 UG/DL (ref 30–160)
LYMPHOCYTES # BLD AUTO: 2.2 K/UL (ref 1–4.8)
LYMPHOCYTES NFR BLD: 29.3 % (ref 18–48)
MCH RBC QN AUTO: 30.7 PG (ref 27–31)
MCHC RBC AUTO-ENTMCNC: 33.2 G/DL (ref 32–36)
MCV RBC AUTO: 92 FL (ref 82–98)
MONOCYTES # BLD AUTO: 0.6 K/UL (ref 0.3–1)
MONOCYTES NFR BLD: 8.6 % (ref 4–15)
NEUTROPHILS # BLD AUTO: 4.4 K/UL (ref 1.8–7.7)
NEUTROPHILS NFR BLD: 59.8 % (ref 38–73)
NRBC BLD-RTO: 0 /100 WBC
PLATELET # BLD AUTO: 289 K/UL (ref 150–450)
PMV BLD AUTO: 9.9 FL (ref 9.2–12.9)
POTASSIUM SERPL-SCNC: 3.5 MMOL/L (ref 3.5–5.1)
PROT SERPL-MCNC: 6.7 G/DL (ref 6–8.4)
RBC # BLD AUTO: 4.2 M/UL (ref 4–5.4)
SATURATED IRON: 19 % (ref 20–50)
SODIUM SERPL-SCNC: 141 MMOL/L (ref 136–145)
TOTAL IRON BINDING CAPACITY: 394 UG/DL (ref 250–450)
TRANSFERRIN SERPL-MCNC: 266 MG/DL (ref 200–375)
WBC # BLD AUTO: 7.41 K/UL (ref 3.9–12.7)

## 2022-12-14 PROCEDURE — 36415 COLL VENOUS BLD VENIPUNCTURE: CPT | Performed by: INTERNAL MEDICINE

## 2022-12-14 PROCEDURE — 82728 ASSAY OF FERRITIN: CPT | Performed by: INTERNAL MEDICINE

## 2022-12-14 PROCEDURE — 84466 ASSAY OF TRANSFERRIN: CPT | Performed by: INTERNAL MEDICINE

## 2022-12-14 PROCEDURE — 80053 COMPREHEN METABOLIC PANEL: CPT | Performed by: INTERNAL MEDICINE

## 2022-12-14 PROCEDURE — 85025 COMPLETE CBC W/AUTO DIFF WBC: CPT | Performed by: INTERNAL MEDICINE

## 2022-12-16 ENCOUNTER — OFFICE VISIT (OUTPATIENT)
Dept: HEMATOLOGY/ONCOLOGY | Facility: CLINIC | Age: 67
End: 2022-12-16
Payer: COMMERCIAL

## 2022-12-16 ENCOUNTER — PATIENT MESSAGE (OUTPATIENT)
Dept: HEMATOLOGY/ONCOLOGY | Facility: CLINIC | Age: 67
End: 2022-12-16

## 2022-12-16 DIAGNOSIS — D64.9 SEVERE ANEMIA: ICD-10-CM

## 2022-12-16 DIAGNOSIS — E78.5 DYSLIPIDEMIA: Primary | ICD-10-CM

## 2022-12-16 DIAGNOSIS — D50.8 IRON DEFICIENCY ANEMIA SECONDARY TO INADEQUATE DIETARY IRON INTAKE: ICD-10-CM

## 2022-12-16 PROCEDURE — 99214 OFFICE O/P EST MOD 30 MIN: CPT | Mod: 95,,, | Performed by: INTERNAL MEDICINE

## 2022-12-16 PROCEDURE — 99214 PR OFFICE/OUTPT VISIT, EST, LEVL IV, 30-39 MIN: ICD-10-PCS | Mod: 95,,, | Performed by: INTERNAL MEDICINE

## 2022-12-18 NOTE — PROGRESS NOTES
Subjective:    The patient location is: home  Visit type: Virtual visit with synchronous audio and video  Face-to-face or time spent with patient on the encounter:20min  Total time spent on and for  this encounter which includes non face-to-face time preparing to see patient, review of tests, obtaining and or reviewing separately obtained records documenting clinical information in the electronic or other health records, independently interpreting results which is not separately reported ,and communicating results to the patient/family/caregiver and in care coordination and treatment planning/communicating with pharmacy for prescriptions/addressing social needs/arranging follow-up and or referrals :25 min    Each patient I provide medical services by telemedicine is:  (1) informed of the relationship between the physician and patient and the respective role of any other health care provider with respect to management of the patient; and (2) notified that he or she may decline to receive medical services by telemedicine and may withdraw from such care at any time.  This is a video visit therefore some elements of the physical exam such as vital signs, heart sounds are breath sounds are not included and may be included if found in recent clinic notes of other providers assessing same patient. Any symptoms or signs that were visualized were stated by the patient may be included in this note.     Patient ID: Pham Licea is a 67 y.o. female.    Chief Complaint:      sept 2020 had gall bladder surgery, thern she had a bowel obstruction, no surgery then, had 2 feet of bowels removed after a second bowel obstruction  Esophageal ulcers and dudenal ulcers- with bleeding in 2017  Recd blood in [facility] this weekend, pt went to ED because she was shaky and week and couldn't think was found to be anemic in ed   pt readmitted 3/22 had repeat bowel obstruction   Seen at emergency room May 25, 2022 with CT scan of abdomen  which shows partial obstruction patient having significant abdominal pain.  Discomfort belching similar to her prior obstructive symptoms  Past Medical History:   Diagnosis Date    Anemia     Fatty liver 2015    Former smoker 2009    Osteoarthritis 2010    Pulmonary nodules 2019    Repeat CT in 6 mo    Ulcer of abdomen wall 2016     Past Surgical History:   Procedure Laterality Date    APPENDECTOMY  1993     SECTION  , , 1983    x3    COLONOSCOPY  ~2014    [facility]: normal findings per patient report    COLONOSCOPY N/A 2020    Procedure: COLONOSCOPY;  Surgeon: Misael Holm MD;  Location: [facility] ENDO;  Service: General;  Laterality: N/A;    DIAGNOSTIC LAPAROSCOPY N/A 2021    Procedure: LAPAROSCOPY, DIAGNOSTIC;  Surgeon: Misael Holm MD;  Location: [facility] OR;  Service: General;  Laterality: N/A;    EPIDURAL STEROID INJECTION INTO LUMBAR SPINE N/A 10/12/2018    Procedure: Injection-steroid-epidural-lumbar;  Surgeon: Kal Johnson MD;  Location: [facility] OR;  Service: Pain Management;  Laterality: N/A;  L5-S1    EPIDURAL STEROID INJECTION INTO LUMBAR SPINE N/A 2019    Procedure: Injection-steroid-epidural-lumbar L5-S1;  Surgeon: Kal Johnson MD;  Location: [facility] OR;  Service: Pain Management;  Laterality: N/A;    ESOPHAGOGASTRODUODENOSCOPY N/A 2020    Procedure: ESOPHAGOGASTRODUODENOSCOPY (EGD);  Surgeon: Misael Holm MD;  Location: [facility];  Service: General;  Laterality: N/A;    HYSTERECTOMY      one ovary conserved    LAPAROSCOPIC CHOLECYSTECTOMY N/A 2020    Procedure: CHOLECYSTECTOMY, LAPAROSCOPIC;  Surgeon: Govind Frey MD;  Location: [facility] OR;  Service: General;  Laterality: N/A;    LAPAROSCOPIC LYSIS OF ADHESIONS N/A 2021    Procedure: LYSIS, ADHESIONS, LAPAROSCOPIC;  Surgeon: Misael Holm MD;  Location: [facility] OR;  Service: General;  Laterality: N/A;    UPPER GASTROINTESTINAL ENDOSCOPY  2016     Family History   Problem Relation Age  of Onset    Ovarian cancer Mother     Heart disease Mother     Osteoporosis Mother     Arthritis Mother     Hypertension Father     Stroke Father 50    Brain Hemorrhage Father     Aneurysm Father     Osteoarthritis Sister     Arthritis Sister     Hypertension Sister     Obesity Sister     Breast cancer Neg Hx     Colon cancer Neg Hx     Colon polyps Neg Hx     Crohn's disease Neg Hx     Ulcerative colitis Neg Hx       Social History     Socioeconomic History    Marital status:     Number of children: 4    Highest education level: Some college, no degree   Occupational History    Occupation: sale specialist-    Tobacco Use    Smoking status: Former     Packs/day: 1.00     Years: 40.00     Pack years: 40.00     Types: Cigarettes     Quit date: 2009     Years since quittin.1    Smokeless tobacco: Never    Tobacco comments:     quit    Substance and Sexual Activity    Alcohol use: Not Currently     Comment: Not often - one glass of wine every now and then    Drug use: Not Currently     Types: Marijuana     Comment: in     Sexual activity: Not Currently     Social Determinants of Health     Financial Resource Strain: Low Risk     Difficulty of Paying Living Expenses: Not very hard   Food Insecurity: No Food Insecurity    Worried About Running Out of Food in the Last Year: Never true    Ran Out of Food in the Last Year: Never true   Transportation Needs: No Transportation Needs    Lack of Transportation (Medical): No    Lack of Transportation (Non-Medical): No   Physical Activity: Inactive    Days of Exercise per Week: 0 days    Minutes of Exercise per Session: 0 min   Stress: No Stress Concern Present    Feeling of Stress : Not at all   Social Connections: Unknown    Frequency of Communication with Friends and Family: More than three times a week    Frequency of Social Gatherings with Friends and Family: Twice a week    Active Member of Clubs or Organizations: Yes    Attends Club or  Organization Meetings: More than 4 times per year    Marital Status:    Housing Stability: Low Risk     Unable to Pay for Housing in the Last Year: No    Number of Places Lived in the Last Year: 2    Unstable Housing in the Last Year: No     Review of patient's allergies indicates:  No Known Allergies    Current Outpatient Medications:     albuterol (VENTOLIN HFA) 90 mcg/actuation inhaler, Inhale 2 puffs into the lungs every 6 (six) hours as needed for Wheezing or Shortness of Breath. Rescue, Disp: 6.7 g, Rfl: 2    atorvastatin (LIPITOR) 10 MG tablet, Take 1 tablet (10 mg total) by mouth once daily., Disp: 90 tablet, Rfl: 3    CONSTULOSE 10 gram/15 mL solution, TAKE 10 ML BY MOUTH   THREE TIMES DAILY, Disp: 948 mL, Rfl: 0    diclofenac sodium (VOLTAREN) 1 % Gel, Apply 2 g topically 2 (two) times daily as needed., Disp: , Rfl:     ondansetron (ZOFRAN-ODT) 4 MG TbDL, Take 2 tablets (8 mg total) by mouth every 8 (eight) hours as needed. (Patient not taking: Reported on 10/12/2022), Disp: 10 tablet, Rfl: 1    ondansetron (ZOFRAN-ODT) 8 MG TbDL, Take 1 tablet (8 mg total) by mouth every 6 (six) hours as needed., Disp: 60 tablet, Rfl: 3    pantoprazole (PROTONIX) 20 MG tablet, Take 1 tablet (20 mg total) by mouth once daily., Disp: 90 tablet, Rfl: 3    promethazine (PHENERGAN) 25 MG suppository, Place 1 suppository (25 mg total) rectally 3 (three) times daily as needed for Nausea., Disp: 8 suppository, Rfl: 0    vitamin D (VITAMIN D3) 1000 units Tab, Take 3,000 Units by mouth once daily., Disp: , Rfl:   Review of Systems   Constitutional:  Positive for malaise/fatigue. Negative for weight loss.        Mostly good appetite lost weight with her sx   HENT:  Negative for hearing loss.    Eyes:  Negative for blurred vision and double vision.   Gastrointestinal:  Positive for constipation.        Takes lactulose twice a day and colace twice a day   Musculoskeletal:  Negative for back pain, myalgias and neck pain.         Leg cramps and shaky   Skin:  Negative for rash.   Neurological:  Negative for dizziness, weakness and headaches.   Endo/Heme/Allergies:  Does not bruise/bleed easily. recent bowel obstruction 3/22 with repeat abdominal pain discomfort emergency room visit last night the outpatient surgical appointment  Physical Exam    Wt Readings from Last 3 Encounters:   12/01/22 77.6 kg (171 lb)   10/12/22 77.8 kg (171 lb 8.3 oz)   10/06/22 77.1 kg (170 lb)     Temp Readings from Last 3 Encounters:   09/19/22 98.1 °F (36.7 °C)   09/13/22 97.8 °F (36.6 °C)   08/29/22 97.9 °F (36.6 °C)     BP Readings from Last 3 Encounters:   10/12/22 123/76   09/27/22 110/70   09/19/22 122/83     Pulse Readings from Last 3 Encounters:   10/12/22 72   09/27/22 76   09/19/22 71     Lab Results   Component Value Date    WBC 7.41 12/14/2022    HGB 12.9 12/14/2022    HCT 38.8 12/14/2022    MCV 92 12/14/2022     12/14/2022       BMP  Lab Results   Component Value Date     12/14/2022    K 3.5 12/14/2022     12/14/2022    CO2 26 12/14/2022    BUN 12 12/14/2022    CREATININE 0.7 12/14/2022    CALCIUM 9.1 12/14/2022    ANIONGAP 7 (L) 12/14/2022    ESTGFRAFRICA >60.0 06/27/2022    EGFRNONAA >60.0 06/27/2022     Lab Results   Component Value Date    IRON 75 12/14/2022    TIBC 394 12/14/2022    FERRITIN 90 12/14/2022       Impression:  CT scan abdomen May 25, 2022     1. Multiple dilated loops of small bowel are noted that are fluid-filled left upper quadrant with the suggestion of bowel wall thickening near the level of the umbilicus in the midline and extending into the left dilated loops of bowel.  This raises the concern for vascular injury and or bowel infarction/inflammation possibly associated with obstruction.  Surgical consultation is suggested.  Post-contrast oral and intravenous imaging may be of use for confirmation given that this exam is severely hindered.  2. A 2nd region of concern is noted within the right hemipelvis where  a dilated loop of small bowel is noted without obvious bowel wall thickening.  This region is nonspecific and could relate to colon, small bowel, ileus, or obstruction.  Further evaluation and clinical correlation is necessary     Patient Active Problem List   Diagnosis    Primary osteoarthritis involving multiple joints    Obesity (BMI 30.0-34.9)    Gastroesophageal reflux disease    Right knee pain    Chronic midline low back pain    Acute tear medial meniscus, right, sequela    Primary osteoarthritis of right knee    Lumbar radiculitis    Trigger middle finger of right hand    Nausea    Abdominal pain    Encounter for postoperative care    Intestinal obstruction    Hypokalemia    Constipation    History of cholecystectomy    Right upper quadrant pain    History of hysterectomy    Rectal bleeding    Hemorrhoids    Mitral valve prolapse    Coronary artery calcification    Anemia    SAMPSON (dyspnea on exertion), noted 2010    History of smoking 25-50 pack years, 46 py, quit 2009    Other emphysema    NSAID long-term use, started 2017    Bandemia    Family history of premature CAD    Cardiovascular event risk, ASCVD 10-year risk 4.8%, 2019    Abdominal obesity    Dyslipidemia, baseline LDL-C 112, HDL-C 49    Iron deficiency anemia secondary to inadequate dietary iron intake    Severe anemia    S/p small bowel obstruction    Arm pain, lateral, left        Assessment and Plan   NATHAN: pt had partail bowel removal from opbstructions with recurrneces Bowel obs again 3/22 repeat sx and adhesiolysis, Going to [facility] for evaluation of small bowel.  Will keep patient on Q 6 weeks the iron checks   recvd infusinal iron with excellent response   Orders will be placed for [facility] if infusion needed      Slight B12 deficiency also continue to monitor most recent test J 9/2022 acceptable  Patient to continue follow-up of her DJD with PCP

## 2022-12-19 ENCOUNTER — PATIENT MESSAGE (OUTPATIENT)
Dept: FAMILY MEDICINE | Facility: CLINIC | Age: 67
End: 2022-12-19
Payer: MEDICARE

## 2022-12-19 ENCOUNTER — PATIENT MESSAGE (OUTPATIENT)
Dept: HEMATOLOGY/ONCOLOGY | Facility: CLINIC | Age: 67
End: 2022-12-19
Payer: MEDICARE

## 2022-12-22 ENCOUNTER — ANESTHESIA EVENT (OUTPATIENT)
Dept: ENDOSCOPY | Facility: HOSPITAL | Age: 67
End: 2022-12-22
Payer: COMMERCIAL

## 2022-12-22 ENCOUNTER — ANESTHESIA (OUTPATIENT)
Dept: ENDOSCOPY | Facility: HOSPITAL | Age: 67
End: 2022-12-22
Payer: MEDICARE

## 2022-12-22 ENCOUNTER — HOSPITAL ENCOUNTER (OUTPATIENT)
Facility: HOSPITAL | Age: 67
Discharge: HOME | End: 2022-12-22
Attending: INTERNAL MEDICINE | Admitting: INTERNAL MEDICINE
Payer: COMMERCIAL

## 2022-12-22 VITALS
WEIGHT: 170 LBS | BODY MASS INDEX: 29.02 KG/M2 | SYSTOLIC BLOOD PRESSURE: 117 MMHG | HEIGHT: 64 IN | DIASTOLIC BLOOD PRESSURE: 64 MMHG | RESPIRATION RATE: 12 BRPM | OXYGEN SATURATION: 96 % | HEART RATE: 67 BPM | TEMPERATURE: 97 F

## 2022-12-22 DIAGNOSIS — D50.8 IRON DEFICIENCY ANEMIA SECONDARY TO INADEQUATE DIETARY IRON INTAKE: ICD-10-CM

## 2022-12-22 DIAGNOSIS — K56.699 SMALL BOWEL STRICTURE: Primary | ICD-10-CM

## 2022-12-22 PROCEDURE — D9220A PRA ANESTHESIA: Mod: ANES,,, | Performed by: STUDENT IN AN ORGANIZED HEALTH CARE EDUCATION/TRAINING PROGRAM

## 2022-12-22 PROCEDURE — 44377 PR SB SCOPE,TO ILEUM,BIOPSY: ICD-10-PCS | Mod: ,,, | Performed by: INTERNAL MEDICINE

## 2022-12-22 PROCEDURE — D9220A PRA ANESTHESIA: Mod: CRNA,,, | Performed by: NURSE ANESTHETIST, CERTIFIED REGISTERED

## 2022-12-22 PROCEDURE — 63600175 PHARM REV CODE 636 W HCPCS

## 2022-12-22 PROCEDURE — 88305 TISSUE EXAM BY PATHOLOGIST: ICD-10-PCS | Mod: 26,,, | Performed by: PATHOLOGY

## 2022-12-22 PROCEDURE — 63600175 PHARM REV CODE 636 W HCPCS: Performed by: STUDENT IN AN ORGANIZED HEALTH CARE EDUCATION/TRAINING PROGRAM

## 2022-12-22 PROCEDURE — 94761 N-INVAS EAR/PLS OXIMETRY MLT: CPT

## 2022-12-22 PROCEDURE — 44377 SMALL BOWEL ENDOSCOPY/BIOPSY: CPT | Performed by: INTERNAL MEDICINE

## 2022-12-22 PROCEDURE — 27201012 HC FORCEPS, HOT/COLD, DISP: Performed by: INTERNAL MEDICINE

## 2022-12-22 PROCEDURE — 88305 TISSUE EXAM BY PATHOLOGIST: CPT | Mod: 26,,, | Performed by: PATHOLOGY

## 2022-12-22 PROCEDURE — D9220A PRA ANESTHESIA: ICD-10-PCS | Mod: CRNA,,, | Performed by: NURSE ANESTHETIST, CERTIFIED REGISTERED

## 2022-12-22 PROCEDURE — 37000009 HC ANESTHESIA EA ADD 15 MINS: Performed by: INTERNAL MEDICINE

## 2022-12-22 PROCEDURE — 25000003 PHARM REV CODE 250: Performed by: NURSE ANESTHETIST, CERTIFIED REGISTERED

## 2022-12-22 PROCEDURE — D9220A PRA ANESTHESIA: ICD-10-PCS | Mod: ANES,,, | Performed by: STUDENT IN AN ORGANIZED HEALTH CARE EDUCATION/TRAINING PROGRAM

## 2022-12-22 PROCEDURE — 25000003 PHARM REV CODE 250: Performed by: INTERNAL MEDICINE

## 2022-12-22 PROCEDURE — 63600175 PHARM REV CODE 636 W HCPCS: Performed by: NURSE ANESTHETIST, CERTIFIED REGISTERED

## 2022-12-22 PROCEDURE — 88305 TISSUE EXAM BY PATHOLOGIST: CPT | Performed by: PATHOLOGY

## 2022-12-22 PROCEDURE — 27201030 HC OVERTUBE: Performed by: INTERNAL MEDICINE

## 2022-12-22 PROCEDURE — 44377 SMALL BOWEL ENDOSCOPY/BIOPSY: CPT | Mod: ,,, | Performed by: INTERNAL MEDICINE

## 2022-12-22 PROCEDURE — 37000008 HC ANESTHESIA 1ST 15 MINUTES: Performed by: INTERNAL MEDICINE

## 2022-12-22 PROCEDURE — 99900035 HC TECH TIME PER 15 MIN (STAT)

## 2022-12-22 RX ORDER — FENTANYL CITRATE 50 UG/ML
INJECTION, SOLUTION INTRAMUSCULAR; INTRAVENOUS
Status: DISCONTINUED | OUTPATIENT
Start: 2022-12-22 | End: 2022-12-22

## 2022-12-22 RX ORDER — SODIUM CHLORIDE 0.9 % (FLUSH) 0.9 %
10 SYRINGE (ML) INJECTION
Status: DISCONTINUED | OUTPATIENT
Start: 2022-12-22 | End: 2023-01-05 | Stop reason: HOSPADM

## 2022-12-22 RX ORDER — PHENYLEPHRINE HYDROCHLORIDE 10 MG/ML
INJECTION INTRAVENOUS
Status: DISCONTINUED | OUTPATIENT
Start: 2022-12-22 | End: 2022-12-22

## 2022-12-22 RX ORDER — HYDROMORPHONE HYDROCHLORIDE 1 MG/ML
0.2 INJECTION, SOLUTION INTRAMUSCULAR; INTRAVENOUS; SUBCUTANEOUS ONCE
Status: COMPLETED | OUTPATIENT
Start: 2022-12-22 | End: 2022-12-22

## 2022-12-22 RX ORDER — ONDANSETRON 2 MG/ML
INJECTION INTRAMUSCULAR; INTRAVENOUS
Status: DISCONTINUED | OUTPATIENT
Start: 2022-12-22 | End: 2022-12-22

## 2022-12-22 RX ORDER — SODIUM CHLORIDE 9 MG/ML
INJECTION, SOLUTION INTRAVENOUS CONTINUOUS
Status: DISCONTINUED | OUTPATIENT
Start: 2022-12-22 | End: 2023-01-05 | Stop reason: HOSPADM

## 2022-12-22 RX ORDER — LACTULOSE 10 G/15ML
SOLUTION ORAL
Qty: 948 ML | Refills: 0 | Status: SHIPPED | OUTPATIENT
Start: 2022-12-22 | End: 2023-07-21 | Stop reason: SDUPTHER

## 2022-12-22 RX ORDER — LIDOCAINE HYDROCHLORIDE 10 MG/ML
INJECTION, SOLUTION INTRAVENOUS
Status: DISCONTINUED | OUTPATIENT
Start: 2022-12-22 | End: 2022-12-22

## 2022-12-22 RX ORDER — HYDROMORPHONE HYDROCHLORIDE 1 MG/ML
INJECTION, SOLUTION INTRAMUSCULAR; INTRAVENOUS; SUBCUTANEOUS
Status: COMPLETED
Start: 2022-12-22 | End: 2022-12-22

## 2022-12-22 RX ORDER — ROCURONIUM BROMIDE 10 MG/ML
INJECTION, SOLUTION INTRAVENOUS
Status: DISCONTINUED | OUTPATIENT
Start: 2022-12-22 | End: 2022-12-22

## 2022-12-22 RX ORDER — PROPOFOL 10 MG/ML
VIAL (ML) INTRAVENOUS
Status: DISCONTINUED | OUTPATIENT
Start: 2022-12-22 | End: 2022-12-22

## 2022-12-22 RX ORDER — ONDANSETRON 2 MG/ML
4 INJECTION INTRAMUSCULAR; INTRAVENOUS ONCE
Status: COMPLETED | OUTPATIENT
Start: 2022-12-22 | End: 2022-12-22

## 2022-12-22 RX ORDER — ONDANSETRON 2 MG/ML
INJECTION INTRAMUSCULAR; INTRAVENOUS
Status: COMPLETED
Start: 2022-12-22 | End: 2022-12-22

## 2022-12-22 RX ADMIN — HYDROMORPHONE HYDROCHLORIDE 0.2 MG: 1 INJECTION, SOLUTION INTRAMUSCULAR; INTRAVENOUS; SUBCUTANEOUS at 12:12

## 2022-12-22 RX ADMIN — ONDANSETRON 4 MG: 2 INJECTION INTRAMUSCULAR; INTRAVENOUS at 12:12

## 2022-12-22 RX ADMIN — SODIUM CHLORIDE: 9 INJECTION, SOLUTION INTRAVENOUS at 10:12

## 2022-12-22 RX ADMIN — PROPOFOL 150 MG: 10 INJECTION, EMULSION INTRAVENOUS at 10:12

## 2022-12-22 RX ADMIN — LIDOCAINE HYDROCHLORIDE 80 MG: 10 INJECTION, SOLUTION INTRAVENOUS at 10:12

## 2022-12-22 RX ADMIN — PHENYLEPHRINE HYDROCHLORIDE 100 MCG: 10 INJECTION INTRAVENOUS at 11:12

## 2022-12-22 RX ADMIN — FENTANYL CITRATE 100 MCG: 50 INJECTION, SOLUTION INTRAMUSCULAR; INTRAVENOUS at 10:12

## 2022-12-22 RX ADMIN — ONDANSETRON 4 MG: 2 INJECTION INTRAMUSCULAR; INTRAVENOUS at 11:12

## 2022-12-22 RX ADMIN — ROCURONIUM BROMIDE 40 MG: 10 INJECTION INTRAVENOUS at 10:12

## 2022-12-22 RX ADMIN — GLYCOPYRROLATE 0.2 MG: 0.2 INJECTION, SOLUTION INTRAMUSCULAR; INTRAVENOUS at 11:12

## 2022-12-22 RX ADMIN — SODIUM CHLORIDE: 9 INJECTION, SOLUTION INTRAVENOUS at 08:12

## 2022-12-22 RX ADMIN — SUGAMMADEX 200 MG: 100 INJECTION, SOLUTION INTRAVENOUS at 11:12

## 2022-12-22 NOTE — H&P
Short Stay Endoscopy History and Physical      Procedure - Antegrade double-balloon enteroscopy  ASA - per anesthesia  Mallampati - per anesthesia  History of Anesthesia problems - no  Family history Anesthesia problems - no   Plan of anesthesia - General    HPI:  This is a 67 y.o. female here for history of small bowel strictures and small bowel obstruction with ongoing post-prandial nausea and vomiting.      ROS:  Constitutional: No fevers, chills, No weight loss  CV: No chest pain  Pulm: No cough, No shortness of breath  GI: see HPI    Medical History:  has a past medical history of Anemia, Fatty liver (), Former smoker (), Osteoarthritis (), Pulmonary nodules (2019), and Ulcer of abdomen wall ().    Surgical History:  has a past surgical history that includes Upper gastrointestinal endoscopy (); Colonoscopy (~); Epidural steroid injection into lumbar spine (N/A, 10/12/2018); Epidural steroid injection into lumbar spine (N/A, 2019);  section (, , ); Hysterectomy (); Appendectomy (); Laparoscopic cholecystectomy (N/A, 2020); Colonoscopy (N/A, 2020); Esophagogastroduodenoscopy (N/A, 2020); Diagnostic laparoscopy (N/A, 2021); and Laparoscopic lysis of adhesions (N/A, 2021).    Family History: family history includes Aneurysm in her father; Arthritis in her mother and sister; Brain Hemorrhage in her father; Heart disease in her mother; Hypertension in her father and sister; Obesity in her sister; Osteoarthritis in her sister; Osteoporosis in her mother; Ovarian cancer in her mother; Stroke (age of onset: 50) in her father.    Social History:  reports that she quit smoking about 13 years ago. She has a 40.00 pack-year smoking history. She has never used smokeless tobacco. She reports that she does not currently use alcohol. She reports that she does not currently use drugs after having used the following drugs:  Marijuana.    Review of patient's allergies indicates:  No Known Allergies    Medications:   Medications Prior to Admission   Medication Sig Dispense Refill Last Dose    pantoprazole (PROTONIX) 20 MG tablet Take 1 tablet (20 mg total) by mouth once daily. 90 tablet 3 12/21/2022    albuterol (VENTOLIN HFA) 90 mcg/actuation inhaler Inhale 2 puffs into the lungs every 6 (six) hours as needed for Wheezing or Shortness of Breath. Rescue 6.7 g 2 More than a month    atorvastatin (LIPITOR) 10 MG tablet Take 1 tablet (10 mg total) by mouth once daily. 90 tablet 3     CONSTULOSE 10 gram/15 mL solution TAKE 10 ML BY MOUTH   THREE TIMES DAILY 948 mL 0     diclofenac sodium (VOLTAREN) 1 % Gel Apply 2 g topically 2 (two) times daily as needed.       ondansetron (ZOFRAN-ODT) 4 MG TbDL Take 2 tablets (8 mg total) by mouth every 8 (eight) hours as needed. (Patient not taking: Reported on 10/12/2022) 10 tablet 1     ondansetron (ZOFRAN-ODT) 8 MG TbDL Take 1 tablet (8 mg total) by mouth every 6 (six) hours as needed. 60 tablet 3     promethazine (PHENERGAN) 25 MG suppository Place 1 suppository (25 mg total) rectally 3 (three) times daily as needed for Nausea. 8 suppository 0     vitamin D (VITAMIN D3) 1000 units Tab Take 3,000 Units by mouth once daily.            Physical Exam:    Vital Signs:   Vitals:    12/22/22 0816   BP: 134/64   Pulse: 63   Resp: 16   Temp: 97.9 °F (36.6 °C)       General Appearance: Well appearing in no acute distress  Eyes:  PERRL  Lungs: CTA bilaterally  Heart:  Reg rate and rhythm  Abdomen: Soft, non tender, non distended with positive bowel sounds.      Labs:  Lab Results   Component Value Date    WBC 7.41 12/14/2022    HGB 12.9 12/14/2022    HCT 38.8 12/14/2022    MCV 92 12/14/2022     12/14/2022        BMP  Lab Results   Component Value Date     12/14/2022    K 3.5 12/14/2022     12/14/2022    CO2 26 12/14/2022    BUN 12 12/14/2022    CREATININE 0.7 12/14/2022    CALCIUM 9.1  12/14/2022    ANIONGAP 7 (L) 12/14/2022    ESTGFRAFRICA >60.0 06/27/2022    EGFRNONAA >60.0 06/27/2022     Lab Results   Component Value Date    INR 0.9 01/28/2022    INR 1.0 09/02/2020          Assessment:  67 y.o. female with history of small bowel strictures and small bowel obstruction with ongoing post-prandial nausea and vomiting.    Plan:  Proceed with antegrade double-balloon enteroscopy today.  I have explained the risks and benefits of endoscopy procedures to the patient including but not limited to bleeding, perforation, infection, and death.  All questions and answered.        Collin Mares MD

## 2022-12-22 NOTE — PROGRESS NOTES
Anesthesia md called to bedside by Eloise ADAME. Pt has breathlessness and upper abdominal pain. Per MD order pt. Received 4 zofran and total of 0.4 dilaudid iv. MD administered additional sugammadex .

## 2022-12-22 NOTE — [employer]ATION PATIENT INSTRUCTIONS
Discharge Summary/Instructions after an Endoscopic Procedure  Patient Name: Pham Licea  Patient MRN: [id number]  Patient YOB: 1955 Thursday, December 22, 2022  Collin Mares MD  Dear patient,  As a result of recent federal legislation (The Federal Cures Act), you may   receive lab or pathology results from your procedure in your [facility]   account before your physician is able to contact you. Your physician or   their representative will relay the results to you with their   recommendations at their soonest availability.  Thank you,  RESTRICTIONS:  During your procedure today, you received medications for sedation.  These   medications may affect your judgment, balance and coordination.  Therefore,   for 24 hours, you have the following restrictions:   - DO NOT drive a car, operate machinery, make legal/financial decisions,   sign important papers or drink alcohol.    ACTIVITY:  Today: no heavy lifting, straining or running due to procedural   sedation/anesthesia.  The following day: return to full activity including work.  DIET:  Eat and drink normally unless instructed otherwise.     TREATMENT FOR COMMON SIDE EFFECTS:  - Mild abdominal pain, nausea, belching, bloating or excessive gas:  rest,   eat lightly and use a heating pad.  - Sore Throat: treat with throat lozenges and/or gargle with warm salt   water.  - Because air was used during the procedure, expelling large amounts of air   from your rectum or belching is normal.  - If a bowel prep was taken, you may not have a bowel movement for 1-3 days.    This is normal.  SYMPTOMS TO WATCH FOR AND REPORT TO YOUR PHYSICIAN:  1. Abdominal pain or bloating, other than gas cramps.  2. Chest pain.  3. Back pain.  4. Signs of infection such as: chills or fever occurring within 24 hours   after the procedure.  5. Rectal bleeding, which would show as bright red, maroon, or black stools.   (A tablespoon of blood from the rectum is not serious,  especially if   hemorrhoids are present.)  6. Vomiting.  7. Weakness or dizziness.  GO DIRECTLY TO THE NEAREST EMERGENCY ROOM IF YOU HAVE ANY OF THE FOLLOWING:      Difficulty breathing              Chills and/or fever over 101 F   Persistent vomiting and/or vomiting blood   Severe abdominal pain   Severe chest pain   Black, tarry stools   Bleeding- more than one tablespoon   Any other symptom or condition that you feel may need urgent attention  Your doctor recommends these additional instructions:  If any biopsies were taken, your doctors clinic will contact you in 1 to 2   weeks with any results.  - Discharge patient to home.   - Patient has a contact number available for emergencies.  The signs and   symptoms of potential delayed complications were discussed with the   patient.  Return to normal activities tomorrow.  Written discharge   instructions were provided to the patient.   - Advance diet as tolerated.   - Continue present medications.   - Await pathology results.   - Return to GI clinic.  For questions, problems or results please call your physician - Collin Mares MD at Work:  [phone].  [facility] [place], EMERGENCY ROOM PHONE NUMBER: [phone]  IF A COMPLICATION OR EMERGENCY SITUATION ARISES AND YOU ARE UNABLE TO REACH   YOUR PHYSICIAN - GO DIRECTLY TO THE EMERGENCY ROOM.  Collin Mares MD  12/22/2022 12:06:21 PM  This report has been verified and signed electronically.  Dear patient,  As a result of recent federal legislation (The Federal Cures Act), you may   receive lab or pathology results from your procedure in your [facility]   account before your physician is able to contact you. Your physician or   their representative will relay the results to you with their   recommendations at their soonest availability.  Thank you,  [employer]

## 2022-12-22 NOTE — PROGRESS NOTES
Dr. Mares to bedside following KUB. MD says patient is ready to be discharged.     Patient appears to be stable and says she has no distress. R arm PIV removed. AVS has been given to patient.

## 2022-12-22 NOTE — ANESTHESIA PROCEDURE NOTES
Intubation    Date/Time: 12/22/2022 10:38 AM  Performed by: Freida Kurtz CRNA  Authorized by: Paco Cheema MD     Intubation:     Induction:  Intravenous    Intubated:  Postinduction    Mask Ventilation:  Easy mask    Attempts:  1    Attempted By:  CRNA    Method of Intubation:  Direct    Blade:  Ordaz 3    Laryngeal View Grade: Grade I - full view of cords      Difficult Airway Encountered?: No      Complications:  None    Airway Device:  Oral endotracheal tube    Airway Device Size:  7.5    Style/Cuff Inflation:  Cuffed (inflated to minimal occlusive pressure)    Tube secured:  21    Secured at:  The teeth    Placement Verified By:  Capnometry    Complicating Factors:  None    Findings Post-Intubation:  BS equal bilateral and atraumatic/condition of teeth unchanged

## 2022-12-22 NOTE — ANESTHESIA PREPROCEDURE EVALUATION
12/22/2022  Pham Licea is a 67 y.o., female.  Pre-operative evaluation for Procedure(s) (LRB):  ENTEROSCOPY, DOUBLE BALLOON, ANTEGRADE (N/A)    Pham Licea is a 67 y.o. female     Patient Active Problem List   Diagnosis    Primary osteoarthritis involving multiple joints    Obesity (BMI 30.0-34.9)    Gastroesophageal reflux disease    Right knee pain    Chronic midline low back pain    Acute tear medial meniscus, right, sequela    Primary osteoarthritis of right knee    Lumbar radiculitis    Trigger middle finger of right hand    Nausea    Abdominal pain    Encounter for postoperative care    Intestinal obstruction    Hypokalemia    Constipation    History of cholecystectomy    Right upper quadrant pain    History of hysterectomy    Rectal bleeding    Hemorrhoids    Mitral valve prolapse    Coronary artery calcification    Anemia    SAMPSON (dyspnea on exertion), noted 2010    History of smoking 25-50 pack years, 46 py, quit 2009    Other emphysema    NSAID long-term use, started 2017    Bandemia    Family history of premature CAD    Cardiovascular event risk, ASCVD 10-year risk 4.8%, 2019    Abdominal obesity    Dyslipidemia, baseline LDL-C 112, HDL-C 49    Iron deficiency anemia secondary to inadequate dietary iron intake    Severe anemia    S/p small bowel obstruction    Arm pain, lateral, left       Review of patient's allergies indicates:  No Known Allergies    No current facility-administered medications on file prior to encounter.     Current Outpatient Medications on File Prior to Encounter   Medication Sig Dispense Refill    albuterol (VENTOLIN HFA) 90 mcg/actuation inhaler Inhale 2 puffs into the lungs every 6 (six) hours as needed for Wheezing or Shortness of Breath. Rescue 6.7 g 2    atorvastatin (LIPITOR) 10 MG tablet Take 1 tablet (10 mg  total) by mouth once daily. 90 tablet 3    CONSTULOSE 10 gram/15 mL solution TAKE 10 ML BY MOUTH   THREE TIMES DAILY 948 mL 0    diclofenac sodium (VOLTAREN) 1 % Gel Apply 2 g topically 2 (two) times daily as needed.      ondansetron (ZOFRAN-ODT) 4 MG TbDL Take 2 tablets (8 mg total) by mouth every 8 (eight) hours as needed. (Patient not taking: Reported on 10/12/2022) 10 tablet 1    ondansetron (ZOFRAN-ODT) 8 MG TbDL Take 1 tablet (8 mg total) by mouth every 6 (six) hours as needed. 60 tablet 3    pantoprazole (PROTONIX) 20 MG tablet Take 1 tablet (20 mg total) by mouth once daily. 90 tablet 3    promethazine (PHENERGAN) 25 MG suppository Place 1 suppository (25 mg total) rectally 3 (three) times daily as needed for Nausea. 8 suppository 0    vitamin D (VITAMIN D3) 1000 units Tab Take 3,000 Units by mouth once daily.         Past Surgical History:   Procedure Laterality Date    APPENDECTOMY  1993     SECTION  , , 1983    x3    COLONOSCOPY  ~    [facility]: normal findings per patient report    COLONOSCOPY N/A 2020    Procedure: COLONOSCOPY;  Surgeon: Misael Holm MD;  Location: [facility];  Service: General;  Laterality: N/A;    DIAGNOSTIC LAPAROSCOPY N/A 2021    Procedure: LAPAROSCOPY, DIAGNOSTIC;  Surgeon: Misael Holm MD;  Location: [facility] OR;  Service: General;  Laterality: N/A;    EPIDURAL STEROID INJECTION INTO LUMBAR SPINE N/A 10/12/2018    Procedure: Injection-steroid-epidural-lumbar;  Surgeon: Kal Johnson MD;  Location: [facility] OR;  Service: Pain Management;  Laterality: N/A;  L5-S1    EPIDURAL STEROID INJECTION INTO LUMBAR SPINE N/A 2019    Procedure: Injection-steroid-epidural-lumbar L5-S1;  Surgeon: Kal Johnson MD;  Location: [facility] OR;  Service: Pain Management;  Laterality: N/A;    ESOPHAGOGASTRODUODENOSCOPY N/A 2020    Procedure: ESOPHAGOGASTRODUODENOSCOPY (EGD);  Surgeon: Misael Holm MD;  Location: [facility] ENDO;  Service: General;   Laterality: N/A;    HYSTERECTOMY  1993    one ovary conserved    LAPAROSCOPIC CHOLECYSTECTOMY N/A 2020    Procedure: CHOLECYSTECTOMY, LAPAROSCOPIC;  Surgeon: Govind Frey MD;  Location: [facility] OR;  Service: General;  Laterality: N/A;    LAPAROSCOPIC LYSIS OF ADHESIONS N/A 2021    Procedure: LYSIS, ADHESIONS, LAPAROSCOPIC;  Surgeon: Misael Holm MD;  Location: [facility] OR;  Service: General;  Laterality: N/A;    UPPER GASTROINTESTINAL ENDOSCOPY         Social History     Socioeconomic History    Marital status:     Number of children: 4    Highest education level: Some college, no degree   Occupational History    Occupation: sale specialist-    Tobacco Use    Smoking status: Former     Packs/day: 1.00     Years: 40.00     Pack years: 40.00     Types: Cigarettes     Quit date: 2009     Years since quittin.1    Smokeless tobacco: Never    Tobacco comments:     quit    Substance and Sexual Activity    Alcohol use: Not Currently     Comment: Not often - one glass of wine every now and then    Drug use: Not Currently     Types: Marijuana     Comment: in     Sexual activity: Not Currently     Social Determinants of Health     Financial Resource Strain: Low Risk     Difficulty of Paying Living Expenses: Not very hard   Food Insecurity: No Food Insecurity    Worried About Running Out of Food in the Last Year: Never true    Ran Out of Food in the Last Year: Never true   Transportation Needs: No Transportation Needs    Lack of Transportation (Medical): No    Lack of Transportation (Non-Medical): No   Physical Activity: Inactive    Days of Exercise per Week: 0 days    Minutes of Exercise per Session: 0 min   Stress: No Stress Concern Present    Feeling of Stress : Not at all   Social Connections: Unknown    Frequency of Communication with Friends and Family: More than three times a week    Frequency of Social Gatherings with Friends and Family: Twice a week     Active Member of Clubs or Organizations: Yes    Attends Club or Organization Meetings: More than 4 times per year    Marital Status:    Housing Stability: Low Risk     Unable to Pay for Housing in the Last Year: No    Number of Places Lived in the Last Year: 2    Unstable Housing in the Last Year: No         CBC: No results for input(s): WBC, RBC, HGB, HCT, PLT, MCV, MCH, MCHC in the last 72 hours.    CMP: No results for input(s): NA, K, CL, CO2, BUN, CREATININE, GLU, MG, PHOS, CALCIUM, ALBUMIN, PROT, ALKPHOS, ALT, AST, BILITOT in the last 72 hours.    INR  No results for input(s): PT, INR, PROTIME, APTT in the last 72 hours.        Diagnostic Studies:      EKD Echo:  No results found for this or any previous visit.        Pre-op Assessment    I have reviewed the Patient Summary Reports.     I have reviewed the Nursing Notes. I have reviewed the NPO Status.   I have reviewed the Medications.     Review of Systems  Cardiovascular:   CAD   SAMPSON    Pulmonary:   COPD Shortness of breath    Hepatic/GI:   GERD Liver Disease,    Neurological:   Neuromuscular Disease,        Physical Exam    Airway:  Mallampati: II           Anesthesia Plan  Type of Anesthesia, risks & benefits discussed:    Anesthesia Type: Gen ETT  Intra-op Monitoring Plan: Standard ASA Monitors  Post Op Pain Control Plan: multimodal analgesia  Induction:  IV  Airway Plan: Direct, Post-Induction  Informed Consent: Informed consent signed with the Patient and all parties understand the risks and agree with anesthesia plan.  All questions answered.   ASA Score: 3  Day of Surgery Review of History & Physical: H&P Update referred to the surgeon/provider.    Ready For Surgery From Anesthesia Perspective.     .

## 2022-12-22 NOTE — TRANSFER OF CARE
"Anesthesia Transfer of Care Note    Patient: Pham Licea    Procedure(s) Performed: Procedure(s) (LRB):  ENTEROSCOPY, DOUBLE BALLOON, ANTEGRADE (N/A)    Patient location: PACU    Anesthesia Type: general    Transport from OR: Transported from OR on 6-10 L/min O2 by face mask with adequate spontaneous ventilation    Post pain: adequate analgesia    Post assessment: no apparent anesthetic complications and tolerated procedure well    Post vital signs: stable    Level of consciousness: awake and alert    Nausea/Vomiting: no nausea/vomiting    Complications: none    Transfer of care protocol was followed      Last vitals:   Visit Vitals  BP (!) 134/58 (BP Location: Left arm, Patient Position: Lying)   Pulse 75   Temp 36.3 °C (97.3 °F) (Temporal)   Resp 11   Ht 5' 4" (1.626 m)   Wt 77.1 kg (170 lb)   LMP  (LMP Unknown)   SpO2 100%   Breastfeeding No   BMI 29.18 kg/m²     "

## 2022-12-23 ENCOUNTER — HOSPITAL ENCOUNTER (OUTPATIENT)
Dept: RADIOLOGY | Facility: HOSPITAL | Age: 67
Discharge: HOME | End: 2022-12-23
Attending: FAMILY MEDICINE
Payer: COMMERCIAL

## 2022-12-23 DIAGNOSIS — Z12.31 ENCOUNTER FOR SCREENING MAMMOGRAM FOR MALIGNANT NEOPLASM OF BREAST: ICD-10-CM

## 2022-12-23 PROCEDURE — 77063 MAMMO DIGITAL SCREENING BILAT WITH TOMO: ICD-10-PCS | Mod: 26,,, | Performed by: RADIOLOGY

## 2022-12-23 PROCEDURE — 77063 BREAST TOMOSYNTHESIS BI: CPT | Mod: 26,,, | Performed by: RADIOLOGY

## 2022-12-23 PROCEDURE — 77067 SCR MAMMO BI INCL CAD: CPT | Mod: 26,,, | Performed by: RADIOLOGY

## 2022-12-23 PROCEDURE — 77063 BREAST TOMOSYNTHESIS BI: CPT | Mod: TC

## 2022-12-23 PROCEDURE — 77067 SCR MAMMO BI INCL CAD: CPT | Mod: TC

## 2022-12-23 PROCEDURE — 77067 MAMMO DIGITAL SCREENING BILAT WITH TOMO: ICD-10-PCS | Mod: 26,,, | Performed by: RADIOLOGY

## 2022-12-23 NOTE — ANESTHESIA POSTPROCEDURE EVALUATION
Anesthesia Post Evaluation    Patient: Pham Licea    Procedure(s) Performed: Procedure(s) (LRB):  ENTEROSCOPY, DOUBLE BALLOON, ANTEGRADE (N/A)    Final Anesthesia Type: general      Patient location during evaluation: PACU  Patient participation: Yes- Able to Participate  Level of consciousness: awake and alert, awake and oriented  Post-procedure vital signs: reviewed and stable  Pain management: adequate  Airway patency: patent    PONV status at discharge: No PONV  Anesthetic complications: no      Cardiovascular status: blood pressure returned to baseline, hemodynamically stable and stable  Respiratory status: unassisted, spontaneous ventilation and room air  Hydration status: euvolemic  Follow-up not needed.          Vitals Value Taken Time   /60 12/22/22 1552   Temp 36.3 °C (97.3 °F) 12/22/22 1150   Pulse 72 12/22/22 1557   Resp 25 12/22/22 1556   SpO2 90 % 12/22/22 1557   Vitals shown include unvalidated device data.      Event Time   Out of Recovery 16:00:00         Pain/Derick Score: Pain Rating Prior to Med Admin: 7 (12/22/2022 12:53 PM)  Derick Score: 9 (12/22/2022  1:45 PM)

## 2022-12-28 DIAGNOSIS — R92.8 ABNORMALITY OF LEFT BREAST ON SCREENING MAMMOGRAPHY: Primary | ICD-10-CM

## 2022-12-30 ENCOUNTER — PATIENT MESSAGE (OUTPATIENT)
Dept: GASTROENTEROLOGY | Facility: CLINIC | Age: 67
End: 2022-12-30
Payer: MEDICARE

## 2022-12-30 DIAGNOSIS — K52.9 ENTERITIS: ICD-10-CM

## 2022-12-30 DIAGNOSIS — K56.699 STRICTURE OF SMALL INTESTINE: ICD-10-CM

## 2022-12-30 DIAGNOSIS — K56.609 SMALL BOWEL OBSTRUCTION: Primary | ICD-10-CM

## 2022-12-30 LAB
FINAL PATHOLOGIC DIAGNOSIS: NORMAL
GROSS: NORMAL
Lab: NORMAL

## 2023-01-05 ENCOUNTER — TELEPHONE (OUTPATIENT)
Dept: ENDOSCOPY | Facility: HOSPITAL | Age: 68
End: 2023-01-05
Payer: MEDICARE

## 2023-01-05 ENCOUNTER — PATIENT MESSAGE (OUTPATIENT)
Dept: ENDOSCOPY | Facility: HOSPITAL | Age: 68
End: 2023-01-05
Payer: MEDICARE

## 2023-01-05 DIAGNOSIS — Z12.11 SPECIAL SCREENING FOR MALIGNANT NEOPLASMS, COLON: Primary | ICD-10-CM

## 2023-01-05 RX ORDER — SODIUM, POTASSIUM,MAG SULFATES 17.5-3.13G
1 SOLUTION, RECONSTITUTED, ORAL ORAL DAILY
Qty: 1 KIT | Refills: 0 | Status: SHIPPED | OUTPATIENT
Start: 2023-01-05 | End: 2023-01-07

## 2023-01-05 NOTE — TELEPHONE ENCOUNTER
Pt is scheduled for Double Balloon Colonoscopy on 1/30/23.  Reviedwd medical history and instructions with pt.  Pt verbalized understanding.  Instructions sent to portal.

## 2023-01-10 ENCOUNTER — TELEPHONE (OUTPATIENT)
Dept: ENDOSCOPY | Facility: HOSPITAL | Age: 68
End: 2023-01-10
Payer: MEDICARE

## 2023-01-10 RX ORDER — SOD SULF/POT CHLORIDE/MAG SULF 1.479 G
12 TABLET ORAL DAILY
Qty: 24 TABLET | Refills: 0 | Status: SHIPPED | OUTPATIENT
Start: 2023-01-10 | End: 2023-01-30 | Stop reason: HOSPADM

## 2023-01-10 NOTE — TELEPHONE ENCOUNTER
Pt requested sutab prep for colonoscopy. New instructions sent via portal. Prescription sent to preferred pharmacy

## 2023-01-11 ENCOUNTER — HOSPITAL ENCOUNTER (OUTPATIENT)
Dept: RADIOLOGY | Facility: HOSPITAL | Age: 68
Discharge: HOME | End: 2023-01-11
Attending: FAMILY MEDICINE
Payer: MEDICARE

## 2023-01-11 VITALS — BODY MASS INDEX: 28.98 KG/M2 | WEIGHT: 169.75 LBS | HEIGHT: 64 IN

## 2023-01-11 DIAGNOSIS — R92.8 ABNORMALITY OF LEFT BREAST ON SCREENING MAMMOGRAPHY: ICD-10-CM

## 2023-01-11 PROCEDURE — 76642 ULTRASOUND BREAST LIMITED: CPT | Mod: TC,LT

## 2023-01-11 PROCEDURE — 76642 ULTRASOUND BREAST LIMITED: CPT | Mod: 26,LT,, | Performed by: RADIOLOGY

## 2023-01-11 PROCEDURE — 77061 BREAST TOMOSYNTHESIS UNI: CPT | Mod: 26,LT,, | Performed by: RADIOLOGY

## 2023-01-11 PROCEDURE — 77065 DX MAMMO INCL CAD UNI: CPT | Mod: 26,LT,, | Performed by: RADIOLOGY

## 2023-01-11 PROCEDURE — 77061 MAMMO DIGITAL DIAGNOSTIC LEFT WITH TOMO: ICD-10-PCS | Mod: 26,LT,, | Performed by: RADIOLOGY

## 2023-01-11 PROCEDURE — 77065 DX MAMMO INCL CAD UNI: CPT | Mod: TC,LT

## 2023-01-11 PROCEDURE — 77065 MAMMO DIGITAL DIAGNOSTIC LEFT WITH TOMO: ICD-10-PCS | Mod: 26,LT,, | Performed by: RADIOLOGY

## 2023-01-11 PROCEDURE — 76642 US BREAST LEFT LIMITED: ICD-10-PCS | Mod: 26,LT,, | Performed by: RADIOLOGY

## 2023-01-17 ENCOUNTER — PATIENT MESSAGE (OUTPATIENT)
Dept: FAMILY MEDICINE | Facility: CLINIC | Age: 68
End: 2023-01-17
Payer: MEDICARE

## 2023-01-25 ENCOUNTER — LAB VISIT (OUTPATIENT)
Dept: LAB | Facility: HOSPITAL | Age: 68
End: 2023-01-25
Attending: INTERNAL MEDICINE
Payer: MEDICARE

## 2023-01-25 DIAGNOSIS — D50.8 IRON DEFICIENCY ANEMIA SECONDARY TO INADEQUATE DIETARY IRON INTAKE: ICD-10-CM

## 2023-01-25 DIAGNOSIS — D64.9 SEVERE ANEMIA: ICD-10-CM

## 2023-01-25 LAB
ALBUMIN SERPL BCP-MCNC: 3.9 G/DL (ref 3.5–5.2)
ALP SERPL-CCNC: 72 U/L (ref 55–135)
ALT SERPL W/O P-5'-P-CCNC: 15 U/L (ref 10–44)
ANION GAP SERPL CALC-SCNC: 11 MMOL/L (ref 8–16)
AST SERPL-CCNC: 16 U/L (ref 10–40)
BASOPHILS # BLD AUTO: 0.06 K/UL (ref 0–0.2)
BASOPHILS NFR BLD: 0.8 % (ref 0–1.9)
BILIRUB SERPL-MCNC: 0.2 MG/DL (ref 0.1–1)
BUN SERPL-MCNC: 15 MG/DL (ref 8–23)
CALCIUM SERPL-MCNC: 9.3 MG/DL (ref 8.7–10.5)
CHLORIDE SERPL-SCNC: 106 MMOL/L (ref 95–110)
CO2 SERPL-SCNC: 26 MMOL/L (ref 23–29)
CREAT SERPL-MCNC: 0.7 MG/DL (ref 0.5–1.4)
DIFFERENTIAL METHOD: ABNORMAL
EOSINOPHIL # BLD AUTO: 0.1 K/UL (ref 0–0.5)
EOSINOPHIL NFR BLD: 1.6 % (ref 0–8)
ERYTHROCYTE [DISTWIDTH] IN BLOOD BY AUTOMATED COUNT: 12.8 % (ref 11.5–14.5)
EST. GFR  (NO RACE VARIABLE): >60 ML/MIN/1.73 M^2
FERRITIN SERPL-MCNC: 54 NG/ML (ref 20–300)
GLUCOSE SERPL-MCNC: 82 MG/DL (ref 70–110)
HCT VFR BLD AUTO: 40.3 % (ref 37–48.5)
HGB BLD-MCNC: 12.8 G/DL (ref 12–16)
IMM GRANULOCYTES # BLD AUTO: 0.02 K/UL (ref 0–0.04)
IMM GRANULOCYTES NFR BLD AUTO: 0.3 % (ref 0–0.5)
IRON SERPL-MCNC: 82 UG/DL (ref 30–160)
LYMPHOCYTES # BLD AUTO: 2.2 K/UL (ref 1–4.8)
LYMPHOCYTES NFR BLD: 30.4 % (ref 18–48)
MCH RBC QN AUTO: 29.8 PG (ref 27–31)
MCHC RBC AUTO-ENTMCNC: 31.8 G/DL (ref 32–36)
MCV RBC AUTO: 94 FL (ref 82–98)
MONOCYTES # BLD AUTO: 0.6 K/UL (ref 0.3–1)
MONOCYTES NFR BLD: 8.8 % (ref 4–15)
NEUTROPHILS # BLD AUTO: 4.3 K/UL (ref 1.8–7.7)
NEUTROPHILS NFR BLD: 58.1 % (ref 38–73)
NRBC BLD-RTO: 0 /100 WBC
PLATELET # BLD AUTO: 268 K/UL (ref 150–450)
PMV BLD AUTO: 9.8 FL (ref 9.2–12.9)
POTASSIUM SERPL-SCNC: 3.7 MMOL/L (ref 3.5–5.1)
PROT SERPL-MCNC: 6.7 G/DL (ref 6–8.4)
RBC # BLD AUTO: 4.3 M/UL (ref 4–5.4)
SATURATED IRON: 20 % (ref 20–50)
SODIUM SERPL-SCNC: 143 MMOL/L (ref 136–145)
TOTAL IRON BINDING CAPACITY: 416 UG/DL (ref 250–450)
TRANSFERRIN SERPL-MCNC: 281 MG/DL (ref 200–375)
WBC # BLD AUTO: 7.31 K/UL (ref 3.9–12.7)

## 2023-01-25 PROCEDURE — 85025 COMPLETE CBC W/AUTO DIFF WBC: CPT | Performed by: INTERNAL MEDICINE

## 2023-01-25 PROCEDURE — 84466 ASSAY OF TRANSFERRIN: CPT | Performed by: INTERNAL MEDICINE

## 2023-01-25 PROCEDURE — 36415 COLL VENOUS BLD VENIPUNCTURE: CPT | Performed by: INTERNAL MEDICINE

## 2023-01-25 PROCEDURE — 80053 COMPREHEN METABOLIC PANEL: CPT | Performed by: INTERNAL MEDICINE

## 2023-01-25 PROCEDURE — 82728 ASSAY OF FERRITIN: CPT | Performed by: INTERNAL MEDICINE

## 2023-01-27 ENCOUNTER — OFFICE VISIT (OUTPATIENT)
Dept: HEMATOLOGY/ONCOLOGY | Facility: CLINIC | Age: 68
End: 2023-01-27
Payer: MEDICARE

## 2023-01-27 DIAGNOSIS — D50.8 IRON DEFICIENCY ANEMIA SECONDARY TO INADEQUATE DIETARY IRON INTAKE: ICD-10-CM

## 2023-01-27 DIAGNOSIS — E53.8 B12 DEFICIENCY: ICD-10-CM

## 2023-01-27 DIAGNOSIS — E78.5 DYSLIPIDEMIA: Primary | ICD-10-CM

## 2023-01-27 DIAGNOSIS — Z87.19 S/P SMALL BOWEL OBSTRUCTION: ICD-10-CM

## 2023-01-27 PROCEDURE — 99214 PR OFFICE/OUTPT VISIT, EST, LEVL IV, 30-39 MIN: ICD-10-PCS | Mod: 95,,, | Performed by: INTERNAL MEDICINE

## 2023-01-27 PROCEDURE — 99214 OFFICE O/P EST MOD 30 MIN: CPT | Mod: 95,,, | Performed by: INTERNAL MEDICINE

## 2023-01-28 NOTE — PROGRESS NOTES
Subjective:    The patient location is: home  Visit type: Virtual visit with synchronous audio and video  Face-to-face or time spent with patient on the encounter:20min  Total time spent on and for  this encounter which includes non face-to-face time preparing to see patient, review of tests, obtaining and or reviewing separately obtained records documenting clinical information in the electronic or other health records, independently interpreting results which is not separately reported ,and communicating results to the patient/family/caregiver and in care coordination and treatment planning/communicating with pharmacy for prescriptions/addressing social needs/arranging follow-up and or referrals :25 min    Each patient I provide medical services by telemedicine is:  (1) informed of the relationship between the physician and patient and the respective role of any other health care provider with respect to management of the patient; and (2) notified that he or she may decline to receive medical services by telemedicine and may withdraw from such care at any time.  This is a video visit therefore some elements of the physical exam such as vital signs, heart sounds are breath sounds are not included and may be included if found in recent clinic notes of other providers assessing same patient. Any symptoms or signs that were visualized were stated by the patient may be included in this note.     Patient ID: Pham Licea is a 67 y.o. female.    Chief Complaint:      sept 2020 had gall bladder surgery, thern she had a bowel obstruction, no surgery then, had 2 feet of bowels removed after a second bowel obstruction  Esophageal ulcers and dudenal ulcers- with bleeding in 2017  Recd blood in [facility] this weekend, pt went to ED because she was shaky and week and couldn't think was found to be anemic in ed   pt readmitted 3/22 had repeat bowel obstruction   Seen at emergency room May 25, 2022 with CT scan of abdomen  which shows partial obstruction patient having significant abdominal pain.  Discomfort belching similar to her prior obstructive symptoms  Past Medical History:   Diagnosis Date    Anemia     Fatty liver 2015    Former smoker 2009    Osteoarthritis 2010    Pulmonary nodules 2019    Repeat CT in 6 mo    Ulcer of abdomen wall 2016     Past Surgical History:   Procedure Laterality Date    APPENDECTOMY  1993     SECTION  , , 1983    x3    COLONOSCOPY  ~2014    [facility]: normal findings per patient report    COLONOSCOPY N/A 2020    Procedure: COLONOSCOPY;  Surgeon: Misael Holm MD;  Location: [facility] ENDO;  Service: General;  Laterality: N/A;    DIAGNOSTIC LAPAROSCOPY N/A 2021    Procedure: LAPAROSCOPY, DIAGNOSTIC;  Surgeon: Misael Holm MD;  Location: [facility] OR;  Service: General;  Laterality: N/A;    EPIDURAL STEROID INJECTION INTO LUMBAR SPINE N/A 10/12/2018    Procedure: Injection-steroid-epidural-lumbar;  Surgeon: Kal Johnson MD;  Location: [facility] OR;  Service: Pain Management;  Laterality: N/A;  L5-S1    EPIDURAL STEROID INJECTION INTO LUMBAR SPINE N/A 2019    Procedure: Injection-steroid-epidural-lumbar L5-S1;  Surgeon: Kal Johnson MD;  Location: [facility] OR;  Service: Pain Management;  Laterality: N/A;    ESOPHAGOGASTRODUODENOSCOPY N/A 2020    Procedure: ESOPHAGOGASTRODUODENOSCOPY (EGD);  Surgeon: Misael Holm MD;  Location: [facility];  Service: General;  Laterality: N/A;    FUSION, SPINE, MINIMALLY INVASIVE, USING COMPUTER-ASSISTED NAVIGATION N/A 2022    Procedure: ENTEROSCOPY, DOUBLE BALLOON, ANTEGRADE;  Surgeon: Collin Mares MD;  Location: [facility] ([address] [place] [address]);  Service: Endoscopy;  Laterality: N/A;  22-Instructions via portal-DS    *open to earlier date if available*    HYSTERECTOMY      one ovary conserved    LAPAROSCOPIC CHOLECYSTECTOMY N/A 2020    Procedure: CHOLECYSTECTOMY, LAPAROSCOPIC;  Surgeon: Govind Frey MD;   Location: [facility] OR;  Service: General;  Laterality: N/A;    LAPAROSCOPIC LYSIS OF ADHESIONS N/A 2021    Procedure: LYSIS, ADHESIONS, LAPAROSCOPIC;  Surgeon: Misael Holm MD;  Location: [facility] OR;  Service: General;  Laterality: N/A;    UPPER GASTROINTESTINAL ENDOSCOPY       Family History   Problem Relation Age of Onset    Ovarian cancer Mother     Heart disease Mother     Osteoporosis Mother     Arthritis Mother     Hypertension Father     Stroke Father 50    Brain Hemorrhage Father     Aneurysm Father     Osteoarthritis Sister     Arthritis Sister     Hypertension Sister     Obesity Sister     Breast cancer Neg Hx     Colon cancer Neg Hx     Colon polyps Neg Hx     Crohn's disease Neg Hx     Ulcerative colitis Neg Hx       Social History     Socioeconomic History    Marital status:     Number of children: 4    Highest education level: Some college, no degree   Occupational History    Occupation: sale specialist-    Tobacco Use    Smoking status: Former     Packs/day: 1.00     Years: 40.00     Pack years: 40.00     Types: Cigarettes     Quit date: 2009     Years since quittin.2    Smokeless tobacco: Never    Tobacco comments:     quit    Substance and Sexual Activity    Alcohol use: Not Currently     Comment: Not often - one glass of wine every now and then    Drug use: Not Currently     Types: Marijuana     Comment: in     Sexual activity: Not Currently     Social Determinants of Health     Financial Resource Strain: Low Risk     Difficulty of Paying Living Expenses: Not hard at all   Food Insecurity: No Food Insecurity    Worried About Running Out of Food in the Last Year: Never true    Ran Out of Food in the Last Year: Never true   Transportation Needs: No Transportation Needs    Lack of Transportation (Medical): No    Lack of Transportation (Non-Medical): No   Physical Activity: Inactive    Days of Exercise per Week: 0 days    Minutes of Exercise per Session: 0 min    Stress: Stress Concern Present    Feeling of Stress : To some extent   Social Connections: Unknown    Frequency of Communication with Friends and Family: More than three times a week    Frequency of Social Gatherings with Friends and Family: More than three times a week    Active Member of Clubs or Organizations: Yes    Attends Club or Organization Meetings: More than 4 times per year    Marital Status:    Housing Stability: Low Risk     Unable to Pay for Housing in the Last Year: No    Number of Places Lived in the Last Year: 2    Unstable Housing in the Last Year: No     Review of patient's allergies indicates:  No Known Allergies    Current Outpatient Medications:     albuterol (VENTOLIN HFA) 90 mcg/actuation inhaler, Inhale 2 puffs into the lungs every 6 (six) hours as needed for Wheezing or Shortness of Breath. Rescue, Disp: 6.7 g, Rfl: 2    atorvastatin (LIPITOR) 10 MG tablet, Take 1 tablet (10 mg total) by mouth once daily., Disp: 90 tablet, Rfl: 3    CONSTULOSE 10 gram/15 mL solution, TAKE 10 ML BY MOUTH  THREE TIMES DAILY, Disp: 948 mL, Rfl: 0    diclofenac sodium (VOLTAREN) 1 % Gel, Apply 2 g topically 2 (two) times daily as needed., Disp: , Rfl:     ondansetron (ZOFRAN-ODT) 8 MG TbDL, Take 1 tablet (8 mg total) by mouth every 6 (six) hours as needed., Disp: 60 tablet, Rfl: 3    pantoprazole (PROTONIX) 20 MG tablet, Take 1 tablet (20 mg total) by mouth once daily., Disp: 90 tablet, Rfl: 3    promethazine (PHENERGAN) 25 MG suppository, Place 1 suppository (25 mg total) rectally 3 (three) times daily as needed for Nausea., Disp: 8 suppository, Rfl: 0    sod sulf-pot chloride-mag sulf (SUTAB) 1.479-0.188- 0.225 gram tablet, Take 12 tablets by mouth once daily. BIN:[id number]:[id number], Disp: 24 tablet, Rfl: 0    vitamin D (VITAMIN D3) 1000 units Tab, Take 3,000 Units by mouth once daily., Disp: , Rfl:   Review of Systems   Constitutional:  Positive for malaise/fatigue.  Negative for weight loss.        Mostly good appetite lost weight with her sx   HENT:  Negative for hearing loss.    Eyes:  Negative for blurred vision and double vision.   Gastrointestinal:  Positive for constipation.        Takes lactulose twice a day and colace twice a day   Musculoskeletal:  Negative for back pain, myalgias and neck pain.        Leg cramps and shaky   Skin:  Negative for rash.   Neurological:  Negative for dizziness, weakness and headaches.   Endo/Heme/Allergies:  Does not bruise/bleed easily. recent bowel obstruction 3/22 with repeat abdominal pain discomfort emergency room visit last night the outpatient surgical appointment  Physical Exam    Wt Readings from Last 3 Encounters:   01/11/23 77 kg (169 lb 12.1 oz)   12/22/22 77.1 kg (170 lb)   12/01/22 77.6 kg (171 lb)     Temp Readings from Last 3 Encounters:   12/22/22 97.3 °F (36.3 °C) (Temporal)   09/19/22 98.1 °F (36.7 °C)   09/13/22 97.8 °F (36.6 °C)     BP Readings from Last 3 Encounters:   12/22/22 117/64   10/12/22 123/76   09/27/22 110/70     Pulse Readings from Last 3 Encounters:   12/22/22 67   10/12/22 72   09/27/22 76     Lab Results   Component Value Date    WBC 7.31 01/25/2023    HGB 12.8 01/25/2023    HCT 40.3 01/25/2023    MCV 94 01/25/2023     01/25/2023       BMP  Lab Results   Component Value Date     01/25/2023    K 3.7 01/25/2023     01/25/2023    CO2 26 01/25/2023    BUN 15 01/25/2023    CREATININE 0.7 01/25/2023    CALCIUM 9.3 01/25/2023    ANIONGAP 11 01/25/2023    ESTGFRAFRICA >60.0 06/27/2022    EGFRNONAA >60.0 06/27/2022     Lab Results   Component Value Date    IRON 82 01/25/2023    TIBC 416 01/25/2023    FERRITIN 54 01/25/2023       Impression:  CT scan abdomen May 25, 2022     1. Multiple dilated loops of small bowel are noted that are fluid-filled left upper quadrant with the suggestion of bowel wall thickening near the level of the umbilicus in the midline and extending into the left dilated  loops of bowel.  This raises the concern for vascular injury and or bowel infarction/inflammation possibly associated with obstruction.  Surgical consultation is suggested.  Post-contrast oral and intravenous imaging may be of use for confirmation given that this exam is severely hindered.  2. A 2nd region of concern is noted within the right hemipelvis where a dilated loop of small bowel is noted without obvious bowel wall thickening.  This region is nonspecific and could relate to colon, small bowel, ileus, or obstruction.  Further evaluation and clinical correlation is necessary     Patient Active Problem List   Diagnosis    Primary osteoarthritis involving multiple joints    Obesity (BMI 30.0-34.9)    Gastroesophageal reflux disease    Right knee pain    Chronic midline low back pain    Acute tear medial meniscus, right, sequela    Primary osteoarthritis of right knee    Lumbar radiculitis    Trigger middle finger of right hand    Nausea    Abdominal pain    Encounter for postoperative care    Intestinal obstruction    Hypokalemia    Constipation    History of cholecystectomy    Right upper quadrant pain    History of hysterectomy    Rectal bleeding    Hemorrhoids    Mitral valve prolapse    Coronary artery calcification    Anemia    SAMPSON (dyspnea on exertion), noted 2010    History of smoking 25-50 pack years, 46 py, quit 2009    Other emphysema    NSAID long-term use, started 2017    Bandemia    Family history of premature CAD    Cardiovascular event risk, ASCVD 10-year risk 4.8%, 2019    Abdominal obesity    Dyslipidemia, baseline LDL-C 112, HDL-C 49    Iron deficiency anemia secondary to inadequate dietary iron intake    Severe anemia    S/p small bowel obstruction    Arm pain, lateral, left        Assessment and Plan   NATHAN: pt had partail bowel removal from opbstructions with recurrneces Bowel obs again 3/22 repeat sx and adhesiolysis, Going to [facility] for evaluation of small bowel.  Will keep patient on  Q 6 weeks the iron checks   recvd infusinal iron with excellent response   Orders will be placed for [facility] if infusion needed      Slight B12 deficiency also continue to monitor most recent test J 9/2022 acceptable  Patient to continue follow-up of her DJD with PCP

## 2023-01-28 NOTE — H&P (VIEW-ONLY)
Subjective:    The patient location is: home  Visit type: Virtual visit with synchronous audio and video  Face-to-face or time spent with patient on the encounter:20min  Total time spent on and for  this encounter which includes non face-to-face time preparing to see patient, review of tests, obtaining and or reviewing separately obtained records documenting clinical information in the electronic or other health records, independently interpreting results which is not separately reported ,and communicating results to the patient/family/caregiver and in care coordination and treatment planning/communicating with pharmacy for prescriptions/addressing social needs/arranging follow-up and or referrals :25 min    Each patient I provide medical services by telemedicine is:  (1) informed of the relationship between the physician and patient and the respective role of any other health care provider with respect to management of the patient; and (2) notified that he or she may decline to receive medical services by telemedicine and may withdraw from such care at any time.  This is a video visit therefore some elements of the physical exam such as vital signs, heart sounds are breath sounds are not included and may be included if found in recent clinic notes of other providers assessing same patient. Any symptoms or signs that were visualized were stated by the patient may be included in this note.     Patient ID: Pham Licea is a 67 y.o. female.    Chief Complaint:      sept 2020 had gall bladder surgery, thern she had a bowel obstruction, no surgery then, had 2 feet of bowels removed after a second bowel obstruction  Esophageal ulcers and dudenal ulcers- with bleeding in 2017  Recd blood in BSL this weekend, pt went to ED because she was shaky and week and couldn't think was found to be anemic in ed   pt readmitted 3/22 had repeat bowel obstruction   Seen at emergency room May 25, 2022 with CT scan of abdomen  which shows partial obstruction patient having significant abdominal pain.  Discomfort belching similar to her prior obstructive symptoms  Past Medical History:   Diagnosis Date    Anemia     Fatty liver 2015    Former smoker 2009    Osteoarthritis 2010    Pulmonary nodules 2019    Repeat CT in 6 mo    Ulcer of abdomen wall 2016     Past Surgical History:   Procedure Laterality Date    APPENDECTOMY  1993     SECTION  , , 1983    x3    COLONOSCOPY  ~2014    [facility]: normal findings per patient report    COLONOSCOPY N/A 2020    Procedure: COLONOSCOPY;  Surgeon: Misael Holm MD;  Location: [facility] ENDO;  Service: General;  Laterality: N/A;    DIAGNOSTIC LAPAROSCOPY N/A 2021    Procedure: LAPAROSCOPY, DIAGNOSTIC;  Surgeon: Misael Holm MD;  Location: [facility] OR;  Service: General;  Laterality: N/A;    EPIDURAL STEROID INJECTION INTO LUMBAR SPINE N/A 10/12/2018    Procedure: Injection-steroid-epidural-lumbar;  Surgeon: Kal Johnson MD;  Location: [facility] OR;  Service: Pain Management;  Laterality: N/A;  L5-S1    EPIDURAL STEROID INJECTION INTO LUMBAR SPINE N/A 2019    Procedure: Injection-steroid-epidural-lumbar L5-S1;  Surgeon: Kal Johnson MD;  Location: [facility] OR;  Service: Pain Management;  Laterality: N/A;    ESOPHAGOGASTRODUODENOSCOPY N/A 2020    Procedure: ESOPHAGOGASTRODUODENOSCOPY (EGD);  Surgeon: Misael Holm MD;  Location: [facility];  Service: General;  Laterality: N/A;    FUSION, SPINE, MINIMALLY INVASIVE, USING COMPUTER-ASSISTED NAVIGATION N/A 2022    Procedure: ENTEROSCOPY, DOUBLE BALLOON, ANTEGRADE;  Surgeon: Collin Mares MD;  Location: [facility] ([address] [place] [address]);  Service: Endoscopy;  Laterality: N/A;  22-Instructions via portal-DS    *open to earlier date if available*    HYSTERECTOMY      one ovary conserved    LAPAROSCOPIC CHOLECYSTECTOMY N/A 2020    Procedure: CHOLECYSTECTOMY, LAPAROSCOPIC;  Surgeon: Govind Frey MD;   Location: [facility] OR;  Service: General;  Laterality: N/A;    LAPAROSCOPIC LYSIS OF ADHESIONS N/A 2021    Procedure: LYSIS, ADHESIONS, LAPAROSCOPIC;  Surgeon: Misael Holm MD;  Location: [facility] OR;  Service: General;  Laterality: N/A;    UPPER GASTROINTESTINAL ENDOSCOPY       Family History   Problem Relation Age of Onset    Ovarian cancer Mother     Heart disease Mother     Osteoporosis Mother     Arthritis Mother     Hypertension Father     Stroke Father 50    Brain Hemorrhage Father     Aneurysm Father     Osteoarthritis Sister     Arthritis Sister     Hypertension Sister     Obesity Sister     Breast cancer Neg Hx     Colon cancer Neg Hx     Colon polyps Neg Hx     Crohn's disease Neg Hx     Ulcerative colitis Neg Hx       Social History     Socioeconomic History    Marital status:     Number of children: 4    Highest education level: Some college, no degree   Occupational History    Occupation: sale specialist-    Tobacco Use    Smoking status: Former     Packs/day: 1.00     Years: 40.00     Pack years: 40.00     Types: Cigarettes     Quit date: 2009     Years since quittin.2    Smokeless tobacco: Never    Tobacco comments:     quit    Substance and Sexual Activity    Alcohol use: Not Currently     Comment: Not often - one glass of wine every now and then    Drug use: Not Currently     Types: Marijuana     Comment: in     Sexual activity: Not Currently     Social Determinants of Health     Financial Resource Strain: Low Risk     Difficulty of Paying Living Expenses: Not hard at all   Food Insecurity: No Food Insecurity    Worried About Running Out of Food in the Last Year: Never true    Ran Out of Food in the Last Year: Never true   Transportation Needs: No Transportation Needs    Lack of Transportation (Medical): No    Lack of Transportation (Non-Medical): No   Physical Activity: Inactive    Days of Exercise per Week: 0 days    Minutes of Exercise per Session: 0 min    Stress: Stress Concern Present    Feeling of Stress : To some extent   Social Connections: Unknown    Frequency of Communication with Friends and Family: More than three times a week    Frequency of Social Gatherings with Friends and Family: More than three times a week    Active Member of Clubs or Organizations: Yes    Attends Club or Organization Meetings: More than 4 times per year    Marital Status:    Housing Stability: Low Risk     Unable to Pay for Housing in the Last Year: No    Number of Places Lived in the Last Year: 2    Unstable Housing in the Last Year: No     Review of patient's allergies indicates:  No Known Allergies    Current Outpatient Medications:     albuterol (VENTOLIN HFA) 90 mcg/actuation inhaler, Inhale 2 puffs into the lungs every 6 (six) hours as needed for Wheezing or Shortness of Breath. Rescue, Disp: 6.7 g, Rfl: 2    atorvastatin (LIPITOR) 10 MG tablet, Take 1 tablet (10 mg total) by mouth once daily., Disp: 90 tablet, Rfl: 3    CONSTULOSE 10 gram/15 mL solution, TAKE 10 ML BY MOUTH  THREE TIMES DAILY, Disp: 948 mL, Rfl: 0    diclofenac sodium (VOLTAREN) 1 % Gel, Apply 2 g topically 2 (two) times daily as needed., Disp: , Rfl:     ondansetron (ZOFRAN-ODT) 8 MG TbDL, Take 1 tablet (8 mg total) by mouth every 6 (six) hours as needed., Disp: 60 tablet, Rfl: 3    pantoprazole (PROTONIX) 20 MG tablet, Take 1 tablet (20 mg total) by mouth once daily., Disp: 90 tablet, Rfl: 3    promethazine (PHENERGAN) 25 MG suppository, Place 1 suppository (25 mg total) rectally 3 (three) times daily as needed for Nausea., Disp: 8 suppository, Rfl: 0    sod sulf-pot chloride-mag sulf (SUTAB) 1.479-0.188- 0.225 gram tablet, Take 12 tablets by mouth once daily. BIN:[id number]:CNGROUP:[id number], Disp: 24 tablet, Rfl: 0    vitamin D (VITAMIN D3) 1000 units Tab, Take 3,000 Units by mouth once daily., Disp: , Rfl:   Review of Systems   Constitutional:  Positive for malaise/fatigue.  Negative for weight loss.        Mostly good appetite lost weight with her sx   HENT:  Negative for hearing loss.    Eyes:  Negative for blurred vision and double vision.   Gastrointestinal:  Positive for constipation.        Takes lactulose twice a day and colace twice a day   Musculoskeletal:  Negative for back pain, myalgias and neck pain.        Leg cramps and shaky   Skin:  Negative for rash.   Neurological:  Negative for dizziness, weakness and headaches.   Endo/Heme/Allergies:  Does not bruise/bleed easily. recent bowel obstruction 3/22 with repeat abdominal pain discomfort emergency room visit last night the outpatient surgical appointment  Physical Exam    Wt Readings from Last 3 Encounters:   01/11/23 77 kg (169 lb 12.1 oz)   12/22/22 77.1 kg (170 lb)   12/01/22 77.6 kg (171 lb)     Temp Readings from Last 3 Encounters:   12/22/22 97.3 °F (36.3 °C) (Temporal)   09/19/22 98.1 °F (36.7 °C)   09/13/22 97.8 °F (36.6 °C)     BP Readings from Last 3 Encounters:   12/22/22 117/64   10/12/22 123/76   09/27/22 110/70     Pulse Readings from Last 3 Encounters:   12/22/22 67   10/12/22 72   09/27/22 76     Lab Results   Component Value Date    WBC 7.31 01/25/2023    HGB 12.8 01/25/2023    HCT 40.3 01/25/2023    MCV 94 01/25/2023     01/25/2023       BMP  Lab Results   Component Value Date     01/25/2023    K 3.7 01/25/2023     01/25/2023    CO2 26 01/25/2023    BUN 15 01/25/2023    CREATININE 0.7 01/25/2023    CALCIUM 9.3 01/25/2023    ANIONGAP 11 01/25/2023    ESTGFRAFRICA >60.0 06/27/2022    EGFRNONAA >60.0 06/27/2022     Lab Results   Component Value Date    IRON 82 01/25/2023    TIBC 416 01/25/2023    FERRITIN 54 01/25/2023       Impression:  CT scan abdomen May 25, 2022     1. Multiple dilated loops of small bowel are noted that are fluid-filled left upper quadrant with the suggestion of bowel wall thickening near the level of the umbilicus in the midline and extending into the left dilated  loops of bowel.  This raises the concern for vascular injury and or bowel infarction/inflammation possibly associated with obstruction.  Surgical consultation is suggested.  Post-contrast oral and intravenous imaging may be of use for confirmation given that this exam is severely hindered.  2. A 2nd region of concern is noted within the right hemipelvis where a dilated loop of small bowel is noted without obvious bowel wall thickening.  This region is nonspecific and could relate to colon, small bowel, ileus, or obstruction.  Further evaluation and clinical correlation is necessary     Patient Active Problem List   Diagnosis    Primary osteoarthritis involving multiple joints    Obesity (BMI 30.0-34.9)    Gastroesophageal reflux disease    Right knee pain    Chronic midline low back pain    Acute tear medial meniscus, right, sequela    Primary osteoarthritis of right knee    Lumbar radiculitis    Trigger middle finger of right hand    Nausea    Abdominal pain    Encounter for postoperative care    Intestinal obstruction    Hypokalemia    Constipation    History of cholecystectomy    Right upper quadrant pain    History of hysterectomy    Rectal bleeding    Hemorrhoids    Mitral valve prolapse    Coronary artery calcification    Anemia    SAMPSON (dyspnea on exertion), noted 2010    History of smoking 25-50 pack years, 46 py, quit 2009    Other emphysema    NSAID long-term use, started 2017    Bandemia    Family history of premature CAD    Cardiovascular event risk, ASCVD 10-year risk 4.8%, 2019    Abdominal obesity    Dyslipidemia, baseline LDL-C 112, HDL-C 49    Iron deficiency anemia secondary to inadequate dietary iron intake    Severe anemia    S/p small bowel obstruction    Arm pain, lateral, left        Assessment and Plan   NATHAN: pt had partail bowel removal from opbstructions with recurrneces Bowel obs again 3/22 repeat sx and adhesiolysis, Going to [facility] of small bowel.  Will keep patient on  Q 6 weeks the iron checks   recvd infusinal iron with excellent response   Orders will be placed for [facility] if infusion needed      Slight B12 deficiency also continue to monitor most recent test J 9/2022 acceptable  Patient to continue follow-up of her DJD with PCP

## 2023-01-30 ENCOUNTER — HOSPITAL ENCOUNTER (OUTPATIENT)
Facility: HOSPITAL | Age: 68
Discharge: HOME | End: 2023-01-30
Attending: INTERNAL MEDICINE | Admitting: INTERNAL MEDICINE
Payer: MEDICARE

## 2023-01-30 ENCOUNTER — ANESTHESIA EVENT (OUTPATIENT)
Dept: ENDOSCOPY | Facility: HOSPITAL | Age: 68
End: 2023-01-30
Payer: MEDICARE

## 2023-01-30 ENCOUNTER — ANESTHESIA (OUTPATIENT)
Dept: ENDOSCOPY | Facility: HOSPITAL | Age: 68
End: 2023-01-30
Payer: MEDICARE

## 2023-01-30 VITALS
RESPIRATION RATE: 18 BRPM | TEMPERATURE: 98 F | DIASTOLIC BLOOD PRESSURE: 72 MMHG | SYSTOLIC BLOOD PRESSURE: 123 MMHG | OXYGEN SATURATION: 99 % | HEART RATE: 68 BPM | BODY MASS INDEX: 29.88 KG/M2 | WEIGHT: 175 LBS | HEIGHT: 64 IN

## 2023-01-30 DIAGNOSIS — Z87.19 S/P SMALL BOWEL OBSTRUCTION: Primary | ICD-10-CM

## 2023-01-30 DIAGNOSIS — Z12.11 SPECIAL SCREENING FOR MALIGNANT NEOPLASMS, COLON: ICD-10-CM

## 2023-01-30 DIAGNOSIS — Z87.19 HISTORY OF SMALL INTESTINE ULCER: ICD-10-CM

## 2023-01-30 PROCEDURE — 37000008 HC ANESTHESIA 1ST 15 MINUTES: Performed by: INTERNAL MEDICINE

## 2023-01-30 PROCEDURE — 37000009 HC ANESTHESIA EA ADD 15 MINS: Performed by: INTERNAL MEDICINE

## 2023-01-30 PROCEDURE — 63600175 PHARM REV CODE 636 W HCPCS: Performed by: NURSE ANESTHETIST, CERTIFIED REGISTERED

## 2023-01-30 PROCEDURE — 88305 TISSUE EXAM BY PATHOLOGIST: ICD-10-PCS | Mod: 26,,, | Performed by: PATHOLOGY

## 2023-01-30 PROCEDURE — D9220A PRA ANESTHESIA: Mod: CRNA,,, | Performed by: NURSE ANESTHETIST, CERTIFIED REGISTERED

## 2023-01-30 PROCEDURE — 25000003 PHARM REV CODE 250: Performed by: NURSE ANESTHETIST, CERTIFIED REGISTERED

## 2023-01-30 PROCEDURE — 44799 UNLISTED PX SMALL INTESTINE: CPT | Mod: ,,, | Performed by: INTERNAL MEDICINE

## 2023-01-30 PROCEDURE — 88305 TISSUE EXAM BY PATHOLOGIST: CPT | Mod: 26,,, | Performed by: PATHOLOGY

## 2023-01-30 PROCEDURE — 44799 UNLISTED PX SMALL INTESTINE: CPT | Performed by: INTERNAL MEDICINE

## 2023-01-30 PROCEDURE — D9220A PRA ANESTHESIA: ICD-10-PCS | Mod: ANES,,, | Performed by: STUDENT IN AN ORGANIZED HEALTH CARE EDUCATION/TRAINING PROGRAM

## 2023-01-30 PROCEDURE — 44799 PR RETROGRADE DEVICE ASSISTED ENTEROSCOPY: ICD-10-PCS | Mod: ,,, | Performed by: INTERNAL MEDICINE

## 2023-01-30 PROCEDURE — 88305 TISSUE EXAM BY PATHOLOGIST: CPT | Performed by: PATHOLOGY

## 2023-01-30 PROCEDURE — D9220A PRA ANESTHESIA: ICD-10-PCS | Mod: CRNA,,, | Performed by: NURSE ANESTHETIST, CERTIFIED REGISTERED

## 2023-01-30 PROCEDURE — D9220A PRA ANESTHESIA: Mod: ANES,,, | Performed by: STUDENT IN AN ORGANIZED HEALTH CARE EDUCATION/TRAINING PROGRAM

## 2023-01-30 PROCEDURE — 27201030 HC OVERTUBE: Performed by: INTERNAL MEDICINE

## 2023-01-30 PROCEDURE — 27201012 HC FORCEPS, HOT/COLD, DISP: Performed by: INTERNAL MEDICINE

## 2023-01-30 RX ORDER — SODIUM CHLORIDE 0.9 % (FLUSH) 0.9 %
10 SYRINGE (ML) INJECTION
Status: DISCONTINUED | OUTPATIENT
Start: 2023-01-30 | End: 2023-01-30 | Stop reason: HOSPADM

## 2023-01-30 RX ORDER — PROPOFOL 10 MG/ML
VIAL (ML) INTRAVENOUS
Status: DISCONTINUED | OUTPATIENT
Start: 2023-01-30 | End: 2023-01-30

## 2023-01-30 RX ORDER — LIDOCAINE HYDROCHLORIDE 10 MG/ML
INJECTION, SOLUTION INTRAVENOUS
Status: DISCONTINUED | OUTPATIENT
Start: 2023-01-30 | End: 2023-01-30

## 2023-01-30 RX ORDER — SODIUM CHLORIDE 9 MG/ML
INJECTION, SOLUTION INTRAVENOUS CONTINUOUS
Status: DISCONTINUED | OUTPATIENT
Start: 2023-01-30 | End: 2023-01-30 | Stop reason: HOSPADM

## 2023-01-30 RX ORDER — PROPOFOL 10 MG/ML
VIAL (ML) INTRAVENOUS CONTINUOUS PRN
Status: DISCONTINUED | OUTPATIENT
Start: 2023-01-30 | End: 2023-01-30

## 2023-01-30 RX ADMIN — PROPOFOL 80 MG: 10 INJECTION, EMULSION INTRAVENOUS at 12:01

## 2023-01-30 RX ADMIN — LIDOCAINE HYDROCHLORIDE 100 MG: 10 INJECTION, SOLUTION INTRAVENOUS at 12:01

## 2023-01-30 RX ADMIN — Medication 150 MCG/KG/MIN: at 12:01

## 2023-01-30 RX ADMIN — SODIUM CHLORIDE: 9 INJECTION, SOLUTION INTRAVENOUS at 12:01

## 2023-01-30 NOTE — ANESTHESIA PREPROCEDURE EVALUATION
01/30/2023  Pham Licea is a 67 y.o., female.      Pre-op Assessment    I have reviewed the Patient Summary Reports.     I have reviewed the Nursing Notes. I have reviewed the NPO Status.   I have reviewed the Medications.     Review of Systems  Anesthesia Hx:  Denies Family Hx of Anesthesia complications.   Denies Personal Hx of Anesthesia complications.   Cardiovascular:   CAD   SAMPSON    Pulmonary:   COPD, mild Shortness of breath    Hepatic/GI:   GERD Liver Disease,    Musculoskeletal:   Arthritis     Neurological:   Neuromuscular Disease,        Physical Exam  General: Well nourished, Cooperative, Alert and Oriented    Airway:  Mallampati: II   Mouth Opening: Normal  TM Distance: Normal  Tongue: Normal  Neck ROM: Normal ROM        Anesthesia Plan  Type of Anesthesia, risks & benefits discussed:    Anesthesia Type: Gen Natural Airway  Intra-op Monitoring Plan: Standard ASA Monitors  Post Op Pain Control Plan: multimodal analgesia  Induction:  IV  Informed Consent: Informed consent signed with the Patient and all parties understand the risks and agree with anesthesia plan.  All questions answered. Patient consented to blood products? No  ASA Score: 3  Day of Surgery Review of History & Physical: H&P Update referred to the surgeon/provider.    Ready For Surgery From Anesthesia Perspective.     .

## 2023-01-30 NOTE — INTERVAL H&P NOTE
The patient has been examined and the H&P has been reviewed:    Pre-Procedure H and P Addendum    Patient seen and examined.  History and exam unchanged from prior history and physical.      Procedure: Retrograde double-balloon enteroscopy  Indication: history of small bowel ulcers and obstruction  ASA Class: per anesthesiology  Airway: normal  Neck Mobility: full range of motion  Mallampatti score: per anesthesia  History of anesthesia problems: no  Family history of anesthesia problems: no  Anesthesia Plan: MAC

## 2023-01-30 NOTE — PROVATION PATIENT INSTRUCTIONS
Discharge Summary/Instructions after an Endoscopic Procedure  Patient Name: Pham Licea  Patient MRN: [id number]  Patient YOB: 1955 Monday, January 30, 2023  Collin Marse MD  Dear patient,  As a result of recent federal legislation (The Federal Cures Act), you may   receive lab or pathology results from your procedure in your [facility]   account before your physician is able to contact you. Your physician or   their representative will relay the results to you with their   recommendations at their soonest availability.  Thank you,  RESTRICTIONS:  During your procedure today, you received medications for sedation.  These   medications may affect your judgment, balance and coordination.  Therefore,   for 24 hours, you have the following restrictions:   - DO NOT drive a car, operate machinery, make legal/financial decisions,   sign important papers or drink alcohol.    ACTIVITY:  Today: no heavy lifting, straining or running due to procedural   sedation/anesthesia.  The following day: return to full activity including work.  DIET:  Eat and drink normally unless instructed otherwise.     TREATMENT FOR COMMON SIDE EFFECTS:  - Mild abdominal pain, nausea, belching, bloating or excessive gas:  rest,   eat lightly and use a heating pad.  - Sore Throat: treat with throat lozenges and/or gargle with warm salt   water.  - Because air was used during the procedure, expelling large amounts of air   from your rectum or belching is normal.  - If a bowel prep was taken, you may not have a bowel movement for 1-3 days.    This is normal.  SYMPTOMS TO WATCH FOR AND REPORT TO YOUR PHYSICIAN:  1. Abdominal pain or bloating, other than gas cramps.  2. Chest pain.  3. Back pain.  4. Signs of infection such as: chills or fever occurring within 24 hours   after the procedure.  5. Rectal bleeding, which would show as bright red, maroon, or black stools.   (A tablespoon of blood from the rectum is not serious, especially  if   hemorrhoids are present.)  6. Vomiting.  7. Weakness or dizziness.  GO DIRECTLY TO THE NEAREST EMERGENCY ROOM IF YOU HAVE ANY OF THE FOLLOWING:      Difficulty breathing              Chills and/or fever over 101 F   Persistent vomiting and/or vomiting blood   Severe abdominal pain   Severe chest pain   Black, tarry stools   Bleeding- more than one tablespoon   Any other symptom or condition that you feel may need urgent attention  Your doctor recommends these additional instructions:  If any biopsies were taken, your doctors clinic will contact you in 1 to 2   weeks with any results.  - Discharge patient to home.   - Patient has a contact number available for emergencies.  The signs and   symptoms of potential delayed complications were discussed with the   patient.  Return to normal activities tomorrow.  Written discharge   instructions were provided to the patient.   - Resume previous diet.   - Continue present medications.   - Perform a lower endoscopic ultrasound (LEUS).    - Return to referring physician.  Recurrent obstruction may be due to   adhesions.    For questions, problems or results please call your physician - Collin Mares MD at Work:  [phone].  [facility], EMERGENCY ROOM PHONE NUMBER: [phone]  IF A COMPLICATION OR EMERGENCY SITUATION ARISES AND YOU ARE UNABLE TO REACH   YOUR PHYSICIAN - GO DIRECTLY TO THE EMERGENCY ROOM.  Collin Mares MD  1/30/2023 1:14:17 PM  This report has been verified and signed electronically.  Dear patient,  As a result of recent federal legislation (The Federal Cures Act), you may   receive lab or pathology results from your procedure in your [facility]   account before your physician is able to contact you. Your physician or   their representative will relay the results to you with their   recommendations at their soonest availability.  Thank you,  PROVATION

## 2023-01-30 NOTE — ANESTHESIA POSTPROCEDURE EVALUATION
Anesthesia Post Evaluation    Patient: Pham Licea    Procedure(s) Performed: Procedure(s) (LRB):  COLONOSCOPY, WITH RETROGRADE BALLOON ENTEROSCOPY, DOUBLE BALLOON (N/A)    Final Anesthesia Type: general      Patient location during evaluation: PACU  Patient participation: Yes- Able to Participate  Level of consciousness: awake and alert  Post-procedure vital signs: reviewed and stable  Pain management: adequate  Airway patency: patent  ANNMARIE mitigation strategies: Multimodal analgesia  PONV status at discharge: No PONV  Anesthetic complications: no      Cardiovascular status: blood pressure returned to baseline and hemodynamically stable  Respiratory status: unassisted  Hydration status: euvolemic  Follow-up not needed.          Vitals Value Taken Time   /59 01/30/23 1317   Temp 36.8 °C (98.2 °F) 01/30/23 1301   Pulse 74 01/30/23 1320   Resp 14 01/30/23 1320   SpO2 100 % 01/30/23 1320   Vitals shown include unvalidated device data.      No case tracking events are documented in the log.      Pain/Derick Score: Derick Score: 8 (1/30/2023  1:01 PM)

## 2023-01-30 NOTE — TRANSFER OF CARE
"Anesthesia Transfer of Care Note    Patient: Pham Licea    Procedure(s) Performed: Procedure(s) (LRB):  COLONOSCOPY, WITH RETROGRADE BALLOON ENTEROSCOPY, DOUBLE BALLOON (N/A)    Patient location: PACU    Anesthesia Type: general    Transport from OR: Transported from OR on 6-10 L/min O2 by face mask with adequate spontaneous ventilation    Post pain: adequate analgesia    Post assessment: no apparent anesthetic complications and tolerated procedure well    Post vital signs: stable    Level of consciousness: awake    Nausea/Vomiting: no nausea/vomiting    Complications: none    Transfer of care protocol was followed      Last vitals:   Visit Vitals  /61 (BP Location: Left arm, Patient Position: Lying)   Pulse 77   Temp 36.7 °C (98.1 °F) (Temporal)   Resp 16   Ht 5' 4" (1.626 m)   Wt 79.4 kg (175 lb)   LMP  (LMP Unknown)   SpO2 98%   Breastfeeding No   BMI 30.04 kg/m²     "

## 2023-01-31 ENCOUNTER — PATIENT MESSAGE (OUTPATIENT)
Dept: HEMATOLOGY/ONCOLOGY | Facility: CLINIC | Age: 68
End: 2023-01-31
Payer: MEDICARE

## 2023-02-07 ENCOUNTER — PATIENT MESSAGE (OUTPATIENT)
Dept: GASTROENTEROLOGY | Facility: CLINIC | Age: 68
End: 2023-02-07
Payer: MEDICARE

## 2023-02-07 LAB
COMMENT: NORMAL
FINAL PATHOLOGIC DIAGNOSIS: NORMAL
GROSS: NORMAL
Lab: NORMAL

## 2023-02-08 DIAGNOSIS — K62.9 RECTAL LESION: Primary | ICD-10-CM

## 2023-02-09 ENCOUNTER — OFFICE VISIT (OUTPATIENT)
Dept: SURGERY | Facility: CLINIC | Age: 68
End: 2023-02-09
Payer: MEDICARE

## 2023-02-09 VITALS
WEIGHT: 184 LBS | HEIGHT: 64 IN | HEART RATE: 68 BPM | BODY MASS INDEX: 31.41 KG/M2 | OXYGEN SATURATION: 98 % | SYSTOLIC BLOOD PRESSURE: 158 MMHG | DIASTOLIC BLOOD PRESSURE: 84 MMHG

## 2023-02-09 DIAGNOSIS — K59.04 CHRONIC IDIOPATHIC CONSTIPATION: ICD-10-CM

## 2023-02-09 DIAGNOSIS — Z87.19 HISTORY OF SMALL BOWEL OBSTRUCTION: Primary | ICD-10-CM

## 2023-02-09 PROCEDURE — 99999 PR PBB SHADOW E&M-EST. PATIENT-LVL III: CPT | Mod: PBBFAC,,, | Performed by: SURGERY

## 2023-02-09 PROCEDURE — 99999 PR PBB SHADOW E&M-EST. PATIENT-LVL III: ICD-10-PCS | Mod: PBBFAC,,, | Performed by: SURGERY

## 2023-02-09 PROCEDURE — 99213 OFFICE O/P EST LOW 20 MIN: CPT | Mod: PBBFAC | Performed by: SURGERY

## 2023-02-09 PROCEDURE — 99214 PR OFFICE/OUTPT VISIT, EST, LEVL IV, 30-39 MIN: ICD-10-PCS | Mod: S$PBB,,, | Performed by: SURGERY

## 2023-02-09 PROCEDURE — 99214 OFFICE O/P EST MOD 30 MIN: CPT | Mod: S$PBB,,, | Performed by: SURGERY

## 2023-02-09 RX ORDER — SODIUM, POTASSIUM,MAG SULFATES 17.5-3.13G
SOLUTION, RECONSTITUTED, ORAL ORAL
COMMUNITY
Start: 2023-01-09 | End: 2023-02-09 | Stop reason: ALTCHOICE

## 2023-02-09 NOTE — PROGRESS NOTES
"[facility] General Surgery  Follow-up    Subjective:     Chief Complaint:  Follow-up bowel obstructions.  Follow-up chronic idiopathic constipation.    HPI:  Pham Licea is a 67 y.o. female presents today for follow-up examination.  Patient since last visit patient has undergone advanced Gastroenterology with push enteroscopy double balloon enteroscopy without significant findings.  Patient since last visit has done well.  She states that her obstructive episodes happen every 2 months as opposed every 3 weeks previous.  Obstructive episode described as bilious emesis.  Usually resolves over weekend.  She presents today for follow-up evaluation.    Review of Systems   Constitutional:  Negative for activity change, appetite change, chills and fever.   HENT:  Negative for congestion, dental problem and ear discharge.    Eyes:  Negative for discharge and itching.   Respiratory:  Negative for apnea, choking and chest tightness.    Cardiovascular:  Negative for chest pain and leg swelling.   Gastrointestinal:  Negative for abdominal distention, abdominal pain, anal bleeding, constipation, diarrhea and nausea.   Endocrine: Negative for cold intolerance and heat intolerance.   Genitourinary:  Negative for difficulty urinating and dyspareunia.   Musculoskeletal:  Negative for arthralgias and back pain.   Skin:  Negative for color change and pallor.   Neurological:  Negative for dizziness and facial asymmetry.   Hematological:  Negative for adenopathy. Does not bruise/bleed easily.   Psychiatric/Behavioral:  Negative for agitation and behavioral problems.      Objective:      Vitals:    02/09/23 1128   BP: (!) 158/84   Pulse: 68   SpO2: 98%   Weight: 83.5 kg (184 lb)   Height: 5' 4" (1.626 m)     Physical Exam  Constitutional:       General: She is not in acute distress.     Appearance: She is well-developed.   HENT:      Head: Normocephalic and atraumatic.   Eyes:      Pupils: Pupils are equal, round, and reactive " to light.   Neck:      Thyroid: No thyromegaly.   Cardiovascular:      Rate and Rhythm: Normal rate and regular rhythm.   Pulmonary:      Effort: Pulmonary effort is normal.      Breath sounds: Normal breath sounds.   Abdominal:      General: Bowel sounds are normal. There is no distension.      Palpations: Abdomen is soft.      Tenderness: There is no abdominal tenderness.   Musculoskeletal:         General: No deformity. Normal range of motion.      Cervical back: Normal range of motion and neck supple.   Skin:     General: Skin is warm.      Capillary Refill: Capillary refill takes less than 2 seconds.      Findings: No erythema.   Neurological:      Mental Status: She is alert and oriented to person, place, and time.      Cranial Nerves: No cranial nerve deficit.   Psychiatric:         Behavior: Behavior normal.        Assessment:       1. History of small bowel obstruction    2. Chronic idiopathic constipation          Plan:   History of small bowel obstruction    Chronic idiopathic constipation        Medical Decision Making/Counseling:  Chronic idiopathic constipation.  Much improved with lactulose.  Once a day utilization.  Continue use.      History of bowel obstructions.  Continue monitoring.  Appears that the course is improving for the patient.  Continue to monitor at this point.  Follow-up surgery clinic 6 months    Follow up:  6 months    Patient instructed that best way to communicate with my office staff is for patient to get on the [facility] epic patient portal to expedite communication and communication issues that may occur.  Patient was given instructions on how to get on the portal.  I encouraged patient to obtain portal access as well.  Ultimately it is up to the patient to obtain access.  Patient voiced understanding.

## 2023-02-14 ENCOUNTER — TELEPHONE (OUTPATIENT)
Dept: ENDOSCOPY | Facility: HOSPITAL | Age: 68
End: 2023-02-14

## 2023-02-17 ENCOUNTER — TELEPHONE (OUTPATIENT)
Dept: ENDOSCOPY | Facility: HOSPITAL | Age: 68
End: 2023-02-17
Payer: MEDICARE

## 2023-02-17 NOTE — TELEPHONE ENCOUNTER
Telephoned pt to schedule Colonoscopy/EUS.  Spoke with pt, however she states she is not ready to schedule yet.  She simply wants a small break from procedures and bowel prep, but understands the importance of the procedure.  Direct contact number provided to pt to call when she is ready to schedule.

## 2023-02-22 ENCOUNTER — TELEPHONE (OUTPATIENT)
Dept: OTOLARYNGOLOGY | Facility: CLINIC | Age: 68
End: 2023-02-22
Payer: MEDICARE

## 2023-02-23 ENCOUNTER — OFFICE VISIT (OUTPATIENT)
Dept: OTOLARYNGOLOGY | Facility: CLINIC | Age: 68
End: 2023-02-23
Payer: MEDICARE

## 2023-02-23 VITALS
WEIGHT: 182.5 LBS | BODY MASS INDEX: 31.16 KG/M2 | HEIGHT: 64 IN | DIASTOLIC BLOOD PRESSURE: 74 MMHG | SYSTOLIC BLOOD PRESSURE: 140 MMHG

## 2023-02-23 DIAGNOSIS — B36.9: Primary | ICD-10-CM

## 2023-02-23 DIAGNOSIS — H62.43: Primary | ICD-10-CM

## 2023-02-23 PROCEDURE — 99999 PR PBB SHADOW E&M-EST. PATIENT-LVL II: CPT | Mod: PBBFAC,,, | Performed by: OTOLARYNGOLOGY

## 2023-02-23 PROCEDURE — 99203 OFFICE O/P NEW LOW 30 MIN: CPT | Mod: S$PBB,,, | Performed by: OTOLARYNGOLOGY

## 2023-02-23 PROCEDURE — 99203 PR OFFICE/OUTPT VISIT, NEW, LEVL III, 30-44 MIN: ICD-10-PCS | Mod: S$PBB,,, | Performed by: OTOLARYNGOLOGY

## 2023-02-23 PROCEDURE — 99212 OFFICE O/P EST SF 10 MIN: CPT | Mod: PBBFAC,PN | Performed by: OTOLARYNGOLOGY

## 2023-02-23 PROCEDURE — 99999 PR PBB SHADOW E&M-EST. PATIENT-LVL II: ICD-10-PCS | Mod: PBBFAC,,, | Performed by: OTOLARYNGOLOGY

## 2023-02-23 RX ORDER — CLOTRIMAZOLE AND BETAMETHASONE DIPROPIONATE 10; .64 MG/G; MG/G
CREAM TOPICAL 2 TIMES DAILY
Qty: 15 G | Refills: 2 | Status: SHIPPED | OUTPATIENT
Start: 2023-02-23 | End: 2023-03-09

## 2023-02-23 NOTE — PROGRESS NOTES
Subjective:       Patient ID: Pham Licea is a 67 y.o. female.    Chief Complaint: Other (Pt c/o itchy crusty ear x 6/7 months. Pt has tried otc drops but it doesn't really help. )      Healthy 67-year-old tells me that for 6 months maybe more she is had itching and crusting of her left ear and she points to the uri bowl and to some extent the posterior meatus.  It has not hurt but it has been persistent and she has not notice this on the right side.  She thinks her hearing is symmetric though perhaps a bit fading as she gets older but she does not have any significant problems with communication.  She does not voice any other ear nose and throat problems at the.    Review of Systems    Objective:      ENT Physical Exam  Constitutional  Appearance: patient appears well-developed, well-nourished and well-groomed,  Communication/Voice: communication appropriate for developmental age; vocal quality normal;  Head and Face  Appearance: head appears normal, face appears normal and face appears atraumatic;  Palpation: facial palpation normal;  Salivary: glands normal;  Ear  Hearing: intact;  Auricles: right auricle normal; left auricle normal;  External Mastoids: right external mastoid normal; left external mastoid normal;  Ear Canals: right ear canal normal;  Tympanic Membranes: right tympanic membrane normal; left tympanic membrane normal;  Ear comments: The left uri bowl is red and crusty the redness extends to the edge of the posterior meatus on the left but the canal itself is fine as as the eardrum.  It appears likely to be a low-grade chronic mycotic skin infection.  Nose  External Nose: nares patent bilaterally; external nose normal;  Internal Nose: nasal mucosa normal; septum normal; bilateral inferior turbinates normal;  Oral Cavity/Oropharynx  Lips: normal;  Teeth: normal;  Gums: gingiva normal;  Tongue: normal;  Oral mucosa: normal;  Hard palate: normal;  Neck  Neck: neck normal; neck palpation  normal;  Thyroid: thyroid normal;  Lymphatic  Palpation: lymph nodes normal;        Assessment:       1. Otitis externa of both external auditory canals due to fungus            Plan:           We discussed the likely nature of her low-grade chronic ear canal skin infection.  I have sent in a common antifungal steroid cream for her to apply and as I have mentioned to her if this has not suffice after a couple of weeks or if the problem should recur I have asked her to call me so we can consider other topical agents.

## 2023-03-07 ENCOUNTER — LAB VISIT (OUTPATIENT)
Dept: LAB | Facility: HOSPITAL | Age: 68
End: 2023-03-07
Attending: INTERNAL MEDICINE
Payer: MEDICARE

## 2023-03-07 DIAGNOSIS — D50.8 IRON DEFICIENCY ANEMIA SECONDARY TO INADEQUATE DIETARY IRON INTAKE: ICD-10-CM

## 2023-03-07 DIAGNOSIS — E53.8 B12 DEFICIENCY: ICD-10-CM

## 2023-03-07 DIAGNOSIS — Z87.19 S/P SMALL BOWEL OBSTRUCTION: ICD-10-CM

## 2023-03-07 LAB
ALBUMIN SERPL BCP-MCNC: 3.8 G/DL (ref 3.5–5.2)
ALP SERPL-CCNC: 68 U/L (ref 55–135)
ALT SERPL W/O P-5'-P-CCNC: 15 U/L (ref 10–44)
ANION GAP SERPL CALC-SCNC: 8 MMOL/L (ref 8–16)
AST SERPL-CCNC: 18 U/L (ref 10–40)
BASOPHILS # BLD AUTO: 0.06 K/UL (ref 0–0.2)
BASOPHILS NFR BLD: 1 % (ref 0–1.9)
BILIRUB SERPL-MCNC: 0.4 MG/DL (ref 0.1–1)
BUN SERPL-MCNC: 10 MG/DL (ref 8–23)
CALCIUM SERPL-MCNC: 8.8 MG/DL (ref 8.7–10.5)
CHLORIDE SERPL-SCNC: 109 MMOL/L (ref 95–110)
CO2 SERPL-SCNC: 25 MMOL/L (ref 23–29)
CREAT SERPL-MCNC: 0.7 MG/DL (ref 0.5–1.4)
DIFFERENTIAL METHOD: ABNORMAL
EOSINOPHIL # BLD AUTO: 0.1 K/UL (ref 0–0.5)
EOSINOPHIL NFR BLD: 1.9 % (ref 0–8)
ERYTHROCYTE [DISTWIDTH] IN BLOOD BY AUTOMATED COUNT: 13.1 % (ref 11.5–14.5)
EST. GFR  (NO RACE VARIABLE): >60 ML/MIN/1.73 M^2
FERRITIN SERPL-MCNC: 36 NG/ML (ref 20–300)
GLUCOSE SERPL-MCNC: 105 MG/DL (ref 70–110)
HCT VFR BLD AUTO: 38.8 % (ref 37–48.5)
HGB BLD-MCNC: 12.3 G/DL (ref 12–16)
IMM GRANULOCYTES # BLD AUTO: 0.02 K/UL (ref 0–0.04)
IMM GRANULOCYTES NFR BLD AUTO: 0.3 % (ref 0–0.5)
IRON SERPL-MCNC: 57 UG/DL (ref 30–160)
LYMPHOCYTES # BLD AUTO: 1.9 K/UL (ref 1–4.8)
LYMPHOCYTES NFR BLD: 32.8 % (ref 18–48)
MCH RBC QN AUTO: 29.6 PG (ref 27–31)
MCHC RBC AUTO-ENTMCNC: 31.7 G/DL (ref 32–36)
MCV RBC AUTO: 94 FL (ref 82–98)
MONOCYTES # BLD AUTO: 0.5 K/UL (ref 0.3–1)
MONOCYTES NFR BLD: 9.4 % (ref 4–15)
NEUTROPHILS # BLD AUTO: 3.1 K/UL (ref 1.8–7.7)
NEUTROPHILS NFR BLD: 54.6 % (ref 38–73)
NRBC BLD-RTO: 0 /100 WBC
PLATELET # BLD AUTO: 246 K/UL (ref 150–450)
PMV BLD AUTO: 10.1 FL (ref 9.2–12.9)
POTASSIUM SERPL-SCNC: 3.8 MMOL/L (ref 3.5–5.1)
PROT SERPL-MCNC: 6.3 G/DL (ref 6–8.4)
RBC # BLD AUTO: 4.15 M/UL (ref 4–5.4)
SATURATED IRON: 14 % (ref 20–50)
SODIUM SERPL-SCNC: 142 MMOL/L (ref 136–145)
TOTAL IRON BINDING CAPACITY: 410 UG/DL (ref 250–450)
TRANSFERRIN SERPL-MCNC: 277 MG/DL (ref 200–375)
VIT B12 SERPL-MCNC: 418 PG/ML (ref 210–950)
WBC # BLD AUTO: 5.76 K/UL (ref 3.9–12.7)

## 2023-03-07 PROCEDURE — 36415 COLL VENOUS BLD VENIPUNCTURE: CPT | Performed by: INTERNAL MEDICINE

## 2023-03-07 PROCEDURE — 82607 VITAMIN B-12: CPT | Performed by: INTERNAL MEDICINE

## 2023-03-07 PROCEDURE — 84466 ASSAY OF TRANSFERRIN: CPT | Performed by: INTERNAL MEDICINE

## 2023-03-07 PROCEDURE — 80053 COMPREHEN METABOLIC PANEL: CPT | Performed by: INTERNAL MEDICINE

## 2023-03-07 PROCEDURE — 85025 COMPLETE CBC W/AUTO DIFF WBC: CPT | Performed by: INTERNAL MEDICINE

## 2023-03-07 PROCEDURE — 82728 ASSAY OF FERRITIN: CPT | Performed by: INTERNAL MEDICINE

## 2023-03-08 ENCOUNTER — PATIENT MESSAGE (OUTPATIENT)
Dept: HEMATOLOGY/ONCOLOGY | Facility: CLINIC | Age: 68
End: 2023-03-08
Payer: MEDICARE

## 2023-03-09 ENCOUNTER — OFFICE VISIT (OUTPATIENT)
Dept: HEMATOLOGY/ONCOLOGY | Facility: CLINIC | Age: 68
End: 2023-03-09
Payer: MEDICARE

## 2023-03-09 DIAGNOSIS — I25.10 CORONARY ARTERY CALCIFICATION: Primary | ICD-10-CM

## 2023-03-09 DIAGNOSIS — E66.9 OBESITY (BMI 30.0-34.9): ICD-10-CM

## 2023-03-09 DIAGNOSIS — D64.9 SEVERE ANEMIA: ICD-10-CM

## 2023-03-09 DIAGNOSIS — K62.5 RECTAL BLEEDING: ICD-10-CM

## 2023-03-09 DIAGNOSIS — I25.84 CORONARY ARTERY CALCIFICATION: Primary | ICD-10-CM

## 2023-03-09 DIAGNOSIS — D50.8 IRON DEFICIENCY ANEMIA SECONDARY TO INADEQUATE DIETARY IRON INTAKE: ICD-10-CM

## 2023-03-09 PROCEDURE — 99214 OFFICE O/P EST MOD 30 MIN: CPT | Mod: 95,,, | Performed by: INTERNAL MEDICINE

## 2023-03-09 PROCEDURE — 99214 PR OFFICE/OUTPT VISIT, EST, LEVL IV, 30-39 MIN: ICD-10-PCS | Mod: 95,,, | Performed by: INTERNAL MEDICINE

## 2023-03-09 NOTE — PROGRESS NOTES
Subjective:    The patient location is: home  Visit type: Virtual visit with synchronous audio and video  Face-to-face or time spent with patient on the encounter:20min  Total time spent on and for  this encounter which includes non face-to-face time preparing to see patient, review of tests, obtaining and or reviewing separately obtained records documenting clinical information in the electronic or other health records, independently interpreting results which is not separately reported ,and communicating results to the patient/family/caregiver and in care coordination and treatment planning/communicating with pharmacy for prescriptions/addressing social needs/arranging follow-up and or referrals :25 min    Each patient I provide medical services by telemedicine is:  (1) informed of the relationship between the physician and patient and the respective role of any other health care provider with respect to management of the patient; and (2) notified that he or she may decline to receive medical services by telemedicine and may withdraw from such care at any time.  This is a video visit therefore some elements of the physical exam such as vital signs, heart sounds are breath sounds are not included and may be included if found in recent clinic notes of other providers assessing same patient. Any symptoms or signs that were visualized were stated by the patient may be included in this note.     Patient ID: Pham Licea is a 67 y.o. female.    Chief Complaint:      sept 2020 had gall bladder surgery, thern she had a bowel obstruction, no surgery then, had 2 feet of bowels removed after a second bowel obstruction  Esophageal ulcers and dudenal ulcers- with bleeding in 2017  Recd blood in [facility] this weekend, pt went to ED because she was shaky and week and couldn't think was found to be anemic in ed   pt readmitted 3/22 had repeat bowel obstruction   Seen at emergency room May 25, 2022 with CT scan of abdomen  which shows partial obstruction patient having significant abdominal pain.  Discomfort belching similar to her prior obstructive symptoms  Past Medical History:   Diagnosis Date    Anemia     Fatty liver 2015    Former smoker 2009    Osteoarthritis 2010    Pulmonary nodules 2019    Repeat CT in 6 mo    Ulcer of abdomen wall 2016     Past Surgical History:   Procedure Laterality Date    APPENDECTOMY  1993     SECTION  , , 1983    x3    COLONOSCOPY  ~2014    [facility]: normal findings per patient report    COLONOSCOPY N/A 2020    Procedure: COLONOSCOPY;  Surgeon: Misael Holm MD;  Location: [facility] ENDO;  Service: General;  Laterality: N/A;    COLONOSCOPY, WITH RETROGRADE BALLOON ENTEROSCOPY, SINGLE OR DOUBLE BALLOON N/A 2023    Procedure: COLONOSCOPY, WITH RETROGRADE BALLOON ENTEROSCOPY, DOUBLE BALLOON;  Surgeon: Collin Mares MD;  Location: [facility] ENDO ([address] [place] [address]);  Service: Endoscopy;  Laterality: N/A;  inst via portal-MS-sutab per pt request-tb-new inst portal    DIAGNOSTIC LAPAROSCOPY N/A 2021    Procedure: LAPAROSCOPY, DIAGNOSTIC;  Surgeon: Misael Holm MD;  Location: [facility] OR;  Service: General;  Laterality: N/A;    EPIDURAL STEROID INJECTION INTO LUMBAR SPINE N/A 10/12/2018    Procedure: Injection-steroid-epidural-lumbar;  Surgeon: Kal Johnson MD;  Location: [facility] OR;  Service: Pain Management;  Laterality: N/A;  L5-S1    EPIDURAL STEROID INJECTION INTO LUMBAR SPINE N/A 2019    Procedure: Injection-steroid-epidural-lumbar L5-S1;  Surgeon: Kal Johnson MD;  Location: [facility] OR;  Service: Pain Management;  Laterality: N/A;    ESOPHAGOGASTRODUODENOSCOPY N/A 2020    Procedure: ESOPHAGOGASTRODUODENOSCOPY (EGD);  Surgeon: Misael Holm MD;  Location: [facility] ENDO;  Service: General;  Laterality: N/A;    FUSION, SPINE, MINIMALLY INVASIVE, USING COMPUTER-ASSISTED NAVIGATION N/A 2022    Procedure: ENTEROSCOPY, DOUBLE BALLOON, ANTEGRADE;  Surgeon: Collin  SWATI Mares MD;  Location: [facility] ENDO (2ND FLR);  Service: Endoscopy;  Laterality: N/A;  22-Instructions via portal-DS    *open to earlier date if available*    HYSTERECTOMY  1993    one ovary conserved    LAPAROSCOPIC CHOLECYSTECTOMY N/A 2020    Procedure: CHOLECYSTECTOMY, LAPAROSCOPIC;  Surgeon: Govind Frey MD;  Location: [facility] OR;  Service: General;  Laterality: N/A;    LAPAROSCOPIC LYSIS OF ADHESIONS N/A 2021    Procedure: LYSIS, ADHESIONS, LAPAROSCOPIC;  Surgeon: Misael Holm MD;  Location: [facility] OR;  Service: General;  Laterality: N/A;    UPPER GASTROINTESTINAL ENDOSCOPY       Family History   Problem Relation Age of Onset    Ovarian cancer Mother     Heart disease Mother     Osteoporosis Mother     Arthritis Mother     Hypertension Father     Stroke Father 50    Brain Hemorrhage Father     Aneurysm Father     Osteoarthritis Sister     Arthritis Sister     Hypertension Sister     Obesity Sister     Breast cancer Neg Hx     Colon cancer Neg Hx     Colon polyps Neg Hx     Crohn's disease Neg Hx     Ulcerative colitis Neg Hx       Social History     Socioeconomic History    Marital status:     Number of children: 4    Highest education level: Some college, no degree   Occupational History    Occupation: sale specialist-    Tobacco Use    Smoking status: Former     Packs/day: 1.00     Years: 40.00     Pack years: 40.00     Types: Cigarettes     Quit date: 2009     Years since quittin.3    Smokeless tobacco: Never    Tobacco comments:     quit    Substance and Sexual Activity    Alcohol use: Not Currently     Comment: Not often - one glass of wine every now and then    Drug use: Not Currently     Types: Marijuana     Comment: in     Sexual activity: Not Currently     Social Determinants of Health     Financial Resource Strain: Low Risk     Difficulty of Paying Living Expenses: Not very hard   Food Insecurity: No Food Insecurity    Worried About Running Out of  Food in the Last Year: Never true    Ran Out of Food in the Last Year: Never true   Transportation Needs: No Transportation Needs    Lack of Transportation (Medical): No    Lack of Transportation (Non-Medical): No   Physical Activity: Inactive    Days of Exercise per Week: 0 days    Minutes of Exercise per Session: 0 min   Stress: Stress Concern Present    Feeling of Stress : To some extent   Social Connections: Unknown    Frequency of Communication with Friends and Family: More than three times a week    Frequency of Social Gatherings with Friends and Family: More than three times a week    Active Member of Clubs or Organizations: Yes    Attends Club or Organization Meetings: More than 4 times per year    Marital Status:    Housing Stability: Low Risk     Unable to Pay for Housing in the Last Year: No    Number of Places Lived in the Last Year: 2    Unstable Housing in the Last Year: No     Review of patient's allergies indicates:  No Known Allergies    Current Outpatient Medications:     albuterol (VENTOLIN HFA) 90 mcg/actuation inhaler, Inhale 2 puffs into the lungs every 6 (six) hours as needed for Wheezing or Shortness of Breath. Rescue, Disp: 6.7 g, Rfl: 2    atorvastatin (LIPITOR) 10 MG tablet, Take 1 tablet (10 mg total) by mouth once daily., Disp: 90 tablet, Rfl: 3    clotrimazole-betamethasone 1-0.05% (LOTRISONE) cream, Apply topically 2 (two) times daily. for 14 days, Disp: 15 g, Rfl: 2    CONSTULOSE 10 gram/15 mL solution, TAKE 10 ML BY MOUTH  THREE TIMES DAILY, Disp: 948 mL, Rfl: 0    diclofenac sodium (VOLTAREN) 1 % Gel, Apply 2 g topically 2 (two) times daily as needed., Disp: , Rfl:     ondansetron (ZOFRAN-ODT) 8 MG TbDL, Take 1 tablet (8 mg total) by mouth every 6 (six) hours as needed., Disp: 60 tablet, Rfl: 3    pantoprazole (PROTONIX) 20 MG tablet, Take 1 tablet (20 mg total) by mouth once daily., Disp: 90 tablet, Rfl: 3    promethazine (PHENERGAN) 25 MG suppository, Place 1 suppository  (25 mg total) rectally 3 (three) times daily as needed for Nausea., Disp: 8 suppository, Rfl: 0    vitamin D (VITAMIN D3) 1000 units Tab, Take 3,000 Units by mouth once daily., Disp: , Rfl:   Review of Systems   Constitutional:  Positive for malaise/fatigue. Negative for weight loss.        Mostly good appetite lost weight with her sx   HENT:  Negative for hearing loss.    Eyes:  Negative for blurred vision and double vision.   Gastrointestinal:  Positive for constipation.        Takes lactulose twice a day and colace twice a day   Musculoskeletal:  Negative for back pain, myalgias and neck pain.        Leg cramps and shaky   Skin:  Negative for rash.   Neurological:  Negative for dizziness, weakness and headaches.   Endo/Heme/Allergies:  Does not bruise/bleed easily. recent bowel obstruction 3/22 with repeat abdominal pain discomfort emergency room visit last night the outpatient surgical appointment  Physical Exam    Wt Readings from Last 3 Encounters:   02/23/23 82.8 kg (182 lb 8 oz)   02/09/23 83.5 kg (184 lb)   01/30/23 79.4 kg (175 lb)     Temp Readings from Last 3 Encounters:   01/30/23 98.2 °F (36.8 °C) (Temporal)   12/22/22 97.3 °F (36.3 °C) (Temporal)   09/19/22 98.1 °F (36.7 °C)     BP Readings from Last 3 Encounters:   02/23/23 (!) 140/74   02/09/23 (!) 158/84   01/30/23 123/72     Pulse Readings from Last 3 Encounters:   02/09/23 68   01/30/23 68   12/22/22 67     Lab Results   Component Value Date    WBC 5.76 03/07/2023    HGB 12.3 03/07/2023    HCT 38.8 03/07/2023    MCV 94 03/07/2023     03/07/2023       BMP  Lab Results   Component Value Date     03/07/2023    K 3.8 03/07/2023     03/07/2023    CO2 25 03/07/2023    BUN 10 03/07/2023    CREATININE 0.7 03/07/2023    CALCIUM 8.8 03/07/2023    ANIONGAP 8 03/07/2023    ESTGFRAFRICA >60.0 06/27/2022    EGFRNONAA >60.0 06/27/2022     Lab Results   Component Value Date    IRON 57 03/07/2023    TIBC 410 03/07/2023    FERRITIN 36 03/07/2023        Impression:  CT scan abdomen May 25, 2022     1. Multiple dilated loops of small bowel are noted that are fluid-filled left upper quadrant with the suggestion of bowel wall thickening near the level of the umbilicus in the midline and extending into the left dilated loops of bowel.  This raises the concern for vascular injury and or bowel infarction/inflammation possibly associated with obstruction.  Surgical consultation is suggested.  Post-contrast oral and intravenous imaging may be of use for confirmation given that this exam is severely hindered.  2. A 2nd region of concern is noted within the right hemipelvis where a dilated loop of small bowel is noted without obvious bowel wall thickening.  This region is nonspecific and could relate to colon, small bowel, ileus, or obstruction.  Further evaluation and clinical correlation is necessary     Patient Active Problem List   Diagnosis    Primary osteoarthritis involving multiple joints    Obesity (BMI 30.0-34.9)    Gastroesophageal reflux disease    Right knee pain    Chronic midline low back pain    Acute tear medial meniscus, right, sequela    Primary osteoarthritis of right knee    Lumbar radiculitis    Trigger middle finger of right hand    Nausea    Abdominal pain    Encounter for postoperative care    Intestinal obstruction    Hypokalemia    Constipation    History of cholecystectomy    Right upper quadrant pain    History of hysterectomy    Rectal bleeding    Hemorrhoids    Mitral valve prolapse    Coronary artery calcification    Anemia    SAMPSON (dyspnea on exertion), noted 2010    History of smoking 25-50 pack years, 46 py, quit 2009    Other emphysema    NSAID long-term use, started 2017    Bandemia    Family history of premature CAD    Cardiovascular event risk, ASCVD 10-year risk 4.8%, 2019    Abdominal obesity    Dyslipidemia, baseline LDL-C 112, HDL-C 49    Iron deficiency anemia secondary to inadequate dietary iron intake    Severe anemia    S/p  small bowel obstruction    Arm pain, lateral, left        Assessment and Plan   NATHAN: pt had partail bowel removal from opbstructions with recurrneces Bowel obs again 3/22 repeat sx and adhesiolysis, Going to [facility] for evaluation of [facility] bowel.  Will keep patient on Q 6 weeks the iron checks   recvd infusinal iron with excellent response    May take liquid iron  Orders will be placed for [facility] if infusion needed      Slight B12 deficiency also continue to monitor most recent test J 9/2022 acceptable  Patient to continue follow-up of her DJD with PCP

## 2023-03-10 ENCOUNTER — PATIENT MESSAGE (OUTPATIENT)
Dept: HEMATOLOGY/ONCOLOGY | Facility: CLINIC | Age: 68
End: 2023-03-10
Payer: MEDICARE

## 2023-04-05 ENCOUNTER — PATIENT MESSAGE (OUTPATIENT)
Dept: FAMILY MEDICINE | Facility: CLINIC | Age: 68
End: 2023-04-05

## 2023-04-05 ENCOUNTER — OFFICE VISIT (OUTPATIENT)
Dept: FAMILY MEDICINE | Facility: CLINIC | Age: 68
End: 2023-04-05
Payer: MEDICARE

## 2023-04-05 VITALS
HEIGHT: 64 IN | WEIGHT: 192 LBS | OXYGEN SATURATION: 95 % | RESPIRATION RATE: 17 BRPM | DIASTOLIC BLOOD PRESSURE: 86 MMHG | HEART RATE: 70 BPM | BODY MASS INDEX: 32.78 KG/M2 | SYSTOLIC BLOOD PRESSURE: 136 MMHG

## 2023-04-05 DIAGNOSIS — Z78.0 ENCOUNTER FOR OSTEOPOROSIS SCREENING IN ASYMPTOMATIC POSTMENOPAUSAL PATIENT: ICD-10-CM

## 2023-04-05 DIAGNOSIS — R03.0 ELEVATED BP WITHOUT DIAGNOSIS OF HYPERTENSION: Primary | ICD-10-CM

## 2023-04-05 DIAGNOSIS — Z13.820 ENCOUNTER FOR OSTEOPOROSIS SCREENING IN ASYMPTOMATIC POSTMENOPAUSAL PATIENT: ICD-10-CM

## 2023-04-05 DIAGNOSIS — Z13.1 SCREENING FOR DIABETES MELLITUS (DM): ICD-10-CM

## 2023-04-05 DIAGNOSIS — R73.01 ELEVATED FASTING GLUCOSE: ICD-10-CM

## 2023-04-05 PROCEDURE — 99214 PR OFFICE/OUTPT VISIT, EST, LEVL IV, 30-39 MIN: ICD-10-PCS | Mod: S$PBB,,, | Performed by: NURSE PRACTITIONER

## 2023-04-05 PROCEDURE — 99214 OFFICE O/P EST MOD 30 MIN: CPT | Mod: S$PBB,,, | Performed by: NURSE PRACTITIONER

## 2023-04-05 PROCEDURE — 99999 PR PBB SHADOW E&M-EST. PATIENT-LVL IV: CPT | Mod: PBBFAC,,, | Performed by: NURSE PRACTITIONER

## 2023-04-05 PROCEDURE — 99214 OFFICE O/P EST MOD 30 MIN: CPT | Mod: PBBFAC | Performed by: NURSE PRACTITIONER

## 2023-04-05 PROCEDURE — 99999 PR PBB SHADOW E&M-EST. PATIENT-LVL IV: ICD-10-PCS | Mod: PBBFAC,,, | Performed by: NURSE PRACTITIONER

## 2023-04-05 NOTE — PROGRESS NOTES
Subjective:       Patient ID: Pham Licea is a 67 y.o. female.    Chief Complaint: BP issues (Took BP at home with wrist BP cuff and has been running elevated. Compared it to one done here. Today I got 136/86 and with hers 158/107 and 162/104. Patient wanted to see if there was a difference)    The patient is a 68 y/o female new to this presents with her home wrist BP machine as her home readings were averaging 164/103.    BP rechecked with her machine doing her usual manner, L-arm resting on lap wrist up: 141/88    Then taken with L-arm across chest, wrist down 125/60 and 136/87 comparable to our reading.   Past Medical History:   Diagnosis Date    Anemia     Fatty liver     Former smoker     Osteoarthritis     Pulmonary nodules 2019    Repeat CT in 6 mo    Ulcer of abdomen wall 2016       Past Surgical History:   Procedure Laterality Date    APPENDECTOMY       SECTION  , , 1983    x3    COLONOSCOPY  ~    [facility]: normal findings per patient report    COLONOSCOPY N/A 2020    Procedure: COLONOSCOPY;  Surgeon: Misael Holm MD;  Location: [facility] ENDO;  Service: General;  Laterality: N/A;    COLONOSCOPY, WITH RETROGRADE BALLOON ENTEROSCOPY, SINGLE OR DOUBLE BALLOON N/A 2023    Procedure: COLONOSCOPY, WITH RETROGRADE BALLOON ENTEROSCOPY, DOUBLE BALLOON;  Surgeon: Collin Mares MD;  Location: [facility] ENDO ([address] [place] [address]);  Service: Endoscopy;  Laterality: N/A;  inst via portal-MS-sutab per pt request-tb-new inst portal    DIAGNOSTIC LAPAROSCOPY N/A 2021    Procedure: LAPAROSCOPY, DIAGNOSTIC;  Surgeon: Misael Holm MD;  Location: [facility] OR;  Service: General;  Laterality: N/A;    EPIDURAL STEROID INJECTION INTO LUMBAR SPINE N/A 10/12/2018    Procedure: Injection-steroid-epidural-lumbar;  Surgeon: aKl Johnson MD;  Location: [facility] OR;  Service: Pain Management;  Laterality: N/A;  L5-S1    EPIDURAL STEROID INJECTION INTO LUMBAR SPINE N/A 2019     Procedure: Injection-steroid-epidural-lumbar L5-S1;  Surgeon: Kal Johnson MD;  Location: [facility] OR;  Service: Pain Management;  Laterality: N/A;    ESOPHAGOGASTRODUODENOSCOPY N/A 2020    Procedure: ESOPHAGOGASTRODUODENOSCOPY (EGD);  Surgeon: Misael Holm MD;  Location: [facility] ENDO;  Service: General;  Laterality: N/A;    FUSION, SPINE, MINIMALLY INVASIVE, USING COMPUTER-ASSISTED NAVIGATION N/A 2022    Procedure: ENTEROSCOPY, DOUBLE BALLOON, ANTEGRADE;  Surgeon: Collin Mares MD;  Location: [facility] ENDO (2ND FLR);  Service: Endoscopy;  Laterality: N/A;  22-Instructions via portal-DS    *open to earlier date if available*    HYSTERECTOMY  1993    one ovary conserved    LAPAROSCOPIC CHOLECYSTECTOMY N/A 2020    Procedure: CHOLECYSTECTOMY, LAPAROSCOPIC;  Surgeon: Govind Frey MD;  Location: [facility] OR;  Service: General;  Laterality: N/A;    LAPAROSCOPIC LYSIS OF ADHESIONS N/A 2021    Procedure: LYSIS, ADHESIONS, LAPAROSCOPIC;  Surgeon: Misael Holm MD;  Location: [facility] OR;  Service: General;  Laterality: N/A;    UPPER GASTROINTESTINAL ENDOSCOPY          Social History     Socioeconomic History    Marital status:     Number of children: 4    Highest education level: Some college, no degree   Occupational History    Occupation: sale specialist-    Tobacco Use    Smoking status: Former     Packs/day: 1.00     Years: 40.00     Pack years: 40.00     Types: Cigarettes     Quit date: 2009     Years since quittin.4    Smokeless tobacco: Never    Tobacco comments:     quit    Substance and Sexual Activity    Alcohol use: Not Currently     Comment: Not often - one glass of wine every now and then    Drug use: Not Currently     Types: Marijuana     Comment: in     Sexual activity: Not Currently     Social Determinants of Health     Financial Resource Strain: Low Risk     Difficulty of Paying Living Expenses: Not very hard   Food Insecurity: No Food Insecurity     Worried About Running Out of Food in the Last Year: Never true    Ran Out of Food in the Last Year: Never true   Transportation Needs: No Transportation Needs    Lack of Transportation (Medical): No    Lack of Transportation (Non-Medical): No   Physical Activity: Inactive    Days of Exercise per Week: 0 days    Minutes of Exercise per Session: 0 min   Stress: Stress Concern Present    Feeling of Stress : To some extent   Social Connections: Unknown    Frequency of Communication with Friends and Family: More than three times a week    Frequency of Social Gatherings with Friends and Family: More than three times a week    Active Member of Clubs or Organizations: Yes    Attends Club or Organization Meetings: More than 4 times per year    Marital Status:    Housing Stability: Low Risk     Unable to Pay for Housing in the Last Year: No    Number of Places Lived in the Last Year: 2    Unstable Housing in the Last Year: No       Family History   Problem Relation Age of Onset    Ovarian cancer Mother     Heart disease Mother     Osteoporosis Mother     Arthritis Mother     Hypertension Father     Stroke Father 50    Brain Hemorrhage Father     Aneurysm Father     Osteoarthritis Sister     Arthritis Sister     Hypertension Sister     Obesity Sister     Breast cancer Neg Hx     Colon cancer Neg Hx     Colon polyps Neg Hx     Crohn's disease Neg Hx     Ulcerative colitis Neg Hx        Review of patient's allergies indicates:  No Known Allergies       Current Outpatient Medications:     CONSTULOSE 10 gram/15 mL solution, TAKE 10 ML BY MOUTH  THREE TIMES DAILY, Disp: 948 mL, Rfl: 0    diclofenac sodium (VOLTAREN) 1 % Gel, Apply 2 g topically 2 (two) times daily as needed., Disp: , Rfl:     ondansetron (ZOFRAN-ODT) 8 MG TbDL, Take 1 tablet (8 mg total) by mouth every 6 (six) hours as needed., Disp: 60 tablet, Rfl: 3    pantoprazole (PROTONIX) 20 MG tablet, Take 1 tablet (20 mg total) by mouth once daily., Disp: 90  tablet, Rfl: 3    vitamin D (VITAMIN D3) 1000 units Tab, Take 3,000 Units by mouth once daily., Disp: , Rfl:     HPI  Review of Systems   Constitutional: Negative.    HENT: Negative.     Eyes: Negative.    Respiratory: Negative.     Cardiovascular: Negative.    Gastrointestinal: Negative.    Endocrine: Negative.    Genitourinary: Negative.    Musculoskeletal: Negative.    Skin: Negative.    Allergic/Immunologic: Negative.    Neurological: Negative.    Hematological: Negative.    Psychiatric/Behavioral: Negative.       Objective:      Physical Exam  Vitals and nursing note reviewed.   Constitutional:       General: She is not in acute distress.     Appearance: Normal appearance. She is well-developed. She is obese. She is not ill-appearing.   HENT:      Head: Normocephalic.   Eyes:      Conjunctiva/sclera: Conjunctivae normal.   Neck:      Thyroid: No thyromegaly.   Cardiovascular:      Rate and Rhythm: Normal rate and regular rhythm.      Heart sounds: Normal heart sounds. No murmur heard.  Pulmonary:      Effort: Pulmonary effort is normal.      Breath sounds: Normal breath sounds. No wheezing or rales.   Musculoskeletal:         General: Normal range of motion.      Cervical back: Normal range of motion.      Right lower leg: No edema.      Left lower leg: No edema.   Skin:     General: Skin is warm and dry.   Neurological:      Mental Status: She is alert and oriented to person, place, and time. Mental status is at baseline.   Psychiatric:         Mood and Affect: Mood normal.         Behavior: Behavior normal.         Thought Content: Thought content normal.         Judgment: Judgment normal.       Assessment:       1. Elevated BP without diagnosis of hypertension    2. Encounter for osteoporosis screening in asymptomatic postmenopausal patient    3. Screening for diabetes mellitus (DM)    4. Elevated fasting glucose        Plan:     1- BP log with edu given and recommend [place]on Arm cuff checking 3xs weekly.  2-  4/18/23 scheduled for labs thus add  3-Dexa ordered  4- RTC PRN   5- nurse visit for covid booster  -      Elevated BP without diagnosis of hypertension    Encounter for osteoporosis screening in asymptomatic postmenopausal patient  -     DXA Bone Density Axial Skeleton 1 or more sites; Future; Expected date: 04/05/2023    Screening for diabetes mellitus (DM)  -     Hemoglobin A1C; Future; Expected date: 04/05/2023    Elevated fasting glucose  -     Hemoglobin A1C; Future; Expected date: 04/05/2023        Risks, benefits, and side effects were discussed with the patient. All questions were answered to the fullest satisfaction of the patient, and pt verbalized understanding and agreement to treatment plan. Pt was to call with any new or worsening symptoms, or present to the ER.

## 2023-04-18 ENCOUNTER — LAB VISIT (OUTPATIENT)
Dept: LAB | Facility: HOSPITAL | Age: 68
End: 2023-04-18
Attending: INTERNAL MEDICINE
Payer: MEDICARE

## 2023-04-18 DIAGNOSIS — Z13.1 SCREENING FOR DIABETES MELLITUS (DM): ICD-10-CM

## 2023-04-18 DIAGNOSIS — E66.9 OBESITY (BMI 30.0-34.9): ICD-10-CM

## 2023-04-18 DIAGNOSIS — K62.5 RECTAL BLEEDING: ICD-10-CM

## 2023-04-18 DIAGNOSIS — R73.01 ELEVATED FASTING GLUCOSE: ICD-10-CM

## 2023-04-18 DIAGNOSIS — D64.9 SEVERE ANEMIA: ICD-10-CM

## 2023-04-18 DIAGNOSIS — D50.8 IRON DEFICIENCY ANEMIA SECONDARY TO INADEQUATE DIETARY IRON INTAKE: ICD-10-CM

## 2023-04-18 LAB
ALBUMIN SERPL BCP-MCNC: 4 G/DL (ref 3.5–5.2)
ALP SERPL-CCNC: 74 U/L (ref 55–135)
ALT SERPL W/O P-5'-P-CCNC: 16 U/L (ref 10–44)
ANION GAP SERPL CALC-SCNC: 11 MMOL/L (ref 8–16)
AST SERPL-CCNC: 18 U/L (ref 10–40)
BASOPHILS # BLD AUTO: 0.06 K/UL (ref 0–0.2)
BASOPHILS NFR BLD: 0.9 % (ref 0–1.9)
BILIRUB SERPL-MCNC: 0.4 MG/DL (ref 0.1–1)
BUN SERPL-MCNC: 13 MG/DL (ref 8–23)
CALCIUM SERPL-MCNC: 9.2 MG/DL (ref 8.7–10.5)
CHLORIDE SERPL-SCNC: 108 MMOL/L (ref 95–110)
CO2 SERPL-SCNC: 22 MMOL/L (ref 23–29)
CREAT SERPL-MCNC: 0.7 MG/DL (ref 0.5–1.4)
DIFFERENTIAL METHOD: NORMAL
EOSINOPHIL # BLD AUTO: 0.2 K/UL (ref 0–0.5)
EOSINOPHIL NFR BLD: 2.3 % (ref 0–8)
ERYTHROCYTE [DISTWIDTH] IN BLOOD BY AUTOMATED COUNT: 13.2 % (ref 11.5–14.5)
EST. GFR  (NO RACE VARIABLE): >60 ML/MIN/1.73 M^2
ESTIMATED AVG GLUCOSE: 105 MG/DL (ref 68–131)
FERRITIN SERPL-MCNC: 23 NG/ML (ref 20–300)
GLUCOSE SERPL-MCNC: 94 MG/DL (ref 70–110)
HBA1C MFR BLD: 5.3 % (ref 4–5.6)
HCT VFR BLD AUTO: 38.3 % (ref 37–48.5)
HGB BLD-MCNC: 12.6 G/DL (ref 12–16)
IMM GRANULOCYTES # BLD AUTO: 0.02 K/UL (ref 0–0.04)
IMM GRANULOCYTES NFR BLD AUTO: 0.3 % (ref 0–0.5)
IRON SERPL-MCNC: 48 UG/DL (ref 30–160)
LYMPHOCYTES # BLD AUTO: 2.3 K/UL (ref 1–4.8)
LYMPHOCYTES NFR BLD: 32.9 % (ref 18–48)
MCH RBC QN AUTO: 29.5 PG (ref 27–31)
MCHC RBC AUTO-ENTMCNC: 32.9 G/DL (ref 32–36)
MCV RBC AUTO: 90 FL (ref 82–98)
MONOCYTES # BLD AUTO: 0.5 K/UL (ref 0.3–1)
MONOCYTES NFR BLD: 7.9 % (ref 4–15)
NEUTROPHILS # BLD AUTO: 3.8 K/UL (ref 1.8–7.7)
NEUTROPHILS NFR BLD: 55.7 % (ref 38–73)
NRBC BLD-RTO: 0 /100 WBC
PLATELET # BLD AUTO: 267 K/UL (ref 150–450)
PMV BLD AUTO: 10 FL (ref 9.2–12.9)
POTASSIUM SERPL-SCNC: 3.9 MMOL/L (ref 3.5–5.1)
PROT SERPL-MCNC: 6.8 G/DL (ref 6–8.4)
RBC # BLD AUTO: 4.27 M/UL (ref 4–5.4)
SATURATED IRON: 11 % (ref 20–50)
SODIUM SERPL-SCNC: 141 MMOL/L (ref 136–145)
TOTAL IRON BINDING CAPACITY: 454 UG/DL (ref 250–450)
TRANSFERRIN SERPL-MCNC: 307 MG/DL (ref 200–375)
WBC # BLD AUTO: 6.84 K/UL (ref 3.9–12.7)

## 2023-04-18 PROCEDURE — 82728 ASSAY OF FERRITIN: CPT | Performed by: INTERNAL MEDICINE

## 2023-04-18 PROCEDURE — 85025 COMPLETE CBC W/AUTO DIFF WBC: CPT | Performed by: INTERNAL MEDICINE

## 2023-04-18 PROCEDURE — 80053 COMPREHEN METABOLIC PANEL: CPT | Performed by: INTERNAL MEDICINE

## 2023-04-18 PROCEDURE — 83036 HEMOGLOBIN GLYCOSYLATED A1C: CPT | Performed by: NURSE PRACTITIONER

## 2023-04-18 PROCEDURE — 84466 ASSAY OF TRANSFERRIN: CPT | Performed by: INTERNAL MEDICINE

## 2023-04-18 PROCEDURE — 36415 COLL VENOUS BLD VENIPUNCTURE: CPT | Performed by: INTERNAL MEDICINE

## 2023-04-20 ENCOUNTER — OFFICE VISIT (OUTPATIENT)
Dept: HEMATOLOGY/ONCOLOGY | Facility: CLINIC | Age: 68
End: 2023-04-20
Payer: MEDICARE

## 2023-04-20 DIAGNOSIS — E78.5 DYSLIPIDEMIA: Primary | ICD-10-CM

## 2023-04-20 DIAGNOSIS — D50.8 IRON DEFICIENCY ANEMIA SECONDARY TO INADEQUATE DIETARY IRON INTAKE: ICD-10-CM

## 2023-04-20 DIAGNOSIS — Z87.19 S/P SMALL BOWEL OBSTRUCTION: ICD-10-CM

## 2023-04-20 PROCEDURE — 99214 OFFICE O/P EST MOD 30 MIN: CPT | Mod: 95,,, | Performed by: INTERNAL MEDICINE

## 2023-04-20 PROCEDURE — 99214 PR OFFICE/OUTPT VISIT, EST, LEVL IV, 30-39 MIN: ICD-10-PCS | Mod: 95,,, | Performed by: INTERNAL MEDICINE

## 2023-04-20 RX ORDER — DIPHENHYDRAMINE HYDROCHLORIDE 50 MG/ML
50 INJECTION INTRAMUSCULAR; INTRAVENOUS ONCE AS NEEDED
Status: CANCELLED | OUTPATIENT
Start: 2023-04-20

## 2023-04-20 RX ORDER — HEPARIN 100 UNIT/ML
500 SYRINGE INTRAVENOUS
Status: CANCELLED | OUTPATIENT
Start: 2023-04-20

## 2023-04-20 RX ORDER — METHYLPREDNISOLONE SOD SUCC 125 MG
125 VIAL (EA) INJECTION ONCE AS NEEDED
Status: CANCELLED | OUTPATIENT
Start: 2023-04-20

## 2023-04-20 RX ORDER — EPINEPHRINE 0.3 MG/.3ML
0.3 INJECTION SUBCUTANEOUS ONCE AS NEEDED
Status: CANCELLED | OUTPATIENT
Start: 2023-04-20

## 2023-04-20 RX ORDER — SODIUM CHLORIDE 0.9 % (FLUSH) 0.9 %
10 SYRINGE (ML) INJECTION
Status: CANCELLED | OUTPATIENT
Start: 2023-04-20

## 2023-04-20 NOTE — PROGRESS NOTES
Subjective:    The patient location is: home  Visit type: Virtual visit with synchronous audio and video  Face-to-face or time spent with patient on the encounter:20min  Total time spent on and for  this encounter which includes non face-to-face time preparing to see patient, review of tests, obtaining and or reviewing separately obtained records documenting clinical information in the electronic or other health records, independently interpreting results which is not separately reported ,and communicating results to the patient/family/caregiver and in care coordination and treatment planning/communicating with pharmacy for prescriptions/addressing social needs/arranging follow-up and or referrals :25 min    Each patient I provide medical services by telemedicine is:  (1) informed of the relationship between the physician and patient and the respective role of any other health care provider with respect to management of the patient; and (2) notified that he or she may decline to receive medical services by telemedicine and may withdraw from such care at any time.  This is a video visit therefore some elements of the physical exam such as vital signs, heart sounds are breath sounds are not included and may be included if found in recent clinic notes of other providers assessing same patient. Any symptoms or signs that were visualized were stated by the patient may be included in this note.     Patient ID: Pham Licea is a 67 y.o. female.    Chief Complaint:      sept 2020 had gall bladder surgery, thern she had a bowel obstruction, no surgery then, had 2 feet of bowels removed after a second bowel obstruction  Esophageal ulcers and dudenal ulcers- with bleeding in 2017  Recd blood in [facility] this weekend, pt went to ED because she was shaky and week and couldn't think was found to be anemic in ed   pt readmitted 3/22 had repeat bowel obstruction   Seen at emergency room May 25, 2022 with CT scan of abdomen  which shows partial obstruction patient having significant abdominal pain.  Discomfort belching similar to her prior obstructive symptoms  Past Medical History:   Diagnosis Date    Anemia     Fatty liver 2015    Former smoker 2009    Osteoarthritis 2010    Pulmonary nodules 2019    Repeat CT in 6 mo    Ulcer of abdomen wall 2016     Past Surgical History:   Procedure Laterality Date    APPENDECTOMY  1993     SECTION  , , 1983    x3    COLONOSCOPY  ~2014    [facility]: normal findings per patient report    COLONOSCOPY N/A 2020    Procedure: COLONOSCOPY;  Surgeon: Misael Holm MD;  Location: [facility] ENDO;  Service: General;  Laterality: N/A;    COLONOSCOPY, WITH RETROGRADE BALLOON ENTEROSCOPY, SINGLE OR DOUBLE BALLOON N/A 2023    Procedure: COLONOSCOPY, WITH RETROGRADE BALLOON ENTEROSCOPY, DOUBLE BALLOON;  Surgeon: Collin Mares MD;  Location: [facility] ENDO ([address] [place] [address]);  Service: Endoscopy;  Laterality: N/A;  inst via portal-MS-sutab per pt request-tb-new inst portal    DIAGNOSTIC LAPAROSCOPY N/A 2021    Procedure: LAPAROSCOPY, DIAGNOSTIC;  Surgeon: Misael Holm MD;  Location: [facility] OR;  Service: General;  Laterality: N/A;    EPIDURAL STEROID INJECTION INTO LUMBAR SPINE N/A 10/12/2018    Procedure: Injection-steroid-epidural-lumbar;  Surgeon: Kal Johnson MD;  Location: [facility] OR;  Service: Pain Management;  Laterality: N/A;  L5-S1    EPIDURAL STEROID INJECTION INTO LUMBAR SPINE N/A 2019    Procedure: Injection-steroid-epidural-lumbar L5-S1;  Surgeon: Kal Johnson MD;  Location: [facility] OR;  Service: Pain Management;  Laterality: N/A;    ESOPHAGOGASTRODUODENOSCOPY N/A 2020    Procedure: ESOPHAGOGASTRODUODENOSCOPY (EGD);  Surgeon: Misael Holm MD;  Location: [facility] ENDO;  Service: General;  Laterality: N/A;    FUSION, SPINE, MINIMALLY INVASIVE, USING COMPUTER-ASSISTED NAVIGATION N/A 2022    Procedure: ENTEROSCOPY, DOUBLE BALLOON, ANTEGRADE;  Surgeon: Collin  SWATI Mares MD;  Location: [facility] ENDO (2ND FLR);  Service: Endoscopy;  Laterality: N/A;  22-Instructions via portal-DS    *open to earlier date if available*    HYSTERECTOMY  1993    one ovary conserved    LAPAROSCOPIC CHOLECYSTECTOMY N/A 2020    Procedure: CHOLECYSTECTOMY, LAPAROSCOPIC;  Surgeon: Govind Frey MD;  Location: [facility] OR;  Service: General;  Laterality: N/A;    LAPAROSCOPIC LYSIS OF ADHESIONS N/A 2021    Procedure: LYSIS, ADHESIONS, LAPAROSCOPIC;  Surgeon: Misael Holm MD;  Location: [facility] OR;  Service: General;  Laterality: N/A;    UPPER GASTROINTESTINAL ENDOSCOPY       Family History   Problem Relation Age of Onset    Ovarian cancer Mother     Heart disease Mother     Osteoporosis Mother     Arthritis Mother     Hypertension Father     Stroke Father 50    Brain Hemorrhage Father     Aneurysm Father     Osteoarthritis Sister     Arthritis Sister     Hypertension Sister     Obesity Sister     Breast cancer Neg Hx     Colon cancer Neg Hx     Colon polyps Neg Hx     Crohn's disease Neg Hx     Ulcerative colitis Neg Hx       Social History     Socioeconomic History    Marital status:     Number of children: 4    Highest education level: Some college, no degree   Occupational History    Occupation: sale specialist-    Tobacco Use    Smoking status: Former     Packs/day: 1.00     Years: 40.00     Pack years: 40.00     Types: Cigarettes     Quit date: 2009     Years since quittin.4    Smokeless tobacco: Never    Tobacco comments:     quit    Substance and Sexual Activity    Alcohol use: Not Currently     Comment: Not often - one glass of wine every now and then    Drug use: Not Currently     Types: Marijuana     Comment: in     Sexual activity: Not Currently     Social Determinants of Health     Financial Resource Strain: Low Risk     Difficulty of Paying Living Expenses: Not very hard   Food Insecurity: No Food Insecurity    Worried About Running Out of  Food in the Last Year: Never true    Ran Out of Food in the Last Year: Never true   Transportation Needs: No Transportation Needs    Lack of Transportation (Medical): No    Lack of Transportation (Non-Medical): No   Physical Activity: Inactive    Days of Exercise per Week: 0 days    Minutes of Exercise per Session: 0 min   Stress: Stress Concern Present    Feeling of Stress : To some extent   Social Connections: Unknown    Frequency of Communication with Friends and Family: More than three times a week    Frequency of Social Gatherings with Friends and Family: Once a week    Active Member of Clubs or Organizations: Yes    Attends Club or Organization Meetings: More than 4 times per year    Marital Status:    Housing Stability: Low Risk     Unable to Pay for Housing in the Last Year: No    Number of Places Lived in the Last Year: 2    Unstable Housing in the Last Year: No     Review of patient's allergies indicates:  No Known Allergies    Current Outpatient Medications:     CONSTULOSE 10 gram/15 mL solution, TAKE 10 ML BY MOUTH  THREE TIMES DAILY, Disp: 948 mL, Rfl: 0    diclofenac sodium (VOLTAREN) 1 % Gel, Apply 2 g topically 2 (two) times daily as needed., Disp: , Rfl:     ondansetron (ZOFRAN-ODT) 8 MG TbDL, Take 1 tablet (8 mg total) by mouth every 6 (six) hours as needed., Disp: 60 tablet, Rfl: 3    pantoprazole (PROTONIX) 20 MG tablet, Take 1 tablet (20 mg total) by mouth once daily., Disp: 90 tablet, Rfl: 3    vitamin D (VITAMIN D3) 1000 units Tab, Take 3,000 Units by mouth once daily., Disp: , Rfl:   Review of Systems   Constitutional:  Positive for malaise/fatigue. Negative for weight loss.        Mostly good appetite lost weight with her sx   HENT:  Negative for hearing loss.    Eyes:  Negative for blurred vision and double vision.   Gastrointestinal:  Positive for constipation.        Takes lactulose twice a day and colace twice a day   Musculoskeletal:  Negative for back pain, myalgias and neck  pain.        Leg cramps and shaky   Skin:  Negative for rash.   Neurological:  Negative for dizziness, weakness and headaches.   Endo/Heme/Allergies:  Does not bruise/bleed easily. recent bowel obstruction 3/22 with repeat abdominal pain discomfort emergency room visit last night the outpatient surgical appointment  Physical Exam    Wt Readings from Last 3 Encounters:   04/05/23 87.1 kg (192 lb)   02/23/23 82.8 kg (182 lb 8 oz)   02/09/23 83.5 kg (184 lb)     Temp Readings from Last 3 Encounters:   01/30/23 98.2 °F (36.8 °C) (Temporal)   12/22/22 97.3 °F (36.3 °C) (Temporal)   09/19/22 98.1 °F (36.7 °C)     BP Readings from Last 3 Encounters:   04/05/23 136/86   02/23/23 (!) 140/74   02/09/23 (!) 158/84     Pulse Readings from Last 3 Encounters:   04/05/23 70   02/09/23 68   01/30/23 68     Lab Results   Component Value Date    WBC 6.84 04/18/2023    HGB 12.6 04/18/2023    HCT 38.3 04/18/2023    MCV 90 04/18/2023     04/18/2023       BMP  Lab Results   Component Value Date     04/18/2023    K 3.9 04/18/2023     04/18/2023    CO2 22 (L) 04/18/2023    BUN 13 04/18/2023    CREATININE 0.7 04/18/2023    CALCIUM 9.2 04/18/2023    ANIONGAP 11 04/18/2023    ESTGFRAFRICA >60.0 06/27/2022    EGFRNONAA >60.0 06/27/2022     Lab Results   Component Value Date    IRON 48 04/18/2023    TIBC 454 (H) 04/18/2023    FERRITIN 23 04/18/2023       Impression:  CT scan abdomen May 25, 2022     1. Multiple dilated loops of small bowel are noted that are fluid-filled left upper quadrant with the suggestion of bowel wall thickening near the level of the umbilicus in the midline and extending into the left dilated loops of bowel.  This raises the concern for vascular injury and or bowel infarction/inflammation possibly associated with obstruction.  Surgical consultation is suggested.  Post-contrast oral and intravenous imaging may be of use for confirmation given that this exam is severely hindered.  2. A 2nd region of  concern is noted within the right hemipelvis where a dilated loop of small bowel is noted without obvious bowel wall thickening.  This region is nonspecific and could relate to colon, small bowel, ileus, or obstruction.  Further evaluation and clinical correlation is necessary     Patient Active Problem List   Diagnosis    Primary osteoarthritis involving multiple joints    Obesity (BMI 30.0-34.9)    Gastroesophageal reflux disease    Right knee pain    Chronic midline low back pain    Acute tear medial meniscus, right, sequela    Primary osteoarthritis of right knee    Lumbar radiculitis    Trigger middle finger of right hand    Nausea    Abdominal pain    Encounter for postoperative care    Intestinal obstruction    Hypokalemia    Constipation    History of cholecystectomy    Right upper quadrant pain    History of hysterectomy    Rectal bleeding    Hemorrhoids    Mitral valve prolapse    Coronary artery calcification    Anemia    SAMPSON (dyspnea on exertion), noted 2010    History of smoking 25-50 pack years, 46 py, quit 2009    Other emphysema    NSAID long-term use, started 2017    Bandemia    Family history of premature CAD    Cardiovascular event risk, ASCVD 10-year risk 4.8%, 2019    Abdominal obesity    Dyslipidemia, baseline LDL-C 112, HDL-C 49    Iron deficiency anemia secondary to inadequate dietary iron intake    Severe anemia    S/p small bowel obstruction    Arm pain, lateral, left        Assessment and Plan   NATHAN: pt had partail bowel removal from opbstructions with recurrneces Bowel obs again 3/22 repeat sx and adhesiolysis, Goes to [facility] for evaluation of [facility] bowel.  Will keep patient on Q 6 weeks the iron checks   recvd infusinal iron with excellent response    May take liquid iron  Orders will be placed for [facility]  this visit for nathan again see me in 6 weeks with cbc,c mp iron ferin     Slight B12 deficiency also continue to monitor most recent test J 9/2022 acceptable  Patient to continue  follow-up of her DJD with PCP    Advance Care Planning     Date: 04/20/2023    Power of   I initiated the process of advance care planning today due to virtual nature of visit documents will be made to to upload into our system

## 2023-04-21 ENCOUNTER — HOSPITAL ENCOUNTER (OUTPATIENT)
Dept: RADIOLOGY | Facility: HOSPITAL | Age: 68
Discharge: HOME | End: 2023-04-21
Attending: NURSE PRACTITIONER
Payer: MEDICARE

## 2023-04-21 ENCOUNTER — PATIENT MESSAGE (OUTPATIENT)
Dept: HEMATOLOGY/ONCOLOGY | Facility: CLINIC | Age: 68
End: 2023-04-21
Payer: MEDICARE

## 2023-04-21 DIAGNOSIS — Z78.0 ENCOUNTER FOR OSTEOPOROSIS SCREENING IN ASYMPTOMATIC POSTMENOPAUSAL PATIENT: ICD-10-CM

## 2023-04-21 DIAGNOSIS — Z13.820 ENCOUNTER FOR OSTEOPOROSIS SCREENING IN ASYMPTOMATIC POSTMENOPAUSAL PATIENT: ICD-10-CM

## 2023-04-21 PROCEDURE — 77080 DXA BONE DENSITY AXIAL: CPT | Mod: TC

## 2023-04-21 PROCEDURE — 77080 DXA BONE DENSITY AXIAL SKELETON 1 OR MORE SITES: ICD-10-PCS | Mod: 26,,, | Performed by: RADIOLOGY

## 2023-04-21 PROCEDURE — 77080 DXA BONE DENSITY AXIAL: CPT | Mod: 26,,, | Performed by: RADIOLOGY

## 2023-04-24 ENCOUNTER — INFUSION (OUTPATIENT)
Dept: INFUSION THERAPY | Facility: HOSPITAL | Age: 68
End: 2023-04-24
Attending: INTERNAL MEDICINE
Payer: MEDICARE

## 2023-04-24 ENCOUNTER — PATIENT MESSAGE (OUTPATIENT)
Dept: FAMILY MEDICINE | Facility: CLINIC | Age: 68
End: 2023-04-24
Payer: MEDICARE

## 2023-04-24 VITALS
HEART RATE: 66 BPM | TEMPERATURE: 98 F | SYSTOLIC BLOOD PRESSURE: 131 MMHG | RESPIRATION RATE: 18 BRPM | DIASTOLIC BLOOD PRESSURE: 65 MMHG | OXYGEN SATURATION: 98 %

## 2023-04-24 DIAGNOSIS — M85.88 OSTEOPENIA OF LUMBAR SPINE: Primary | ICD-10-CM

## 2023-04-24 DIAGNOSIS — D50.8 IRON DEFICIENCY ANEMIA SECONDARY TO INADEQUATE DIETARY IRON INTAKE: Primary | ICD-10-CM

## 2023-04-24 DIAGNOSIS — M85.852 OSTEOPENIA OF NECK OF LEFT FEMUR: ICD-10-CM

## 2023-04-24 PROCEDURE — 96365 THER/PROPH/DIAG IV INF INIT: CPT

## 2023-04-24 PROCEDURE — 63600175 PHARM REV CODE 636 W HCPCS: Performed by: INTERNAL MEDICINE

## 2023-04-24 PROCEDURE — 25000003 PHARM REV CODE 250: Performed by: INTERNAL MEDICINE

## 2023-04-24 RX ORDER — HEPARIN 100 UNIT/ML
500 SYRINGE INTRAVENOUS
Status: CANCELLED | OUTPATIENT
Start: 2023-05-01

## 2023-04-24 RX ORDER — SODIUM CHLORIDE 0.9 % (FLUSH) 0.9 %
10 SYRINGE (ML) INJECTION
Status: ACTIVE | OUTPATIENT
Start: 2023-04-24

## 2023-04-24 RX ORDER — DIPHENHYDRAMINE HYDROCHLORIDE 50 MG/ML
50 INJECTION INTRAMUSCULAR; INTRAVENOUS ONCE AS NEEDED
Status: CANCELLED | OUTPATIENT
Start: 2023-05-01

## 2023-04-24 RX ORDER — ALENDRONATE SODIUM 70 MG/1
70 TABLET ORAL
Qty: 4 TABLET | Refills: 2 | Status: SHIPPED | OUTPATIENT
Start: 2023-04-24 | End: 2023-08-18

## 2023-04-24 RX ORDER — SODIUM CHLORIDE 0.9 % (FLUSH) 0.9 %
10 SYRINGE (ML) INJECTION
Status: CANCELLED | OUTPATIENT
Start: 2023-05-01

## 2023-04-24 RX ORDER — METHYLPREDNISOLONE SOD SUCC 125 MG
125 VIAL (EA) INJECTION ONCE AS NEEDED
Status: CANCELLED | OUTPATIENT
Start: 2023-05-01

## 2023-04-24 RX ORDER — EPINEPHRINE 0.3 MG/.3ML
0.3 INJECTION SUBCUTANEOUS ONCE AS NEEDED
Status: CANCELLED | OUTPATIENT
Start: 2023-05-01

## 2023-04-24 RX ADMIN — SODIUM CHLORIDE 125 MG: 9 INJECTION, SOLUTION INTRAVENOUS at 02:04

## 2023-04-24 RX ADMIN — SODIUM CHLORIDE: 9 INJECTION, SOLUTION INTRAVENOUS at 01:04

## 2023-04-24 NOTE — NURSING
PT tolerated Ferrlecit infusion well. No adverse reaction noted. Pt education reinforced on Ferrlecit side effects, what to expect and when to call Dr. Sanjiv Bower. Pt verbalized understanding. PIV d/c per protocol, pt tolerated well.

## 2023-05-01 ENCOUNTER — INFUSION (OUTPATIENT)
Dept: INFUSION THERAPY | Facility: HOSPITAL | Age: 68
End: 2023-05-01
Attending: INTERNAL MEDICINE
Payer: MEDICARE

## 2023-05-01 VITALS — RESPIRATION RATE: 18 BRPM | HEART RATE: 68 BPM | SYSTOLIC BLOOD PRESSURE: 128 MMHG | DIASTOLIC BLOOD PRESSURE: 80 MMHG

## 2023-05-01 DIAGNOSIS — D50.8 IRON DEFICIENCY ANEMIA SECONDARY TO INADEQUATE DIETARY IRON INTAKE: Primary | ICD-10-CM

## 2023-05-01 PROCEDURE — 96365 THER/PROPH/DIAG IV INF INIT: CPT

## 2023-05-01 PROCEDURE — 25000003 PHARM REV CODE 250: Performed by: INTERNAL MEDICINE

## 2023-05-01 PROCEDURE — 63600175 PHARM REV CODE 636 W HCPCS: Performed by: INTERNAL MEDICINE

## 2023-05-01 RX ORDER — METHYLPREDNISOLONE SOD SUCC 125 MG
125 VIAL (EA) INJECTION ONCE AS NEEDED
Status: CANCELLED | OUTPATIENT
Start: 2023-05-08

## 2023-05-01 RX ORDER — SODIUM CHLORIDE 0.9 % (FLUSH) 0.9 %
10 SYRINGE (ML) INJECTION
Status: DISCONTINUED | OUTPATIENT
Start: 2023-05-01 | End: 2023-05-01 | Stop reason: HOSPADM

## 2023-05-01 RX ORDER — EPINEPHRINE 0.3 MG/.3ML
0.3 INJECTION SUBCUTANEOUS ONCE AS NEEDED
Status: CANCELLED | OUTPATIENT
Start: 2023-05-08

## 2023-05-01 RX ORDER — SODIUM CHLORIDE 0.9 % (FLUSH) 0.9 %
10 SYRINGE (ML) INJECTION
Status: CANCELLED | OUTPATIENT
Start: 2023-05-08

## 2023-05-01 RX ORDER — DIPHENHYDRAMINE HYDROCHLORIDE 50 MG/ML
50 INJECTION INTRAMUSCULAR; INTRAVENOUS ONCE AS NEEDED
Status: CANCELLED | OUTPATIENT
Start: 2023-05-08

## 2023-05-01 RX ORDER — HEPARIN 100 UNIT/ML
500 SYRINGE INTRAVENOUS
Status: CANCELLED | OUTPATIENT
Start: 2023-05-08

## 2023-05-01 RX ADMIN — SODIUM CHLORIDE 125 MG: 9 INJECTION, SOLUTION INTRAVENOUS at 01:05

## 2023-05-08 ENCOUNTER — INFUSION (OUTPATIENT)
Dept: INFUSION THERAPY | Facility: HOSPITAL | Age: 68
End: 2023-05-08
Attending: INTERNAL MEDICINE
Payer: MEDICARE

## 2023-05-08 VITALS
WEIGHT: 192 LBS | DIASTOLIC BLOOD PRESSURE: 64 MMHG | RESPIRATION RATE: 18 BRPM | HEIGHT: 64 IN | HEART RATE: 78 BPM | BODY MASS INDEX: 32.78 KG/M2 | TEMPERATURE: 99 F | OXYGEN SATURATION: 97 % | SYSTOLIC BLOOD PRESSURE: 143 MMHG

## 2023-05-08 DIAGNOSIS — D50.8 IRON DEFICIENCY ANEMIA SECONDARY TO INADEQUATE DIETARY IRON INTAKE: Primary | ICD-10-CM

## 2023-05-08 PROCEDURE — 63600175 PHARM REV CODE 636 W HCPCS: Performed by: INTERNAL MEDICINE

## 2023-05-08 PROCEDURE — 96365 THER/PROPH/DIAG IV INF INIT: CPT

## 2023-05-08 PROCEDURE — 25000003 PHARM REV CODE 250: Performed by: INTERNAL MEDICINE

## 2023-05-08 RX ORDER — DIPHENHYDRAMINE HYDROCHLORIDE 50 MG/ML
50 INJECTION INTRAMUSCULAR; INTRAVENOUS ONCE AS NEEDED
Status: CANCELLED | OUTPATIENT
Start: 2023-05-15

## 2023-05-08 RX ORDER — HEPARIN 100 UNIT/ML
500 SYRINGE INTRAVENOUS
Status: CANCELLED | OUTPATIENT
Start: 2023-05-15

## 2023-05-08 RX ORDER — METHYLPREDNISOLONE SOD SUCC 125 MG
125 VIAL (EA) INJECTION ONCE AS NEEDED
Status: CANCELLED | OUTPATIENT
Start: 2023-05-15

## 2023-05-08 RX ORDER — EPINEPHRINE 0.3 MG/.3ML
0.3 INJECTION SUBCUTANEOUS ONCE AS NEEDED
Status: CANCELLED | OUTPATIENT
Start: 2023-05-15

## 2023-05-08 RX ORDER — SODIUM CHLORIDE 0.9 % (FLUSH) 0.9 %
10 SYRINGE (ML) INJECTION
Status: CANCELLED | OUTPATIENT
Start: 2023-05-15

## 2023-05-08 RX ORDER — SODIUM CHLORIDE 0.9 % (FLUSH) 0.9 %
10 SYRINGE (ML) INJECTION
Status: DISCONTINUED | OUTPATIENT
Start: 2023-05-08 | End: 2023-05-08 | Stop reason: HOSPADM

## 2023-05-08 RX ADMIN — SODIUM CHLORIDE: 9 INJECTION, SOLUTION INTRAVENOUS at 01:05

## 2023-05-08 RX ADMIN — SODIUM CHLORIDE 125 MG: 9 INJECTION, SOLUTION INTRAVENOUS at 01:05

## 2023-05-08 NOTE — NURSING
PT tolerated Ferrlicit infusion well. No adverse reaction noted. Pt education reinforced on Ferrlicit side effects, what to expect and when to call Dr. Sanjiv Bower MD. Pt verbalized understanding. PIV d/c per protocol, pt tolerated well.

## 2023-05-15 ENCOUNTER — INFUSION (OUTPATIENT)
Dept: INFUSION THERAPY | Facility: HOSPITAL | Age: 68
End: 2023-05-15
Attending: INTERNAL MEDICINE
Payer: MEDICARE

## 2023-05-15 ENCOUNTER — PATIENT MESSAGE (OUTPATIENT)
Dept: ORTHOPEDICS | Facility: CLINIC | Age: 68
End: 2023-05-15
Payer: MEDICARE

## 2023-05-15 VITALS
DIASTOLIC BLOOD PRESSURE: 69 MMHG | HEIGHT: 64 IN | WEIGHT: 192 LBS | RESPIRATION RATE: 18 BRPM | BODY MASS INDEX: 32.78 KG/M2 | HEART RATE: 60 BPM | SYSTOLIC BLOOD PRESSURE: 141 MMHG

## 2023-05-15 DIAGNOSIS — D50.8 IRON DEFICIENCY ANEMIA SECONDARY TO INADEQUATE DIETARY IRON INTAKE: Primary | ICD-10-CM

## 2023-05-15 PROCEDURE — 63600175 PHARM REV CODE 636 W HCPCS: Performed by: INTERNAL MEDICINE

## 2023-05-15 PROCEDURE — 25000003 PHARM REV CODE 250: Performed by: INTERNAL MEDICINE

## 2023-05-15 PROCEDURE — 96365 THER/PROPH/DIAG IV INF INIT: CPT

## 2023-05-15 RX ORDER — SODIUM CHLORIDE 0.9 % (FLUSH) 0.9 %
10 SYRINGE (ML) INJECTION
Status: DISCONTINUED | OUTPATIENT
Start: 2023-05-15 | End: 2023-05-15 | Stop reason: HOSPADM

## 2023-05-15 RX ORDER — EPINEPHRINE 0.3 MG/.3ML
0.3 INJECTION SUBCUTANEOUS ONCE AS NEEDED
Status: CANCELLED | OUTPATIENT
Start: 2023-05-22

## 2023-05-15 RX ORDER — METHYLPREDNISOLONE SOD SUCC 125 MG
125 VIAL (EA) INJECTION ONCE AS NEEDED
Status: CANCELLED | OUTPATIENT
Start: 2023-05-22

## 2023-05-15 RX ORDER — HEPARIN 100 UNIT/ML
500 SYRINGE INTRAVENOUS
Status: CANCELLED | OUTPATIENT
Start: 2023-05-22

## 2023-05-15 RX ORDER — SODIUM CHLORIDE 0.9 % (FLUSH) 0.9 %
10 SYRINGE (ML) INJECTION
Status: CANCELLED | OUTPATIENT
Start: 2023-05-22

## 2023-05-15 RX ORDER — DIPHENHYDRAMINE HYDROCHLORIDE 50 MG/ML
50 INJECTION INTRAMUSCULAR; INTRAVENOUS ONCE AS NEEDED
Status: CANCELLED | OUTPATIENT
Start: 2023-05-22

## 2023-05-15 RX ADMIN — SODIUM CHLORIDE 125 MG: 9 INJECTION, SOLUTION INTRAVENOUS at 01:05

## 2023-05-15 RX ADMIN — SODIUM CHLORIDE: 9 INJECTION, SOLUTION INTRAVENOUS at 01:05

## 2023-05-15 NOTE — NURSING
PT tolerated Ferrlecit infusion well. No adverse reaction noted. Pt education reinforced on Ferrlecit side effects, what to expect and when to call Dr. Sanjiv Bower MD. Pt verbalized understanding. PAC flushed with heparin and de-accessed per protocol. PIt tolerated well.

## 2023-05-15 NOTE — PLAN OF CARE
"Problem: Adult Inpatient Plan of Care  Goal: Plan of Care Review  Outcome: Ongoing, Progressing  Flowsheets (Taken 5/15/2023 1420)  Plan of Care Reviewed With: patient  BP (!) 141/69 (BP Location: Right arm, Patient Position: Sitting)   Pulse 60   Resp 18   Ht 5' 4" (1.626 m)   Wt 87.1 kg (192 lb)   LMP  (LMP Unknown)   BMI 32.96 kg/m² Pleasant, alert and oriented patient to OPT for Ferrlicit Infusion - VSS and patient tolerated infusion without adverse medication reactions - Patient discharged to home per self without concerns - RTC in 1 week.       "

## 2023-05-22 ENCOUNTER — INFUSION (OUTPATIENT)
Dept: INFUSION THERAPY | Facility: HOSPITAL | Age: 68
End: 2023-05-22
Attending: INTERNAL MEDICINE
Payer: MEDICARE

## 2023-05-22 VITALS — DIASTOLIC BLOOD PRESSURE: 69 MMHG | SYSTOLIC BLOOD PRESSURE: 131 MMHG | TEMPERATURE: 98 F | HEART RATE: 65 BPM

## 2023-05-22 DIAGNOSIS — D50.8 IRON DEFICIENCY ANEMIA SECONDARY TO INADEQUATE DIETARY IRON INTAKE: Primary | ICD-10-CM

## 2023-05-22 PROCEDURE — 25000003 PHARM REV CODE 250: Performed by: INTERNAL MEDICINE

## 2023-05-22 PROCEDURE — 96365 THER/PROPH/DIAG IV INF INIT: CPT

## 2023-05-22 PROCEDURE — 63600175 PHARM REV CODE 636 W HCPCS: Performed by: INTERNAL MEDICINE

## 2023-05-22 RX ORDER — DIPHENHYDRAMINE HYDROCHLORIDE 50 MG/ML
50 INJECTION INTRAMUSCULAR; INTRAVENOUS ONCE AS NEEDED
Status: CANCELLED | OUTPATIENT
Start: 2023-05-29

## 2023-05-22 RX ORDER — SODIUM CHLORIDE 0.9 % (FLUSH) 0.9 %
10 SYRINGE (ML) INJECTION
Status: DISCONTINUED | OUTPATIENT
Start: 2023-05-22 | End: 2023-05-22 | Stop reason: HOSPADM

## 2023-05-22 RX ORDER — HEPARIN 100 UNIT/ML
500 SYRINGE INTRAVENOUS
Status: CANCELLED | OUTPATIENT
Start: 2023-05-29

## 2023-05-22 RX ORDER — EPINEPHRINE 0.3 MG/.3ML
0.3 INJECTION SUBCUTANEOUS ONCE AS NEEDED
Status: CANCELLED | OUTPATIENT
Start: 2023-05-29

## 2023-05-22 RX ORDER — SODIUM CHLORIDE 0.9 % (FLUSH) 0.9 %
10 SYRINGE (ML) INJECTION
Status: CANCELLED | OUTPATIENT
Start: 2023-05-29

## 2023-05-22 RX ORDER — METHYLPREDNISOLONE SOD SUCC 125 MG
125 VIAL (EA) INJECTION ONCE AS NEEDED
Status: CANCELLED | OUTPATIENT
Start: 2023-05-29

## 2023-05-22 RX ADMIN — SODIUM CHLORIDE 125 MG: 9 INJECTION, SOLUTION INTRAVENOUS at 01:05

## 2023-05-22 RX ADMIN — SODIUM CHLORIDE: 9 INJECTION, SOLUTION INTRAVENOUS at 01:05

## 2023-05-22 NOTE — PLAN OF CARE
Pleasant alert and oriented patient ambulated to clinic for iron - pt tolerated well - discharged home with no concerns - pt to RTC next week

## 2023-05-23 ENCOUNTER — PATIENT MESSAGE (OUTPATIENT)
Dept: RESEARCH | Facility: HOSPITAL | Age: 68
End: 2023-05-23
Payer: MEDICARE

## 2023-05-30 ENCOUNTER — INFUSION (OUTPATIENT)
Dept: INFUSION THERAPY | Facility: HOSPITAL | Age: 68
End: 2023-05-30
Attending: INTERNAL MEDICINE
Payer: MEDICARE

## 2023-05-30 VITALS
BODY MASS INDEX: 32.78 KG/M2 | SYSTOLIC BLOOD PRESSURE: 160 MMHG | OXYGEN SATURATION: 94 % | DIASTOLIC BLOOD PRESSURE: 75 MMHG | HEART RATE: 73 BPM | WEIGHT: 192 LBS | HEIGHT: 64 IN | RESPIRATION RATE: 18 BRPM

## 2023-05-30 DIAGNOSIS — D50.8 IRON DEFICIENCY ANEMIA SECONDARY TO INADEQUATE DIETARY IRON INTAKE: Primary | ICD-10-CM

## 2023-05-30 PROCEDURE — 96365 THER/PROPH/DIAG IV INF INIT: CPT

## 2023-05-30 PROCEDURE — 63600175 PHARM REV CODE 636 W HCPCS: Performed by: INTERNAL MEDICINE

## 2023-05-30 PROCEDURE — 25000003 PHARM REV CODE 250: Performed by: INTERNAL MEDICINE

## 2023-05-30 RX ORDER — METHYLPREDNISOLONE SOD SUCC 125 MG
125 VIAL (EA) INJECTION ONCE AS NEEDED
Status: CANCELLED | OUTPATIENT
Start: 2023-06-05

## 2023-05-30 RX ORDER — DIPHENHYDRAMINE HYDROCHLORIDE 50 MG/ML
50 INJECTION INTRAMUSCULAR; INTRAVENOUS ONCE AS NEEDED
Status: CANCELLED | OUTPATIENT
Start: 2023-06-05

## 2023-05-30 RX ORDER — SODIUM CHLORIDE 0.9 % (FLUSH) 0.9 %
10 SYRINGE (ML) INJECTION
Status: CANCELLED | OUTPATIENT
Start: 2023-06-05

## 2023-05-30 RX ORDER — EPINEPHRINE 0.3 MG/.3ML
0.3 INJECTION SUBCUTANEOUS ONCE AS NEEDED
Status: CANCELLED | OUTPATIENT
Start: 2023-06-05

## 2023-05-30 RX ORDER — HEPARIN 100 UNIT/ML
500 SYRINGE INTRAVENOUS
Status: CANCELLED | OUTPATIENT
Start: 2023-06-05

## 2023-05-30 RX ORDER — SODIUM CHLORIDE 0.9 % (FLUSH) 0.9 %
10 SYRINGE (ML) INJECTION
Status: DISCONTINUED | OUTPATIENT
Start: 2023-05-30 | End: 2023-05-30 | Stop reason: HOSPADM

## 2023-05-30 RX ADMIN — SODIUM CHLORIDE 125 MG: 9 INJECTION, SOLUTION INTRAVENOUS at 10:05

## 2023-05-30 NOTE — PLAN OF CARE
"Problem: Adult Inpatient Plan of Care  Goal: Plan of Care Review  Outcome: Ongoing, Progressing  Flowsheets (Taken 5/30/2023 1146)  Plan of Care Reviewed With: patient         /65 (BP Location: Right arm, Patient Position: Sitting)   Pulse 61   Resp 18   Ht 5' 4" (1.626 m)   Wt 87.1 kg (192 lb)   LMP  (LMP Unknown)   SpO2 (!) 94%   BMI 32.96 kg/m² Pleasant alert and oriented patient ambulated to clinic for iron - pt tolerated well - discharged home with no concerns - pt to RTC next week     "

## 2023-06-03 ENCOUNTER — PATIENT MESSAGE (OUTPATIENT)
Dept: ENDOSCOPY | Facility: HOSPITAL | Age: 68
End: 2023-06-03
Payer: MEDICARE

## 2023-06-05 ENCOUNTER — INFUSION (OUTPATIENT)
Dept: INFUSION THERAPY | Facility: HOSPITAL | Age: 68
End: 2023-06-05
Attending: INTERNAL MEDICINE
Payer: MEDICARE

## 2023-06-05 VITALS
WEIGHT: 192 LBS | DIASTOLIC BLOOD PRESSURE: 83 MMHG | SYSTOLIC BLOOD PRESSURE: 140 MMHG | HEART RATE: 87 BPM | BODY MASS INDEX: 32.78 KG/M2 | HEIGHT: 64 IN | RESPIRATION RATE: 16 BRPM | TEMPERATURE: 98 F

## 2023-06-05 DIAGNOSIS — D50.8 IRON DEFICIENCY ANEMIA SECONDARY TO INADEQUATE DIETARY IRON INTAKE: Primary | ICD-10-CM

## 2023-06-05 PROCEDURE — 96365 THER/PROPH/DIAG IV INF INIT: CPT

## 2023-06-05 PROCEDURE — 63600175 PHARM REV CODE 636 W HCPCS: Performed by: INTERNAL MEDICINE

## 2023-06-05 PROCEDURE — 25000003 PHARM REV CODE 250: Performed by: INTERNAL MEDICINE

## 2023-06-05 RX ORDER — HEPARIN 100 UNIT/ML
500 SYRINGE INTRAVENOUS
OUTPATIENT
Start: 2023-06-12

## 2023-06-05 RX ORDER — EPINEPHRINE 0.3 MG/.3ML
0.3 INJECTION SUBCUTANEOUS ONCE AS NEEDED
OUTPATIENT
Start: 2023-06-12

## 2023-06-05 RX ORDER — SODIUM CHLORIDE 0.9 % (FLUSH) 0.9 %
10 SYRINGE (ML) INJECTION
Status: CANCELLED | OUTPATIENT
Start: 2023-06-12

## 2023-06-05 RX ORDER — SODIUM CHLORIDE 0.9 % (FLUSH) 0.9 %
10 SYRINGE (ML) INJECTION
Status: DISCONTINUED | OUTPATIENT
Start: 2023-06-05 | End: 2023-06-05 | Stop reason: HOSPADM

## 2023-06-05 RX ORDER — METHYLPREDNISOLONE SOD SUCC 125 MG
125 VIAL (EA) INJECTION ONCE AS NEEDED
OUTPATIENT
Start: 2023-06-12

## 2023-06-05 RX ORDER — DIPHENHYDRAMINE HYDROCHLORIDE 50 MG/ML
50 INJECTION INTRAMUSCULAR; INTRAVENOUS ONCE AS NEEDED
OUTPATIENT
Start: 2023-06-12

## 2023-06-05 RX ADMIN — SODIUM CHLORIDE 125 MG: 9 INJECTION, SOLUTION INTRAVENOUS at 12:06

## 2023-06-05 NOTE — PLAN OF CARE
Pleasant alert and oriented patient ambulated to clinic for ferrlicit - pt tolerated well - discharged home with no concerns - pt to RTC in one week

## 2023-06-06 ENCOUNTER — TELEPHONE (OUTPATIENT)
Dept: HEMATOLOGY/ONCOLOGY | Facility: CLINIC | Age: 68
End: 2023-06-06
Payer: MEDICARE

## 2023-06-06 NOTE — TELEPHONE ENCOUNTER
Spoke to pt and notified dr out appt 06/19/23 and rescheduled lab 06/20 due to orders expiring that date and appt with  on 07/05/23.

## 2023-06-08 ENCOUNTER — PATIENT MESSAGE (OUTPATIENT)
Dept: SURGERY | Facility: CLINIC | Age: 68
End: 2023-06-08
Payer: MEDICARE

## 2023-06-08 ENCOUNTER — OFFICE VISIT (OUTPATIENT)
Dept: SURGERY | Facility: CLINIC | Age: 68
End: 2023-06-08
Payer: MEDICARE

## 2023-06-08 ENCOUNTER — HOSPITAL ENCOUNTER (OUTPATIENT)
Dept: RADIOLOGY | Facility: HOSPITAL | Age: 68
Discharge: HOME | End: 2023-06-08
Attending: SURGERY
Payer: MEDICARE

## 2023-06-08 VITALS — SYSTOLIC BLOOD PRESSURE: 159 MMHG | DIASTOLIC BLOOD PRESSURE: 106 MMHG | HEART RATE: 99 BPM | OXYGEN SATURATION: 98 %

## 2023-06-08 DIAGNOSIS — K56.609 INTESTINAL OBSTRUCTION, UNSPECIFIED CAUSE, UNSPECIFIED WHETHER PARTIAL OR COMPLETE: ICD-10-CM

## 2023-06-08 DIAGNOSIS — R11.14 BILIOUS VOMITING WITH NAUSEA: Primary | ICD-10-CM

## 2023-06-08 DIAGNOSIS — R11.14 BILIOUS VOMITING WITH NAUSEA: ICD-10-CM

## 2023-06-08 PROCEDURE — 96372 THER/PROPH/DIAG INJ SC/IM: CPT | Mod: PBBFAC

## 2023-06-08 PROCEDURE — 99215 OFFICE O/P EST HI 40 MIN: CPT | Mod: S$PBB,,, | Performed by: SURGERY

## 2023-06-08 PROCEDURE — 25500020 PHARM REV CODE 255: Performed by: SURGERY

## 2023-06-08 PROCEDURE — 74177 CT ABD & PELVIS W/CONTRAST: CPT | Mod: 26,,, | Performed by: RADIOLOGY

## 2023-06-08 PROCEDURE — 74177 CT ABDOMEN PELVIS WITH CONTRAST: ICD-10-PCS | Mod: 26,,, | Performed by: RADIOLOGY

## 2023-06-08 PROCEDURE — 99999 PR PBB SHADOW E&M-EST. PATIENT-LVL I: CPT | Mod: PBBFAC,,, | Performed by: SURGERY

## 2023-06-08 PROCEDURE — A9698 NON-RAD CONTRAST MATERIALNOC: HCPCS | Performed by: SURGERY

## 2023-06-08 PROCEDURE — 74177 CT ABD & PELVIS W/CONTRAST: CPT | Mod: TC

## 2023-06-08 PROCEDURE — 99215 PR OFFICE/OUTPT VISIT, EST, LEVL V, 40-54 MIN: ICD-10-PCS | Mod: S$PBB,,, | Performed by: SURGERY

## 2023-06-08 PROCEDURE — 99999 PR PBB SHADOW E&M-EST. PATIENT-LVL I: ICD-10-PCS | Mod: PBBFAC,,, | Performed by: SURGERY

## 2023-06-08 PROCEDURE — 99211 OFF/OP EST MAY X REQ PHY/QHP: CPT | Mod: PBBFAC,25 | Performed by: SURGERY

## 2023-06-08 RX ORDER — ONDANSETRON 2 MG/ML
4 INJECTION INTRAMUSCULAR; INTRAVENOUS
Status: DISCONTINUED | OUTPATIENT
Start: 2023-06-08 | End: 2023-08-18 | Stop reason: ALTCHOICE

## 2023-06-08 RX ORDER — CIPROFLOXACIN 500 MG/1
500 TABLET ORAL EVERY 12 HOURS
Qty: 20 TABLET | Refills: 0 | Status: SHIPPED | OUTPATIENT
Start: 2023-06-08 | End: 2023-06-18

## 2023-06-08 RX ORDER — ACETAMINOPHEN 325 MG/1
650 TABLET ORAL EVERY 4 HOURS PRN
Status: CANCELLED | OUTPATIENT
Start: 2023-06-08

## 2023-06-08 RX ORDER — MORPHINE SULFATE 10 MG/ML
4 INJECTION, SOLUTION INTRAMUSCULAR; INTRAVENOUS
Status: CANCELLED | OUTPATIENT
Start: 2023-06-08

## 2023-06-08 RX ORDER — SODIUM CHLORIDE, SODIUM LACTATE, POTASSIUM CHLORIDE, CALCIUM CHLORIDE 600; 310; 30; 20 MG/100ML; MG/100ML; MG/100ML; MG/100ML
INJECTION, SOLUTION INTRAVENOUS
Status: COMPLETED | OUTPATIENT
Start: 2023-06-08 | End: 2023-06-08

## 2023-06-08 RX ORDER — ONDANSETRON 4 MG/1
8 TABLET, ORALLY DISINTEGRATING ORAL EVERY 6 HOURS PRN
Qty: 90 TABLET | Refills: 3 | Status: SHIPPED | OUTPATIENT
Start: 2023-06-08 | End: 2023-07-08

## 2023-06-08 RX ORDER — OXYCODONE AND ACETAMINOPHEN 10; 325 MG/1; MG/1
1 TABLET ORAL EVERY 6 HOURS PRN
Qty: 30 TABLET | Refills: 0 | Status: SHIPPED | OUTPATIENT
Start: 2023-06-08 | End: 2023-07-08

## 2023-06-08 RX ORDER — MORPHINE SULFATE 10 MG/ML
2 INJECTION, SOLUTION INTRAMUSCULAR; INTRAVENOUS
Status: CANCELLED | OUTPATIENT
Start: 2023-06-08

## 2023-06-08 RX ORDER — ONDANSETRON 2 MG/ML
4 INJECTION INTRAMUSCULAR; INTRAVENOUS EVERY 6 HOURS PRN
Status: CANCELLED | OUTPATIENT
Start: 2023-06-08

## 2023-06-08 RX ORDER — SODIUM CHLORIDE, SODIUM LACTATE, POTASSIUM CHLORIDE, CALCIUM CHLORIDE 600; 310; 30; 20 MG/100ML; MG/100ML; MG/100ML; MG/100ML
INJECTION, SOLUTION INTRAVENOUS CONTINUOUS
Status: CANCELLED | OUTPATIENT
Start: 2023-06-08

## 2023-06-08 RX ADMIN — SODIUM CHLORIDE, SODIUM LACTATE, POTASSIUM CHLORIDE, CALCIUM CHLORIDE: 600; 310; 30; 20 INJECTION, SOLUTION INTRAVENOUS at 01:06

## 2023-06-08 RX ADMIN — SODIUM CHLORIDE, SODIUM LACTATE, POTASSIUM CHLORIDE, CALCIUM CHLORIDE 1000 ML/HR: 600; 310; 30; 20 INJECTION, SOLUTION INTRAVENOUS at 10:06

## 2023-06-08 RX ADMIN — IOHEXOL 75 ML: 350 INJECTION, SOLUTION INTRAVENOUS at 01:06

## 2023-06-08 RX ADMIN — IOHEXOL 500 ML: 9 SOLUTION ORAL at 01:06

## 2023-06-08 NOTE — NURSING
Iv order placed by Dr Holm. Moved pt to pediatric room to start IV. IV placed by me assisted by Robert Wilburn LPN.  Pt tolerated well. Will monitor Pt til IV is completed.

## 2023-06-08 NOTE — TELEPHONE ENCOUNTER
Writer spoke to patient and scheduled her a same day appt today with Dr Holm. Pt expressed verbal understanding.

## 2023-06-08 NOTE — PROGRESS NOTES
[facility] General Surgery  Follow-up    Subjective:     Chief Complaint:  Doc I am having a flare.    HPI:  Pham Licea is a 67 y.o. female presents today as established patient surgery Clinic for evaluation of a new flare.  Patient ate raising came yesterday.  He is had nausea vomiting since.  Bilious vomiting.  History of bowel obstructions.  History of intestinal resection for strictures.  She is done very well for 6 months.  This is her 1st flare in the last 6 months.  She presents today surgery Clinic today for evaluation.    Review of Systems   Constitutional:  Negative for activity change, appetite change, chills and fever.   HENT:  Negative for congestion, dental problem and ear discharge.    Eyes:  Negative for discharge and itching.   Respiratory:  Negative for apnea, choking and chest tightness.    Cardiovascular:  Negative for chest pain and leg swelling.   Gastrointestinal:  Positive for abdominal pain, nausea and vomiting. Negative for abdominal distention, anal bleeding, constipation and diarrhea.   Endocrine: Negative for cold intolerance and heat intolerance.   Genitourinary:  Negative for difficulty urinating and dyspareunia.   Musculoskeletal:  Negative for arthralgias and back pain.   Skin:  Negative for color change and pallor.   Neurological:  Negative for dizziness and facial asymmetry.   Hematological:  Negative for adenopathy. Does not bruise/bleed easily.   Psychiatric/Behavioral:  Negative for agitation and behavioral problems.      Objective:      There were no vitals filed for this visit.  Physical Exam  Constitutional:       General: She is not in acute distress.     Appearance: She is well-developed.   HENT:      Head: Normocephalic and atraumatic.   Eyes:      Pupils: Pupils are equal, round, and reactive to light.   Neck:      Thyroid: No thyromegaly.   Cardiovascular:      Rate and Rhythm: Normal rate and regular rhythm.   Pulmonary:      Effort: Pulmonary effort is  normal.      Breath sounds: Normal breath sounds.   Abdominal:      General: Bowel sounds are normal. There is no distension.      Palpations: Abdomen is soft.      Tenderness: There is generalized abdominal tenderness (mild).   Musculoskeletal:         General: No deformity. Normal range of motion.      Cervical back: Normal range of motion and neck supple.   Skin:     General: Skin is warm.      Capillary Refill: Capillary refill takes less than 2 seconds.      Findings: No erythema.   Neurological:      Mental Status: She is alert and oriented to person, place, and time.      Cranial Nerves: No cranial nerve deficit.   Psychiatric:         Behavior: Behavior normal.        Assessment:       1. Intestinal obstruction, unspecified cause, unspecified whether partial or complete    2. Bilious vomiting with nausea        Plan:   Intestinal obstruction, unspecified cause, unspecified whether partial or complete  -     CT Abdomen Pelvis With Contrast; Future; Expected date: 06/08/2023  -     Comprehensive Metabolic Panel; Future; Expected date: 06/08/2023  -     CBC Auto Differential; Future; Expected date: 06/08/2023  -     Lipase; Future; Expected date: 06/08/2023  -     Amylase; Future; Expected date: 06/08/2023  -     ondansetron (ZOFRAN-ODT) 4 MG TbDL; Take 2 tablets (8 mg total) by mouth every 6 (six) hours as needed.  Dispense: 90 tablet; Refill: 3  -     oxyCODONE-acetaminophen (PERCOCET)  mg per tablet; Take 1 tablet by mouth every 6 (six) hours as needed for Pain.  Dispense: 30 tablet; Refill: 0  -     Urinalysis, Reflex to Urine Culture Urine, Clean Catch    Bilious vomiting with nausea  -     CT Abdomen Pelvis With Contrast; Future; Expected date: 06/08/2023  -     Comprehensive Metabolic Panel; Future; Expected date: 06/08/2023  -     CBC Auto Differential; Future; Expected date: 06/08/2023  -     Lipase; Future; Expected date: 06/08/2023  -     Amylase; Future; Expected date: 06/08/2023  -      ondansetron (ZOFRAN-ODT) 4 MG TbDL; Take 2 tablets (8 mg total) by mouth every 6 (six) hours as needed.  Dispense: 90 tablet; Refill: 3  -     oxyCODONE-acetaminophen (PERCOCET)  mg per tablet; Take 1 tablet by mouth every 6 (six) hours as needed for Pain.  Dispense: 30 tablet; Refill: 0  -     Urinalysis, Reflex to Urine Culture Urine, Clean Catch  -     Creatinine, serum; Future; Expected date: 06/08/2023    Other orders  -     Place in Observation; Standing  -     Full code; Standing  -     Vital Signs (specify frequency); Standing  -     Insert peripheral IV; Standing  -     Height and weight; Standing  -     Intake and output; Standing  -     lactated ringers infusion  -     IP VTE LOW RISK PATIENT; Standing  -     Place LINDSEY hose; Standing  -     Place sequential compression device; Standing  -     ondansetron injection 4 mg  -     acetaminophen tablet 650 mg  -     morphine injection 2 mg  -     morphine injection 4 mg  -     Urinalysis, Reflex to Urine Culture Urine, Clean Catch; Standing  -     Comprehensive metabolic panel; Standing  -     CBC auto differential; Standing  -     Diet NPO; Standing  -     CT Abdomen Pelvis With Contrast; Standing        Medical Decision Making/Counseling:  Symptoms highly suggestive of bowel obstruction.  Will recommend admission to the hospital IV fluid hydration CT scan stat labs UA etcetera.  Treat symptoms empirically with nausea and pain medications.  Surgery will follow with medical team on admission.  Further management patient will depend upon clinical course.    Follow up:  Admit    Patient instructed that best way to communicate with my office staff is for patient to get on the [facility] epic patient portal to expedite communication and communication issues that may occur.  Patient was given instructions on how to get on the portal.  I encouraged patient to obtain portal access as well.  Ultimately it is up to the patient to obtain access.  Patient voiced  understanding.    Attempted to admit patient however hospitalist full.  Gave patient 2 L of lactated Ringer's in infusion center.  Significant improvement symptoms.  No recurrent nausea vomiting.  Labs showed evidence of mild urinary tract infection with mild elevation leukocytosis.  No evidence of renal dysfunction or hepatic dysfunction.  CT scan reviewed.  Evidence of obstructive signs with dilated decompressed distal small bowel.  Re recommended admission to the patient and offered to send the patient to the emergency room however she declined.  She states I do not want wait 6 or 8 hours in the emergency room.  I feel better now would like to go home.  In this light, will recommend patient be re-evaluated in surgery clinic next week, Tuesday.  If symptoms were to worsen in the next few days, patient was advised to proceed to the emergency room.  She voiced understanding, and agreement.

## 2023-06-12 ENCOUNTER — INFUSION (OUTPATIENT)
Dept: INFUSION THERAPY | Facility: HOSPITAL | Age: 68
End: 2023-06-12
Attending: INTERNAL MEDICINE
Payer: MEDICARE

## 2023-06-12 VITALS
HEART RATE: 52 BPM | TEMPERATURE: 98 F | SYSTOLIC BLOOD PRESSURE: 140 MMHG | RESPIRATION RATE: 16 BRPM | DIASTOLIC BLOOD PRESSURE: 79 MMHG

## 2023-06-12 DIAGNOSIS — D50.8 IRON DEFICIENCY ANEMIA SECONDARY TO INADEQUATE DIETARY IRON INTAKE: Primary | ICD-10-CM

## 2023-06-12 PROCEDURE — 63600175 PHARM REV CODE 636 W HCPCS: Performed by: INTERNAL MEDICINE

## 2023-06-12 PROCEDURE — 25000003 PHARM REV CODE 250: Performed by: INTERNAL MEDICINE

## 2023-06-12 PROCEDURE — 96365 THER/PROPH/DIAG IV INF INIT: CPT

## 2023-06-12 RX ORDER — EPINEPHRINE 0.3 MG/.3ML
0.3 INJECTION SUBCUTANEOUS ONCE AS NEEDED
OUTPATIENT
Start: 2023-06-12

## 2023-06-12 RX ORDER — HEPARIN 100 UNIT/ML
500 SYRINGE INTRAVENOUS
OUTPATIENT
Start: 2023-06-12

## 2023-06-12 RX ORDER — DIPHENHYDRAMINE HYDROCHLORIDE 50 MG/ML
50 INJECTION INTRAMUSCULAR; INTRAVENOUS ONCE AS NEEDED
OUTPATIENT
Start: 2023-06-12

## 2023-06-12 RX ORDER — SODIUM CHLORIDE 0.9 % (FLUSH) 0.9 %
10 SYRINGE (ML) INJECTION
Status: CANCELLED | OUTPATIENT
Start: 2023-06-12

## 2023-06-12 RX ORDER — SODIUM CHLORIDE 0.9 % (FLUSH) 0.9 %
10 SYRINGE (ML) INJECTION
Status: DISCONTINUED | OUTPATIENT
Start: 2023-06-12 | End: 2023-06-12 | Stop reason: HOSPADM

## 2023-06-12 RX ORDER — METHYLPREDNISOLONE SOD SUCC 125 MG
125 VIAL (EA) INJECTION ONCE AS NEEDED
OUTPATIENT
Start: 2023-06-12

## 2023-06-12 RX ADMIN — SODIUM CHLORIDE 125 MG: 9 INJECTION, SOLUTION INTRAVENOUS at 11:06

## 2023-06-12 NOTE — PLAN OF CARE
Pleasant alert and oriented patient ambulated to clinic for ferrlicit - pt tolerated well - discharged home with no concerns - pt to RTC prn

## 2023-06-13 ENCOUNTER — OFFICE VISIT (OUTPATIENT)
Dept: SURGERY | Facility: CLINIC | Age: 68
End: 2023-06-13
Payer: MEDICARE

## 2023-06-13 VITALS
OXYGEN SATURATION: 96 % | BODY MASS INDEX: 32.78 KG/M2 | WEIGHT: 192 LBS | SYSTOLIC BLOOD PRESSURE: 135 MMHG | HEART RATE: 66 BPM | DIASTOLIC BLOOD PRESSURE: 85 MMHG | HEIGHT: 64 IN

## 2023-06-13 DIAGNOSIS — K56.609 SBO (SMALL BOWEL OBSTRUCTION): Primary | ICD-10-CM

## 2023-06-13 PROCEDURE — 99999 PR PBB SHADOW E&M-EST. PATIENT-LVL III: CPT | Mod: PBBFAC,,, | Performed by: SURGERY

## 2023-06-13 PROCEDURE — 99213 OFFICE O/P EST LOW 20 MIN: CPT | Mod: PBBFAC | Performed by: SURGERY

## 2023-06-13 PROCEDURE — 99999 PR PBB SHADOW E&M-EST. PATIENT-LVL III: ICD-10-PCS | Mod: PBBFAC,,, | Performed by: SURGERY

## 2023-06-13 PROCEDURE — 99213 PR OFFICE/OUTPT VISIT, EST, LEVL III, 20-29 MIN: ICD-10-PCS | Mod: S$PBB,,, | Performed by: SURGERY

## 2023-06-13 PROCEDURE — 99213 OFFICE O/P EST LOW 20 MIN: CPT | Mod: S$PBB,,, | Performed by: SURGERY

## 2023-06-13 NOTE — PROGRESS NOTES
"[facility] General [facility]  Follow-up    Subjective:     Chief Complaint:  Follow-up small-bowel obstruction.    HPI:  Pham Licea is a 67 y.o. female presents for follow-up examination of small-bowel obstruction.  Patient since last visit has done great.  She is back eating grits eggs, etcetera.  No nausea vomiting.  Tolerating a diet.  Having bowel function.  Feels much better.  No new issues or complaints.    Review of Systems   Constitutional:  Negative for activity change, appetite change, chills and fever.   HENT:  Negative for congestion, dental problem and ear discharge.    Eyes:  Negative for discharge and itching.   Respiratory:  Negative for apnea, choking and chest tightness.    Cardiovascular:  Negative for chest pain and leg swelling.   Gastrointestinal:  Negative for abdominal distention, abdominal pain, anal bleeding, constipation, diarrhea and nausea.   Endocrine: Negative for cold intolerance and heat intolerance.   Genitourinary:  Negative for difficulty urinating and dyspareunia.   Musculoskeletal:  Negative for arthralgias and back pain.   Skin:  Negative for color change and pallor.   Neurological:  Negative for dizziness and facial asymmetry.   Hematological:  Negative for adenopathy. Does not bruise/bleed easily.   Psychiatric/Behavioral:  Negative for agitation and behavioral problems.      Objective:      Vitals:    06/13/23 0849   BP: 135/85   Pulse: 66   SpO2: 96%   Weight: 87.1 kg (192 lb)   Height: 5' 4" (1.626 m)     Physical Exam  Constitutional:       General: She is not in acute distress.     Appearance: She is well-developed.   HENT:      Head: Normocephalic and atraumatic.   Eyes:      Pupils: Pupils are equal, round, and reactive to light.   Neck:      Thyroid: No thyromegaly.   Cardiovascular:      Rate and Rhythm: Normal rate and regular rhythm.   Pulmonary:      Effort: Pulmonary effort is normal.      Breath sounds: Normal breath sounds.   Abdominal:      General: " Bowel sounds are normal. There is no distension.      Palpations: Abdomen is soft.      Tenderness: There is no abdominal tenderness.   Musculoskeletal:         General: No deformity. Normal range of motion.      Cervical back: Normal range of motion and neck supple.   Skin:     General: Skin is warm.      Capillary Refill: Capillary refill takes less than 2 seconds.      Findings: No erythema.   Neurological:      Mental Status: She is alert and oriented to person, place, and time.      Cranial Nerves: No cranial nerve deficit.   Psychiatric:         Behavior: Behavior normal.        Assessment:       1. SBO (small bowel obstruction)        Plan:   SBO (small bowel obstruction)        Medical Decision Making/Counseling:  Small bowel obstruction.  Resolved.  Improving.  Recommendation be follow up in surgery clinic as needed.    Follow up:  As needed    Patient instructed that best way to communicate with my office staff is for patient to get on the [facility] epic patient portal to expedite communication and communication issues that may occur.  Patient was given instructions on how to get on the portal.  I encouraged patient to obtain portal access as well.  Ultimately it is up to the patient to obtain access.  Patient voiced understanding.

## 2023-06-14 ENCOUNTER — PATIENT MESSAGE (OUTPATIENT)
Dept: ORTHOPEDICS | Facility: CLINIC | Age: 68
End: 2023-06-14
Payer: MEDICARE

## 2023-06-14 ENCOUNTER — OFFICE VISIT (OUTPATIENT)
Dept: SPINE | Facility: CLINIC | Age: 68
End: 2023-06-14
Payer: MEDICARE

## 2023-06-14 ENCOUNTER — HOSPITAL ENCOUNTER (OUTPATIENT)
Dept: RADIOLOGY | Facility: HOSPITAL | Age: 68
Discharge: HOME | End: 2023-06-14
Attending: ORTHOPAEDIC SURGERY
Payer: MEDICARE

## 2023-06-14 VITALS — HEIGHT: 64 IN | WEIGHT: 192 LBS | BODY MASS INDEX: 32.78 KG/M2

## 2023-06-14 DIAGNOSIS — M54.50 CHRONIC BILATERAL LOW BACK PAIN WITHOUT SCIATICA: ICD-10-CM

## 2023-06-14 DIAGNOSIS — M79.645 FINGER PAIN, LEFT: Primary | ICD-10-CM

## 2023-06-14 DIAGNOSIS — M79.645 FINGER PAIN, LEFT: ICD-10-CM

## 2023-06-14 DIAGNOSIS — M70.61 TROCHANTERIC BURSITIS OF RIGHT HIP: Primary | ICD-10-CM

## 2023-06-14 DIAGNOSIS — G89.29 CHRONIC BILATERAL LOW BACK PAIN WITHOUT SCIATICA: ICD-10-CM

## 2023-06-14 PROCEDURE — 99213 PR OFFICE/OUTPT VISIT, EST, LEVL III, 20-29 MIN: ICD-10-PCS | Mod: S$GLB,,, | Performed by: PHYSICAL MEDICINE & REHABILITATION

## 2023-06-14 PROCEDURE — 73140 X-RAY EXAM OF FINGER(S): CPT | Mod: TC,FY,LT

## 2023-06-14 PROCEDURE — 73140 X-RAY EXAM OF FINGER(S): CPT | Mod: 26,LT,, | Performed by: RADIOLOGY

## 2023-06-14 PROCEDURE — 99213 OFFICE O/P EST LOW 20 MIN: CPT | Mod: S$GLB,,, | Performed by: PHYSICAL MEDICINE & REHABILITATION

## 2023-06-14 PROCEDURE — 73140 XR FINGER 2 OR MORE VIEWS LEFT: ICD-10-PCS | Mod: 26,LT,, | Performed by: RADIOLOGY

## 2023-06-14 NOTE — PROGRESS NOTES
SUBJECTIVE:    Patient ID: Pham Licea is a 67 y.o. female.    Chief Complaint: No chief complaint on file.    She presents today with recurrent familiar complaints of low back pain at the lumbosacral junction radiating to the right lateral hip.  She also has some tenderness over the right lateral hip.  Hurts to lie on it.  I have not seen her since 2019.  Back then she responded favorably to interlaminar injections at L5-S1.  Pain level is 2/10.  Bowel and bladder remain intact        Past Medical History:   Diagnosis Date    Anemia     Fatty liver 2015    Former smoker 2009    Osteoarthritis 2010    Pulmonary nodules 2019    Repeat CT in 6 mo    Ulcer of abdomen wall 2016     Social History     Socioeconomic History    Marital status:     Number of children: 4    Highest education level: Some college, no degree   Occupational History    Occupation: sale specialist-    Tobacco Use    Smoking status: Former     Packs/day: 1.00     Years: 40.00     Pack years: 40.00     Types: Cigarettes     Quit date: 2009     Years since quittin.6    Smokeless tobacco: Never    Tobacco comments:     quit    Substance and Sexual Activity    Alcohol use: Not Currently     Comment: Not often - one glass of wine every now and then    Drug use: Not Currently     Types: Marijuana     Comment: in     Sexual activity: Not Currently     Social Determinants of Health     Financial Resource Strain: Low Risk     Difficulty of Paying Living Expenses: Not very hard   Food Insecurity: No Food Insecurity    Worried About Running Out of Food in the Last Year: Never true    Ran Out of Food in the Last Year: Never true   Transportation Needs: No Transportation Needs    Lack of Transportation (Medical): No    Lack of Transportation (Non-Medical): No   Physical Activity: Inactive    Days of Exercise per Week: 0 days    Minutes of Exercise per Session: 0 min   Stress: Stress Concern Present    Feeling of  Stress : To some extent   Social Connections: Unknown    Frequency of Communication with Friends and Family: More than three times a week    Frequency of Social Gatherings with Friends and Family: Once a week    Active Member of Clubs or Organizations: Yes    Attends Club or Organization Meetings: More than 4 times per year    Marital Status:    Housing Stability: Low Risk     Unable to Pay for Housing in the Last Year: No    Number of Places Lived in the Last Year: 2    Unstable Housing in the Last Year: No     Past Surgical History:   Procedure Laterality Date    APPENDECTOMY  1993     SECTION  , , 1983    x3    COLONOSCOPY  ~2014    [facility]: normal findings per patient report    COLONOSCOPY N/A 2020    Procedure: COLONOSCOPY;  Surgeon: Misael Holm MD;  Location: [facility] ENDO;  Service: General;  Laterality: N/A;    COLONOSCOPY, WITH RETROGRADE BALLOON ENTEROSCOPY, SINGLE OR DOUBLE BALLOON N/A 2023    Procedure: COLONOSCOPY, WITH RETROGRADE BALLOON ENTEROSCOPY, DOUBLE BALLOON;  Surgeon: Collin Mares MD;  Location: [facility] ENDO ([address] [place] [address]);  Service: Endoscopy;  Laterality: N/A;  inst via portal-MS-sutab per pt request-tb-new inst portal    DIAGNOSTIC LAPAROSCOPY N/A 2021    Procedure: LAPAROSCOPY, DIAGNOSTIC;  Surgeon: Misael Holm MD;  Location: [facility] OR;  Service: General;  Laterality: N/A;    EPIDURAL STEROID INJECTION INTO LUMBAR SPINE N/A 10/12/2018    Procedure: Injection-steroid-epidural-lumbar;  Surgeon: Kal Johnson MD;  Location: [facility] OR;  Service: Pain Management;  Laterality: N/A;  L5-S1    EPIDURAL STEROID INJECTION INTO LUMBAR SPINE N/A 2019    Procedure: Injection-steroid-epidural-lumbar L5-S1;  Surgeon: Kal Johnson MD;  Location: [facility] OR;  Service: Pain Management;  Laterality: N/A;    ESOPHAGOGASTRODUODENOSCOPY N/A 2020    Procedure: ESOPHAGOGASTRODUODENOSCOPY (EGD);  Surgeon: Misael Holm MD;  Location: [facility];  Service:  "General;  Laterality: N/A;    FUSION, SPINE, MINIMALLY INVASIVE, USING COMPUTER-ASSISTED NAVIGATION N/A 12/22/2022    Procedure: ENTEROSCOPY, DOUBLE BALLOON, ANTEGRADE;  Surgeon: Collin Mares MD;  Location: [facility] ([address] [place] [address]);  Service: Endoscopy;  Laterality: N/A;  11/14/22-Instructions via portal-DS    *open to earlier date if available*    HYSTERECTOMY  1993    one ovary conserved    LAPAROSCOPIC CHOLECYSTECTOMY N/A 9/2/2020    Procedure: CHOLECYSTECTOMY, LAPAROSCOPIC;  Surgeon: Govind Frey MD;  Location: [facility] OR;  Service: General;  Laterality: N/A;    LAPAROSCOPIC LYSIS OF ADHESIONS N/A 5/4/2021    Procedure: LYSIS, ADHESIONS, LAPAROSCOPIC;  Surgeon: Misael Holm MD;  Location: [facility] OR;  Service: General;  Laterality: N/A;    UPPER GASTROINTESTINAL ENDOSCOPY  2016     Family History   Problem Relation Age of Onset    Ovarian cancer Mother     Heart disease Mother     Osteoporosis Mother     Arthritis Mother     Hypertension Father     Stroke Father 50    Brain Hemorrhage Father     Aneurysm Father     Osteoarthritis Sister     Arthritis Sister     Hypertension Sister     Obesity Sister     Breast cancer Neg Hx     Colon cancer Neg Hx     Colon polyps Neg Hx     Crohn's disease Neg Hx     Ulcerative colitis Neg Hx      Vitals:    06/14/23 1442   Weight: 87.1 kg (192 lb 0.3 oz)   Height: 5' 4.02" (1.626 m)       Review of Systems   Constitutional:  Negative for chills, diaphoresis, fatigue, fever and unexpected weight change.   HENT:  Negative for trouble swallowing.    Eyes:  Negative for visual disturbance.   Respiratory:  Negative for shortness of breath.    Cardiovascular:  Negative for chest pain.   Gastrointestinal:  Negative for abdominal pain, constipation, nausea and vomiting.   Genitourinary:  Negative for difficulty urinating.   Musculoskeletal:  Negative for arthralgias, back pain, gait problem, joint swelling, myalgias, neck pain and neck stiffness.   Neurological:  Negative for " dizziness, speech difficulty, weakness, light-headedness, numbness and headaches.        Objective:      Physical Exam  Neurological:      Mental Status: She is alert and oriented to person, place, and time.      Comments: She is awake and in no acute distress  Mild tenderness palpation lumbar paraspinous musculature at the lumbosacral junction with no palpable masses.  Mild tenderness over right greater trochanter  Forward flexion is to about 60° before she complains of pain at the lumbosacral junction.  Extension causes pain at the lumbosacral junction  Reflexes- +1-+2 reflexes at the following:   C5-Biceps   C6-Brachioradialis   C7-Triceps   L3/4-Patellar   S1-Achilles   Rosenda sign negative bilaterally  Strength testing- 5/5 strength in the following muscle groups:  C5-Elbow flexion  C6-Wrist extension  C7-Elbow extension  C8-Finger flexion  T1-Finger abduction  L2-Hip flexion  L3-Knee extension  L4-Ankle dorsiflexion  L5-Great toe extension  S1-Ankle plantar flexion                  Assessment:       1. Trochanteric bursitis of right hip    2. Chronic bilateral low back pain without sciatica           Plan:     She remains neurologically intact.  I suspect she has low back pain on basis of degenerative disc disease and facet arthropathy.  I think she has right trochanteric bursitis.  I am going to put her in therapy for her back and send her to Orthopedics to consider an injection.  Consider repeat interlaminar injection at L5-S1.  Follow-up me after therapy      Trochanteric bursitis of right hip  -     Ambulatory referral/consult to Orthopedics; Future; Expected date: 06/21/2023    Chronic bilateral low back pain without sciatica  -     Ambulatory referral/consult to Physical/Occupational Therapy; Future; Expected date: 06/21/2023

## 2023-06-15 ENCOUNTER — CLINICAL SUPPORT (OUTPATIENT)
Dept: REHABILITATION | Facility: HOSPITAL | Age: 68
End: 2023-06-15
Attending: INTERNAL MEDICINE
Payer: MEDICARE

## 2023-06-15 ENCOUNTER — OFFICE VISIT (OUTPATIENT)
Dept: ORTHOPEDICS | Facility: CLINIC | Age: 68
End: 2023-06-15
Payer: MEDICARE

## 2023-06-15 VITALS — HEIGHT: 64 IN | WEIGHT: 192 LBS | BODY MASS INDEX: 32.78 KG/M2

## 2023-06-15 DIAGNOSIS — G89.29 CHRONIC BILATERAL LOW BACK PAIN WITHOUT SCIATICA: ICD-10-CM

## 2023-06-15 DIAGNOSIS — M25.551 RIGHT HIP PAIN: ICD-10-CM

## 2023-06-15 DIAGNOSIS — R53.1 DECREASED STRENGTH: ICD-10-CM

## 2023-06-15 DIAGNOSIS — M25.551 RIGHT HIP PAIN: Primary | ICD-10-CM

## 2023-06-15 DIAGNOSIS — M18.12 ARTHRITIS OF CARPOMETACARPAL (CMC) JOINT OF LEFT THUMB: Primary | ICD-10-CM

## 2023-06-15 DIAGNOSIS — M54.50 CHRONIC BILATERAL LOW BACK PAIN WITHOUT SCIATICA: ICD-10-CM

## 2023-06-15 PROCEDURE — 99999 PR PBB SHADOW E&M-EST. PATIENT-LVL III: CPT | Mod: PBBFAC,,, | Performed by: ORTHOPAEDIC SURGERY

## 2023-06-15 PROCEDURE — 99213 PR OFFICE/OUTPT VISIT, EST, LEVL III, 20-29 MIN: ICD-10-PCS | Mod: S$PBB,25,, | Performed by: ORTHOPAEDIC SURGERY

## 2023-06-15 PROCEDURE — 20600 DRAIN/INJ JOINT/BURSA W/O US: CPT | Mod: PBBFAC,PO,LT | Performed by: ORTHOPAEDIC SURGERY

## 2023-06-15 PROCEDURE — 99213 OFFICE O/P EST LOW 20 MIN: CPT | Mod: S$PBB,25,, | Performed by: ORTHOPAEDIC SURGERY

## 2023-06-15 PROCEDURE — 20600 SMALL JOINT ASPIRATION/INJECTION: L THUMB CMC: ICD-10-PCS | Mod: S$PBB,LT,, | Performed by: ORTHOPAEDIC SURGERY

## 2023-06-15 PROCEDURE — 99213 OFFICE O/P EST LOW 20 MIN: CPT | Mod: PBBFAC,PO,25 | Performed by: ORTHOPAEDIC SURGERY

## 2023-06-15 PROCEDURE — 99999 PR PBB SHADOW E&M-EST. PATIENT-LVL III: ICD-10-PCS | Mod: PBBFAC,,, | Performed by: ORTHOPAEDIC SURGERY

## 2023-06-15 RX ORDER — TRIAMCINOLONE ACETONIDE 40 MG/ML
40 INJECTION, SUSPENSION INTRA-ARTICULAR; INTRAMUSCULAR
Status: DISCONTINUED | OUTPATIENT
Start: 2023-06-15 | End: 2023-06-15 | Stop reason: HOSPADM

## 2023-06-15 RX ADMIN — TRIAMCINOLONE ACETONIDE 40 MG: 40 INJECTION, SUSPENSION INTRA-ARTICULAR; INTRAMUSCULAR at 08:06

## 2023-06-15 NOTE — PROCEDURES
Small Joint Aspiration/Injection: L thumb CMC    Date/Time: 6/15/2023 8:00 AM  Performed by: Geo Vaughan MD  Authorized by: Geo Vaughan MD     Consent Done?:  Yes (Verbal)  Indications:  Pain  Site marked: the procedure site was marked    Timeout: prior to procedure the correct patient, procedure, and site was verified    Prep: patient was prepped and draped in usual sterile fashion      Location:  Thumb  Site:  L thumb CMC  Ultrasonic guidance for needle placement?: No    Needle size:  25 G  Approach:  Dorsal  Medications:  40 mg triamcinolone acetonide 40 mg/mL  Patient tolerance:  Patient tolerated the procedure well with no immediate complications

## 2023-06-15 NOTE — PROGRESS NOTES
[facility]  History & Physical  Orthopedics    SUBJECTIVE:      Chief Complaint: Left wrist and forearm pain    Referring Provider: No ref. provider found     Dr. Vaughan is the supervising physician for this encounter/patient    History of Present Illness:  Patient is a 67 y.o. right hand dominant female who presents today with complaints of left wrist and forearm pain. History of left distal radius fracture from July 2021, treated conservatively with [facility] in [place], [place]. She has generalized hand arthritis and was also treated for left thumb CMC arthritis with steroid injection in the past. She wear thumb spica support brace, she takes Mobic daily and uses Voltaren gel. She would like a repeat CMC injection today. She also mentions a left forearm mass that started within the last year, no injury. She does note that she will get a sharp stabbing pain at the mass with any resisted rotation of the wrist, such as lifting a heavy pot to pour.     Interval history July 7, 2022:  The patient returns today for re-evaluation.  She sustained a left distal radius fracture treated closed at an outside facility.  She subsequently developed a pain and swelling in the mid to proximal forearm of the left upper extremity after lifting a TV 2-3 months ago.  She was sent for MRI to evaluate further this mass and returns today for these results and re-evaluation.  Fortunately she notes her pain is significantly improved since her last visit with us.  Pain is rated 0/10 today.    Interval history October 6, 2022: The patient returns today for re-evaluation.  She notes that her left forearm is doing very well.  She still has occasional discomfort at times which she is steadily improving with time.  In regards to her CMC arthritis, her prior injection helped significantly and pain has only returned to a rather modest extent.  She has no new complaints and is pleased with her results of conservative  treatment for these issues thus far.    Interval history Mar 15, 2023: The patient returns today for re-evaluation.  She is still really suffering with her left-sided thumb CMC arthritis.  Additionally she has pain at the left radial styloid and 1st dorsal compartment.  She would like to discuss options for this today and has no other complaints.    Onset of symptoms/DOI was 1 year.    Symptoms are aggravated by activity.    Symptoms are alleviated by rest, immobilization and medication.    Symptoms consist of pain.    The patient rates their pain as a 5/10.    Attempted treatment(s) and/or interventions include activity modifications, rest, anti-inflammatory medications, immobilization and steroid injection.     The patient denies any fevers, chills, N/V, D/C and presents for evaluation.       Past Medical History:   Diagnosis Date    Anemia     Fatty liver     Former smoker     Osteoarthritis     Pulmonary nodules 2019    Repeat CT in 6 mo    Ulcer of abdomen wall 2016     Past Surgical History:   Procedure Laterality Date    APPENDECTOMY  1993     SECTION  , , 1983    x3    COLONOSCOPY  ~    [facility]: normal findings per patient report    COLONOSCOPY N/A 2020    Procedure: COLONOSCOPY;  Surgeon: Misael Holm MD;  Location: [facility] ENDO;  Service: General;  Laterality: N/A;    COLONOSCOPY, WITH RETROGRADE BALLOON ENTEROSCOPY, SINGLE OR DOUBLE BALLOON N/A 2023    Procedure: COLONOSCOPY, WITH RETROGRADE BALLOON ENTEROSCOPY, DOUBLE BALLOON;  Surgeon: Collin Mares MD;  Location: [facility] ([address] [place] [address]);  Service: Endoscopy;  Laterality: N/A;  inst via portal-MS-sutab per pt request-tb-new inst portal    DIAGNOSTIC LAPAROSCOPY N/A 2021    Procedure: LAPAROSCOPY, DIAGNOSTIC;  Surgeon: Misael Holm MD;  Location: [facility] OR;  Service: General;  Laterality: N/A;    EPIDURAL STEROID INJECTION INTO LUMBAR SPINE N/A 10/12/2018    Procedure:  Injection-steroid-epidural-lumbar;  Surgeon: Kal Johnson MD;  Location: [facility] OR;  Service: Pain Management;  Laterality: N/A;  L5-S1    EPIDURAL STEROID INJECTION INTO LUMBAR SPINE N/A 5/31/2019    Procedure: Injection-steroid-epidural-lumbar L5-S1;  Surgeon: Kal Johnson MD;  Location: [facility] OR;  Service: Pain Management;  Laterality: N/A;    ESOPHAGOGASTRODUODENOSCOPY N/A 11/9/2020    Procedure: ESOPHAGOGASTRODUODENOSCOPY (EGD);  Surgeon: Misael Holm MD;  Location: [facility] ENDO;  Service: General;  Laterality: N/A;    FUSION, SPINE, MINIMALLY INVASIVE, USING COMPUTER-ASSISTED NAVIGATION N/A 12/22/2022    Procedure: ENTEROSCOPY, DOUBLE BALLOON, ANTEGRADE;  Surgeon: Collin Mares MD;  Location: [facility] ENDO ([facility]);  Service: Endoscopy;  Laterality: N/A;  11/14/22-Instructions via portal-DS    *open to earlier date if available*    HYSTERECTOMY  1993    one ovary conserved    LAPAROSCOPIC CHOLECYSTECTOMY N/A 9/2/2020    Procedure: CHOLECYSTECTOMY, LAPAROSCOPIC;  Surgeon: Govind Frey MD;  Location: [facility] OR;  Service: General;  Laterality: N/A;    LAPAROSCOPIC LYSIS OF ADHESIONS N/A 5/4/2021    Procedure: LYSIS, ADHESIONS, LAPAROSCOPIC;  Surgeon: Misael Holm MD;  Location: [facility] OR;  Service: General;  Laterality: N/A;    UPPER GASTROINTESTINAL ENDOSCOPY  2016     Review of patient's allergies indicates:  No Known Allergies  Social History     Social History Narrative    Not on file     Family History   Problem Relation Age of Onset    Ovarian cancer Mother     Heart disease Mother     Osteoporosis Mother     Arthritis Mother     Hypertension Father     Stroke Father 50    Brain Hemorrhage Father     Aneurysm Father     Osteoarthritis Sister     Arthritis Sister     Hypertension Sister     Obesity Sister     Breast cancer Neg Hx     Colon cancer Neg Hx     Colon polyps Neg Hx     Crohn's disease Neg Hx     Ulcerative colitis Neg Hx          Current Outpatient Medications:     alendronate  "(FOSAMAX) 70 MG tablet, Take 1 tablet (70 mg total) by mouth every 7 days., Disp: 4 tablet, Rfl: 2    ciprofloxacin HCl (CIPRO) 500 MG tablet, Take 1 tablet (500 mg total) by mouth every 12 (twelve) hours. for 10 days, Disp: 20 tablet, Rfl: 0    CONSTULOSE 10 gram/15 mL solution, TAKE 10 ML BY MOUTH  THREE TIMES DAILY, Disp: 948 mL, Rfl: 0    diclofenac sodium (VOLTAREN) 1 % Gel, Apply 2 g topically 2 (two) times daily as needed., Disp: , Rfl:     ondansetron (ZOFRAN-ODT) 4 MG TbDL, Take 2 tablets (8 mg total) by mouth every 6 (six) hours as needed., Disp: 90 tablet, Rfl: 3    ondansetron (ZOFRAN-ODT) 8 MG TbDL, Take 1 tablet (8 mg total) by mouth every 6 (six) hours as needed., Disp: 60 tablet, Rfl: 3    oxyCODONE-acetaminophen (PERCOCET)  mg per tablet, Take 1 tablet by mouth every 6 (six) hours as needed for Pain., Disp: 30 tablet, Rfl: 0    pantoprazole (PROTONIX) 20 MG tablet, Take 1 tablet (20 mg total) by mouth once daily., Disp: 90 tablet, Rfl: 3    vitamin D (VITAMIN D3) 1000 units Tab, Take 3,000 Units by mouth once daily., Disp: , Rfl:     Current Facility-Administered Medications:     ondansetron injection 4 mg, 4 mg, Intravenous, 1 time in Clinic/HOD, Misael Holm MD    Facility-Administered Medications Ordered in Other Visits:     sodium chloride 0.9% 100 mL flush bag, , Intravenous, PRN, Mayra Bower MD, Stopped at 04/24/23 1519    sodium chloride 0.9% flush 10 mL, 10 mL, Intravenous, PRN, Mayra Bower MD      Review of Systems:  Constitutional: no fever or chills  Eyes: no visual changes  ENT: no nasal congestion or sore throat  Respiratory: no cough or shortness of breath  Cardiovascular: no chest pain  Gastrointestinal: no nausea or vomiting, tolerating diet  Musculoskeletal: pain and soreness    OBJECTIVE:      Vital Signs (Most Recent):  Vitals:    06/15/23 0809   Weight: 87.1 kg (192 lb)   Height: 5' 4.02" (1.626 m)     Body mass index is 32.94 kg/m².      Physical " Exam:  Constitutional: The patient appears well-developed and well-nourished. No distress.   Skin: No lesions appreciated  Head: Normocephalic and atraumatic.   Nose: Nose normal.   Ears: No deformities seen  Eyes: Conjunctivae and EOM are normal.   Neck: No tracheal deviation present.   Cardiovascular: Normal rate and intact distal pulses.    Pulmonary/Chest: Effort normal. No respiratory distress.   Abdominal: There is no guarding.   Neurological: The patient is alert.   Psychiatric: The patient has a normal mood and affect.     Left Hand/Wrist Examination:    Observation/Inspection:  Swelling  Notable soft tissue mass to the radial forearm/mid shaft-mildly tender to palpation   Deformity  none  Discoloration  none     Scars   none    Atrophy  none    HAND/WRIST EXAMINATION:  Finkelstein's Test   positive  WHAT Test    positive  Snuff box tenderness   Neg  Hilliard's Test    Neg  Hook of Hamate Tenderness  Neg  CMC grind    POS/Pain  Circumduction test   Pain  TTP to the CMC.     Neurovascular Exam:  Digits WWP, brisk CR < 3s throughout  NVI motor/LTS to M/R/U nerves, radial pulse 2+  Tinel's Test - Carpal Tunnel  Neg  Tinel's Test - Cubital Tunnel  Neg  Phalen's Test    Neg  Median Nerve Compression Test Neg    ROM hand full, painless    ROM wrist full, painless    ROM elbow full, painless    Abdomen not guarded  Respirations nonlabored  Perfusion intact    Diagnostic Results:       MRI left forearm:    Impression:     1. Full-thickness tear and retraction of the brachioradialis tendon accounting for the patient's reported soft tissue mass.  2. Mild extensor carpi radialis longus tendinosis with mild muscular strain.  This report was flagged in Epic as abnormal.      Imaging - I independently viewed the patient's imaging as well as the radiology report.      Left Wrist Xray 5/9/22  FINDINGS:  At the rest there is mild dorsal angulation of the radius and slight irregularity of the dorsal surface.  Soft tissue  swelling however is not seen.  This is the sequela of the fracture identified July 21, 2021.  A fracture line is not presently seen.  The carpals appear intact.  Narrowing is noted at the radiocarpal joint, triscaphe and 1st carpal metacarpal joint consistent with DJD.     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  Mild dorsal angulation of the radius secondary to prior fracture of July 2021.  No acute fracture seen    Left thumb x-rays demonstrate significant left thumb CMC arthritis as well as IP arthritis       ASSESSMENT/PLAN:      67 y.o. yo female with left thumb CMC arthritis, left de Quervain tenosynovitis       Plan: The patient and I had a thorough discussion today.  We discussed the working diagnosis as well as several other potential alternative diagnoses.  Treatment options were discussed, both conservative and surgical.  Conservative treatment options would include things such as activity modifications, workplace modifications, a period of rest, oral vs topical OTC and prescription anti-inflammatory medications, occupational therapy, splinting/bracing, immobilization, corticosteroid injections, and others.  Surgical options were discussed as well.     At this time, the patient would like for me to provide fit her with a left thumb spica brace which she will be fitted for today as well as administer a left thumb CMC joint corticosteroid injection which I would be happy to perform.  We discussed surgical options which would include a CMC arthroplasty and de Quervain release.  She may follow up on an as needed/if needed basis.    Should the patient's symptoms worsen, persist, or fail to improve they should return for reevaluation and I would be happy to see them back anytime.        Please do not hesitate to reach out to us via email, phone, or MyChart with any questions, concerns, or feedback.

## 2023-06-15 NOTE — PLAN OF CARE
PT/PTA met face to face to discuss pt's treatment plan and progress towards established goals. Pt will be seen by a physical therapist minimally every 6th visit or every 30 days.     Please see plan of Care dated 6/15/23 for goals.     Samara Velazquez, PT

## 2023-06-15 NOTE — PLAN OF CARE
[facility]  Physical Therapy Initial Evaluation    Name: Pham Licea  Clinic Number: [id number]    Therapy Diagnosis:   Encounter Diagnosis   Name Primary?    Chronic bilateral low back pain without sciatica      Physician: Julio Evans MD    Physician Orders: PT Eval and Treat   Medical Diagnosis from Referral: M54.50,G89.29 (ICD-10-CM) - Chronic bilateral low back pain without sciatica   Evaluation Date: 6/15/2023  Authorization Period Expiration: 6/15/24  Plan of Care Expiration: 23  Visit # / Visits authorized:   FOTO Visit #:  1/3    Time In: 12:35 PM  Time Out: 1:30 PM  Total Appointment Time (timed & untimed codes): 55 minutes    Precautions: Standard Osteopenia     Subjective     History of current condition - Elicia reports: She presents today with recurrent familiar complaints of low back pain at the lumbosacral junction radiating to the right lateral hip. Reported as progressive since 2016 following a fall.  She also has some tenderness over the right lateral hip.  Hurts to lie on it.  Pt reports prior low back discomfort approx 4 years ago and  then she responded favorably to interlaminar injections at L5-S1. Hx of bursitis Right hip. Pt is scheduled to see  for RIGHT hip 23. Pt reports several falls this year related to slipping on a fur ball and tripping over an object on the floor.   Medical History:   Past Medical History:   Diagnosis Date    Anemia     Fatty liver 2015    Former smoker     Osteoarthritis 2010    Pulmonary nodules 2019    Repeat CT in 6 mo    Ulcer of abdomen wall 2016       Surgical History:   Pham Licea  has a past surgical history that includes Upper gastrointestinal endoscopy (); Colonoscopy (~); Epidural steroid injection into lumbar spine (N/A, 10/12/2018); Epidural steroid injection into lumbar spine (N/A, 2019);  section (, , ); Hysterectomy ();  "Appendectomy (1993); Laparoscopic cholecystectomy (N/A, 9/2/2020); Colonoscopy (N/A, 11/9/2020); Esophagogastroduodenoscopy (N/A, 11/9/2020); Diagnostic laparoscopy (N/A, 5/4/2021); Laparoscopic lysis of adhesions (N/A, 5/4/2021); FUSION, SPINE, MINIMALLY INVASIVE, USING COMPUTER ASSISTED NAVIGATION (N/A, 12/22/2022); and colonoscopy, with retrograde balloon enteroscopy, single or double balloon (N/A, 1/30/2023).    Medications:   Pham has a current medication list which includes the following prescription(s): alendronate, ciprofloxacin hcl, constulose, diclofenac sodium, ondansetron, ondansetron, oxycodone-acetaminophen, pantoprazole, and vitamin d, and the following Facility-Administered Medications: ondansetron, sodium chloride 0.9% 100 mL flush bag, and sodium chloride 0.9%.    Allergies:   Review of patient's allergies indicates:  No Known Allergies     Imaging, Xray for right hip sheduled for tomorrow.    Prior Therapy: pt has seen OT for the pain left wrist.pt has alose received PT services for her knee.   Social History: Single level house lives alone  Occupation: Retired   Prior Level of Function: Indep   Current Level of Function: Indep with modified activity     Pain:  Current 4/10, worst 8/10, best 0/10   Location: right Lumbar to Right hip   Description: Aching, Grabbing, and Tingling  Aggravating Factors: Walking and Getting out of bed/chair, Kneeling on the prayer bench  Easing Factors: pain medication, lying down, heating pad, and rest, Aleve , meloicam ,Voltaren Gel  -Pt has 2 cats     Pts goals: " Increased mobility and reduced pain to be able to walk through [employer]"    Objective     Posture:    -       Rounded shoulders  -       Forward head    Gait: Slight antalgic gait; not AD    Lumbar Range of Motion:    % of Normal Range Pain   Flexion 100% Hamstrings pulling   Extension 100%    Left Side Bending 70%    Right Side Bending 75%    Left rotation 75%    Right Rotation 75%       Lower " Extremity Strength   Left Right   Knee extension: 5/5 4+/5   Knee flexion: 4+/5 5/5   Hip flexion: 3+/5 3+/5   Hip extension:  3/5 3/5   Hip abduction: 3+/5 3/5   Hip adduction: 3/5 3/5   Ankle dorsiflexion: 5/5 5/5   Ankle plantarflexion: 5/5 5/5   Upper abdominals 2/5         Trunk extension 2/5      Special Tests:    Repeated Flexion Negative   Repeated Extension Negative       Straight Leg Raise Negative   Slump Positive-Right   Quadrant Negative   Femoral nerve test Negative     Hip  CHERELLE:+ R  Scour:+ R  FADIR:+ R    DTR:performed by Dr. Evans 6/14/23 WNL    Joint Mobility:   Lumbar: CPA restricted,   RPA restricted  LPA restricted    Thoracic: restricted    Sacral Evaluation  Sacral thrust :+  Sacral distraction and compression:-  Supine to long sit: Right leg changed from (short to long = posterior rotation)      Palpation: significant tenderness to palpation at Bilateral piriformis, lumbar    Sensation: tingling Lateral phalanges 3-5    Flexibility:     90/90 SLR = R minimal restriction, L no restriction   Ely's test: R minimal restriction, L minimal restriction   Zaheer test: R moderate restriction, L minimal restriction    PT Evaluation Completed: Yes  Discussed Plan of Care with patient: Yes       Limitation/Restriction for FOTO Low back Survey    Therapist reviewed FOTO scores for Pham Licea on 6/15/2023.   FOTO documents entered into [employer] - see Media section.    Limitation Score: 59%         TREATMENT   Treatment Time In: 1:20 PM  Treatment Time Out: 1:30 PM  Total Treatment time (time-based codes) separate from Evaluation: 10 minutes    Elicia received the treatments listed below:    THERAPEUTIC EXERCISES to develop strength, ROM, and flexibility for 10 minutes including  Pelvic tilts x 15  Seated Hamstring stretch 3 x 30 sec  Seated piriformis stretch 3 x 30 sec  HEP provided and reviewed    Home Exercises and Patient Education Provided    Education provided:   - Goals/POC    Written Home  Exercises Provided: yes.  Exercises were reviewed and Elicia was able to demonstrate them prior to the end of the session.  Elicia demonstrated good  understanding of the education provided.     See EMR under Media for exercises provided 6/15/2023.    Assessment   Pham is a 67 y.o. female referred to outpatient Physical Therapy with a medical diagnosis of M54.50,G89.29 (ICD-10-CM) - Chronic bilateral low back pain without sciatica . Pt presents with Right lumbosacral and right hip pain, slight decline in trunk ROM (rotation and lateral flexion), decline in LE strength , reduced muscular flexibility- paraspinals,Piriformis,Hamstrings and Quads with muscular imbalances contributing to posterior rotation of the Right ilium. Positive Slump Right LE indicating neural tension of the sciatic nerve, aggravation of Right sciatic nerved SLR at end range past 90 degrees. Positive Scour and anjali consistent with Right hip involvement with notable Hx of OA. Pt also noted lateral hip pain with greater hip ADD.    Pt prognosis is Good.   Pt will benefit from skilled outpatient Physical Therapy to address the deficits stated above and in the chart below, provide pt/family education, and to maximize pt's level of independence.     Plan of care discussed with patient: Yes  Pt's spiritual, cultural and educational needs considered and patient is agreeable to the plan of care and goals as stated below:     Anticipated Barriers for therapy: None    Medical Necessity is demonstrated by the following  History  Co-morbidities and personal factors that may impact the plan of care Co-morbidities:   See above    Personal Factors:   no deficits     moderate   Examination  Body Structures and Functions, activity limitations and participation restrictions that may impact the plan of care Body Regions:   lower extremities  trunk    Body Systems:    gross symmetry  ROM  strength    Participation Restrictions:   None    Activity limitations:  "  Learning and applying knowledge  no deficits    General Tasks and Commands  no deficits    Communication  no deficits    Mobility  lifting and carrying objects  walking  driving (bike, car, motorcycle)    Self care  no deficits    Domestic Life  shopping  doing house work (cleaning house, washing dishes, laundry)    Interactions/Relationships  no deficits    Life Areas  no deficits    Community and Social Life  recreation and leisure         moderate   Clinical Presentation stable and uncomplicated low   Decision Making/ Complexity Score: low     Goals:  Short Term Goals: 4 weeks   1. Report decreased lower back pain  < / =  4/10 "At Worst" to increase tolerance for functional mobility and ADLs.  2. Increase lumbar ROM to 100% in all planes of motion without pain provocation in order to perform ADLs without difficulty.  3. Increase strength to 4-/5 MMT grade grossly  or better throughout to increase tolerance for ADL and work activities.  4. Demonstrate (-) flexibility limitations in BLE to promote ROM without restriction.  5. Report >/= 50% resolution of neural symptoms in bilateral hip/thigh to demonstrate improvement in condition.     Long Term Goals: 8 weeks   1. Report decreased pain < / = 2/10 "At Worst" to increase tolerance for functional mobility and ADLs.  2. Improve trunk flexion and extension by 1 grade for improved spinal stabilization  3. Increase LE  strength to 4+/5 MMT grade grossly throughout to increase tolerance for ADL and work activities.  4. Pt will report at </= 53% Limitation on FOTO  to demonstrate increase in LE function with every day tasks.   5. Patient to demonstrate independence with comprehensive HEP for self-management    Plan   Plan of care Certification: 6/15/2023 to 9/13/23.    Outpatient Physical Therapy 2 times weekly for 6 weeks to include the following interventions: Aquatic Therapy, Manual Therapy, Moist Heat/ Ice, Neuromuscular Re-ed, Patient Education, Therapeutic " Activities, and Therapeutic Exercise. Dry needling. Other unlisted modalities as appropriate.    Samara Velazquez, PT

## 2023-06-16 ENCOUNTER — HOSPITAL ENCOUNTER (OUTPATIENT)
Dept: RADIOLOGY | Facility: HOSPITAL | Age: 68
Discharge: HOME | End: 2023-06-16
Attending: ORTHOPAEDIC SURGERY
Payer: MEDICARE

## 2023-06-16 ENCOUNTER — OFFICE VISIT (OUTPATIENT)
Dept: ORTHOPEDICS | Facility: CLINIC | Age: 68
End: 2023-06-16
Payer: MEDICARE

## 2023-06-16 VITALS — WEIGHT: 192 LBS | BODY MASS INDEX: 32.78 KG/M2 | HEIGHT: 64 IN | RESPIRATION RATE: 16 BRPM

## 2023-06-16 DIAGNOSIS — M70.61 TROCHANTERIC BURSITIS OF RIGHT HIP: ICD-10-CM

## 2023-06-16 DIAGNOSIS — M25.551 RIGHT HIP PAIN: ICD-10-CM

## 2023-06-16 PROCEDURE — 99999 PR PBB SHADOW E&M-EST. PATIENT-LVL III: CPT | Mod: PBBFAC,,, | Performed by: ORTHOPAEDIC SURGERY

## 2023-06-16 PROCEDURE — 99213 OFFICE O/P EST LOW 20 MIN: CPT | Mod: PBBFAC,PO | Performed by: ORTHOPAEDIC SURGERY

## 2023-06-16 PROCEDURE — 99999 PR PBB SHADOW E&M-EST. PATIENT-LVL III: ICD-10-PCS | Mod: PBBFAC,,, | Performed by: ORTHOPAEDIC SURGERY

## 2023-06-16 PROCEDURE — 99214 OFFICE O/P EST MOD 30 MIN: CPT | Mod: S$PBB,,, | Performed by: ORTHOPAEDIC SURGERY

## 2023-06-16 PROCEDURE — 73502 X-RAY EXAM HIP UNI 2-3 VIEWS: CPT | Mod: 26,RT,, | Performed by: RADIOLOGY

## 2023-06-16 PROCEDURE — 73502 X-RAY EXAM HIP UNI 2-3 VIEWS: CPT | Mod: TC,PO,RT

## 2023-06-16 PROCEDURE — 99214 PR OFFICE/OUTPT VISIT, EST, LEVL IV, 30-39 MIN: ICD-10-PCS | Mod: S$PBB,,, | Performed by: ORTHOPAEDIC SURGERY

## 2023-06-16 PROCEDURE — 73502 XR HIP WITH PELVIS WHEN PERFORMED, 2 OR 3  VIEWS RIGHT: ICD-10-PCS | Mod: 26,RT,, | Performed by: RADIOLOGY

## 2023-06-16 NOTE — PROGRESS NOTES
Patient ID: Pham Licea is a 67 y.o. female    Chief Complaint:   Chief Complaint   Patient presents with    Right Hip - Pain       History of Present Illness:    Pleasant 67-year-old female here for evaluation of right lateral hip pain.  She has had this issue in the past several years ago treated with right hip injection which helped.  Reports pain on the right side mostly when laying on the right side.  Denies any significant groin pain.  Currently is starting physical therapy for her lower back and hips.    PAST MEDICAL HISTORY:   Past Medical History:   Diagnosis Date    Anemia     Fatty liver 2015    Former smoker     Osteoarthritis 2010    Pulmonary nodules 2019    Repeat CT in 6 mo    Ulcer of abdomen wall 2016     PAST SURGICAL HISTORY:   Past Surgical History:   Procedure Laterality Date    APPENDECTOMY       SECTION  , , 1983    x3    COLONOSCOPY  ~    [facility]: normal findings per patient report    COLONOSCOPY N/A 2020    Procedure: COLONOSCOPY;  Surgeon: Misael Holm MD;  Location: [facility];  Service: General;  Laterality: N/A;    COLONOSCOPY, WITH RETROGRADE BALLOON ENTEROSCOPY, SINGLE OR DOUBLE BALLOON N/A 2023    Procedure: COLONOSCOPY, WITH RETROGRADE BALLOON ENTEROSCOPY, DOUBLE BALLOON;  Surgeon: Collin Mares MD;  Location: [facility] ([address] [place] [address]);  Service: Endoscopy;  Laterality: N/A;  inst via portal-MS-sutab per pt request-tb-new inst portal    DIAGNOSTIC LAPAROSCOPY N/A 2021    Procedure: LAPAROSCOPY, DIAGNOSTIC;  Surgeon: Misael Holm MD;  Location: [facility] OR;  Service: General;  Laterality: N/A;    EPIDURAL STEROID INJECTION INTO LUMBAR SPINE N/A 10/12/2018    Procedure: Injection-steroid-epidural-lumbar;  Surgeon: Kal Johnson MD;  [facility]  Service: Pain Management;  Laterality: N/A;  L5-S1    EPIDURAL STEROID INJECTION INTO LUMBAR SPINE N/A 2019    Procedure: Injection-steroid-epidural-lumbar L5-S1;   Surgeon: Kal Johnson MD;  [facility];  Service: Pain Management;  Laterality: N/A;    ESOPHAGOGASTRODUODENOSCOPY N/A 2020    Procedure: ESOPHAGOGASTRODUODENOSCOPY (EGD);  Surgeon: Misael Holm MD;  Location: [facility] ENDO;  Service: General;  Laterality: N/A;    FUSION, SPINE, MINIMALLY INVASIVE, USING COMPUTER-ASSISTED NAVIGATION N/A 2022    Procedure: ENTEROSCOPY, DOUBLE BALLOON, ANTEGRADE;  Surgeon: Collin Mares MD;  Location: [facility] ENDO (2ND FLR);  Service: Endoscopy;  Laterality: N/A;  22-Instructions via portal-DS    *open to earlier date if available*    HYSTERECTOMY  1993    one ovary conserved    LAPAROSCOPIC CHOLECYSTECTOMY N/A 2020    Procedure: CHOLECYSTECTOMY, LAPAROSCOPIC;  Surgeon: Govind Frey MD;  Location: [facility] OR;  Service: General;  Laterality: N/A;    LAPAROSCOPIC LYSIS OF ADHESIONS N/A 2021    Procedure: LYSIS, ADHESIONS, LAPAROSCOPIC;  Surgeon: Misael Holm MD;  Location: [facility] OR;  Service: General;  Laterality: N/A;    UPPER GASTROINTESTINAL ENDOSCOPY       FAMILY HISTORY:   Family History   Problem Relation Age of Onset    Ovarian cancer Mother     Heart disease Mother     Osteoporosis Mother     Arthritis Mother     Hypertension Father     Stroke Father 50    Brain Hemorrhage Father     Aneurysm Father     Osteoarthritis Sister     Arthritis Sister     Hypertension Sister     Obesity Sister     Breast cancer Neg Hx     Colon cancer Neg Hx     Colon polyps Neg Hx     Crohn's disease Neg Hx     Ulcerative colitis Neg Hx      SOCIAL HISTORY:   Social History     Occupational History    Occupation: sale specialist-    Tobacco Use    Smoking status: Former     Packs/day: 1.00     Years: 40.00     Pack years: 40.00     Types: Cigarettes     Quit date: 2009     Years since quittin.6    Smokeless tobacco: Never    Tobacco comments:     quit    Substance and Sexual Activity    Alcohol use: Not Currently     Comment: Not often -  one glass of wine every now and then    Drug use: Not Currently     Types: Marijuana     Comment: in 1970s    Sexual activity: Not Currently        MEDICATIONS:   Current Outpatient Medications:     alendronate (FOSAMAX) 70 MG tablet, Take 1 tablet (70 mg total) by mouth every 7 days., Disp: 4 tablet, Rfl: 2    ciprofloxacin HCl (CIPRO) 500 MG tablet, Take 1 tablet (500 mg total) by mouth every 12 (twelve) hours. for 10 days, Disp: 20 tablet, Rfl: 0    CONSTULOSE 10 gram/15 mL solution, TAKE 10 ML BY MOUTH  THREE TIMES DAILY, Disp: 948 mL, Rfl: 0    diclofenac sodium (VOLTAREN) 1 % Gel, Apply 2 g topically 2 (two) times daily as needed., Disp: , Rfl:     pantoprazole (PROTONIX) 20 MG tablet, Take 1 tablet (20 mg total) by mouth once daily., Disp: 90 tablet, Rfl: 3    vitamin D (VITAMIN D3) 1000 units Tab, Take 3,000 Units by mouth once daily., Disp: , Rfl:     ondansetron (ZOFRAN-ODT) 4 MG TbDL, Take 2 tablets (8 mg total) by mouth every 6 (six) hours as needed., Disp: 90 tablet, Rfl: 3    ondansetron (ZOFRAN-ODT) 8 MG TbDL, Take 1 tablet (8 mg total) by mouth every 6 (six) hours as needed. (Patient not taking: Reported on 6/16/2023), Disp: 60 tablet, Rfl: 3    oxyCODONE-acetaminophen (PERCOCET)  mg per tablet, Take 1 tablet by mouth every 6 (six) hours as needed for Pain. (Patient not taking: Reported on 6/16/2023), Disp: 30 tablet, Rfl: 0    Current Facility-Administered Medications:     ondansetron injection 4 mg, 4 mg, Intravenous, 1 time in Clinic/HOD, Misael Holm MD    Facility-Administered Medications Ordered in Other Visits:     sodium chloride 0.9% 100 mL flush bag, , Intravenous, PRN, Mayra Bower MD, Stopped at 04/24/23 1519    sodium chloride 0.9% flush 10 mL, 10 mL, Intravenous, PRN, Mayra Bower MD  ALLERGIES: Review of patient's allergies indicates:  No Known Allergies      Physical Exam     Vitals:    06/16/23 1034   Resp: 16     Alert and oriented to person, place and  time. No acute distress. Well-groomed, not ill appearing. Pupils round and reactive, normal respiratory effort, no audible wheezing.     Gait: She  walks with a normal gait.                   EXTREMITIES:  Examination of lower extremities reveals there is no visible mass or deformity.            Right hip:  ROM(IR/ER) 25/50    .Negative FADIR test    .Negative Stinchfield test     Positive trochanteric pain.    Negative straight leg raise.    No warmth    No erythema        The skin over both lower extremities is normal and unremarkable.  She has a  painless range of motion of the knees and ankles bilaterally.   Sensation is intact in both lower extremities.    There are no motor deficits in the lower extremities bilaterally.   Pedal pulses are palpable distally bilaterally.    She has no calf tenderness to palpation nor edema.       Imaging:       Mild DJD right hip      Assessment & Plan    Trochanteric bursitis of right hip  -     Ambulatory referral/consult to Orthopedics         I made the decision to obtain old records of the patient including previous notes and imaging. New imaging, if ordered today of the extremity or extremities, were evaluated. I independently reviewed and interpreted the radiographs and/or MRIs/CT scan today as well as prior imaging. I also reviewed the referring provider's documentation as well as any pertinent labs, tests and imaging.     I believe that this is likely trochanteric bursitis    After clinical evaluation, we discussed at length different treatment options including anti-inflammatories, acetaminophen, rest, ice, heat, formal physical therapy including strengthening and stretching exercises, home exercise programs, injections and finally surgical intervention. We discussed the morbidity and mortality associated with surgery as well as the risks and expectations of surgery.  We also discussed weight loss and BMI and its effects on joints and overall health as well as smoking  cessation if appropriate.    After shared medical decision-making with the patient and  family, the patient would like to proceed with a trial of:     Formal Physical Therapy    We can consider injections p.r.n. if pain is refractory to conservative treatment    All questions were answered and patient is agreeable to the above plan.

## 2023-06-20 ENCOUNTER — CLINICAL SUPPORT (OUTPATIENT)
Dept: REHABILITATION | Facility: HOSPITAL | Age: 68
End: 2023-06-20
Attending: INTERNAL MEDICINE
Payer: MEDICARE

## 2023-06-20 DIAGNOSIS — M25.551 RIGHT HIP PAIN: ICD-10-CM

## 2023-06-20 DIAGNOSIS — R53.1 DECREASED STRENGTH: ICD-10-CM

## 2023-06-20 DIAGNOSIS — M54.50 CHRONIC BILATERAL LOW BACK PAIN WITHOUT SCIATICA: Primary | ICD-10-CM

## 2023-06-20 DIAGNOSIS — G89.29 CHRONIC BILATERAL LOW BACK PAIN WITHOUT SCIATICA: Primary | ICD-10-CM

## 2023-06-20 PROCEDURE — 97140 MANUAL THERAPY 1/> REGIONS: CPT | Mod: PN,CQ

## 2023-06-20 PROCEDURE — 97110 THERAPEUTIC EXERCISES: CPT | Mod: PN,CQ

## 2023-06-20 NOTE — PROGRESS NOTES
"[facility]   Physical Therapy Treatment Note      Name: Pham Licea  Clinic Number: [id number]    Therapy Diagnosis:   Encounter Diagnoses   Name Primary?    Right hip pain     Decreased strength     Chronic bilateral low back pain without sciatica Yes     Physician: Julio Evans MD    Visit Date: 6/20/2023    Physician Orders: PT Eval and Treat   Medical Diagnosis from Referral: M54.50,G89.29 (ICD-10-CM) - Chronic bilateral low back pain without sciatica   Evaluation Date: 6/15/2023  Authorization Period Expiration: 6/15/24  Plan of Care Expiration: 9/13/23  Visit # / Visits authorized: 1 / 12  FOTO Visit #:  1/3    PTA Visit #: 1/5     Time In: 9:05 AM  Time Out: 10:10 AM  Total Billable Time: 53 minutes    Subjective     Pt reports: My back is bothering me today.  She was compliant with home exercise program.  Response to previous treatment: N/A  Functional change: N/A    Pain: 6/10  Location: right back      Objective      Objective Measures updated at progress report unless specified.     Treatment     Elicia received the treatments listed below:      therapeutic exercises to develop strength, endurance, ROM, and flexibility for 43 minutes including:  Nustep level 1 x 2 min (increased LBP)  Toe Taps on 6" step x 2 min  Heel Cord stretch on wedge 3 x 30 sec  Seated H/S stretch 3 x 30 sec  Seated Piriformis stretch 3 x 30 sec  Seated Lumbar Flex w/ PB x 2 min  Tr Ab w/ RSB x 15- 3 sec hold  Pelvic Tilts x 15  Ball Squeezes x 1 min (increased LBP)  Supine Knee Fallouts x 15  DKC w/ PB x 1 min 30 sec  LTR w PB x 1 min 30 sec    manual therapy techniques: Soft tissue Mobilization were applied to the: right piriformis for 10 minutes      neuromuscular re-education activities to improve:  for  minutes. The following activities were included:      therapeutic activities to improve functional performance for   minutes, including:      gait training to improve functional " "mobility and safety for   minutes, including:      direct contact modalities after being cleared for contraindications:     supervised modalities after being cleared for contradictions:     hot pack for  minutes to .    cold pack for  minutes to .    Patient Education and Home Exercises       Education provided:       Written Home Exercises Provided: yes. Exercises were reviewed and Elicia was able to demonstrate them prior to the end of the session.  Elicia demonstrated good  understanding of the education provided. See EMR under Patient Instructions for exercises provided during therapy sessions    Assessment     Patient did ok with exercises; some increased LBP noted with some exercises.     Elicia Is progressing well towards her goals.   Pt prognosis is Good.     Pt will continue to benefit from skilled outpatient physical therapy to address the deficits listed in the problem list box on initial evaluation, provide pt/family education and to maximize pt's level of independence in the home and community environment.     Pt's spiritual, cultural and educational needs considered and pt agreeable to plan of care and goals.     Anticipated barriers to physical therapy: None    Goals:   Short Term Goals: 4 weeks   1. Report decreased lower back pain  < / =  4/10 "At Worst" to increase tolerance for functional mobility and ADLs.  2. Increase lumbar ROM to 100% in all planes of motion without pain provocation in order to perform ADLs without difficulty.  3. Increase strength to 4-/5 MMT grade grossly  or better throughout to increase tolerance for ADL and work activities.  4. Demonstrate (-) flexibility limitations in BLE to promote ROM without restriction.  5. Report >/= 50% resolution of neural symptoms in bilateral hip/thigh to demonstrate improvement in condition.     Long Term Goals: 8 weeks   1. Report decreased pain < / = 2/10 "At Worst" to increase tolerance for functional mobility and ADLs.  2. Improve trunk " flexion and extension by 1 grade for improved spinal stabilization  3. Increase LE  strength to 4+/5 MMT grade grossly throughout to increase tolerance for ADL and work activities.  4. Pt will report at </= 53% Limitation on FOTO  to demonstrate increase in LE function with every day tasks.   5. Patient to demonstrate independence with comprehensive HEP for self-management    Plan     Continue per POC and progress with strength/mobility as patient tolerates.    Jonathan Favre, PTA

## 2023-06-22 ENCOUNTER — CLINICAL SUPPORT (OUTPATIENT)
Dept: REHABILITATION | Facility: HOSPITAL | Age: 68
End: 2023-06-22
Attending: INTERNAL MEDICINE
Payer: MEDICARE

## 2023-06-22 ENCOUNTER — TELEPHONE (OUTPATIENT)
Dept: HEMATOLOGY/ONCOLOGY | Facility: CLINIC | Age: 68
End: 2023-06-22
Payer: MEDICARE

## 2023-06-22 DIAGNOSIS — M25.551 RIGHT HIP PAIN: Primary | ICD-10-CM

## 2023-06-22 DIAGNOSIS — R53.1 DECREASED STRENGTH: ICD-10-CM

## 2023-06-22 PROCEDURE — 97140 MANUAL THERAPY 1/> REGIONS: CPT | Mod: PN,CQ

## 2023-06-22 NOTE — TELEPHONE ENCOUNTER
----- Message from Meli Wells sent at 6/22/2023 12:28 PM CDT -----  Type:  Patient Returning Call    Who Called:  Patient   Who Left Message for Patient:  Darcie  Does the patient know what this is regarding?:  yes  Best Call Back Number:  [phone]  Additional Information:  Patient returning Darcie call to reschedule her appt..

## 2023-06-22 NOTE — PROGRESS NOTES
"[facility]   Physical Therapy Treatment Note      Name: Pham Licea  Clinic Number: [id number]    Therapy Diagnosis:   Encounter Diagnoses   Name Primary?    Right hip pain Yes    Decreased strength      Physician: Julio Evans MD    Visit Date: 6/22/2023    Physician Orders: PT Eval and Treat   Medical Diagnosis from Referral: M54.50,G89.29 (ICD-10-CM) - Chronic bilateral low back pain without sciatica   Evaluation Date: 6/15/2023  Authorization Period Expiration: 6/15/24  Plan of Care Expiration: 9/13/23  Visit # / Visits authorized: 2 / 12  FOTO Visit #:  1/3    PTA Visit #: 1/5     Time In: 2:02 pm  Time Out: 2:55 pm  Total Billable Time: 53 minutes    Subjective     Pt reports: pain varies with activities.  She was compliant with home exercise program.  Response to previous treatment: sore  Functional change: none    Pain: 3-6/10  Location: right back      Objective      Objective Measures updated at progress report unless specified.     Treatment     Elicia received the treatments listed below:      therapeutic exercises to develop strength, endurance, ROM, and flexibility for 0 minutes including:  Nustep level 1 x 2 min (increased LBP)  Toe Taps on 6" step x 2 min  Heel Cord stretch on wedge 3 x 30 sec  Seated H/S stretch 3 x 30 sec  Seated Piriformis stretch 3 x 30 sec  Seated Lumbar Flex w/ PB x 2 min  Tr Ab w/ RSB x 15- 3 sec hold  Pelvic Tilts x 15  Ball Squeezes x 1 min (increased LBP)  Supine Knee Fallouts x 15  DKC w/ PB x 1 min 30 sec  LTR w PB x 1 min 30 sec    manual therapy techniques: Soft tissue Mobilization were applied to the:   Myofascial Release   Bilateral piriformis R>L   Bilateral psoas L>R   Bilateral QL R>L   Right leg pull x 2 (10deg and 40 deg)    EMT to align pelvis (-)            neuromuscular re-education activities to improve:  for  minutes. The following activities were included:      therapeutic activities to improve functional " "performance for   minutes, including:      gait training to improve functional mobility and safety for   minutes, including:      direct contact modalities after being cleared for contraindications:     supervised modalities after being cleared for contradictions:     hot pack for  minutes to .    cold pack for  minutes to .    Patient Education and Home Exercises       Education provided: myofascial release      Written Home Exercises Provided: yes. Exercises were reviewed and Elicia was able to demonstrate them prior to the end of the session.  Elicia demonstrated good  understanding of the education provided. See EMR under Patient Instructions for exercises provided during therapy sessions    Assessment     Patient responded well to the release and noted better range of motion.  Patient will need blending techniques: right ITB and Multifidus.    Elicia Is progressing well towards her goals.   Pt prognosis is Good.     Pt will continue to benefit from skilled outpatient physical therapy to address the deficits listed in the problem list box on initial evaluation, provide pt/family education and to maximize pt's level of independence in the home and community environment.     Pt's spiritual, cultural and educational needs considered and pt agreeable to plan of care and goals.     Anticipated barriers to physical therapy: None    Goals:   Short Term Goals: 4 weeks   1. Report decreased lower back pain  < / =  4/10 "At Worst" to increase tolerance for functional mobility and ADLs.  2. Increase lumbar ROM to 100% in all planes of motion without pain provocation in order to perform ADLs without difficulty.  3. Increase strength to 4-/5 MMT grade grossly  or better throughout to increase tolerance for ADL and work activities.  4. Demonstrate (-) flexibility limitations in BLE to promote ROM without restriction.  5. Report >/= 50% resolution of neural symptoms in bilateral hip/thigh to demonstrate improvement in " "condition.     Long Term Goals: 8 weeks   1. Report decreased pain < / = 2/10 "At Worst" to increase tolerance for functional mobility and ADLs.  2. Improve trunk flexion and extension by 1 grade for improved spinal stabilization  3. Increase LE  strength to 4+/5 MMT grade grossly throughout to increase tolerance for ADL and work activities.  4. Pt will report at </= 53% Limitation on FOTO  to demonstrate increase in LE function with every day tasks.   5. Patient to demonstrate independence with comprehensive HEP for self-management    Plan     Continue per POC and progress with strength/mobility as patient tolerates.    Malvin Aguilar, DALE        "

## 2023-06-26 ENCOUNTER — CLINICAL SUPPORT (OUTPATIENT)
Dept: REHABILITATION | Facility: HOSPITAL | Age: 68
End: 2023-06-26
Attending: INTERNAL MEDICINE
Payer: MEDICARE

## 2023-06-26 DIAGNOSIS — R53.1 DECREASED STRENGTH: ICD-10-CM

## 2023-06-26 DIAGNOSIS — M25.551 RIGHT HIP PAIN: Primary | ICD-10-CM

## 2023-06-26 PROCEDURE — 97140 MANUAL THERAPY 1/> REGIONS: CPT | Mod: PN

## 2023-06-26 PROCEDURE — 97110 THERAPEUTIC EXERCISES: CPT | Mod: PN

## 2023-06-26 NOTE — PROGRESS NOTES
"  [facility]   Physical Therapy Treatment Note      Name: Pham Licea  Clinic Number: [id number]    Therapy Diagnosis:   Encounter Diagnoses   Name Primary?    Right hip pain Yes    Decreased strength        Physician: Julio Evans MD    Visit Date: 6/26/2023    Physician Orders: PT Eval and Treat   Medical Diagnosis from Referral: M54.50,G89.29 (ICD-10-CM) - Chronic bilateral low back pain without sciatica   Evaluation Date: 6/15/2023  Authorization Period Expiration: 6/15/24  Plan of Care Expiration: 9/13/23  Visit # / Visits authorized: 2 / 12  FOTO Visit #:  1/3    PTA Visit #: 1/5     Time In: 11:06 am  Time Out: 12:05  am  Total Billable Time: 54 minutes    Subjective     Pt reports: pain varies with activities however pain has been a little better since last session. Per X-ray report of hip present OA of right hip. Further Dx of bursitis.   She was compliant with home exercise program.  Response to previous treatment: sore  Functional change: none    Pain: 3 /10  Location: right back <Right Hip    Objective      Objective Measures updated at progress report unless specified.     Treatment     Elicia received the treatments listed below:      therapeutic exercises to develop strength, endurance, ROM, and flexibility for 43 minutes including:  Nustep level 1 x 10  min   Toe Taps on 6" step x 2 min  Heel Cord stretch on wedge 3 x 30 sec  Seated H/S stretch 3 x 30 sec  Seated Piriformis stretch 3 x 30 sec  Prayer stretch x 30 sec   SLR  x 10 R/L  Seated Lumbar Flex w/ PB x 2 min  Tr Ab w/ RSB x 20 - 3 sec hold  Prone on elbows 2 x 30 sec   Pelvic Tilts 2 x 15  Ball Squeezes x 1 min (increased LBP)  Supine Knee Fallouts 2 x 15  DKC w/ PB x 1 min 30 sec  LTR w PB x 1 min 30 sec  Passive IR/ER of R/L hip    manual therapy techniques: Soft tissue Mobilization were applied to the:   Myofascial Release 11 mins   Bilateral piriformis R>L   Bilateral psoas L>R   Bilateral QL " "R>L     EMT to align pelvis (-)    5 min Ice pack Right hip.    neuromuscular re-education activities to improve:  for  minutes. The following activities were included:      therapeutic activities to improve functional performance for   minutes, including    gait training to improve functional mobility and safety for   minutes, including:  direct contact modalities after being cleared for contraindications:   supervised modalities after being cleared for contradictions:   hot pack for  minutes to .  cold pack for  minutes to .    Patient Education and Home Exercises       Education provided: myofascial release      Written Home Exercises Provided: yes. Exercises were reviewed and Elicia was able to demonstrate them prior to the end of the session.  Elicia demonstrated good  understanding of the education provided. See EMR under Patient Instructions for exercises provided during therapy sessions    Assessment     Patient responded well to the release and noted better range of motion and reduced piriformis discomfort. Today's session focused more on flexibility and strengthening of the low back and hips. No adverse response. Will continue to advance pt as tolerated.    Elicia Is progressing well towards her goals.   Pt prognosis is Good.     Pt will continue to benefit from skilled outpatient physical therapy to address the deficits listed in the problem list box on initial evaluation, provide pt/family education and to maximize pt's level of independence in the home and community environment.     Pt's spiritual, cultural and educational needs considered and pt agreeable to plan of care and goals.     Anticipated barriers to physical therapy: None    Goals:   Short Term Goals: 4 weeks   1. Report decreased lower back pain  < / =  4/10 "At Worst" to increase tolerance for functional mobility and ADLs.  2. Increase lumbar ROM to 100% in all planes of motion without pain provocation in order to perform ADLs without " "difficulty.  3. Increase strength to 4-/5 MMT grade grossly  or better throughout to increase tolerance for ADL and work activities.  4. Demonstrate (-) flexibility limitations in BLE to promote ROM without restriction.  5. Report >/= 50% resolution of neural symptoms in bilateral hip/thigh to demonstrate improvement in condition.     Long Term Goals: 8 weeks   1. Report decreased pain < / = 2/10 "At Worst" to increase tolerance for functional mobility and ADLs.  2. Improve trunk flexion and extension by 1 grade for improved spinal stabilization  3. Increase LE  strength to 4+/5 MMT grade grossly throughout to increase tolerance for ADL and work activities.  4. Pt will report at </= 53% Limitation on FOTO  to demonstrate increase in LE function with every day tasks.   5. Patient to demonstrate independence with comprehensive HEP for self-management    Plan     Continue per POC and progress with strength/mobility as patient tolerates.    Samara Velazquez, PT        "

## 2023-06-29 ENCOUNTER — CLINICAL SUPPORT (OUTPATIENT)
Dept: REHABILITATION | Facility: HOSPITAL | Age: 68
End: 2023-06-29
Attending: INTERNAL MEDICINE
Payer: MEDICARE

## 2023-06-29 DIAGNOSIS — R53.1 DECREASED STRENGTH: ICD-10-CM

## 2023-06-29 DIAGNOSIS — M25.551 RIGHT HIP PAIN: Primary | ICD-10-CM

## 2023-06-29 PROCEDURE — 97140 MANUAL THERAPY 1/> REGIONS: CPT | Mod: PN,CQ

## 2023-06-29 NOTE — PROGRESS NOTES
" [facility]   Physical Therapy Treatment Note      Name: Pham Licea  Clinic Number: [id number]    Therapy Diagnosis:   Encounter Diagnoses   Name Primary?    Right hip pain Yes    Decreased strength        Physician: Julio Evans MD    Visit Date: 6/29/2023    Physician Orders: PT Eval and Treat   Medical Diagnosis from Referral: M54.50,G89.29 (ICD-10-CM) - Chronic bilateral low back pain without sciatica   Evaluation Date: 6/15/2023  Authorization Period Expiration: 6/15/24  Plan of Care Expiration: 9/13/23  Visit # / Visits authorized: 4 / 12 + eval  FOTO Visit #:  1/3    PTA Visit #: 1/5     Time In: 2:00 pm  Time Out: 2:53 pm  Total Billable Time: 53 minutes    Subjective     Pt reports: dragging her right leg today.  She is working a [employer] hoping the back and hip won't act up.  She was compliant with home exercise program.  Response to previous treatment: sore  Functional change: none    Pain: 3-4 /10  Location: right back <Right Hip    Objective      Objective Measures updated at progress report unless specified.     Treatment     Elicia received the treatments listed below:      therapeutic exercises to develop strength, endurance, ROM, and flexibility for 0 minutes including:  Nustep level 1 x 10  min   Toe Taps on 6" step x 2 min  Heel Cord stretch on wedge 3 x 30 sec  Seated H/S stretch 3 x 30 sec  Seated Piriformis stretch 3 x 30 sec  Prayer stretch x 30 sec   SLR  x 10 R/L  Seated Lumbar Flex w/ PB x 2 min  Tr Ab w/ RSB x 20 - 3 sec hold  Prone on elbows 2 x 30 sec   Pelvic Tilts 2 x 15  Ball Squeezes x 1 min (increased LBP)  Supine Knee Fallouts 2 x 15  DKC w/ PB x 1 min 30 sec  LTR w PB x 1 min 30 sec  Passive IR/ER of R/L hip    manual therapy techniques: Soft tissue Mobilization were applied to the:   Myofascial Release 53 mins   Bilateral piriformis R>L   Bilateral psoas L>R   Bilateral QL R>L   Multifidus   Right ITB    Trail KT taping of right " "tarsal tunnel-reviewed precautions and patient understood    neuromuscular re-education activities to improve:  for  minutes. The following activities were included:      therapeutic activities to improve functional performance for   minutes, including    gait training to improve functional mobility and safety for   minutes, including:  direct contact modalities after being cleared for contraindications:   supervised modalities after being cleared for contradictions:   hot pack for  minutes to .  cold pack for  minutes to .    Patient Education and Home Exercises       Education provided: myofascial release      Written Home Exercises Provided: yes. Exercises were reviewed and Elicia was able to demonstrate them prior to the end of the session.  Elicia demonstrated good  understanding of the education provided. See EMR under Patient Instructions for exercises provided during therapy sessions    Assessment     Patient responded well to the releases: added right ITB and multifidus. No adverse response. Will continue to advance pt as tolerated.    Elicia Is progressing well towards her goals.   Pt prognosis is Good.     Pt will continue to benefit from skilled outpatient physical therapy to address the deficits listed in the problem list box on initial evaluation, provide pt/family education and to maximize pt's level of independence in the home and community environment.     Pt's spiritual, cultural and educational needs considered and pt agreeable to plan of care and goals.     Anticipated barriers to physical therapy: None    Goals:   Short Term Goals: 4 weeks   1. Report decreased lower back pain  < / =  4/10 "At Worst" to increase tolerance for functional mobility and ADLs.  2. Increase lumbar ROM to 100% in all planes of motion without pain provocation in order to perform ADLs without difficulty.  3. Increase strength to 4-/5 MMT grade grossly  or better throughout to increase tolerance for ADL and work " "activities.  4. Demonstrate (-) flexibility limitations in BLE to promote ROM without restriction.  5. Report >/= 50% resolution of neural symptoms in bilateral hip/thigh to demonstrate improvement in condition.     Long Term Goals: 8 weeks   1. Report decreased pain < / = 2/10 "At Worst" to increase tolerance for functional mobility and ADLs.  2. Improve trunk flexion and extension by 1 grade for improved spinal stabilization  3. Increase LE  strength to 4+/5 MMT grade grossly throughout to increase tolerance for ADL and work activities.  4. Pt will report at </= 53% Limitation on FOTO  to demonstrate increase in LE function with every day tasks.   5. Patient to demonstrate independence with comprehensive HEP for self-management    Plan     Continue per POC and progress with strength/mobility as patient tolerates.    Malvin Aguilar, PTA          "

## 2023-06-30 ENCOUNTER — OFFICE VISIT (OUTPATIENT)
Dept: ORTHOPEDICS | Facility: CLINIC | Age: 68
End: 2023-06-30
Payer: MEDICARE

## 2023-06-30 VITALS — BODY MASS INDEX: 32.78 KG/M2 | HEIGHT: 64 IN | WEIGHT: 192 LBS | RESPIRATION RATE: 18 BRPM

## 2023-06-30 DIAGNOSIS — M70.61 TROCHANTERIC BURSITIS OF RIGHT HIP: Primary | ICD-10-CM

## 2023-06-30 PROCEDURE — 99499 UNLISTED E&M SERVICE: CPT | Mod: S$PBB,,, | Performed by: ORTHOPAEDIC SURGERY

## 2023-06-30 PROCEDURE — 99999 PR PBB SHADOW E&M-EST. PATIENT-LVL I: CPT | Mod: PBBFAC,,, | Performed by: ORTHOPAEDIC SURGERY

## 2023-06-30 PROCEDURE — 99999 PR PBB SHADOW E&M-EST. PATIENT-LVL I: ICD-10-PCS | Mod: PBBFAC,,, | Performed by: ORTHOPAEDIC SURGERY

## 2023-06-30 PROCEDURE — 99211 OFF/OP EST MAY X REQ PHY/QHP: CPT | Mod: PBBFAC,PO | Performed by: ORTHOPAEDIC SURGERY

## 2023-06-30 PROCEDURE — 20610 LARGE JOINT ASPIRATION/INJECTION: R GREATER TROCHANTERIC BURSA: ICD-10-PCS | Mod: S$PBB,RT,, | Performed by: ORTHOPAEDIC SURGERY

## 2023-06-30 PROCEDURE — 20610 DRAIN/INJ JOINT/BURSA W/O US: CPT | Mod: PBBFAC,PO | Performed by: ORTHOPAEDIC SURGERY

## 2023-06-30 PROCEDURE — 99499 NO LOS: ICD-10-PCS | Mod: S$PBB,,, | Performed by: ORTHOPAEDIC SURGERY

## 2023-06-30 RX ORDER — TRIAMCINOLONE ACETONIDE 40 MG/ML
40 INJECTION, SUSPENSION INTRA-ARTICULAR; INTRAMUSCULAR
Status: DISCONTINUED | OUTPATIENT
Start: 2023-06-30 | End: 2023-06-30 | Stop reason: HOSPADM

## 2023-06-30 RX ADMIN — TRIAMCINOLONE ACETONIDE 40 MG: 40 INJECTION, SUSPENSION INTRA-ARTICULAR; INTRAMUSCULAR at 01:06

## 2023-06-30 NOTE — PROCEDURES
Large Joint Aspiration/Injection: R greater trochanteric bursa    Date/Time: 6/30/2023 1:15 PM  Performed by: Jeremy Mendez MD  Authorized by: Jeremy Mendez MD     Consent Done?:  Yes (Verbal)  Indications:  Pain  Timeout: prior to procedure the correct patient, procedure, and site was verified      Local anesthesia used?: Yes    Local anesthetic:  Lidocaine spray and lidocaine 1% without epinephrine  Anesthetic total (ml):  3      Details:  Needle Size:  22 G  Approach:  Lateral  Location:  Hip  Site:  R greater trochanteric bursa  Medications:  40 mg triamcinolone acetonide 40 mg/mL  Patient tolerance:  Patient tolerated the procedure well with no immediate complications

## 2023-06-30 NOTE — PROGRESS NOTES
Patient requesting right trochanteric bursa injection.  Well tolerated.  Continue PT and follow up as needed

## 2023-07-06 ENCOUNTER — CLINICAL SUPPORT (OUTPATIENT)
Dept: REHABILITATION | Facility: HOSPITAL | Age: 68
End: 2023-07-06
Attending: INTERNAL MEDICINE
Payer: MEDICARE

## 2023-07-06 DIAGNOSIS — M25.551 RIGHT HIP PAIN: Primary | ICD-10-CM

## 2023-07-06 DIAGNOSIS — R53.1 DECREASED STRENGTH: ICD-10-CM

## 2023-07-06 PROCEDURE — 97110 THERAPEUTIC EXERCISES: CPT | Mod: PN

## 2023-07-06 PROCEDURE — 97140 MANUAL THERAPY 1/> REGIONS: CPT | Mod: PN

## 2023-07-06 NOTE — PROGRESS NOTES
"  [facility]   Physical Therapy Treatment Note      Name: Pham Licea  Clinic Number: [id number]    Therapy Diagnosis:   Encounter Diagnoses   Name Primary?    Right hip pain Yes    Decreased strength          Physician: Julio Evans MD    Visit Date: 7/6/2023    Physician Orders: PT Eval and Treat   Medical Diagnosis from Referral: M54.50,G89.29 (ICD-10-CM) - Chronic bilateral low back pain without sciatica   Evaluation Date: 6/15/2023  Authorization Period Expiration: 6/15/24  Plan of Care Expiration: 9/13/23  Visit # / Visits authorized: 5 / 12 + eval  FOTO Visit #:  1/3    PTA Visit #: 1/5     Time In: 10:03 pm  Time Out: 11:00 pm  Total Billable Time: 55 minutes    Subjective     Pt reports: pt received right hip injection 5/30/23 in [place] in which pt reports she has not felt any hip pain since. Back pain is intermittent.  She was compliant with home exercise program.  Response to previous treatment: sore  Functional change: none    Pain: 0 /10  Location: right back    Objective      Objective Measures updated at progress report unless specified.     Treatment     Elicia received the treatments listed below:      therapeutic exercises to develop strength, endurance, ROM, and flexibility for 45 minutes including:  Scifit level 2 x 15  min   Supine passive Hamstring stretch Right x 15  Toe Taps on 6" step x 2 min  Heel Cord stretch on wedge 3 x 30 sec  Seated H/S stretch 3 x 30 sec  Seated Piriformis stretch 3 x 30 sec  Prayer stretch x 30 sec   SLR  x 10 Lonly- (Right increased lateral hip pain)  Seated Lumbar Flex w/ PB x 2 min  Tr Ab w/ RSB x 20 - 3 sec hold  Prone on elbows 2 x 30 sec   Pelvic Tilts x 45   Clams x 20 B   SL hip ABD x 10 B  Ball Squeezes x 1 min (increased LBP)  Supine Knee Fallouts 2 x 15  Braced MARCHING x 15  DKC w/ PB x 2 mins  LTR w PB x 2 mins  Passive IR/ER of R/L hip    manual therapy techniques: 10 mins Soft tissue Mobilization were applied " "  IASTM to Right ITB     Myofascial Release 53 mins   Bilateral piriformis R>L   Bilateral psoas L>R   Bilateral QL R>L   Multifidus   Right ITB    Trail KT taping of right tarsal tunnel-reviewed precautions and patient understood    neuromuscular re-education activities to improve:  for  minutes. The following activities were included:      therapeutic activities to improve functional performance for   minutes, including    gait training to improve functional mobility and safety for   minutes, including:  direct contact modalities after being cleared for contraindications:   supervised modalities after being cleared for contradictions:   hot pack for  minutes to .  cold pack for  minutes to .    Patient Education and Home Exercises       Education provided: myofascial release      Written Home Exercises Provided: yes. Exercises were reviewed and Elicia was able to demonstrate them prior to the end of the session.  Elicia demonstrated good  understanding of the education provided. See EMR under Patient Instructions for exercises provided during therapy sessions    Assessment     Patient tolerated tolerated strengthening activities for the back/core and hip well. Continued with stretches as indicated. No adverse response. IASTM/MFR to right ITB reduced discomfort with active mobility of the right hip. Will continue to advance pt as tolerated.    Elicia Is progressing well towards her goals.   Pt prognosis is Good.     Pt will continue to benefit from skilled outpatient physical therapy to address the deficits listed in the problem list box on initial evaluation, provide pt/family education and to maximize pt's level of independence in the home and community environment.     Pt's spiritual, cultural and educational needs considered and pt agreeable to plan of care and goals.     Anticipated barriers to physical therapy: None    Goals:   Short Term Goals: 4 weeks   1. Report decreased lower back pain  < / =  4/10 "At " "Worst" to increase tolerance for functional mobility and ADLs.  2. Increase lumbar ROM to 100% in all planes of motion without pain provocation in order to perform ADLs without difficulty.  3. Increase strength to 4-/5 MMT grade grossly  or better throughout to increase tolerance for ADL and work activities.  4. Demonstrate (-) flexibility limitations in BLE to promote ROM without restriction.  5. Report >/= 50% resolution of neural symptoms in bilateral hip/thigh to demonstrate improvement in condition.     Long Term Goals: 8 weeks   1. Report decreased pain < / = 2/10 "At Worst" to increase tolerance for functional mobility and ADLs.  2. Improve trunk flexion and extension by 1 grade for improved spinal stabilization  3. Increase LE  strength to 4+/5 MMT grade grossly throughout to increase tolerance for ADL and work activities.  4. Pt will report at </= 53% Limitation on FOTO  to demonstrate increase in LE function with every day tasks.   5. Patient to demonstrate independence with comprehensive HEP for self-management    Plan     Continue per POC and progress with strength/mobility as patient tolerates.    Samara Velazquez, PT            "

## 2023-07-13 ENCOUNTER — OFFICE VISIT (OUTPATIENT)
Dept: HEMATOLOGY/ONCOLOGY | Facility: CLINIC | Age: 68
End: 2023-07-13
Payer: MEDICARE

## 2023-07-13 ENCOUNTER — TELEPHONE (OUTPATIENT)
Dept: OTOLARYNGOLOGY | Facility: CLINIC | Age: 68
End: 2023-07-13
Payer: MEDICARE

## 2023-07-13 ENCOUNTER — CLINICAL SUPPORT (OUTPATIENT)
Dept: REHABILITATION | Facility: HOSPITAL | Age: 68
End: 2023-07-13
Attending: INTERNAL MEDICINE
Payer: MEDICARE

## 2023-07-13 DIAGNOSIS — Z87.19 S/P SMALL BOWEL OBSTRUCTION: ICD-10-CM

## 2023-07-13 DIAGNOSIS — G89.29 CHRONIC BILATERAL LOW BACK PAIN WITHOUT SCIATICA: ICD-10-CM

## 2023-07-13 DIAGNOSIS — M25.551 RIGHT HIP PAIN: Primary | ICD-10-CM

## 2023-07-13 DIAGNOSIS — D50.8 IRON DEFICIENCY ANEMIA SECONDARY TO INADEQUATE DIETARY IRON INTAKE: Primary | ICD-10-CM

## 2023-07-13 DIAGNOSIS — M54.50 CHRONIC BILATERAL LOW BACK PAIN WITHOUT SCIATICA: ICD-10-CM

## 2023-07-13 DIAGNOSIS — R53.1 DECREASED STRENGTH: ICD-10-CM

## 2023-07-13 PROCEDURE — 97110 THERAPEUTIC EXERCISES: CPT | Mod: PN,CQ

## 2023-07-13 PROCEDURE — 99214 OFFICE O/P EST MOD 30 MIN: CPT | Mod: 95,,, | Performed by: INTERNAL MEDICINE

## 2023-07-13 PROCEDURE — 99214 PR OFFICE/OUTPT VISIT, EST, LEVL IV, 30-39 MIN: ICD-10-PCS | Mod: 95,,, | Performed by: INTERNAL MEDICINE

## 2023-07-13 NOTE — PROGRESS NOTES
Subjective:    The patient location is: home  Visit type: Virtual visit with synchronous audio and video  Face-to-face or time spent with patient on the encounter:20min  Total time spent on and for  this encounter which includes non face-to-face time preparing to see patient, review of tests, obtaining and or reviewing separately obtained records documenting clinical information in the electronic or other health records, independently interpreting results which is not separately reported ,and communicating results to the patient/family/caregiver and in care coordination and treatment planning/communicating with pharmacy for prescriptions/addressing social needs/arranging follow-up and or referrals :25 min    Each patient I provide medical services by telemedicine is:  (1) informed of the relationship between the physician and patient and the respective role of any other health care provider with respect to management of the patient; and (2) notified that he or she may decline to receive medical services by telemedicine and may withdraw from such care at any time.  This is a video visit therefore some elements of the physical exam such as vital signs, heart sounds are breath sounds are not included and may be included if found in recent clinic notes of other providers assessing same patient. Any symptoms or signs that were visualized were stated by the patient may be included in this note.     Patient ID: Pham Licea is a 67 y.o. female.    Chief Complaint:      sept 2020 had gall bladder surgery, thern she had a bowel obstruction, no surgery then, had 2 feet of bowels removed after a second bowel obstruction  Esophageal ulcers and dudenal ulcers- with bleeding in 2017  Recd blood in BSL this weekend, pt went to ED because she was shaky and week and couldn't think was found to be anemic in ed   pt readmitted 3/22 had repeat bowel obstruction   Seen at emergency room May 25, 2022 with CT scan of abdomen  which shows partial obstruction patient having significant abdominal pain.  Discomfort belching similar to her prior obstructive symptoms  Past Medical History:   Diagnosis Date    Anemia     Fatty liver 2015    Former smoker 2009    Osteoarthritis 2010    Pulmonary nodules 2019    Repeat CT in 6 mo    Ulcer of abdomen wall 2016     Past Surgical History:   Procedure Laterality Date    APPENDECTOMY  1993     SECTION  , , 1983    x3    COLONOSCOPY  ~2014    [facility]: normal findings per patient report    COLONOSCOPY N/A 2020    Procedure: COLONOSCOPY;  Surgeon: Misael Holm MD;  Location: [facility] ENDO;  Service: General;  Laterality: N/A;    COLONOSCOPY, WITH RETROGRADE BALLOON ENTEROSCOPY, SINGLE OR DOUBLE BALLOON N/A 2023    Procedure: COLONOSCOPY, WITH RETROGRADE BALLOON ENTEROSCOPY, DOUBLE BALLOON;  Surgeon: Collin Mares MD;  Location: [facility] ENDO ([address] [place] [address]);  Service: Endoscopy;  Laterality: N/A;  inst via portal-MS-sutab per pt request-tb-new inst portal    DIAGNOSTIC LAPAROSCOPY N/A 2021    Procedure: LAPAROSCOPY, DIAGNOSTIC;  Surgeon: Misael Holm MD;  Location: [facility] OR;  Service: General;  Laterality: N/A;    EPIDURAL STEROID INJECTION INTO LUMBAR SPINE N/A 10/12/2018    Procedure: Injection-steroid-epidural-lumbar;  Surgeon: Kal Johnson MD;  Location: [facility] OR;  Service: Pain Management;  Laterality: N/A;  L5-S1    EPIDURAL STEROID INJECTION INTO LUMBAR SPINE N/A 2019    Procedure: Injection-steroid-epidural-lumbar L5-S1;  Surgeon: Kal Johnson MD;  Location: [facility] OR;  Service: Pain Management;  Laterality: N/A;    ESOPHAGOGASTRODUODENOSCOPY N/A 2020    Procedure: ESOPHAGOGASTRODUODENOSCOPY (EGD);  Surgeon: Misael Holm MD;  Location: [facility] ENDO;  Service: General;  Laterality: N/A;    FUSION, SPINE, MINIMALLY INVASIVE, USING COMPUTER-ASSISTED NAVIGATION N/A 2022    Procedure: ENTEROSCOPY, DOUBLE BALLOON, ANTEGRADE;  Surgeon: Collin  SWATI Mares MD;  Location: [facility] ENDO (2ND FLR);  Service: Endoscopy;  Laterality: N/A;  22-Instructions via portal-DS    *open to earlier date if available*    HYSTERECTOMY  1993    one ovary conserved    LAPAROSCOPIC CHOLECYSTECTOMY N/A 2020    Procedure: CHOLECYSTECTOMY, LAPAROSCOPIC;  Surgeon: Govind Frey MD;  Location: [facility] OR;  Service: General;  Laterality: N/A;    LAPAROSCOPIC LYSIS OF ADHESIONS N/A 2021    Procedure: LYSIS, ADHESIONS, LAPAROSCOPIC;  Surgeon: Misael Holm MD;  Location: [facility] OR;  Service: General;  Laterality: N/A;    UPPER GASTROINTESTINAL ENDOSCOPY       Family History   Problem Relation Age of Onset    Ovarian cancer Mother     Heart disease Mother     Osteoporosis Mother     Arthritis Mother     Hypertension Father     Stroke Father 50    Brain Hemorrhage Father     Aneurysm Father     Osteoarthritis Sister     Arthritis Sister     Hypertension Sister     Obesity Sister     Breast cancer Neg Hx     Colon cancer Neg Hx     Colon polyps Neg Hx     Crohn's disease Neg Hx     Ulcerative colitis Neg Hx       Social History     Socioeconomic History    Marital status:     Number of children: 4    Highest education level: Some college, no degree   Occupational History    Occupation: sale specialist-    Tobacco Use    Smoking status: Former     Packs/day: 1.00     Years: 40.00     Pack years: 40.00     Types: Cigarettes     Quit date: 2009     Years since quittin.7    Smokeless tobacco: Never    Tobacco comments:     quit    Substance and Sexual Activity    Alcohol use: Not Currently     Comment: Not often - one glass of wine every now and then    Drug use: Not Currently     Types: Marijuana     Comment: in     Sexual activity: Not Currently     Social Determinants of Health     Financial Resource Strain: Low Risk     Difficulty of Paying Living Expenses: Not very hard   Food Insecurity: No Food Insecurity    Worried About Running Out of  Food in the Last Year: Never true    Ran Out of Food in the Last Year: Never true   Transportation Needs: No Transportation Needs    Lack of Transportation (Medical): No    Lack of Transportation (Non-Medical): No   Physical Activity: Inactive    Days of Exercise per Week: 0 days    Minutes of Exercise per Session: 0 min   Stress: Stress Concern Present    Feeling of Stress : To some extent   Social Connections: Unknown    Frequency of Communication with Friends and Family: More than three times a week    Frequency of Social Gatherings with Friends and Family: More than three times a week    Active Member of Clubs or Organizations: Yes    Attends Club or Organization Meetings: More than 4 times per year    Marital Status:    Housing Stability: Low Risk     Unable to Pay for Housing in the Last Year: No    Number of Places Lived in the Last Year: 1    Unstable Housing in the Last Year: No     Review of patient's allergies indicates:  No Known Allergies    Current Outpatient Medications:     alendronate (FOSAMAX) 70 MG tablet, Take 1 tablet (70 mg total) by mouth every 7 days., Disp: 4 tablet, Rfl: 2    CONSTULOSE 10 gram/15 mL solution, TAKE 10 ML BY MOUTH  THREE TIMES DAILY, Disp: 948 mL, Rfl: 0    diclofenac sodium (VOLTAREN) 1 % Gel, Apply 2 g topically 2 (two) times daily as needed., Disp: , Rfl:     ondansetron (ZOFRAN-ODT) 8 MG TbDL, Take 1 tablet (8 mg total) by mouth every 6 (six) hours as needed. (Patient not taking: Reported on 6/16/2023), Disp: 60 tablet, Rfl: 3    pantoprazole (PROTONIX) 20 MG tablet, Take 1 tablet (20 mg total) by mouth once daily., Disp: 90 tablet, Rfl: 3    vitamin D (VITAMIN D3) 1000 units Tab, Take 3,000 Units by mouth once daily., Disp: , Rfl:     Current Facility-Administered Medications:     ondansetron injection 4 mg, 4 mg, Intravenous, 1 time in Clinic/HOD, Misael Holm MD    Facility-Administered Medications Ordered in Other Visits:     sodium chloride 0.9%  100 mL flush bag, , Intravenous, PRN, Mayra Bower MD, Stopped at 04/24/23 1519    sodium chloride 0.9% flush 10 mL, 10 mL, Intravenous, PRN, Mayra Bower MD  Review of Systems   Constitutional:  Positive for malaise/fatigue. Negative for weight loss.        Mostly good appetite lost weight with her sx   HENT:  Negative for hearing loss.    Eyes:  Negative for blurred vision and double vision.   Gastrointestinal:  Positive for constipation.        Takes lactulose twice a day and colace twice a day   Musculoskeletal:  Negative for back pain, myalgias and neck pain.        Leg cramps and shaky   Skin:  Negative for rash.   Neurological:  Negative for dizziness, weakness and headaches.   Endo/Heme/Allergies:  Does not bruise/bleed easily. recent bowel obstruction 3/22 with repeat abdominal pain discomfort emergency room visit last night the outpatient surgical appointment  Physical Exam    Wt Readings from Last 3 Encounters:   06/30/23 87.1 kg (192 lb)   06/16/23 87.1 kg (192 lb)   06/15/23 87.1 kg (192 lb)     Temp Readings from Last 3 Encounters:   06/12/23 98.1 °F (36.7 °C)   06/05/23 97.8 °F (36.6 °C)     BP Readings from Last 3 Encounters:   06/13/23 135/85   06/12/23 (!) 140/79   06/08/23 (!) 159/106     Pulse Readings from Last 3 Encounters:   06/13/23 66   06/12/23 (!) 52   06/08/23 99     Lab Results   Component Value Date    WBC 7.87 06/20/2023    HGB 13.0 06/20/2023    HCT 40.0 06/20/2023    MCV 91 06/20/2023     06/20/2023       BMP  Lab Results   Component Value Date     06/20/2023    K 4.1 06/20/2023     06/20/2023    CO2 19 (L) 06/20/2023    BUN 19 06/20/2023    CREATININE 0.8 06/20/2023    CALCIUM 9.0 06/20/2023    ANIONGAP 12 06/20/2023    ESTGFRAFRICA >60.0 06/27/2022    EGFRNONAA >60.0 06/27/2022     Lab Results   Component Value Date    IRON 121 06/20/2023    TIBC 349 06/20/2023    FERRITIN 320 (H) 06/20/2023       Impression:  CT scan abdomen May 25, 2022     1.  Multiple dilated loops of small bowel are noted that are fluid-filled left upper quadrant with the suggestion of bowel wall thickening near the level of the umbilicus in the midline and extending into the left dilated loops of bowel.  This raises the concern for vascular injury and or bowel infarction/inflammation possibly associated with obstruction.  Surgical consultation is suggested.  Post-contrast oral and intravenous imaging may be of use for confirmation given that this exam is severely hindered.  2. A 2nd region of concern is noted within the right hemipelvis where a dilated loop of small bowel is noted without obvious bowel wall thickening.  This region is nonspecific and could relate to colon, small bowel, ileus, or obstruction.  Further evaluation and clinical correlation is necessary     Patient Active Problem List   Diagnosis    Primary osteoarthritis involving multiple joints    Obesity (BMI 30.0-34.9)    Gastroesophageal reflux disease    Right knee pain    Chronic midline low back pain    Acute tear medial meniscus, right, sequela    Primary osteoarthritis of right knee    Lumbar radiculitis    Trigger middle finger of right hand    Nausea    Abdominal pain    Encounter for postoperative care    Intestinal obstruction    Hypokalemia    Constipation    History of cholecystectomy    Right upper quadrant pain    History of hysterectomy    Rectal bleeding    Hemorrhoids    Mitral valve prolapse    Coronary artery calcification    Anemia    SAMPSON (dyspnea on exertion), noted 2010    History of smoking 25-50 pack years, 46 py, quit 2009    Other emphysema    NSAID long-term use, started 2017    Bandemia    Family history of premature CAD    Cardiovascular event risk, ASCVD 10-year risk 4.8%, 2019    Abdominal obesity    Dyslipidemia, baseline LDL-C 112, HDL-C 49    Iron deficiency anemia secondary to inadequate dietary iron intake    Severe anemia    S/p small bowel obstruction    Arm pain, lateral, left     Right hip pain    Chronic bilateral low back pain without sciatica    Decreased strength        Assessment and Plan   NATHAN: pt had partail bowel removal from opbstructions with recurrneces Bowel obs again 3/22 repeat sx and adhesiolysis, Goes to [facility] for evaluation of small bowel.  Will keep patient on Q 6 weeks the iron checks   recvd infusinal iron with excellent response    May take liquid iron  Orders will be placed for [place]   when neededfor nathan again see me in 6 weeks with cbc,c mp iron ferin     Slight B12 deficiency also continue to monitor most recent test J 9/2022 acceptable  Patient to continue follow-up of her DJD with PCP    Advance Care Planning     Date: 04/20/2023    Power of   I initiated the process of advance care planning today due to virtual nature of visit documents will be made to to upload into our system

## 2023-07-13 NOTE — PROGRESS NOTES
"  [facility]   Physical Therapy Treatment Note      Name: Pham Licea  Clinic Number: [id number]    Therapy Diagnosis:   Encounter Diagnoses   Name Primary?    Right hip pain Yes    Decreased strength     Chronic bilateral low back pain without sciatica          Physician: Julio Evans MD    Visit Date: 7/13/2023    Physician Orders: PT Eval and Treat   Medical Diagnosis from Referral: M54.50,G89.29 (ICD-10-CM) - Chronic bilateral low back pain without sciatica   Evaluation Date: 6/15/2023  Authorization Period Expiration: 6/15/24  Plan of Care Expiration: 9/13/23  Visit # / Visits authorized: 6 / 12 + eval  FOTO Visit #:  1/3    PTA Visit #: 1/5     Time In: 10:00 AM  Time Out: 11:00 AM  Total Billable Time: 55 minutes    Subjective     Pt reports: No new c/o's.  She was compliant with home exercise program.  Response to previous treatment: sore  Functional change: none    Pain: 3-4 /10  Location: right hip    Objective      Objective Measures updated at progress report unless specified.     Treatment     Elicia received the treatments listed below:      therapeutic exercises to develop strength, endurance, ROM, and flexibility for 45 minutes including:  Scifit level 2 x 15  min   Nustep level 2 x 12 min  Supine passive Hamstring stretch Right x 15  Toe Taps on 6" step x 2 min  Heel Cord stretch on wedge 3 x 30 sec  Seated H/S stretch 3 x 30 sec  Seated Piriformis stretch 3 x 30 sec  Prayer stretch x 30 sec   SLR  x 10 L only- (Right increased lateral hip pain)  Seated Lumbar Flex w/ PB x 2 min  Tr Ab w/ RSB x 20 - 3 sec hold  Prone on elbows 2 x 30 sec   Pelvic Tilts x 30  Clams x 20 B   SL hip ABD x 10 B  Ball Squeezes x 1 min (increased LBP)  Supine Knee Fallouts 2 x 15  Braced MARCHING x 15  DKC w/ PB x 2 mins  LTR x 2 mins  Passive IR/ER of R/L hip    manual therapy techniques: 0 mins Soft tissue Mobilization were applied   IASTM to Right ITB     Myofascial Release 53 " "mins   Bilateral piriformis R>L   Bilateral psoas L>R   Bilateral QL R>L   Multifidus   Right ITB    Trail KT taping of right tarsal tunnel-reviewed precautions and patient understood    neuromuscular re-education activities to improve:  for  minutes. The following activities were included:      therapeutic activities to improve functional performance for   minutes, including    gait training to improve functional mobility and safety for   minutes, including:  direct contact modalities after being cleared for contraindications:   supervised modalities after being cleared for contradictions:   hot pack for  minutes to .  cold pack for  minutes to .    Patient Education and Home Exercises       Education provided: myofascial release      Written Home Exercises Provided: yes. Exercises were reviewed and Elicia was able to demonstrate them prior to the end of the session.  Elicia demonstrated good  understanding of the education provided. See EMR under Patient Instructions for exercises provided during therapy sessions    Assessment     Patient tolerated tolerated strengthening activities for the back/core and hip well. Continued with stretches as indicated. No adverse response. Will continue to advance pt as tolerated.    Elicia Is progressing well towards her goals.   Pt prognosis is Good.     Pt will continue to benefit from skilled outpatient physical therapy to address the deficits listed in the problem list box on initial evaluation, provide pt/family education and to maximize pt's level of independence in the home and community environment.     Pt's spiritual, cultural and educational needs considered and pt agreeable to plan of care and goals.     Anticipated barriers to physical therapy: None    Goals:   Short Term Goals: 4 weeks   1. Report decreased lower back pain  < / =  4/10 "At Worst" to increase tolerance for functional mobility and ADLs.  2. Increase lumbar ROM to 100% in all planes of motion without pain " "provocation in order to perform ADLs without difficulty.  3. Increase strength to 4-/5 MMT grade grossly  or better throughout to increase tolerance for ADL and work activities.  4. Demonstrate (-) flexibility limitations in BLE to promote ROM without restriction.  5. Report >/= 50% resolution of neural symptoms in bilateral hip/thigh to demonstrate improvement in condition.     Long Term Goals: 8 weeks   1. Report decreased pain < / = 2/10 "At Worst" to increase tolerance for functional mobility and ADLs.  2. Improve trunk flexion and extension by 1 grade for improved spinal stabilization  3. Increase LE  strength to 4+/5 MMT grade grossly throughout to increase tolerance for ADL and work activities.  4. Pt will report at </= 53% Limitation on FOTO  to demonstrate increase in LE function with every day tasks.   5. Patient to demonstrate independence with comprehensive HEP for self-management    Plan     Continue per POC and progress with strength/mobility as patient tolerates.    Jonathan Favre, PTA              "

## 2023-07-14 ENCOUNTER — OFFICE VISIT (OUTPATIENT)
Dept: OTOLARYNGOLOGY | Facility: CLINIC | Age: 68
End: 2023-07-14
Payer: MEDICARE

## 2023-07-14 VITALS — SYSTOLIC BLOOD PRESSURE: 110 MMHG | BODY MASS INDEX: 32.27 KG/M2 | DIASTOLIC BLOOD PRESSURE: 74 MMHG | WEIGHT: 188 LBS

## 2023-07-14 DIAGNOSIS — H91.90 HEARING LOSS, UNSPECIFIED HEARING LOSS TYPE, UNSPECIFIED LATERALITY: ICD-10-CM

## 2023-07-14 PROCEDURE — 99212 OFFICE O/P EST SF 10 MIN: CPT | Mod: S$PBB,,, | Performed by: OTOLARYNGOLOGY

## 2023-07-14 PROCEDURE — 99999 PR PBB SHADOW E&M-EST. PATIENT-LVL III: CPT | Mod: PBBFAC,,, | Performed by: OTOLARYNGOLOGY

## 2023-07-14 PROCEDURE — 99999 PR PBB SHADOW E&M-EST. PATIENT-LVL III: ICD-10-PCS | Mod: PBBFAC,,, | Performed by: OTOLARYNGOLOGY

## 2023-07-14 PROCEDURE — 99213 OFFICE O/P EST LOW 20 MIN: CPT | Mod: PBBFAC,PN | Performed by: OTOLARYNGOLOGY

## 2023-07-14 PROCEDURE — 99212 PR OFFICE/OUTPT VISIT, EST, LEVL II, 10-19 MIN: ICD-10-PCS | Mod: S$PBB,,, | Performed by: OTOLARYNGOLOGY

## 2023-07-14 NOTE — PROGRESS NOTES
This patient, who I have seen with external otitis that we finally improved comes in just to discuss her hearing.  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    Physical exam her ears look fine today as does her nose and throat     We have discussed the need for an audiogram to further evaluate the level of hearing loss that is affecting her ability to communicate we also discussed the end result of the audiogram which would be to consider hearing aids, assuming that the levels of her hearing are appropriate for that consideration.  We discussed over-the-counter hearing aids, hearing aids dispensed by audiologist, and the various costs and potential reimbursement that are available for such products.      We have scheduled her for an audiogram in [place] and I will discuss with her the findings once they are available.

## 2023-07-18 ENCOUNTER — TELEPHONE (OUTPATIENT)
Dept: OTOLARYNGOLOGY | Facility: CLINIC | Age: 68
End: 2023-07-18
Payer: MEDICARE

## 2023-07-18 ENCOUNTER — CLINICAL SUPPORT (OUTPATIENT)
Dept: AUDIOLOGY | Facility: CLINIC | Age: 68
End: 2023-07-18
Payer: MEDICARE

## 2023-07-18 DIAGNOSIS — H90.3 SENSORINEURAL HEARING LOSS, BILATERAL: Primary | ICD-10-CM

## 2023-07-18 PROCEDURE — 92557 COMPREHENSIVE HEARING TEST: CPT | Mod: PBBFAC,PO | Performed by: AUDIOLOGIST

## 2023-07-18 PROCEDURE — 92567 TYMPANOMETRY: CPT | Mod: PBBFAC,PO | Performed by: AUDIOLOGIST

## 2023-07-18 NOTE — Clinical Note
Audiogram was completed today. Results reveal a moderate sensorineural hearing loss for the right ear and  moderate sensorineural hearing loss for the left ear.  Speech Reception Thresholds were  45 dBHL for the right ear and 45 dBHL for the left ear.  Word recognition scores were excellent for the right ear and excellent for the left ear.  Tympanograms were Type A for the right ear and Type A for the left ear.  Recommendations: 1) Follow up with ENT    2) Annual hearing evaluation    3) Hearing aid consult when ready    4) Gave information about [facility]  Thank you, Macarena Deal, [place] CCC-A

## 2023-07-18 NOTE — PROGRESS NOTES
Pham Licea was seen 07/18/2023 for an audiological evaluation. Pt was alone during today's visit. Pertinent complaints today include hearing loss. Pt denies history of loud noise exposure and denies early onset of genetic family history of hearing loss. Otoscopy revealed no cerumen in both ears. The tympanic membrane was visualized AU prior to proceeding with the hearing testing.     Results reveal a moderate sensorineural hearing loss for the right ear and  moderate sensorineural hearing loss for the left ear.    Speech Reception Thresholds were  45 dBHL for the right ear and 45 dBHL for the left ear.    Word recognition scores were excellent for the right ear and excellent for the left ear.   Tympanograms were Type A for the right ear and Type A for the left ear.    Recommendations: 1) Follow up with ENT     2) Annual hearing evaluation     3) Hearing aid consult when ready     4) Gave information about [facility]    Audiogram results were reviewed in detail with patient and all questions were answered. Results will be reviewed by the referring provider at the completion of this note. All complaints were addressed during this visit to the patient's satisfaction.

## 2023-07-18 NOTE — TELEPHONE ENCOUNTER
----- Message from Grace Melgoza sent at 7/18/2023  9:36 AM CDT -----  Regarding: Wrong Fax #  Type:  Needs Medical Advice    Who Called: Pt      Would the patient rather a call back or a response via [employer]? Callback    Best Call Back Number: [phone]    Additional Information:Pt sts the fax was sent to the wrong fax No. Correct fax # is [phone] Please advise --------------------- Thank you

## 2023-07-20 ENCOUNTER — CLINICAL SUPPORT (OUTPATIENT)
Dept: REHABILITATION | Facility: HOSPITAL | Age: 68
End: 2023-07-20
Attending: INTERNAL MEDICINE
Payer: MEDICARE

## 2023-07-20 DIAGNOSIS — M25.551 RIGHT HIP PAIN: Primary | ICD-10-CM

## 2023-07-20 DIAGNOSIS — R53.1 DECREASED STRENGTH: ICD-10-CM

## 2023-07-20 PROCEDURE — 97110 THERAPEUTIC EXERCISES: CPT | Mod: PN

## 2023-07-20 PROCEDURE — 97140 MANUAL THERAPY 1/> REGIONS: CPT | Mod: PN

## 2023-07-20 NOTE — PROGRESS NOTES
"  [facility]   Physical Therapy Treatment Note      Name: Pham Licea  Clinic Number: [id number]    Therapy Diagnosis:   Encounter Diagnoses   Name Primary?    Right hip pain Yes    Decreased strength          Physician: Julio Evans MD    Visit Date: 7/20/2023    Physician Orders: PT Eval and Treat   Medical Diagnosis from Referral: M54.50,G89.29 (ICD-10-CM) - Chronic bilateral low back pain without sciatica   Evaluation Date: 6/15/2023  Authorization Period Expiration: 6/15/24  Plan of Care Expiration: 9/13/23  Visit # / Visits authorized: 6 / 12 + eval  FOTO Visit #:  1/3    PTA Visit #: 1/5     Time In: 10:00 AM  Time Out: 10:55 AM  Total Billable Time: 55 minutes    Subjective     Pt reports: She went to [employer] 2 days ago and was feeling the hip pretty good. Today it's better but "it still lets me know it's there."  She was compliant with home exercise program.  Response to previous treatment: sore  Functional change: none    Pain: 2 /10  Location: right hip    Objective      Objective Measures updated at progress report unless specified.     Treatment     Elicia received the treatments listed below:      therapeutic exercises to develop strength, endurance, ROM, and flexibility for 45 minutes including:  Nustep level 2 x 12 min  Supine passive Hamstring stretch Right x 15- Not Performed   Toe Taps on 6" step x 2 min- Not Performed    Heel Cord stretch on wedge 3 x 30 sec- Not Performed  Seated H/S stretch 3 x 30 sec  Seated Piriformis stretch 3 x 30 sec  Prayer stretch x 30 sec - Not Performed   SLR  x 15 - small ROM on right  Seated Lumbar Flex w/ PB x 2 min- Not Performed   Tr Ab w/ RSB x 20 - 3 sec hold  Prone on elbows 2 x 30 sec - Not Performed   Pelvic Tilts 3"H x 30  Clams 2 x 15   Open clams x 15  SL hip ABD x 10 B--> small ROM initially, able to get to neutral after manual to posterior gr troch tendons  Supine Knee Fallouts 2 x 15  Braced MARCHING x 15  DKC " "w/ PB x 2 mins- Not Performed   LTR x 2 mins- Not Performed   Passive IR/ER of R/L hip    manual therapy techniques: 10 mins Soft tissue Mobilization were applied   STM to Right iliotibial band, piriformis, glute med, friction massage to tendonous attachments at right gr troch    Myofascial Release 00 mins   Bilateral piriformis R>L   Bilateral psoas L>R   Bilateral QL R>L   Multifidus   Right ITB      neuromuscular re-education activities to improve:  for  minutes. The following activities were included:      therapeutic activities to improve functional performance for   minutes, including    gait training to improve functional mobility and safety for   minutes, including:  direct contact modalities after being cleared for contraindications:   supervised modalities after being cleared for contradictions:   hot pack for  minutes to .  cold pack for  minutes to .    Patient Education and Home Exercises       Education provided: myofascial release, self soft tissue mobilization to gluteal muscles, trigger point release with massage ball, limit IR movements that increase pain      Written Home Exercises Provided: yes. Exercises were reviewed and Elicia was able to demonstrate them prior to the end of the session.  Elicia demonstrated good  understanding of the education provided. See EMR under Patient Instructions for exercises provided during therapy sessions    Assessment     Patient received cortisone injection a few weeks ago that has helped a lot, however she still gets a "catching" in the hip with certain movements. She does have limited and painful IR which did not improve with PROM. She was advised to limit painful IR movements to avoid exacerbating symptoms. She did have improved pain free range in abduction and with SLR following manual treatment. Provided education on self soft tissue mobilization techniques to perform over the weekend and assess these movements at next appointment.     Elicia Is progressing " "well towards her goals.   Pt prognosis is Good.     Pt will continue to benefit from skilled outpatient physical therapy to address the deficits listed in the problem list box on initial evaluation, provide pt/family education and to maximize pt's level of independence in the home and community environment.     Pt's spiritual, cultural and educational needs considered and pt agreeable to plan of care and goals.     Anticipated barriers to physical therapy: None    Goals:   Short Term Goals: 4 weeks   1. Report decreased lower back pain  < / =  4/10 "At Worst" to increase tolerance for functional mobility and ADLs.  2. Increase lumbar ROM to 100% in all planes of motion without pain provocation in order to perform ADLs without difficulty.  3. Increase strength to 4-/5 MMT grade grossly  or better throughout to increase tolerance for ADL and work activities.  4. Demonstrate (-) flexibility limitations in BLE to promote ROM without restriction.  5. Report >/= 50% resolution of neural symptoms in bilateral hip/thigh to demonstrate improvement in condition.     Long Term Goals: 8 weeks   1. Report decreased pain < / = 2/10 "At Worst" to increase tolerance for functional mobility and ADLs.  2. Improve trunk flexion and extension by 1 grade for improved spinal stabilization  3. Increase LE  strength to 4+/5 MMT grade grossly throughout to increase tolerance for ADL and work activities.  4. Pt will report at </= 53% Limitation on FOTO  to demonstrate increase in LE function with every day tasks.   5. Patient to demonstrate independence with comprehensive HEP for self-management    Plan     Continue per POC and progress with strength/mobility as patient tolerates.    Doris Hollis, PT                "

## 2023-07-21 ENCOUNTER — PATIENT MESSAGE (OUTPATIENT)
Dept: FAMILY MEDICINE | Facility: CLINIC | Age: 68
End: 2023-07-21
Payer: MEDICARE

## 2023-07-21 RX ORDER — LACTULOSE 10 G/15ML
SOLUTION ORAL
Qty: 948 ML | Refills: 2 | Status: SHIPPED | OUTPATIENT
Start: 2023-07-21 | End: 2024-02-27 | Stop reason: SDUPTHER

## 2023-08-01 ENCOUNTER — CLINICAL SUPPORT (OUTPATIENT)
Dept: REHABILITATION | Facility: HOSPITAL | Age: 68
End: 2023-08-01
Attending: INTERNAL MEDICINE
Payer: MEDICARE

## 2023-08-01 DIAGNOSIS — M25.551 RIGHT HIP PAIN: Primary | ICD-10-CM

## 2023-08-01 DIAGNOSIS — R53.1 DECREASED STRENGTH: ICD-10-CM

## 2023-08-01 PROCEDURE — 97110 THERAPEUTIC EXERCISES: CPT | Mod: PN

## 2023-08-01 NOTE — PROGRESS NOTES
[facility]   Physical Therapy Treatment Note /Progress Note     Name: Pham Licea  Clinic Number: [id number]    Therapy Diagnosis:   No diagnosis found.        Physician: Julio Evans MD    Visit Date: 8/1/2023    Physician Orders: PT Eval and Treat   Medical Diagnosis from Referral: M54.50,G89.29 (ICD-10-CM) - Chronic bilateral low back pain without sciatica   Evaluation Date: 6/15/2023  Authorization Period Expiration: 6/15/24  Plan of Care Expiration: 9/13/23  Visit # / Visits authorized: 8 / 12 + eval  FOTO Visit #:  1/3  PTA Visit #: 0/5     Time In: 9:15 AM  Time Out: 10:00AM  Total Billable Time: 40  minutes    Subjective     Pt reports: No back pain today. My knee is what has really been bothering me.  She was compliant with home exercise program.  Response to previous treatment: sore  Functional change: none    Pain: 0 /10  Location: right hip    Objective      Objective Measures updated at progress report unless specified.   Gait: Slight antalgic gait; not AD     Lumbar Range of Motion:     % of Normal Range Pain   Flexion 100%    Extension 100%     Left Side Bending 95%     Right Side Bending 100%     Left rotation 80%     Right Rotation 100%        Lower Extremity Strength    Left Right   Knee extension: 5/5 4+/5   Knee flexion: 4+/5 5/5   Hip flexion: 3+/5 4/5   Hip extension:  3+/5 3+/5   Hip abduction: 4-/5 4-/5   Hip adduction: 4-/5 4-/5   Ankle dorsiflexion: 5/5 5/5   Ankle plantarflexion: 5/5 5/5   Upper abdominals 2/5             Trunk extension 3/5        Special Tests:     Repeated Flexion Negative   Repeated Extension Negative         Straight Leg Raise Negative   Slump Positive-Right   Quadrant Negative   Femoral nerve test Negative      Hip  CHERELLE:+ R (Minimal discomfort)  Scour:- R  FADIR:+ R     DTR:performed by Dr. Evans 6/14/23 WNL     Joint Mobility:   Lumbar: CPA restricted,   RPA restricted  LPA restricted     Thoracic: restricted    "  Sacral Evaluation  Sacral thrust :-  Sacral distraction and compression:-  Supine to long sit:         Palpation: significant tenderness to palpation at Bilateral piriformis, lumbar     Sensation: tingling Lateral phalanges 3-5     Flexibility:                90/90 SLR = R minimal restriction, L no restriction              Ely's test: R no restriction, L minimal restriction (greater discomfort)              Zaheer test: R moderate restriction, L minimal restriction     PT Evaluation Completed: Yes  Discussed Plan of Care with patient: Yes         Limitation/Restriction for FOTO Low back Survey     Therapist reviewed FOTO scores for Pham Licea on 6/15/2023.   FOTO documents entered into [employer] - see Media section.     Limitation Score: 59%        Treatment     Elicia received the treatments listed below:      therapeutic exercises to develop strength, endurance, ROM, and flexibility for 45 minutes including:  Nustep level 2 x 10 min  Seated H/S stretch 3 x 30 sec  Seated Piriformis stretch 3 x 30 sec  SLR  x 15 - small ROM on right  Tr Ab w/ RSB x 30 - 3 sec hold  Pelvic Tilts 3"H x 30  Clams 2 x 15   SL hip ABD x 10 B--> small ROM initially, able to get to neutral after manual to posterior gr troch tendons  Supine Knee Fallouts 2 x 15  Braced MARCHING x 15  Passive IR/ER of R/L hip  Bird Dog x 10 (Pillow under knees)  Bridging 2 x 10  DKC w/ PB x 2 mins- Not Performed   LTR x 2 mins- Not Performed   Supine passive Hamstring stretch Right x 15- Not Performed   Toe Taps on 6" step x 2 min- Not Performed    Heel Cord stretch on wedge 3 x 30 sec- Not Performed  Open clams x 15- Not performed  Prayer stretch x 30 sec - Not Performed   Seated Lumbar Flex w/ PB x 2 min- Not Performed   Prone on elbows 2 x 30 sec - Not Performed     manual therapy techniques: 10 mins Soft tissue Mobilization were applied   STM to Right iliotibial band, piriformis, glute med, friction massage to tendonous attachments at right gr " troch    Myofascial Release 00 mins   Bilateral piriformis R>L   Bilateral psoas L>R   Bilateral QL R>L   Multifidus   Right ITB      neuromuscular re-education activities to improve:  for  minutes. The following activities were included:      therapeutic activities to improve functional performance for   minutes, including    gait training to improve functional mobility and safety for   minutes, including:  direct contact modalities after being cleared for contraindications:   supervised modalities after being cleared for contradictions:   hot pack for  minutes to .  cold pack for  minutes to .    Patient Education and Home Exercises       Education provided: myofascial release, self soft tissue mobilization to gluteal muscles, trigger point release with massage ball, limit IR movements that increase pain    Written Home Exercises Provided: yes. Exercises were reviewed and Elicia was able to demonstrate them prior to the end of the session.  Elicia demonstrated good  understanding of the education provided. See EMR under Patient Instructions for exercises provided during therapy sessions    Assessment   Pham is a 67 y.o. female referred to outpatient Physical Therapy with a medical diagnosis of M54.50,G89.29 (ICD-10-CM) - Chronic bilateral low back pain without sciatica . Pt presented with Right lumbosacral and right hip pain, slight decline in trunk ROM (rotation and lateral flexion), decline in LE strength , reduced muscular flexibility- paraspinals,Piriformis,Hamstrings and Quads with muscular imbalances contributing to posterior rotation of the Right ilium. Positive Slump Right LE indicating neural tension of the sciatic nerve, aggravation of Right sciatic nerved SLR at end range past 90 degrees. Positive Scour and anjali consistent with Right hip involvement with notable Hx of OA. Pt also noted lateral hip pain with greater hip ADD.Patient received cortisone injection a few weeks ago that has helped a lot,  "however she still gets a "catching" in the hip with certain movements. She does have limited and painful IR which did improved slightly with PROM. Progress note completed with objective measures obtained. She has demonstrated improved pain free range in abduction and with SLR following manual treatments. Further improvements noted with general trunk AROM and LE/trunk strength. Reduced posterior rotation Right pelvis reducing leg length discrepancy. Minor changes in muscular flexibility.    Elicia Is progressing well towards her goals.   Pt prognosis is Good.     Pt will continue to benefit from skilled outpatient physical therapy to address the deficits listed in the problem list box on initial evaluation, provide pt/family education and to maximize pt's level of independence in the home and community environment.     Pt's spiritual, cultural and educational needs considered and pt agreeable to plan of care and goals.      Anticipated barriers to physical therapy: None    Goals:   Short Term Goals: 4 weeks   1. Report decreased lower back pain  < / =  4/10 "At Worst" to increase tolerance for functional mobility and ADLs.> PROGRESSING   2. Increase lumbar ROM to 100% in all planes of motion without pain provocation in order to perform ADLs without difficulty.> PROGRESSING  3. Increase strength to 4-/5 MMT grade grossly  or better throughout to increase tolerance for ADL and work activities.> PROGRESSING  4. Demonstrate (-) flexibility limitations in BLE to promote ROM without restriction. > Progressing  5. Report >/= 50% resolution of neural symptoms in bilateral hip/thigh to demonstrate improvement in condition.> MET     Long Term Goals: 8 weeks   1. Report decreased pain < / = 2/10 "At Worst" to increase tolerance for functional mobility and ADLs.>PROGRESSING  2. Improve trunk flexion and extension by 1 grade for improved spinal stabilization>PROGRESSING  3. Increase LE  strength to 4+/5 MMT grade grossly " throughout to increase tolerance for ADL and work activities.>PROGRESSING  4. Pt will report at </= 53% Limitation on FOTO  to demonstrate increase in LE function with every day tasks. >PROGRESSING  5. Patient to demonstrate independence with comprehensive HEP for self-management>PROGRESSING    Plan     Continue per POC and progress with strength/mobility as patient tolerates.    Samara Velazquez, PT

## 2023-08-09 ENCOUNTER — TELEPHONE (OUTPATIENT)
Dept: GASTROENTEROLOGY | Facility: CLINIC | Age: 68
End: 2023-08-09
Payer: MEDICARE

## 2023-08-09 NOTE — TELEPHONE ENCOUNTER
Talked to patient about upcoming appointment with [facility], after reviewing f/u Dr Holm, needs patient to follow up. No further issues noted.

## 2023-08-13 DIAGNOSIS — K21.9 GASTROESOPHAGEAL REFLUX DISEASE WITHOUT ESOPHAGITIS: ICD-10-CM

## 2023-08-14 RX ORDER — PANTOPRAZOLE SODIUM 20 MG/1
20 TABLET, DELAYED RELEASE ORAL DAILY
Qty: 90 TABLET | Refills: 0 | Status: SHIPPED | OUTPATIENT
Start: 2023-08-14 | End: 2024-08-13

## 2023-08-15 ENCOUNTER — OFFICE VISIT (OUTPATIENT)
Dept: FAMILY MEDICINE | Facility: CLINIC | Age: 68
End: 2023-08-15
Payer: MEDICARE

## 2023-08-15 VITALS
SYSTOLIC BLOOD PRESSURE: 124 MMHG | OXYGEN SATURATION: 96 % | HEIGHT: 64 IN | WEIGHT: 191 LBS | BODY MASS INDEX: 32.61 KG/M2 | DIASTOLIC BLOOD PRESSURE: 72 MMHG | HEART RATE: 67 BPM

## 2023-08-15 DIAGNOSIS — R05.1 ACUTE COUGH: ICD-10-CM

## 2023-08-15 DIAGNOSIS — J06.9 BACTERIAL URI: Primary | ICD-10-CM

## 2023-08-15 DIAGNOSIS — B96.89 BACTERIAL URI: Primary | ICD-10-CM

## 2023-08-15 LAB
CTP QC/QA: YES
SARS-COV-2 RDRP RESP QL NAA+PROBE: NEGATIVE

## 2023-08-15 PROCEDURE — 87635 SARS-COV-2 COVID-19 AMP PRB: CPT | Mod: PBBFAC | Performed by: FAMILY MEDICINE

## 2023-08-15 PROCEDURE — 99999PBSHW: ICD-10-PCS | Mod: PBBFAC,,,

## 2023-08-15 PROCEDURE — 99214 OFFICE O/P EST MOD 30 MIN: CPT | Mod: S$PBB,,, | Performed by: FAMILY MEDICINE

## 2023-08-15 PROCEDURE — 99999PBSHW: Mod: PBBFAC,,,

## 2023-08-15 PROCEDURE — 99213 OFFICE O/P EST LOW 20 MIN: CPT | Mod: PBBFAC | Performed by: FAMILY MEDICINE

## 2023-08-15 PROCEDURE — 99999 PR PBB SHADOW E&M-EST. PATIENT-LVL III: CPT | Mod: PBBFAC,,, | Performed by: FAMILY MEDICINE

## 2023-08-15 PROCEDURE — 99999 PR PBB SHADOW E&M-EST. PATIENT-LVL III: ICD-10-PCS | Mod: PBBFAC,,, | Performed by: FAMILY MEDICINE

## 2023-08-15 PROCEDURE — 99214 PR OFFICE/OUTPT VISIT, EST, LEVL IV, 30-39 MIN: ICD-10-PCS | Mod: S$PBB,,, | Performed by: FAMILY MEDICINE

## 2023-08-15 RX ORDER — PROMETHAZINE HYDROCHLORIDE AND DEXTROMETHORPHAN HYDROBROMIDE 6.25; 15 MG/5ML; MG/5ML
5 SYRUP ORAL EVERY 4 HOURS PRN
Qty: 118 ML | Refills: 0 | Status: SHIPPED | OUTPATIENT
Start: 2023-08-15 | End: 2023-08-25

## 2023-08-15 RX ORDER — DOXYCYCLINE HYCLATE 100 MG
100 TABLET ORAL 2 TIMES DAILY
Qty: 20 TABLET | Refills: 0 | Status: SHIPPED | OUTPATIENT
Start: 2023-08-15 | End: 2023-08-25

## 2023-08-15 NOTE — PROGRESS NOTES
[facility] - Clinic Note    Subjective      Ms. Licea is a 68 y.o. female who presents to clinic with nasal congestion.     Reports nasal and chest congestion for the past 5 days.   Admits to a productive cough and hoarse voice.   Has been taking OTC robutussin.   Denies fevers  Did a OTC covid test that was negative.   No known sick contacts.    [facility] Pham has a past medical history of Anemia, Fatty liver (), Former smoker (), Osteoarthritis (), Pulmonary nodules (2019), and Ulcer of abdomen wall ().   PSXH Pham has a past surgical history that includes Upper gastrointestinal endoscopy (); Colonoscopy (~); Epidural steroid injection into lumbar spine (N/A, 10/12/2018); Epidural steroid injection into lumbar spine (N/A, 2019);  section (, , ); Hysterectomy (); Appendectomy (); Laparoscopic cholecystectomy (N/A, 2020); Colonoscopy (N/A, 2020); Esophagogastroduodenoscopy (N/A, 2020); Diagnostic laparoscopy (N/A, 2021); Laparoscopic lysis of adhesions (N/A, 2021); FUSION, SPINE, MINIMALLY INVASIVE, USING COMPUTER ASSISTED NAVIGATION (N/A, 2022); and colonoscopy, with retrograde balloon enteroscopy, single or double balloon (N/A, 2023).    Pham's family history includes Aneurysm in her father; Arthritis in her mother and sister; Brain Hemorrhage in her father; Heart disease in her mother; Hypertension in her father and sister; Obesity in her sister; Osteoarthritis in her sister; Osteoporosis in her mother; Ovarian cancer in her mother; Stroke (age of onset: 50) in her father.    Pham reports that she quit smoking about 13 years ago. Her smoking use included cigarettes. She started smoking about 53 years ago. She has a 40.0 pack-year smoking history. She has never used smokeless tobacco. She reports that she does not currently use alcohol. She reports that she does not currently use drugs after having  "used the following drugs: Marijuana.   WIL Nath has No Known Allergies.   ABIMAEL Nath has a current medication list which includes the following prescription(s): constulose, diclofenac sodium, ondansetron, pantoprazole, vitamin d, and prednisone, and the following Facility-Administered Medications: sodium chloride 0.9% 100 mL flush bag and sodium chloride 0.9%.     Review of Systems   Constitutional:  Positive for fatigue. Negative for activity change, appetite change, chills and fever.   HENT:  Positive for congestion, postnasal drip, rhinorrhea, sinus pressure, sore throat and voice change. Negative for ear discharge, ear pain and sinus pain.    Eyes:  Negative for visual disturbance.   Respiratory:  Positive for cough. Negative for chest tightness, shortness of breath and wheezing.    Cardiovascular:  Negative for chest pain, palpitations and leg swelling.   Gastrointestinal:  Negative for abdominal pain, nausea and vomiting.   Skin:  Negative for wound.   Neurological:  Negative for dizziness and headaches.   Psychiatric/Behavioral:  Negative for confusion.      Objective     /72 (BP Location: Right arm, Patient Position: Sitting, BP Method: Medium (Manual))   Pulse 67   Ht 5' 4" (1.626 m)   Wt 86.6 kg (191 lb)   LMP  (LMP Unknown)   SpO2 96%   BMI 32.79 kg/m²     Physical Exam   Constitutional: normal appearance. She appears well-developed, well-nourished and obese.  Non-toxic appearance. No distress. She does not appear ill.   HENT:   Head: Normocephalic and atraumatic.   Right Ear: Tympanic membrane, external ear and ear canal normal.   Left Ear: Tympanic membrane, external ear and ear canal normal.   Nose: Nasal congestion present.   Mouth/Throat: Mucous membranes are dry. No oropharyngeal exudate or posterior oropharyngeal erythema. Oropharynx is clear.   Eyes: Conjunctivae are normal. Right eye exhibits no discharge. Left eye exhibits no discharge. No scleral icterus.   Cardiovascular: " Normal rate, regular rhythm, normal heart sounds and normal pulses. Exam reveals no gallop and no friction rub.   No murmur heard.Pulmonary:      Effort: Pulmonary effort is normal. No respiratory distress.      Breath sounds: Normal breath sounds. No wheezing, rhonchi or rales.     Abdominal: Normal appearance.   Musculoskeletal:      Cervical back: Neck supple.   Lymphadenopathy:     She has no cervical adenopathy.   Neurological: She is alert.   Skin: Skin is warm and dry. Capillary refill takes less than 2 seconds. She is not diaphoretic.   Psychiatric: Her behavior is normal. Mood, judgment and thought content normal.   Vitals reviewed.     Assessment/Plan     Pham was seen today for nasal congestion and head congestion.    Diagnoses and all orders for this visit:    Bacterial URI  -     doxycycline (VIBRA-TABS) 100 MG tablet; Take 1 tablet (100 mg total) by mouth 2 (two) times daily. for 10 days  -     promethazine-dextromethorphan (PROMETHAZINE-DM) 6.25-15 mg/5 mL Syrp; Take 5 mLs by mouth every 4 (four) hours as needed (cough).    Acute cough  -     POCT COVID-19 Rapid Screening          Follow up if symptoms worsen or fail to improve.    Future Appointments   Date Time Provider Department Center   9/8/2023  8:20 AM Donis Corrales MD [facility] CARDIO1 [facility]   10/3/2023 12:00 PM [facility], LABORATORY [facility] LAB [facility]   10/5/2023 10:20 AM Mayra Bower MD [facility] HEM ON O at [facility] CC       Deven Emerson MD  Family Medicine  [facility]

## 2023-08-18 ENCOUNTER — OFFICE VISIT (OUTPATIENT)
Dept: URGENT CARE | Facility: CLINIC | Age: 68
End: 2023-08-18
Payer: MEDICARE

## 2023-08-18 VITALS
HEART RATE: 69 BPM | BODY MASS INDEX: 32.44 KG/M2 | TEMPERATURE: 98 F | WEIGHT: 190 LBS | SYSTOLIC BLOOD PRESSURE: 139 MMHG | HEIGHT: 64 IN | OXYGEN SATURATION: 96 % | RESPIRATION RATE: 19 BRPM | DIASTOLIC BLOOD PRESSURE: 83 MMHG

## 2023-08-18 DIAGNOSIS — B96.89 ACUTE BACTERIAL BRONCHITIS: Primary | ICD-10-CM

## 2023-08-18 DIAGNOSIS — J20.8 ACUTE BACTERIAL BRONCHITIS: Primary | ICD-10-CM

## 2023-08-18 PROCEDURE — 99203 OFFICE O/P NEW LOW 30 MIN: CPT | Mod: S$GLB,,, | Performed by: NURSE PRACTITIONER

## 2023-08-18 PROCEDURE — 99203 PR OFFICE/OUTPT VISIT, NEW, LEVL III, 30-44 MIN: ICD-10-PCS | Mod: S$GLB,,, | Performed by: NURSE PRACTITIONER

## 2023-08-18 RX ORDER — PROMETHAZINE HYDROCHLORIDE AND DEXTROMETHORPHAN HYDROBROMIDE 6.25; 15 MG/5ML; MG/5ML
5 SYRUP ORAL EVERY 4 HOURS PRN
Qty: 120 ML | Refills: 0 | Status: SHIPPED | OUTPATIENT
Start: 2023-08-18 | End: 2023-08-28

## 2023-08-18 RX ORDER — PREDNISONE 10 MG/1
TABLET ORAL
Qty: 8 TABLET | Refills: 0 | Status: SHIPPED | OUTPATIENT
Start: 2023-08-18 | End: 2023-09-08 | Stop reason: ALTCHOICE

## 2023-08-18 NOTE — PROGRESS NOTES
"Subjective:       Patient ID: Pham Licea is a 68 y.o. female.    Vitals:  height is 5' 4" (1.626 m) and weight is 86.2 kg (190 lb). Her oral temperature is 97.9 °F (36.6 °C). Her blood pressure is 139/83 and her pulse is 69. Her respiration is 19 and oxygen saturation is 96%.     Chief Complaint: Nasal Congestion (Started last week with head and chest congestion, hoarseness, non productive cough, denies fever.  She had 2 negative covid tests.States she started doxycycline which she's still on, but states it's not helping.)    This is a 68 y.o. female who presents today with a chief complaint of Started last week with head and chest congestion, hoarseness, non productive cough, denies fever.  She had 2 negative covid tests.States she started doxycycline which she's still on, but states it's not helping. Declines testing at this time.  Patient presents with:  Nasal Congestion: Started last week with head and chest congestion, hoarseness, non productive cough, denies fever.  She had 2 negative covid tests.States she started doxycycline which she's still on, but states it's not helping.         ROS        Objective:      Physical Exam   Constitutional: She is oriented to person, place, and time. She appears well-developed.   HENT:   Head: Normocephalic and atraumatic.   Ears:   Right Ear: External ear normal.   Left Ear: External ear normal.   Nose: Nose normal.   Mouth/Throat: Mucous membranes are normal.   Eyes: Conjunctivae and lids are normal.   Neck: Trachea normal. Neck supple.   Cardiovascular: Normal rate, regular rhythm and normal heart sounds.   Pulmonary/Chest: Effort normal and breath sounds normal. No respiratory distress.   Abdominal: Normal appearance and bowel sounds are normal. She exhibits no distension and no mass. Soft. There is no abdominal tenderness.   Musculoskeletal: Normal range of motion.         General: Normal range of motion.   Neurological: She is alert and oriented to person, " place, and time. She has normal strength.   Skin: Skin is warm, dry, intact, not diaphoretic and not pale.   Psychiatric: Her speech is normal and behavior is normal. Judgment and thought content normal.   Nursing note and vitals reviewed.        Past medical history and current medications reviewed.       Assessment:           1. Acute bacterial bronchitis              Plan:         Acute bacterial bronchitis  -     predniSONE (DELTASONE) 10 MG tablet; Take 2 tabs today, then 1 tab po qd x 6 days  Dispense: 8 tablet; Refill: 0  -     promethazine-dextromethorphan (PROMETHAZINE-DM) 6.25-15 mg/5 mL Syrp; Take 5 mLs by mouth every 4 (four) hours as needed.  Dispense: 120 mL; Refill: 0             Patient Instructions     You must understand that you've received an Urgent Care treatment only and that you may be released before all your medical problems are known or treated. You, the patient, will arrange for follow up care as instructed.  Follow up with your PCP or specialty clinic as directed in the next 1-2 weeks if not improved or as needed.  You can call [phone] to schedule an appointment with the appropriate provider.  If your condition worsens we recommend that you receive another evaluation at the emergency room immediately or contact your primary medical clinics after hours call service to discuss your concerns.  Please return here or go to the Emergency Department for any concerns or worsening of condition.  Please if you smoke please consider quitting. [facility] Smoke cessation hotline number is [phone], available at this number is free counseling and medications to live a healthier life!       If you were prescribed a narcotic or controlled medication, do not drive or operate heavy equipment or machinery while taking these medications.    If you were not prescribed an antibiotic and your not better please return for a recheck. Antibiotic therapy is not always indicated initially.   Please attempt  over the counter medications, give it time and try Echinacea, Zinc and Vitamin C to fight common colds and virus.

## 2023-08-18 NOTE — PATIENT INSTRUCTIONS
You must understand that you've received an Urgent Care treatment only and that you may be released before all your medical problems are known or treated. You, the patient, will arrange for follow up care as instructed.  Follow up with your PCP or specialty clinic as directed in the next 1-2 weeks if not improved or as needed.  You can call [phone] to schedule an appointment with the appropriate provider.  If your condition worsens we recommend that you receive another evaluation at the emergency room immediately or contact your primary medical clinics after hours call service to discuss your concerns.  Please return here or go to the Emergency Department for any concerns or worsening of condition.  Please if you smoke please consider quitting. [facility] Smoke cessation hotline number is [phone], available at this number is free counseling and medications to live a healthier life!       If you were prescribed a narcotic or controlled medication, do not drive or operate heavy equipment or machinery while taking these medications.    If you were not prescribed an antibiotic and your not better please return for a recheck. Antibiotic therapy is not always indicated initially.   Please attempt over the counter medications, give it time and try Echinacea, Zinc and Vitamin C to fight common colds and virus.

## 2023-08-22 ENCOUNTER — LAB VISIT (OUTPATIENT)
Dept: LAB | Facility: HOSPITAL | Age: 68
End: 2023-08-22
Attending: INTERNAL MEDICINE
Payer: MEDICARE

## 2023-08-22 DIAGNOSIS — D50.8 IRON DEFICIENCY ANEMIA SECONDARY TO INADEQUATE DIETARY IRON INTAKE: ICD-10-CM

## 2023-08-22 LAB
ALBUMIN SERPL BCP-MCNC: 4.3 G/DL (ref 3.5–5.2)
ALP SERPL-CCNC: 80 U/L (ref 55–135)
ALT SERPL W/O P-5'-P-CCNC: 15 U/L (ref 10–44)
ANION GAP SERPL CALC-SCNC: 12 MMOL/L (ref 8–16)
AST SERPL-CCNC: 16 U/L (ref 10–40)
BASOPHILS # BLD AUTO: 0.05 K/UL (ref 0–0.2)
BASOPHILS NFR BLD: 0.5 % (ref 0–1.9)
BILIRUB SERPL-MCNC: 0.5 MG/DL (ref 0.1–1)
BUN SERPL-MCNC: 17 MG/DL (ref 8–23)
CALCIUM SERPL-MCNC: 9.8 MG/DL (ref 8.7–10.5)
CHLORIDE SERPL-SCNC: 102 MMOL/L (ref 95–110)
CO2 SERPL-SCNC: 23 MMOL/L (ref 23–29)
CREAT SERPL-MCNC: 0.8 MG/DL (ref 0.5–1.4)
DIFFERENTIAL METHOD: ABNORMAL
EOSINOPHIL # BLD AUTO: 0 K/UL (ref 0–0.5)
EOSINOPHIL NFR BLD: 0.2 % (ref 0–8)
ERYTHROCYTE [DISTWIDTH] IN BLOOD BY AUTOMATED COUNT: 13.9 % (ref 11.5–14.5)
EST. GFR  (NO RACE VARIABLE): >60 ML/MIN/1.73 M^2
FERRITIN SERPL-MCNC: 215 NG/ML (ref 20–300)
GLUCOSE SERPL-MCNC: 105 MG/DL (ref 70–110)
HCT VFR BLD AUTO: 41.3 % (ref 37–48.5)
HGB BLD-MCNC: 14 G/DL (ref 12–16)
IMM GRANULOCYTES # BLD AUTO: 0.07 K/UL (ref 0–0.04)
IMM GRANULOCYTES NFR BLD AUTO: 0.7 % (ref 0–0.5)
IRON SERPL-MCNC: 83 UG/DL (ref 30–160)
LYMPHOCYTES # BLD AUTO: 1.3 K/UL (ref 1–4.8)
LYMPHOCYTES NFR BLD: 12.5 % (ref 18–48)
MCH RBC QN AUTO: 30.4 PG (ref 27–31)
MCHC RBC AUTO-ENTMCNC: 33.9 G/DL (ref 32–36)
MCV RBC AUTO: 90 FL (ref 82–98)
MONOCYTES # BLD AUTO: 0.4 K/UL (ref 0.3–1)
MONOCYTES NFR BLD: 3.6 % (ref 4–15)
NEUTROPHILS # BLD AUTO: 8.6 K/UL (ref 1.8–7.7)
NEUTROPHILS NFR BLD: 82.5 % (ref 38–73)
NRBC BLD-RTO: 0 /100 WBC
PLATELET # BLD AUTO: 295 K/UL (ref 150–450)
PMV BLD AUTO: 9.6 FL (ref 9.2–12.9)
POTASSIUM SERPL-SCNC: 4.1 MMOL/L (ref 3.5–5.1)
PROT SERPL-MCNC: 7.2 G/DL (ref 6–8.4)
RBC # BLD AUTO: 4.6 M/UL (ref 4–5.4)
SATURATED IRON: 20 % (ref 20–50)
SODIUM SERPL-SCNC: 137 MMOL/L (ref 136–145)
TOTAL IRON BINDING CAPACITY: 410 UG/DL (ref 250–450)
TRANSFERRIN SERPL-MCNC: 277 MG/DL (ref 200–375)
WBC # BLD AUTO: 10.44 K/UL (ref 3.9–12.7)

## 2023-08-22 PROCEDURE — 83540 ASSAY OF IRON: CPT | Performed by: INTERNAL MEDICINE

## 2023-08-22 PROCEDURE — 36415 COLL VENOUS BLD VENIPUNCTURE: CPT | Performed by: INTERNAL MEDICINE

## 2023-08-22 PROCEDURE — 82728 ASSAY OF FERRITIN: CPT | Performed by: INTERNAL MEDICINE

## 2023-08-22 PROCEDURE — 84466 ASSAY OF TRANSFERRIN: CPT | Performed by: INTERNAL MEDICINE

## 2023-08-22 PROCEDURE — 80053 COMPREHEN METABOLIC PANEL: CPT | Performed by: INTERNAL MEDICINE

## 2023-08-22 PROCEDURE — 85025 COMPLETE CBC W/AUTO DIFF WBC: CPT | Performed by: INTERNAL MEDICINE

## 2023-08-24 ENCOUNTER — OFFICE VISIT (OUTPATIENT)
Dept: HEMATOLOGY/ONCOLOGY | Facility: CLINIC | Age: 68
End: 2023-08-24
Payer: MEDICARE

## 2023-08-24 DIAGNOSIS — E78.5 DYSLIPIDEMIA: ICD-10-CM

## 2023-08-24 DIAGNOSIS — D50.8 IRON DEFICIENCY ANEMIA SECONDARY TO INADEQUATE DIETARY IRON INTAKE: Primary | ICD-10-CM

## 2023-08-24 DIAGNOSIS — K56.1 INTUSSUSCEPTION OF INTESTINE: ICD-10-CM

## 2023-08-24 DIAGNOSIS — E53.8 B12 DEFICIENCY: ICD-10-CM

## 2023-08-24 DIAGNOSIS — Z87.19 S/P SMALL BOWEL OBSTRUCTION: ICD-10-CM

## 2023-08-24 PROCEDURE — 99214 PR OFFICE/OUTPT VISIT, EST, LEVL IV, 30-39 MIN: ICD-10-PCS | Mod: 95,,, | Performed by: INTERNAL MEDICINE

## 2023-08-24 PROCEDURE — 99214 OFFICE O/P EST MOD 30 MIN: CPT | Mod: 95,,, | Performed by: INTERNAL MEDICINE

## 2023-08-24 NOTE — PROGRESS NOTES
Subjective:    The patient location is: home  Visit type: Virtual visit with synchronous audio and video  Face-to-face or time spent with patient on the encounter:20min  Total time spent on and for  this encounter which includes non face-to-face time preparing to see patient, review of tests, obtaining and or reviewing separately obtained records documenting clinical information in the electronic or other health records, independently interpreting results which is not separately reported ,and communicating results to the patient/family/caregiver and in care coordination and treatment planning/communicating with pharmacy for prescriptions/addressing social needs/arranging follow-up and or referrals :25 min    Each patient I provide medical services by telemedicine is:  (1) informed of the relationship between the physician and patient and the respective role of any other health care provider with respect to management of the patient; and (2) notified that he or she may decline to receive medical services by telemedicine and may withdraw from such care at any time.  This is a video visit therefore some elements of the physical exam such as vital signs, heart sounds are breath sounds are not included and may be included if found in recent clinic notes of other providers assessing same patient. Any symptoms or signs that were visualized were stated by the patient may be included in this note.     Patient ID: Pham Licea is a 68 y.o. female.    Chief Complaint:      sept 2020 had gall bladder surgery, thern she had a bowel obstruction, no surgery then, had 2 feet of bowels removed after a second bowel obstruction  Esophageal ulcers and dudenal ulcers- with bleeding in 2017  Recd blood in [facility] this weekend, pt went to ED because she was shaky and week and couldn't think was found to be anemic in ed   pt readmitted 3/22 had repeat bowel obstruction   Seen at emergency room May 25, 2022 with CT scan of abdomen  which shows partial obstruction patient having significant abdominal pain.  Discomfort belching similar to her prior obstructive symptoms  Past Medical History:   Diagnosis Date    Anemia     Fatty liver 2015    Former smoker 2009    Osteoarthritis 2010    Pulmonary nodules 2019    Repeat CT in 6 mo    Ulcer of abdomen wall 2016     Past Surgical History:   Procedure Laterality Date    APPENDECTOMY  1993     SECTION  , , 1983    x3    COLONOSCOPY  ~2014    [facility]: normal findings per patient report    COLONOSCOPY N/A 2020    Procedure: COLONOSCOPY;  Surgeon: Misael Holm MD;  Location: [facility] ENDO;  Service: General;  Laterality: N/A;    COLONOSCOPY, WITH RETROGRADE BALLOON ENTEROSCOPY, SINGLE OR DOUBLE BALLOON N/A 2023    Procedure: COLONOSCOPY, WITH RETROGRADE BALLOON ENTEROSCOPY, DOUBLE BALLOON;  Surgeon: Collin Mares MD;  Location: [facility] ENDO ([address] [place] [address]);  Service: Endoscopy;  Laterality: N/A;  inst via portal-MS-sutab per pt request-tb-new inst portal    DIAGNOSTIC LAPAROSCOPY N/A 2021    Procedure: LAPAROSCOPY, DIAGNOSTIC;  Surgeon: Misael Holm MD;  Location: [facility] OR;  Service: General;  Laterality: N/A;    EPIDURAL STEROID INJECTION INTO LUMBAR SPINE N/A 10/12/2018    Procedure: Injection-steroid-epidural-lumbar;  Surgeon: Kal Johnson MD;  Location: [facility] OR;  Service: Pain Management;  Laterality: N/A;  L5-S1    EPIDURAL STEROID INJECTION INTO LUMBAR SPINE N/A 2019    Procedure: Injection-steroid-epidural-lumbar L5-S1;  Surgeon: Kal Johnson MD;  Location: [facility] OR;  Service: Pain Management;  Laterality: N/A;    ESOPHAGOGASTRODUODENOSCOPY N/A 2020    Procedure: ESOPHAGOGASTRODUODENOSCOPY (EGD);  Surgeon: Misael Holm MD;  Location: [facility] ENDO;  Service: General;  Laterality: N/A;    FUSION, SPINE, MINIMALLY INVASIVE, USING COMPUTER-ASSISTED NAVIGATION N/A 2022    Procedure: ENTEROSCOPY, DOUBLE BALLOON, ANTEGRADE;  Surgeon: Collin  SWATI Mares MD;  Location: [facility] ENDO (2ND FLR);  Service: Endoscopy;  Laterality: N/A;  22-Instructions via portal-DS    *open to earlier date if available*    HYSTERECTOMY      one ovary conserved    LAPAROSCOPIC CHOLECYSTECTOMY N/A 2020    Procedure: CHOLECYSTECTOMY, LAPAROSCOPIC;  Surgeon: Govind Frey MD;  Location: [facility] OR;  Service: General;  Laterality: N/A;    LAPAROSCOPIC LYSIS OF ADHESIONS N/A 2021    Procedure: LYSIS, ADHESIONS, LAPAROSCOPIC;  Surgeon: Misael Holm MD;  Location: [facility] OR;  Service: General;  Laterality: N/A;    UPPER GASTROINTESTINAL ENDOSCOPY       Family History   Problem Relation Age of Onset    Ovarian cancer Mother     Heart disease Mother     Osteoporosis Mother     Arthritis Mother     Hypertension Father     Stroke Father 50    Brain Hemorrhage Father     Aneurysm Father     Osteoarthritis Sister     Arthritis Sister     Hypertension Sister     Obesity Sister     Breast cancer Neg Hx     Colon cancer Neg Hx     Colon polyps Neg Hx     Crohn's disease Neg Hx     Ulcerative colitis Neg Hx       Social History     Socioeconomic History    Marital status:     Number of children: 4    Highest education level: Some college, no degree   Occupational History    Occupation: sale specialist-    Tobacco Use    Smoking status: Former     Current packs/day: 0.00     Average packs/day: 1 pack/day for 40.0 years (40.0 ttl pk-yrs)     Types: Cigarettes     Start date: 1969     Quit date: 2009     Years since quittin.8    Smokeless tobacco: Never    Tobacco comments:     quit    Substance and Sexual Activity    Alcohol use: Not Currently     Comment: Not often - one glass of wine every now and then    Drug use: Not Currently     Types: Marijuana     Comment: in     Sexual activity: Not Currently     Social Determinants of Health     Financial Resource Strain: Low Risk  (2023)    Overall Financial Resource Strain (CARDIA)      Difficulty of Paying Living Expenses: Not very hard   Food Insecurity: No Food Insecurity (8/24/2023)    Hunger Vital Sign     Worried About Running Out of Food in the Last Year: Never true     Ran Out of Food in the Last Year: Never true   Transportation Needs: No Transportation Needs (8/24/2023)    PRAPARE - Transportation     Lack of Transportation (Medical): No     Lack of Transportation (Non-Medical): No   Physical Activity: Inactive (8/24/2023)    Exercise Vital Sign     Days of Exercise per Week: 0 days     Minutes of Exercise per Session: 0 min   Stress: No Stress Concern Present (8/24/2023)    [employer] [place] [employer] - Occupational Stress Questionnaire     Feeling of Stress : Not at all   Recent Concern: Stress - Stress Concern Present (7/13/2023)    [employer] [place] [employer] - Occupational Stress Questionnaire     Feeling of Stress : To some extent   Social Connections: Unknown (8/24/2023)    Social Connection and Isolation Panel [NHANES]     Frequency of Communication with Friends and Family: More than three times a week     Frequency of Social Gatherings with Friends and Family: Twice a week     Active Member of Clubs or Organizations: Yes     Attends Club or Organization Meetings: More than 4 times per year     Marital Status:    Housing Stability: Low Risk  (8/24/2023)    Housing Stability Vital Sign     Unable to Pay for Housing in the Last Year: No     Number of Places Lived in the Last Year: 1     Unstable Housing in the Last Year: No     Review of patient's allergies indicates:  No Known Allergies    Current Outpatient Medications:     CONSTULOSE 10 gram/15 mL solution, TAKE 10 ML BY MOUTH  THREE TIMES DAILY, Disp: 948 mL, Rfl: 2    diclofenac sodium (VOLTAREN) 1 % Gel, Apply 2 g topically 2 (two) times daily as needed., Disp: , Rfl:     doxycycline (VIBRA-TABS) 100 MG tablet, Take 1 tablet (100 mg total) by mouth 2 (two) times daily. for 10 days, Disp: 20  tablet, Rfl: 0    ondansetron (ZOFRAN-ODT) 8 MG TbDL, Take 1 tablet (8 mg total) by mouth every 6 (six) hours as needed., Disp: 60 tablet, Rfl: 3    pantoprazole (PROTONIX) 20 MG tablet, Take 1 tablet (20 mg total) by mouth once daily., Disp: 90 tablet, Rfl: 0    predniSONE (DELTASONE) 10 MG tablet, Take 2 tabs today, then 1 tab po qd x 6 days, Disp: 8 tablet, Rfl: 0    promethazine-dextromethorphan (PROMETHAZINE-DM) 6.25-15 mg/5 mL Syrp, Take 5 mLs by mouth every 4 (four) hours as needed (cough)., Disp: 118 mL, Rfl: 0    promethazine-dextromethorphan (PROMETHAZINE-DM) 6.25-15 mg/5 mL Syrp, Take 5 mLs by mouth every 4 (four) hours as needed., Disp: 120 mL, Rfl: 0    vitamin D (VITAMIN D3) 1000 units Tab, Take 3,000 Units by mouth once daily., Disp: , Rfl:   No current facility-administered medications for this visit.    Facility-Administered Medications Ordered in Other Visits:     sodium chloride 0.9% 100 mL flush bag, , Intravenous, PRN, Mayra Bower MD, Stopped at 04/24/23 1519    sodium chloride 0.9% flush 10 mL, 10 mL, Intravenous, PRN, Mayra Bower MD  Review of Systems   Constitutional:  Positive for malaise/fatigue. Negative for weight loss.        Mostly good appetite lost weight with her sx   HENT:  Negative for hearing loss.    Eyes:  Negative for blurred vision and double vision.   Gastrointestinal:  Positive for constipation.        Takes lactulose twice a day and colace twice a day   Musculoskeletal:  Negative for back pain, myalgias and neck pain.        Leg cramps and shaky   Skin:  Negative for rash.   Neurological:  Negative for dizziness, weakness and headaches.   Endo/Heme/Allergies:  Does not bruise/bleed easily.   recent bowel obstruction 3/22 with repeat abdominal pain discomfort emergency room visit last night the outpatient surgical appointment  Physical Exam    Wt Readings from Last 3 Encounters:   08/18/23 86.2 kg (190 lb)   08/15/23 86.6 kg (191 lb)   07/14/23 85.3 kg (188 lb)      Temp Readings from Last 3 Encounters:   08/18/23 97.9 °F (36.6 °C) (Oral)   06/12/23 98.1 °F (36.7 °C)   06/05/23 97.8 °F (36.6 °C)     BP Readings from Last 3 Encounters:   08/18/23 139/83   08/15/23 124/72   07/14/23 110/74     Pulse Readings from Last 3 Encounters:   08/18/23 69   08/15/23 67   06/13/23 66     Lab Results   Component Value Date    WBC 10.44 08/22/2023    HGB 14.0 08/22/2023    HCT 41.3 08/22/2023    MCV 90 08/22/2023     08/22/2023       BMP  Lab Results   Component Value Date     08/22/2023    K 4.1 08/22/2023     08/22/2023    CO2 23 08/22/2023    BUN 17 08/22/2023    CREATININE 0.8 08/22/2023    CALCIUM 9.8 08/22/2023    ANIONGAP 12 08/22/2023    ESTGFRAFRICA >60.0 06/27/2022    EGFRNONAA >60.0 06/27/2022     Lab Results   Component Value Date    IRON 83 08/22/2023    TIBC 410 08/22/2023    FERRITIN 215 08/22/2023       Impression:  CT scan abdomen May 25, 2022     1. Multiple dilated loops of small bowel are noted that are fluid-filled left upper quadrant with the suggestion of bowel wall thickening near the level of the umbilicus in the midline and extending into the left dilated loops of bowel.  This raises the concern for vascular injury and or bowel infarction/inflammation possibly associated with obstruction.  Surgical consultation is suggested.  Post-contrast oral and intravenous imaging may be of use for confirmation given that this exam is severely hindered.  2. A 2nd region of concern is noted within the right hemipelvis where a dilated loop of small bowel is noted without obvious bowel wall thickening.  This region is nonspecific and could relate to colon, small bowel, ileus, or obstruction.  Further evaluation and clinical correlation is necessary     Patient Active Problem List   Diagnosis    Primary osteoarthritis involving multiple joints    Obesity (BMI 30.0-34.9)    Gastroesophageal reflux disease    Right knee pain    Chronic midline low back pain     Acute tear medial meniscus, right, sequela    Primary osteoarthritis of right knee    Lumbar radiculitis    Trigger middle finger of right hand    Nausea    Abdominal pain    Encounter for postoperative care    Intestinal obstruction    Hypokalemia    Constipation    History of cholecystectomy    Right upper quadrant pain    History of hysterectomy    Rectal bleeding    Hemorrhoids    Mitral valve prolapse    Coronary artery calcification    Anemia    SAMPSON (dyspnea on exertion), noted 2010    History of smoking 25-50 pack years, 46 py, quit 2009    Other emphysema    NSAID long-term use, started 2017    Bandemia    Family history of premature CAD    Cardiovascular event risk, ASCVD 10-year risk 4.8%, 2019    Abdominal obesity    Dyslipidemia, baseline LDL-C 112, HDL-C 49    Iron deficiency anemia secondary to inadequate dietary iron intake    Severe anemia    S/p small bowel obstruction    Arm pain, lateral, left    Right hip pain    Chronic bilateral low back pain without sciatica    Decreased strength        Assessment and Plan   NATHAN: pt had partail bowel removal from opbstructions with recurrneces Bowel obs again 3/22 repeat sx and adhesiolysis, Goes to [facility] for evaluation of small bowel.  Will keep patient on Q 6 weeks the iron checks   recvd infusinal iron with excellent response    May take liquid iron  Orders will be placed for [place]   when neededfor nathan again see me in 6 weeks with cbc,c mp iron ferin     Slight B12 deficiency also continue to monitor most recent test J 9/2022 acceptable  Patient to continue follow-up of her DJD with PCP    Advance Care Planning     Date: 04/20/2023    Power of   I initiated the process of advance care planning today due to virtual nature of visit documents will be made to to upload into our system

## 2023-08-29 PROBLEM — J43.8 OTHER EMPHYSEMA: Status: RESOLVED | Noted: 2021-03-26 | Resolved: 2023-08-29

## 2023-08-30 ENCOUNTER — TELEPHONE (OUTPATIENT)
Dept: CARDIOLOGY | Facility: CLINIC | Age: 68
End: 2023-08-30
Payer: MEDICARE

## 2023-08-30 NOTE — TELEPHONE ENCOUNTER
Patient called and confirmed the appointment.   ----- Message from Naveen Escalera sent at 8/30/2023 12:00 PM CDT -----  Regarding: Return Call  Contact: patient  Type:  Patient Returning Call    Who Called:patient  Who Left Message for Patient:Genevieve  Does the patient know what this is regarding?:returning office call  Would the patient rather a call back or a response via [facility]? call  Best Call Back Number:[phone]  Additional Information:

## 2023-09-08 ENCOUNTER — OFFICE VISIT (OUTPATIENT)
Dept: CARDIOLOGY | Facility: CLINIC | Age: 68
End: 2023-09-08
Payer: MEDICARE

## 2023-09-08 VITALS
WEIGHT: 198.13 LBS | SYSTOLIC BLOOD PRESSURE: 130 MMHG | OXYGEN SATURATION: 98 % | HEART RATE: 78 BPM | BODY MASS INDEX: 33.83 KG/M2 | DIASTOLIC BLOOD PRESSURE: 75 MMHG | HEIGHT: 64 IN

## 2023-09-08 DIAGNOSIS — R06.09 DOE (DYSPNEA ON EXERTION): ICD-10-CM

## 2023-09-08 DIAGNOSIS — M15.9 PRIMARY OSTEOARTHRITIS INVOLVING MULTIPLE JOINTS: ICD-10-CM

## 2023-09-08 DIAGNOSIS — R53.82 CHRONIC FATIGUE: ICD-10-CM

## 2023-09-08 DIAGNOSIS — Z79.1 NSAID LONG-TERM USE: ICD-10-CM

## 2023-09-08 DIAGNOSIS — G47.19 EXCESSIVE DAYTIME SLEEPINESS: ICD-10-CM

## 2023-09-08 DIAGNOSIS — Z91.89 CARDIOVASCULAR EVENT RISK: Primary | ICD-10-CM

## 2023-09-08 DIAGNOSIS — Z87.891 HISTORY OF SMOKING 25-50 PACK YEARS: ICD-10-CM

## 2023-09-08 DIAGNOSIS — E65 ABDOMINAL OBESITY: ICD-10-CM

## 2023-09-08 DIAGNOSIS — E78.5 DYSLIPIDEMIA: ICD-10-CM

## 2023-09-08 DIAGNOSIS — R07.89 ATYPICAL CHEST PAIN: ICD-10-CM

## 2023-09-08 DIAGNOSIS — D50.8 IRON DEFICIENCY ANEMIA SECONDARY TO INADEQUATE DIETARY IRON INTAKE: ICD-10-CM

## 2023-09-08 PROCEDURE — 99999 PR PBB SHADOW E&M-EST. PATIENT-LVL IV: CPT | Mod: PBBFAC,,, | Performed by: INTERNAL MEDICINE

## 2023-09-08 PROCEDURE — 99214 OFFICE O/P EST MOD 30 MIN: CPT | Mod: PBBFAC | Performed by: INTERNAL MEDICINE

## 2023-09-08 PROCEDURE — 93010 ELECTROCARDIOGRAM REPORT: CPT | Mod: S$PBB,,, | Performed by: INTERNAL MEDICINE

## 2023-09-08 PROCEDURE — 99215 OFFICE O/P EST HI 40 MIN: CPT | Mod: S$PBB,,, | Performed by: INTERNAL MEDICINE

## 2023-09-08 PROCEDURE — 93010 EKG 12-LEAD: ICD-10-PCS | Mod: S$PBB,,, | Performed by: INTERNAL MEDICINE

## 2023-09-08 PROCEDURE — 99999 PR PBB SHADOW E&M-EST. PATIENT-LVL IV: ICD-10-PCS | Mod: PBBFAC,,, | Performed by: INTERNAL MEDICINE

## 2023-09-08 PROCEDURE — 99215 PR OFFICE/OUTPT VISIT, EST, LEVL V, 40-54 MIN: ICD-10-PCS | Mod: S$PBB,,, | Performed by: INTERNAL MEDICINE

## 2023-09-08 PROCEDURE — 93005 ELECTROCARDIOGRAM TRACING: CPT | Mod: PBBFAC | Performed by: INTERNAL MEDICINE

## 2023-09-08 NOTE — PROGRESS NOTES
Subjective:    Patient ID:  Pham Licea is a 68 y.o. female who presents for evaluation of Annual Exam  For coronary calcification on CT, history of MVP, sharp CP and SAMPSON  GI: Dr. SAHRA Blakely  PCP: Deven Emerson MD  Prior cardiologist: Dr. 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    Health literacy: high  Vaccinations: up-to-date, completed COVID, infection 8/2022, no residual   Activities: walking in store, do housework and yard work, no problem, limited by OA of the knees  Nicotine: 46 py, quit 2009  Alcohol: none  Illicit drugs: none  Cardiac symptoms: SAMPSON and sharp left-sided CP  Home BP: 130/70 to 80  Medication compliance: yes, no heart medications, 2-4 220 mg of Aleve daily since 2022  Diet: regular  Caffeine: 2 cpd  Labs:   Lab Results   Component Value Date    TSH 1.937 01/25/2022        Lab Results   Component Value Date    HGBA1C 5.3 04/18/2023       Lab Results   Component Value Date    WBC 10.44 08/22/2023    HGB 14.0 08/22/2023    HCT 41.3 08/22/2023    MCV 90 08/22/2023     08/22/2023       CMP  Sodium   Date Value Ref Range Status   08/22/2023 137 136 - 145 mmol/L Final     Potassium   Date Value Ref Range Status   08/22/2023 4.1 3.5 - 5.1 mmol/L Final     Chloride   Date Value Ref Range Status   08/22/2023 102 95 - 110 mmol/L Final     CO2   Date Value Ref Range Status   08/22/2023 23 23 - 29 mmol/L Final     Glucose   Date Value Ref Range Status   08/22/2023 105 70 - 110 mg/dL Final     BUN   Date Value Ref Range Status   08/22/2023 17 8 - 23 mg/dL Final     Creatinine   Date Value Ref Range Status   08/22/2023 0.8 0.5 - 1.4 mg/dL Final     Calcium   Date Value Ref Range Status   08/22/2023 9.8 8.7 - 10.5 mg/dL Final     Total Protein   Date Value Ref Range Status   08/22/2023 7.2 6.0 - 8.4 g/dL Final     Albumin   Date  Value Ref Range Status   08/22/2023 4.3 3.5 - 5.2 g/dL Final     Total Bilirubin   Date Value Ref Range Status   08/22/2023 0.5 0.1 - 1.0 mg/dL Final     Comment:     For infants and newborns, interpretation of results should be based  on gestational age, weight and in agreement with clinical  observations.    Premature Infant recommended reference ranges:  Up to 24 hours.............<8.0 mg/dL  Up to 48 hours............<12.0 mg/dL  3-5 days..................<15.0 mg/dL  6-29 days.................<15.0 mg/dL       Alkaline Phosphatase   Date Value Ref Range Status   08/22/2023 80 55 - 135 U/L Final     AST   Date Value Ref Range Status   08/22/2023 16 10 - 40 U/L Final     ALT   Date Value Ref Range Status   08/22/2023 15 10 - 44 U/L Final     Anion Gap   Date Value Ref Range Status   08/22/2023 12 8 - 16 mmol/L Final     eGFR if    Date Value Ref Range Status   06/27/2022 >60.0 >60 mL/min/1.73 m^2 Final     eGFR if non    Date Value Ref Range Status   06/27/2022 >60.0 >60 mL/min/1.73 m^2 Final     Comment:     Calculation used to obtain the estimated glomerular filtration  rate (eGFR) is the CKD-EPI equation.        @labrcntip(troponini)@    BNP   Date Value Ref Range Status   08/29/2022 20 0 - 99 pg/mL Final     Comment:     Values of less than 100 pg/ml are consistent with non-CHF populations.   }   Lab Results   Component Value Date    CHOL 206 (H) 03/26/2021    CHOL 180 08/23/2019    CHOL 180 06/22/2016     Lab Results   Component Value Date    HDL 50 03/26/2021    HDL 49 08/23/2019    HDL 45 06/22/2016     Lab Results   Component Value Date    LDLCALC 126.4 03/26/2021    LDLCALC 111.8 08/23/2019    LDLCALC 94.6 06/22/2016     Lab Results   Component Value Date    TRIG 148 03/26/2021    TRIG 96 08/23/2019    TRIG 202 (H) 06/22/2016     Lab Results   Component Value Date    CHOLHDL 24.3 03/26/2021    CHOLHDL 27.2 08/23/2019    CHOLHDL 25.0 06/22/2016     Lab Results   Component  "Value Date    IRON 83 08/22/2023    TRANSFERRIN 277 08/22/2023    TIBC 410 08/22/2023    FESATURATED 20 08/22/2023       Lab Results   Component Value Date    FERRITIN 215 08/22/2023      Last Echo: DSE 4/2021   Last stress test: 4/2021  Cardiovascular angiogram: none  ECG: SA, rate 82, RBBB  Fundoscopic exam: every 2 years, negative for retinopathy    In 3/2021:  WF with lots of GI problem and CT scan showed coronary calcification and small pericardial effusion on outside report. No definite cardiac symptoms, some noted dizziness in the bathroom or bending. Dx with MVP in the late 80s with symptom of fluttering. Low ASCVD 10-year event risk but strong family history with both parents. Mother first MI in her 30s and father with brain aneurysm and MI in his 40s.     Dr. SAHRA Blakely noted "She is referred to the cardiologist.  She has a strong family history of heart disease and has a calcified mitral valve.  She has a pericardial effusion.   Mitral valve prolapse  -     Ambulatory referral/consult to Cardiology"    CT of abdomen - There are coronary artery calcifications.  There is emphysematous lung architecture at the lung bases.     HPI comments: in 9/2023, return with concerns of SAMPSON with walking, progressive over this past year, more difficult to be active. Have gained 27 lbs since 3/2021. Also now with atypical sharp localized left-sided CP below the breast, comes anytime, last up to a minutes. No new lipid results, had low ASCVD 10-year event risk with last data.    DSE 4/2021 - The left ventricle is normal in size with concentric hypertrophy and normal systolic function.  The estimated ejection fraction is 60%.  Normal left ventricular diastolic function.  Small pericardial effusion.  The patient reached the end of the protocol. Attempted high-ramp treadmill, ran for 3:56, about 6 METS, had to stop due to knee pain. Test changed to DSE.  During stress, the following significant arrhythmias were observed: " occasional PVCs.  Normal right ventricular size with normal right ventricular systolic function.  The stress echo portion of this study is negative for myocardial ischemia.  The ECG portion of this study is negative for myocardial ischemia.  The patient's exercise capacity was normal. Achieved 6 METS.    Review of Systems   Constitutional: Positive for malaise/fatigue and weight gain (up 27 lbs since 3/2021). Negative for diaphoresis, fever, night sweats and weight loss.   HENT:  Positive for congestion, hearing loss (bilateral) and tinnitus. Negative for nosebleeds.    Eyes:  Negative for discharge and visual disturbance.   Cardiovascular:  Positive for chest pain and dyspnea on exertion. Negative for claudication, cyanosis, irregular heartbeat, leg swelling, near-syncope, orthopnea, palpitations and paroxysmal nocturnal dyspnea.   Respiratory:  Positive for cough and snoring. Negative for shortness of breath, sleep disturbances due to breathing and wheezing.         [place] score 6 today 14, awaken tired, never tested.   Endocrine: Negative for polydipsia and polyuria.   Hematologic/Lymphatic: Bruises/bleeds easily.   Skin:  Negative for color change, flushing, nail changes, poor wound healing and suspicious lesions.   Musculoskeletal:  Positive for arthritis, back pain, joint pain, muscle cramps and myalgias. Negative for falls, gout, joint swelling, muscle weakness and neck pain.   Gastrointestinal:  Positive for bloating, abdominal pain, constipation, flatus, heartburn and nausea. Negative for diarrhea, hematemesis, melena and vomiting.   Genitourinary:  Positive for nocturia. Negative for dysuria and hematuria.   Neurological:  Negative for disturbances in coordination, excessive daytime sleepiness, focal weakness, headaches, loss of balance, numbness, vertigo and weakness.   Psychiatric/Behavioral:  Negative for depression and substance abuse. The patient does not have insomnia and is not nervous/anxious.   "    Answers submitted by the patient for this visit:  Review of Systems Questionnaire (Submitted on 9/7/2023)  activity change: Yes  unexpected weight change: No  rhinorrhea: No  trouble swallowing: No  chest tightness: No  blood in stool: No  difficulty urinating: No  menstrual problem: No  arthralgias: Yes  confusion: No  dysphoric mood: No       Objective:    Physical Exam  Constitutional:       Appearance: She is well-developed.      Comments: RA O2 sat 98%   HENT:      Head: Normocephalic.   Eyes:      Conjunctiva/sclera: Conjunctivae normal.      Pupils: Pupils are equal, round, and reactive to light.   Neck:      Thyroid: No thyromegaly.      Vascular: No JVD.      Comments: Circumference 11.5"  Cardiovascular:      Rate and Rhythm: Normal rate and regular rhythm.      Pulses: Intact distal pulses.           Carotid pulses are 1+ on the right side and 1+ on the left side.       Radial pulses are 1+ on the right side and 1+ on the left side.        Dorsalis pedis pulses are 1+ on the right side and 1+ on the left side.        Posterior tibial pulses are 1+ on the right side and 1+ on the left side.      Heart sounds: Normal heart sounds. No murmur heard.     No friction rub. No gallop.   Pulmonary:      Effort: Pulmonary effort is normal.      Breath sounds: No rales.      Comments: Diminished breath sounds and prolong expiration.  Chest:      Chest wall: No tenderness.   Abdominal:      General: Bowel sounds are normal.      Palpations: Abdomen is soft.      Tenderness: There is no abdominal tenderness.      Comments: Waist 36" up to 40.5", hip 45"   Musculoskeletal:         General: Normal range of motion.      Cervical back: Normal range of motion and neck supple.   Lymphadenopathy:      Cervical: No cervical adenopathy.   Skin:     General: Skin is warm and dry.      Findings: No rash.   Neurological:      Mental Status: She is alert and oriented to person, place, and time.           Assessment:       1. " "Cardiovascular event risk, ASCVD 10-year risk 4.8%, 2019    2. SAMPSON (dyspnea on exertion), noted 2010    3. History of smoking 25-50 pack years, 46 py, quit 2009    4. Atypical chest pain, onset 2021    5. Primary osteoarthritis involving multiple joints    6. NSAID long-term use, started 2017    7. Iron deficiency anemia secondary to inadequate dietary iron intake    8. Abdominal obesity    9. Dyslipidemia, baseline LDL-C 112, HDL-C 49    10. Chronic fatigue    11. Excessive daytime sleepiness         Plan:       Pham was seen today for annual exam.    Diagnoses and all orders for this visit:    Cardiovascular event risk, ASCVD 10-year risk 4.8%, 2019  -     IN OFFICE EKG 12-LEAD (to Muse)  -     Lipid Panel; Future    SAMPSON (dyspnea on exertion), noted 2010    History of smoking 25-50 pack years, 46 py, quit 2009    Atypical chest pain, onset 2021  -     Lipid Panel; Future    Primary osteoarthritis involving multiple joints    NSAID long-term use, started 2017    Iron deficiency anemia secondary to inadequate dietary iron intake    Abdominal obesity    Dyslipidemia, baseline LDL-C 112, HDL-C 49  -     Lipid Panel; Future    Chronic fatigue    Excessive daytime sleepiness    - All medical issues reviewed, remain relatively low ASCVD event risk. Any testing is optional  - Need good Rx for OA with PT   - Info on ANNMARIE, consider sleep study  - Refer to "Chronic Pain" article in Consumer Report 6/2019 issue.  - Discussed potential problems with NSAID (NonSteroidal Anti Inflammatory Drug), increase risk for hypertension, heart, stomach and kidney problem.   - CV status and all medications reviewed, patient acknowledge good understanding.  - Recommend healthy living: no nicotine, healthy diet and regular exercise aiming for fitness, restorative sleep and weight control  - Need good exercise program, 4 key elements: 1. Aerobic (walking, swimming, dancing, jogging, biking, etc, 2. Muscle strengthening / resistance exercise, " need to do 2-3 times weekly, 3. Stretching daily, good stretch takes a whole  total minute. 4. Balance exercise daily.   - Instruction for Mediterranean diet and heart healthy exercise given.  - Weigh twice weekly, try to lose 1-2 lbs per week. Target weight loss of 5%-10%.  - Highly recommend 30-60 minutes of exercise / activities daily, can have Sunday off, with 2-3 sessions of muscle strengthening weekly. A  would be very helpful.      Total time spend including review of record prior to face-to-face visit is 40 minutes. Greater than 50% of the time was spent in counseling and coordination of care. The above assessment and plan have been discussed at length. Referring provider's note reviewed. Labs and procedure over the last 6 months reviewed. Problem List updated. Asked to bring in all active medications / pills bottles with next visit. Will send note to referring / PCP.

## 2023-09-08 NOTE — PATIENT INSTRUCTIONS
Recommended Mediterranean dietEating Heart-Healthy Food: Using the DASH Plan  Eating for your heart doesnt have to be hard or boring. You just need to know how to make healthier choices. The DASH eating plan has been developed to help you do just that. DASH stands for Dietary Approaches to Stop Hypertension. It is a plan that has been proven to be healthier for your heart and to lower your risk for high blood pressure. It can also help lower your risk for cancer, heart disease, osteoporosis, and diabetes.  Choosing from Each Food Group  Choose foods from each of the food groups below each day. Try to get the recommended number of servings for each food group. The serving numbers are based on a diet of 2,000 calories a day. Talk to your doctor if youre unsure about your calorie needs.  Grains   Servings: 7-8 a day  A serving is:  1 slice bread  1 ounce dry cereal  half a cup cooked rice or pasta  Best choices: Whole grains and any grains high in fiber.  Vegetables   Servings: 4-5 a day  A serving is:  1 cup raw leafy vegetable  Half a cup cooked vegetable  Three-quarter cup vegetable juice  Best choices: Fresh or frozen vegetable prepared without too much added salt or fat.    Fruits   Servings: 4-5 a day  A serving is:  Three-quarter cup fruit juice  1 medium fruit  One-quarter cup dried fruit  One-half cup fresh, frozen, or canned fruit  Best choices: A variety of fresh fruits of different colors. Whole fruits are a much better choice than fruit juices.  Low-fat or Fat Free Dairy   Servings: 2-3 a day  A serving is:  8 ounces milk  1 cup yogurt  One and a half ounces cheese  Best choices: Skim or 1% milk, low-fat or fat free yogurt or buttermilk, and low-fat cheeses.       Meat, Poultry, Fish   Servings: 2 or fewer a day  A serving is:  3 ounces cooked meat, poultry, or fish  Best choices: Lean meats and fish. Trim away visible fat. Broil, roast, or boil instead of frying. Remove skin from poultry before eating.   Nuts, Seeds, Beans   Servings: 4-5 a week  A serving is:  One third cup nuts (or one and a half ounces)  2 tablespoons sunflower seeds  Half a cup cooked beans  Best choices: Dry roasted nuts with no salt added, lentils, kidney beans, garbanzo beans, and whole espinoza beans.    Fats and Oils   Servings: 2 a day  A serving is:  1 teaspoon vegetable oil  1 teaspoon soft margarine  1 tablespoon low-fat mayonnaise  1 teaspoon regular mayonnaise  2 tablespoons light salad dressing  1 tablespoon regular salad dressing  Best choices: Monounsaturated and polyunsaturated fats such as olive, canola, or safflower oil.  Sweets   Servings: 5 a week or fewer  A serving is:  1 tablespoon sugar, maple syrup, or honey  1 tablespoon jam or jelly  1 half-ounce jelly beans (about 15)  8 ounces lemonade  Best choices: Dried fruit can be a satisfying sweet. Choose low-fat sweets when possible. And watch your serving sizes!       Aerobic Exercise for a Healthy Heart  Exercise is a lot more than an energy booster and a stress reliever. It also strengthens your heart muscle, lowers your blood pressure and blood cholesterol, and burns calories.      Remember, some activity is better than none.     Choose an Aerobic Activity  Choose a nonstop activity that makes your heart and lungs work harder than they do when you rest or walk normally. This aerobic exercise can improve the way your heart and other muscles use oxygen. Make it fun by exercising with a friend and choosing an activity you enjoy. Here are some ideas:  Walking  Swimming  Bicycling  Stair climbing  Dancing  Jogging  Exercise Regularly  If you havent been exercising regularly,  get your doctors okay first. Then start slowly.  Here are some tips:  Begin exercising 3 times a week for 5-10 minutes at a time.  When you feel comfortable, add a few minutes each week.  Slowly build up to exercising 3-4 times each week for 20-40 minutes. Aim for a total of 150 or more minutes a  week.  Be sure to carry your nitroglycerin with you when you exercise.  If you get angina when youre exercising, stop what youre doing, take your nitroglycerin, and call your doctor.  © 5369-6062 [employer], [address] [place] [address], [place] [address]. All rights reserved. This information is not intended as a substitute for professional medical care. Always follow your healthcare professional's instructions.    Losing Weight (Cardiovascular)  Excess weight is a major risk factor for heart disease. Losing weight may help keep your arteries open so that your heart can get the oxygen-rich blood it needs. Weight loss can also help lower your blood pressure and reduce your risk for diabetes. All in all, losing weight makes you healthier.          Exercise with a friend. When activity is fun, you're more likely to stick with it.        Calories and Weight Loss  Calories are the fuel your body burns for energy. You get the calories you need from the food you eat. For healthy weight loss, women should eat at least 1,200 calories a day, men at least 1,500.    When you eat more calories than you need, your body stores the extra calories as fat. One pound of fat equals 3,500 calories.    To lose weight, try to burn 500 calories a day more than you eat. To do this, eat 250 calories less each day. Add activity to burn the other 250 calories. Walking 21/2 miles burns about 250 calories.    Eat a variety of healthy foods. Its the best way to make calories count.     Tips for losing weight:  Drink 8 to 10 glasses of water a day.    Dont skip meals. Instead, eat smaller portions.       Brisk Activity Is Best  Brisk activity gets your heart pumping faster. It makes your heart healthier. Its also the best way to burn calories. In fact, your body may keep burning calories for hours after you stop a brisk activity.    Begin by walking 10 minutes most days.    Add more time and speed to your walk. Build up as you feel  able.    Try to walk briskly at least 30 minutes most days. If needed, you can break this into 2 shorter sessions.     Check off the ideas below that you could try to make your day more active:    Take the stairs instead of the elevator.    Park your car farther away and walk.    Ride a bike to work or to the store.    Walk laps around the mall.

## 2023-09-09 ENCOUNTER — LAB VISIT (OUTPATIENT)
Dept: LAB | Facility: HOSPITAL | Age: 68
End: 2023-09-09
Attending: INTERNAL MEDICINE
Payer: MEDICARE

## 2023-09-09 DIAGNOSIS — R07.89 ATYPICAL CHEST PAIN: ICD-10-CM

## 2023-09-09 DIAGNOSIS — Z91.89 CARDIOVASCULAR EVENT RISK: ICD-10-CM

## 2023-09-09 DIAGNOSIS — E78.5 DYSLIPIDEMIA: ICD-10-CM

## 2023-09-09 LAB
CHOLEST SERPL-MCNC: 190 MG/DL (ref 120–199)
CHOLEST/HDLC SERPL: 3.1 {RATIO} (ref 2–5)
HDLC SERPL-MCNC: 61 MG/DL (ref 40–75)
HDLC SERPL: 32.1 % (ref 20–50)
LDLC SERPL CALC-MCNC: 115.4 MG/DL (ref 63–159)
NONHDLC SERPL-MCNC: 129 MG/DL
TRIGL SERPL-MCNC: 68 MG/DL (ref 30–150)

## 2023-09-09 PROCEDURE — 80061 LIPID PANEL: CPT | Performed by: INTERNAL MEDICINE

## 2023-09-09 PROCEDURE — 36415 COLL VENOUS BLD VENIPUNCTURE: CPT | Performed by: INTERNAL MEDICINE

## 2023-09-11 ENCOUNTER — OFFICE VISIT (OUTPATIENT)
Dept: SPINE | Facility: CLINIC | Age: 68
End: 2023-09-11
Payer: MEDICARE

## 2023-09-11 VITALS — WEIGHT: 198.19 LBS | BODY MASS INDEX: 33.84 KG/M2 | HEIGHT: 64 IN

## 2023-09-11 DIAGNOSIS — G89.29 CHRONIC BILATERAL LOW BACK PAIN WITH RIGHT-SIDED SCIATICA: Primary | ICD-10-CM

## 2023-09-11 DIAGNOSIS — M54.41 CHRONIC BILATERAL LOW BACK PAIN WITH RIGHT-SIDED SCIATICA: Primary | ICD-10-CM

## 2023-09-11 PROCEDURE — 99213 OFFICE O/P EST LOW 20 MIN: CPT | Mod: S$GLB,,, | Performed by: PHYSICAL MEDICINE & REHABILITATION

## 2023-09-11 PROCEDURE — 99213 PR OFFICE/OUTPT VISIT, EST, LEVL III, 20-29 MIN: ICD-10-PCS | Mod: S$GLB,,, | Performed by: PHYSICAL MEDICINE & REHABILITATION

## 2023-09-11 RX ORDER — GABAPENTIN 300 MG/1
300 CAPSULE ORAL NIGHTLY
Qty: 30 CAPSULE | Refills: 5 | Status: SHIPPED | OUTPATIENT
Start: 2023-09-11 | End: 2024-03-11

## 2023-09-11 NOTE — PROGRESS NOTES
SUBJECTIVE:    Patient ID: Pham Licea is a 68 y.o. female.    Chief Complaint: Follow-up    She presents for follow-up having completed her course of physical therapy for back and right lateral hip discomfort.  She did find therapy beneficial in terms of strengthening but really did not have any effect on the right-sided leg discomfort.  She complains of ongoing right posterior leg pain from the lumbosacral junction to the right calf.  Pain level is 7/10 and interferes with her quality of life in terms of activities of daily living and work.  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  She has no new or progressive problems          Past Medical History:   Diagnosis Date    Anemia     Fatty liver     Former smoker     Osteoarthritis 2010    Pulmonary nodules 2019    Repeat CT in 6 mo    Ulcer of abdomen wall 2016     Social History     Socioeconomic History    Marital status:     Number of children: 4    Highest education level: Some college, no degree   Occupational History    Occupation: sale specialist-    Tobacco Use    Smoking status: Former     Current packs/day: 0.00     Average packs/day: 1 pack/day for 40.0 years (40.0 ttl pk-yrs)     Types: Cigarettes     Start date: 1969     Quit date: 2009     Years since quittin.8    Smokeless tobacco: Never    Tobacco comments:     quit    Substance and Sexual Activity    Alcohol use: Not Currently     Comment: Not often - one glass of wine every now and then    Drug use: Not Currently     Types: Marijuana     Comment: in     Sexual activity: Not Currently     Social Determinants of Health     Financial Resource Strain: Medium Risk (2023)    Overall Financial Resource Strain (CARDIA)     Difficulty of Paying Living Expenses: Somewhat hard   Food Insecurity: No Food Insecurity (2023)    Hunger Vital Sign     Worried About Running Out of Food in the Last Year: Never true     Ran Out of Food in the Last  Year: Never true   Transportation Needs: No Transportation Needs (2023)    PRAPARE - Transportation     Lack of Transportation (Medical): No     Lack of Transportation (Non-Medical): No   Physical Activity: Inactive (2023)    Exercise Vital Sign     Days of Exercise per Week: 0 days     Minutes of Exercise per Session: 0 min   Stress: No Stress Concern Present (2023)    [place] [employer] - Occupational Stress Questionnaire     Feeling of Stress : Not at all   Recent Concern: Stress - Stress Concern Present (2023)    [place] [employer] - Occupational Stress Questionnaire     Feeling of Stress : To some extent   Social Connections: Unknown (2023)    Social Connection and Isolation Panel [NHANES]     Frequency of Communication with Friends and Family: More than three times a week     Frequency of Social Gatherings with Friends and Family: Once a week     Active Member of Clubs or Organizations: Yes     Attends Club or Organization Meetings: More than 4 times per year     Marital Status:    Housing Stability: Low Risk  (2023)    Housing Stability Vital Sign     Unable to Pay for Housing in the Last Year: No     Number of Places Lived in the Last Year: 1     Unstable Housing in the Last Year: No     Past Surgical History:   Procedure Laterality Date    APPENDECTOMY  1993     SECTION  , , 1983    x3    COLONOSCOPY  ~2014    [facility]: normal findings per patient report    COLONOSCOPY N/A 2020    Procedure: COLONOSCOPY;  Surgeon: Misael Holm MD;  Location: [facility];  Service: General;  Laterality: N/A;    COLONOSCOPY, WITH RETROGRADE BALLOON ENTEROSCOPY, SINGLE OR DOUBLE BALLOON N/A 2023    Procedure: COLONOSCOPY, WITH RETROGRADE BALLOON ENTEROSCOPY, DOUBLE BALLOON;  Surgeon: Collin Mares MD;  Location: [facility] ENDO ([address] [place] [address]);  Service: Endoscopy;  Laterality: N/A;  inst via portal-MS-sutab per pt request-tb-new  inst portal    DIAGNOSTIC LAPAROSCOPY N/A 5/4/2021    Procedure: LAPAROSCOPY, DIAGNOSTIC;  Surgeon: Misael Holm MD;  Location: [facility] OR;  Service: General;  Laterality: N/A;    EPIDURAL STEROID INJECTION INTO LUMBAR SPINE N/A 10/12/2018    Procedure: Injection-steroid-epidural-lumbar;  Surgeon: Kal Johnson MD;  Location: [facility] OR;  Service: Pain Management;  Laterality: N/A;  L5-S1    EPIDURAL STEROID INJECTION INTO LUMBAR SPINE N/A 5/31/2019    Procedure: Injection-steroid-epidural-lumbar L5-S1;  Surgeon: Kal Johnson MD;  Location: [facility] OR;  Service: Pain Management;  Laterality: N/A;    ESOPHAGOGASTRODUODENOSCOPY N/A 11/9/2020    Procedure: ESOPHAGOGASTRODUODENOSCOPY (EGD);  Surgeon: Misael Holm MD;  Location: [facility] ENDO;  Service: General;  Laterality: N/A;    FUSION, SPINE, MINIMALLY INVASIVE, USING COMPUTER-ASSISTED NAVIGATION N/A 12/22/2022    Procedure: ENTEROSCOPY, DOUBLE BALLOON, ANTEGRADE;  Surgeon: Collin Mares MD;  Location: [facility] ENDO (2ND FLR);  Service: Endoscopy;  Laterality: N/A;  11/14/22-Instructions via portal-DS    *open to earlier date if available*    HYSTERECTOMY  1993    one ovary conserved    LAPAROSCOPIC CHOLECYSTECTOMY N/A 9/2/2020    Procedure: CHOLECYSTECTOMY, LAPAROSCOPIC;  Surgeon: Govind Frey MD;  Location: [facility] OR;  Service: General;  Laterality: N/A;    LAPAROSCOPIC LYSIS OF ADHESIONS N/A 5/4/2021    Procedure: LYSIS, ADHESIONS, LAPAROSCOPIC;  Surgeon: Misael Holm MD;  Location: [facility] OR;  Service: General;  Laterality: N/A;    UPPER GASTROINTESTINAL ENDOSCOPY  2016     Family History   Problem Relation Age of Onset    Ovarian cancer Mother     Heart disease Mother     Osteoporosis Mother     Arthritis Mother     Hypertension Father     Stroke Father 50    Brain Hemorrhage Father     Aneurysm Father     Osteoarthritis Sister     Arthritis Sister     Hypertension Sister     Obesity Sister     Breast cancer Neg Hx     Colon cancer Neg Hx     Colon  "polyps Neg Hx     Crohn's disease Neg Hx     Ulcerative colitis Neg Hx      Vitals:    09/11/23 0949   Weight: 89.9 kg (198 lb 3.1 oz)   Height: 5' 4" (1.626 m)       Review of Systems   Constitutional:  Negative for chills, diaphoresis, fatigue, fever and unexpected weight change.   HENT:  Negative for trouble swallowing.    Eyes:  Negative for visual disturbance.   Respiratory:  Negative for shortness of breath.    Cardiovascular:  Negative for chest pain.   Gastrointestinal:  Negative for abdominal pain, constipation, nausea and vomiting.   Genitourinary:  Negative for difficulty urinating.   Musculoskeletal:  Negative for arthralgias, back pain, gait problem, joint swelling, myalgias, neck pain and neck stiffness.   Neurological:  Negative for dizziness, speech difficulty, weakness, light-headedness, numbness and headaches.          Objective:      Physical Exam  Neurological:      Mental Status: She is alert and oriented to person, place, and time.             Assessment:       1. Chronic bilateral low back pain with right-sided sciatica           Plan:     Improved but still not satisfied with her quality of life following physical therapy.  I offered her interlaminar injections at L5-S1 which have worked well for her in the past.  She wishes to defer and start back on gabapentin instead.  She can let me know if she wants to proceed with the injection otherwise follow up here as needed.  We will check on her by phone in about 2 weeks to see how she is coming along on the gabapentin      Chronic bilateral low back pain with right-sided sciatica    Other orders  -     gabapentin (NEURONTIN) 300 MG capsule; Take 1 capsule (300 mg total) by mouth every evening.  Dispense: 30 capsule; Refill: 5          "

## 2023-09-25 ENCOUNTER — TELEPHONE (OUTPATIENT)
Dept: SPINE | Facility: CLINIC | Age: 68
End: 2023-09-25
Payer: MEDICARE

## 2023-09-25 NOTE — TELEPHONE ENCOUNTER
----- Message from Julio Evans MD sent at 9/25/2023  7:30 AM CDT -----  Please check up on her and see how she is doing

## 2023-10-03 ENCOUNTER — LAB VISIT (OUTPATIENT)
Dept: LAB | Facility: HOSPITAL | Age: 68
End: 2023-10-03
Attending: INTERNAL MEDICINE
Payer: MEDICARE

## 2023-10-03 ENCOUNTER — TELEPHONE (OUTPATIENT)
Dept: HEMATOLOGY/ONCOLOGY | Facility: CLINIC | Age: 68
End: 2023-10-03
Payer: MEDICARE

## 2023-10-03 DIAGNOSIS — Z87.19 S/P SMALL BOWEL OBSTRUCTION: ICD-10-CM

## 2023-10-03 DIAGNOSIS — K56.1 INTUSSUSCEPTION OF INTESTINE: ICD-10-CM

## 2023-10-03 DIAGNOSIS — D50.8 IRON DEFICIENCY ANEMIA SECONDARY TO INADEQUATE DIETARY IRON INTAKE: ICD-10-CM

## 2023-10-03 DIAGNOSIS — E53.8 B12 DEFICIENCY: ICD-10-CM

## 2023-10-03 LAB
ALBUMIN SERPL BCP-MCNC: 4.1 G/DL (ref 3.5–5.2)
ALP SERPL-CCNC: 71 U/L (ref 55–135)
ALT SERPL W/O P-5'-P-CCNC: 20 U/L (ref 10–44)
ANION GAP SERPL CALC-SCNC: 13 MMOL/L (ref 8–16)
AST SERPL-CCNC: 23 U/L (ref 10–40)
BASOPHILS # BLD AUTO: 0.06 K/UL (ref 0–0.2)
BASOPHILS NFR BLD: 1 % (ref 0–1.9)
BILIRUB SERPL-MCNC: 0.5 MG/DL (ref 0.1–1)
BUN SERPL-MCNC: 15 MG/DL (ref 8–23)
CALCIUM SERPL-MCNC: 9.3 MG/DL (ref 8.7–10.5)
CHLORIDE SERPL-SCNC: 108 MMOL/L (ref 95–110)
CO2 SERPL-SCNC: 20 MMOL/L (ref 23–29)
CREAT SERPL-MCNC: 0.8 MG/DL (ref 0.5–1.4)
DIFFERENTIAL METHOD: NORMAL
EOSINOPHIL # BLD AUTO: 0.2 K/UL (ref 0–0.5)
EOSINOPHIL NFR BLD: 2.5 % (ref 0–8)
ERYTHROCYTE [DISTWIDTH] IN BLOOD BY AUTOMATED COUNT: 13 % (ref 11.5–14.5)
EST. GFR  (NO RACE VARIABLE): >60 ML/MIN/1.73 M^2
FERRITIN SERPL-MCNC: 159 NG/ML (ref 20–300)
GLUCOSE SERPL-MCNC: 109 MG/DL (ref 70–110)
HCT VFR BLD AUTO: 41.1 % (ref 37–48.5)
HGB BLD-MCNC: 13.4 G/DL (ref 12–16)
IMM GRANULOCYTES # BLD AUTO: 0.01 K/UL (ref 0–0.04)
IMM GRANULOCYTES NFR BLD AUTO: 0.2 % (ref 0–0.5)
IRON SERPL-MCNC: 91 UG/DL (ref 30–160)
LYMPHOCYTES # BLD AUTO: 1.9 K/UL (ref 1–4.8)
LYMPHOCYTES NFR BLD: 31.6 % (ref 18–48)
MCH RBC QN AUTO: 30.5 PG (ref 27–31)
MCHC RBC AUTO-ENTMCNC: 32.6 G/DL (ref 32–36)
MCV RBC AUTO: 94 FL (ref 82–98)
MONOCYTES # BLD AUTO: 0.6 K/UL (ref 0.3–1)
MONOCYTES NFR BLD: 10 % (ref 4–15)
NEUTROPHILS # BLD AUTO: 3.3 K/UL (ref 1.8–7.7)
NEUTROPHILS NFR BLD: 54.7 % (ref 38–73)
NRBC BLD-RTO: 0 /100 WBC
PLATELET # BLD AUTO: 280 K/UL (ref 150–450)
PMV BLD AUTO: 10 FL (ref 9.2–12.9)
POTASSIUM SERPL-SCNC: 4 MMOL/L (ref 3.5–5.1)
PROT SERPL-MCNC: 6.6 G/DL (ref 6–8.4)
RBC # BLD AUTO: 4.39 M/UL (ref 4–5.4)
SATURATED IRON: 24 % (ref 20–50)
SODIUM SERPL-SCNC: 141 MMOL/L (ref 136–145)
TOTAL IRON BINDING CAPACITY: 382 UG/DL (ref 250–450)
TRANSFERRIN SERPL-MCNC: 258 MG/DL (ref 200–375)
WBC # BLD AUTO: 5.98 K/UL (ref 3.9–12.7)

## 2023-10-03 PROCEDURE — 80053 COMPREHEN METABOLIC PANEL: CPT | Performed by: INTERNAL MEDICINE

## 2023-10-03 PROCEDURE — 84466 ASSAY OF TRANSFERRIN: CPT | Performed by: INTERNAL MEDICINE

## 2023-10-03 PROCEDURE — 83540 ASSAY OF IRON: CPT | Performed by: INTERNAL MEDICINE

## 2023-10-03 PROCEDURE — 85025 COMPLETE CBC W/AUTO DIFF WBC: CPT | Performed by: INTERNAL MEDICINE

## 2023-10-03 PROCEDURE — 82728 ASSAY OF FERRITIN: CPT | Performed by: INTERNAL MEDICINE

## 2023-10-03 PROCEDURE — 36415 COLL VENOUS BLD VENIPUNCTURE: CPT | Performed by: INTERNAL MEDICINE

## 2023-10-03 NOTE — TELEPHONE ENCOUNTER
Spoke to pt and notified dr out appt 10/05/23 and will need to reschedule, pt would like in person appt, appt 10/11/23 @ 9:20am w/Dr Bower, appt to upload to portal once override approved.

## 2023-10-04 ENCOUNTER — TELEPHONE (OUTPATIENT)
Dept: HEMATOLOGY/ONCOLOGY | Facility: CLINIC | Age: 68
End: 2023-10-04
Payer: MEDICARE

## 2023-10-04 NOTE — TELEPHONE ENCOUNTER
Spoke to pt and notified appt reschedule date has to be changed to another time due to slot no longer available, new appt w/Dr Bower 10/16/23 @ 9:20a in person, appt will update to portal once override is approved.

## 2023-10-09 ENCOUNTER — PATIENT MESSAGE (OUTPATIENT)
Dept: HEMATOLOGY/ONCOLOGY | Facility: CLINIC | Age: 68
End: 2023-10-09
Payer: MEDICARE

## 2023-10-16 ENCOUNTER — OFFICE VISIT (OUTPATIENT)
Dept: HEMATOLOGY/ONCOLOGY | Facility: CLINIC | Age: 68
End: 2023-10-16
Payer: MEDICARE

## 2023-10-16 VITALS
BODY MASS INDEX: 33.27 KG/M2 | HEIGHT: 64 IN | SYSTOLIC BLOOD PRESSURE: 163 MMHG | RESPIRATION RATE: 12 BRPM | TEMPERATURE: 98 F | HEART RATE: 74 BPM | OXYGEN SATURATION: 97 % | DIASTOLIC BLOOD PRESSURE: 74 MMHG | WEIGHT: 194.88 LBS

## 2023-10-16 DIAGNOSIS — I25.84 CORONARY ARTERY CALCIFICATION: Primary | ICD-10-CM

## 2023-10-16 DIAGNOSIS — I25.10 CORONARY ARTERY CALCIFICATION: Primary | ICD-10-CM

## 2023-10-16 DIAGNOSIS — E78.5 DYSLIPIDEMIA: ICD-10-CM

## 2023-10-16 DIAGNOSIS — D50.8 IRON DEFICIENCY ANEMIA SECONDARY TO INADEQUATE DIETARY IRON INTAKE: ICD-10-CM

## 2023-10-16 DIAGNOSIS — E53.8 B12 DEFICIENCY: ICD-10-CM

## 2023-10-16 PROCEDURE — 99999 PR PBB SHADOW E&M-EST. PATIENT-LVL III: ICD-10-PCS | Mod: PBBFAC,,, | Performed by: INTERNAL MEDICINE

## 2023-10-16 PROCEDURE — 99214 PR OFFICE/OUTPT VISIT, EST, LEVL IV, 30-39 MIN: ICD-10-PCS | Mod: S$PBB,,, | Performed by: INTERNAL MEDICINE

## 2023-10-16 PROCEDURE — 99214 OFFICE O/P EST MOD 30 MIN: CPT | Mod: S$PBB,,, | Performed by: INTERNAL MEDICINE

## 2023-10-16 PROCEDURE — 99213 OFFICE O/P EST LOW 20 MIN: CPT | Mod: PBBFAC,PN | Performed by: INTERNAL MEDICINE

## 2023-10-16 PROCEDURE — 99999 PR PBB SHADOW E&M-EST. PATIENT-LVL III: CPT | Mod: PBBFAC,,, | Performed by: INTERNAL MEDICINE

## 2023-10-16 NOTE — PROGRESS NOTES
Subjective:        Patient ID: Pham Licea is a 68 y.o. female.    Chief Complaint:      2020 had gall bladder surgery, thern she had a bowel obstruction, no surgery then, had 2 feet of bowels removed after a second bowel obstruction  Esophageal ulcers and dudenal ulcers- with bleeding in 2017  Recd blood in [facility] this weekend, pt went to ED because she was shaky and week and couldn't think was found to be anemic in ed   pt readmitted 3/22 had repeat bowel obstruction   Seen at emergency room May 25, 2022 with CT scan of abdomen which shows partial obstruction patient having significant abdominal pain.  Discomfort belching similar to her prior obstructive symptoms  Past Medical History:   Diagnosis Date    Anemia     Fatty liver 2015    Former smoker     Osteoarthritis 2010    Pulmonary nodules 2019    Repeat CT in 6 mo    Ulcer of abdomen wall 2016     Past Surgical History:   Procedure Laterality Date    APPENDECTOMY       SECTION  , , 1983    x3    COLONOSCOPY  ~    [facility]: normal findings per patient report    COLONOSCOPY N/A 2020    Procedure: COLONOSCOPY;  Surgeon: Misael Holm MD;  Location: [facility] ENDO;  Service: General;  Laterality: N/A;    COLONOSCOPY, WITH RETROGRADE BALLOON ENTEROSCOPY, SINGLE OR DOUBLE BALLOON N/A 2023    Procedure: COLONOSCOPY, WITH RETROGRADE BALLOON ENTEROSCOPY, DOUBLE BALLOON;  Surgeon: Collin Mares MD;  Location: [facility] ([address] [place] [address]);  Service: Endoscopy;  Laterality: N/A;  inst via portal-MS-sutab per pt request-tb-new inst portal    DIAGNOSTIC LAPAROSCOPY N/A 2021    Procedure: LAPAROSCOPY, DIAGNOSTIC;  Surgeon: Misael Holm MD;  Location: [facility] OR;  Service: General;  Laterality: N/A;    EPIDURAL STEROID INJECTION INTO LUMBAR SPINE N/A 10/12/2018    Procedure: Injection-steroid-epidural-lumbar;  Surgeon: Kal Johnson MD;  Location: [facility] OR;  Service: Pain Management;  Laterality: N/A;  L5-S1     EPIDURAL STEROID INJECTION INTO LUMBAR SPINE N/A 5/31/2019    Procedure: Injection-steroid-epidural-lumbar L5-S1;  Surgeon: Kal Johnson MD;  Location: [facility] OR;  Service: Pain Management;  Laterality: N/A;    ESOPHAGOGASTRODUODENOSCOPY N/A 11/9/2020    Procedure: ESOPHAGOGASTRODUODENOSCOPY (EGD);  Surgeon: Misael Holm MD;  Location: [facility] ENDO;  Service: General;  Laterality: N/A;    FUSION, SPINE, MINIMALLY INVASIVE, USING COMPUTER-ASSISTED NAVIGATION N/A 12/22/2022    Procedure: ENTEROSCOPY, DOUBLE BALLOON, ANTEGRADE;  Surgeon: Collin Mares MD;  Location: [facility] ENDO (2ND FLR);  Service: Endoscopy;  Laterality: N/A;  11/14/22-Instructions via portal-DS    *open to earlier date if available*    HYSTERECTOMY  1993    one ovary conserved    LAPAROSCOPIC CHOLECYSTECTOMY N/A 9/2/2020    Procedure: CHOLECYSTECTOMY, LAPAROSCOPIC;  Surgeon: Govind Frey MD;  Location: [facility] OR;  Service: General;  Laterality: N/A;    LAPAROSCOPIC LYSIS OF ADHESIONS N/A 5/4/2021    Procedure: LYSIS, ADHESIONS, LAPAROSCOPIC;  Surgeon: Misael Holm MD;  Location: [facility] OR;  Service: General;  Laterality: N/A;    UPPER GASTROINTESTINAL ENDOSCOPY  2016     Family History   Problem Relation Age of Onset    Ovarian cancer Mother     Heart disease Mother     Osteoporosis Mother     Arthritis Mother     Hypertension Father     Stroke Father 50    Brain Hemorrhage Father     Aneurysm Father     Osteoarthritis Sister     Arthritis Sister     Hypertension Sister     Obesity Sister     Breast cancer Neg Hx     Colon cancer Neg Hx     Colon polyps Neg Hx     Crohn's disease Neg Hx     Ulcerative colitis Neg Hx       Social History     Socioeconomic History    Marital status:     Number of children: 4    Highest education level: Some college, no degree   Occupational History    Occupation: sale specialist-    Tobacco Use    Smoking status: Former     Current packs/day: 0.00     Average packs/day: 1 pack/day for 40.0 years  (40.0 ttl pk-yrs)     Types: Cigarettes     Start date: 1969     Quit date: 2009     Years since quittin.9    Smokeless tobacco: Never    Tobacco comments:     quit 2009   Substance and Sexual Activity    Alcohol use: Not Currently     Comment: Not often - one glass of wine every now and then    Drug use: Not Currently     Types: Marijuana     Comment: in 1970s    Sexual activity: Not Currently     Social Determinants of Health     Financial Resource Strain: Medium Risk (2023)    Overall Financial Resource Strain (CARDIA)     Difficulty of Paying Living Expenses: Somewhat hard   Food Insecurity: No Food Insecurity (2023)    Hunger Vital Sign     Worried About Running Out of Food in the Last Year: Never true     Ran Out of Food in the Last Year: Never true   Transportation Needs: No Transportation Needs (2023)    PRAPARE - Transportation     Lack of Transportation (Medical): No     Lack of Transportation (Non-Medical): No   Physical Activity: Inactive (2023)    Exercise Vital Sign     Days of Exercise per Week: 0 days     Minutes of Exercise per Session: 0 min   Stress: No Stress Concern Present (2023)    [employer] [place] [employer] - Occupational Stress Questionnaire     Feeling of Stress : Not at all   Recent Concern: Stress - Stress Concern Present (2023)    [employer] [place] [employer] - Occupational Stress Questionnaire     Feeling of Stress : To some extent   Social Connections: Unknown (2023)    Social Connection and Isolation Panel [NHANES]     Frequency of Communication with Friends and Family: More than three times a week     Frequency of Social Gatherings with Friends and Family: Once a week     Active Member of Clubs or Organizations: Yes     Attends Club or Organization Meetings: More than 4 times per year     Marital Status:    Housing Stability: Low Risk  (2023)    Housing Stability Vital Sign     Unable to Pay for Housing  in the Last Year: No     Number of Places Lived in the Last Year: 1     Unstable Housing in the Last Year: No     Review of patient's allergies indicates:  No Known Allergies    Current Outpatient Medications:     CONSTULOSE 10 gram/15 mL solution, TAKE 10 ML BY MOUTH  THREE TIMES DAILY, Disp: 948 mL, Rfl: 2    diclofenac sodium (VOLTAREN) 1 % Gel, Apply 2 g topically 2 (two) times daily as needed., Disp: , Rfl:     gabapentin (NEURONTIN) 300 MG capsule, Take 1 capsule (300 mg total) by mouth every evening., Disp: 30 capsule, Rfl: 5    ondansetron (ZOFRAN-ODT) 8 MG TbDL, Take 1 tablet (8 mg total) by mouth every 6 (six) hours as needed., Disp: 60 tablet, Rfl: 3    pantoprazole (PROTONIX) 20 MG tablet, Take 1 tablet (20 mg total) by mouth once daily., Disp: 90 tablet, Rfl: 0    vitamin D (VITAMIN D3) 1000 units Tab, Take 3,000 Units by mouth once daily., Disp: , Rfl:   No current facility-administered medications for this visit.    Facility-Administered Medications Ordered in Other Visits:     sodium chloride 0.9% 100 mL flush bag, , Intravenous, PRN, Mayra Bower MD, Stopped at 04/24/23 1519    sodium chloride 0.9% flush 10 mL, 10 mL, Intravenous, PRN, Mayra Bower MD  Review of Systems   Constitutional:  Positive for malaise/fatigue. Negative for weight loss.        Mostly good appetite lost weight with her sx   HENT:  Negative for hearing loss.    Eyes:  Negative for blurred vision and double vision.   Gastrointestinal:  Positive for constipation.        Takes lactulose twice a day and colace twice a day   Musculoskeletal:  Negative for back pain, myalgias and neck pain.        Leg cramps and shaky   Skin:  Negative for rash.   Neurological:  Negative for dizziness, weakness and headaches.   Endo/Heme/Allergies:  Does not bruise/bleed easily.   recent bowel obstruction 3/22 with repeat abdominal pain discomfort emergency room visit last night the outpatient surgical appointment  Physical Exam    Wt  Readings from Last 3 Encounters:   10/16/23 88.4 kg (194 lb 14.2 oz)   09/11/23 89.9 kg (198 lb 3.1 oz)   09/08/23 89.9 kg (198 lb 1.6 oz)     Temp Readings from Last 3 Encounters:   10/16/23 98 °F (36.7 °C) (Temporal)   08/18/23 97.9 °F (36.6 °C) (Oral)   06/12/23 98.1 °F (36.7 °C)     BP Readings from Last 3 Encounters:   10/16/23 (!) 163/74   09/08/23 130/75   08/18/23 139/83     Pulse Readings from Last 3 Encounters:   10/16/23 74   09/08/23 78   08/18/23 69     Lab Results   Component Value Date    WBC 5.98 10/03/2023    HGB 13.4 10/03/2023    HCT 41.1 10/03/2023    MCV 94 10/03/2023     10/03/2023       BMP  Lab Results   Component Value Date     10/03/2023    K 4.0 10/03/2023     10/03/2023    CO2 20 (L) 10/03/2023    BUN 15 10/03/2023    CREATININE 0.8 10/03/2023    CALCIUM 9.3 10/03/2023    ANIONGAP 13 10/03/2023    ESTGFRAFRICA >60.0 06/27/2022    EGFRNONAA >60.0 06/27/2022     Lab Results   Component Value Date    IRON 91 10/03/2023    TIBC 382 10/03/2023    FERRITIN 159 10/03/2023       Impression:  CT scan abdomen May 25, 2022     1. Multiple dilated loops of small bowel are noted that are fluid-filled left upper quadrant with the suggestion of bowel wall thickening near the level of the umbilicus in the midline and extending into the left dilated loops of bowel.  This raises the concern for vascular injury and or bowel infarction/inflammation possibly associated with obstruction.  Surgical consultation is suggested.  Post-contrast oral and intravenous imaging may be of use for confirmation given that this exam is severely hindered.  2. A 2nd region of concern is noted within the right hemipelvis where a dilated loop of small bowel is noted without obvious bowel wall thickening.  This region is nonspecific and could relate to colon, small bowel, ileus, or obstruction.  Further evaluation and clinical correlation is necessary     Patient Active Problem List   Diagnosis    Primary  osteoarthritis involving multiple joints    Obesity (BMI 30.0-34.9)    Gastroesophageal reflux disease    Right knee pain    Chronic midline low back pain    Acute tear medial meniscus, right, sequela    Primary osteoarthritis of right knee    Lumbar radiculitis    Trigger middle finger of right hand    Nausea    Abdominal pain    Encounter for postoperative care    Intestinal obstruction    Hypokalemia    Constipation    History of cholecystectomy    Right upper quadrant pain    History of hysterectomy    Rectal bleeding    Hemorrhoids    Mitral valve prolapse    Coronary artery calcification    Anemia    SAMPSON (dyspnea on exertion), noted 2010    History of smoking 25-50 pack years, 46 py, quit 2009    NSAID long-term use, started 2017, Aleve 2-4 tabs daily for a year    Bandemia    Family history of premature CAD    Cardiovascular event risk, ASCVD 10-year risk 4.8%, 2019, 6.7% in 2021    Abdominal obesity    Dyslipidemia, baseline LDL-C 112, HDL-C 49    Iron deficiency anemia secondary to inadequate dietary iron intake    Severe anemia    S/p small bowel obstruction    Arm pain, lateral, left    Right hip pain    Chronic bilateral low back pain without sciatica    Decreased strength    Atypical chest pain, onset 2021    Chronic fatigue    Excessive daytime sleepiness        Assessment and Plan   NATHAN: pt had partail bowel removal from opbstructions with recurrneces Bowel obs again 3/22 repeat sx and adhesiolysis, Goes to [facility] for evaluation of small bowel.  Will keep patient on Q 6 weeks the iron checks   recvd infusinal iron with excellent response    May take liquid iron  Orders will be placed for [place]   when neededfor nathan again see me in 6 weeks with cbc,c mp iron ferin     Slight B12 deficiency also continue to monitor most recent test J 9/2022 acceptable  Patient to continue follow-up of her DJD with PCP    Advance Care Planning     Date: 04/20/2023    Power of   I initiated the process of  advance care planning today due to virtual nature of visit documents will be made to to upload into our system

## 2023-11-16 ENCOUNTER — PATIENT MESSAGE (OUTPATIENT)
Dept: ORTHOPEDICS | Facility: CLINIC | Age: 68
End: 2023-11-16
Payer: MEDICARE

## 2023-11-28 ENCOUNTER — OFFICE VISIT (OUTPATIENT)
Dept: ORTHOPEDICS | Facility: CLINIC | Age: 68
End: 2023-11-28
Payer: MEDICARE

## 2023-11-28 VITALS — RESPIRATION RATE: 18 BRPM | WEIGHT: 194 LBS | BODY MASS INDEX: 33.12 KG/M2 | HEIGHT: 64 IN

## 2023-11-28 DIAGNOSIS — M70.62 TROCHANTERIC BURSITIS OF BOTH HIPS: Primary | ICD-10-CM

## 2023-11-28 DIAGNOSIS — M70.61 TROCHANTERIC BURSITIS OF BOTH HIPS: Primary | ICD-10-CM

## 2023-11-28 PROCEDURE — 99999PBSHW PR PBB SHADOW TECHNICAL ONLY FILED TO HB: ICD-10-PCS | Mod: PBBFAC,,,

## 2023-11-28 PROCEDURE — 99999 PR PBB SHADOW E&M-EST. PATIENT-LVL II: CPT | Mod: PBBFAC,,, | Performed by: ORTHOPAEDIC SURGERY

## 2023-11-28 PROCEDURE — 99213 OFFICE O/P EST LOW 20 MIN: CPT | Mod: 25,S$PBB,, | Performed by: ORTHOPAEDIC SURGERY

## 2023-11-28 PROCEDURE — 99212 OFFICE O/P EST SF 10 MIN: CPT | Mod: PBBFAC,PO | Performed by: ORTHOPAEDIC SURGERY

## 2023-11-28 PROCEDURE — 99999PBSHW PR PBB SHADOW TECHNICAL ONLY FILED TO HB: Mod: PBBFAC,,,

## 2023-11-28 PROCEDURE — 20610 LARGE JOINT ASPIRATION/INJECTION: BILATERAL GREATER TROCHANTERIC BURSA: ICD-10-PCS | Mod: 50,S$PBB,, | Performed by: ORTHOPAEDIC SURGERY

## 2023-11-28 PROCEDURE — 99999 PR PBB SHADOW E&M-EST. PATIENT-LVL II: ICD-10-PCS | Mod: PBBFAC,,, | Performed by: ORTHOPAEDIC SURGERY

## 2023-11-28 PROCEDURE — 20610 DRAIN/INJ JOINT/BURSA W/O US: CPT | Mod: 50,PBBFAC,PO | Performed by: ORTHOPAEDIC SURGERY

## 2023-11-28 PROCEDURE — 99213 PR OFFICE/OUTPT VISIT, EST, LEVL III, 20-29 MIN: ICD-10-PCS | Mod: 25,S$PBB,, | Performed by: ORTHOPAEDIC SURGERY

## 2023-11-28 RX ORDER — TRIAMCINOLONE ACETONIDE 40 MG/ML
40 INJECTION, SUSPENSION INTRA-ARTICULAR; INTRAMUSCULAR
Status: DISCONTINUED | OUTPATIENT
Start: 2023-11-28 | End: 2023-11-28 | Stop reason: HOSPADM

## 2023-11-28 RX ADMIN — TRIAMCINOLONE ACETONIDE 40 MG: 40 INJECTION, SUSPENSION INTRA-ARTICULAR; INTRAMUSCULAR at 03:11

## 2023-11-28 NOTE — PROCEDURES
Large Joint Aspiration/Injection: bilateral greater trochanteric bursa    Date/Time: 11/28/2023 3:30 PM    Performed by: Jeremy Mendez MD  Authorized by: Jeremy Mendez MD    Consent Done?:  Yes (Verbal)  Indications:  Pain  Timeout: prior to procedure the correct patient, procedure, and site was verified      Local anesthesia used?: Yes    Local anesthetic:  Topical anesthetic and lidocaine 1% without epinephrine  Anesthetic total (ml):  6      Details:  Needle Size:  22 G  Approach:  Lateral  Location:  Hip  Laterality:  Bilateral  Site:  Bilateral greater trochanteric bursa  Medications (Right):  40 mg triamcinolone acetonide 40 mg/mL  Medications (Left):  40 mg triamcinolone acetonide 40 mg/mL  Patient tolerance:  Patient tolerated the procedure well with no immediate complications

## 2023-11-28 NOTE — PROGRESS NOTES
Patient ID: Phma Licea is a 68 y.o. female    Chief Complaint:   Chief Complaint   Patient presents with    Right Hip - Pain       History of Present Illness:    Pleasant 67-year-old female here for re-evaluation of her right and left hip.  Has known moderate DJD of the bilateral hips as well as no trochanteric bursitis.  She is responded well in the past 2 trochanteric bursa injections most recently on the right hip back in .  She is here requesting bilateral hip injections today.    PAST MEDICAL HISTORY:   Past Medical History:   Diagnosis Date    Anemia     Fatty liver 2015    Former smoker     Osteoarthritis 2010    Pulmonary nodules 2019    Repeat CT in 6 mo    Ulcer of abdomen wall 2016     PAST SURGICAL HISTORY:   Past Surgical History:   Procedure Laterality Date    APPENDECTOMY       SECTION  , , 1983    x3    COLONOSCOPY  ~    [facility]: normal findings per patient report    COLONOSCOPY N/A 2020    Procedure: COLONOSCOPY;  Surgeon: Misael Holm MD;  Location: [facility] ENDO;  Service: General;  Laterality: N/A;    COLONOSCOPY, WITH RETROGRADE BALLOON ENTEROSCOPY, SINGLE OR DOUBLE BALLOON N/A 2023    Procedure: COLONOSCOPY, WITH RETROGRADE BALLOON ENTEROSCOPY, DOUBLE BALLOON;  Surgeon: Collin Mares MD;  Location: [facility] ENDO ([address] [place] [address]);  Service: Endoscopy;  Laterality: N/A;  inst via portal-MS-sutab per pt request-tb-new inst portal    DIAGNOSTIC LAPAROSCOPY N/A 2021    Procedure: LAPAROSCOPY, DIAGNOSTIC;  Surgeon: Misael Holm MD;  Location: [facility] OR;  Service: General;  Laterality: N/A;    EPIDURAL STEROID INJECTION INTO LUMBAR SPINE N/A 10/12/2018    Procedure: Injection-steroid-epidural-lumbar;  Surgeon: Kal Johnson MD;  Location: [facility] OR;  Service: Pain Management;  Laterality: N/A;  L5-S1    EPIDURAL STEROID INJECTION INTO LUMBAR SPINE N/A 2019    Procedure: Injection-steroid-epidural-lumbar L5-S1;  Surgeon: Kal Johnson MD;   Location: [facility] OR;  Service: Pain Management;  Laterality: N/A;    ESOPHAGOGASTRODUODENOSCOPY N/A 2020    Procedure: ESOPHAGOGASTRODUODENOSCOPY (EGD);  Surgeon: Misael Holm MD;  Location: [facility] ENDO;  Service: General;  Laterality: N/A;    FUSION, SPINE, MINIMALLY INVASIVE, USING COMPUTER-ASSISTED NAVIGATION N/A 2022    Procedure: ENTEROSCOPY, DOUBLE BALLOON, ANTEGRADE;  Surgeon: Collin Mares MD;  Location: [facility] ENDO (2ND FLR);  Service: Endoscopy;  Laterality: N/A;  22-Instructions via portal-DS    *open to earlier date if available*    HYSTERECTOMY  1993    one ovary conserved    LAPAROSCOPIC CHOLECYSTECTOMY N/A 2020    Procedure: CHOLECYSTECTOMY, LAPAROSCOPIC;  Surgeon: Govind Frey MD;  Location: [facility] OR;  Service: General;  Laterality: N/A;    LAPAROSCOPIC LYSIS OF ADHESIONS N/A 2021    Procedure: LYSIS, ADHESIONS, LAPAROSCOPIC;  Surgeon: Misael Holm MD;  Location: [facility] OR;  Service: General;  Laterality: N/A;    UPPER GASTROINTESTINAL ENDOSCOPY       FAMILY HISTORY:   Family History   Problem Relation Age of Onset    Ovarian cancer Mother     Heart disease Mother     Osteoporosis Mother     Arthritis Mother     Hypertension Father     Stroke Father 50    Brain Hemorrhage Father     Aneurysm Father     Osteoarthritis Sister     Arthritis Sister     Hypertension Sister     Obesity Sister     Breast cancer Neg Hx     Colon cancer Neg Hx     Colon polyps Neg Hx     Crohn's disease Neg Hx     Ulcerative colitis Neg Hx      SOCIAL HISTORY:   Social History     Occupational History    Occupation: sale specialist-    Tobacco Use    Smoking status: Former     Current packs/day: 0.00     Average packs/day: 1 pack/day for 40.0 years (40.0 ttl pk-yrs)     Types: Cigarettes     Start date: 1969     Quit date: 2009     Years since quittin.0    Smokeless tobacco: Never    Tobacco comments:     quit    Substance and Sexual Activity    Alcohol use:  Not Currently     Comment: Not often - one glass of wine every now and then    Drug use: Not Currently     Types: Marijuana     Comment: in 1970s    Sexual activity: Not Currently        MEDICATIONS:   Current Outpatient Medications:     CONSTULOSE 10 gram/15 mL solution, TAKE 10 ML BY MOUTH  THREE TIMES DAILY, Disp: 948 mL, Rfl: 2    diclofenac sodium (VOLTAREN) 1 % Gel, Apply 2 g topically 2 (two) times daily as needed., Disp: , Rfl:     gabapentin (NEURONTIN) 300 MG capsule, Take 1 capsule (300 mg total) by mouth every evening., Disp: 30 capsule, Rfl: 5    ondansetron (ZOFRAN-ODT) 8 MG TbDL, Take 1 tablet (8 mg total) by mouth every 6 (six) hours as needed., Disp: 60 tablet, Rfl: 3    pantoprazole (PROTONIX) 20 MG tablet, Take 1 tablet (20 mg total) by mouth once daily., Disp: 90 tablet, Rfl: 0    vitamin D (VITAMIN D3) 1000 units Tab, Take 3,000 Units by mouth once daily., Disp: , Rfl:   No current facility-administered medications for this visit.    Facility-Administered Medications Ordered in Other Visits:     sodium chloride 0.9% 100 mL flush bag, , Intravenous, PRN, Mayra Bower MD, Stopped at 04/24/23 1519    sodium chloride 0.9% flush 10 mL, 10 mL, Intravenous, PRN, Mayra Bower MD  ALLERGIES: Review of patient's allergies indicates:  No Known Allergies      Physical Exam     Vitals:    11/28/23 1526   Resp: 18     Alert and oriented to person, place and time. No acute distress. Well-groomed, not ill appearing. Pupils round and reactive, normal respiratory effort, no audible wheezing.     Gait: She  walks with a normal gait.                   EXTREMITIES:  Examination of lower extremities reveals there is no visible mass or deformity.            Bilateral hip:  ROM(IR/ER) 25/50    .Negative FADIR test    .Negative Stinchfield test     Positive trochanteric pain.    Negative straight leg raise.    No warmth    No erythema        The skin over both lower extremities is normal and unremarkable.   She has a  painless range of motion of the knees and ankles bilaterally.   Sensation is intact in both lower extremities.    There are no motor deficits in the lower extremities bilaterally.   Pedal pulses are palpable distally bilaterally.    She has no calf tenderness to palpation nor edema.       Imaging:       Mild DJD bilateral hips      Assessment & Plan    Trochanteric bursitis of both hips       Treatment options discussed with her in detail.  I went over her x-rays again which show arthritis of the hips.  I do think her pain is multifactorial including trochanteric bursitis as well as symptomatic hip arthritis bilaterally.  We discussed treatment options in detail and she would like to proceed with bilateral hip trochanteric bursa injections.  She tolerated this well.

## 2023-11-30 ENCOUNTER — PATIENT MESSAGE (OUTPATIENT)
Dept: ORTHOPEDICS | Facility: CLINIC | Age: 68
End: 2023-11-30
Payer: MEDICARE

## 2023-11-30 DIAGNOSIS — M18.12 ARTHRITIS OF CARPOMETACARPAL (CMC) JOINT OF LEFT THUMB: Primary | ICD-10-CM

## 2023-12-08 ENCOUNTER — PATIENT MESSAGE (OUTPATIENT)
Dept: SPINE | Facility: CLINIC | Age: 68
End: 2023-12-08
Payer: MEDICARE

## 2024-01-05 ENCOUNTER — LAB VISIT (OUTPATIENT)
Dept: LAB | Facility: HOSPITAL | Age: 69
End: 2024-01-05
Attending: INTERNAL MEDICINE
Payer: MEDICARE

## 2024-01-05 DIAGNOSIS — D50.8 IRON DEFICIENCY ANEMIA SECONDARY TO INADEQUATE DIETARY IRON INTAKE: ICD-10-CM

## 2024-01-05 DIAGNOSIS — E53.8 B12 DEFICIENCY: ICD-10-CM

## 2024-01-05 LAB
ALBUMIN SERPL BCP-MCNC: 3.9 G/DL (ref 3.5–5.2)
ALP SERPL-CCNC: 64 U/L (ref 55–135)
ALT SERPL W/O P-5'-P-CCNC: 17 U/L (ref 10–44)
ANION GAP SERPL CALC-SCNC: 11 MMOL/L (ref 8–16)
AST SERPL-CCNC: 16 U/L (ref 10–40)
BASOPHILS # BLD AUTO: 0.06 K/UL (ref 0–0.2)
BASOPHILS NFR BLD: 0.9 % (ref 0–1.9)
BILIRUB SERPL-MCNC: 0.4 MG/DL (ref 0.1–1)
BUN SERPL-MCNC: 16 MG/DL (ref 8–23)
CALCIUM SERPL-MCNC: 8.6 MG/DL (ref 8.7–10.5)
CHLORIDE SERPL-SCNC: 107 MMOL/L (ref 95–110)
CO2 SERPL-SCNC: 23 MMOL/L (ref 23–29)
CREAT SERPL-MCNC: 0.7 MG/DL (ref 0.5–1.4)
DIFFERENTIAL METHOD BLD: ABNORMAL
EOSINOPHIL # BLD AUTO: 0.1 K/UL (ref 0–0.5)
EOSINOPHIL NFR BLD: 1 % (ref 0–8)
ERYTHROCYTE [DISTWIDTH] IN BLOOD BY AUTOMATED COUNT: 13.8 % (ref 11.5–14.5)
EST. GFR  (NO RACE VARIABLE): >60 ML/MIN/1.73 M^2
FERRITIN SERPL-MCNC: 107 NG/ML (ref 20–300)
GLUCOSE SERPL-MCNC: 91 MG/DL (ref 70–110)
HCT VFR BLD AUTO: 41.3 % (ref 37–48.5)
HGB BLD-MCNC: 13.3 G/DL (ref 12–16)
IMM GRANULOCYTES # BLD AUTO: 0.09 K/UL (ref 0–0.04)
IMM GRANULOCYTES NFR BLD AUTO: 1.3 % (ref 0–0.5)
IRON SERPL-MCNC: 70 UG/DL (ref 30–160)
LYMPHOCYTES # BLD AUTO: 1.9 K/UL (ref 1–4.8)
LYMPHOCYTES NFR BLD: 27.7 % (ref 18–48)
MCH RBC QN AUTO: 30.4 PG (ref 27–31)
MCHC RBC AUTO-ENTMCNC: 32.2 G/DL (ref 32–36)
MCV RBC AUTO: 95 FL (ref 82–98)
MONOCYTES # BLD AUTO: 0.6 K/UL (ref 0.3–1)
MONOCYTES NFR BLD: 8.6 % (ref 4–15)
NEUTROPHILS # BLD AUTO: 4.2 K/UL (ref 1.8–7.7)
NEUTROPHILS NFR BLD: 60.5 % (ref 38–73)
NRBC BLD-RTO: 0 /100 WBC
PLATELET # BLD AUTO: 290 K/UL (ref 150–450)
PMV BLD AUTO: 9.6 FL (ref 9.2–12.9)
POTASSIUM SERPL-SCNC: 4 MMOL/L (ref 3.5–5.1)
PROT SERPL-MCNC: 6.4 G/DL (ref 6–8.4)
RBC # BLD AUTO: 4.37 M/UL (ref 4–5.4)
SATURATED IRON: 18 % (ref 20–50)
SODIUM SERPL-SCNC: 141 MMOL/L (ref 136–145)
TOTAL IRON BINDING CAPACITY: 383 UG/DL (ref 250–450)
TRANSFERRIN SERPL-MCNC: 259 MG/DL (ref 200–375)
VIT B12 SERPL-MCNC: 476 PG/ML (ref 210–950)
WBC # BLD AUTO: 7 K/UL (ref 3.9–12.7)

## 2024-01-05 PROCEDURE — 85025 COMPLETE CBC W/AUTO DIFF WBC: CPT | Performed by: INTERNAL MEDICINE

## 2024-01-05 PROCEDURE — 83540 ASSAY OF IRON: CPT | Performed by: INTERNAL MEDICINE

## 2024-01-05 PROCEDURE — 36415 COLL VENOUS BLD VENIPUNCTURE: CPT | Performed by: INTERNAL MEDICINE

## 2024-01-05 PROCEDURE — 80053 COMPREHEN METABOLIC PANEL: CPT | Performed by: INTERNAL MEDICINE

## 2024-01-05 PROCEDURE — 82607 VITAMIN B-12: CPT | Performed by: INTERNAL MEDICINE

## 2024-01-05 PROCEDURE — 82728 ASSAY OF FERRITIN: CPT | Performed by: INTERNAL MEDICINE

## 2024-01-08 ENCOUNTER — OFFICE VISIT (OUTPATIENT)
Dept: HEMATOLOGY/ONCOLOGY | Facility: CLINIC | Age: 69
End: 2024-01-08
Payer: MEDICARE

## 2024-01-08 ENCOUNTER — OFFICE VISIT (OUTPATIENT)
Dept: ORTHOPEDICS | Facility: CLINIC | Age: 69
End: 2024-01-08
Payer: MEDICARE

## 2024-01-08 DIAGNOSIS — M54.50 CHRONIC BILATERAL LOW BACK PAIN WITHOUT SCIATICA: Primary | ICD-10-CM

## 2024-01-08 DIAGNOSIS — E53.8 B12 DEFICIENCY: ICD-10-CM

## 2024-01-08 DIAGNOSIS — D50.8 IRON DEFICIENCY ANEMIA SECONDARY TO INADEQUATE DIETARY IRON INTAKE: ICD-10-CM

## 2024-01-08 DIAGNOSIS — M18.12 ARTHRITIS OF CARPOMETACARPAL (CMC) JOINT OF LEFT THUMB: ICD-10-CM

## 2024-01-08 DIAGNOSIS — G89.29 CHRONIC BILATERAL LOW BACK PAIN WITHOUT SCIATICA: Primary | ICD-10-CM

## 2024-01-08 PROCEDURE — 99213 OFFICE O/P EST LOW 20 MIN: CPT | Mod: PBBFAC,PO | Performed by: ORTHOPAEDIC SURGERY

## 2024-01-08 PROCEDURE — 99214 OFFICE O/P EST MOD 30 MIN: CPT | Mod: S$PBB,25,, | Performed by: ORTHOPAEDIC SURGERY

## 2024-01-08 PROCEDURE — 99214 OFFICE O/P EST MOD 30 MIN: CPT | Mod: 95,,, | Performed by: INTERNAL MEDICINE

## 2024-01-08 PROCEDURE — 99999 PR PBB SHADOW E&M-EST. PATIENT-LVL III: CPT | Mod: PBBFAC,,, | Performed by: ORTHOPAEDIC SURGERY

## 2024-01-08 PROCEDURE — 99999PBSHW PR PBB SHADOW TECHNICAL ONLY FILED TO HB: Mod: PBBFAC,,,

## 2024-01-08 PROCEDURE — 20600 DRAIN/INJ JOINT/BURSA W/O US: CPT | Mod: PBBFAC,PO,LT | Performed by: ORTHOPAEDIC SURGERY

## 2024-01-08 RX ORDER — TRIAMCINOLONE ACETONIDE 40 MG/ML
40 INJECTION, SUSPENSION INTRA-ARTICULAR; INTRAMUSCULAR
Status: DISCONTINUED | OUTPATIENT
Start: 2024-01-08 | End: 2024-01-08 | Stop reason: HOSPADM

## 2024-01-08 RX ADMIN — TRIAMCINOLONE ACETONIDE 40 MG: 40 INJECTION, SUSPENSION INTRA-ARTICULAR; INTRAMUSCULAR at 09:01

## 2024-01-08 NOTE — PROGRESS NOTES
2024    Chief Complaint:  Chief Complaint   Patient presents with    Left Hand - Pain     Thumb CMC        HPI:  Pham Licea is a 68 y.o. female, who presents to clinic today has a history of ring pain.  She states that this has been an ongoing process as she has had multiple joint arthritis.  She used to see Dr. Vaughan.  She has had injections in her left thumb CMC joint in the past which has provided her with some relief.  She is here today for further assessment and treatment options.    PMHX:  Past Medical History:   Diagnosis Date    Anemia     Fatty liver 2015    Former smoker     Osteoarthritis 2010    Pulmonary nodules 2019    Repeat CT in 6 mo    Ulcer of abdomen wall 2016       PSHX:  Past Surgical History:   Procedure Laterality Date    APPENDECTOMY       SECTION  , , 1983    x3    COLONOSCOPY  ~    [facility]: normal findings per patient report    COLONOSCOPY N/A 2020    Procedure: COLONOSCOPY;  Surgeon: Misael Holm MD;  Location: [facility];  Service: General;  Laterality: N/A;    COLONOSCOPY, WITH RETROGRADE BALLOON ENTEROSCOPY, SINGLE OR DOUBLE BALLOON N/A 2023    Procedure: COLONOSCOPY, WITH RETROGRADE BALLOON ENTEROSCOPY, DOUBLE BALLOON;  Surgeon: Collin Mares MD;  Location: [facility] ([address] [place] [address]);  Service: Endoscopy;  Laterality: N/A;  inst via portal-MS-sutab per pt request-tb-new inst portal    DIAGNOSTIC LAPAROSCOPY N/A 2021    Procedure: LAPAROSCOPY, DIAGNOSTIC;  Surgeon: Misael Holm MD;  Location: [facility];  Service: General;  Laterality: N/A;    EPIDURAL STEROID INJECTION INTO LUMBAR SPINE N/A 10/12/2018    Procedure: Injection-steroid-epidural-lumbar;  Surgeon: Kal Johnson MD;  Location: [facility];  Service: Pain Management;  Laterality: N/A;  L5-S1    EPIDURAL STEROID INJECTION INTO LUMBAR SPINE N/A 2019    Procedure: Injection-steroid-epidural-lumbar L5-S1;  Surgeon: Kal Johnson MD;  Location: [facility];   Service: Pain Management;  Laterality: N/A;    ESOPHAGOGASTRODUODENOSCOPY N/A 2020    Procedure: ESOPHAGOGASTRODUODENOSCOPY (EGD);  Surgeon: Misael Holm MD;  Location: [facility] ENDO;  Service: General;  Laterality: N/A;    FUSION, SPINE, MINIMALLY INVASIVE, USING COMPUTER-ASSISTED NAVIGATION N/A 2022    Procedure: ENTEROSCOPY, DOUBLE BALLOON, ANTEGRADE;  Surgeon: Collin Mares MD;  Location: [facility] ENDO ([facility] FLR);  Service: Endoscopy;  Laterality: N/A;  22-Instructions via portal-DS    *open to earlier date if available*    HYSTERECTOMY  1993    one ovary conserved    LAPAROSCOPIC CHOLECYSTECTOMY N/A 2020    Procedure: CHOLECYSTECTOMY, LAPAROSCOPIC;  Surgeon: Govind Frey MD;  Location: [facility] OR;  Service: General;  Laterality: N/A;    LAPAROSCOPIC LYSIS OF ADHESIONS N/A 2021    Procedure: LYSIS, ADHESIONS, LAPAROSCOPIC;  Surgeon: Misael Holm MD;  Location: [facility] OR;  Service: General;  Laterality: N/A;    UPPER GASTROINTESTINAL ENDOSCOPY         FMHX:  Family History   Problem Relation Age of Onset    Ovarian cancer Mother     Heart disease Mother     Osteoporosis Mother     Arthritis Mother     Hypertension Father     Stroke Father 50    Brain Hemorrhage Father     Aneurysm Father     Osteoarthritis Sister     Arthritis Sister     Hypertension Sister     Obesity Sister     Breast cancer Neg Hx     Colon cancer Neg Hx     Colon polyps Neg Hx     Crohn's disease Neg Hx     Ulcerative colitis Neg Hx        SOCHX:  Social History     Tobacco Use    Smoking status: Former     Current packs/day: 0.00     Average packs/day: 1 pack/day for 40.0 years (40.0 ttl pk-yrs)     Types: Cigarettes     Start date: 1969     Quit date: 2009     Years since quittin.1    Smokeless tobacco: Never    Tobacco comments:     quit 2009   Substance Use Topics    Alcohol use: Not Currently     Comment: Not often - one glass of wine every now and then       ALLERGIES:  Patient  has no known allergies.    CURRENT MEDICATIONS:  Current Outpatient Medications on File Prior to Visit   Medication Sig Dispense Refill    CONSTULOSE 10 gram/15 mL solution TAKE 10 ML BY MOUTH  THREE TIMES DAILY 948 mL 2    diclofenac sodium (VOLTAREN) 1 % Gel Apply 2 g topically 2 (two) times daily as needed.      gabapentin (NEURONTIN) 300 MG capsule Take 1 capsule (300 mg total) by mouth every evening. 30 capsule 5    ondansetron (ZOFRAN-ODT) 8 MG TbDL Take 1 tablet (8 mg total) by mouth every 6 (six) hours as needed. 60 tablet 3    pantoprazole (PROTONIX) 20 MG tablet Take 1 tablet (20 mg total) by mouth once daily. 90 tablet 0    vitamin D (VITAMIN D3) 1000 units Tab Take 3,000 Units by mouth once daily.       Current Facility-Administered Medications on File Prior to Visit   Medication Dose Route Frequency Provider Last Rate Last Admin    sodium chloride 0.9% 100 mL flush bag   Intravenous PRN Mayra Bower MD   Stopped at 04/24/23 1519    sodium chloride 0.9% flush 10 mL  10 mL Intravenous PRMayra Garza MD           REVIEW OF SYSTEMS:  Review of Systems   Constitutional: Negative.    HENT: Negative.     Eyes: Negative.    Respiratory: Negative.     Cardiovascular: Negative.    Gastrointestinal: Negative.    Genitourinary: Negative.    Musculoskeletal:  Positive for joint pain.   Skin: Negative.    Neurological:  Positive for weakness.   Endo/Heme/Allergies: Negative.    Psychiatric/Behavioral: Negative.       GENERAL PHYSICAL EXAM:   LMP  (LMP Unknown)    GEN: well developed, well nourished, no acute distress   HENT: Normocephalic, atraumatic   EYES: No discharge, conjunctiva normal   NECK: Supple, non-tender   PULM: No wheezing, no respiratory distress   CV: RRR   ABD: Soft, non-tender    ORTHO EXAM:   Examination of the left hand reveals that there is no edema.  There are no major skin changes.  She does have changes consistent with arthritis throughout multiple joints.  Palpation over the  thumb CMC joint does reproduce her pain and she has a positive grind test.  He does report grossly intact sensation and has capillary refill less than 2 seconds    RADIOLOGY:   X-rays of the left hand from 05/22/2022 have been reviewed.  She is noted to have severe degenerative changes throughout multiple joints in the hand.  One of those joints that has significant degenerative changes of the thumb CMC joint.    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 I have discussed treatment options going forward.  We have discussed options for the distal interphalangeal joint arthritis.  Also discussed the options for the CMC arthritis.  Today will perform a steroid injection into the left thumb CMC joint.    2.  After consent was obtained and injection was placed to the left thumb CMC joint     3. She will follow up with me as needed

## 2024-01-08 NOTE — PROCEDURES
Small Joint Aspiration/Injection: L thumb CMC    Date/Time: 1/8/2024 9:00 AM    Performed by: Jas Wolfe MD  Authorized by: Jas Wolfe MD    Consent Done?:  Yes (Verbal)  Indications:  Pain  Site marked: the procedure site was marked    Timeout: prior to procedure the correct patient, procedure, and site was verified    Local anesthetic:  Topical anesthetic  Location:  Thumb  Site:  L thumb CMC (Left thumb CMC joint)  Ultrasonic guidance for needle placement?: No    Needle size:  25 G  Medications:  40 mg triamcinolone acetonide 40 mg/mL; 40 mg triamcinolone acetonide 40 mg/mL (20 mg injected)  Patient tolerance:  Patient tolerated the procedure well with no immediate complications

## 2024-01-08 NOTE — PROGRESS NOTES
[employer] Pending    Insurance response  Prescription Drug Insurance: [employer]  Notes: Prior authorization submitted - will update provider when decision has been made by insurance.            Subjective:   The patient location is:  home  Visit type: Virtual visit with synchronous audio and video  Face-to-face or time spent with patient on the encounter:25 min  Total time spent on and for  this encounter which includes non face-to-face time preparing to see patient, review of tests, obtaining and or reviewing separately obtained records documenting clinical information in the electronic or other health records, independently interpreting results which is not separately reported ,and communicating results to the patient/family/caregiver and in care coordination and treatment planning/communicating with pharmacy for prescriptions/addressing social needs/arranging follow-up and or referrals :25 min    Each patient I provide medical services by telemedicine is:  (1) informed of the relationship between the physician and patient and the respective role of any other health care provider with respect to management of the patient; and (2) notified that he or she may decline to receive medical services by telemedicine and may withdraw from such care at any time.  This is a video visit therefore some elements of the physical exam such as vital signs, heart sounds are breath sounds are not included and may be included if found in recent clinic notes of other providers assessing same patient. Any symptoms or signs that were visualized were stated by the patient may be included in this note.      Patient ID: Pham Licea is a 68 y.o. female.    Chief Complaint:      sept 2020 had gall bladder surgery, thern she had a bowel obstruction, no surgery then, had 2 feet of bowels removed after a second bowel obstruction  Esophageal ulcers and dudenal ulcers- with bleeding in 2017  Recd blood in [facility] this weekend, pt went to ED because she was shaky and week and couldn't think was found to be anemic in ed   pt readmitted 3/22 had repeat bowel obstruction   Seen at emergency room May 25, 2022 with CT scan of abdomen  which shows partial obstruction patient having significant abdominal pain.  Discomfort belching similar to her prior obstructive symptoms  Past Medical History:   Diagnosis Date    Anemia     Fatty liver 2015    Former smoker 2009    Osteoarthritis 2010    Pulmonary nodules 2019    Repeat CT in 6 mo    Ulcer of abdomen wall 2016     Past Surgical History:   Procedure Laterality Date    APPENDECTOMY  1993     SECTION  , , 1983    x3    COLONOSCOPY  ~2014    [facility]: normal findings per patient report    COLONOSCOPY N/A 2020    Procedure: COLONOSCOPY;  Surgeon: Misael Holm MD;  Location: [facility] ENDO;  Service: General;  Laterality: N/A;    COLONOSCOPY, WITH RETROGRADE BALLOON ENTEROSCOPY, SINGLE OR DOUBLE BALLOON N/A 2023    Procedure: COLONOSCOPY, WITH RETROGRADE BALLOON ENTEROSCOPY, DOUBLE BALLOON;  Surgeon: Collin Mares MD;  Location: [facility] ENDO ([address] [place] [address]);  Service: Endoscopy;  Laterality: N/A;  inst via portal-MS-sutab per pt request-tb-new inst portal    DIAGNOSTIC LAPAROSCOPY N/A 2021    Procedure: LAPAROSCOPY, DIAGNOSTIC;  Surgeon: Misael Holm MD;  Location: [facility] OR;  Service: General;  Laterality: N/A;    EPIDURAL STEROID INJECTION INTO LUMBAR SPINE N/A 10/12/2018    Procedure: Injection-steroid-epidural-lumbar;  Surgeon: Kal Johnson MD;  Location: [facility] OR;  Service: Pain Management;  Laterality: N/A;  L5-S1    EPIDURAL STEROID INJECTION INTO LUMBAR SPINE N/A 2019    Procedure: Injection-steroid-epidural-lumbar L5-S1;  Surgeon: Kal Johnson MD;  Location: [facility] OR;  Service: Pain Management;  Laterality: N/A;    ESOPHAGOGASTRODUODENOSCOPY N/A 2020    Procedure: ESOPHAGOGASTRODUODENOSCOPY (EGD);  Surgeon: Misael Holm MD;  Location: [facility] ENDO;  Service: General;  Laterality: N/A;    FUSION, SPINE, MINIMALLY INVASIVE, USING COMPUTER-ASSISTED NAVIGATION N/A 2022    Procedure: ENTEROSCOPY, DOUBLE BALLOON, ANTEGRADE;  Surgeon: Collin  SWATI Mares MD;  Location: [facility] ENDO (2ND FLR);  Service: Endoscopy;  Laterality: N/A;  22-Instructions via portal-DS    *open to earlier date if available*    HYSTERECTOMY      one ovary conserved    LAPAROSCOPIC CHOLECYSTECTOMY N/A 2020    Procedure: CHOLECYSTECTOMY, LAPAROSCOPIC;  Surgeon: Govind Frey MD;  Location: [facility] OR;  Service: General;  Laterality: N/A;    LAPAROSCOPIC LYSIS OF ADHESIONS N/A 2021    Procedure: LYSIS, ADHESIONS, LAPAROSCOPIC;  Surgeon: Misael Holm MD;  Location: [facility] OR;  Service: General;  Laterality: N/A;    UPPER GASTROINTESTINAL ENDOSCOPY       Family History   Problem Relation Age of Onset    Ovarian cancer Mother     Heart disease Mother     Osteoporosis Mother     Arthritis Mother     Hypertension Father     Stroke Father 50    Brain Hemorrhage Father     Aneurysm Father     Osteoarthritis Sister     Arthritis Sister     Hypertension Sister     Obesity Sister     Breast cancer Neg Hx     Colon cancer Neg Hx     Colon polyps Neg Hx     Crohn's disease Neg Hx     Ulcerative colitis Neg Hx       Social History     Socioeconomic History    Marital status:     Number of children: 4    Highest education level: Some college, no degree   Occupational History    Occupation: sale specialist-    Tobacco Use    Smoking status: Former     Current packs/day: 0.00     Average packs/day: 1 pack/day for 40.0 years (40.0 ttl pk-yrs)     Types: Cigarettes     Start date: 1969     Quit date: 2009     Years since quittin.1    Smokeless tobacco: Never    Tobacco comments:     quit    Substance and Sexual Activity    Alcohol use: Not Currently     Comment: Not often - one glass of wine every now and then    Drug use: Not Currently     Types: Marijuana     Comment: in     Sexual activity: Not Currently     Social Determinants of Health     Financial Resource Strain: Medium Risk (2024)    Overall Financial Resource Strain (CARDIA)      Difficulty of Paying Living Expenses: Somewhat hard   Food Insecurity: No Food Insecurity (1/7/2024)    Hunger Vital Sign     Worried About Running Out of Food in the Last Year: Never true     Ran Out of Food in the Last Year: Never true   Transportation Needs: No Transportation Needs (1/7/2024)    PRAPARE - Transportation     Lack of Transportation (Medical): No     Lack of Transportation (Non-Medical): No   Physical Activity: Inactive (1/7/2024)    Exercise Vital Sign     Days of Exercise per Week: 0 days     Minutes of Exercise per Session: 0 min   Stress: Stress Concern Present (1/7/2024)    [employer] [place] [employer] - Occupational Stress Questionnaire     Feeling of Stress : To some extent   Social Connections: Unknown (1/7/2024)    Social Connection and Isolation Panel [NHANES]     Frequency of Communication with Friends and Family: More than three times a week     Frequency of Social Gatherings with Friends and Family: More than three times a week     Active Member of Clubs or Organizations: Yes     Attends Club or Organization Meetings: More than 4 times per year     Marital Status:    Housing Stability: Low Risk  (1/7/2024)    Housing Stability Vital Sign     Unable to Pay for Housing in the Last Year: No     Number of Places Lived in the Last Year: 1     Unstable Housing in the Last Year: No     Review of patient's allergies indicates:  No Known Allergies    Current Outpatient Medications:     CONSTULOSE 10 gram/15 mL solution, TAKE 10 ML BY MOUTH  THREE TIMES DAILY, Disp: 948 mL, Rfl: 2    diclofenac sodium (VOLTAREN) 1 % Gel, Apply 2 g topically 2 (two) times daily as needed., Disp: , Rfl:     gabapentin (NEURONTIN) 300 MG capsule, Take 1 capsule (300 mg total) by mouth every evening., Disp: 30 capsule, Rfl: 5    ondansetron (ZOFRAN-ODT) 8 MG TbDL, Take 1 tablet (8 mg total) by mouth every 6 (six) hours as needed., Disp: 60 tablet, Rfl: 3    pantoprazole (PROTONIX) 20 MG tablet,  Take 1 tablet (20 mg total) by mouth once daily., Disp: 90 tablet, Rfl: 0    vitamin D (VITAMIN D3) 1000 units Tab, Take 3,000 Units by mouth once daily., Disp: , Rfl:   No current facility-administered medications for this visit.    Facility-Administered Medications Ordered in Other Visits:     sodium chloride 0.9% 100 mL flush bag, , Intravenous, PRN, Mayra Bower MD, Stopped at 04/24/23 1519    sodium chloride 0.9% flush 10 mL, 10 mL, Intravenous, PRN, Mayra Bower MD  Review of Systems   Constitutional:  Positive for malaise/fatigue. Negative for weight loss.        Mostly good appetite lost weight with her sx   HENT:  Negative for hearing loss.    Eyes:  Negative for blurred vision and double vision.   Gastrointestinal:  Positive for constipation.        Takes lactulose twice a day and colace twice a day   Musculoskeletal:  Negative for back pain, myalgias and neck pain.        Leg cramps and shaky   Skin:  Negative for rash.   Neurological:  Negative for dizziness, weakness and headaches.   Endo/Heme/Allergies:  Does not bruise/bleed easily.   recent bowel obstruction 3/22 with repeat abdominal pain discomfort emergency room visit last night the outpatient surgical appointment  Physical Exam    Wt Readings from Last 3 Encounters:   11/28/23 88 kg (194 lb)   10/16/23 88.4 kg (194 lb 14.2 oz)   09/11/23 89.9 kg (198 lb 3.1 oz)     Temp Readings from Last 3 Encounters:   10/16/23 98 °F (36.7 °C) (Temporal)   08/18/23 97.9 °F (36.6 °C) (Oral)   06/12/23 98.1 °F (36.7 °C)     BP Readings from Last 3 Encounters:   10/16/23 (!) 163/74   09/08/23 130/75   08/18/23 139/83     Pulse Readings from Last 3 Encounters:   10/16/23 74   09/08/23 78   08/18/23 69     Lab Results   Component Value Date    WBC 7.00 01/05/2024    HGB 13.3 01/05/2024    HCT 41.3 01/05/2024    MCV 95 01/05/2024     01/05/2024       BMP  Lab Results   Component Value Date     01/05/2024    K 4.0 01/05/2024      01/05/2024    CO2 23 01/05/2024    BUN 16 01/05/2024    CREATININE 0.7 01/05/2024    CALCIUM 8.6 (L) 01/05/2024    ANIONGAP 11 01/05/2024    ESTGFRAFRICA >60.0 06/27/2022    EGFRNONAA >60.0 06/27/2022     Lab Results   Component Value Date    IRON 70 01/05/2024    TIBC 383 01/05/2024    FERRITIN 107 01/05/2024       Impression:  CT scan abdomen May 25, 2022     1. Multiple dilated loops of small bowel are noted that are fluid-filled left upper quadrant with the suggestion of bowel wall thickening near the level of the umbilicus in the midline and extending into the left dilated loops of bowel.  This raises the concern for vascular injury and or bowel infarction/inflammation possibly associated with obstruction.  Surgical consultation is suggested.  Post-contrast oral and intravenous imaging may be of use for confirmation given that this exam is severely hindered.  2. A 2nd region of concern is noted within the right hemipelvis where a dilated loop of small bowel is noted without obvious bowel wall thickening.  This region is nonspecific and could relate to colon, small bowel, ileus, or obstruction.  Further evaluation and clinical correlation is necessary     Patient Active Problem List   Diagnosis    Primary osteoarthritis involving multiple joints    Obesity (BMI 30.0-34.9)    Gastroesophageal reflux disease    Right knee pain    Chronic midline low back pain    Acute tear medial meniscus, right, sequela    Primary osteoarthritis of right knee    Lumbar radiculitis    Trigger middle finger of right hand    Nausea    Abdominal pain    Encounter for postoperative care    Intestinal obstruction    Hypokalemia    Constipation    History of cholecystectomy    Right upper quadrant pain    History of hysterectomy    Rectal bleeding    Hemorrhoids    Mitral valve prolapse    Coronary artery calcification    Anemia    SAMPSON (dyspnea on exertion), noted 2010    History of smoking 25-50 pack years, 46 py, quit 2009    NSAID  long-term use, started 2017, Aleve 2-4 tabs daily for a year    Bandemia    Family history of premature CAD    Cardiovascular event risk, ASCVD 10-year risk 4.8%, 2019, 6.7% in 2021    Abdominal obesity    Dyslipidemia, baseline LDL-C 112, HDL-C 49    Iron deficiency anemia secondary to inadequate dietary iron intake    Severe anemia    S/p small bowel obstruction    Arm pain, lateral, left    Right hip pain    Chronic bilateral low back pain without sciatica    Decreased strength    Atypical chest pain, onset 2021    Chronic fatigue    Excessive daytime sleepiness        Assessment and Plan   NATHAN: pt had partail bowel removal from opbstructions with recurrneces Bowel obs again 3/22 repeat sx and adhesiolysis, Goes to [facility] for evaluation of small bowel.  Will keep patient on Q 3 months the iron checks   recvd infusinal iron with excellent response    May take liquid iron  Orders will be placed for [place]   when neededfor nathan again see me in 6 weeks with cbc,c mp iron ferin   Caring for her special needs neice  Slight B12 deficiency also continue to monitor most recent test J 9/2022 acceptable  Patient to continue follow-up of her DJD with PCP    Advance Care Planning     Date: 04/20/2023    Power of   I initiated the process of advance care planning today due to virtual nature of visit documents will be made to to upload into our system

## 2024-01-11 ENCOUNTER — PATIENT MESSAGE (OUTPATIENT)
Dept: HEMATOLOGY/ONCOLOGY | Facility: CLINIC | Age: 69
End: 2024-01-11
Payer: MEDICARE

## 2024-01-18 ENCOUNTER — PATIENT MESSAGE (OUTPATIENT)
Dept: SPINE | Facility: CLINIC | Age: 69
End: 2024-01-18
Payer: MEDICARE

## 2024-01-24 ENCOUNTER — OFFICE VISIT (OUTPATIENT)
Dept: SPINE | Facility: CLINIC | Age: 69
End: 2024-01-24
Payer: MEDICARE

## 2024-01-24 ENCOUNTER — TELEPHONE (OUTPATIENT)
Dept: PAIN MEDICINE | Facility: CLINIC | Age: 69
End: 2024-01-24
Payer: MEDICARE

## 2024-01-24 VITALS — WEIGHT: 194 LBS | HEIGHT: 64 IN | BODY MASS INDEX: 33.12 KG/M2

## 2024-01-24 DIAGNOSIS — M54.16 LUMBAR RADICULITIS: Primary | ICD-10-CM

## 2024-01-24 PROCEDURE — 99213 OFFICE O/P EST LOW 20 MIN: CPT | Mod: S$GLB,,, | Performed by: PHYSICAL MEDICINE & REHABILITATION

## 2024-01-24 RX ORDER — ORPHENADRINE CITRATE 100 MG/1
100 TABLET, EXTENDED RELEASE ORAL 2 TIMES DAILY PRN
Qty: 40 TABLET | Refills: 1 | Status: SHIPPED | OUTPATIENT
Start: 2024-01-24 | End: 2024-04-19 | Stop reason: SDUPTHER

## 2024-01-24 NOTE — TELEPHONE ENCOUNTER
----- Message from Julio Evans MD sent at 1/24/2024  9:11 AM CST -----  Please schedule for transforaminal injection right L4-5 and L5-S1

## 2024-01-24 NOTE — H&P (VIEW-ONLY)
SUBJECTIVE:    Patient ID: Pham Licea is a 68 y.o. female.    Chief Complaint: Hip Pain    She presents today with several weeks history of worsening pain radiating down the right leg from the gluteal region to both the anterior and posterior portion of the right leg below the knee.  Feels like her leg is locking up.  She is getting cramps in the right leg.  Current pain level is 6/10 interferes with quality of life in terms of activities of daily living recreation and social activities.  In terms of interventional history she has had variable responses to interlaminar injections at L5-S1.  The 1st injection helped the 2nd not as much.          Past Medical History:   Diagnosis Date    Anemia     Fatty liver     Former smoker     Osteoarthritis 2010    Pulmonary nodules 2019    Repeat CT in 6 mo    Ulcer of abdomen wall 2016     Social History     Socioeconomic History    Marital status:     Number of children: 4    Highest education level: Some college, no degree   Occupational History    Occupation: sale specialist-    Tobacco Use    Smoking status: Former     Current packs/day: 0.00     Average packs/day: 1 pack/day for 40.0 years (40.0 ttl pk-yrs)     Types: Cigarettes     Start date: 1969     Quit date: 2009     Years since quittin.2    Smokeless tobacco: Never    Tobacco comments:     quit    Substance and Sexual Activity    Alcohol use: Not Currently     Comment: Not often - one glass of wine every now and then    Drug use: Not Currently     Types: Marijuana     Comment: in     Sexual activity: Not Currently     Social Determinants of Health     Financial Resource Strain: Medium Risk (2024)    Overall Financial Resource Strain (CARDIA)     Difficulty of Paying Living Expenses: Somewhat hard   Food Insecurity: No Food Insecurity (2024)    Hunger Vital Sign     Worried About Running Out of Food in the Last Year: Never true     Ran Out of Food in  the Last Year: Never true   Transportation Needs: No Transportation Needs (2024)    PRAPARE - Transportation     Lack of Transportation (Medical): No     Lack of Transportation (Non-Medical): No   Physical Activity: Inactive (2024)    Exercise Vital Sign     Days of Exercise per Week: 0 days     Minutes of Exercise per Session: 0 min   Stress: Stress Concern Present (2024)    [employer] [place] [employer] - Occupational Stress Questionnaire     Feeling of Stress : To some extent   Social Connections: Unknown (2024)    Social Connection and Isolation Panel [NHANES]     Frequency of Communication with Friends and Family: More than three times a week     Frequency of Social Gatherings with Friends and Family: More than three times a week     Active Member of Clubs or Organizations: Yes     Attends Club or Organization Meetings: More than 4 times per year     Marital Status:    Housing Stability: Low Risk  (2024)    Housing Stability Vital Sign     Unable to Pay for Housing in the Last Year: No     Number of Places Lived in the Last Year: 1     Unstable Housing in the Last Year: No     Past Surgical History:   Procedure Laterality Date    APPENDECTOMY  1993     SECTION  , , 1983    x3    COLONOSCOPY  ~    [facility]: normal findings per patient report    COLONOSCOPY N/A 2020    Procedure: COLONOSCOPY;  Surgeon: Misael Holm MD;  Location: [facility];  Service: General;  Laterality: N/A;    COLONOSCOPY, WITH RETROGRADE BALLOON ENTEROSCOPY, SINGLE OR DOUBLE BALLOON N/A 2023    Procedure: COLONOSCOPY, WITH RETROGRADE BALLOON ENTEROSCOPY, DOUBLE BALLOON;  Surgeon: Collin Mares MD;  Location: [facility] ([address] [place] [address]);  Service: Endoscopy;  Laterality: N/A;  inst via portal-MS-sutab per pt request-tb-new inst portal    DIAGNOSTIC LAPAROSCOPY N/A 2021    Procedure: LAPAROSCOPY, DIAGNOSTIC;  Surgeon: Misael Holm MD;  Location: [facility] OR;   Service: General;  Laterality: N/A;    EPIDURAL STEROID INJECTION INTO LUMBAR SPINE N/A 10/12/2018    Procedure: Injection-steroid-epidural-lumbar;  Surgeon: Kal Johnson MD;  Location: [facility] OR;  Service: Pain Management;  Laterality: N/A;  L5-S1    EPIDURAL STEROID INJECTION INTO LUMBAR SPINE N/A 5/31/2019    Procedure: Injection-steroid-epidural-lumbar L5-S1;  Surgeon: Kal Johnson MD;  Location: [facility] OR;  Service: Pain Management;  Laterality: N/A;    ESOPHAGOGASTRODUODENOSCOPY N/A 11/9/2020    Procedure: ESOPHAGOGASTRODUODENOSCOPY (EGD);  Surgeon: Misael Holm MD;  Location: [facility] ENDO;  Service: General;  Laterality: N/A;    FUSION, SPINE, MINIMALLY INVASIVE, USING COMPUTER-ASSISTED NAVIGATION N/A 12/22/2022    Procedure: ENTEROSCOPY, DOUBLE BALLOON, ANTEGRADE;  Surgeon: Collin Mares MD;  Location: [facility] ENDO ([facility] FLR);  Service: Endoscopy;  Laterality: N/A;  11/14/22-Instructions via portal-DS    *open to earlier date if available*    HYSTERECTOMY  1993    one ovary conserved    LAPAROSCOPIC CHOLECYSTECTOMY N/A 9/2/2020    Procedure: CHOLECYSTECTOMY, LAPAROSCOPIC;  Surgeon: Govind Frey MD;  Location: [facility] OR;  Service: General;  Laterality: N/A;    LAPAROSCOPIC LYSIS OF ADHESIONS N/A 5/4/2021    Procedure: LYSIS, ADHESIONS, LAPAROSCOPIC;  Surgeon: Misael Holm MD;  Location: [facility] OR;  Service: General;  Laterality: N/A;    UPPER GASTROINTESTINAL ENDOSCOPY  2016     Family History   Problem Relation Age of Onset    Ovarian cancer Mother     Heart disease Mother     Osteoporosis Mother     Arthritis Mother     Hypertension Father     Stroke Father 50    Brain Hemorrhage Father     Aneurysm Father     Osteoarthritis Sister     Arthritis Sister     Hypertension Sister     Obesity Sister     Breast cancer Neg Hx     Colon cancer Neg Hx     Colon polyps Neg Hx     Crohn's disease Neg Hx     Ulcerative colitis Neg Hx      Vitals:    01/24/24 0857   Weight: 88 kg (194 lb 0.1 oz)   Height: 5'  "4" (1.626 m)       Review of Systems   Constitutional:  Negative for chills, diaphoresis, fatigue, fever and unexpected weight change.   HENT:  Negative for trouble swallowing.    Eyes:  Negative for visual disturbance.   Respiratory:  Negative for shortness of breath.    Cardiovascular:  Negative for chest pain.   Gastrointestinal:  Negative for abdominal pain, constipation, nausea and vomiting.   Genitourinary:  Negative for difficulty urinating.   Musculoskeletal:  Negative for arthralgias, back pain, gait problem, joint swelling, myalgias, neck pain and neck stiffness.   Neurological:  Negative for dizziness, speech difficulty, weakness, light-headedness, numbness and headaches.          Objective:      Physical Exam  Neurological:      Mental Status: She is alert and oriented to person, place, and time.      Comments: She is awake and in no acute distress  Mild tenderness palpation lumbar paraspinous musculature at the lumbosacral junction with no palpable masses  Reflexes- +1-+2 reflexes at the following:   C5-Biceps   C6-Brachioradialis   C7-Triceps   L3/4-Patellar   S1-Achilles   Rosenda sign negative bilaterally  Strength testing- 5/5 strength in the following muscle groups:  C5-Elbow flexion  C6-Wrist extension  C7-Elbow extension  C8-Finger flexion  T1-Finger abduction  L2-Hip flexion  L3-Knee extension  L4-Ankle dorsiflexion  L5-Great toe extension  S1-Ankle plantar flexion    Straight leg raise on the right positive for reproduction of right posterior leg discomfort.  CHERELLE test on the right does not cause any groin pain                Assessment:       1. Lumbar radiculitis           Plan:     She remains neurologically intact.  For symptom relief I recommend transforaminal injections on the right at L4-5 and L5-S1.  She has known lumbar radiculopathy in that distribution by EMG and nerve conduction studies.  Add nor flex as needed for spasms.  If she fails the epidural injection consider updated MRI " of the lumbar spine.  Follow-up with me after the procedure      Lumbar radiculitis    Other orders  -     orphenadrine (NORFLEX) 100 mg tablet; Take 1 tablet (100 mg total) by mouth 2 (two) times daily as needed for Muscle spasms.  Dispense: 40 tablet; Refill: 1

## 2024-01-24 NOTE — PROGRESS NOTES
SUBJECTIVE:    Patient ID: Pham Licea is a 68 y.o. female.    Chief Complaint: Hip Pain    She presents today with several weeks history of worsening pain radiating down the right leg from the gluteal region to both the anterior and posterior portion of the right leg below the knee.  Feels like her leg is locking up.  She is getting cramps in the right leg.  Current pain level is 6/10 interferes with quality of life in terms of activities of daily living recreation and social activities.  In terms of interventional history she has had variable responses to interlaminar injections at L5-S1.  The 1st injection helped the 2nd not as much.          Past Medical History:   Diagnosis Date    Anemia     Fatty liver     Former smoker     Osteoarthritis 2010    Pulmonary nodules 2019    Repeat CT in 6 mo    Ulcer of abdomen wall 2016     Social History     Socioeconomic History    Marital status:     Number of children: 4    Highest education level: Some college, no degree   Occupational History    Occupation: sale specialist-    Tobacco Use    Smoking status: Former     Current packs/day: 0.00     Average packs/day: 1 pack/day for 40.0 years (40.0 ttl pk-yrs)     Types: Cigarettes     Start date: 1969     Quit date: 2009     Years since quittin.2    Smokeless tobacco: Never    Tobacco comments:     quit    Substance and Sexual Activity    Alcohol use: Not Currently     Comment: Not often - one glass of wine every now and then    Drug use: Not Currently     Types: Marijuana     Comment: in     Sexual activity: Not Currently     Social Determinants of Health     Financial Resource Strain: Medium Risk (2024)    Overall Financial Resource Strain (CARDIA)     Difficulty of Paying Living Expenses: Somewhat hard   Food Insecurity: No Food Insecurity (2024)    Hunger Vital Sign     Worried About Running Out of Food in the Last Year: Never true     Ran Out of Food in  the Last Year: Never true   Transportation Needs: No Transportation Needs (2024)    PRAPARE - Transportation     Lack of Transportation (Medical): No     Lack of Transportation (Non-Medical): No   Physical Activity: Inactive (2024)    Exercise Vital Sign     Days of Exercise per Week: 0 days     Minutes of Exercise per Session: 0 min   Stress: Stress Concern Present (2024)    [place] [employer] - Occupational Stress Questionnaire     Feeling of Stress : To some extent   Social Connections: Unknown (2024)    Social Connection and Isolation Panel [NHANES]     Frequency of Communication with Friends and Family: More than three times a week     Frequency of Social Gatherings with Friends and Family: More than three times a week     Active Member of Clubs or Organizations: Yes     Attends Club or Organization Meetings: More than 4 times per year     Marital Status:    Housing Stability: Low Risk  (2024)    Housing Stability Vital Sign     Unable to Pay for Housing in the Last Year: No     Number of Places Lived in the Last Year: 1     Unstable Housing in the Last Year: No     Past Surgical History:   Procedure Laterality Date    APPENDECTOMY  1993     SECTION  , , 1983    x3    COLONOSCOPY  ~    [facility]: normal findings per patient report    COLONOSCOPY N/A 2020    Procedure: COLONOSCOPY;  Surgeon: Misael Holm MD;  Location: [facility];  Service: General;  Laterality: N/A;    COLONOSCOPY, WITH RETROGRADE BALLOON ENTEROSCOPY, SINGLE OR DOUBLE BALLOON N/A 2023    Procedure: COLONOSCOPY, WITH RETROGRADE BALLOON ENTEROSCOPY, DOUBLE BALLOON;  Surgeon: Collin Mares MD;  Location: [facility] ([address] [place] [address]);  Service: Endoscopy;  Laterality: N/A;  inst via portal-MS-sutab per pt request-tb-new inst portal    DIAGNOSTIC LAPAROSCOPY N/A 2021    Procedure: LAPAROSCOPY, DIAGNOSTIC;  Surgeon: Misael Holm MD;  Location: [facility] OR;   Service: General;  Laterality: N/A;    EPIDURAL STEROID INJECTION INTO LUMBAR SPINE N/A 10/12/2018    Procedure: Injection-steroid-epidural-lumbar;  Surgeon: Kal Johnson MD;  Location: [facility] OR;  Service: Pain Management;  Laterality: N/A;  L5-S1    EPIDURAL STEROID INJECTION INTO LUMBAR SPINE N/A 5/31/2019    Procedure: Injection-steroid-epidural-lumbar L5-S1;  Surgeon: Kal Johnson MD;  Location: [facility] OR;  Service: Pain Management;  Laterality: N/A;    ESOPHAGOGASTRODUODENOSCOPY N/A 11/9/2020    Procedure: ESOPHAGOGASTRODUODENOSCOPY (EGD);  Surgeon: Misael Holm MD;  Location: [facility] ENDO;  Service: General;  Laterality: N/A;    FUSION, SPINE, MINIMALLY INVASIVE, USING COMPUTER-ASSISTED NAVIGATION N/A 12/22/2022    Procedure: ENTEROSCOPY, DOUBLE BALLOON, ANTEGRADE;  Surgeon: Collin Mares MD;  Location: [facility] ENDO ([facility] FLR);  Service: Endoscopy;  Laterality: N/A;  11/14/22-Instructions via portal-DS    *open to earlier date if available*    HYSTERECTOMY  1993    one ovary conserved    LAPAROSCOPIC CHOLECYSTECTOMY N/A 9/2/2020    Procedure: CHOLECYSTECTOMY, LAPAROSCOPIC;  Surgeon: Govind Frey MD;  Location: [facility] OR;  Service: General;  Laterality: N/A;    LAPAROSCOPIC LYSIS OF ADHESIONS N/A 5/4/2021    Procedure: LYSIS, ADHESIONS, LAPAROSCOPIC;  Surgeon: Misael Holm MD;  Location: [facility] OR;  Service: General;  Laterality: N/A;    UPPER GASTROINTESTINAL ENDOSCOPY  2016     Family History   Problem Relation Age of Onset    Ovarian cancer Mother     Heart disease Mother     Osteoporosis Mother     Arthritis Mother     Hypertension Father     Stroke Father 50    Brain Hemorrhage Father     Aneurysm Father     Osteoarthritis Sister     Arthritis Sister     Hypertension Sister     Obesity Sister     Breast cancer Neg Hx     Colon cancer Neg Hx     Colon polyps Neg Hx     Crohn's disease Neg Hx     Ulcerative colitis Neg Hx      Vitals:    01/24/24 0857   Weight: 88 kg (194 lb 0.1 oz)   Height: 5'  "4" (1.626 m)       Review of Systems   Constitutional:  Negative for chills, diaphoresis, fatigue, fever and unexpected weight change.   HENT:  Negative for trouble swallowing.    Eyes:  Negative for visual disturbance.   Respiratory:  Negative for shortness of breath.    Cardiovascular:  Negative for chest pain.   Gastrointestinal:  Negative for abdominal pain, constipation, nausea and vomiting.   Genitourinary:  Negative for difficulty urinating.   Musculoskeletal:  Negative for arthralgias, back pain, gait problem, joint swelling, myalgias, neck pain and neck stiffness.   Neurological:  Negative for dizziness, speech difficulty, weakness, light-headedness, numbness and headaches.          Objective:      Physical Exam  Neurological:      Mental Status: She is alert and oriented to person, place, and time.      Comments: She is awake and in no acute distress  Mild tenderness palpation lumbar paraspinous musculature at the lumbosacral junction with no palpable masses  Reflexes- +1-+2 reflexes at the following:   C5-Biceps   C6-Brachioradialis   C7-Triceps   L3/4-Patellar   S1-Achilles   Rosenda sign negative bilaterally  Strength testing- 5/5 strength in the following muscle groups:  C5-Elbow flexion  C6-Wrist extension  C7-Elbow extension  C8-Finger flexion  T1-Finger abduction  L2-Hip flexion  L3-Knee extension  L4-Ankle dorsiflexion  L5-Great toe extension  S1-Ankle plantar flexion    Straight leg raise on the right positive for reproduction of right posterior leg discomfort.  CHERELLE test on the right does not cause any groin pain                Assessment:       1. Lumbar radiculitis           Plan:     She remains neurologically intact.  For symptom relief I recommend transforaminal injections on the right at L4-5 and L5-S1.  She has known lumbar radiculopathy in that distribution by EMG and nerve conduction studies.  Add nor flex as needed for spasms.  If she fails the epidural injection consider updated MRI " of the lumbar spine.  Follow-up with me after the procedure      Lumbar radiculitis    Other orders  -     orphenadrine (NORFLEX) 100 mg tablet; Take 1 tablet (100 mg total) by mouth 2 (two) times daily as needed for Muscle spasms.  Dispense: 40 tablet; Refill: 1

## 2024-01-31 ENCOUNTER — PATIENT MESSAGE (OUTPATIENT)
Dept: HEMATOLOGY/ONCOLOGY | Facility: CLINIC | Age: 69
End: 2024-01-31
Payer: MEDICARE

## 2024-02-01 ENCOUNTER — OFFICE VISIT (OUTPATIENT)
Dept: GASTROENTEROLOGY | Facility: CLINIC | Age: 69
End: 2024-02-01
Payer: MEDICARE

## 2024-02-01 ENCOUNTER — HOSPITAL ENCOUNTER (OUTPATIENT)
Dept: RADIOLOGY | Facility: HOSPITAL | Age: 69
Discharge: HOME | End: 2024-02-01
Payer: MEDICARE

## 2024-02-01 VITALS
WEIGHT: 195.13 LBS | DIASTOLIC BLOOD PRESSURE: 66 MMHG | HEART RATE: 67 BPM | HEIGHT: 64 IN | SYSTOLIC BLOOD PRESSURE: 132 MMHG | BODY MASS INDEX: 33.31 KG/M2

## 2024-02-01 DIAGNOSIS — Z79.899 ENCOUNTER FOR MONITORING LONG-TERM PROTON PUMP INHIBITOR THERAPY: ICD-10-CM

## 2024-02-01 DIAGNOSIS — K56.609 SMALL BOWEL OBSTRUCTION: ICD-10-CM

## 2024-02-01 DIAGNOSIS — Z12.11 SCREENING FOR COLON CANCER: ICD-10-CM

## 2024-02-01 DIAGNOSIS — Z87.19 HISTORY OF SMALL BOWEL OBSTRUCTION: ICD-10-CM

## 2024-02-01 DIAGNOSIS — Z87.19 HISTORY OF CONSTIPATION: ICD-10-CM

## 2024-02-01 DIAGNOSIS — R10.84 GENERALIZED ABDOMINAL PAIN: Primary | ICD-10-CM

## 2024-02-01 DIAGNOSIS — K21.9 GASTROESOPHAGEAL REFLUX DISEASE, UNSPECIFIED WHETHER ESOPHAGITIS PRESENT: ICD-10-CM

## 2024-02-01 DIAGNOSIS — Z51.81 ENCOUNTER FOR MONITORING LONG-TERM PROTON PUMP INHIBITOR THERAPY: ICD-10-CM

## 2024-02-01 DIAGNOSIS — R11.2 NAUSEA AND VOMITING, UNSPECIFIED VOMITING TYPE: ICD-10-CM

## 2024-02-01 DIAGNOSIS — Z86.2 HISTORY OF IRON DEFICIENCY ANEMIA: ICD-10-CM

## 2024-02-01 PROCEDURE — 99213 OFFICE O/P EST LOW 20 MIN: CPT | Mod: S$PBB,,,

## 2024-02-01 PROCEDURE — 99213 OFFICE O/P EST LOW 20 MIN: CPT | Mod: PBBFAC,PN

## 2024-02-01 PROCEDURE — 99999 PR PBB SHADOW E&M-EST. PATIENT-LVL III: CPT | Mod: PBBFAC,,,

## 2024-02-01 PROCEDURE — 74018 RADEX ABDOMEN 1 VIEW: CPT | Mod: 26,,, | Performed by: RADIOLOGY

## 2024-02-01 PROCEDURE — 74018 RADEX ABDOMEN 1 VIEW: CPT | Mod: TC

## 2024-02-01 RX ORDER — CALCIUM CARBONATE 500(1250)
1 TABLET ORAL 2 TIMES DAILY
COMMUNITY
End: 2024-03-26

## 2024-02-01 RX ORDER — PROMETHAZINE HYDROCHLORIDE 25 MG/1
25 SUPPOSITORY RECTAL EVERY 6 HOURS PRN
Qty: 8 SUPPOSITORY | Refills: 0 | Status: SHIPPED | OUTPATIENT
Start: 2024-02-01 | End: 2024-06-07

## 2024-02-01 RX ORDER — CYANOCOBALAMIN (VITAMIN B-12) 1000MCG/ML
DROPS ORAL
COMMUNITY
Start: 2022-11-26

## 2024-02-01 RX ORDER — ACETAMINOPHEN AND PHENYLEPHRINE HCL 325; 5 MG/1; MG/1
TABLET ORAL
COMMUNITY
End: 2024-03-26

## 2024-02-01 RX ORDER — MAGNESIUM GLUCONATE 27.5 (500)
TABLET ORAL ONCE
COMMUNITY

## 2024-02-01 RX ORDER — POTASSIUM CHLORIDE 750 MG/1
10 CAPSULE, EXTENDED RELEASE ORAL ONCE
COMMUNITY
End: 2024-03-26

## 2024-02-01 NOTE — PROGRESS NOTES
Subjective:       Patient ID: Pham Licea is a 68 y.o. female Body mass index is 33.49 kg/m².    Chief Complaint: Emesis (Abdominal pain/ past bowel obstruction)    This patient is new to me.  Referring Provider: Aaareferral Self for SBO.  Established patient of Dr. Issac montes.            Abdominal Pain  Chronicity: monday night into tuesday, with nausea and vomitting. Episode onset: nausea and vomitting is controlled zofran and phenergan suppository helps. Onset quality: vomits stomach contents and food particles non bloody. The problem occurs daily. The problem has been gradually improving. The pain is located in the generalized abdominal region (feels like abdominal contraction/crampy). The pain is at a severity of 5/10. The abdominal pain does not radiate. Associated symptoms include constipation (hx of constipation, has a bm daily, rates stool type 5 on bristol stool scale, was taking fiber and miralax sbut states lactulose helps more, rates stool type 5-7 on bristol stool scale, takes lactulose almost daily), flatus, nausea and vomiting. Pertinent negatives include no anorexia, arthralgias, belching, diarrhea, dysuria, fever, frequency, headaches, hematochezia, hematuria, melena, myalgias or weight loss. Associated symptoms comments: Pt presents to clinic for evaluation of small-bowel obstruction,pt w/ hx for recurrent small-bowel obstruction and small-bowel strictures.  She had been seen by GI at our [place] location, Dr. Blakely,.  She has been regularly followed by Dr. Misael Holm, general surgeon in [place].  He has been treating her for recurrent small-bowel obstructions. Pt states SBO's started after sept 2020 had gall bladder surgery then she had a bowel obstruction, Recurrent small-bowel obstructions lead to an eventual exploratory laparotomy 05/04/2021.  A long section of the mid small bowel involving the proximal ileum contained multiple strictures, >20, some of which were  pinpoint size.  A 60-70 cm length of small bowel was resected at that time with a hand-sewn side-to-side anastomosis performed.  An estimated 350 cm of bowel remained.  Pathology from those samples revealed edema, fibrosis, and chronic inflammation. She eventually had another exploratory laparotomy 03/17/2022 and was found to have an internal hernia from adhesions   -EGD and colonoscopy 11/09/2020 by Dr. Misael Holm.  Esophagus was normal, gastric erythema in the antrum was noted, and the duodenum was normal.  Gastric biopsies revealed reactive changes and no signs of H pylori.  Duodenal biopsies were normal.  Colonoscopy was complete with good bowel preparation.  A 15 mm lipoma was seen in the ascending colon.  Otherwise, the exam was unremarkable, and a 10 year repeat recommended.  Pt underwent a lower and Upper Device-Assisted Enteroscopy without  Fluoroscopy as listed in procedures  -patient sees Dr. Bower hematologist oncologist for iron-deficiency anemia per Hematology Oncology iron-deficiency anemia possibly can be from NATHAN: pt had partail bowel removal from obstructions with recurrence      . Exacerbated by: cheesy foods aggravate pain, currently on clear liquid diet. Prior diagnostic workup includes CT scan, upper endoscopy, lower endoscopy and GI consult. Her past medical history is significant for abdominal surgery and GERD (hx of GERD has been taking pantoprazole since about 2020 declines any GERD symptoms currently states GERD symptoms are controlled). There is no history of colon cancer, Crohn's disease, gallstones, irritable bowel syndrome, pancreatitis, PUD or ulcerative colitis. Patient's medical history does not include kidney stones and UTI.       Review of Systems   Constitutional:  Negative for fever and weight loss.   Gastrointestinal:  Positive for abdominal pain, constipation (hx of constipation, has a bm daily, rates stool type 5 on bristol stool scale, was taking fiber and miralax  sbut states lactulose helps more, rates stool type 5-7 on bristol stool scale, takes lactulose almost daily), flatus, nausea and vomiting. Negative for anorexia, diarrhea, hematochezia and melena.   Genitourinary:  Negative for dysuria, frequency and hematuria.   Musculoskeletal:  Negative for arthralgias and myalgias.   Neurological:  Negative for headaches.         No LMP recorded (lmp unknown). Patient has had a hysterectomy.  Past Medical History:   Diagnosis Date    Anemia     Fatty liver     Former smoker     Osteoarthritis     Pulmonary nodules 2019    Repeat CT in 6 mo    Ulcer of abdomen wall 2016     Past Surgical History:   Procedure Laterality Date    APPENDECTOMY  1993     SECTION  , , 1983    x3    CHOLECYSTECTOMY          COLONOSCOPY  ~    [facility]: normal findings per patient report    COLONOSCOPY N/A 2020    Procedure: COLONOSCOPY;  Surgeon: Misael Holm MD;  Location[facility];  Service: General;  Laterality: N/A;    COLONOSCOPY, WITH RETROGRADE BALLOON ENTEROSCOPY, SINGLE OR DOUBLE BALLOON N/A 2023    Procedure: COLONOSCOPY, WITH RETROGRADE BALLOON ENTEROSCOPY, DOUBLE BALLOON;  Surgeon: Collin Mares MD;  Location: [facility] ([address] [place] [address]);  Service: Endoscopy;  Laterality: N/A;  inst via portal-MS-sutab per pt request-tb-new inst portal    DIAGNOSTIC LAPAROSCOPY N/A 2021    Procedure: LAPAROSCOPY, DIAGNOSTIC;  Surgeon: Misael Holm MD;  Location: [facility];  Service: General;  Laterality: N/A;    EPIDURAL STEROID INJECTION INTO LUMBAR SPINE N/A 10/12/2018    Procedure: Injection-steroid-epidural-lumbar;  Surgeon: Kal Johnson MD;  Location: [facility] OR;  Service: Pain Management;  Laterality: N/A;  L5-S1    EPIDURAL STEROID INJECTION INTO LUMBAR SPINE N/A 2019    Procedure: Injection-steroid-epidural-lumbar L5-S1;  Surgeon: Kal Johnson MD;  Location: [facility] OR;  Service: Pain Management;  Laterality: N/A;     ESOPHAGOGASTRODUODENOSCOPY N/A 2020    Procedure: ESOPHAGOGASTRODUODENOSCOPY (EGD);  Surgeon: Misael Holm MD;  Location: [facility] ENDO;  Service: General;  Laterality: N/A;    FUSION, SPINE, MINIMALLY INVASIVE, USING COMPUTER-ASSISTED NAVIGATION N/A 2022    Procedure: ENTEROSCOPY, DOUBLE BALLOON, ANTEGRADE;  Surgeon: Collin Mares MD;  Location: [facility] ENDO (2ND FLR);  Service: Endoscopy;  Laterality: N/A;  22-Instructions via portal-DS    *open to earlier date if available*    HYSTERECTOMY  1993    one ovary conserved    LAPAROSCOPIC CHOLECYSTECTOMY N/A 2020    Procedure: CHOLECYSTECTOMY, LAPAROSCOPIC;  Surgeon: Govind Frey MD;  Location: [facility] OR;  Service: General;  Laterality: N/A;    LAPAROSCOPIC LYSIS OF ADHESIONS N/A 2021    Procedure: LYSIS, ADHESIONS, LAPAROSCOPIC;  Surgeon: Misael Holm MD;  Location: [facility] OR;  Service: General;  Laterality: N/A;    UPPER GASTROINTESTINAL ENDOSCOPY       Family History   Problem Relation Age of Onset    Ovarian cancer Mother     Heart disease Mother     Osteoporosis Mother     Arthritis Mother     Hypertension Father     Stroke Father 50    Brain Hemorrhage Father     Aneurysm Father     Osteoarthritis Sister     Arthritis Sister     Hypertension Sister     Obesity Sister     Breast cancer Neg Hx     Colon cancer Neg Hx     Colon polyps Neg Hx     Crohn's disease Neg Hx     Ulcerative colitis Neg Hx      Social History     Tobacco Use    Smoking status: Former     Current packs/day: 0.00     Average packs/day: 1 pack/day for 40.0 years (40.0 ttl pk-yrs)     Types: Cigarettes     Start date: 1969     Quit date: 2009     Years since quittin.2    Smokeless tobacco: Never    Tobacco comments:     quit 2009   Substance Use Topics    Alcohol use: Not Currently     Comment: Not often - one glass of wine every now and then    Drug use: Not Currently     Types: Marijuana     Comment: in 1970s     Wt Readings  from Last 10 Encounters:   02/01/24 88.5 kg (195 lb 1.7 oz)   01/24/24 88 kg (194 lb 0.1 oz)   11/28/23 88 kg (194 lb)   10/16/23 88.4 kg (194 lb 14.2 oz)   09/11/23 89.9 kg (198 lb 3.1 oz)   09/08/23 89.9 kg (198 lb 1.6 oz)   08/18/23 86.2 kg (190 lb)   08/15/23 86.6 kg (191 lb)   07/14/23 85.3 kg (188 lb)   06/30/23 87.1 kg (192 lb)     Lab Results   Component Value Date    WBC 7.00 01/05/2024    HGB 13.3 01/05/2024    HCT 41.3 01/05/2024    MCV 95 01/05/2024     01/05/2024     CMP  Sodium   Date Value Ref Range Status   01/05/2024 141 136 - 145 mmol/L Final     Potassium   Date Value Ref Range Status   01/05/2024 4.0 3.5 - 5.1 mmol/L Final     Chloride   Date Value Ref Range Status   01/05/2024 107 95 - 110 mmol/L Final     CO2   Date Value Ref Range Status   01/05/2024 23 23 - 29 mmol/L Final     Glucose   Date Value Ref Range Status   01/05/2024 91 70 - 110 mg/dL Final     BUN   Date Value Ref Range Status   01/05/2024 16 8 - 23 mg/dL Final     Creatinine   Date Value Ref Range Status   01/05/2024 0.7 0.5 - 1.4 mg/dL Final     Calcium   Date Value Ref Range Status   01/05/2024 8.6 (L) 8.7 - 10.5 mg/dL Final     Total Protein   Date Value Ref Range Status   01/05/2024 6.4 6.0 - 8.4 g/dL Final     Albumin   Date Value Ref Range Status   01/05/2024 3.9 3.5 - 5.2 g/dL Final     Total Bilirubin   Date Value Ref Range Status   01/05/2024 0.4 0.1 - 1.0 mg/dL Final     Comment:     For infants and newborns, interpretation of results should be based  on gestational age, weight and in agreement with clinical  observations.    Premature Infant recommended reference ranges:  Up to 24 hours.............<8.0 mg/dL  Up to 48 hours............<12.0 mg/dL  3-5 days..................<15.0 mg/dL  6-29 days.................<15.0 mg/dL       Alkaline Phosphatase   Date Value Ref Range Status   01/05/2024 64 55 - 135 U/L Final     AST   Date Value Ref Range Status   01/05/2024 16 10 - 40 U/L Final     ALT   Date Value Ref  "Range Status   01/05/2024 17 10 - 44 U/L Final     Anion Gap   Date Value Ref Range Status   01/05/2024 11 8 - 16 mmol/L Final     eGFR if    Date Value Ref Range Status   06/27/2022 >60.0 >60 mL/min/1.73 m^2 Final     eGFR if non    Date Value Ref Range Status   06/27/2022 >60.0 >60 mL/min/1.73 m^2 Final     Comment:     Calculation used to obtain the estimated glomerular filtration  rate (eGFR) is the CKD-EPI equation.        Lab Results   Component Value Date    LIPASE 10 06/08/2023     No results found for: "LIPASERES"  Lab Results   Component Value Date    TSH 1.937 01/25/2022       Reviewed prior medical records including office visit 01/08/2024 Dr. Bower , Dr. Mares office visit 10/12/2022 radiology report of CT abdomen pelvis 06/08/2023 & endoscopy history (see surgical history/procedures).    Objective:      Physical Exam  Vitals and nursing note reviewed.   Constitutional:       Appearance: Normal appearance. She is normal weight.   Cardiovascular:      Rate and Rhythm: Normal rate and regular rhythm.      Heart sounds: Normal heart sounds.   Pulmonary:      Breath sounds: Normal breath sounds.   Abdominal:      General: Bowel sounds are normal.      Palpations: Abdomen is soft.      Tenderness: There is generalized abdominal tenderness.   Skin:     General: Skin is warm and dry.      Coloration: Skin is not jaundiced.   Neurological:      Mental Status: She is alert and oriented to person, place, and time.   Psychiatric:         Mood and Affect: Mood normal.         Behavior: Behavior normal.         Assessment:       1. Generalized abdominal pain    2. Nausea and vomiting, unspecified vomiting type    3. History of small bowel obstruction    4. Small bowel obstruction    5. Gastroesophageal reflux disease, unspecified whether esophagitis present    6. History of constipation    7. Screening for colon cancer        Plan:       Generalized abdominal pain  -     X-Ray Abdomen " AP 1 View; Future; Expected date: 02/01/2024  -     Lipase; Future; Expected date: 02/01/2024  - Consider EGD and Colonoscopy will discuss case with DR. Gleason  - Consider Bentyl  - If no improvement in symptoms or symptoms worsen, call/follow-up at clinic or go to ER    Nausea and vomiting, unspecified vomiting type  -  CONTINUE   promethazine (PHENERGAN) 25 MG suppository; Place 1 suppository (25 mg total) rectally every 6 (six) hours as needed for Nausea (nausea).  Dispense: 8 suppository; Refill: 0  - continue Zofran as prescribed as needed for nausea  -consider gastric emptying study  -consider EGD pending recommendations by     History of small bowel obstruction  -     X-Ray Abdomen AP 1 View; Future; Expected date: 02/01/2024  -     Ambulatory referral/consult to General Surgery; Future; Expected date: 02/08/2024   If no improvement in symptoms or symptoms worsen, call/follow-up at clinic or go to ER    Gastroesophageal reflux disease, unspecified whether esophagitis present   -continue pantoprazole 40 mg orally daily   -Take PPI 30min-1hr before eating breakfast  -Educated patient on lifestyle modifications to help control/reduce reflux/abdominal pain including: avoid large meals, avoid eating within 2-3 hours of bedtime (avoid late night eating & lying down soon after eating), elevate head of bed if nocturnal symptoms are present, smoking cessation (if current smoker), & weight loss (if overweight).   -Educated to avoid known foods which trigger reflux symptoms & to minimize/avoid high-fat foods, chocolate, caffeine, citrus, alcohol, & tomato products.  -Advised to avoid/limit use of NSAID's, since they can cause GI upset, bleeding, and/or ulcers. If needed, take with food.     History of constipation   -Recommend daily exercise as tolerated, adequate water intake (six 8-oz glasses of water daily), and high fiber diet.  -Recommend trying OTC MiraLax once daily (17g PO) as directed  -If no  improvement with above recommendations, try intermittently dosed Dulcolax OTC as directed (every 3-4 days) PRN to facilitate bowel movements  -If no relief with this, consider adding a emollient laxative (castor oil or mineral oil) +/- enema  -If still no improvement with these measures, call/follow-up   -continue lactulose as prescribed    Screening for colon cancer   -next surveillance colonoscopy per recommendations to 11/2030    History of iron-deficiency anemia   -Continue to follow up with Hematology Oncology    Encounter for monitoring long-term proton pump inhibitor therapy    -Option of tapering/weaning PPI away was also discussed, including the need for possible long term therapy to treat symptoms if they recur after cessation of medication, as well as to mitigate the risk of developing complications of reflux such as Willett's esophagus and/or esophageal cancer.  Patient understands the risks and benefits of treatment with drug versus cessation.  Daily supplementation with MVI with calcium and vitamin D were recommended as was follow up with PCP for bone scan when appropriate.  Labs including B12, folate, CMP, CBC, calcium, and magnesium should be checked at least annually      Follow up in about 4 weeks (around 2/29/2024), or if symptoms worsen or fail to improve.      If no improvement in symptoms or symptoms worsen, call/follow-up at clinic or go to ER.       [facility] - GASTROENTEROLOGY  [facility], [place]     Dictation software program was used for this note. Please expect some simple typographical  errors in this note.    Encounter includes face to face time and non-face to face time preparing to see the patient (eg, review of tests), obtaining and/or reviewing separately obtained history, documenting clinical information in the electronic or other health record, independently interpreting results (not separately reported) and communicating results to the  patient/family/caregiver, or care coordination (not separately reported).

## 2024-02-05 NOTE — DISCHARGE INSTRUCTIONS
Pain injection instructions:     This procedure may take a couple weeks to relieve pain  You may get some pain relief from the local anesthetic initally.   Steroids can have side effects of flushed face or nervous feeling.    No driving for 24 hrs.   Activity as tolerated- gradually increase activities.  Dont lift over 10 lbs for 24 hrs   No heat at injection sites for 2 full days. No heating pads, hot tubs, saunas, or swimming in any body of water or pool for 2 full days.  Use ice pack for mild swelling and for comfort , apply for 20 minutes, remove for 20 minute intervals. No direct contact of ice itself  to skin.  May shower today.  Do not allow shower water to beat on injections site(s) for 2 full days. No tub baths for two full days.      Resume Aspirin, Plavix, or Coumadin the day after the procedure unless otherwise instructed.   If diabetic,monitor your glucose carefully as steroids can increase your glucose level    Seek immediate medical help for:     Severe increase in pain not relieved by medication or ice or any new pain in the region .    Prolonged (more than 24 hrs)or increasing weakness or numbness in the legs or arms. - it is normal to have weakness/numbness for up to 8 hrs.   Fever above 100 degrees F , Drainage,redness,active bleeding, or increased swelling at the injection site.  Headache that increases when sitting up, but decreases when lying down.  New shortness of breath, chest pain, or breathing problems.    After Surgery:  Always be aware that any surgery can cause these symptoms:    Pain- Medication can be prescribed for pain to decrease your pain but may not completely take your pain away. Over the Counter pain medicine my be enough and you can always use Ice and rest to help ease pain.    Bleeding- a little bleeding after a surgery is usually within normal.  If there is a lot of blood you need to notify your MD.  Emergency treatments of bleeding are cold application, elevation of the  bleeding site and compression.    Infection- Infection after surgery is NOT a normal occurrence.  Signs of infection are fever, swelling, hot to touch the incision.  If this occurs notify your MD immediately.    Nausea- this can be common after a surgery especially if you have had anesthesia medicine or are taking pain medicine.  Steroids have a side effect of nausea sometimes. Staying on clear liquids, bland foods, gingerale, or over the counter anti nausea medicines can help.  If you vomit more than once, notify your MD.  Anti Nausea medicines can be prescribed.   Detail Level: None

## 2024-02-06 ENCOUNTER — HOSPITAL ENCOUNTER (OUTPATIENT)
Facility: HOSPITAL | Age: 69
Discharge: HOME | End: 2024-02-06
Attending: ANESTHESIOLOGY | Admitting: ANESTHESIOLOGY
Payer: MEDICARE

## 2024-02-06 DIAGNOSIS — M54.16 LUMBAR RADICULITIS: ICD-10-CM

## 2024-02-06 PROCEDURE — 64484 NJX AA&/STRD TFRM EPI L/S EA: CPT | Mod: RT | Performed by: ANESTHESIOLOGY

## 2024-02-06 PROCEDURE — 25000003 PHARM REV CODE 250: Performed by: ANESTHESIOLOGY

## 2024-02-06 PROCEDURE — 64483 NJX AA&/STRD TFRM EPI L/S 1: CPT | Mod: RT | Performed by: ANESTHESIOLOGY

## 2024-02-06 PROCEDURE — 63600175 PHARM REV CODE 636 W HCPCS: Performed by: ANESTHESIOLOGY

## 2024-02-06 PROCEDURE — 64484 NJX AA&/STRD TFRM EPI L/S EA: CPT | Mod: RT,,, | Performed by: ANESTHESIOLOGY

## 2024-02-06 PROCEDURE — 64483 NJX AA&/STRD TFRM EPI L/S 1: CPT | Mod: RT,,, | Performed by: ANESTHESIOLOGY

## 2024-02-06 PROCEDURE — 25500020 PHARM REV CODE 255: Performed by: ANESTHESIOLOGY

## 2024-02-06 RX ORDER — LIDOCAINE HYDROCHLORIDE 10 MG/ML
INJECTION, SOLUTION EPIDURAL; INFILTRATION; INTRACAUDAL; PERINEURAL
Status: DISCONTINUED | OUTPATIENT
Start: 2024-02-06 | End: 2024-02-06 | Stop reason: HOSPADM

## 2024-02-06 RX ORDER — DEXAMETHASONE SODIUM PHOSPHATE 10 MG/ML
INJECTION INTRAMUSCULAR; INTRAVENOUS
Status: DISCONTINUED | OUTPATIENT
Start: 2024-02-06 | End: 2024-02-06 | Stop reason: HOSPADM

## 2024-02-06 RX ORDER — BUPIVACAINE HYDROCHLORIDE 2.5 MG/ML
INJECTION, SOLUTION EPIDURAL; INFILTRATION; INTRACAUDAL
Status: DISCONTINUED | OUTPATIENT
Start: 2024-02-06 | End: 2024-02-06 | Stop reason: HOSPADM

## 2024-02-06 RX ORDER — LIDOCAINE HYDROCHLORIDE 10 MG/ML
1 INJECTION, SOLUTION EPIDURAL; INFILTRATION; INTRACAUDAL; PERINEURAL ONCE
Status: COMPLETED | OUTPATIENT
Start: 2024-02-06 | End: 2024-02-06

## 2024-02-06 RX ORDER — FENTANYL CITRATE 50 UG/ML
INJECTION, SOLUTION INTRAMUSCULAR; INTRAVENOUS
Status: DISCONTINUED | OUTPATIENT
Start: 2024-02-06 | End: 2024-02-06 | Stop reason: HOSPADM

## 2024-02-06 RX ORDER — MIDAZOLAM HYDROCHLORIDE 1 MG/ML
INJECTION INTRAMUSCULAR; INTRAVENOUS
Status: DISCONTINUED | OUTPATIENT
Start: 2024-02-06 | End: 2024-02-06 | Stop reason: HOSPADM

## 2024-02-06 RX ORDER — SODIUM CHLORIDE, SODIUM LACTATE, POTASSIUM CHLORIDE, CALCIUM CHLORIDE 600; 310; 30; 20 MG/100ML; MG/100ML; MG/100ML; MG/100ML
INJECTION, SOLUTION INTRAVENOUS CONTINUOUS
Status: ACTIVE | OUTPATIENT
Start: 2024-02-06

## 2024-02-06 RX ADMIN — SODIUM CHLORIDE, POTASSIUM CHLORIDE, SODIUM LACTATE AND CALCIUM CHLORIDE: 600; 310; 30; 20 INJECTION, SOLUTION INTRAVENOUS at 09:02

## 2024-02-06 RX ADMIN — LIDOCAINE HYDROCHLORIDE 5 MG: 10 INJECTION, SOLUTION EPIDURAL; INFILTRATION; INTRACAUDAL; PERINEURAL at 09:02

## 2024-02-06 NOTE — DISCHARGE SUMMARY
[facility] - Periop Services  Discharge Note  Short Stay    Procedure(s) (LRB):  Injection,steroid,epidural,transforaminal approach (Right)      OUTCOME: Patient tolerated treatment/procedure well without complication and is now ready for discharge.    DISPOSITION: Home or Self Care    FINAL DIAGNOSIS:  <principal problem not specified>    FOLLOWUP: In clinic    DISCHARGE INSTRUCTIONS:    Discharge Procedure Orders   Notify your health care provider if you experience any of the following:  temperature >100.4     Notify your health care provider if you experience any of the following:  severe uncontrolled pain     Notify your health care provider if you experience any of the following:  redness, tenderness, or signs of infection (pain, swelling, redness, odor or green/yellow discharge around incision site)     Activity as tolerated        TIME SPENT ON DISCHARGE: 30 minutes

## 2024-02-06 NOTE — OP NOTE
PROCEDURE DATE: 2/6/2024    PROCEDURE: Right L4-5, L5-S1 transforaminal epidural steroid injection under fluoroscopy    DIAGNOSIS: Lumbar radiculitis    Post op diagnosis: Same    PHYSICIAN: Kal Johnson MD    MEDICATIONS INJECTED:  Dexamethasone 5mg (0.5ml) and 1.5ml 0.25% bupivicaine at each nerve root.     LOCAL ANESTHETIC INJECTED:  Lidocaine 1%. 2 ml per site.    SEDATION MEDICATIONS: RN IV sedation    ESTIMATED BLOOD LOSS:  None    COMPLICATIONS:  None    TECHNIQUE:   A time-out was taken to identify patient and procedure side prior to starting the procedure. The patient was placed in a prone position, prepped and draped in the usual sterile fashion using ChloraPrep and sterile towels.  The area to be injected was determined under fluoroscopic guidance in AP and oblique view.  Local anesthetic was given by raising a wheal and going down to the hub of a 25-gauge 1.5 inch needle.  In oblique view, a 3.5 inch 22-gauge bent-tip spinal needle was introduced towards 6 oclock position of the pedicle of each above named nerve root level.  The needle was walked medially then hinged into the neural foramen and position was confirmed in AP and lateral views.  1ml contrast dye was injected to confirm appropriate placement and that there was no vascular uptake.  After negative aspiration for blood or CSF, the medication was then injected. This was performed at the right L4/5 and L5/S1 level(s). The patient tolerated the procedure well.    The patient was monitored after the procedure.  Patient was given post procedure and discharge instructions to follow at home. The patient was discharged in a stable condition.

## 2024-02-06 NOTE — PLAN OF CARE
Discharge instructions given to pt, verbalized understanding.  Tolerating PO fluids.  IV removed.  Pain 3/10.  Slight weakness noted to right leg.  Wheeled out to dtr  per RN in no distress.

## 2024-02-08 VITALS
SYSTOLIC BLOOD PRESSURE: 109 MMHG | TEMPERATURE: 98 F | HEIGHT: 64 IN | DIASTOLIC BLOOD PRESSURE: 62 MMHG | OXYGEN SATURATION: 94 % | RESPIRATION RATE: 18 BRPM | HEART RATE: 62 BPM | WEIGHT: 194 LBS | BODY MASS INDEX: 33.12 KG/M2

## 2024-02-14 ENCOUNTER — NURSE TRIAGE (OUTPATIENT)
Dept: ADMINISTRATIVE | Facility: CLINIC | Age: 69
End: 2024-02-14
Payer: MEDICARE

## 2024-02-14 ENCOUNTER — OFFICE VISIT (OUTPATIENT)
Dept: FAMILY MEDICINE | Facility: CLINIC | Age: 69
End: 2024-02-14
Payer: MEDICARE

## 2024-02-14 VITALS
BODY MASS INDEX: 33.43 KG/M2 | DIASTOLIC BLOOD PRESSURE: 78 MMHG | HEIGHT: 64 IN | WEIGHT: 195.81 LBS | OXYGEN SATURATION: 98 % | HEART RATE: 72 BPM | SYSTOLIC BLOOD PRESSURE: 120 MMHG

## 2024-02-14 DIAGNOSIS — R05.1 ACUTE COUGH: Primary | ICD-10-CM

## 2024-02-14 LAB
CTP QC/QA: YES
SARS-COV-2 RDRP RESP QL NAA+PROBE: NEGATIVE

## 2024-02-14 PROCEDURE — 99999PBSHW: Mod: PBBFAC,,,

## 2024-02-14 PROCEDURE — 87635 SARS-COV-2 COVID-19 AMP PRB: CPT | Mod: PBBFAC | Performed by: FAMILY MEDICINE

## 2024-02-14 PROCEDURE — 99214 OFFICE O/P EST MOD 30 MIN: CPT | Mod: S$PBB,,, | Performed by: FAMILY MEDICINE

## 2024-02-14 PROCEDURE — 99999 PR PBB SHADOW E&M-EST. PATIENT-LVL III: CPT | Mod: PBBFAC,,, | Performed by: FAMILY MEDICINE

## 2024-02-14 PROCEDURE — 99213 OFFICE O/P EST LOW 20 MIN: CPT | Mod: PBBFAC | Performed by: FAMILY MEDICINE

## 2024-02-14 RX ORDER — AZITHROMYCIN 250 MG/1
TABLET, FILM COATED ORAL
Qty: 6 TABLET | Refills: 0 | Status: SHIPPED | OUTPATIENT
Start: 2024-02-14 | End: 2024-02-19

## 2024-02-14 RX ORDER — BENZONATATE 100 MG/1
100 CAPSULE ORAL 3 TIMES DAILY PRN
Qty: 30 CAPSULE | Refills: 1 | Status: SHIPPED | OUTPATIENT
Start: 2024-02-14 | End: 2024-03-05

## 2024-02-14 NOTE — PROGRESS NOTES
Subjective:       Patient ID: Pham Licea is a 68 y.o. female.    Chief Complaint: Nasal Congestion (Pt stated symptoms started Friday. She took covid test yesterday and it resulted negative ) and Laryngitis    Ms Licea is a 68-year-old female here today with cough, chest congestion and laryngitis.  She states that approximately 2 days ago she had severe acid reflux and feels like she may have inhaled some acid.  She has not sure whether it is coincidental and perhaps she has COVID or some other respiratory illness or if it is a pneumonitis type situation from inhaling acid.  She is coughing moderately and it is causing her lungs to burn      Review of Systems   Constitutional:  Negative for activity change, appetite change, chills, diaphoresis and fever.   HENT:  Positive for congestion, postnasal drip and sore throat. Negative for rhinorrhea and sinus pressure.    Respiratory:  Positive for cough. Negative for shortness of breath.    Cardiovascular:  Negative for chest pain, palpitations and leg swelling.   Gastrointestinal:  Negative for abdominal distention and abdominal pain.   Skin:  Negative for color change, rash and wound.       Patient Active Problem List   Diagnosis    Primary osteoarthritis involving multiple joints    Obesity (BMI 30.0-34.9)    Gastroesophageal reflux disease    Right knee pain    Chronic midline low back pain    Acute tear medial meniscus, right, sequela    Primary osteoarthritis of right knee    Lumbar radiculitis    Trigger middle finger of right hand    Nausea    Abdominal pain    Encounter for postoperative care    Intestinal obstruction    Hypokalemia    Constipation    History of cholecystectomy    Right upper quadrant pain    History of hysterectomy    Rectal bleeding    Hemorrhoids    Mitral valve prolapse    Coronary artery calcification    Anemia    SAMPSON (dyspnea on exertion), noted 2010    History of smoking 25-50 pack years, 46 py, quit 2009    NSAID long-term  use, started 2017, Aleve 2-4 tabs daily for a year    Bandemia    Family history of premature CAD    Cardiovascular event risk, ASCVD 10-year risk 4.8%, 2019, 6.7% in 2021    Abdominal obesity    Dyslipidemia, baseline LDL-C 112, HDL-C 49    Iron deficiency anemia secondary to inadequate dietary iron intake    Severe anemia    S/p small bowel obstruction    Arm pain, lateral, left    Right hip pain    Chronic bilateral low back pain without sciatica    Decreased strength    Atypical chest pain, onset 2021    Chronic fatigue    Excessive daytime sleepiness       Objective:      Physical Exam  Vitals and nursing note reviewed.   Constitutional:       Appearance: She is well-developed.   HENT:      Right Ear: Tympanic membrane and ear canal normal.      Left Ear: Tympanic membrane and ear canal normal.      Nose: Mucosal edema and rhinorrhea present.      Right Sinus: No maxillary sinus tenderness or frontal sinus tenderness.      Left Sinus: No maxillary sinus tenderness or frontal sinus tenderness.      Mouth/Throat:      Pharynx: Posterior oropharyngeal erythema present. No oropharyngeal exudate.      Tonsils: No tonsillar abscesses.   Cardiovascular:      Rate and Rhythm: Normal rate and regular rhythm.      Heart sounds: Normal heart sounds.   Pulmonary:      Effort: Pulmonary effort is normal.      Breath sounds: Normal breath sounds.   Skin:     General: Skin is warm and dry.         Lab Results   Component Value Date    WBC 7.00 01/05/2024    HGB 13.3 01/05/2024    HCT 41.3 01/05/2024     01/05/2024    CHOL 190 09/09/2023    TRIG 68 09/09/2023    HDL 61 09/09/2023    ALT 17 01/05/2024    AST 16 01/05/2024     01/05/2024    K 4.0 01/05/2024     01/05/2024    CREATININE 0.7 01/05/2024    BUN 16 01/05/2024    CO2 23 01/05/2024    TSH 1.937 01/25/2022    INR 0.9 01/28/2022    HGBA1C 5.3 04/18/2023     The 10-year ASCVD risk score (Giuliana MONK, et al., 2019) is: 5.1%    Values used to calculate the  "score:      Age: 68 years      Sex: Female      Is Non- : No      Diabetic: No      Tobacco smoker: No      Systolic Blood Pressure: 120 mmHg      Is BP treated: No      HDL Cholesterol: 61 mg/dL      Total Cholesterol: 190 mg/dL  Visit Vitals  /78 (BP Location: Left arm, Patient Position: Sitting, BP Method: Medium (Manual))   Pulse 72   Ht 5' 4" (1.626 m)   Wt 88.8 kg (195 lb 12.8 oz)   LMP  (LMP Unknown)   SpO2 98%   BMI 33.61 kg/m²      Assessment:       1. Acute cough        Plan:       1. Acute cough  -     POCT COVID-19 Rapid Screening    Other orders  -     azithromycin (Z-KANDI) 250 MG tablet; Take 2 tablets by mouth on day 1; Take 1 tablet by mouth on days 2-5  Dispense: 6 tablet; Refill: 0  -     benzonatate (TESSALON PERLES) 100 MG capsule; Take 1 capsule (100 mg total) by mouth 3 (three) times daily as needed for Cough.  Dispense: 30 capsule; Refill: 1       Follow up if symptoms worsen or fail to improve.      Future Appointments       Date Provider Specialty Appt Notes    3/5/2024 Julio Evans MD Spine Services [facility]    3/26/2024 John Tyler MD Surgery Plan of action    5/6/2024  Lab [facility]    5/8/2024 Mayra Bower MD Hematology and Oncology VV/Anemia/Lab/4mfu             "

## 2024-02-14 NOTE — TELEPHONE ENCOUNTER
Patient states c/o chest congestion and difficulty breathing/SOB. Patient is audibly having difficulty breathing. Patient states she is currently driving.     Patient advised to pull to the side of the street and call EMS/911 for immediate medical attention. Patient declined care advice and stated she needed antibiotics and would like an appointment with her PCP. Patient encouraged to adhere to care advice and activate EMS/911. Patient maintained decline of care advice.     Reason for Disposition   SEVERE difficulty breathing (e.g., struggling for each breath, speaks in single words, pulse > 120)    Protocols used: Breathing Difficulty-A-[place]

## 2024-02-22 ENCOUNTER — PATIENT MESSAGE (OUTPATIENT)
Dept: FAMILY MEDICINE | Facility: CLINIC | Age: 69
End: 2024-02-22
Payer: MEDICARE

## 2024-02-27 ENCOUNTER — PATIENT MESSAGE (OUTPATIENT)
Dept: GASTROENTEROLOGY | Facility: CLINIC | Age: 69
End: 2024-02-27
Payer: MEDICARE

## 2024-02-27 DIAGNOSIS — Z87.19 HISTORY OF CONSTIPATION: Primary | ICD-10-CM

## 2024-02-27 RX ORDER — LACTULOSE 10 G/15ML
SOLUTION ORAL
Qty: 948 ML | Refills: 2 | Status: SHIPPED | OUTPATIENT
Start: 2024-02-27

## 2024-03-05 ENCOUNTER — HOSPITAL ENCOUNTER (OUTPATIENT)
Dept: RADIOLOGY | Facility: HOSPITAL | Age: 69
Discharge: HOME | End: 2024-03-05
Attending: PHYSICAL MEDICINE & REHABILITATION
Payer: MEDICARE

## 2024-03-05 ENCOUNTER — OFFICE VISIT (OUTPATIENT)
Dept: SPINE | Facility: CLINIC | Age: 69
End: 2024-03-05
Payer: MEDICARE

## 2024-03-05 VITALS — BODY MASS INDEX: 33.42 KG/M2 | WEIGHT: 195.75 LBS | HEIGHT: 64 IN

## 2024-03-05 DIAGNOSIS — M54.9 DORSALGIA, UNSPECIFIED: ICD-10-CM

## 2024-03-05 DIAGNOSIS — M54.16 LUMBAR RADICULITIS: Primary | ICD-10-CM

## 2024-03-05 PROCEDURE — 99999 PR PBB SHADOW E&M-EST. PATIENT-LVL III: CPT | Mod: PBBFAC,,, | Performed by: PHYSICAL MEDICINE & REHABILITATION

## 2024-03-05 PROCEDURE — 99213 OFFICE O/P EST LOW 20 MIN: CPT | Mod: S$PBB,,, | Performed by: PHYSICAL MEDICINE & REHABILITATION

## 2024-03-05 PROCEDURE — 72148 MRI LUMBAR SPINE W/O DYE: CPT | Mod: 26,,, | Performed by: RADIOLOGY

## 2024-03-05 PROCEDURE — 72148 MRI LUMBAR SPINE W/O DYE: CPT | Mod: TC

## 2024-03-05 PROCEDURE — 99213 OFFICE O/P EST LOW 20 MIN: CPT | Mod: PBBFAC,PN | Performed by: PHYSICAL MEDICINE & REHABILITATION

## 2024-03-05 NOTE — PROGRESS NOTES
SUBJECTIVE:    Patient ID: Pham Licea is a 68 y.o. female.    Chief Complaint: Follow-up    She is here for follow-up status post transforaminal injection right L4-5 and L5-S1 on 2024 with Dr. Johnson.  Good response to that procedure.  She describes improvement in the right anterior and posterior leg discomfort that she described preprocedure.  Having said that she is still bothered by radiating pain going all the way down the right leg from the lateral hip to the dorsum of the foot.  She has not having pain at this time but at times is as high as 6/10 and interferes with her quality of life in terms of activities of daily living recreation and social activities.  He is taking gabapentin 300 mg in the morning 300 mid day and 600 at night.          Past Medical History:   Diagnosis Date    Anemia     Fatty liver     Former smoker     Osteoarthritis 2010    Pulmonary nodules 2019    Repeat CT in 6 mo    Ulcer of abdomen wall 2016     Social History     Socioeconomic History    Marital status:     Number of children: 4    Highest education level: Some college, no degree   Occupational History    Occupation: sale specialist-    Tobacco Use    Smoking status: Former     Current packs/day: 0.00     Average packs/day: 1 pack/day for 40.0 years (40.0 ttl pk-yrs)     Types: Cigarettes     Start date: 1969     Quit date: 2009     Years since quittin.3    Smokeless tobacco: Never    Tobacco comments:     quit    Substance and Sexual Activity    Alcohol use: Not Currently     Comment: Not often - one glass of wine every now and then    Drug use: Not Currently     Types: Marijuana     Comment: in     Sexual activity: Not Currently     Social Determinants of Health     Financial Resource Strain: Medium Risk (2024)    Overall Financial Resource Strain (CARDIA)     Difficulty of Paying Living Expenses: Somewhat hard   Food Insecurity: No Food Insecurity (2024)     Hunger Vital Sign     Worried About Running Out of Food in the Last Year: Never true     Ran Out of Food in the Last Year: Never true   Transportation Needs: No Transportation Needs (2024)    PRAPARE - Transportation     Lack of Transportation (Medical): No     Lack of Transportation (Non-Medical): No   Physical Activity: Inactive (2024)    Exercise Vital Sign     Days of Exercise per Week: 0 days     Minutes of Exercise per Session: 0 min   Stress: Stress Concern Present (2024)    [employer] - Occupational Stress Questionnaire     Feeling of Stress : To some extent   Social Connections: Unknown (2024)    Social Connection and Isolation Panel [NHANES]     Frequency of Communication with Friends and Family: More than three times a week     Frequency of Social Gatherings with Friends and Family: More than three times a week     Active Member of Clubs or Organizations: Yes     Attends Club or Organization Meetings: More than 4 times per year     Marital Status:    Housing Stability: Low Risk  (2024)    Housing Stability Vital Sign     Unable to Pay for Housing in the Last Year: No     Number of Places Lived in the Last Year: 1     Unstable Housing in the Last Year: No     Past Surgical History:   Procedure Laterality Date    APPENDECTOMY  1993     SECTION  , , 1983    x3    CHOLECYSTECTOMY          COLONOSCOPY  ~    [facility]: normal findings per patient report    COLONOSCOPY N/A 2020    Procedure: COLONOSCOPY;  Surgeon: Misael Holm MD;  Location: [facility];  Service: General;  Laterality: N/A;    COLONOSCOPY, WITH RETROGRADE BALLOON ENTEROSCOPY, SINGLE OR DOUBLE BALLOON N/A 2023    Procedure: COLONOSCOPY, WITH RETROGRADE BALLOON ENTEROSCOPY, DOUBLE BALLOON;  Surgeon: Collin Mares MD;  Location: [facility] ([address] [place] [address]);  Service: Endoscopy;  Laterality: N/A;  [facility] via portal-MS-sutab per pt request-tb-new inst portal     DIAGNOSTIC LAPAROSCOPY N/A 05/04/2021    Procedure: LAPAROSCOPY, DIAGNOSTIC;  Surgeon: Misael Holm MD;  Location: [facility] OR;  Service: General;  Laterality: N/A;    EPIDURAL STEROID INJECTION INTO LUMBAR SPINE N/A 10/12/2018    Procedure: Injection-steroid-epidural-lumbar;  Surgeon: Kal Johnson MD;  Location: [facility] OR;  Service: Pain Management;  Laterality: N/A;  L5-S1    EPIDURAL STEROID INJECTION INTO LUMBAR SPINE N/A 05/31/2019    Procedure: Injection-steroid-epidural-lumbar L5-S1;  Surgeon: Kal Johnson MD;  Location: [facility] OR;  Service: Pain Management;  Laterality: N/A;    ESOPHAGOGASTRODUODENOSCOPY N/A 11/09/2020    Procedure: ESOPHAGOGASTRODUODENOSCOPY (EGD);  Surgeon: Misael Holm MD;  Location: [facility] ENDO;  Service: General;  Laterality: N/A;    FUSION, SPINE, MINIMALLY INVASIVE, USING COMPUTER-ASSISTED NAVIGATION N/A 12/22/2022    Procedure: ENTEROSCOPY, DOUBLE BALLOON, ANTEGRADE;  Surgeon: Collin Mares MD;  Location: [facility] ENDO (2ND FLR);  Service: Endoscopy;  Laterality: N/A;  11/14/22-Instructions via portal-DS    *open to earlier date if available*    HYSTERECTOMY  1993    one ovary conserved    LAPAROSCOPIC CHOLECYSTECTOMY N/A 09/02/2020    Procedure: CHOLECYSTECTOMY, LAPAROSCOPIC;  Surgeon: Govind Frey MD;  Location: [facility] OR;  Service: General;  Laterality: N/A;    LAPAROSCOPIC LYSIS OF ADHESIONS N/A 05/04/2021    Procedure: LYSIS, ADHESIONS, LAPAROSCOPIC;  Surgeon: Misael Holm MD;  Location: [facility] OR;  Service: General;  Laterality: N/A;    TRANSFORAMINAL EPIDURAL INJECTION OF STEROID Right 2/6/2024    Procedure: Injection,steroid,epidural,transforaminal approach;  Surgeon: Kal Johnson MD;  Location: [facility] OR;  Service: Anesthesiology;  Laterality: Right;  L4-5 and L5-s1    UPPER GASTROINTESTINAL ENDOSCOPY  2016     Family History   Problem Relation Age of Onset    Ovarian cancer Mother     Heart disease Mother     Osteoporosis Mother     Arthritis Mother      "Hypertension Father     Stroke Father 50    Brain Hemorrhage Father     Aneurysm Father     Osteoarthritis Sister     Arthritis Sister     Hypertension Sister     Obesity Sister     Breast cancer Neg Hx     Colon cancer Neg Hx     Colon polyps Neg Hx     Crohn's disease Neg Hx     Ulcerative colitis Neg Hx      Vitals:    03/05/24 1054   Weight: 88.8 kg (195 lb 12.3 oz)   Height: 5' 4" (1.626 m)       Review of Systems   Constitutional:  Negative for chills, diaphoresis, fatigue, fever and unexpected weight change.   HENT:  Negative for trouble swallowing.    Eyes:  Negative for visual disturbance.   Respiratory:  Negative for shortness of breath.    Cardiovascular:  Negative for chest pain.   Gastrointestinal:  Negative for abdominal pain, constipation, nausea and vomiting.   Genitourinary:  Negative for difficulty urinating.   Musculoskeletal:  Negative for arthralgias, back pain, gait problem, joint swelling, myalgias, neck pain and neck stiffness.   Neurological:  Negative for dizziness, speech difficulty, weakness, light-headedness, numbness and headaches.          Objective:      Physical Exam  Neurological:      Mental Status: She is alert and oriented to person, place, and time.             Assessment:       1. Lumbar radiculitis    2. Dorsalgia, unspecified           Plan:     Improved but still not satisfied with quality of life status post transforaminal injection right L4-5 and L5-S1.  As previously discussed we will get an updated MRI in preparation for neurosurgical evaluation.  She can follow up with me after the scan      Lumbar radiculitis    Dorsalgia, unspecified  -     MRI Lumbar Spine Without Contrast; Future; Expected date: 03/05/2024          "

## 2024-03-06 ENCOUNTER — OFFICE VISIT (OUTPATIENT)
Dept: SPINE | Facility: CLINIC | Age: 69
End: 2024-03-06
Payer: MEDICARE

## 2024-03-06 VITALS — BODY MASS INDEX: 33.42 KG/M2 | WEIGHT: 195.75 LBS | HEIGHT: 64 IN

## 2024-03-06 DIAGNOSIS — M54.16 LUMBAR RADICULITIS: Primary | ICD-10-CM

## 2024-03-06 PROCEDURE — 99213 OFFICE O/P EST LOW 20 MIN: CPT | Mod: S$PBB,,, | Performed by: PHYSICAL MEDICINE & REHABILITATION

## 2024-03-06 PROCEDURE — 99999 PR PBB SHADOW E&M-EST. PATIENT-LVL III: CPT | Mod: PBBFAC,,, | Performed by: PHYSICAL MEDICINE & REHABILITATION

## 2024-03-06 PROCEDURE — 99213 OFFICE O/P EST LOW 20 MIN: CPT | Mod: PBBFAC,PN | Performed by: PHYSICAL MEDICINE & REHABILITATION

## 2024-03-06 NOTE — PROGRESS NOTES
SUBJECTIVE:    Patient ID: Pham Licea is a 68 y.o. female.    Chief Complaint: Follow-up    She is here to review her lumbar MRI done 03/05/2024 to evaluate her complaint of right leg pain radiating from the right lateral hip to the dorsum of the right foot.      The MRI is summarized below:      FINDINGS:  Morphology: Incidental L1 and L4 body hemangiomas.  Minimal fatty marrow replacement along the inferior endplate of L4 and L5 with associated small chronic appearing Schmorl's nodes.  Marrow signal is otherwise within normal limits and vertebral body heights are preserved.     Alignment: Similar grade 1 anterolisthesis of L4 on L5.     Cord: Normal in contour, caliber, and signal intensity.  Conus positioning is within normal limits.     Disc levels:     T12-L1: Within normal limits.     L1-L2: Within normal limits.     L2-L3: Within normal limits.     L3-L4: Mild bilateral facet arthrosis.  The spinal canal and foramina are patent.     L4-L5: Mild to moderate degenerative disc height loss and desiccation with shallow disc bulging and moderate bilateral facet arthrosis with ligamentum flavum thickening and spondylolisthesis producing moderate narrowing of the spinal canal not substantially changed from the prior exam.  No more than minimal bilateral foraminal narrowing also not substantially changed.     L5-S1: Disc desiccation and shallow disc bulging as well as mild bilateral facet arthrosis.  The spinal canal and foramina remain patent.     Other: Visualized paraspinal soft tissues are within normal limits.     Impression:     1. Similar degenerative changes again most pronounced at L4-L5 with multifactorial moderate spinal canal narrowing and minimal bilateral foraminal narrowing.  2. No adverse changes or acute process.  Additional mild degenerative changes at L3-L4 and L5-S1 as above.      Clinically she is about the same.  Her current pain level is 4/10 and interferes with quality of life and  activities of daily living or recreation and social activities             Past Medical History:   Diagnosis Date    Anemia     Fatty liver 2015    Former smoker 2009    Osteoarthritis 2010    Pulmonary nodules 2019    Repeat CT in 6 mo    Ulcer of abdomen wall 2016     Social History     Socioeconomic History    Marital status:     Number of children: 4    Highest education level: Some college, no degree   Occupational History    Occupation: sale specialist-    Tobacco Use    Smoking status: Former     Current packs/day: 0.00     Average packs/day: 1 pack/day for 40.0 years (40.0 ttl pk-yrs)     Types: Cigarettes     Start date: 1969     Quit date: 2009     Years since quittin.3    Smokeless tobacco: Never    Tobacco comments:     quit 2009   Substance and Sexual Activity    Alcohol use: Not Currently     Comment: Not often - one glass of wine every now and then    Drug use: Not Currently     Types: Marijuana     Comment: in     Sexual activity: Not Currently     Social Determinants of Health     Financial Resource Strain: Medium Risk (2024)    Overall Financial Resource Strain (CARDIA)     Difficulty of Paying Living Expenses: Somewhat hard   Food Insecurity: No Food Insecurity (2024)    Hunger Vital Sign     Worried About Running Out of Food in the Last Year: Never true     Ran Out of Food in the Last Year: Never true   Transportation Needs: No Transportation Needs (2024)    PRAPARE - Transportation     Lack of Transportation (Medical): No     Lack of Transportation (Non-Medical): No   Physical Activity: Inactive (2024)    Exercise Vital Sign     Days of Exercise per Week: 0 days     Minutes of Exercise per Session: 0 min   Stress: Stress Concern Present (2024)    [place] [employer] - Occupational Stress Questionnaire     Feeling of Stress : To some extent   Social Connections: Unknown (2024)    Social Connection and Isolation Panel  [NHANES]     Frequency of Communication with Friends and Family: More than three times a week     Frequency of Social Gatherings with Friends and Family: More than three times a week     Active Member of Clubs or Organizations: Yes     Attends Club or Organization Meetings: More than 4 times per year     Marital Status:    Housing Stability: Low Risk  (2024)    Housing Stability Vital Sign     Unable to Pay for Housing in the Last Year: No     Number of Places Lived in the Last Year: 1     Unstable Housing in the Last Year: No     Past Surgical History:   Procedure Laterality Date    APPENDECTOMY  1993     SECTION  , , 1983    x3    CHOLECYSTECTOMY          COLONOSCOPY  ~    [facility]: normal findings per patient report    COLONOSCOPY N/A 2020    Procedure: COLONOSCOPY;  Surgeon: Misael Holm MD;  Location: [facility];  Service: General;  Laterality: N/A;    COLONOSCOPY, WITH RETROGRADE BALLOON ENTEROSCOPY, SINGLE OR DOUBLE BALLOON N/A 2023    Procedure: COLONOSCOPY, WITH RETROGRADE BALLOON ENTEROSCOPY, DOUBLE BALLOON;  Surgeon: Collin Mares MD;  Location: [facility] ENDO ([address] [place] [address]);  Service: Endoscopy;  Laterality: N/A;  inst via portal-MS-sutab per pt request-tb-new inst portal    DIAGNOSTIC LAPAROSCOPY N/A 2021    Procedure: LAPAROSCOPY, DIAGNOSTIC;  Surgeon: Misael Holm MD;  Location: [facility] OR;  Service: General;  Laterality: N/A;    EPIDURAL STEROID INJECTION INTO LUMBAR SPINE N/A 10/12/2018    Procedure: Injection-steroid-epidural-lumbar;  Surgeon: Kal Johnson MD;  [facility];  Service: Pain Management;  Laterality: N/A;  L5-S1    EPIDURAL STEROID INJECTION INTO LUMBAR SPINE N/A 2019    Procedure: Injection-steroid-epidural-lumbar L5-S1;  Surgeon: Kal Johnson MD;  [facility];  Service: Pain Management;  Laterality: N/A;    ESOPHAGOGASTRODUODENOSCOPY N/A 2020    Procedure: ESOPHAGOGASTRODUODENOSCOPY (EGD);  Surgeon:  "Misael Holm MD;  Location: [facility] ENDO;  Service: General;  Laterality: N/A;    FUSION, SPINE, MINIMALLY INVASIVE, USING COMPUTER-ASSISTED NAVIGATION N/A 12/22/2022    Procedure: ENTEROSCOPY, DOUBLE BALLOON, ANTEGRADE;  Surgeon: Collin Mares MD;  Location: [facility] ENDO (2ND FLR);  Service: Endoscopy;  Laterality: N/A;  11/14/22-Instructions via portal-DS    *open to earlier date if available*    HYSTERECTOMY  1993    one ovary conserved    LAPAROSCOPIC CHOLECYSTECTOMY N/A 09/02/2020    Procedure: CHOLECYSTECTOMY, LAPAROSCOPIC;  Surgeon: Govind Frey MD;  Location: [facility] OR;  Service: General;  Laterality: N/A;    LAPAROSCOPIC LYSIS OF ADHESIONS N/A 05/04/2021    Procedure: LYSIS, ADHESIONS, LAPAROSCOPIC;  Surgeon: Misael Holm MD;  Location: [facility] OR;  Service: General;  Laterality: N/A;    TRANSFORAMINAL EPIDURAL INJECTION OF STEROID Right 2/6/2024    Procedure: Injection,steroid,epidural,transforaminal approach;  Surgeon: Kal Johnson MD;  Location: [facility] OR;  Service: Anesthesiology;  Laterality: Right;  L4-5 and L5-s1    UPPER GASTROINTESTINAL ENDOSCOPY  2016     Family History   Problem Relation Age of Onset    Ovarian cancer Mother     Heart disease Mother     Osteoporosis Mother     Arthritis Mother     Hypertension Father     Stroke Father 50    Brain Hemorrhage Father     Aneurysm Father     Osteoarthritis Sister     Arthritis Sister     Hypertension Sister     Obesity Sister     Breast cancer Neg Hx     Colon cancer Neg Hx     Colon polyps Neg Hx     Crohn's disease Neg Hx     Ulcerative colitis Neg Hx      Vitals:    03/06/24 1416   Weight: 88.8 kg (195 lb 12.3 oz)   Height: 5' 4" (1.626 m)       Review of Systems   Constitutional:  Negative for chills, diaphoresis, fatigue, fever and unexpected weight change.   HENT:  Negative for trouble swallowing.    Eyes:  Negative for visual disturbance.   Respiratory:  Negative for shortness of breath.    Cardiovascular:  Negative for chest " pain.   Gastrointestinal:  Negative for abdominal pain, constipation, nausea and vomiting.   Genitourinary:  Negative for difficulty urinating.   Musculoskeletal:  Negative for arthralgias, back pain, gait problem, joint swelling, myalgias, neck pain and neck stiffness.   Neurological:  Negative for dizziness, speech difficulty, weakness, light-headedness, numbness and headaches.          Objective:      Physical Exam  Neurological:      Mental Status: She is alert and oriented to person, place, and time.             Assessment:       1. Lumbar radiculitis           Plan:     I reassured her she has no worrisome findings on her MRI.  In fact has been very little progression if any since her last MRI in 2019. Per her request we will refer her for a neurosurgical opinion.  She can follow-up with me on an as-needed basis       Lumbar radiculitis  -     Ambulatory referral/consult to Neurosurgery; Future; Expected date: 03/13/2024

## 2024-03-11 ENCOUNTER — TELEPHONE (OUTPATIENT)
Dept: NEUROSURGERY | Facility: CLINIC | Age: 69
End: 2024-03-11
Payer: MEDICARE

## 2024-03-11 RX ORDER — GABAPENTIN 300 MG/1
300 CAPSULE ORAL
Qty: 30 CAPSULE | Refills: 5 | Status: SHIPPED | OUTPATIENT
Start: 2024-03-11 | End: 2024-05-03 | Stop reason: SDUPTHER

## 2024-03-11 NOTE — TELEPHONE ENCOUNTER
----- Message from Stacey M Lefort sent at 3/11/2024  3:06 PM CDT -----  Pt is returning your call at  [phone]. Thank you.

## 2024-03-11 NOTE — TELEPHONE ENCOUNTER
----- Message from Yuko Miller RN sent at 3/8/2024  1:53 PM CST -----  Regarding: FW: scheduling // ret call  Contact: [phone]    ----- Message -----  From: Lindsay Bradford  Sent: 3/8/2024  10:59 AM CST  To: Yusuf Garrett Staff  Subject: scheduling // ret call                           Type:  Patient Returning Call    Who Called:  Elicia    Who Left Message for Patient:  clinic    Does the patient know what this is regarding?:  yes    Best Call Back Number:  [phone]    Additional Information:  missed call to set up appt, please call to advise

## 2024-03-12 NOTE — ED NOTES
Informed of plan of care, pending NG tube to suction, pending admission, GI consult, diagnosis of bowel obstruction   Application Tool (Optional): Liquid Nitrogen Sprayer Show Aperture Variable?: Yes Number Of Freeze-Thaw Cycles: 3 freeze-thaw cycles Duration Of Freeze Thaw-Cycle (Seconds): 0 Post-Care Instructions: I reviewed with the patient in detail post-care instructions. Patient is to wear sunprotection, and avoid picking at any of the treated lesions. Pt may apply Vaseline to crusted or scabbing areas. Render Note In Bullet Format When Appropriate: No Consent: The patient's consent was obtained including but not limited to risks of crusting, scabbing, blistering, scarring, darker or lighter pigmentary change, recurrence, incomplete removal and infection. Detail Level: Simple

## 2024-03-19 ENCOUNTER — OFFICE VISIT (OUTPATIENT)
Dept: ORTHOPEDICS | Facility: CLINIC | Age: 69
End: 2024-03-19
Payer: MEDICARE

## 2024-03-19 VITALS — WEIGHT: 195.75 LBS | HEIGHT: 64 IN | RESPIRATION RATE: 16 BRPM | BODY MASS INDEX: 33.42 KG/M2

## 2024-03-19 DIAGNOSIS — M70.61 TROCHANTERIC BURSITIS OF RIGHT HIP: Primary | ICD-10-CM

## 2024-03-19 PROCEDURE — 20610 DRAIN/INJ JOINT/BURSA W/O US: CPT | Mod: PBBFAC,PO | Performed by: ORTHOPAEDIC SURGERY

## 2024-03-19 PROCEDURE — 99213 OFFICE O/P EST LOW 20 MIN: CPT | Mod: PBBFAC,PO | Performed by: ORTHOPAEDIC SURGERY

## 2024-03-19 PROCEDURE — 99999 PR PBB SHADOW E&M-EST. PATIENT-LVL III: CPT | Mod: PBBFAC,,, | Performed by: ORTHOPAEDIC SURGERY

## 2024-03-19 PROCEDURE — 99499 UNLISTED E&M SERVICE: CPT | Mod: S$PBB,,, | Performed by: ORTHOPAEDIC SURGERY

## 2024-03-19 PROCEDURE — 99999PBSHW PR PBB SHADOW TECHNICAL ONLY FILED TO HB: Mod: PBBFAC,,,

## 2024-03-19 RX ORDER — TRIAMCINOLONE ACETONIDE 40 MG/ML
40 INJECTION, SUSPENSION INTRA-ARTICULAR; INTRAMUSCULAR
Status: DISCONTINUED | OUTPATIENT
Start: 2024-03-19 | End: 2024-03-19 | Stop reason: HOSPADM

## 2024-03-19 RX ADMIN — TRIAMCINOLONE ACETONIDE 40 MG: 40 INJECTION, SUSPENSION INTRA-ARTICULAR; INTRAMUSCULAR at 01:03

## 2024-03-19 NOTE — PROCEDURES
Large Joint Aspiration/Injection: R greater trochanteric bursa    Date/Time: 3/19/2024 1:00 PM    Performed by: Jeremy Mendez MD  Authorized by: Jeremy Mendez MD    Consent Done?:  Yes (Verbal)  Indications:  Pain  Timeout: prior to procedure the correct patient, procedure, and site was verified      Local anesthesia used?: Yes    Local anesthetic:  Topical anesthetic    Details:  Needle Size:  22 G  Approach:  Lateral  Location:  Hip  Site:  R greater trochanteric bursa  Medications:  40 mg triamcinolone acetonide 40 mg/mL  Patient tolerance:  Patient tolerated the procedure well with no immediate complications

## 2024-03-26 ENCOUNTER — OFFICE VISIT (OUTPATIENT)
Dept: SURGERY | Facility: CLINIC | Age: 69
End: 2024-03-26
Payer: MEDICARE

## 2024-03-26 VITALS
BODY MASS INDEX: 33.38 KG/M2 | SYSTOLIC BLOOD PRESSURE: 137 MMHG | TEMPERATURE: 99 F | WEIGHT: 195.56 LBS | DIASTOLIC BLOOD PRESSURE: 74 MMHG | HEIGHT: 64 IN | HEART RATE: 78 BPM

## 2024-03-26 DIAGNOSIS — Z87.19 HISTORY OF SMALL BOWEL OBSTRUCTION: ICD-10-CM

## 2024-03-26 PROCEDURE — 99213 OFFICE O/P EST LOW 20 MIN: CPT | Mod: S$PBB,,, | Performed by: SURGERY

## 2024-03-26 PROCEDURE — 99999 PR PBB SHADOW E&M-EST. PATIENT-LVL IV: CPT | Mod: PBBFAC,,, | Performed by: SURGERY

## 2024-03-26 PROCEDURE — 99214 OFFICE O/P EST MOD 30 MIN: CPT | Mod: PBBFAC,PN | Performed by: SURGERY

## 2024-03-26 RX ORDER — CHOLECALCIFEROL (VITAMIN D3) 125 MCG
5000 CAPSULE ORAL DAILY
COMMUNITY

## 2024-03-26 RX ORDER — CALCIUM CARBONATE 600 MG
1200 TABLET ORAL DAILY
COMMUNITY
End: 2024-06-07

## 2024-03-26 NOTE — PROGRESS NOTES
Pt whom I have seen in the past regarding colonic inertia presents to discuss bowel obstruction.  She has had multipel surgeries in the past for PSBO.  Pt currently having no symptoms.  NOtes she has had bouts of PSBO in past which she often times manages at home.    At present no symptoms    D/w pt that if she were to develop symptoms would recommend visit to office or in ER if unavailable.  Would be happy to address any surgical issues that might arise.

## 2024-04-19 RX ORDER — ORPHENADRINE CITRATE 100 MG/1
100 TABLET, EXTENDED RELEASE ORAL 2 TIMES DAILY PRN
Qty: 40 TABLET | Refills: 1 | Status: SHIPPED | OUTPATIENT
Start: 2024-04-19

## 2024-04-21 NOTE — DISCHARGE INSTRUCTIONS
Pain injection instructions:     Steroids take about a 2 weeks to relieve pain.  Initially you may get pain relief from the local anesthetic but this may wear off before the steroid works.    No driving for 24 hrs.   Activity as tolerated- gradually increase activities.  Dont lift over 10 lbs for 24 hrs   No heat at injection sites x 2 days. No heating pads, hot tubs, saunas, or swimming in any body of water or pool for 2 days.  Use ice pack for mild swelling and for comfort , apply for 20 minutes, remove for 20 minute intervals. No direct contact of ice itself  to skin.  May shower today. No tub baths for two days.      Resume Aspirin, Plavix, or Coumadin the day after the procedure unless otherwise instructed.   If diabetic,monitor your glucose carefully as steroids can increase your glucose level    Seek immediate medical help for:   Severe increase in your usual pain or appearance of new pain.  Prolonged (mor than 8 hours) or increasing weakness or numbness in the legs or arms.    - Numbing medicine was injected that affects nerves that carry information from       muscles to the  brain and the brain to the muscles.  This numbness can last 6-8 hrs so be very careful and get assistance when standing or walking.    Fever above 101 ,Drainage,redness,active bleeding, or increased swelling at the injection site.  Headache, shortness of breath, chest pain, or breathing problems.        
No

## 2024-04-23 ENCOUNTER — HOSPITAL ENCOUNTER (OUTPATIENT)
Dept: RADIOLOGY | Facility: HOSPITAL | Age: 69
Discharge: HOME | End: 2024-04-23
Attending: FAMILY MEDICINE
Payer: MEDICARE

## 2024-04-23 DIAGNOSIS — Z12.31 ENCOUNTER FOR SCREENING MAMMOGRAM FOR BREAST CANCER: ICD-10-CM

## 2024-04-23 PROCEDURE — 77067 SCR MAMMO BI INCL CAD: CPT | Mod: 26,,, | Performed by: RADIOLOGY

## 2024-04-23 PROCEDURE — 77063 BREAST TOMOSYNTHESIS BI: CPT | Mod: 26,,, | Performed by: RADIOLOGY

## 2024-04-23 PROCEDURE — 77063 BREAST TOMOSYNTHESIS BI: CPT | Mod: TC

## 2024-05-01 DIAGNOSIS — R73.03 PREDIABETES: ICD-10-CM

## 2024-05-03 ENCOUNTER — PATIENT MESSAGE (OUTPATIENT)
Dept: SPINE | Facility: CLINIC | Age: 69
End: 2024-05-03
Payer: MEDICARE

## 2024-05-03 RX ORDER — GABAPENTIN 300 MG/1
600 CAPSULE ORAL 3 TIMES DAILY
Qty: 180 CAPSULE | Refills: 0 | Status: SHIPPED | OUTPATIENT
Start: 2024-05-03

## 2024-05-05 ENCOUNTER — PATIENT MESSAGE (OUTPATIENT)
Dept: GASTROENTEROLOGY | Facility: CLINIC | Age: 69
End: 2024-05-05
Payer: MEDICARE

## 2024-05-06 ENCOUNTER — TELEPHONE (OUTPATIENT)
Dept: FAMILY MEDICINE | Facility: CLINIC | Age: 69
End: 2024-05-06
Payer: MEDICARE

## 2024-05-06 ENCOUNTER — LAB VISIT (OUTPATIENT)
Dept: LAB | Facility: HOSPITAL | Age: 69
End: 2024-05-06
Attending: INTERNAL MEDICINE
Payer: MEDICARE

## 2024-05-06 DIAGNOSIS — D50.8 IRON DEFICIENCY ANEMIA SECONDARY TO INADEQUATE DIETARY IRON INTAKE: ICD-10-CM

## 2024-05-06 DIAGNOSIS — M54.50 CHRONIC BILATERAL LOW BACK PAIN WITHOUT SCIATICA: ICD-10-CM

## 2024-05-06 DIAGNOSIS — E53.8 B12 DEFICIENCY: ICD-10-CM

## 2024-05-06 DIAGNOSIS — K21.9 GASTROESOPHAGEAL REFLUX DISEASE WITHOUT ESOPHAGITIS: ICD-10-CM

## 2024-05-06 DIAGNOSIS — G89.29 CHRONIC BILATERAL LOW BACK PAIN WITHOUT SCIATICA: ICD-10-CM

## 2024-05-06 LAB
ALBUMIN SERPL BCP-MCNC: 3.8 G/DL (ref 3.5–5.2)
ALP SERPL-CCNC: 64 U/L (ref 55–135)
ALT SERPL W/O P-5'-P-CCNC: 20 U/L (ref 10–44)
ANION GAP SERPL CALC-SCNC: 10 MMOL/L (ref 8–16)
AST SERPL-CCNC: 20 U/L (ref 10–40)
BASOPHILS # BLD AUTO: 0.07 K/UL (ref 0–0.2)
BASOPHILS NFR BLD: 1.1 % (ref 0–1.9)
BILIRUB SERPL-MCNC: 0.5 MG/DL (ref 0.1–1)
BUN SERPL-MCNC: 13 MG/DL (ref 8–23)
CALCIUM SERPL-MCNC: 9.6 MG/DL (ref 8.7–10.5)
CHLORIDE SERPL-SCNC: 110 MMOL/L (ref 95–110)
CO2 SERPL-SCNC: 23 MMOL/L (ref 23–29)
CREAT SERPL-MCNC: 0.7 MG/DL (ref 0.5–1.4)
DIFFERENTIAL METHOD BLD: NORMAL
EOSINOPHIL # BLD AUTO: 0.1 K/UL (ref 0–0.5)
EOSINOPHIL NFR BLD: 1.8 % (ref 0–8)
ERYTHROCYTE [DISTWIDTH] IN BLOOD BY AUTOMATED COUNT: 12.7 % (ref 11.5–14.5)
EST. GFR  (NO RACE VARIABLE): >60 ML/MIN/1.73 M^2
FERRITIN SERPL-MCNC: 80 NG/ML (ref 20–300)
GLUCOSE SERPL-MCNC: 105 MG/DL (ref 70–110)
HCT VFR BLD AUTO: 42.4 % (ref 37–48.5)
HGB BLD-MCNC: 13.9 G/DL (ref 12–16)
IMM GRANULOCYTES # BLD AUTO: 0.03 K/UL (ref 0–0.04)
IMM GRANULOCYTES NFR BLD AUTO: 0.5 % (ref 0–0.5)
IRON SERPL-MCNC: 60 UG/DL (ref 30–160)
LYMPHOCYTES # BLD AUTO: 2 K/UL (ref 1–4.8)
LYMPHOCYTES NFR BLD: 32.4 % (ref 18–48)
MCH RBC QN AUTO: 30.9 PG (ref 27–31)
MCHC RBC AUTO-ENTMCNC: 32.8 G/DL (ref 32–36)
MCV RBC AUTO: 94 FL (ref 82–98)
MONOCYTES # BLD AUTO: 0.6 K/UL (ref 0.3–1)
MONOCYTES NFR BLD: 9.3 % (ref 4–15)
NEUTROPHILS # BLD AUTO: 3.5 K/UL (ref 1.8–7.7)
NEUTROPHILS NFR BLD: 54.9 % (ref 38–73)
NRBC BLD-RTO: 0 /100 WBC
PLATELET # BLD AUTO: 259 K/UL (ref 150–450)
PMV BLD AUTO: 9.7 FL (ref 9.2–12.9)
POTASSIUM SERPL-SCNC: 4.1 MMOL/L (ref 3.5–5.1)
PROT SERPL-MCNC: 6.6 G/DL (ref 6–8.4)
RBC # BLD AUTO: 4.5 M/UL (ref 4–5.4)
SATURATED IRON: 15 % (ref 20–50)
SODIUM SERPL-SCNC: 143 MMOL/L (ref 136–145)
TOTAL IRON BINDING CAPACITY: 388 UG/DL (ref 250–450)
TRANSFERRIN SERPL-MCNC: 262 MG/DL (ref 200–375)
WBC # BLD AUTO: 6.27 K/UL (ref 3.9–12.7)

## 2024-05-06 PROCEDURE — 83540 ASSAY OF IRON: CPT | Performed by: INTERNAL MEDICINE

## 2024-05-06 PROCEDURE — 80053 COMPREHEN METABOLIC PANEL: CPT | Performed by: INTERNAL MEDICINE

## 2024-05-06 PROCEDURE — 82728 ASSAY OF FERRITIN: CPT | Performed by: INTERNAL MEDICINE

## 2024-05-06 PROCEDURE — 85025 COMPLETE CBC W/AUTO DIFF WBC: CPT | Performed by: INTERNAL MEDICINE

## 2024-05-06 RX ORDER — PANTOPRAZOLE SODIUM 20 MG/1
20 TABLET, DELAYED RELEASE ORAL DAILY
Qty: 90 TABLET | Refills: 2 | Status: SHIPPED | OUTPATIENT
Start: 2024-05-06 | End: 2025-01-31

## 2024-05-06 NOTE — TELEPHONE ENCOUNTER
----- Message from Lani Grossman MD sent at 5/6/2024  7:38 AM CDT -----  Hemoglobin A1c is 5.5% and that is in normal range

## 2024-05-08 ENCOUNTER — OFFICE VISIT (OUTPATIENT)
Dept: HEMATOLOGY/ONCOLOGY | Facility: CLINIC | Age: 69
End: 2024-05-08
Payer: MEDICARE

## 2024-05-08 DIAGNOSIS — D53.9 NUTRITIONAL ANEMIA, UNSPECIFIED: ICD-10-CM

## 2024-05-08 DIAGNOSIS — K62.5 RECTAL BLEEDING: ICD-10-CM

## 2024-05-08 DIAGNOSIS — E78.5 DYSLIPIDEMIA: Primary | ICD-10-CM

## 2024-05-08 DIAGNOSIS — D50.8 IRON DEFICIENCY ANEMIA SECONDARY TO INADEQUATE DIETARY IRON INTAKE: ICD-10-CM

## 2024-05-08 PROCEDURE — 99214 OFFICE O/P EST MOD 30 MIN: CPT | Mod: 95,,, | Performed by: INTERNAL MEDICINE

## 2024-05-08 NOTE — PROGRESS NOTES
Subjective:   The patient location is:  home  Visit type: Virtual visit with synchronous audio and video  Face-to-face or time spent with patient on the encounter:25 min  Total time spent on and for  this encounter which includes non face-to-face time preparing to see patient, review of tests, obtaining and or reviewing separately obtained records documenting clinical information in the electronic or other health records, independently interpreting results which is not separately reported ,and communicating results to the patient/family/caregiver and in care coordination and treatment planning/communicating with pharmacy for prescriptions/addressing social needs/arranging follow-up and or referrals :25 min    Each patient I provide medical services by telemedicine is:  (1) informed of the relationship between the physician and patient and the respective role of any other health care provider with respect to management of the patient; and (2) notified that he or she may decline to receive medical services by telemedicine and may withdraw from such care at any time.  This is a video visit therefore some elements of the physical exam such as vital signs, heart sounds are breath sounds are not included and may be included if found in recent clinic notes of other providers assessing same patient. Any symptoms or signs that were visualized were stated by the patient may be included in this note.      Patient ID: Pham Licea is a 68 y.o. female.    Chief Complaint:      sept 2020 had gall bladder surgery, thern she had a bowel obstruction, no surgery then, had 2 feet of bowels removed after a second bowel obstruction  Esophageal ulcers and dudenal ulcers- with bleeding in 2017  Recd blood in [facility] this weekend, pt went to ED because she was shaky and week and couldn't think was found to be anemic in ed   pt readmitted 3/22 had repeat bowel obstruction   Seen at emergency room May 25, 2022 with CT scan of abdomen  which shows partial obstruction patient having significant abdominal pain.  Discomfort belching similar to her prior obstructive symptoms  Past Medical History:   Diagnosis Date    Anemia     Fatty liver 2015    Former smoker 2009    Osteoarthritis 2010    Pulmonary nodules 2019    Repeat CT in 6 mo    Ulcer of abdomen wall 2016     Past Surgical History:   Procedure Laterality Date    APPENDECTOMY  1993     SECTION  , , 1983    x3    CHOLECYSTECTOMY          COLONOSCOPY  ~    [facility]: normal findings per patient report    COLONOSCOPY N/A 2020    Procedure: COLONOSCOPY;  Surgeon: Misael Holm MD;  [facility] ENDO;  Service: General;  Laterality: N/A;    COLONOSCOPY, WITH RETROGRADE BALLOON ENTEROSCOPY, SINGLE OR DOUBLE BALLOON N/A 2023    Procedure: COLONOSCOPY, WITH RETROGRADE BALLOON ENTEROSCOPY, DOUBLE BALLOON;  Surgeon: Collin Mares MD;  Location: [facility] ENDO ([address] [place] [address]);  Service: Endoscopy;  Laterality: N/A;  inst via portal-MS-sutab per pt request-tb-new inst portal    DIAGNOSTIC LAPAROSCOPY N/A 2021    Procedure: LAPAROSCOPY, DIAGNOSTIC;  Surgeon: Misael Holm MD;  Location: [facility] OR;  Service: General;  Laterality: N/A;    EPIDURAL STEROID INJECTION INTO LUMBAR SPINE N/A 10/12/2018    Procedure: Injection-steroid-epidural-lumbar;  Surgeon: Kal Johnson MD;  [facility] OR;  Service: Pain Management;  Laterality: N/A;  L5-S1    EPIDURAL STEROID INJECTION INTO LUMBAR SPINE N/A 2019    Procedure: Injection-steroid-epidural-lumbar L5-S1;  Surgeon: Kal Johnson MD;  Location: [facility] OR;  Service: Pain Management;  Laterality: N/A;    ESOPHAGOGASTRODUODENOSCOPY N/A 2020    Procedure: ESOPHAGOGASTRODUODENOSCOPY (EGD);  Surgeon: Misael Holm MD;  Location: [facility] ENDO;  Service: General;  Laterality: N/A;    FUSION, SPINE, MINIMALLY INVASIVE, USING COMPUTER-ASSISTED NAVIGATION N/A 2022    Procedure: ENTEROSCOPY, DOUBLE  BALLOON, ANTEGRADE;  Surgeon: Collin Mares MD;  Location: [facility] ENDO (2ND FLR);  Service: Endoscopy;  Laterality: N/A;  22-Instructions via portal-DS    *open to earlier date if available*    HYSTERECTOMY      one ovary conserved    LAPAROSCOPIC CHOLECYSTECTOMY N/A 2020    Procedure: CHOLECYSTECTOMY, LAPAROSCOPIC;  Surgeon: Govind Frey MD;  Location: [facility] OR;  Service: General;  Laterality: N/A;    LAPAROSCOPIC LYSIS OF ADHESIONS N/A 2021    Procedure: LYSIS, ADHESIONS, LAPAROSCOPIC;  Surgeon: Misael Holm MD;  Location: [facility] OR;  Service: General;  Laterality: N/A;    TRANSFORAMINAL EPIDURAL INJECTION OF STEROID Right 2024    Procedure: Injection,steroid,epidural,transforaminal approach;  Surgeon: Kal Johnson MD;  Location: [facility] OR;  Service: Anesthesiology;  Laterality: Right;  L4-5 and L5-s1    UPPER GASTROINTESTINAL ENDOSCOPY       Family History   Problem Relation Name Age of Onset    Ovarian cancer Mother      Heart disease Mother      Osteoporosis Mother      Arthritis Mother      Hypertension Father      Stroke Father  50    Brain Hemorrhage Father      Aneurysm Father      Osteoarthritis Sister      Arthritis Sister      Hypertension Sister      Obesity Sister      Breast cancer Neg Hx      Colon cancer Neg Hx      Colon polyps Neg Hx      Crohn's disease Neg Hx      Ulcerative colitis Neg Hx        Social History     Socioeconomic History    Marital status:     Number of children: 4    Highest education level: Some college, no degree   Occupational History    Occupation: sale specialist-    Tobacco Use    Smoking status: Former     Current packs/day: 0.00     Average packs/day: 1 pack/day for 40.0 years (40.0 ttl pk-yrs)     Types: Cigarettes     Start date: 1969     Quit date: 2009     Years since quittin.5    Smokeless tobacco: Never    Tobacco comments:     quit    Substance and Sexual Activity    Alcohol use: Not Currently      Comment: Not often - one glass of wine every now and then    Drug use: Not Currently     Types: Marijuana     Comment: in 1970s    Sexual activity: Not Currently     Social Determinants of Health     Financial Resource Strain: Medium Risk (1/7/2024)    Overall Financial Resource Strain (CARDIA)     Difficulty of Paying Living Expenses: Somewhat hard   Food Insecurity: No Food Insecurity (1/7/2024)    Hunger Vital Sign     Worried About Running Out of Food in the Last Year: Never true     Ran Out of Food in the Last Year: Never true   Transportation Needs: No Transportation Needs (1/7/2024)    PRAPARE - Transportation     Lack of Transportation (Medical): No     Lack of Transportation (Non-Medical): No   Physical Activity: Inactive (1/7/2024)    Exercise Vital Sign     Days of Exercise per Week: 0 days     Minutes of Exercise per Session: 0 min   Stress: Stress Concern Present (1/7/2024)    [employer] - Occupational Stress Questionnaire     Feeling of Stress : To some extent   Housing Stability: Low Risk  (1/7/2024)    Housing Stability Vital Sign     Unable to Pay for Housing in the Last Year: No     Number of Places Lived in the Last Year: 1     Unstable Housing in the Last Year: No     Review of patient's allergies indicates:  No Known Allergies    Current Outpatient Medications:     biotin 5,000 mcg TbDL, Take 5,000 mcg by mouth Daily., Disp: , Rfl:     calcium carbonate (OS-REJI) 600 mg calcium (1,500 mg) Tab, Take 1,200 mg by mouth once daily., Disp: , Rfl:     CONSTULOSE 10 gram/15 mL solution, TAKE 10 ML BY MOUTH  THREE TIMES DAILY (Patient taking differently: Take 10 g by mouth 2 (two) times daily. TAKE 10 ML BY MOUTH  THREE TIMES DAILY), Disp: 948 mL, Rfl: 2    cyanocobalamin, vitamin B-12, (VITAMIN B-12) 1,000 mcg/mL Drop, , Disp: , Rfl:     diclofenac sodium (VOLTAREN) 1 % Gel, Apply 2 g topically 2 (two) times daily as needed., Disp: , Rfl:     gabapentin (NEURONTIN) 300  MG capsule, Take 2 capsules (600 mg total) by mouth 3 (three) times daily., Disp: 180 capsule, Rfl: 0    magnesium gluconate 27.5 mg magne- sium (500 mg) Tab, Take by mouth once., Disp: , Rfl:     ondansetron (ZOFRAN-ODT) 8 MG TbDL, Take 1 tablet (8 mg total) by mouth every 6 (six) hours as needed. (Patient not taking: Reported on 3/26/2024), Disp: 60 tablet, Rfl: 3    orphenadrine (NORFLEX) 100 mg tablet, Take 1 tablet (100 mg total) by mouth 2 (two) times daily as needed for Muscle spasms., Disp: 40 tablet, Rfl: 1    pantoprazole (PROTONIX) 20 MG tablet, Take 1 tablet (20 mg total) by mouth once daily., Disp: 90 tablet, Rfl: 2    POTASSIUM CHLORIDE, BULK, MISC, Take 99 mg by mouth once daily., Disp: , Rfl:     promethazine (PHENERGAN) 25 MG suppository, Place 1 suppository (25 mg total) rectally every 6 (six) hours as needed for Nausea (nausea). (Patient not taking: Reported on 3/26/2024), Disp: 8 suppository, Rfl: 0    vitamin D (VITAMIN D3) 1000 units Tab, Take 3,000 Units by mouth once daily., Disp: , Rfl:   No current facility-administered medications for this visit.    Facility-Administered Medications Ordered in Other Visits:     lactated ringers infusion, , Intravenous, Continuous, VuKal MD, Last Rate: 25 mL/hr at 02/06/24 0915, New Bag at 02/06/24 0915    sodium chloride 0.9% 100 mL flush bag, , Intravenous, PRN, Mayra Bower MD, Stopped at 04/24/23 1519    sodium chloride 0.9% flush 10 mL, 10 mL, Intravenous, PRNSathish Sumathi S., MD  Review of Systems   Constitutional:  Positive for malaise/fatigue. Negative for weight loss.        Mostly good appetite lost weight with her sx   HENT:  Negative for hearing loss.    Eyes:  Negative for blurred vision and double vision.   Gastrointestinal:  Positive for constipation.        Takes lactulose twice a day and colace twice a day   Musculoskeletal:  Negative for back pain, myalgias and neck pain.        Leg cramps and shaky   Skin:  Negative for  rash.   Neurological:  Negative for dizziness, weakness and headaches.   Endo/Heme/Allergies:  Does not bruise/bleed easily.   recent bowel obstruction 3/22 with repeat abdominal pain discomfort emergency room visit last night the outpatient surgical appointment  Physical Exam    Wt Readings from Last 3 Encounters:   03/26/24 88.7 kg (195 lb 8.8 oz)   03/19/24 88.8 kg (195 lb 12.3 oz)   03/06/24 88.8 kg (195 lb 12.3 oz)     Temp Readings from Last 3 Encounters:   03/26/24 98.5 °F (36.9 °C) (Oral)   02/06/24 97.7 °F (36.5 °C) (Skin)   10/16/23 98 °F (36.7 °C) (Temporal)     BP Readings from Last 3 Encounters:   03/26/24 137/74   02/14/24 120/78   02/06/24 109/62     Pulse Readings from Last 3 Encounters:   03/26/24 78   02/14/24 72   02/06/24 62     Lab Results   Component Value Date    WBC 6.27 05/06/2024    HGB 13.9 05/06/2024    HCT 42.4 05/06/2024    MCV 94 05/06/2024     05/06/2024       BMP  Lab Results   Component Value Date     05/06/2024    K 4.1 05/06/2024     05/06/2024    CO2 23 05/06/2024    BUN 13 05/06/2024    CREATININE 0.7 05/06/2024    CALCIUM 9.6 05/06/2024    ANIONGAP 10 05/06/2024    ESTGFRAFRICA >60.0 06/27/2022    EGFRNONAA >60.0 06/27/2022     Lab Results   Component Value Date    IRON 60 05/06/2024    TIBC 388 05/06/2024    FERRITIN 80 05/06/2024       Impression:  CT scan abdomen May 25, 2022     1. Multiple dilated loops of small bowel are noted that are fluid-filled left upper quadrant with the suggestion of bowel wall thickening near the level of the umbilicus in the midline and extending into the left dilated loops of bowel.  This raises the concern for vascular injury and or bowel infarction/inflammation possibly associated with obstruction.  Surgical consultation is suggested.  Post-contrast oral and intravenous imaging may be of use for confirmation given that this exam is severely hindered.  2. A 2nd region of concern is noted within the right hemipelvis where a  dilated loop of small bowel is noted without obvious bowel wall thickening.  This region is nonspecific and could relate to colon, small bowel, ileus, or obstruction.  Further evaluation and clinical correlation is necessary     Patient Active Problem List   Diagnosis    Primary osteoarthritis involving multiple joints    Obesity (BMI 30.0-34.9)    Gastroesophageal reflux disease    Right knee pain    Chronic midline low back pain    Acute tear medial meniscus, right, sequela    Primary osteoarthritis of right knee    Lumbar radiculitis    Trigger middle finger of right hand    Nausea    Abdominal pain    Encounter for postoperative care    Intestinal obstruction    Hypokalemia    Constipation    History of cholecystectomy    Right upper quadrant pain    History of hysterectomy    Rectal bleeding    Hemorrhoids    Mitral valve prolapse    Coronary artery calcification    Anemia    SAMPSON (dyspnea on exertion), noted 2010    History of smoking 25-50 pack years, 46 py, quit 2009    NSAID long-term use, started 2017, Aleve 2-4 tabs daily for a year    Bandemia    Family history of premature CAD    Cardiovascular event risk, ASCVD 10-year risk 4.8%, 2019, 6.7% in 2021    Abdominal obesity    Dyslipidemia, baseline LDL-C 112, HDL-C 49    Iron deficiency anemia secondary to inadequate dietary iron intake    Severe anemia    S/p small bowel obstruction    Arm pain, lateral, left    Right hip pain    Chronic bilateral low back pain without sciatica    Decreased strength    Atypical chest pain, onset 2021    Chronic fatigue    Excessive daytime sleepiness        Assessment and Plan   NATHAN: pt had partail bowel removal from opbstructions with recurrneces Bowel obs again 3/22 repeat sx and adhesiolysis, Goes to [facility] for evaluation of small bowel.  Will keep patient on Q 4 months the iron checks   recvd infusinal iron with excellent response    May take liquid iron  Orders will be placed for [place]   when neededfor nathan  again see me in 6 weeks with cbc,c mp iron ferin   Caring for her special needs neice trying to get benefits for her has been very trying for patient  Slight B12 deficiency also continue to monitor most recent test J 9/2022 acceptable  Patient to continue follow-up of her DJD with PCP    Advance Care Planning     Date: 04/20/2023    Power of   I initiated the process of advance care planning today due to virtual nature of visit documents will be made to to upload into our system

## 2024-05-09 ENCOUNTER — PATIENT MESSAGE (OUTPATIENT)
Dept: HEMATOLOGY/ONCOLOGY | Facility: CLINIC | Age: 69
End: 2024-05-09
Payer: MEDICARE

## 2024-06-07 ENCOUNTER — OFFICE VISIT (OUTPATIENT)
Dept: FAMILY MEDICINE | Facility: CLINIC | Age: 69
End: 2024-06-07
Payer: MEDICARE

## 2024-06-07 VITALS
RESPIRATION RATE: 14 BRPM | OXYGEN SATURATION: 95 % | DIASTOLIC BLOOD PRESSURE: 82 MMHG | HEIGHT: 64 IN | SYSTOLIC BLOOD PRESSURE: 142 MMHG | WEIGHT: 201.81 LBS | BODY MASS INDEX: 34.45 KG/M2 | HEART RATE: 85 BPM

## 2024-06-07 DIAGNOSIS — R41.3 OTHER AMNESIA: Primary | ICD-10-CM

## 2024-06-07 PROCEDURE — 99213 OFFICE O/P EST LOW 20 MIN: CPT | Mod: PBBFAC | Performed by: FAMILY MEDICINE

## 2024-06-07 PROCEDURE — 99999 PR PBB SHADOW E&M-EST. PATIENT-LVL III: CPT | Mod: PBBFAC,,, | Performed by: FAMILY MEDICINE

## 2024-06-07 PROCEDURE — 99214 OFFICE O/P EST MOD 30 MIN: CPT | Mod: S$PBB,,, | Performed by: FAMILY MEDICINE

## 2024-06-07 NOTE — PROGRESS NOTES
Subjective:       Patient ID: Pham Licea is a 68 y.o. female.    Chief Complaint: Follow-up (Labs having bouts of memory loss)      Past Medical History:   Diagnosis Date    Anemia     Fatty liver 2015    Former smoker     Osteoarthritis 2010    Pulmonary nodules 2019    Repeat CT in 6 mo    Ulcer of abdomen wall 2016       Past Surgical History:   Procedure Laterality Date    APPENDECTOMY  1993     SECTION  , , 1983    x3    CHOLECYSTECTOMY          COLONOSCOPY  ~    [facility]: normal findings per patient report    COLONOSCOPY N/A 2020    Procedure: COLONOSCOPY;  Surgeon: Misael Holm MD;  Location: [facility] ENDO;  Service: General;  Laterality: N/A;    COLONOSCOPY, WITH RETROGRADE BALLOON ENTEROSCOPY, SINGLE OR DOUBLE BALLOON N/A 2023    Procedure: COLONOSCOPY, WITH RETROGRADE BALLOON ENTEROSCOPY, DOUBLE BALLOON;  Surgeon: Collin Mares MD;  Location: [facility] ENDO ([address] [place] [address]);  Service: Endoscopy;  Laterality: N/A;  inst via portal-MS-sutab per pt request-tb-new inst portal    DIAGNOSTIC LAPAROSCOPY N/A 2021    Procedure: LAPAROSCOPY, DIAGNOSTIC;  Surgeon: Misael Holm MD;  Location: [facility] OR;  Service: General;  Laterality: N/A;    EPIDURAL STEROID INJECTION INTO LUMBAR SPINE N/A 10/12/2018    Procedure: Injection-steroid-epidural-lumbar;  Surgeon: Kal Johnson MD;  Location: [facility] OR;  Service: Pain Management;  Laterality: N/A;  L5-S1    EPIDURAL STEROID INJECTION INTO LUMBAR SPINE N/A 2019    Procedure: Injection-steroid-epidural-lumbar L5-S1;  Surgeon: Kal Johnson MD;  Location: [facility] OR;  Service: Pain Management;  Laterality: N/A;    ESOPHAGOGASTRODUODENOSCOPY N/A 2020    Procedure: ESOPHAGOGASTRODUODENOSCOPY (EGD);  Surgeon: Misael Holm MD;  Location: [facility] ENDO;  Service: General;  Laterality: N/A;    FUSION, SPINE, MINIMALLY INVASIVE, USING COMPUTER-ASSISTED NAVIGATION N/A 2022    Procedure: ENTEROSCOPY,  DOUBLE BALLOON, ANTEGRADE;  Surgeon: Collin Mares MD;  Location: [facility] ENDO ([facility] FLR);  Service: Endoscopy;  Laterality: N/A;  22-Instructions via portal-DS    *open to earlier date if available*    HYSTERECTOMY      one ovary conserved    LAPAROSCOPIC CHOLECYSTECTOMY N/A 2020    Procedure: CHOLECYSTECTOMY, LAPAROSCOPIC;  Surgeon: Govind Frey MD;  Location: [facility] OR;  Service: General;  Laterality: N/A;    LAPAROSCOPIC LYSIS OF ADHESIONS N/A 2021    Procedure: LYSIS, ADHESIONS, LAPAROSCOPIC;  Surgeon: Misael Holm MD;  Location: [facility] OR;  Service: General;  Laterality: N/A;    TRANSFORAMINAL EPIDURAL INJECTION OF STEROID Right 2024    Procedure: Injection,steroid,epidural,transforaminal approach;  Surgeon: Kal Johnson MD;  Location: [facility] OR;  Service: Anesthesiology;  Laterality: Right;  L4-5 and L5-s1    UPPER GASTROINTESTINAL ENDOSCOPY          Social History     Socioeconomic History    Marital status:     Number of children: 4    Highest education level: Some college, no degree   Occupational History    Occupation: sale specialist-    Tobacco Use    Smoking status: Former     Current packs/day: 0.00     Average packs/day: 1 pack/day for 40.0 years (40.0 ttl pk-yrs)     Types: Cigarettes     Start date: 1969     Quit date: 2009     Years since quittin.6    Smokeless tobacco: Never    Tobacco comments:     quit 2009   Substance and Sexual Activity    Alcohol use: Not Currently     Comment: Not often - one glass of wine every now and then    Drug use: Not Currently     Types: Marijuana     Comment: in     Sexual activity: Not Currently     Social Determinants of Health     Financial Resource Strain: Medium Risk (2024)    Overall Financial Resource Strain (CARDIA)     Difficulty of Paying Living Expenses: Somewhat hard   Food Insecurity: No Food Insecurity (2024)    Hunger Vital Sign     Worried About Running Out of Food in the  Last Year: Never true     Ran Out of Food in the Last Year: Never true   Transportation Needs: No Transportation Needs (1/7/2024)    PRAPARE - Transportation     Lack of Transportation (Medical): No     Lack of Transportation (Non-Medical): No   Physical Activity: Inactive (1/7/2024)    Exercise Vital Sign     Days of Exercise per Week: 0 days     Minutes of Exercise per Session: 0 min   Stress: Stress Concern Present (1/7/2024)    [employer] - Occupational Stress Questionnaire     Feeling of Stress : To some extent   Housing Stability: Low Risk  (1/7/2024)    Housing Stability Vital Sign     Unable to Pay for Housing in the Last Year: No     Number of Places Lived in the Last Year: 1     Unstable Housing in the Last Year: No       Family History   Problem Relation Name Age of Onset    Ovarian cancer Mother      Heart disease Mother      Osteoporosis Mother      Arthritis Mother      Hypertension Father      Stroke Father  50    Brain Hemorrhage Father      Aneurysm Father      Osteoarthritis Sister      Arthritis Sister      Hypertension Sister      Obesity Sister      Breast cancer Neg Hx      Colon cancer Neg Hx      Colon polyps Neg Hx      Crohn's disease Neg Hx      Ulcerative colitis Neg Hx         Review of patient's allergies indicates:  No Known Allergies       Current Outpatient Medications:     biotin 5,000 mcg TbDL, Take 5,000 mcg by mouth Daily., Disp: , Rfl:     CONSTULOSE 10 gram/15 mL solution, TAKE 10 ML BY MOUTH  THREE TIMES DAILY (Patient taking differently: Take 10 g by mouth 2 (two) times daily. TAKE 10 ML BY MOUTH  THREE TIMES DAILY), Disp: 948 mL, Rfl: 2    cyanocobalamin, vitamin B-12, (VITAMIN B-12) 1,000 mcg/mL Drop, , Disp: , Rfl:     diclofenac sodium (VOLTAREN) 1 % Gel, Apply 2 g topically 2 (two) times daily as needed., Disp: , Rfl:     gabapentin (NEURONTIN) 300 MG capsule, Take 2 capsules (600 mg total) by mouth 3 (three) times daily., Disp: 180 capsule,  Rfl: 0    magnesium gluconate 27.5 mg magne- sium (500 mg) Tab, Take by mouth once., Disp: , Rfl:     orphenadrine (NORFLEX) 100 mg tablet, Take 1 tablet (100 mg total) by mouth 2 (two) times daily as needed for Muscle spasms., Disp: 40 tablet, Rfl: 1    pantoprazole (PROTONIX) 20 MG tablet, Take 1 tablet (20 mg total) by mouth once daily., Disp: 90 tablet, Rfl: 2    POTASSIUM CHLORIDE, BULK, MISC, Take 99 mg by mouth once daily., Disp: , Rfl:     vitamin D (VITAMIN D3) 1000 units Tab, Take 3,000 Units by mouth once daily., Disp: , Rfl:   No current facility-administered medications for this visit.    Facility-Administered Medications Ordered in Other Visits:     lactated ringers infusion, , Intravenous, Continuous, Kal Johnson MD, Last Rate: 25 mL/hr at 24 0915, New Bag at 24 0915    sodium chloride 0.9% 100 mL flush bag, , Intravenous, PRN, Mayra Bower MD, Stopped at 23 1519    sodium chloride 0.9% flush 10 mL, 10 mL, Intravenous, PRN, Mayra Bower MD    Ms Licea is a 68-year-old female with pulmonary nodules, osteoarthritis, previous smoker, fatty liver, anemia, B12 deficiency, vitamin-D deficiency, GERD who presents today with memory issues. Her last labs were done 2024 and have been reviewed.  They include the followin. CBC within normal limits   2. CMP within normal limits   3. Hemoglobin A1c 5.5%    Her last lipid panel was done on 2023 and was within acceptable range and her last B12 was 476 and was done on 2024.    She has been forgetting things like locking herself out of the house (forgot her keys), she forget her granddaughter's wedding which is coming up, and she feels that she is losing her ability to do certain chores at work. There is no dementia in the family that she is aware of.  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 She is on a ppi asa well as gabapentin, so her memory issues may be due to medications      Review  of Systems   Constitutional:  Negative for activity change, appetite change, chills, diaphoresis and fever.   HENT:  Negative for congestion, ear pain, postnasal drip, rhinorrhea and sore throat.    Eyes:  Negative for pain, discharge, redness and itching.   Respiratory:  Negative for cough and shortness of breath.    Cardiovascular:  Negative for chest pain, palpitations and leg swelling.   Gastrointestinal:  Negative for abdominal distention, abdominal pain, constipation, diarrhea and nausea.   Genitourinary:  Negative for difficulty urinating, dysuria, frequency and urgency.   Skin:  Negative for color change, rash and wound.   Psychiatric/Behavioral:  Positive for confusion and decreased concentration. The patient is nervous/anxious.    All other systems reviewed and are negative.      Objective:      Physical Exam    Assessment:       1. Other amnesia        Plan:         Other amnesia  Comments:  will do CT head  Orders:  -     CT Head Without Contrast; Future; Expected date: 06/07/2024        Risks, benefits, and side effects were discussed with the patient. All questions were answered to the fullest satisfaction of the patient, and pt verbalized understanding and agreement to treatment plan. Pt was to call with any new or worsening symptoms, or present to the ER.        Lani Grossman MD

## 2024-06-08 ENCOUNTER — HOSPITAL ENCOUNTER (OUTPATIENT)
Dept: RADIOLOGY | Facility: HOSPITAL | Age: 69
Discharge: HOME | End: 2024-06-08
Attending: FAMILY MEDICINE
Payer: MEDICARE

## 2024-06-08 DIAGNOSIS — R41.3 OTHER AMNESIA: ICD-10-CM

## 2024-06-08 PROCEDURE — 70450 CT HEAD/BRAIN W/O DYE: CPT | Mod: TC

## 2024-06-08 PROCEDURE — 70450 CT HEAD/BRAIN W/O DYE: CPT | Mod: 26,,, | Performed by: RADIOLOGY

## 2024-06-18 ENCOUNTER — OFFICE VISIT (OUTPATIENT)
Dept: ORTHOPEDICS | Facility: CLINIC | Age: 69
End: 2024-06-18
Payer: MEDICARE

## 2024-06-18 VITALS — HEIGHT: 64 IN | WEIGHT: 201.75 LBS | BODY MASS INDEX: 34.44 KG/M2

## 2024-06-18 DIAGNOSIS — M70.61 TROCHANTERIC BURSITIS OF RIGHT HIP: Primary | ICD-10-CM

## 2024-06-18 PROCEDURE — 99999 PR PBB SHADOW E&M-EST. PATIENT-LVL III: CPT | Mod: PBBFAC,,, | Performed by: ORTHOPAEDIC SURGERY

## 2024-06-18 PROCEDURE — 99499 UNLISTED E&M SERVICE: CPT | Mod: S$PBB,,, | Performed by: ORTHOPAEDIC SURGERY

## 2024-06-18 PROCEDURE — 20610 DRAIN/INJ JOINT/BURSA W/O US: CPT | Mod: PBBFAC,PO | Performed by: ORTHOPAEDIC SURGERY

## 2024-06-18 PROCEDURE — 99999PBSHW PR PBB SHADOW TECHNICAL ONLY FILED TO HB: Mod: PBBFAC,,,

## 2024-06-18 PROCEDURE — 99213 OFFICE O/P EST LOW 20 MIN: CPT | Mod: PBBFAC,PO | Performed by: ORTHOPAEDIC SURGERY

## 2024-06-18 RX ORDER — TRIAMCINOLONE ACETONIDE 40 MG/ML
40 INJECTION, SUSPENSION INTRA-ARTICULAR; INTRAMUSCULAR
Status: DISCONTINUED | OUTPATIENT
Start: 2024-06-18 | End: 2024-06-18 | Stop reason: HOSPADM

## 2024-06-18 RX ADMIN — TRIAMCINOLONE ACETONIDE 40 MG: 40 INJECTION, SUSPENSION INTRA-ARTICULAR; INTRAMUSCULAR at 02:06

## 2024-06-18 NOTE — PROCEDURES
Large Joint Aspiration/Injection: R greater trochanteric bursa    Date/Time: 6/18/2024 2:45 PM    Performed by: Jeremy Mendez MD  Authorized by: Jeremy Mendez MD    Consent Done?:  Yes (Verbal)  Indications:  Pain  Timeout: prior to procedure the correct patient, procedure, and site was verified      Local anesthesia used?: Yes    Local anesthetic:  Topical anesthetic    Details:  Needle Size:  22 G  Approach:  Lateral  Location:  Hip  Site:  R greater trochanteric bursa  Medications:  40 mg triamcinolone acetonide 40 mg/mL  Patient tolerance:  Patient tolerated the procedure well with no immediate complications

## 2024-06-18 NOTE — PROGRESS NOTES
Patient here for repeat right trochanteric bursa injection.  Tolerated well.  Patient agrees to PT

## 2024-06-23 ENCOUNTER — PATIENT MESSAGE (OUTPATIENT)
Dept: GASTROENTEROLOGY | Facility: CLINIC | Age: 69
End: 2024-06-23
Payer: MEDICARE

## 2024-07-02 ENCOUNTER — CLINICAL SUPPORT (OUTPATIENT)
Dept: REHABILITATION | Facility: HOSPITAL | Age: 69
End: 2024-07-02
Attending: ORTHOPAEDIC SURGERY
Payer: MEDICARE

## 2024-07-02 DIAGNOSIS — Z74.09 IMPAIRED FUNCTIONAL MOBILITY, BALANCE, GAIT, AND ENDURANCE: Primary | ICD-10-CM

## 2024-07-02 DIAGNOSIS — M70.61 TROCHANTERIC BURSITIS OF RIGHT HIP: ICD-10-CM

## 2024-07-02 PROBLEM — M79.602 ARM PAIN, LATERAL, LEFT: Status: RESOLVED | Noted: 2022-06-17 | Resolved: 2024-07-02

## 2024-07-02 PROBLEM — R53.1 DECREASED STRENGTH: Status: RESOLVED | Noted: 2023-06-15 | Resolved: 2024-07-02

## 2024-07-02 PROBLEM — M25.551 RIGHT HIP PAIN: Status: RESOLVED | Noted: 2023-06-15 | Resolved: 2024-07-02

## 2024-07-02 PROCEDURE — 97161 PT EVAL LOW COMPLEX 20 MIN: CPT | Mod: PO

## 2024-07-02 PROCEDURE — 97110 THERAPEUTIC EXERCISES: CPT | Mod: PO

## 2024-07-02 NOTE — PROGRESS NOTES
[facility]  Physical Therapy Initial Evaluation    Name: Pham Licea  Clinic Number: [id number]    Therapy Diagnosis:   Encounter Diagnoses   Name Primary?    Trochanteric bursitis of right hip     Impaired functional mobility, balance, gait, and endurance Yes     Physician: Jeremy Mendez MD    Physician Orders: PT Eval and Treat   Medical Diagnosis from Referral: M70.61 (ICD-10-CM) - Trochanteric bursitis of right hip   Evaluation Date: 7/2/2024   Authorization Period Expiration: 6/18/2025  Plan of Care Expiration: 9/30/2024 or 12v post eval  Visit # (episodes) / Visits authorized: 1/ eval (POC 0 / 12)  2nd FOTO visit 5  3rd FOTO visit 10  Progress Note Due: 8/2/2024    Time In: 100  Time Out: 144  Total Billable Time: 44 minutes    Precautions: OA  Insurance: Payor: MEDICARE / Plan: MEDICARE PART A & B / Product Type: Government /     Subjective   Date of onset: 6 or 7 years ago  History of current condition - Elicia reports: pn started after a twisted fall on her left side; gradual increase of pain over time. History of back pain around the same amount of time. Reports No history of back surgeries but has had injections in spine and Right hip (claims the fusion surgery listed in [facility] is not accurate). Tried gabapentin but stopped. Has had PT before for her back. 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 Reports the brain CT scan was to rule out dementia; due to sister in law getting dementia and she has been having difficulty remembering names.          Medical History:   Past Medical History:   Diagnosis Date    Anemia     Fatty liver 2015    Former smoker 2009    Osteoarthritis 2010    Pulmonary nodules 08/16/2019    Repeat CT in 6 mo    Ulcer of abdomen wall 2016       Surgical History:   Pham Licea  has a past surgical history that includes Upper gastrointestinal endoscopy (2016); Colonoscopy (~2014); Epidural steroid injection into  lumbar spine (N/A, 10/12/2018); Epidural steroid injection into lumbar spine (N/A, 2019);  section (, , ); Hysterectomy (); Appendectomy (); Laparoscopic cholecystectomy (N/A, 2020); Colonoscopy (N/A, 2020); Esophagogastroduodenoscopy (N/A, 2020); Diagnostic laparoscopy (N/A, 2021); Laparoscopic lysis of adhesions (N/A, 2021); FUSION, SPINE, MINIMALLY INVASIVE, USING COMPUTER ASSISTED NAVIGATION (N/A, 2022); colonoscopy, with retrograde balloon enteroscopy, single or double balloon (N/A, 2023); Cholecystectomy; and Transforaminal epidural injection of steroid (Right, 2024).    Medications:   Pham has a current medication list which includes the following prescription(s): biotin, constulose, vitamin b-12, diclofenac sodium, gabapentin, magnesium gluconate, orphenadrine, pantoprazole, potassium chloride, and vitamin d, and the following Facility-Administered Medications: lactated ringers, sodium chloride 0.9% 100 mL flush bag, and sodium chloride 0.9%.    Allergies:   Review of patient's allergies indicates:  No Known Allergies     Imaging, MRI 3/2024  Impression:   1. Similar degenerative changes again most pronounced at L4-L5 with multifactorial moderate spinal canal narrowing and minimal bilateral foraminal narrowing.  2. No adverse changes or acute process.  Additional mild degenerative changes at L3-L4 and L5-S1 as above.    Prior Therapy: yes  Social History: lives alone; 1-story; family in area  Current Smoker: none  Recent Weight loss: no  Bowel or Bladder Issues: bowel issues being monitored  Falls in last 6 months: none  Cancer Hx: none  Occupation: Retired; [employer], up and down stairs, administrative   Prior Level of Function: independent ambulation; does have cane and walker  Current Level of Function: difficult/pain with walking, stairs, sitting, cannot sleep on that side, squatting to     Pain:  Current 4/10,  worst 5/10, best 0/10   Location: Right glute wraps into anterior thigh, ant tib and calf  Description: Sharp; denies n/t  Aggravating Factors: walking, stairs, sitting, cannot sleep on that side, squatting to   Easing Factors: pn medication, heat, ice; aleve; voltaren; no exercise but active    Pts goals: something other than medication; keeping mobility coming    Objective     Posture / IC / ASIS / PSIS:  mod BMI; Anterior Pelvic Tilt; mild upper sway; mod kyphosis and FHP    Palpation: NT    Gait Without AD   Analysis Bilateral trendelenburg; Dec Weight Bearing on Right LE     Lumbar AROM: ROM-   Response to repeated movements   Flexion 31 cm from floor - stretch   Extension (15 norm) 20 degrees - none   Right side bending  (20 norm) 15 degrees   Left side bending  (20 norm) 15 degrees   Rotation Observation Mild Restriction Bilateral      Sensation: NT    L/E MMT: 5/5 Left Right   Hip Flexion 3+/5. 3+/5.   Hip Extension 5/5. 5/5.   Hip Abduction 4/5. 4/5.   Hip Adduction 5/5. 5/5.   Hip IR 5/5. 5/5.   Hip ER 3+/5. 3+/5.   Knee Flexion 5/5. 5/5.   Knee Extension 3+/5. 3+/5.   Ankle DF 5/5. 5/5.   Ankle INV 5/5. 5/5.   Ankle EV 5/5. 5/5.     Hip & LE Range of Motion:   Restricted Grading Left  Severe restriction hip flexion and Internal Rotation  Right  Severe restriction hip flexion and Internal Rotation / Mild restriction with hip abduction   Core strength MMT: 2/5 = unable to assume scapulae clearance for test position  LLD (Leg length discrepancy) / Supine to Sit: Right supine    Flexibility Left Right   Hamstrings 90/90 (-20 norm) -20 degrees -30 degrees   Flexibility: mild / moderate / severe restriction grading  Quadriceps: severe  Illiopsoas: severe      Special Tests Left Right             FADDIR negative. negative.   CHERELLE negative. negative.     See FOTO score in Media section of EPIC     TREATMENT   Treatment Time In: 130  Treatment Time Out: 144  Total Treatment time separate from Evaluation:  14 minutes    Elicia received therapeutic exercises to develop strength, ROM, and flexibility for 10 minutes including:      TABLE:  Lumbar rotation, 10x Bilateral   Zaheer stretch, [id number]  Ab brace with Post pelvic tilt      Add NEXT/FUTURE: pt has low pn tolerance; apply slow progression of exercises  Massage roller to Right glute and ITB  Standing Hip Flexion (HOLD supine against gravity)  Clamshells  Test innominates  Hip abduction   Teach log roll  Squat at work and stairs  NOTE: Standing Right Quadratus Lumborum stretch, [id number] - too painful in lumbar region day of eval    Home Exercises and Patient Education Provided    Education provided:   - daily HEP    Written Home Exercises Provided: yes.  Exercises were reviewed and Elicia was able to demonstrate them prior to the end of the session.  Elicia demonstrated good  understanding of the education provided.     See EMR under Patient Instructions for exercises provided 7/2/2024.    Assessment   Pham is a 68 y.o. female referred to outpatient Physical Therapy with a medical diagnosis of Right trochanteric bursitis. Pt presents with pain in Right glute that radiates laterally into anterior thigh and into anterior tibialis, decreased lumbar range of motion, decreased hip and thigh flexibility, decreased hip and Bilateral Lower Extremity strength, posture and gait deficits, and impaired function.    Pt prognosis is Good.   Pt will benefit from skilled outpatient Physical Therapy to address the deficits stated above and in the chart below, provide pt/family education, and to maximize pt's level of independence.     Plan of care discussed with patient: Yes  Pt's spiritual, cultural and educational needs considered and patient is agreeable to the plan of care and goals as stated below:     Anticipated Barriers for therapy: Chronicity      Medical Necessity is demonstrated by the following  History  Co-morbidities and personal factors that may impact the plan of care  [] LOW: no personal factors / co-morbidities  [x] MODERATE: 1-2 personal factors / co-morbidities  [] HIGH: 3+ personal factors / co-morbidities    Moderate / High Support Documentation:   Co-morbidities affecting plan of care: see precautions    Personal Factors:   coping style  lifestyle     Examination  Body Structures and Functions, activity limitations and participation restrictions that may impact the plan of care [] LOW: addressing 1-2 elements  [x] MODERATE: 3+ elements  [] HIGH: 4+ elements (please support below)    Moderate / High Support Documentation: Range of Motion, flexibility, strength     Clinical Presentation [x] LOW: stable  [] MODERATE: Evolving  [] HIGH: Unstable     Decision Making/ Complexity Score: low          Goals:  Short Term GOALS: 3 weeks  1. Patient is independent with HEP.   2. Patient demonstrates independence with core activation and postural awareness while sitting and standing.   3. Patient will reduce pn to 2/10 while performing daily activities.  4. Patient will reduce RLE radiculopathy frequency and intensity.     Long Term GOALS: 6 weeks.   1. Patient demonstrates increased l/s FB ROM to 15 cm from floor to improve tolerance to reaching activities.   2. Patient demonstrates increased core, hip and BLE strength by 1/2 grade or greater to improve tolerance to home chores.  3. Patient demonstrates independence with body mechanics while working.  4. Patient will improve FOTO function score by 25% to show improvement in condition and functional mobility.     Plan   Plan of care Certification: 7/2/2024 to 9/30/2024.    Outpatient Physical Therapy 2 times weekly for 6 weeks to include the following interventions: Cervical/Lumbar Traction, Electrical Stimulation ,, Gait Training, Manual Therapy, Moist Heat/ Ice, Neuromuscular Re-ed, Orthotic Management and Training, Patient Education, Self Care, Therapeutic Activities, Therapeutic Exercise, and Ultrasound.     Marisabel Bey, PT

## 2024-07-02 NOTE — PLAN OF CARE
[facility]  Physical Therapy Initial Evaluation    Name: Pham Licea  Clinic Number: [id number]    Therapy Diagnosis:   Encounter Diagnoses   Name Primary?    Trochanteric bursitis of right hip     Impaired functional mobility, balance, gait, and endurance Yes     Physician: Jeremy Mendez MD    Physician Orders: PT Eval and Treat   Medical Diagnosis from Referral: M70.61 (ICD-10-CM) - Trochanteric bursitis of right hip   Evaluation Date: 7/2/2024   Authorization Period Expiration: 6/18/2025  Plan of Care Expiration: 9/30/2024 or 12v post eval  Visit # (episodes) / Visits authorized: 1/ eval (POC 0 / 12)  2nd FOTO visit 5  3rd FOTO visit 10  Progress Note Due: 8/2/2024    Time In: 100  Time Out: 144  Total Billable Time: 44 minutes    Precautions: OA  Insurance: Payor: MEDICARE / Plan: MEDICARE PART A & B / Product Type: Government /     Subjective   Date of onset: 6 or 7 years ago  History of current condition - Elicia reports: pn started after a twisted fall on her left side; gradual increase of pain over time. History of back pain around the same amount of time. Reports No history of back surgeries but has had injections in spine and Right hip (claims the fusion surgery listed in [facility] is not accurate). Tried gabapentin but stopped. Has had PT before for her back. 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 Reports the brain CT scan was to rule out dementia; due to sister in law getting dementia and she has been having difficulty remembering names.          Medical History:   Past Medical History:   Diagnosis Date    Anemia     Fatty liver 2015    Former smoker 2009    Osteoarthritis 2010    Pulmonary nodules 08/16/2019    Repeat CT in 6 mo    Ulcer of abdomen wall 2016       Surgical History:   Pham Licea  has a past surgical history that includes Upper gastrointestinal endoscopy (2016); Colonoscopy (~2014); Epidural steroid injection into  lumbar spine (N/A, 10/12/2018); Epidural steroid injection into lumbar spine (N/A, 2019);  section (, , ); Hysterectomy (); Appendectomy (); Laparoscopic cholecystectomy (N/A, 2020); Colonoscopy (N/A, 2020); Esophagogastroduodenoscopy (N/A, 2020); Diagnostic laparoscopy (N/A, 2021); Laparoscopic lysis of adhesions (N/A, 2021); FUSION, SPINE, MINIMALLY INVASIVE, USING COMPUTER ASSISTED NAVIGATION (N/A, 2022); colonoscopy, with retrograde balloon enteroscopy, single or double balloon (N/A, 2023); Cholecystectomy; and Transforaminal epidural injection of steroid (Right, 2024).    Medications:   Pham has a current medication list which includes the following prescription(s): biotin, constulose, vitamin b-12, diclofenac sodium, gabapentin, magnesium gluconate, orphenadrine, pantoprazole, potassium chloride, and vitamin d, and the following Facility-Administered Medications: lactated ringers, sodium chloride 0.9% 100 mL flush bag, and sodium chloride 0.9%.    Allergies:   Review of patient's allergies indicates:  No Known Allergies     Imaging, MRI 3/2024  Impression:   1. Similar degenerative changes again most pronounced at L4-L5 with multifactorial moderate spinal canal narrowing and minimal bilateral foraminal narrowing.  2. No adverse changes or acute process.  Additional mild degenerative changes at L3-L4 and L5-S1 as above.    Prior Therapy: yes  Social History: lives alone; 1-story; family in area  Current Smoker: none  Recent Weight loss: no  Bowel or Bladder Issues: bowel issues being monitored  Falls in last 6 months: none  Cancer Hx: none  Occupation: Retired; [employer], up and down stairs, administrative   Prior Level of Function: independent ambulation; does have cane and walker  Current Level of Function: difficult/pain with walking, stairs, sitting, cannot sleep on that side, squatting to     Pain:  Current 4/10,  worst 5/10, best 0/10   Location: Right glute wraps into anterior thigh, ant tib and calf  Description: Sharp; denies n/t  Aggravating Factors: walking, stairs, sitting, cannot sleep on that side, squatting to   Easing Factors: pn medication, heat, ice; aleve; voltaren; no exercise but active    Pts goals: something other than medication; keeping mobility coming    Objective     Posture / IC / ASIS / PSIS:  mod BMI; Anterior Pelvic Tilt; mild upper sway; mod kyphosis and FHP    Palpation: NT    Gait Without AD   Analysis Bilateral trendelenburg; Dec Weight Bearing on Right LE     Lumbar AROM: ROM-   Response to repeated movements   Flexion 31 cm from floor - stretch   Extension (15 norm) 20 degrees - none   Right side bending  (20 norm) 15 degrees   Left side bending  (20 norm) 15 degrees   Rotation Observation Mild Restriction Bilateral      Sensation: NT    L/E MMT: 5/5 Left Right   Hip Flexion 3+/5. 3+/5.   Hip Extension 5/5. 5/5.   Hip Abduction 4/5. 4/5.   Hip Adduction 5/5. 5/5.   Hip IR 5/5. 5/5.   Hip ER 3+/5. 3+/5.   Knee Flexion 5/5. 5/5.   Knee Extension 3+/5. 3+/5.   Ankle DF 5/5. 5/5.   Ankle INV 5/5. 5/5.   Ankle EV 5/5. 5/5.     Hip & LE Range of Motion:   Restricted Grading Left  Severe restriction hip flexion and Internal Rotation  Right  Severe restriction hip flexion and Internal Rotation / Mild restriction with hip abduction   Core strength MMT: 2/5 = unable to assume scapulae clearance for test position  LLD (Leg length discrepancy) / Supine to Sit: Right supine    Flexibility Left Right   Hamstrings 90/90 (-20 norm) -20 degrees -30 degrees   Flexibility: mild / moderate / severe restriction grading  Quadriceps: severe  Illiopsoas: severe      Special Tests Left Right             FADDIR negative. negative.   CHERELLE negative. negative.     See FOTO score in Media section of EPIC     TREATMENT   Treatment Time In: 130  Treatment Time Out: 144  Total Treatment time separate from Evaluation:  14 minutes    Elicia received therapeutic exercises to develop strength, ROM, and flexibility for 10 minutes including:      TABLE:  Lumbar rotation, 10x Bilateral   Zaheer stretch, [id number]  Ab brace with Post pelvic tilt      Add NEXT/FUTURE: pt has low pn tolerance; apply slow progression of exercises  Massage roller to Right glute and ITB  Standing Hip Flexion (HOLD supine against gravity)  Clamshells  Test innominates  Hip abduction   Teach log roll  Squat at work and stairs  NOTE: Standing Right Quadratus Lumborum stretch, [id number] - too painful in lumbar region day of eval    Home Exercises and Patient Education Provided    Education provided:   - daily HEP    Written Home Exercises Provided: yes.  Exercises were reviewed and Elicia was able to demonstrate them prior to the end of the session.  Elicia demonstrated good  understanding of the education provided.     See EMR under Patient Instructions for exercises provided 7/2/2024.    Assessment   Pham is a 68 y.o. female referred to outpatient Physical Therapy with a medical diagnosis of Right trochanteric bursitis. Pt presents with pain in Right glute that radiates laterally into anterior thigh and into anterior tibialis, decreased lumbar range of motion, decreased hip and thigh flexibility, decreased hip and Bilateral Lower Extremity strength, posture and gait deficits, and impaired function.    Pt prognosis is Good.   Pt will benefit from skilled outpatient Physical Therapy to address the deficits stated above and in the chart below, provide pt/family education, and to maximize pt's level of independence.     Plan of care discussed with patient: Yes  Pt's spiritual, cultural and educational needs considered and patient is agreeable to the plan of care and goals as stated below:     Anticipated Barriers for therapy: Chronicity      Medical Necessity is demonstrated by the following  History  Co-morbidities and personal factors that may impact the plan of care  [] LOW: no personal factors / co-morbidities  [x] MODERATE: 1-2 personal factors / co-morbidities  [] HIGH: 3+ personal factors / co-morbidities    Moderate / High Support Documentation:   Co-morbidities affecting plan of care: see precautions    Personal Factors:   coping style  lifestyle     Examination  Body Structures and Functions, activity limitations and participation restrictions that may impact the plan of care [] LOW: addressing 1-2 elements  [x] MODERATE: 3+ elements  [] HIGH: 4+ elements (please support below)    Moderate / High Support Documentation: Range of Motion, flexibility, strength     Clinical Presentation [x] LOW: stable  [] MODERATE: Evolving  [] HIGH: Unstable     Decision Making/ Complexity Score: low          Goals:  Short Term GOALS: 3 weeks  1. Patient is independent with HEP.   2. Patient demonstrates independence with core activation and postural awareness while sitting and standing.   3. Patient will reduce pn to 2/10 while performing daily activities.  4. Patient will reduce RLE radiculopathy frequency and intensity.     Long Term GOALS: 6 weeks.   1. Patient demonstrates increased l/s FB ROM to 15 cm from floor to improve tolerance to reaching activities.   2. Patient demonstrates increased core, hip and BLE strength by 1/2 grade or greater to improve tolerance to home chores.  3. Patient demonstrates independence with body mechanics while working.  4. Patient will improve FOTO function score by 25% to show improvement in condition and functional mobility.     Plan   Plan of care Certification: 7/2/2024 to 9/30/2024.    Outpatient Physical Therapy 2 times weekly for 6 weeks to include the following interventions: Cervical/Lumbar Traction, Electrical Stimulation ,, Gait Training, Manual Therapy, Moist Heat/ Ice, Neuromuscular Re-ed, Orthotic Management and Training, Patient Education, Self Care, Therapeutic Activities, Therapeutic Exercise, and Ultrasound.     Marisabel Bey, PT

## 2024-07-10 ENCOUNTER — DOCUMENTATION ONLY (OUTPATIENT)
Dept: REHABILITATION | Facility: HOSPITAL | Age: 69
End: 2024-07-10

## 2024-07-10 ENCOUNTER — CLINICAL SUPPORT (OUTPATIENT)
Dept: REHABILITATION | Facility: HOSPITAL | Age: 69
End: 2024-07-10
Payer: MEDICARE

## 2024-07-10 DIAGNOSIS — Z74.09 IMPAIRED FUNCTIONAL MOBILITY, BALANCE, GAIT, AND ENDURANCE: Primary | ICD-10-CM

## 2024-07-10 PROCEDURE — 97112 NEUROMUSCULAR REEDUCATION: CPT | Mod: PO,CQ

## 2024-07-10 PROCEDURE — 97110 THERAPEUTIC EXERCISES: CPT | Mod: PO,CQ

## 2024-07-10 NOTE — PROGRESS NOTES
[facility]  Outpatient Physical Therapy Daily Treatment Note      Name: Pham Licea  Clinic Number: [id number]  Visit Date: 7/10/2024    Therapy Diagnosis:   Encounter Diagnosis   Name Primary?    Impaired functional mobility, balance, gait, and endurance Yes       Physician: Jeremy Mendez MD  Physician Orders: PT Eval and Treat   Medical Diagnosis from Referral: M70.61 (ICD-10-CM) - Trochanteric bursitis of right hip   Evaluation Date: 7/2/2024   Authorization Period Expiration: 6/18/2025  Plan of Care Expiration: 9/30/2024 or 12v post eval  Visit # (episodes) / Visits authorized: 2/ eval (POC 1 / 12)  2nd FOTO visit 5  3rd FOTO visit 10  Progress Note Due: 8/2/2024     Time In: 145  Time Out: 230  Total Billable Time: 45 minutes     Precautions: OA  Insurance: Payor: MEDICARE / Plan: MEDICARE PART A & B / Product Type: Government /     Subjective     The patient presents to therapy reporting moderate pain in right hip.     Response to previous treatment: first f/u  Functional change: first f/u     Pain: 4/10  Location: right hip       Objective     Elicia received therapeutic exercises to develop strength, endurance, ROM, flexibility, posture, and core stabilization for 25 minutes including:    Bridges with glute set  x 10   Seated piriformis stretch 3 x 30 sec   Standing hip abduction 2 x 10   Standing hip extension 2 x 10     Elicia received the following manual therapy techniques: Joint mobilizations, Manual traction, Myofacial release, and Soft tissue Mobilization were applied to the:  for 0 minutes, including:      Elicia participated in neuromuscular re-education activities to improve: Balance, Coordination, Kinesthetic, Sense, Proprioception, and Posture for 20 minutes. The following activities were included:    Prone press ups 20 x   Pelvic tilts ( caused initiation of sciatic symptoms)   Prone press ups to alleviate sciatic  pain       Elicia  participated in dynamic functional therapeutic activities to improve functional performance for 0  minutes, including:      Patient Education and HEP     She was compliant with home exercise program.    Education provided:   - home exercise program update     Written Home Exercises Provided: yes.  Exercises were reviewed and Elicia was able to demonstrate them prior to the end of the session.  Elicia demonstrated good  understanding of the education provided.     See EMR under Patient Instructions for exercises provided 7/10/2024.    Assessment     The patient presented to clinic engaged and willing to participate with therapy. She has clearly been compliant with home exercise program so it was expanded today to include some strengthening exercises. She was able to return good demonstration of all recommended exercises however it should be noted that patient was unable to perform hip abduction against gravity and had to switch to gravity minimized position. It should also be noted that pelvic tilts initiated the onset of sciatic symptoms which resolved with prone extension. Patient is likely to respond well to extension based exercises in the future.     Pt will continue to benefit from skilled outpatient physical therapy to address the deficits listed in the problem list box on initial evaluation, provide pt/family education and to maximize pt's level of independence in the home and community environment.     Elicia Is progressing well towards her goals.   Pt prognosis is Good.     Pt's spiritual, cultural and educational needs considered and pt agreeable to plan of care and goals.    Anticipated barriers to physical therapy: Chronicity     Goals: Goals:  Short Term GOALS: 3 weeks  1. Patient is independent with HEP.   2. Patient demonstrates independence with core activation and postural awareness while sitting and standing.   3. Patient will reduce pn to 2/10 while performing daily activities.  4. Patient will  reduce RLE radiculopathy frequency and intensity.     Long Term GOALS: 6 weeks.   1. Patient demonstrates increased l/s FB ROM to 15 cm from floor to improve tolerance to reaching activities.   2. Patient demonstrates increased core, hip and BLE strength by 1/2 grade or greater to improve tolerance to home chores.  3. Patient demonstrates independence with body mechanics while working.  4. Patient will improve FOTO function score by 25% to show improvement in condition and functional mobility.     Plan     Continue with the plan of care established per initial evaluation    Plan of care Certification: 7/2/2024 to 9/30/2024.     Outpatient Physical Therapy 2 times weekly for 6 weeks    Татьяна Garcia, PTA

## 2024-07-16 ENCOUNTER — ANESTHESIA EVENT (OUTPATIENT)
Dept: ENDOSCOPY | Facility: HOSPITAL | Age: 69
End: 2024-07-16
Payer: MEDICARE

## 2024-07-16 ENCOUNTER — ANESTHESIA (OUTPATIENT)
Dept: ENDOSCOPY | Facility: HOSPITAL | Age: 69
End: 2024-07-16
Payer: MEDICARE

## 2024-07-16 ENCOUNTER — HOSPITAL ENCOUNTER (OUTPATIENT)
Facility: HOSPITAL | Age: 69
Discharge: HOME | End: 2024-07-17
Attending: EMERGENCY MEDICINE | Admitting: HOSPITALIST
Payer: MEDICARE

## 2024-07-16 ENCOUNTER — OFFICE VISIT (OUTPATIENT)
Dept: GASTROENTEROLOGY | Facility: CLINIC | Age: 69
End: 2024-07-16
Payer: MEDICARE

## 2024-07-16 VITALS
HEIGHT: 64 IN | SYSTOLIC BLOOD PRESSURE: 130 MMHG | BODY MASS INDEX: 33.8 KG/M2 | WEIGHT: 198 LBS | HEART RATE: 67 BPM | DIASTOLIC BLOOD PRESSURE: 72 MMHG

## 2024-07-16 DIAGNOSIS — K92.0 HEMATEMESIS, UNSPECIFIED WHETHER NAUSEA PRESENT: ICD-10-CM

## 2024-07-16 DIAGNOSIS — Z87.11 HISTORY OF PEPTIC ULCER DISEASE: ICD-10-CM

## 2024-07-16 DIAGNOSIS — K92.0 COFFEE GROUND EMESIS: ICD-10-CM

## 2024-07-16 DIAGNOSIS — K52.9 ENTERITIS: ICD-10-CM

## 2024-07-16 DIAGNOSIS — R10.13 EPIGASTRIC PAIN: Primary | ICD-10-CM

## 2024-07-16 DIAGNOSIS — R10.84 GENERALIZED ABDOMINAL PAIN: Primary | ICD-10-CM

## 2024-07-16 DIAGNOSIS — K59.09 CHRONIC CONSTIPATION: ICD-10-CM

## 2024-07-16 DIAGNOSIS — Z87.19 HISTORY OF SMALL BOWEL OBSTRUCTION: ICD-10-CM

## 2024-07-16 DIAGNOSIS — R11.2 NAUSEA AND VOMITING, UNSPECIFIED VOMITING TYPE: ICD-10-CM

## 2024-07-16 DIAGNOSIS — K21.9 GASTROESOPHAGEAL REFLUX DISEASE, UNSPECIFIED WHETHER ESOPHAGITIS PRESENT: ICD-10-CM

## 2024-07-16 DIAGNOSIS — Z86.2 HISTORY OF IRON DEFICIENCY ANEMIA: ICD-10-CM

## 2024-07-16 DIAGNOSIS — Z12.11 SCREENING FOR COLON CANCER: ICD-10-CM

## 2024-07-16 LAB
ALBUMIN SERPL BCP-MCNC: 3.7 G/DL (ref 3.5–5.2)
ALP SERPL-CCNC: 62 U/L (ref 55–135)
ALT SERPL W/O P-5'-P-CCNC: 19 U/L (ref 10–44)
ANION GAP SERPL CALC-SCNC: 12 MMOL/L (ref 8–16)
AST SERPL-CCNC: 20 U/L (ref 10–40)
BACTERIA #/AREA URNS HPF: ABNORMAL /HPF
BASOPHILS # BLD AUTO: 0.04 K/UL (ref 0–0.2)
BASOPHILS NFR BLD: 0.7 % (ref 0–1.9)
BILIRUB SERPL-MCNC: 0.5 MG/DL (ref 0.1–1)
BILIRUB UR QL STRIP: NEGATIVE
BUN SERPL-MCNC: 9 MG/DL (ref 8–23)
CALCIUM SERPL-MCNC: 9.5 MG/DL (ref 8.7–10.5)
CHLORIDE SERPL-SCNC: 106 MMOL/L (ref 95–110)
CLARITY UR: CLEAR
CO2 SERPL-SCNC: 23 MMOL/L (ref 23–29)
COLOR UR: YELLOW
CREAT SERPL-MCNC: 0.8 MG/DL (ref 0.5–1.4)
DIFFERENTIAL METHOD BLD: NORMAL
EOSINOPHIL # BLD AUTO: 0.1 K/UL (ref 0–0.5)
EOSINOPHIL NFR BLD: 1.9 % (ref 0–8)
ERYTHROCYTE [DISTWIDTH] IN BLOOD BY AUTOMATED COUNT: 12.8 % (ref 11.5–14.5)
EST. GFR  (NO RACE VARIABLE): >60 ML/MIN/1.73 M^2
GLUCOSE SERPL-MCNC: 97 MG/DL (ref 70–110)
GLUCOSE UR QL STRIP: NEGATIVE
HCT VFR BLD AUTO: 42.5 % (ref 37–48.5)
HGB BLD-MCNC: 13.8 G/DL (ref 12–16)
HGB UR QL STRIP: NEGATIVE
IMM GRANULOCYTES # BLD AUTO: 0.02 K/UL (ref 0–0.04)
IMM GRANULOCYTES NFR BLD AUTO: 0.3 % (ref 0–0.5)
KETONES UR QL STRIP: NEGATIVE
LEUKOCYTE ESTERASE UR QL STRIP: ABNORMAL
LIPASE SERPL-CCNC: 25 U/L (ref 4–60)
LYMPHOCYTES # BLD AUTO: 1.8 K/UL (ref 1–4.8)
LYMPHOCYTES NFR BLD: 31.6 % (ref 18–48)
MCH RBC QN AUTO: 30.3 PG (ref 27–31)
MCHC RBC AUTO-ENTMCNC: 32.5 G/DL (ref 32–36)
MCV RBC AUTO: 93 FL (ref 82–98)
MICROSCOPIC COMMENT: ABNORMAL
MONOCYTES # BLD AUTO: 0.6 K/UL (ref 0.3–1)
MONOCYTES NFR BLD: 9.6 % (ref 4–15)
NEUTROPHILS # BLD AUTO: 3.2 K/UL (ref 1.8–7.7)
NEUTROPHILS NFR BLD: 55.9 % (ref 38–73)
NITRITE UR QL STRIP: NEGATIVE
NON-SQ EPI CELLS #/AREA URNS HPF: 3 /HPF
NRBC BLD-RTO: 0 /100 WBC
PH UR STRIP: 7 [PH] (ref 5–8)
PLATELET # BLD AUTO: 272 K/UL (ref 150–450)
PMV BLD AUTO: 10 FL (ref 9.2–12.9)
POTASSIUM SERPL-SCNC: 4 MMOL/L (ref 3.5–5.1)
PROT SERPL-MCNC: 6.5 G/DL (ref 6–8.4)
PROT UR QL STRIP: NEGATIVE
RBC # BLD AUTO: 4.55 M/UL (ref 4–5.4)
RBC #/AREA URNS HPF: 2 /HPF (ref 0–4)
SODIUM SERPL-SCNC: 141 MMOL/L (ref 136–145)
SP GR UR STRIP: 1.01 (ref 1–1.03)
SQUAMOUS #/AREA URNS HPF: 4 /HPF
TROPONIN I SERPL DL<=0.01 NG/ML-MCNC: <0.006 NG/ML (ref 0–0.03)
TROPONIN I SERPL DL<=0.01 NG/ML-MCNC: <0.006 NG/ML (ref 0–0.03)
URN SPEC COLLECT METH UR: ABNORMAL
UROBILINOGEN UR STRIP-ACNC: NEGATIVE EU/DL
WBC # BLD AUTO: 5.73 K/UL (ref 3.9–12.7)
WBC #/AREA URNS HPF: 5 /HPF (ref 0–5)

## 2024-07-16 PROCEDURE — 84484 ASSAY OF TROPONIN QUANT: CPT | Mod: 91 | Performed by: HOSPITALIST

## 2024-07-16 PROCEDURE — 85025 COMPLETE CBC W/AUTO DIFF WBC: CPT | Performed by: EMERGENCY MEDICINE

## 2024-07-16 PROCEDURE — 27201012 HC FORCEPS, HOT/COLD, DISP: Performed by: INTERNAL MEDICINE

## 2024-07-16 PROCEDURE — G0378 HOSPITAL OBSERVATION PER HR: HCPCS

## 2024-07-16 PROCEDURE — 99214 OFFICE O/P EST MOD 30 MIN: CPT | Mod: 25,ICN,, | Performed by: INTERNAL MEDICINE

## 2024-07-16 PROCEDURE — 63600175 PHARM REV CODE 636 W HCPCS: Performed by: EMERGENCY MEDICINE

## 2024-07-16 PROCEDURE — 63600175 PHARM REV CODE 636 W HCPCS: Performed by: NURSE ANESTHETIST, CERTIFIED REGISTERED

## 2024-07-16 PROCEDURE — 96374 THER/PROPH/DIAG INJ IV PUSH: CPT | Mod: 59

## 2024-07-16 PROCEDURE — 43239 EGD BIOPSY SINGLE/MULTIPLE: CPT | Performed by: INTERNAL MEDICINE

## 2024-07-16 PROCEDURE — 81000 URINALYSIS NONAUTO W/SCOPE: CPT | Performed by: EMERGENCY MEDICINE

## 2024-07-16 PROCEDURE — 80053 COMPREHEN METABOLIC PANEL: CPT | Performed by: EMERGENCY MEDICINE

## 2024-07-16 PROCEDURE — 25500020 PHARM REV CODE 255

## 2024-07-16 PROCEDURE — 83690 ASSAY OF LIPASE: CPT | Performed by: EMERGENCY MEDICINE

## 2024-07-16 PROCEDURE — 99999 PR PBB SHADOW E&M-EST. PATIENT-LVL III: CPT | Mod: PBBFAC,,,

## 2024-07-16 PROCEDURE — 37000009 HC ANESTHESIA EA ADD 15 MINS: Performed by: INTERNAL MEDICINE

## 2024-07-16 PROCEDURE — 25000003 PHARM REV CODE 250: Performed by: HOSPITALIST

## 2024-07-16 PROCEDURE — 36415 COLL VENOUS BLD VENIPUNCTURE: CPT | Performed by: EMERGENCY MEDICINE

## 2024-07-16 PROCEDURE — 96375 TX/PRO/DX INJ NEW DRUG ADDON: CPT

## 2024-07-16 PROCEDURE — 37000008 HC ANESTHESIA 1ST 15 MINUTES: Performed by: INTERNAL MEDICINE

## 2024-07-16 PROCEDURE — 43239 EGD BIOPSY SINGLE/MULTIPLE: CPT | Mod: ,,, | Performed by: INTERNAL MEDICINE

## 2024-07-16 PROCEDURE — 99499 UNLISTED E&M SERVICE: CPT | Mod: S$PBB,,,

## 2024-07-16 PROCEDURE — 63600175 PHARM REV CODE 636 W HCPCS: Performed by: HOSPITALIST

## 2024-07-16 PROCEDURE — 96361 HYDRATE IV INFUSION ADD-ON: CPT

## 2024-07-16 PROCEDURE — 25000003 PHARM REV CODE 250: Performed by: NURSE ANESTHETIST, CERTIFIED REGISTERED

## 2024-07-16 PROCEDURE — 99213 OFFICE O/P EST LOW 20 MIN: CPT | Mod: PBBFAC,PN

## 2024-07-16 PROCEDURE — 36415 COLL VENOUS BLD VENIPUNCTURE: CPT | Performed by: HOSPITALIST

## 2024-07-16 PROCEDURE — 63600175 PHARM REV CODE 636 W HCPCS

## 2024-07-16 PROCEDURE — 99285 EMERGENCY DEPT VISIT HI MDM: CPT | Mod: 25

## 2024-07-16 PROCEDURE — 88305 TISSUE EXAM BY PATHOLOGIST: CPT | Mod: TC | Performed by: PATHOLOGY

## 2024-07-16 RX ORDER — SODIUM CHLORIDE 9 MG/ML
INJECTION, SOLUTION INTRAVENOUS CONTINUOUS
Status: DISCONTINUED | OUTPATIENT
Start: 2024-07-16 | End: 2024-07-16

## 2024-07-16 RX ORDER — PROCHLORPERAZINE EDISYLATE 5 MG/ML
INJECTION INTRAMUSCULAR; INTRAVENOUS
Status: DISPENSED
Start: 2024-07-16 | End: 2024-07-17

## 2024-07-16 RX ORDER — PROPOFOL 10 MG/ML
VIAL (ML) INTRAVENOUS
Status: DISCONTINUED | OUTPATIENT
Start: 2024-07-16 | End: 2024-07-16

## 2024-07-16 RX ORDER — PROCHLORPERAZINE EDISYLATE 5 MG/ML
5 INJECTION INTRAMUSCULAR; INTRAVENOUS EVERY 6 HOURS PRN
Status: DISCONTINUED | OUTPATIENT
Start: 2024-07-16 | End: 2024-07-17 | Stop reason: HOSPADM

## 2024-07-16 RX ORDER — PANTOPRAZOLE SODIUM 40 MG/10ML
40 INJECTION, POWDER, LYOPHILIZED, FOR SOLUTION INTRAVENOUS 2 TIMES DAILY
Status: DISCONTINUED | OUTPATIENT
Start: 2024-07-16 | End: 2024-07-17 | Stop reason: HOSPADM

## 2024-07-16 RX ORDER — PANTOPRAZOLE SODIUM 40 MG/10ML
80 INJECTION, POWDER, LYOPHILIZED, FOR SOLUTION INTRAVENOUS
Status: COMPLETED | OUTPATIENT
Start: 2024-07-16 | End: 2024-07-16

## 2024-07-16 RX ORDER — LIDOCAINE HYDROCHLORIDE 20 MG/ML
INJECTION INTRAVENOUS
Status: DISCONTINUED | OUTPATIENT
Start: 2024-07-16 | End: 2024-07-16

## 2024-07-16 RX ORDER — ONDANSETRON HYDROCHLORIDE 2 MG/ML
4 INJECTION, SOLUTION INTRAVENOUS EVERY 6 HOURS PRN
Status: DISCONTINUED | OUTPATIENT
Start: 2024-07-16 | End: 2024-07-17 | Stop reason: HOSPADM

## 2024-07-16 RX ORDER — GABAPENTIN 300 MG/1
300 CAPSULE ORAL 3 TIMES DAILY
Status: DISCONTINUED | OUTPATIENT
Start: 2024-07-16 | End: 2024-07-17 | Stop reason: HOSPADM

## 2024-07-16 RX ORDER — SODIUM CHLORIDE, SODIUM LACTATE, POTASSIUM CHLORIDE, CALCIUM CHLORIDE 600; 310; 30; 20 MG/100ML; MG/100ML; MG/100ML; MG/100ML
INJECTION, SOLUTION INTRAVENOUS CONTINUOUS
Status: DISCONTINUED | OUTPATIENT
Start: 2024-07-16 | End: 2024-07-17 | Stop reason: HOSPADM

## 2024-07-16 RX ORDER — ONDANSETRON 4 MG/1
8 TABLET, ORALLY DISINTEGRATING ORAL EVERY 6 HOURS PRN
COMMUNITY

## 2024-07-16 RX ORDER — ONDANSETRON HYDROCHLORIDE 4 MG/5ML
SOLUTION ORAL ONCE
COMMUNITY
End: 2024-07-16

## 2024-07-16 RX ORDER — LACTULOSE 10 G/15ML
10 SOLUTION ORAL 3 TIMES DAILY
Status: DISCONTINUED | OUTPATIENT
Start: 2024-07-16 | End: 2024-07-17 | Stop reason: HOSPADM

## 2024-07-16 RX ADMIN — PANTOPRAZOLE SODIUM 80 MG: 40 INJECTION, POWDER, LYOPHILIZED, FOR SOLUTION INTRAVENOUS at 04:07

## 2024-07-16 RX ADMIN — PANTOPRAZOLE SODIUM 40 MG: 40 INJECTION, POWDER, LYOPHILIZED, FOR SOLUTION INTRAVENOUS at 08:07

## 2024-07-16 RX ADMIN — IOHEXOL 75 ML: 350 INJECTION, SOLUTION INTRAVENOUS at 10:07

## 2024-07-16 RX ADMIN — GABAPENTIN 300 MG: 300 CAPSULE ORAL at 08:07

## 2024-07-16 RX ADMIN — LACTULOSE 10 G: 20 SOLUTION ORAL at 05:07

## 2024-07-16 RX ADMIN — LIDOCAINE HYDROCHLORIDE 100 MG: 20 INJECTION, SOLUTION INTRAVENOUS at 03:07

## 2024-07-16 RX ADMIN — GABAPENTIN 300 MG: 300 CAPSULE ORAL at 05:07

## 2024-07-16 RX ADMIN — PROCHLORPERAZINE EDISYLATE 5 MG: 5 INJECTION INTRAMUSCULAR; INTRAVENOUS at 09:07

## 2024-07-16 RX ADMIN — ONDANSETRON 4 MG: 2 INJECTION INTRAMUSCULAR; INTRAVENOUS at 05:07

## 2024-07-16 RX ADMIN — PROPOFOL 100 MG: 10 INJECTION, EMULSION INTRAVENOUS at 03:07

## 2024-07-16 RX ADMIN — SODIUM CHLORIDE, POTASSIUM CHLORIDE, SODIUM LACTATE AND CALCIUM CHLORIDE: 600; 310; 30; 20 INJECTION, SOLUTION INTRAVENOUS at 04:07

## 2024-07-16 NOTE — ANESTHESIA POSTPROCEDURE EVALUATION
Anesthesia Post Evaluation    Patient: Pham Licea    Procedure(s) Performed: Procedure(s) (LRB):  EGD (ESOPHAGOGASTRODUODENOSCOPY) (N/A)    Final Anesthesia Type: general      Patient location during evaluation: PACU  Patient participation: Yes- Able to Participate  Level of consciousness: awake and alert and oriented  Post-procedure vital signs: reviewed and stable  Pain management: adequate  Airway patency: patent    PONV status at discharge: No PONV  Anesthetic complications: no      Cardiovascular status: blood pressure returned to baseline and stable  Respiratory status: unassisted and spontaneous ventilation  Hydration status: euvolemic  Follow-up not needed.              Vitals Value Taken Time   /72 07/16/24 1535   Temp 36.5 °C (97.7 °F) 07/16/24 1515   Pulse 76 07/16/24 1539   Resp 15 07/16/24 1535   SpO2 98 % 07/16/24 1539   Vitals shown include unfiled device data.      No case tracking events are documented in the log.      Pain/Derick Score: Derick Score: 10 (7/16/2024  3:35 PM)

## 2024-07-16 NOTE — ED NOTES
Assumed care:  Pham Licea is awake, alert and oriented x 3, skin warm and dry, in NAD.  Patient with history of WHIT CO abd pain that started on Saturday.    Patient identifiers for Pham Licea checked and correct.  LOC:  Pham Licea is awake, alert, and aware of environment with an appropriate affect. She is oriented x 3 and speaking appropriately.  APPEARANCE:  She is resting comfortably and in no acute distress. She is clean and well groomed, patient's clothing is properly fastened.  SKIN:  The skin is warm and dry. She has normal skin turgor and moist mucus membranes. Skin is intact; no bruising or breakdown noted.  MUSCULOSKELETAL:  She is moving all extremities well, no obvious deformities noted. Pulses intact.   RESPIRATORY:  Airway is open and patent. Respirations are spontaneous and non-labored with normal effort and rate.  CARDIAC:  She has a normal rate and rhythm. No peripheral edema noted. Capillary refill < 3 seconds.  ABDOMEN:  No distention noted.  Soft and non-tender upon palpation.  Abd pain  NEUROLOGICAL:  PERRL. Facial expression is symmetrical. Hand grasps are equal bilaterally. Normal sensation in all extremities when touched with finger.  Allergies reported:  Review of patient's allergies indicates:  No Known Allergies

## 2024-07-16 NOTE — PROGRESS NOTES
Subjective:       Patient ID: Pham Licea is a 68 y.o. female Body mass index is 33.99 kg/m².    Chief Complaint: Nausea (Abd pain, vomititng)    This patient is new to me.  Referring Provider: No ref. provider found for SBO.  Established patient of Dr. Issac montes.     Patient states is having severe generalized abdominal pain 8/10 tender to touch all over abdomen on examination , reports nausea but vomiting has resolved pt reports on Saturday vomiting black liquids no bright blood, no SOB no chest pain, no rectal bleeding, no melena, due to the severity of pain, vomiting black liquids with a hx of PUD, and recurrent SBO, I recommended patient to report to emergency room for emergent care, pt verbalized understanding and would like to get a couple things taking care of at home prior to going to ED, pt declined going to ED now via ambulance.     Abdominal Pain  This is a recurrent (this episode of abdominal pain started saturday with N/V states felt like another episode of SBO) problem. Episode onset: reports vomiting black liquids on saturday no bright red blood, states vomiting has stopped but nausea still continues, pt with hx of PUD with bleeding. The problem occurs daily. The problem has been gradually worsening (pt states during SBO flair ups her abdominal pain goes away but states this time it is worsening). The pain is located in the generalized abdominal region (feels like abdominal contraction/crampy). The pain is at a severity of 8/10. The quality of the pain is burning. Associated symptoms include constipation (hx of constipation, has a bm daily, rates stool type 5 on bristol stool scale, was taking fiber and miralax but states lactulose helps more, rates stool type 5 on bristol stool scale, takes lactulose almost daily last bm yesterday passing gas today), nausea and vomiting (states vomiting has resolved and is able to tolerate mash potatoes and chicken broth). Pertinent negatives  include no anorexia, arthralgias, belching, diarrhea, dysuria, fever, flatus, frequency, headaches, hematochezia, hematuria, melena, myalgias or weight loss. Associated symptoms comments: Pt presents to clinic for evaluation of small-bowel obstruction,pt w/ hx for recurrent small-bowel obstruction and small-bowel strictures.  She had been seen by GI at our [place] location, Dr. Blakely,.  She has been regularly followed by Dr. Misael Holm, general surgeon in [place].  He has been treating her for recurrent small-bowel obstructions. Pt states SBO's started after sept 2020 had gall bladder surgery then she had a bowel obstruction, Recurrent small-bowel obstructions lead to an eventual exploratory laparotomy 05/04/2021.  A long section of the mid small bowel involving the proximal ileum contained multiple strictures, >20, some of which were pinpoint size.  A 60-70 cm length of small bowel was resected at that time with a hand-sewn side-to-side anastomosis performed.  An estimated 350 cm of bowel remained.  Pathology from those samples revealed edema, fibrosis, and chronic inflammation. She eventually had another exploratory laparotomy 03/17/2022 and was found to have an internal hernia from adhesions   -EGD and colonoscopy 11/09/2020 by Dr. Misael Holm.  Esophagus was normal, gastric erythema in the antrum was noted, and the duodenum was normal.  Gastric biopsies revealed reactive changes and no signs of H pylori.  Duodenal biopsies were normal.  Colonoscopy was complete with good bowel preparation.  A 15 mm lipoma was seen in the ascending colon.  Otherwise, the exam was unremarkable, and a 10 year repeat recommended.  Pt underwent a lower and Upper Device-Assisted Enteroscopy without  Fluoroscopy as listed in procedures  -patient sees Dr. Bower hematologist oncologist for iron-deficiency anemia per Hematology Oncology iron-deficiency anemia possibly can be from NATHAN: pt had partail bowel removal from  obstructions with recurrence  -during last clinic appointment pt was referred to general surgeon pt established care with Dr. Tyler      . The pain is aggravated by eating. The pain is relieved by Nothing. Prior diagnostic workup includes CT scan, upper endoscopy, lower endoscopy and GI consult. Her past medical history is significant for abdominal surgery and GERD (hx of GERD has been taking pantoprazole since about  declines any GERD symptoms currently states GERD symptoms are controlled). There is no history of colon cancer, Crohn's disease, gallstones, irritable bowel syndrome, pancreatitis, PUD or ulcerative colitis. Patient's medical history does not include kidney stones and UTI.       Review of Systems   Constitutional:  Negative for fever and weight loss.   Gastrointestinal:  Positive for abdominal pain, constipation (hx of constipation, has a bm daily, rates stool type 5 on bristol stool scale, was taking fiber and miralax but states lactulose helps more, rates stool type 5 on bristol stool scale, takes lactulose almost daily last bm yesterday passing gas today), nausea and vomiting (states vomiting has resolved and is able to tolerate mash potatoes and chicken broth). Negative for anorexia, diarrhea, flatus, hematochezia and melena.   Genitourinary:  Negative for dysuria, frequency and hematuria.   Musculoskeletal:  Negative for arthralgias and myalgias.   Neurological:  Negative for headaches.         No LMP recorded (lmp unknown). Patient has had a hysterectomy.  Past Medical History:   Diagnosis Date    Anemia     Fatty liver 2015    Former smoker 2009    Osteoarthritis 2010    Pulmonary nodules 2019    Repeat CT in 6 mo    Ulcer of abdomen wall 2016     Past Surgical History:   Procedure Laterality Date    APPENDECTOMY  1993     SECTION  1980, 1982, 1983    x3    CHOLECYSTECTOMY          COLONOSCOPY  ~    [facility]: normal findings per patient report    COLONOSCOPY N/A 2020     Procedure: COLONOSCOPY;  Surgeon: Misael Holm MD;  Location: [facility] END[facility];  Service: General;  Laterality: N/A;    COLONOSCOPY, WITH RETROGRADE BALLOON ENTEROSCOPY, SINGLE OR DOUBLE BALLOON N/A 01/30/2023    Procedure: COLONOSCOPY, WITH RETROGRADE BALLOON ENTEROSCOPY, DOUBLE BALLOON;  Surgeon: Collin Mares MD;  Location: [facility] ENDO (2ND FLR);  Service: Endoscopy;  Laterality: N/A;  inst via portal-MS-sutab per pt request-tb-new inst portal    DIAGNOSTIC LAPAROSCOPY N/A 05/04/2021    Procedure: LAPAROSCOPY, DIAGNOSTIC;  Surgeon: Misael Holm MD;  Location: [facility] OR;  Service: General;  Laterality: N/A;    EPIDURAL STEROID INJECTION INTO LUMBAR SPINE N/A 10/12/2018    Procedure: Injection-steroid-epidural-lumbar;  Surgeon: Kal Johnson MD;  Location: [facility] OR;  Service: Pain Management;  Laterality: N/A;  L5-S1    EPIDURAL STEROID INJECTION INTO LUMBAR SPINE N/A 05/31/2019    Procedure: Injection-steroid-epidural-lumbar L5-S1;  Surgeon: Kal Johnson MD;  Location: [facility];  Service: Pain Management;  Laterality: N/A;    ESOPHAGOGASTRODUODENOSCOPY N/A 11/09/2020    Procedure: ESOPHAGOGASTRODUODENOSCOPY (EGD);  Surgeon: Misael Holm MD;  Location: [facility];  Service: General;  Laterality: N/A;    FUSION, SPINE, MINIMALLY INVASIVE, USING COMPUTER-ASSISTED NAVIGATION N/A 12/22/2022    Procedure: ENTEROSCOPY, DOUBLE BALLOON, ANTEGRADE;  Surgeon: Collin Mares MD;  Location: [facility] ENDO (2ND FLR);  Service: Endoscopy;  Laterality: N/A;  11/14/22-Instructions via portal-DS    *open to earlier date if available*    HYSTERECTOMY  1993    one ovary conserved    LAPAROSCOPIC CHOLECYSTECTOMY N/A 09/02/2020    Procedure: CHOLECYSTECTOMY, LAPAROSCOPIC;  Surgeon: Govind Frey MD;  Location: [facility] OR;  Service: General;  Laterality: N/A;    LAPAROSCOPIC LYSIS OF ADHESIONS N/A 05/04/2021    Procedure: LYSIS, ADHESIONS, LAPAROSCOPIC;  Surgeon: Misael Holm MD;  Location: [facility] OR;  Service:  General;  Laterality: N/A;    TRANSFORAMINAL EPIDURAL INJECTION OF STEROID Right 2024    Procedure: Injection,steroid,epidural,transforaminal approach;  Surgeon: Kal Johnson MD;  Location: [facility] OR;  Service: Anesthesiology;  Laterality: Right;  L4-5 and L5-s1    UPPER GASTROINTESTINAL ENDOSCOPY  2016     Family History   Problem Relation Name Age of Onset    Ovarian cancer Mother      Heart disease Mother      Osteoporosis Mother      Arthritis Mother      Hypertension Father      Stroke Father  50    Brain Hemorrhage Father      Aneurysm Father      Osteoarthritis Sister      Arthritis Sister      Hypertension Sister      Obesity Sister      Breast cancer Neg Hx      Colon cancer Neg Hx      Colon polyps Neg Hx      Crohn's disease Neg Hx      Ulcerative colitis Neg Hx       Social History     Tobacco Use    Smoking status: Former     Current packs/day: 0.00     Average packs/day: 1 pack/day for 40.0 years (40.0 ttl pk-yrs)     Types: Cigarettes     Start date: 1969     Quit date: 2009     Years since quittin.7    Smokeless tobacco: Never    Tobacco comments:     quit    Substance Use Topics    Alcohol use: Not Currently     Comment: Not often - one glass of wine every now and then    Drug use: Not Currently     Types: Marijuana     Comment: in      Wt Readings from Last 10 Encounters:   24 89.8 kg (198 lb)   24 89.8 kg (198 lb)   24 91.5 kg (201 lb 11.5 oz)   24 91.5 kg (201 lb 12.8 oz)   24 88.7 kg (195 lb 8.8 oz)   24 88.8 kg (195 lb 12.3 oz)   24 88.8 kg (195 lb 12.3 oz)   24 88.8 kg (195 lb 12.3 oz)   24 88.8 kg (195 lb 12.8 oz)   24 88 kg (194 lb 0.1 oz)     Lab Results   Component Value Date    WBC 5.73 2024    HGB 13.8 2024    HCT 42.5 2024    MCV 93 2024     2024     CMP  Sodium   Date Value Ref Range Status   2024 141 136 - 145 mmol/L Final     Potassium   Date Value Ref  "Range Status   07/16/2024 4.0 3.5 - 5.1 mmol/L Final     Chloride   Date Value Ref Range Status   07/16/2024 106 95 - 110 mmol/L Final     CO2   Date Value Ref Range Status   07/16/2024 23 23 - 29 mmol/L Final     Glucose   Date Value Ref Range Status   07/16/2024 97 70 - 110 mg/dL Final     BUN   Date Value Ref Range Status   07/16/2024 9 8 - 23 mg/dL Final     Creatinine   Date Value Ref Range Status   07/16/2024 0.8 0.5 - 1.4 mg/dL Final     Calcium   Date Value Ref Range Status   07/16/2024 9.5 8.7 - 10.5 mg/dL Final     Total Protein   Date Value Ref Range Status   07/16/2024 6.5 6.0 - 8.4 g/dL Final     Albumin   Date Value Ref Range Status   07/16/2024 3.7 3.5 - 5.2 g/dL Final     Total Bilirubin   Date Value Ref Range Status   07/16/2024 0.5 0.1 - 1.0 mg/dL Final     Comment:     For infants and newborns, interpretation of results should be based  on gestational age, weight and in agreement with clinical  observations.    Premature Infant recommended reference ranges:  Up to 24 hours.............<8.0 mg/dL  Up to 48 hours............<12.0 mg/dL  3-5 days..................<15.0 mg/dL  6-29 days.................<15.0 mg/dL       Alkaline Phosphatase   Date Value Ref Range Status   07/16/2024 62 55 - 135 U/L Final     AST   Date Value Ref Range Status   07/16/2024 20 10 - 40 U/L Final     ALT   Date Value Ref Range Status   07/16/2024 19 10 - 44 U/L Final     Anion Gap   Date Value Ref Range Status   07/16/2024 12 8 - 16 mmol/L Final     eGFR if    Date Value Ref Range Status   06/27/2022 >60.0 >60 mL/min/1.73 m^2 Final     eGFR if non    Date Value Ref Range Status   06/27/2022 >60.0 >60 mL/min/1.73 m^2 Final     Comment:     Calculation used to obtain the estimated glomerular filtration  rate (eGFR) is the CKD-EPI equation.        Lab Results   Component Value Date    LIPASE 25 07/16/2024     No results found for: "LIPASERES"  Lab Results   Component Value Date    TSH 1.937 " 01/25/2022       Reviewed prior medical records including office visit 01/08/2024 Dr. Bower , Dr. Mares office visit 10/12/2022 radiology report of CT abdomen pelvis 06/08/2023 & endoscopy history (see surgical history/procedures).    Objective:      Physical Exam  Vitals and nursing note reviewed.   Constitutional:       Appearance: Normal appearance. She is normal weight.   Cardiovascular:      Rate and Rhythm: Normal rate and regular rhythm.      Heart sounds: Normal heart sounds.   Pulmonary:      Breath sounds: Normal breath sounds.   Abdominal:      General: Bowel sounds are normal.      Palpations: Abdomen is soft.      Tenderness: There is generalized abdominal tenderness.   Skin:     General: Skin is warm and dry.      Coloration: Skin is not jaundiced.   Neurological:      Mental Status: She is alert and oriented to person, place, and time.   Psychiatric:         Mood and Affect: Mood normal.         Behavior: Behavior normal.         Assessment:       1. Generalized abdominal pain    2. Nausea and vomiting, unspecified vomiting type    3. History of small bowel obstruction    4. History of peptic ulcer disease    5. Gastroesophageal reflux disease, unspecified whether esophagitis present    6. Chronic constipation    7. Screening for colon cancer    8. History of iron deficiency anemia          Plan:       -Patient was able to schedule EGD in clinic, although recommend patient to go to emergency room for emergent care and possible STAT EGD    Generalized abdominal pain  -     X-Ray Abdomen AP 1 View; Future; Expected date: 07/16/24  - schedule EGD, discussed procedure with patient, including risks and benefits, patient verbalized understanding    Nausea and vomiting, unspecified vomiting type, Coffee ground emesis   - schedule EGD, discussed procedure with patient, including risks and benefits, patient verbalized understanding  - continue Zofran as prescribed as needed for nausea  -consider gastric  emptying study    History of small bowel obstruction  -     X-Ray Abdomen AP 1 View; Future; Expected date: 02/01/2024  - schedule EGD, discussed procedure with patient, including risks and benefits, patient verbalized understanding    History of peptic ulcer disease   - schedule EGD, discussed procedure with patient, including risks and benefits, patient verbalized understanding   -Advised to avoid/limit use of NSAID's, since they can cause GI upset, bleeding, and/or ulcers. If needed, take with food.     Gastroesophageal reflux disease, unspecified whether esophagitis present   - schedule EGD, discussed procedure with patient, including risks and benefits, patient verbalized understanding   -continue pantoprazole 40 mg orally daily   -Take PPI 30min-1hr before eating breakfast  -Educated patient on lifestyle modifications to help control/reduce reflux/abdominal pain including: avoid large meals, avoid eating within 2-3 hours of bedtime (avoid late night eating & lying down soon after eating), elevate head of bed if nocturnal symptoms are present, smoking cessation (if current smoker), & weight loss (if overweight).   -Educated to avoid known foods which trigger reflux symptoms & to minimize/avoid high-fat foods, chocolate, caffeine, citrus, alcohol, & tomato products.    Chronic constipation   -Recommend daily exercise as tolerated, adequate water intake (six 8-oz glasses of water daily), and high fiber diet.  -Recommend trying OTC MiraLax once daily (17g PO) as directed  -If no improvement with above recommendations, try intermittently dosed Dulcolax OTC as directed (every 3-4 days) PRN to facilitate bowel movements  -If no relief with this, consider adding a emollient laxative (castor oil or mineral oil) +/- enema  -If still no improvement with these measures, call/follow-up   -continue lactulose as prescribed    Screening for colon cancer   -next surveillance colonoscopy per recommendations to  11/2030    History of iron-deficiency anemia   -Continue to follow up with Hematology Oncology      Follow up if symptoms worsen or fail to improve.      If no improvement in symptoms or symptoms worsen, call/follow-up at clinic or go to ER.       [facility]  [facility], [place]     Dictation software program was used for this note. Please expect some simple typographical  errors in this note.    Encounter includes face to face time and non-face to face time preparing to see the patient (eg, review of tests), obtaining and/or reviewing separately obtained history, documenting clinical information in the electronic or other health record, independently interpreting results (not separately reported) and communicating results to the patient/family/caregiver, or care coordination (not separately reported).

## 2024-07-16 NOTE — HPI
69 yo F whom I have seen in the past presented to the hospital with abdominal discomfort and nausea when eating over the weekend.  Notes that she has had continued discomfort with eating.  No fever/chills.  Ct scan performed with no evidence of obstruction.  EGD performed today with no findings to suggest obstruction.   Surgery consulted    PT has had previous ex lap with bowel resection and THIAGO x2.

## 2024-07-16 NOTE — CONSULTS
[facility]     CC: Coffee ground emesis    HPI 68 y.o. female with past medical history of anemia, hepatic steatosis, former smoker, multiple previous abdominal surgeries and small-bowel obstructions presents today with abdominal pain associated nausea vomiting.  Symptoms began on Saturday 3 days ago.  Reports epigastric abdominal pain since then.  Nonbilious emesis.  However patient reports vomiting up black liquids.  Last bowel movement was 2 days ago and was nonbloody.  She states she was feels constipated and bloated.  Denies any dysuria hematuria.  Denies any fevers or chills.  Denies any chest pain shortness of breath or any other symptoms.  Patient reports she has been taking Zofran with some relief.  Went to see GI today he was recommend she come to the ER for further management.     Had exploratory laparotomy with enterectomy for bowel obstruction in 2021 another ex lap in 03/2023 after CT finding concerning for bowel ischemia and ultimately found to have internal hernia created by postoperative adhesions that were lysed by Dr. Marquez at [facility].  Abdominal surgical history also includes cholecystectomy in 2020.  Patient also has a history of esophageal and duodenal ulcers with bleeding in 2017.    FURTHER HISTORY:  Above obtained from independent review of records from admitting provider as well as from direct discussion with ED physician who relates that patient has had multiple surgeries in the past.  In addition, on my interview, I note the following:  Patient complains of N/V, associated with black emesis and abdominal pain, recent onset, with no alleviating/exacerbating factors, occurring in the setting of multiple prior surgeries.      Past Medical History:   Diagnosis Date    Anemia     Fatty liver 2015    Former smoker 2009    Osteoarthritis 2010    Pulmonary nodules 08/16/2019    Repeat CT in 6 mo    Ulcer of abdomen wall 2016       Past Surgical History:   Procedure Laterality Date     APPENDECTOMY  1993     SECTION  , , 1983    x3    CHOLECYSTECTOMY      2020    COLONOSCOPY  ~    [facility]: normal findings per patient report    COLONOSCOPY N/A 2020    Procedure: COLONOSCOPY;  Surgeon: Misael oHlm MD;  Location: [facility] ENDO;  Service: General;  Laterality: N/A;    COLONOSCOPY, WITH RETROGRADE BALLOON ENTEROSCOPY, SINGLE OR DOUBLE BALLOON N/A 2023    Procedure: COLONOSCOPY, WITH RETROGRADE BALLOON ENTEROSCOPY, DOUBLE BALLOON;  Surgeon: Collin Mares MD;  Location: [facility] ENDO (2ND FLR);  Service: Endoscopy;  Laterality: N/A;  inst via portal-MS-sutab per pt request-tb-new inst portal    DIAGNOSTIC LAPAROSCOPY N/A 2021    Procedure: LAPAROSCOPY, DIAGNOSTIC;  Surgeon: Misael Holm MD;  Location: [facility] OR;  Service: General;  Laterality: N/A;    EPIDURAL STEROID INJECTION INTO LUMBAR SPINE N/A 10/12/2018    Procedure: Injection-steroid-epidural-lumbar;  Surgeon: Kal Johnson MD;  Location: [facility] OR;  Service: Pain Management;  Laterality: N/A;  L5-S1    EPIDURAL STEROID INJECTION INTO LUMBAR SPINE N/A 2019    Procedure: Injection-steroid-epidural-lumbar L5-S1;  Surgeon: Kal Johnson MD;  Location: [facility] OR;  Service: Pain Management;  Laterality: N/A;    ESOPHAGOGASTRODUODENOSCOPY N/A 2020    Procedure: ESOPHAGOGASTRODUODENOSCOPY (EGD);  Surgeon: Misael Holm MD;  Location: [facility];  Service: General;  Laterality: N/A;    FUSION, SPINE, MINIMALLY INVASIVE, USING COMPUTER-ASSISTED NAVIGATION N/A 2022    Procedure: ENTEROSCOPY, DOUBLE BALLOON, ANTEGRADE;  Surgeon: Collin Mares MD;  Location: [facility] ENDO (2ND FLR);  Service: Endoscopy;  Laterality: N/A;  22-Instructions via portal-DS    *open to earlier date if available*    HYSTERECTOMY      one ovary conserved    LAPAROSCOPIC CHOLECYSTECTOMY N/A 2020    Procedure: CHOLECYSTECTOMY, LAPAROSCOPIC;  Surgeon: Govind Frey MD;  Location: [facility] OR;  Service:  General;  Laterality: N/A;    LAPAROSCOPIC LYSIS OF ADHESIONS N/A 2021    Procedure: LYSIS, ADHESIONS, LAPAROSCOPIC;  Surgeon: Misael Holm MD;  Location: [facility] OR;  Service: General;  Laterality: N/A;    TRANSFORAMINAL EPIDURAL INJECTION OF STEROID Right 2024    Procedure: Injection,steroid,epidural,transforaminal approach;  Surgeon: Kal Johnson MD;  Location: [facility] OR;  Service: Anesthesiology;  Laterality: Right;  L4-5 and L5-s1    UPPER GASTROINTESTINAL ENDOSCOPY         Social History     Tobacco Use    Smoking status: Former     Current packs/day: 0.00     Average packs/day: 1 pack/day for 40.0 years (40.0 ttl pk-yrs)     Types: Cigarettes     Start date: 1969     Quit date: 2009     Years since quittin.7    Smokeless tobacco: Never    Tobacco comments:     quit    Substance Use Topics    Alcohol use: Not Currently     Comment: Not often - one glass of wine every now and then    Drug use: Not Currently     Types: Marijuana     Comment: in        Family History   Problem Relation Name Age of Onset    Ovarian cancer Mother      Heart disease Mother      Osteoporosis Mother      Arthritis Mother      Hypertension Father      Stroke Father  50    Brain Hemorrhage Father      Aneurysm Father      Osteoarthritis Sister      Arthritis Sister      Hypertension Sister      Obesity Sister      Breast cancer Neg Hx      Colon cancer Neg Hx      Colon polyps Neg Hx      Crohn's disease Neg Hx      Ulcerative colitis Neg Hx         Review of Systems  General ROS: negative for - chills, fever or weight loss  Psychological ROS: negative for - hallucination, depression or suicidal ideation  Ophthalmic ROS: negative for - blurry vision, photophobia or eye pain  ENT ROS: negative for - epistaxis, sore throat or rhinorrhea  Respiratory ROS: no cough, shortness of breath, or wheezing  Cardiovascular ROS: no chest pain or dyspnea on exertion  Gastrointestinal ROS: as  "above  Genito-Urinary ROS: no dysuria, trouble voiding, or hematuria  Musculoskeletal ROS: negative for - arthralgia, myalgia, weakness  Neurological ROS: no syncope or seizures; no ataxia  Dermatological ROS: negative for pruritis, rash and jaundice    Physical Examination  BP (!) 151/66 (BP Location: Left arm, Patient Position: Lying)   Pulse 70   Temp 97.7 °F (36.5 °C) (Skin)   Resp 16   Ht 5' 4" (1.626 m)   Wt 89.8 kg (198 lb)   LMP  (LMP Unknown)   SpO2 97%   Breastfeeding No   BMI 33.99 kg/m²   General appearance: alert, cooperative, no distress  HENT: Normocephalic, atraumatic, neck symmetrical, no nasal discharge   Eyes: conjunctivae/corneas clear, PERRL, EOM's intact, sclera anicteric  Lungs: clear to auscultation bilaterally, no dullness to percussion bilaterally, symmetric expansion, breathing unlabored  Heart: regular rate and rhythm without rub; no displacement of the PMI   Abdomen: soft, NT  Extremities: extremities symmetric; no clubbing, cyanosis, or edema  Integument: Skin color, texture, turgor normal; no rashes; hair distrubution normal, no jaundice  Neurologic: Alert and oriented X 3, no focal sensory or motor neurologic deficits  Psychiatric: no pressured speech; normal affect; no evidence of impaired cognition, no anxiety/depression     Labs:  Lab Results   Component Value Date    WBC 5.73 07/16/2024    HGB 13.8 07/16/2024    HCT 42.5 07/16/2024    MCV 93 07/16/2024     07/16/2024         CMP  Sodium   Date Value Ref Range Status   07/16/2024 141 136 - 145 mmol/L Final     Potassium   Date Value Ref Range Status   07/16/2024 4.0 3.5 - 5.1 mmol/L Final     Chloride   Date Value Ref Range Status   07/16/2024 106 95 - 110 mmol/L Final     CO2   Date Value Ref Range Status   07/16/2024 23 23 - 29 mmol/L Final     Glucose   Date Value Ref Range Status   07/16/2024 97 70 - 110 mg/dL Final     BUN   Date Value Ref Range Status   07/16/2024 9 8 - 23 mg/dL Final     Creatinine   Date " Value Ref Range Status   07/16/2024 0.8 0.5 - 1.4 mg/dL Final     Calcium   Date Value Ref Range Status   07/16/2024 9.5 8.7 - 10.5 mg/dL Final     Total Protein   Date Value Ref Range Status   07/16/2024 6.5 6.0 - 8.4 g/dL Final     Albumin   Date Value Ref Range Status   07/16/2024 3.7 3.5 - 5.2 g/dL Final     Total Bilirubin   Date Value Ref Range Status   07/16/2024 0.5 0.1 - 1.0 mg/dL Final     Comment:     For infants and newborns, interpretation of results should be based  on gestational age, weight and in agreement with clinical  observations.    Premature Infant recommended reference ranges:  Up to 24 hours.............<8.0 mg/dL  Up to 48 hours............<12.0 mg/dL  3-5 days..................<15.0 mg/dL  6-29 days.................<15.0 mg/dL       Alkaline Phosphatase   Date Value Ref Range Status   07/16/2024 62 55 - 135 U/L Final     AST   Date Value Ref Range Status   07/16/2024 20 10 - 40 U/L Final     ALT   Date Value Ref Range Status   07/16/2024 19 10 - 44 U/L Final     Anion Gap   Date Value Ref Range Status   07/16/2024 12 8 - 16 mmol/L Final     eGFR   Date Value Ref Range Status   07/16/2024 >60 >60 mL/min/1.73 m^2 Final           Imaging:  CT scan was independently visualized and reviewed by me and showed postsurgical anatomy and mild small bowel wall thickening proximal to anastomosis.    I have personally reviewed these images    Case discussed as above    Assessment:   Coffee ground emesis  History of bowel surgery  Abnormal CT scan  Likely gastroenteritis    Plan:  NPO  PPI  EGD today  Further recommendations to follow after above.  Communication will be sent to the referring provider regarding my assessment and plan on this patient via EPIC.      Jas Sood MD  [facility]  [address], [address]  [place] [address]  Office: [phone]  Fax: [phone]

## 2024-07-16 NOTE — CONSULTS
[facility]/Surg  General Surgery  Consult Note    Patient Name: Pham Licea  MRN: [id number]  Code Status: Full Code  Admission Date: 7/16/2024  Hospital Length of Stay: 0 days  Attending Physician: Joel Almeida MD  Primary Care Provider: Lani Grossman MD    Patient information was obtained from patient and ER records.     Inpatient consult to General Surgery  Consult performed by: John Tyler MD  Consult ordered by: Joel Almeida MD        Subjective:     Principal Problem: Nausea & vomiting    History of Present Illness: 69 yo F whom I have seen in the past presented to the hospital with abdominal discomfort and nausea when eating over the weekend.  Notes that she has had continued discomfort with eating.  No fever/chills.  Ct scan performed with no evidence of obstruction.  EGD performed today with no findings to suggest obstruction.   Surgery consulted    PT has had previous ex lap with bowel resection and THIAGO x2.       Current Facility-Administered Medications on File Prior to Encounter   Medication    lactated ringers infusion    sodium chloride 0.9% 100 mL flush bag    sodium chloride 0.9% flush 10 mL     Current Outpatient Medications on File Prior to Encounter   Medication Sig    biotin 5,000 mcg TbDL Take 5,000 mcg by mouth Daily.    CONSTULOSE 10 gram/15 mL solution TAKE 10 ML BY MOUTH  THREE TIMES DAILY (Patient taking differently: Take 10 g by mouth 2 (two) times daily. TAKE 10 ML BY MOUTH  THREE TIMES DAILY)    cyanocobalamin, vitamin B-12, (VITAMIN B-12) 1,000 mcg/mL Drop Take 1 Dose by mouth once daily.    gabapentin (NEURONTIN) 300 MG capsule Take 2 capsules (600 mg total) by mouth 3 (three) times daily.    magnesium gluconate 27.5 mg magne- sium (500 mg) Tab Take 250 mg by mouth once daily.    ondansetron (ZOFRAN-ODT) 4 MG TbDL Take 8 mg by mouth every 6 (six) hours as needed.    orphenadrine (NORFLEX) 100 mg tablet Take 1 tablet (100 mg total) by mouth 2 (two)  times daily as needed for Muscle spasms.    pantoprazole (PROTONIX) 20 MG tablet Take 1 tablet (20 mg total) by mouth once daily.    POTASSIUM CHLORIDE, BULK, MISC Take 99 mg by mouth once daily.    vitamin D (VITAMIN D3) 1000 units Tab Take 3,000 Units by mouth once daily.    diclofenac sodium (VOLTAREN) 1 % Gel Apply 2 g topically 2 (two) times daily as needed.    [DISCONTINUED] ondansetron (ZOFRAN) 4 mg/5 mL solution Take by mouth once.       Review of patient's allergies indicates:  No Known Allergies    Past Medical History:   Diagnosis Date    Anemia     Fatty liver     Former smoker     Osteoarthritis 2010    Pulmonary nodules 2019    Repeat CT in 6 mo    Ulcer of abdomen wall 2016     Past Surgical History:   Procedure Laterality Date    APPENDECTOMY       SECTION  , , 1983    x3    CHOLECYSTECTOMY          COLONOSCOPY  ~    [facility]: normal findings per patient report    COLONOSCOPY N/A 2020    Procedure: COLONOSCOPY;  Surgeon: Misael Holm MD;  Location: [facility] ENDO;  Service: General;  Laterality: N/A;    COLONOSCOPY, WITH RETROGRADE BALLOON ENTEROSCOPY, SINGLE OR DOUBLE BALLOON N/A 2023    Procedure: COLONOSCOPY, WITH RETROGRADE BALLOON ENTEROSCOPY, DOUBLE BALLOON;  Surgeon: Collin Mares MD;  [facility]  Service: Endoscopy;  Laterality: N/A;  inst via portal-MS-sutab per pt request-tb-new inst portal    DIAGNOSTIC LAPAROSCOPY N/A 2021    Procedure: LAPAROSCOPY, DIAGNOSTIC;  Surgeon: Misael Holm MD;  Location: [facility] OR;  Service: General;  Laterality: N/A;    EPIDURAL STEROID INJECTION INTO LUMBAR SPINE N/A 10/12/2018    Procedure: Injection-steroid-epidural-lumbar;  Surgeon: Kal Johnson MD;  Location: [facility] OR;  Service: Pain Management;  Laterality: N/A;  L5-S1    EPIDURAL STEROID INJECTION INTO LUMBAR SPINE N/A 2019    Procedure: Injection-steroid-epidural-lumbar L5-S1;  Surgeon: Kal Johnson MD;   [facility]  Service: Pain Management;  Laterality: N/A;    ESOPHAGOGASTRODUODENOSCOPY N/A 2020    Procedure: ESOPHAGOGASTRODUODENOSCOPY (EGD);  Surgeon: Misael Holm MD;  Location: [facility] ENDO;  Service: General;  Laterality: N/A;    FUSION, SPINE, MINIMALLY INVASIVE, USING COMPUTER-ASSISTED NAVIGATION N/A 2022    Procedure: ENTEROSCOPY, DOUBLE BALLOON, ANTEGRADE;  Surgeon: Collin Mares MD;  Location: [facility] ENDO (2ND FLR);  Service: Endoscopy;  Laterality: N/A;  22-Instructions via portal-DS    *open to earlier date if available*    HYSTERECTOMY  1993    one ovary conserved    LAPAROSCOPIC CHOLECYSTECTOMY N/A 2020    Procedure: CHOLECYSTECTOMY, LAPAROSCOPIC;  Surgeon: Govind Frey MD;  Location: [facility] OR;  Service: General;  Laterality: N/A;    LAPAROSCOPIC LYSIS OF ADHESIONS N/A 2021    Procedure: LYSIS, ADHESIONS, LAPAROSCOPIC;  Surgeon: Misael Holm MD;  Location: [facility] OR;  Service: General;  Laterality: N/A;    TRANSFORAMINAL EPIDURAL INJECTION OF STEROID Right 2024    Procedure: Injection,steroid,epidural,transforaminal approach;  Surgeon: Kal Johnson MD;  Location: [facility] OR;  Service: Anesthesiology;  Laterality: Right;  L4-5 and L5-s1    UPPER GASTROINTESTINAL ENDOSCOPY       Family History       Problem Relation (Age of Onset)    Aneurysm Father    Arthritis Mother, Sister    Brain Hemorrhage Father    Heart disease Mother    Hypertension Father, Sister    Obesity Sister    Osteoarthritis Sister    Osteoporosis Mother    Ovarian cancer Mother    Stroke Father (50)          Tobacco Use    Smoking status: Former     Current packs/day: 0.00     Average packs/day: 1 pack/day for 40.0 years (40.0 ttl pk-yrs)     Types: Cigarettes     Start date: 1969     Quit date: 2009     Years since quittin.7    Smokeless tobacco: Never    Tobacco comments:     quit    Substance and Sexual Activity    Alcohol use: Not Currently      Comment: Not often - one glass of wine every now and then    Drug use: Not Currently     Types: Marijuana     Comment: in 1970s    Sexual activity: Not Currently     Review of Systems   Constitutional:  Negative for activity change and appetite change.   Gastrointestinal:  Positive for abdominal pain and nausea. Negative for abdominal distention.     Objective:     Vital Signs (Most Recent):  Temp: 97.8 °F (36.6 °C) (07/16/24 1558)  Pulse: 75 (07/16/24 1558)  Resp: 18 (07/16/24 1558)  BP: (!) 157/80 (07/16/24 1558)  SpO2: 98 % (07/16/24 1558) Vital Signs (24h Range):  Temp:  [97.5 °F (36.4 °C)-97.8 °F (36.6 °C)] 97.8 °F (36.6 °C)  Pulse:  [66-87] 75  Resp:  [15-18] 18  SpO2:  [95 %-100 %] 98 %  BP: (115-163)/(58-88) 157/80     Weight: 89.8 kg (198 lb)  Body mass index is 33.99 kg/m².     Physical Exam  Vitals reviewed.   Cardiovascular:      Rate and Rhythm: Normal rate.      Pulses: Normal pulses.   Pulmonary:      Effort: Pulmonary effort is normal.   Abdominal:      General: There is no distension.      Tenderness: There is no abdominal tenderness. There is no guarding or rebound.   Neurological:      Mental Status: She is alert.   Psychiatric:         Mood and Affect: Mood normal.            I have reviewed all pertinent lab results within the past 24 hours.  CBC:   Recent Labs   Lab 07/16/24 0924   WBC 5.73   RBC 4.55   HGB 13.8   HCT 42.5      MCV 93   MCH 30.3   MCHC 32.5     BMP:   Recent Labs   Lab 07/16/24 0924   GLU 97      K 4.0      CO2 23   BUN 9   CREATININE 0.8   CALCIUM 9.5       Significant Diagnostics:  CT scan reviewed.  No findings to suggest obstruction.  Enteritis suggested.   EGD reviewed      Assessment/Plan:     Enteritis  NO evidence of obstruction or surgically correctable problem.    Ok to adv diet from surgical perspective  Surgery to sign off      VTE Risk Mitigation (From admission, onward)      None            Thank you for your consult. I will sign off. Please  contact us if you have any additional questions.    John Tyler MD  General Surgery  [facility]/Surg

## 2024-07-16 NOTE — HPI
This is a 68-year-old  female with a history of peptic ulcer disease, small-bowel obstruction status post surgery x2 presenting here for epigastric pain for 3 days, associated with nausea vomiting with a black emesis, patient has good bowel movement yesterday, patient denies any fever or chills, no sick contact, no recent travel history, patient denies any chest pain or SOB, patient is very concern of small-bowel obstruction so come to hospital for further workup, in the ER, patient is afebrile, hemodynamically stable, lab work is insignificant, EKG is unavailable to me, CT abdomen and pelvis shows enteritis.  GI has been consulted and pending for possible EGD, patient requested to see her general surgeon Dr. Tyler.

## 2024-07-16 NOTE — PATIENT INSTRUCTIONS
..  Instructions for Outpatient Endoscopy    PROCEDURE DATE: 9/17/24  REPORT TO [facility] ([address] [place] [address] Mireya Xie. [phone]) Your arrival time will be provided by the ENDO department about 1 week prior to your procedure date. If you do not hear from them, they can be reached at [phone] Mon- Friday 8a-2p.      NO BLOOD THINNERS/NO ASPIRIN OR MEDICATIONS CONTAINING ASPIRIN, MOTRIN, IBUPROFEN, ALEVE OR ANY ANTI-INFLAMMATORY MEDICATIONS FOR 7 DAYS PRIOR TO PROCEDURE. TYLENOL IS OK.      Eat a light evening meal the night before your Endoscopy          (Soup, Salad, [place], or grilled chicken)     Nothing by mouth after midnight or the morning of EGD.                 Ok to take morning medications with small sip water by 5:30am (except no Diabetic medications)              SINCE SEDATION IS USED, YOU MUST HAVE SOMEONE TO DRIVE YOU HOME/NO TAXI

## 2024-07-16 NOTE — H&P
Critical access hospital Medicine  History & Physical    Patient Name: Pham Licea  MRN: [id number]  Patient Class: OP- Observation  Admission Date: 2024  Attending Physician: Joel Almeida MD  Primary Care Provider: Lani Grossman MD         Patient information was obtained from patient and ER records.     Subjective:     Principal Problem:Nausea & vomiting    Chief Complaint:   Chief Complaint   Patient presents with    Abdominal Pain     Reports upper abdominal pain x 3 days -- sent by dr for concern of obstruction        HPI: This is a 68-year-old  female with a history of peptic ulcer disease, small-bowel obstruction status post surgery x2 presenting here for epigastric pain for 3 days, associated with nausea vomiting with a black emesis, patient has good bowel movement yesterday, patient denies any fever or chills, no sick contact, no recent travel history, patient denies any chest pain or SOB, patient is very concern of small-bowel obstruction so come to hospital for further workup, in the ER, patient is afebrile, hemodynamically stable, lab work is insignificant, EKG is unavailable to me, CT abdomen and pelvis shows enteritis.  GI has been consulted and pending for possible EGD, patient requested to see her general surgeon Dr. Tyler.     Past Medical History:   Diagnosis Date    Anemia     Fatty liver 2015    Former smoker 2009    Osteoarthritis 2010    Pulmonary nodules 2019    Repeat CT in 6 mo    Ulcer of abdomen wall 2016       Past Surgical History:   Procedure Laterality Date    APPENDECTOMY  1993     SECTION  , , 1983    x3    CHOLECYSTECTOMY      2020    COLONOSCOPY  ~    [facility]: normal findings per patient report    COLONOSCOPY N/A 2020    Procedure: COLONOSCOPY;  Surgeon: Misael Holm MD;  Location: [facility];  Service: General;  Laterality: N/A;    COLONOSCOPY, WITH RETROGRADE BALLOON ENTEROSCOPY, SINGLE OR DOUBLE  BALLOON N/A 01/30/2023    Procedure: COLONOSCOPY, WITH RETROGRADE BALLOON ENTEROSCOPY, DOUBLE BALLOON;  Surgeon: Collin Mares MD;  Location: [facility] ENDO (2ND FLR);  Service: Endoscopy;  Laterality: N/A;  inst via portal-MS-sutab per pt request-tb-new inst portal    DIAGNOSTIC LAPAROSCOPY N/A 05/04/2021    Procedure: LAPAROSCOPY, DIAGNOSTIC;  Surgeon: Misael Holm MD;  Location: [facility] OR;  Service: General;  Laterality: N/A;    EPIDURAL STEROID INJECTION INTO LUMBAR SPINE N/A 10/12/2018    Procedure: Injection-steroid-epidural-lumbar;  Surgeon: Kal Johnson MD;  Location: [facility] OR;  Service: Pain Management;  Laterality: N/A;  L5-S1    EPIDURAL STEROID INJECTION INTO LUMBAR SPINE N/A 05/31/2019    Procedure: Injection-steroid-epidural-lumbar L5-S1;  Surgeon: Kal Johnson MD;  Location: [facility] OR;  Service: Pain Management;  Laterality: N/A;    ESOPHAGOGASTRODUODENOSCOPY N/A 11/09/2020    Procedure: ESOPHAGOGASTRODUODENOSCOPY (EGD);  Surgeon: Misael Holm MD;  Location: [facility];  Service: General;  Laterality: N/A;    FUSION, SPINE, MINIMALLY INVASIVE, USING COMPUTER-ASSISTED NAVIGATION N/A 12/22/2022    Procedure: ENTEROSCOPY, DOUBLE BALLOON, ANTEGRADE;  Surgeon: Collin Mares MD;  Location: [facility] ENDO (2ND FLR);  Service: Endoscopy;  Laterality: N/A;  11/14/22-Instructions via portal-DS    *open to earlier date if available*    HYSTERECTOMY  1993    one ovary conserved    LAPAROSCOPIC CHOLECYSTECTOMY N/A 09/02/2020    Procedure: CHOLECYSTECTOMY, LAPAROSCOPIC;  Surgeon: Govind Frey MD;  Location: [facility] OR;  Service: General;  Laterality: N/A;    LAPAROSCOPIC LYSIS OF ADHESIONS N/A 05/04/2021    Procedure: LYSIS, ADHESIONS, LAPAROSCOPIC;  Surgeon: Misael Holm MD;  Location: [facility] OR;  Service: General;  Laterality: N/A;    TRANSFORAMINAL EPIDURAL INJECTION OF STEROID Right 2/6/2024    Procedure: Injection,steroid,epidural,transforaminal approach;  Surgeon: Kal Johnson MD;  Location:  [facility] OR;  Service: Anesthesiology;  Laterality: Right;  L4-5 and L5-s1    UPPER GASTROINTESTINAL ENDOSCOPY  2016       Review of patient's allergies indicates:  No Known Allergies    Current Facility-Administered Medications on File Prior to Encounter   Medication    lactated ringers infusion    sodium chloride 0.9% 100 mL flush bag    sodium chloride 0.9% flush 10 mL     Current Outpatient Medications on File Prior to Encounter   Medication Sig    biotin 5,000 mcg TbDL Take 5,000 mcg by mouth Daily.    CONSTULOSE 10 gram/15 mL solution TAKE 10 ML BY MOUTH  THREE TIMES DAILY (Patient taking differently: Take 10 g by mouth 2 (two) times daily. TAKE 10 ML BY MOUTH  THREE TIMES DAILY)    cyanocobalamin, vitamin B-12, (VITAMIN B-12) 1,000 mcg/mL Drop Take 1 Dose by mouth once daily.    gabapentin (NEURONTIN) 300 MG capsule Take 2 capsules (600 mg total) by mouth 3 (three) times daily.    magnesium gluconate 27.5 mg magne- sium (500 mg) Tab Take 250 mg by mouth once daily.    ondansetron (ZOFRAN-ODT) 4 MG TbDL Take 8 mg by mouth every 6 (six) hours as needed.    orphenadrine (NORFLEX) 100 mg tablet Take 1 tablet (100 mg total) by mouth 2 (two) times daily as needed for Muscle spasms.    pantoprazole (PROTONIX) 20 MG tablet Take 1 tablet (20 mg total) by mouth once daily.    POTASSIUM CHLORIDE, BULK, MISC Take 99 mg by mouth once daily.    vitamin D (VITAMIN D3) 1000 units Tab Take 3,000 Units by mouth once daily.    diclofenac sodium (VOLTAREN) 1 % Gel Apply 2 g topically 2 (two) times daily as needed.    [DISCONTINUED] ondansetron (ZOFRAN) 4 mg/5 mL solution Take by mouth once.     Family History       Problem Relation (Age of Onset)    Aneurysm Father    Arthritis Mother, Sister    Brain Hemorrhage Father    Heart disease Mother    Hypertension Father, Sister    Obesity Sister    Osteoarthritis Sister    Osteoporosis Mother    Ovarian cancer Mother    Stroke Father (50)          Tobacco Use    Smoking status:  Former     Current packs/day: 0.00     Average packs/day: 1 pack/day for 40.0 years (40.0 ttl pk-yrs)     Types: Cigarettes     Start date: 1969     Quit date: 2009     Years since quittin.7    Smokeless tobacco: Never    Tobacco comments:     quit 2009   Substance and Sexual Activity    Alcohol use: Not Currently     Comment: Not often - one glass of wine every now and then    Drug use: Not Currently     Types: Marijuana     Comment: in     Sexual activity: Not Currently     Review of Systems   Constitutional:  Negative for activity change and fever.   HENT:  Negative for ear discharge, facial swelling and sore throat.    Eyes:  Negative for photophobia, pain and visual disturbance.   Respiratory:  Negative for apnea, cough and shortness of breath.    Cardiovascular:  Negative for chest pain and leg swelling.   Gastrointestinal:  Positive for abdominal pain, nausea and vomiting. Negative for blood in stool.   Endocrine: Negative for cold intolerance and heat intolerance.   Musculoskeletal:  Negative for back pain and gait problem.   Skin:  Negative for pallor and rash.   Neurological:  Negative for speech difficulty and headaches.   Psychiatric/Behavioral:  Negative for confusion, hallucinations and suicidal ideas.    All other systems reviewed and are negative.    Objective:     Vital Signs (Most Recent):  Temp: 97.5 °F (36.4 °C) (24 0846)  Pulse: 74 (24 1232)  Resp: 17 (24 1232)  BP: 121/76 (24 1232)  SpO2: 97 % (24 1232) Vital Signs (24h Range):  Temp:  [97.5 °F (36.4 °C)] 97.5 °F (36.4 °C)  Pulse:  [66-77] 74  Resp:  [16-18] 17  SpO2:  [96 %-98 %] 97 %  BP: (117-145)/(60-76) 121/76     Weight: 89.8 kg (198 lb)  Body mass index is 33.99 kg/m².     Physical Exam  Constitutional:       Appearance: Normal appearance.   HENT:      Head: Normocephalic and atraumatic.      Nose: Nose normal.   Eyes:      Extraocular Movements: Extraocular movements intact.      Pupils:  Pupils are equal, round, and reactive to light.   Cardiovascular:      Rate and Rhythm: Normal rate and regular rhythm.      Heart sounds: No murmur heard.  Pulmonary:      Effort: Pulmonary effort is normal.      Breath sounds: Normal breath sounds.   Abdominal:      General: Abdomen is flat.      Palpations: Abdomen is soft.      Tenderness: There is abdominal tenderness.      Comments: Epigastric tenderness   Musculoskeletal:         General: Normal range of motion.      Cervical back: Normal range of motion and neck supple.   Skin:     General: Skin is warm and dry.   Neurological:      General: No focal deficit present.      Mental Status: She is alert and oriented to person, place, and time.   Psychiatric:         Mood and Affect: Mood normal.              CRANIAL NERVES     CN III, IV, VI   Pupils are equal, round, and reactive to light.       Significant Labs: All pertinent labs within the past 24 hours have been reviewed.  CBC:   Recent Labs   Lab 07/16/24  0924   WBC 5.73   HGB 13.8   HCT 42.5        CMP:   Recent Labs   Lab 07/16/24  0924      K 4.0      CO2 23   GLU 97   BUN 9   CREATININE 0.8   CALCIUM 9.5   PROT 6.5   ALBUMIN 3.7   BILITOT 0.5   ALKPHOS 62   AST 20   ALT 19   ANIONGAP 12       Significant Imaging: I have reviewed all pertinent imaging results/findings within the past 24 hours.  I have reviewed and interpreted all pertinent imaging results/findings within the past 24 hours.    CT Abdomen Pelvis With IV Contrast NO Oral Contrast    Result Date: 7/16/2024  EXAMINATION: CT ABDOMEN PELVIS WITH IV CONTRAST CLINICAL HISTORY: Bowel obstruction suspected; TECHNIQUE: Low dose axial images, sagittal and coronal reformations were obtained from the lung bases to the pubic symphysis following the IV administration of 100 mL of Omnipaque 350 .  Oral contrast was not given. COMPARISON: 06/08/2023 FINDINGS: The liver is hypoattenuating indicative of fatty infiltration.  There has  been a cholecystectomy.  The pancreas, spleen, adrenal glands, and kidneys are unremarkable. There has been a prior partial small bowel resection in left lower quadrant.  Bowel is nondilated.  There is prominence of the Vasa recta of small bowel within the left hemiabdomen.  A short segment of small bowel just proximal to the anastomosis exhibits mild generalized mural thickening. The appendix is not seen.  There is a 4.0 cm fatty intramural mass of the cecum compatible with a lipoma.  The colon is unremarkable. No free air, free fluid, or fluid collection.  There has been hysterectomy.  No acute bony abnormality.     Status post partial small bowel resection.  Mild mural thickening of a short segment small bowel just proximal to the anastomosis, along with more generalized prominence of the vasa recta serving the small bowel within the left hemiabdomen, with the findings most compatible with a nonspecific enteritis. Hepatic steatosis. Cholecystectomy. Electronically signed by: Alan Hung MD Date:    07/16/2024 Time:    11:01  - pulls last radiology orders    Assessment/Plan:     * Nausea & vomiting    CT abdomen was reviewed shows questionable enteritis, no signs of SBO.   Patient also complaining of black emesis, GI has been consulted.  Pending for possible EGD.  Continue PPI b.i.d..  Hemoglobin has been stable.  IV hydration  Zofran  General surgery consult  Patient is status post cholecystectomy.    Enteritis    As above described      VTE Risk Mitigation (From admission, onward)      None               On 07/16/2024, patient should be placed in hospital observation services under my care.             Joel Almeida MD  Department of Hospital Medicine  [facility]

## 2024-07-16 NOTE — ASSESSMENT & PLAN NOTE
CT abdomen was reviewed shows questionable enteritis, no signs of SBO.   Patient also complaining of black emesis, GI has been consulted.  Pending for possible EGD.  Continue PPI b.i.d..  Hemoglobin has been stable.  IV hydration  Zofran  General surgery consult  Patient is status post cholecystectomy.

## 2024-07-16 NOTE — ANESTHESIA PREPROCEDURE EVALUATION
07/16/2024  Pham Licea is a 68 y.o., female.      Pre-op Assessment    I have reviewed the Patient Summary Reports.     I have reviewed the Nursing Notes. I have reviewed the NPO Status.   I have reviewed the Medications.     Review of Systems  Anesthesia Hx:  No problems with previous Anesthesia                Social:  Former Smoker       Hematology/Oncology:       -- Anemia:                                  Cardiovascular:      Valvular problems/Murmurs, MVP  CAD  asymptomatic         hyperlipidemia SAMPSON                            Pulmonary:      Shortness of breath                  Renal/:  Renal/ Normal                 Hepatic/GI:     GERD Liver Disease,            Musculoskeletal:  Arthritis          Spine Disorders: lumbar            Neurological:    Neuromuscular Disease,                                   Endocrine:  Endocrine Normal                Physical Exam  General: Well nourished, Cooperative, Alert and Oriented    Airway:  Mallampati: II   Mouth Opening: Normal  TM Distance: Normal  Neck ROM: Normal ROM        Anesthesia Plan  Type of Anesthesia, risks & benefits discussed:    Anesthesia Type: Gen ETT, Gen Supraglottic Airway, Gen Natural Airway, MAC  Intra-op Monitoring Plan: Standard ASA Monitors  Post Op Pain Control Plan: multimodal analgesia  Induction:  IV  Airway Plan: Direct, Video and Fiberoptic, Post-Induction  Informed Consent: Informed consent signed with the Patient and all parties understand the risks and agree with anesthesia plan.  All questions answered.   ASA Score: 3  Anesthesia Plan Notes: Admitted for anemia      Ready For Surgery From Anesthesia Perspective.     .

## 2024-07-16 NOTE — PROGRESS NOTES
EGD done.  No evidence of obstruction or bleeding.  Resume diet.  OK to discharge home from GI standpoint if tolerating PO.  Recommend maintaining hydration.  Follow up with GI NP as outpatient.  Will sign off.

## 2024-07-16 NOTE — SUBJECTIVE & OBJECTIVE
Current Facility-Administered Medications on File Prior to Encounter   Medication    lactated ringers infusion    sodium chloride 0.9% 100 mL flush bag    sodium chloride 0.9% flush 10 mL     Current Outpatient Medications on File Prior to Encounter   Medication Sig    biotin 5,000 mcg TbDL Take 5,000 mcg by mouth Daily.    CONSTULOSE 10 gram/15 mL solution TAKE 10 ML BY MOUTH  THREE TIMES DAILY (Patient taking differently: Take 10 g by mouth 2 (two) times daily. TAKE 10 ML BY MOUTH  THREE TIMES DAILY)    cyanocobalamin, vitamin B-12, (VITAMIN B-12) 1,000 mcg/mL Drop Take 1 Dose by mouth once daily.    gabapentin (NEURONTIN) 300 MG capsule Take 2 capsules (600 mg total) by mouth 3 (three) times daily.    magnesium gluconate 27.5 mg magne- sium (500 mg) Tab Take 250 mg by mouth once daily.    ondansetron (ZOFRAN-ODT) 4 MG TbDL Take 8 mg by mouth every 6 (six) hours as needed.    orphenadrine (NORFLEX) 100 mg tablet Take 1 tablet (100 mg total) by mouth 2 (two) times daily as needed for Muscle spasms.    pantoprazole (PROTONIX) 20 MG tablet Take 1 tablet (20 mg total) by mouth once daily.    POTASSIUM CHLORIDE, BULK, MISC Take 99 mg by mouth once daily.    vitamin D (VITAMIN D3) 1000 units Tab Take 3,000 Units by mouth once daily.    diclofenac sodium (VOLTAREN) 1 % Gel Apply 2 g topically 2 (two) times daily as needed.    [DISCONTINUED] ondansetron (ZOFRAN) 4 mg/5 mL solution Take by mouth once.       Review of patient's allergies indicates:  No Known Allergies    Past Medical History:   Diagnosis Date    Anemia     Fatty liver 2015    Former smoker 2009    Osteoarthritis 2010    Pulmonary nodules 2019    Repeat CT in 6 mo    Ulcer of abdomen wall 2016     Past Surgical History:   Procedure Laterality Date    APPENDECTOMY  1993     SECTION  1980, 1982, 1983    x3    CHOLECYSTECTOMY          COLONOSCOPY  ~    [facility]: normal findings per patient report    COLONOSCOPY N/A 2020    Procedure:  COLONOSCOPY;  Surgeon: Misael Holm MD;  Location: [facility] ENDO;  Service: General;  Laterality: N/A;    COLONOSCOPY, WITH RETROGRADE BALLOON ENTEROSCOPY, SINGLE OR DOUBLE BALLOON N/A 01/30/2023    Procedure: COLONOSCOPY, WITH RETROGRADE BALLOON ENTEROSCOPY, DOUBLE BALLOON;  Surgeon: Collin Mares MD;  Location: [facility] ENDO (2ND FLR);  Service: Endoscopy;  Laterality: N/A;  inst via portal-MS-sutab per pt request-tb-new inst portal    DIAGNOSTIC LAPAROSCOPY N/A 05/04/2021    Procedure: LAPAROSCOPY, DIAGNOSTIC;  Surgeon: Misael Holm MD;  Location: [facility] OR;  Service: General;  Laterality: N/A;    EPIDURAL STEROID INJECTION INTO LUMBAR SPINE N/A 10/12/2018    Procedure: Injection-steroid-epidural-lumbar;  Surgeon: Kal Johnson MD;  Location: [facility] OR;  Service: Pain Management;  Laterality: N/A;  L5-S1    EPIDURAL STEROID INJECTION INTO LUMBAR SPINE N/A 05/31/2019    Procedure: Injection-steroid-epidural-lumbar L5-S1;  Surgeon: Kal Johnson MD;  Location: [facility] OR;  Service: Pain Management;  Laterality: N/A;    ESOPHAGOGASTRODUODENOSCOPY N/A 11/09/2020    Procedure: ESOPHAGOGASTRODUODENOSCOPY (EGD);  Surgeon: Misael Holm MD;  Location: [facility] ENDO;  Service: General;  Laterality: N/A;    FUSION, SPINE, MINIMALLY INVASIVE, USING COMPUTER-ASSISTED NAVIGATION N/A 12/22/2022    Procedure: ENTEROSCOPY, DOUBLE BALLOON, ANTEGRADE;  Surgeon: Collin Mares MD;  Location: [facility] ENDO (2ND FLR);  Service: Endoscopy;  Laterality: N/A;  11/14/22-Instructions via portal-DS    *open to earlier date if available*    HYSTERECTOMY  1993    one ovary conserved    LAPAROSCOPIC CHOLECYSTECTOMY N/A 09/02/2020    Procedure: CHOLECYSTECTOMY, LAPAROSCOPIC;  Surgeon: Govind Frey MD;  Location: [facility] OR;  Service: General;  Laterality: N/A;    LAPAROSCOPIC LYSIS OF ADHESIONS N/A 05/04/2021    Procedure: LYSIS, ADHESIONS, LAPAROSCOPIC;  Surgeon: Misael Holm MD;  Location: [facility] OR;  Service: General;   Laterality: N/A;    TRANSFORAMINAL EPIDURAL INJECTION OF STEROID Right 2024    Procedure: Injection,steroid,epidural,transforaminal approach;  Surgeon: Kal Johnson MD;  Location: [facility] OR;  Service: Anesthesiology;  Laterality: Right;  L4-5 and L5-s1    UPPER GASTROINTESTINAL ENDOSCOPY  2016     Family History       Problem Relation (Age of Onset)    Aneurysm Father    Arthritis Mother, Sister    Brain Hemorrhage Father    Heart disease Mother    Hypertension Father, Sister    Obesity Sister    Osteoarthritis Sister    Osteoporosis Mother    Ovarian cancer Mother    Stroke Father (50)          Tobacco Use    Smoking status: Former     Current packs/day: 0.00     Average packs/day: 1 pack/day for 40.0 years (40.0 ttl pk-yrs)     Types: Cigarettes     Start date: 1969     Quit date: 2009     Years since quittin.7    Smokeless tobacco: Never    Tobacco comments:     quit    Substance and Sexual Activity    Alcohol use: Not Currently     Comment: Not often - one glass of wine every now and then    Drug use: Not Currently     Types: Marijuana     Comment: in     Sexual activity: Not Currently     Review of Systems   Constitutional:  Negative for activity change and appetite change.   Gastrointestinal:  Positive for abdominal pain and nausea. Negative for abdominal distention.     Objective:     Vital Signs (Most Recent):  Temp: 97.8 °F (36.6 °C) (24 1558)  Pulse: 75 (24 1558)  Resp: 18 (24 1558)  BP: (!) 157/80 (24 1558)  SpO2: 98 % (24 1558) Vital Signs (24h Range):  Temp:  [97.5 °F (36.4 °C)-97.8 °F (36.6 °C)] 97.8 °F (36.6 °C)  Pulse:  [66-87] 75  Resp:  [15-18] 18  SpO2:  [95 %-100 %] 98 %  BP: (115-163)/(58-88) 157/80     Weight: 89.8 kg (198 lb)  Body mass index is 33.99 kg/m².     Physical Exam  Vitals reviewed.   Cardiovascular:      Rate and Rhythm: Normal rate.      Pulses: Normal pulses.   Pulmonary:      Effort: Pulmonary effort is normal.    Abdominal:      General: There is no distension.      Tenderness: There is no abdominal tenderness. There is no guarding or rebound.   Neurological:      Mental Status: She is alert.   Psychiatric:         Mood and Affect: Mood normal.            I have reviewed all pertinent lab results within the past 24 hours.  CBC:   Recent Labs   Lab 07/16/24  0924   WBC 5.73   RBC 4.55   HGB 13.8   HCT 42.5      MCV 93   MCH 30.3   MCHC 32.5     BMP:   Recent Labs   Lab 07/16/24  0924   GLU 97      K 4.0      CO2 23   BUN 9   CREATININE 0.8   CALCIUM 9.5       Significant Diagnostics:  CT scan reviewed.  No findings to suggest obstruction.  Enteritis suggested.   EGD reviewed

## 2024-07-16 NOTE — ASSESSMENT & PLAN NOTE
NO evidence of obstruction or surgically correctable problem.    Ok to adv diet from surgical perspective  Surgery to sign off

## 2024-07-16 NOTE — TRANSFER OF CARE
"Anesthesia Transfer of Care Note    Patient: Pham Licea    Procedure(s) Performed: Procedure(s) (LRB):  EGD (ESOPHAGOGASTRODUODENOSCOPY) (N/A)    Patient location: GI    Anesthesia Type: general    Transport from OR: Transported from OR on room air with adequate spontaneous ventilation    Post pain: adequate analgesia    Post assessment: no apparent anesthetic complications and tolerated procedure well    Post vital signs: stable    Level of consciousness: awake, alert and oriented    Nausea/Vomiting: no nausea/vomiting    Complications: none    Transfer of care protocol was followed    Last vitals: Visit Vitals  BP (!) 151/66 (BP Location: Left arm, Patient Position: Lying)   Pulse 70   Temp 36.5 °C (97.7 °F) (Skin)   Resp 16   Ht 5' 4" (1.626 m)   Wt 89.8 kg (198 lb)   LMP  (LMP Unknown)   SpO2 97%   Breastfeeding No   BMI 33.99 kg/m²     "

## 2024-07-16 NOTE — PROVATION PATIENT INSTRUCTIONS
Discharge Summary/Instructions after an Endoscopic Procedure  Patient Name: Pham Licea  Patient MRN: [id number]  Patient YOB: 1955 Tuesday, July 16, 2024  Jas Sood MD  Dear patient,  As a result of recent federal legislation (The Federal Cures Act), you may   receive lab or pathology results from your procedure in your [facility]   account before your physician is able to contact you. Your physician or   their representative will relay the results to you with their   recommendations at their soonest availability.  Thank you,  RESTRICTIONS:  During your procedure today, you received medications for sedation.  These   medications may affect your judgment, balance and coordination.  Therefore,   for 24 hours, you have the following restrictions:   - DO NOT drive a car, operate machinery, make legal/financial decisions,   sign important papers or drink alcohol.    ACTIVITY:  Today: no heavy lifting, straining or running due to procedural   sedation/anesthesia.  The following day: return to full activity including work.  DIET:  Eat and drink normally unless instructed otherwise.     TREATMENT FOR COMMON SIDE EFFECTS:  - Mild abdominal pain, nausea, belching, bloating or excessive gas:  rest,   eat lightly and use a heating pad.  - Sore Throat: treat with throat lozenges and/or gargle with warm salt   water.  - Because air was used during the procedure, expelling large amounts of air   from your rectum or belching is normal.  - If a bowel prep was taken, you may not have a bowel movement for 1-3 days.    This is normal.  SYMPTOMS TO WATCH FOR AND REPORT TO YOUR PHYSICIAN:  1. Abdominal pain or bloating, other than gas cramps.  2. Chest pain.  3. Back pain.  4. Signs of infection such as: chills or fever occurring within 24 hours   after the procedure.  5. Rectal bleeding, which would show as bright red, maroon, or black stools.   (A tablespoon of blood from the rectum is not serious, especially  if   hemorrhoids are present.)  6. Vomiting.  7. Weakness or dizziness.  GO DIRECTLY TO THE NEAREST EMERGENCY ROOM IF YOU HAVE ANY OF THE FOLLOWING:      Difficulty breathing              Chills and/or fever over 101 F   Persistent vomiting and/or vomiting blood   Severe abdominal pain   Severe chest pain   Black, tarry stools   Bleeding- more than one tablespoon   Any other symptom or condition that you feel may need urgent attention  Your doctor recommends these additional instructions:  If any biopsies were taken, your doctors clinic will contact you in 1 to 2   weeks with any results.  - Return patient to hospital [facility] for ongoing care.   - Resume previous diet.   - Continue present medications.   - No aspirin, ibuprofen, naproxen, or other non-steroidal anti-inflammatory   drugs.   - Await pathology results.   - Follow an antireflux regimen.   - Return to GI office after studies are complete.  For questions, problems or results please call your physician - Jas Sood MD at Work:  [phone].  [facility], EMERGENCY ROOM PHONE NUMBER: [phone]  IF A COMPLICATION OR EMERGENCY SITUATION ARISES AND YOU ARE UNABLE TO REACH   YOUR PHYSICIAN - GO DIRECTLY TO THE EMERGENCY ROOM.  Jas Sood MD  7/16/2024 3:15:37 PM  This report has been verified and signed electronically.  Dear patient,  As a result of recent federal legislation (The Federal Cures Act), you may   receive lab or pathology results from your procedure in your [facility]   account before your physician is able to contact you. Your physician or   their representative will relay the results to you with their   recommendations at their soonest availability.  Thank you,  PROVATION

## 2024-07-16 NOTE — ED PROVIDER NOTES
Encounter Date: 2024       History     Chief Complaint   Patient presents with    Abdominal Pain     Reports upper abdominal pain x 3 days -- sent by  for concern of obstruction     68-year-old female with past medical history of anemia, hepatic steatosis, former smoker, multiple previous abdominal surgeries and small-bowel obstructions presents today with abdominal pain associated nausea vomiting.  Symptoms began on Saturday 3 days ago.  Reports epigastric abdominal pain since then.  Nonbilious emesis.  However patient reports vomiting up black liquids.  Last bowel movement was 2 days ago and was nonbloody.  She states she was feels constipated and bloated.  Denies any dysuria hematuria.  Denies any fevers or chills.  Denies any chest pain shortness of breath or any other symptoms.  Patient reports she has been taking Zofran with some relief.  Went to see GI today he was recommend she come to the ER for further management.    Had exploratory laparotomy with enterectomy for bowel obstruction in  another ex lap in 2023 after CT finding concerning for bowel ischemia and ultimately found to have internal hernia created by postoperative adhesions that were lysed by Dr. Marquez at [facility].  Abdominal surgical history also includes cholecystectomy in .  Patient also has a history of esophageal and duodenal ulcers with bleeding in 2017.      The history is provided by the patient and medical records. No  was used.     Review of patient's allergies indicates:  No Known Allergies  Past Medical History:   Diagnosis Date    Anemia     Fatty liver 2015    Former smoker 2009    Osteoarthritis 2010    Pulmonary nodules 2019    Repeat CT in 6 mo    Ulcer of abdomen wall 2016     Past Surgical History:   Procedure Laterality Date    APPENDECTOMY  1993     SECTION  1980, 1982, 1983    x3    CHOLECYSTECTOMY          COLONOSCOPY  ~    [facility]: normal findings per patient report     COLONOSCOPY N/A 11/09/2020    Procedure: COLONOSCOPY;  Surgeon: Misael Holm MD;  Location: [facility] ENDO;  Service: General;  Laterality: N/A;    COLONOSCOPY, WITH RETROGRADE BALLOON ENTEROSCOPY, SINGLE OR DOUBLE BALLOON N/A 01/30/2023    Procedure: COLONOSCOPY, WITH RETROGRADE BALLOON ENTEROSCOPY, DOUBLE BALLOON;  Surgeon: Collin Mares MD;  Location: [facility] ENDO (2ND FLR);  Service: Endoscopy;  Laterality: N/A;  inst via portal-MS-sutab per pt request-tb-new inst portal    DIAGNOSTIC LAPAROSCOPY N/A 05/04/2021    Procedure: LAPAROSCOPY, DIAGNOSTIC;  Surgeon: Misael Holm MD;  Location: [facility] OR;  Service: General;  Laterality: N/A;    EPIDURAL STEROID INJECTION INTO LUMBAR SPINE N/A 10/12/2018    Procedure: Injection-steroid-epidural-lumbar;  Surgeon: Kal Johnson MD;  Location: [facility] OR;  Service: Pain Management;  Laterality: N/A;  L5-S1    EPIDURAL STEROID INJECTION INTO LUMBAR SPINE N/A 05/31/2019    Procedure: Injection-steroid-epidural-lumbar L5-S1;  Surgeon: Kal Johnson MD;  Location: [facility] OR;  Service: Pain Management;  Laterality: N/A;    ESOPHAGOGASTRODUODENOSCOPY N/A 11/09/2020    Procedure: ESOPHAGOGASTRODUODENOSCOPY (EGD);  Surgeon: Misael Holm MD;  [facility]: [facility];  Service: General;  Laterality: N/A;    FUSION, SPINE, MINIMALLY INVASIVE, USING COMPUTER-ASSISTED NAVIGATION N/A 12/22/2022    Procedure: ENTEROSCOPY, DOUBLE BALLOON, ANTEGRADE;  Surgeon: Collin Mares MD;  Location: [facility] ENDO (2ND FLR);  Service: Endoscopy;  Laterality: N/A;  11/14/22-Instructions via portal-DS    *open to earlier date if available*    HYSTERECTOMY  1993    one ovary conserved    LAPAROSCOPIC CHOLECYSTECTOMY N/A 09/02/2020    Procedure: CHOLECYSTECTOMY, LAPAROSCOPIC;  Surgeon: Govind Frey MD;  Location: [facility] OR;  Service: General;  Laterality: N/A;    LAPAROSCOPIC LYSIS OF ADHESIONS N/A 05/04/2021    Procedure: LYSIS, ADHESIONS, LAPAROSCOPIC;  Surgeon: Misael Holm MD;   Location: [facility] OR;  Service: General;  Laterality: N/A;    TRANSFORAMINAL EPIDURAL INJECTION OF STEROID Right 2024    Procedure: Injection,steroid,epidural,transforaminal approach;  Surgeon: Kal Johnson MD;  Location: [facility] OR;  Service: Anesthesiology;  Laterality: Right;  L4-5 and L5-s1    UPPER GASTROINTESTINAL ENDOSCOPY       Family History   Problem Relation Name Age of Onset    Ovarian cancer Mother      Heart disease Mother      Osteoporosis Mother      Arthritis Mother      Hypertension Father      Stroke Father  50    Brain Hemorrhage Father      Aneurysm Father      Osteoarthritis Sister      Arthritis Sister      Hypertension Sister      Obesity Sister      Breast cancer Neg Hx      Colon cancer Neg Hx      Colon polyps Neg Hx      Crohn's disease Neg Hx      Ulcerative colitis Neg Hx       Social History     Tobacco Use    Smoking status: Former     Current packs/day: 0.00     Average packs/day: 1 pack/day for 40.0 years (40.0 ttl pk-yrs)     Types: Cigarettes     Start date: 1969     Quit date: 2009     Years since quittin.7    Smokeless tobacco: Never    Tobacco comments:     quit    Substance Use Topics    Alcohol use: Not Currently     Comment: Not often - one glass of wine every now and then    Drug use: Not Currently     Types: Marijuana     Comment: in      Review of Systems    Physical Exam     Initial Vitals [24 0846]   BP Pulse Resp Temp SpO2   (!) 145/71 77 16 97.5 °F (36.4 °C) 98 %      MAP       --         Physical Exam    Nursing note and vitals reviewed.  Constitutional: She appears well-developed. She is not diaphoretic. No distress.   HENT:   Head: Normocephalic and atraumatic.   Nose: Nose normal.   Eyes: EOM are normal. No scleral icterus.   Neck: Neck supple.   Normal range of motion.  Cardiovascular:  Normal rate, regular rhythm, normal heart sounds and intact distal pulses.     Exam reveals no gallop and no friction rub.       No murmur  heard.  Pulmonary/Chest: Breath sounds normal. No stridor. No respiratory distress. She has no wheezes. She has no rhonchi. She has no rales.   Abdominal: Abdomen is soft. Bowel sounds are normal. She exhibits no distension. There is abdominal tenderness.   Bilateral upper abdominal and epigastric tenderness.  Voluntary guarding.  No rebound rigidity.  Mild distention. There is no rebound and no guarding.   Musculoskeletal:         General: Normal range of motion.      Cervical back: Normal range of motion and neck supple.     Neurological: She is alert and oriented to person, place, and time. She has normal strength. No cranial nerve deficit or sensory deficit.   Skin: Skin is warm and dry. Capillary refill takes less than 2 seconds. No rash noted.   Psychiatric: She has a normal mood and affect.         ED Course   Procedures  Labs Reviewed   URINALYSIS, REFLEX TO URINE CULTURE - Abnormal; Notable for the following components:       Result Value    Leukocytes, UA 1+ (*)     All other components within normal limits    Narrative:     Specimen Source->Urine   URINALYSIS MICROSCOPIC - Abnormal; Notable for the following components:    Non-Squam Epith 3 (*)     All other components within normal limits    Narrative:     Specimen Source->Urine   CBC W/ AUTO DIFFERENTIAL   COMPREHENSIVE METABOLIC PANEL   LIPASE          Imaging Results              CT Abdomen Pelvis With IV Contrast NO Oral Contrast (Final result)  Result time 07/16/24 11:01:39      Final result by Alan Hung MD (07/16/24 11:01:39)                   Impression:      Status post partial small bowel resection.  Mild mural thickening of a short segment small bowel just proximal to the anastomosis, along with more generalized prominence of the vasa recta serving the small bowel within the left hemiabdomen, with the findings most compatible with a nonspecific enteritis.    Hepatic steatosis.    Cholecystectomy.      Electronically signed  by: Alan Hung MD  Date:    07/16/2024  Time:    11:01               Narrative:    EXAMINATION:  CT ABDOMEN PELVIS WITH IV CONTRAST    CLINICAL HISTORY:  Bowel obstruction suspected;    TECHNIQUE:  Low dose axial images, sagittal and coronal reformations were obtained from the lung bases to the pubic symphysis following the IV administration of 100 mL of Omnipaque 350 .  Oral contrast was not given.    COMPARISON:  06/08/2023    FINDINGS:  The liver is hypoattenuating indicative of fatty infiltration.  There has been a cholecystectomy.  The pancreas, spleen, adrenal glands, and kidneys are unremarkable.    There has been a prior partial small bowel resection in left lower quadrant.  Bowel is nondilated.  There is prominence of the Vasa recta of small bowel within the left hemiabdomen.  A short segment of small bowel just proximal to the anastomosis exhibits mild generalized mural thickening.    The appendix is not seen.  There is a 4.0 cm fatty intramural mass of the cecum compatible with a lipoma.  The colon is unremarkable.    No free air, free fluid, or fluid collection.  There has been hysterectomy.  No acute bony abnormality.                                       Medications   pantoprazole injection 80 mg (has no administration in time range)   iohexoL (OMNIPAQUE 350) 350 mg iodine/mL injection (75 mLs Intravenous Given 7/16/24 1043)     Medical Decision Making  68-year-old female with past medical history of prior bleeding ulcers, hepatic steatosis, multiple previous abdominal surgeries and SBOs presents today with epigastric abdominal pain associated nausea vomiting. Reported black emesis. Probably has another bleeding ulcer. Ordered Ct. Epigastric abdominal pain with reported black emesis.  Has previous history of bleeding ulcers per patient    Given history and exam patient's presentation most consistent with upper GI bleed possibly secondary to peptic ulcer disease and less likely variceal  bleeding.  Differential diagnosis includes but I have low suspicion for aortoenteric fistula, ENT bleeding mimic, Boerhaave's, Pulmonary bleeding mimic.    Workup: CBC, CMP, Lipase, PT/INR, Type and Screen, CT    Interventions:  Analgesia and antiemetic medications PRN  Protonix 80mg IVP  Findings:  H&H reassuring labs otherwise unremarkable.  Vital signs stable with no hypotension.  Discussed with GI on-call who will take patient up to endoscopy for EGD.  Discussed with hospital medicine who accepted patient for further management.    Disposition: Admit for close monitoring. GI Consulted    Amount and/or Complexity of Data Reviewed  Labs: ordered.  Radiology: ordered. Decision-making details documented in ED Course.               ED Course as of 07/16/24 1254   Tue Jul 16, 2024   1107 CT Abdomen Pelvis With IV Contrast NO Oral Contrast [BD]   1107 Impression:     Status post partial small bowel resection.  Mild mural thickening of a short segment small bowel just proximal to the anastomosis, along with more generalized prominence of the vasa recta serving the small bowel within the left hemiabdomen, with the findings most compatible with a nonspecific enteritis.     Hepatic steatosis.     Cholecystectomy.        Electronically signed by:Alan Hung MD  Date:                                            07/16/2024  Time:                                           11:01   [BD]      ED Course User Index  [BD] Garrison Piper MD                           Clinical Impression:  Final diagnoses:  [R10.13] Epigastric pain (Primary)  [K92.0] Hematemesis, unspecified whether nausea present          ED Disposition Condition    Observation Stable                Garrison Piper MD  07/16/24 1254

## 2024-07-16 NOTE — NURSING
Pt arrived to floor via wheel chair from Endo. Bedside report received from Taylor ADAME. NAD noted. Pt resting in bed. Call light within reach. Primary RN Dot Updated.

## 2024-07-16 NOTE — SUBJECTIVE & OBJECTIVE
Past Medical History:   Diagnosis Date    Anemia     Fatty liver 2015    Former smoker 2009    Osteoarthritis 2010    Pulmonary nodules 2019    Repeat CT in 6 mo    Ulcer of abdomen wall 2016       Past Surgical History:   Procedure Laterality Date    APPENDECTOMY  1993     SECTION  , , 1983    x3    CHOLECYSTECTOMY          COLONOSCOPY  ~    [facility]: normal findings per patient report    COLONOSCOPY N/A 2020    Procedure: COLONOSCOPY;  Surgeon: Misael Holm MD;  Location: [facility] ENDO;  Service: General;  Laterality: N/A;    COLONOSCOPY, WITH RETROGRADE BALLOON ENTEROSCOPY, SINGLE OR DOUBLE BALLOON N/A 2023    Procedure: COLONOSCOPY, WITH RETROGRADE BALLOON ENTEROSCOPY, DOUBLE BALLOON;  Surgeon: Collin Mares MD;  Location: [facility] ENDO (2ND FLR);  Service: Endoscopy;  Laterality: N/A;  inst via portal-MS-sutab per pt request-tb-new inst portal    DIAGNOSTIC LAPAROSCOPY N/A 2021    Procedure: LAPAROSCOPY, DIAGNOSTIC;  Surgeon: Misael Holm MD;  Location: [facility] OR;  Service: General;  Laterality: N/A;    EPIDURAL STEROID INJECTION INTO LUMBAR SPINE N/A 10/12/2018    Procedure: Injection-steroid-epidural-lumbar;  Surgeon: Kal Johnson MD;  Location: [facility] OR;  Service: Pain Management;  Laterality: N/A;  L5-S1    EPIDURAL STEROID INJECTION INTO LUMBAR SPINE N/A 2019    Procedure: Injection-steroid-epidural-lumbar L5-S1;  Surgeon: Kal Johnson MD;  Location: [facility] OR;  Service: Pain Management;  Laterality: N/A;    ESOPHAGOGASTRODUODENOSCOPY N/A 2020    Procedure: ESOPHAGOGASTRODUODENOSCOPY (EGD);  Surgeon: Misael Holm MD;  Location: [facility] ENDO;  Service: General;  Laterality: N/A;    FUSION, SPINE, MINIMALLY INVASIVE, USING COMPUTER-ASSISTED NAVIGATION N/A 2022    Procedure: ENTEROSCOPY, DOUBLE BALLOON, ANTEGRADE;  Surgeon: Collin Mares MD;  Location: [facility] ENDO (2ND FLR);  Service: Endoscopy;  Laterality: N/A;   11/14/22-Instructions via portal-DS    *open to earlier date if available*    HYSTERECTOMY  1993    one ovary conserved    LAPAROSCOPIC CHOLECYSTECTOMY N/A 09/02/2020    Procedure: CHOLECYSTECTOMY, LAPAROSCOPIC;  Surgeon: Govind Frey MD;  Location: [facility] OR;  Service: General;  Laterality: N/A;    LAPAROSCOPIC LYSIS OF ADHESIONS N/A 05/04/2021    Procedure: LYSIS, ADHESIONS, LAPAROSCOPIC;  Surgeon: Misael Holm MD;  Location: [facility] OR;  Service: General;  Laterality: N/A;    TRANSFORAMINAL EPIDURAL INJECTION OF STEROID Right 2/6/2024    Procedure: Injection,steroid,epidural,transforaminal approach;  Surgeon: Kal Johnson MD;  Location: [facility] OR;  Service: Anesthesiology;  Laterality: Right;  L4-5 and L5-s1    UPPER GASTROINTESTINAL ENDOSCOPY  2016       Review of patient's allergies indicates:  No Known Allergies    Current Facility-Administered Medications on File Prior to Encounter   Medication    lactated ringers infusion    sodium chloride 0.9% 100 mL flush bag    sodium chloride 0.9% flush 10 mL     Current Outpatient Medications on File Prior to Encounter   Medication Sig    biotin 5,000 mcg TbDL Take 5,000 mcg by mouth Daily.    CONSTULOSE 10 gram/15 mL solution TAKE 10 ML BY MOUTH  THREE TIMES DAILY (Patient taking differently: Take 10 g by mouth 2 (two) times daily. TAKE 10 ML BY MOUTH  THREE TIMES DAILY)    cyanocobalamin, vitamin B-12, (VITAMIN B-12) 1,000 mcg/mL Drop Take 1 Dose by mouth once daily.    gabapentin (NEURONTIN) 300 MG capsule Take 2 capsules (600 mg total) by mouth 3 (three) times daily.    magnesium gluconate 27.5 mg magne- sium (500 mg) Tab Take 250 mg by mouth once daily.    ondansetron (ZOFRAN-ODT) 4 MG TbDL Take 8 mg by mouth every 6 (six) hours as needed.    orphenadrine (NORFLEX) 100 mg tablet Take 1 tablet (100 mg total) by mouth 2 (two) times daily as needed for Muscle spasms.    pantoprazole (PROTONIX) 20 MG tablet Take 1 tablet (20 mg total) by mouth once daily.     POTASSIUM CHLORIDE, BULK, MISC Take 99 mg by mouth once daily.    vitamin D (VITAMIN D3) 1000 units Tab Take 3,000 Units by mouth once daily.    diclofenac sodium (VOLTAREN) 1 % Gel Apply 2 g topically 2 (two) times daily as needed.    [DISCONTINUED] ondansetron (ZOFRAN) 4 mg/5 mL solution Take by mouth once.     Family History       Problem Relation (Age of Onset)    Aneurysm Father    Arthritis Mother, Sister    Brain Hemorrhage Father    Heart disease Mother    Hypertension Father, Sister    Obesity Sister    Osteoarthritis Sister    Osteoporosis Mother    Ovarian cancer Mother    Stroke Father (50)          Tobacco Use    Smoking status: Former     Current packs/day: 0.00     Average packs/day: 1 pack/day for 40.0 years (40.0 ttl pk-yrs)     Types: Cigarettes     Start date: 1969     Quit date: 2009     Years since quittin.7    Smokeless tobacco: Never    Tobacco comments:     quit    Substance and Sexual Activity    Alcohol use: Not Currently     Comment: Not often - one glass of wine every now and then    Drug use: Not Currently     Types: Marijuana     Comment: in     Sexual activity: Not Currently     Review of Systems   Constitutional:  Negative for activity change and fever.   HENT:  Negative for ear discharge, facial swelling and sore throat.    Eyes:  Negative for photophobia, pain and visual disturbance.   Respiratory:  Negative for apnea, cough and shortness of breath.    Cardiovascular:  Negative for chest pain and leg swelling.   Gastrointestinal:  Positive for abdominal pain, nausea and vomiting. Negative for blood in stool.   Endocrine: Negative for cold intolerance and heat intolerance.   Musculoskeletal:  Negative for back pain and gait problem.   Skin:  Negative for pallor and rash.   Neurological:  Negative for speech difficulty and headaches.   Psychiatric/Behavioral:  Negative for confusion, hallucinations and suicidal ideas.    All other systems reviewed and are  negative.    Objective:     Vital Signs (Most Recent):  Temp: 97.5 °F (36.4 °C) (07/16/24 0846)  Pulse: 74 (07/16/24 1232)  Resp: 17 (07/16/24 1232)  BP: 121/76 (07/16/24 1232)  SpO2: 97 % (07/16/24 1232) Vital Signs (24h Range):  Temp:  [97.5 °F (36.4 °C)] 97.5 °F (36.4 °C)  Pulse:  [66-77] 74  Resp:  [16-18] 17  SpO2:  [96 %-98 %] 97 %  BP: (117-145)/(60-76) 121/76     Weight: 89.8 kg (198 lb)  Body mass index is 33.99 kg/m².     Physical Exam  Constitutional:       Appearance: Normal appearance.   HENT:      Head: Normocephalic and atraumatic.      Nose: Nose normal.   Eyes:      Extraocular Movements: Extraocular movements intact.      Pupils: Pupils are equal, round, and reactive to light.   Cardiovascular:      Rate and Rhythm: Normal rate and regular rhythm.      Heart sounds: No murmur heard.  Pulmonary:      Effort: Pulmonary effort is normal.      Breath sounds: Normal breath sounds.   Abdominal:      General: Abdomen is flat.      Palpations: Abdomen is soft.      Tenderness: There is abdominal tenderness.      Comments: Epigastric tenderness   Musculoskeletal:         General: Normal range of motion.      Cervical back: Normal range of motion and neck supple.   Skin:     General: Skin is warm and dry.   Neurological:      General: No focal deficit present.      Mental Status: She is alert and oriented to person, place, and time.   Psychiatric:         Mood and Affect: Mood normal.              CRANIAL NERVES     CN III, IV, VI   Pupils are equal, round, and reactive to light.       Significant Labs: All pertinent labs within the past 24 hours have been reviewed.  CBC:   Recent Labs   Lab 07/16/24  0924   WBC 5.73   HGB 13.8   HCT 42.5        CMP:   Recent Labs   Lab 07/16/24  0924      K 4.0      CO2 23   GLU 97   BUN 9   CREATININE 0.8   CALCIUM 9.5   PROT 6.5   ALBUMIN 3.7   BILITOT 0.5   ALKPHOS 62   AST 20   ALT 19   ANIONGAP 12       Significant Imaging: I have reviewed all  pertinent imaging results/findings within the past 24 hours.  I have reviewed and interpreted all pertinent imaging results/findings within the past 24 hours.    CT Abdomen Pelvis With IV Contrast NO Oral Contrast    Result Date: 7/16/2024  EXAMINATION: CT ABDOMEN PELVIS WITH IV CONTRAST CLINICAL HISTORY: Bowel obstruction suspected; TECHNIQUE: Low dose axial images, sagittal and coronal reformations were obtained from the lung bases to the pubic symphysis following the IV administration of 100 mL of Omnipaque 350 .  Oral contrast was not given. COMPARISON: 06/08/2023 FINDINGS: The liver is hypoattenuating indicative of fatty infiltration.  There has been a cholecystectomy.  The pancreas, spleen, adrenal glands, and kidneys are unremarkable. There has been a prior partial small bowel resection in left lower quadrant.  Bowel is nondilated.  There is prominence of the Vasa recta of small bowel within the left hemiabdomen.  A short segment of small bowel just proximal to the anastomosis exhibits mild generalized mural thickening. The appendix is not seen.  There is a 4.0 cm fatty intramural mass of the cecum compatible with a lipoma.  The colon is unremarkable. No free air, free fluid, or fluid collection.  There has been hysterectomy.  No acute bony abnormality.     Status post partial small bowel resection.  Mild mural thickening of a short segment small bowel just proximal to the anastomosis, along with more generalized prominence of the vasa recta serving the small bowel within the left hemiabdomen, with the findings most compatible with a nonspecific enteritis. Hepatic steatosis. Cholecystectomy. Electronically signed by: Alan Hung MD Date:    07/16/2024 Time:    11:01  - pulls last radiology orders

## 2024-07-17 ENCOUNTER — PATIENT MESSAGE (OUTPATIENT)
Dept: SPINE | Facility: CLINIC | Age: 69
End: 2024-07-17
Payer: MEDICARE

## 2024-07-17 VITALS
HEIGHT: 64 IN | RESPIRATION RATE: 17 BRPM | TEMPERATURE: 98 F | BODY MASS INDEX: 33.8 KG/M2 | DIASTOLIC BLOOD PRESSURE: 67 MMHG | OXYGEN SATURATION: 97 % | HEART RATE: 78 BPM | SYSTOLIC BLOOD PRESSURE: 144 MMHG | WEIGHT: 198 LBS

## 2024-07-17 LAB
ALBUMIN SERPL BCP-MCNC: 3.4 G/DL (ref 3.5–5.2)
ALP SERPL-CCNC: 60 U/L (ref 55–135)
ALT SERPL W/O P-5'-P-CCNC: 16 U/L (ref 10–44)
ANION GAP SERPL CALC-SCNC: 9 MMOL/L (ref 8–16)
AST SERPL-CCNC: 18 U/L (ref 10–40)
BASOPHILS # BLD AUTO: 0.04 K/UL (ref 0–0.2)
BASOPHILS NFR BLD: 0.7 % (ref 0–1.9)
BILIRUB SERPL-MCNC: 0.6 MG/DL (ref 0.1–1)
BUN SERPL-MCNC: 7 MG/DL (ref 8–23)
CALCIUM SERPL-MCNC: 9.2 MG/DL (ref 8.7–10.5)
CHLORIDE SERPL-SCNC: 108 MMOL/L (ref 95–110)
CO2 SERPL-SCNC: 25 MMOL/L (ref 23–29)
CREAT SERPL-MCNC: 0.7 MG/DL (ref 0.5–1.4)
DIFFERENTIAL METHOD BLD: NORMAL
EOSINOPHIL # BLD AUTO: 0.2 K/UL (ref 0–0.5)
EOSINOPHIL NFR BLD: 2.7 % (ref 0–8)
ERYTHROCYTE [DISTWIDTH] IN BLOOD BY AUTOMATED COUNT: 12.8 % (ref 11.5–14.5)
EST. GFR  (NO RACE VARIABLE): >60 ML/MIN/1.73 M^2
GLUCOSE SERPL-MCNC: 88 MG/DL (ref 70–110)
HCT VFR BLD AUTO: 40.4 % (ref 37–48.5)
HGB BLD-MCNC: 13 G/DL (ref 12–16)
IMM GRANULOCYTES # BLD AUTO: 0.01 K/UL (ref 0–0.04)
IMM GRANULOCYTES NFR BLD AUTO: 0.2 % (ref 0–0.5)
LYMPHOCYTES # BLD AUTO: 1.8 K/UL (ref 1–4.8)
LYMPHOCYTES NFR BLD: 31.5 % (ref 18–48)
MAGNESIUM SERPL-MCNC: 1.9 MG/DL (ref 1.6–2.6)
MCH RBC QN AUTO: 30 PG (ref 27–31)
MCHC RBC AUTO-ENTMCNC: 32.2 G/DL (ref 32–36)
MCV RBC AUTO: 93 FL (ref 82–98)
MONOCYTES # BLD AUTO: 0.6 K/UL (ref 0.3–1)
MONOCYTES NFR BLD: 10 % (ref 4–15)
NEUTROPHILS # BLD AUTO: 3.1 K/UL (ref 1.8–7.7)
NEUTROPHILS NFR BLD: 54.9 % (ref 38–73)
NRBC BLD-RTO: 0 /100 WBC
PHOSPHATE SERPL-MCNC: 4.1 MG/DL (ref 2.7–4.5)
PLATELET # BLD AUTO: 254 K/UL (ref 150–450)
PMV BLD AUTO: 10 FL (ref 9.2–12.9)
POTASSIUM SERPL-SCNC: 3.6 MMOL/L (ref 3.5–5.1)
PROT SERPL-MCNC: 5.9 G/DL (ref 6–8.4)
RBC # BLD AUTO: 4.33 M/UL (ref 4–5.4)
SODIUM SERPL-SCNC: 142 MMOL/L (ref 136–145)
TROPONIN I SERPL DL<=0.01 NG/ML-MCNC: <0.006 NG/ML (ref 0–0.03)
WBC # BLD AUTO: 5.59 K/UL (ref 3.9–12.7)

## 2024-07-17 PROCEDURE — 25000003 PHARM REV CODE 250: Performed by: HOSPITALIST

## 2024-07-17 PROCEDURE — 96376 TX/PRO/DX INJ SAME DRUG ADON: CPT

## 2024-07-17 PROCEDURE — 80053 COMPREHEN METABOLIC PANEL: CPT | Performed by: HOSPITALIST

## 2024-07-17 PROCEDURE — 96361 HYDRATE IV INFUSION ADD-ON: CPT

## 2024-07-17 PROCEDURE — 84484 ASSAY OF TROPONIN QUANT: CPT | Performed by: HOSPITALIST

## 2024-07-17 PROCEDURE — G0378 HOSPITAL OBSERVATION PER HR: HCPCS

## 2024-07-17 PROCEDURE — 36415 COLL VENOUS BLD VENIPUNCTURE: CPT | Performed by: HOSPITALIST

## 2024-07-17 PROCEDURE — 63600175 PHARM REV CODE 636 W HCPCS: Performed by: HOSPITALIST

## 2024-07-17 PROCEDURE — 83735 ASSAY OF MAGNESIUM: CPT | Performed by: HOSPITALIST

## 2024-07-17 PROCEDURE — 84100 ASSAY OF PHOSPHORUS: CPT | Performed by: HOSPITALIST

## 2024-07-17 PROCEDURE — 85025 COMPLETE CBC W/AUTO DIFF WBC: CPT | Performed by: HOSPITALIST

## 2024-07-17 RX ADMIN — GABAPENTIN 300 MG: 300 CAPSULE ORAL at 08:07

## 2024-07-17 RX ADMIN — PANTOPRAZOLE SODIUM 40 MG: 40 INJECTION, POWDER, LYOPHILIZED, FOR SOLUTION INTRAVENOUS at 08:07

## 2024-07-17 RX ADMIN — SODIUM CHLORIDE, POTASSIUM CHLORIDE, SODIUM LACTATE AND CALCIUM CHLORIDE: 600; 310; 30; 20 INJECTION, SOLUTION INTRAVENOUS at 02:07

## 2024-07-17 NOTE — DISCHARGE SUMMARY
[facility] Medicine  Discharge Summary      Patient Name: Pham Licea  MRN: [id number]  ANIKET: [id number]  Patient Class: OP- Observation  Admission Date: 7/16/2024  Hospital Length of Stay: 0 days  Discharge Date and Time: 7/17/2024 11:19 AM  Attending Physician: No att. providers found   Discharging Provider: Joel Brooks MD  Primary Care Provider: Lani Grossman MD    Primary Care Team: Networked reference to record PCT     HPI:   This is a 68-year-old  female with a history of peptic ulcer disease, small-bowel obstruction status post surgery x2 presenting here for epigastric pain for 3 days, associated with nausea vomiting with a black emesis, patient has good bowel movement yesterday, patient denies any fever or chills, no sick contact, no recent travel history, patient denies any chest pain or SOB, patient is very concern of small-bowel obstruction so come to hospital for further workup, in the ER, patient is afebrile, hemodynamically stable, lab work is insignificant, EKG is unavailable to me, CT abdomen and pelvis shows enteritis.  GI has been consulted and pending for possible EGD, patient requested to see her general surgeon Dr. Tyler.     Procedure(s) (LRB):  EGD (ESOPHAGOGASTRODUODENOSCOPY) (N/A)      Hospital Course:   Patient admitted for further management, patient was placed on PPI, clear liquid diet, CT of abdomen pelvis is negative for obstruction only shows enteritis, patient had EGD which is negative for obstruction or bleeding, patient was placed on diet and advanced gradually, patient tolerated well.  Patient had multiple bowel movements.  Patient is cleared for discharge.      Goals of Care Treatment Preferences:  Code Status: Full Code      Consults:   Consults (From admission, onward)          Status Ordering Provider     Inpatient consult to General Surgery  Once        Provider:  John Tyler MD    Completed JOEL BROOKS     Inpatient  consult to Gastroenterology  Once        Provider:  Jas Gleason MD    Completed ELISA YING            GI  * Nausea & vomiting    CT abdomen was reviewed shows questionable enteritis, no signs of SBO.   Patient also complaining of black emesis, GI has been consulted.  EGD is normal, no bleeding or obstruction  Continue PPI b.i.d..  Hemoglobin has been stable.  IV hydration  Zofran  General surgery consult  Patient is status post cholecystectomy.    Enteritis    As above described      Final Active Diagnoses:    Diagnosis Date Noted POA    PRINCIPAL PROBLEM:  Nausea & vomiting [R11.2] 09/01/2020 Yes    Enteritis [K52.9] 07/16/2024 Yes      Problems Resolved During this Admission:       Discharged Condition: stable    Disposition: Home or Self Care    Follow Up:   Follow-up Information       Lani Grossman MD. Schedule an appointment as soon as possible for a visit.    Specialty: Family Medicine  Why: 7/23 @ 821  Contact information:  [address] [place] [address] [phone]                           Patient Instructions:   No discharge procedures on file.    Significant Diagnostic Studies: Labs: CMP   Recent Labs   Lab 07/16/24  0924 07/17/24  0429    142   K 4.0 3.6    108   CO2 23 25   GLU 97 88   BUN 9 7*   CREATININE 0.8 0.7   CALCIUM 9.5 9.2   PROT 6.5 5.9*   ALBUMIN 3.7 3.4*   BILITOT 0.5 0.6   ALKPHOS 62 60   AST 20 18   ALT 19 16   ANIONGAP 12 9   , CBC   Recent Labs   Lab 07/16/24  0924 07/17/24  0429   WBC 5.73 5.59   HGB 13.8 13.0   HCT 42.5 40.4    254   , and Troponin   Recent Labs   Lab 07/16/24  1558 07/16/24  1930 07/17/24  0129   TROPONINI <0.006 <0.006 <0.006       Pending Diagnostic Studies:       Procedure Component Value Units Date/Time    Specimen to Pathology - Surgery [[id number]] Collected: 07/16/24 1514    Order Status: Sent Lab Status: No result     Specimen: Tissue            Medications:  Reconciled Home Medications:      Medication List         CHANGE how you take these medications      CONSTULOSE 10 gram/15 mL solution  Generic drug: lactulose  TAKE 10 ML BY MOUTH  THREE TIMES DAILY  What changed:   how much to take  how to take this  when to take this            CONTINUE taking these medications      biotin 5,000 mcg Tbdl  Take 5,000 mcg by mouth Daily.     diclofenac sodium 1 % Gel  Commonly known as: VOLTAREN  Apply 2 g topically 2 (two) times daily as needed.     gabapentin 300 MG capsule  Commonly known as: NEURONTIN  Take 2 capsules (600 mg total) by mouth 3 (three) times daily.     magnesium gluconate 27.5 mg magne- sium (500 mg) Tab  Take 250 mg by mouth once daily.     ondansetron 4 MG Tbdl  Commonly known as: ZOFRAN-ODT  Take 8 mg by mouth every 6 (six) hours as needed.     orphenadrine 100 mg tablet  Commonly known as: NORFLEX  Take 1 tablet (100 mg total) by mouth 2 (two) times daily as needed for Muscle spasms.     pantoprazole 20 MG tablet  Commonly known as: PROTONIX  Take 1 tablet (20 mg total) by mouth once daily.     POTASSIUM CHLORIDE (BULK) MISC  Take 99 mg by mouth once daily.     VITAMIN B-12 1,000 mcg/mL Drop  Generic drug: cyanocobalamin (vitamin B-12)  Take 1 Dose by mouth once daily.     vitamin D 1000 units Tab  Commonly known as: VITAMIN D3  Take 3,000 Units by mouth once daily.            CT Abdomen Pelvis With IV Contrast NO Oral Contrast    Result Date: 7/16/2024  EXAMINATION: CT ABDOMEN PELVIS WITH IV CONTRAST CLINICAL HISTORY: Bowel obstruction suspected; TECHNIQUE: Low dose axial images, sagittal and coronal reformations were obtained from the lung bases to the pubic symphysis following the IV administration of 100 mL of Omnipaque 350 .  Oral contrast was not given. COMPARISON: 06/08/2023 FINDINGS: The liver is hypoattenuating indicative of fatty infiltration.  There has been a cholecystectomy.  The pancreas, spleen, adrenal glands, and kidneys are unremarkable. There has been a prior partial small bowel resection  in left lower quadrant.  Bowel is nondilated.  There is prominence of the Vasa recta of small bowel within the left hemiabdomen.  A short segment of small bowel just proximal to the anastomosis exhibits mild generalized mural thickening. The appendix is not seen.  There is a 4.0 cm fatty intramural mass of the cecum compatible with a lipoma.  The colon is unremarkable. No free air, free fluid, or fluid collection.  There has been hysterectomy.  No acute bony abnormality.     Status post partial small bowel resection.  Mild mural thickening of a short segment small bowel just proximal to the anastomosis, along with more generalized prominence of the vasa recta serving the small bowel within the left hemiabdomen, with the findings most compatible with a nonspecific enteritis. Hepatic steatosis. Cholecystectomy. Electronically signed by: Alan Hung MD Date:    07/16/2024 Time:    11:01  - pulls last radiology orders      Indwelling Lines/Drains at time of discharge:   Lines/Drains/Airways       None                   Time spent on the discharge of patient: 35 minutes         Joel Almeida MD  Department of Hospital Medicine  [facility]/Surg

## 2024-07-17 NOTE — HOSPITAL COURSE
Patient admitted for further management, patient was placed on PPI, clear liquid diet, CT of abdomen pelvis is negative for obstruction only shows enteritis, patient had EGD which is negative for obstruction or bleeding, patient was placed on diet and advanced gradually, patient tolerated well.  Patient had multiple bowel movements.  Patient is cleared for discharge.

## 2024-07-17 NOTE — ASSESSMENT & PLAN NOTE
CT abdomen was reviewed shows questionable enteritis, no signs of SBO.   Patient also complaining of black emesis, GI has been consulted.  EGD is normal, no bleeding or obstruction  Continue PPI b.i.d..  Hemoglobin has been stable.  IV hydration  Zofran  General surgery consult  Patient is status post cholecystectomy.

## 2024-07-17 NOTE — NURSING
Patient continues to vocalize nausea this evening, despite PRN antiemetic being utilized by prior shift, shortly after 1700. Patient not yet due for further medication and clear liquid diet is maintained with further measures. At 2044, contact is made with Hospital Medicine provider, CLAYTON Gresham NP, regarding patient reporting and requesting something for nausea. Relevant, pertinent, and qualifying patient information is reviewed and reviewed recently administered Zofran. Upon contacting NP, order is noted as entered by the provider. Medication is administered with education offered.

## 2024-07-17 NOTE — PLAN OF CARE
Hospital follow up scheduled  Pt is clear for dc from      07/17/24 1057   Final Note   Assessment Type Final Discharge Note   Anticipated Discharge Disposition Home   Hospital Resources/Appts/Education Provided Appointments scheduled and added to AVS

## 2024-07-17 NOTE — PLAN OF CARE
[facility]/Surg  Initial Discharge Assessment       Primary Care Provider: Lani Grossman MD    Admission Diagnosis: Epigastric pain [R10.13]    Admission Date: 7/16/2024  Expected Discharge Date: 7/17/2024    Met with pt at bedside to complete discharge assessment, verified PCP, pharmacy and information on facesheet.  No HH, dialysis or Coumadin.  Pt has rolling walker, no other DME at home.  Daughter, Magali in room and will drive pt home.  No needs identified at this time.    Transition of Care Barriers: None    Payor: MEDICARE / Plan: MEDICARE PART A & B / Product Type: Government /     Extended Emergency Contact Information  Primary Emergency Contact: Magali Nunez   [place]  Mobile Phone: [phone]  Relation: Daughter  Secondary Emergency Contact: AntwanCait  Mobile Phone: [phone]  Relation: Daughter  Preferred language: English   needed? No    Discharge Plan A: Home  Discharge Plan B: Home      [facility] - [place], [place] - [address]  [address]  [place] [address]  Phone: [phone] Fax: [phone]    [id number] - [place], [place] - [address]  [address]  [place] [address]  Phone: [phone] Fax: [phone]      Initial Assessment (most recent)       Adult Discharge Assessment - 07/17/24 1035          Discharge Assessment    Assessment Type Discharge Planning Assessment     Confirmed/corrected address, phone number and insurance Yes     Confirmed Demographics Correct on Facesheet     Source of Information patient     Communicated GABBY with patient/caregiver Yes     People in Home alone     Do you expect to return to your current living situation? Yes     Prior to hospitilization cognitive status: Alert/Oriented     Current cognitive status: Alert/Oriented     Walking or Climbing Stairs Difficulty no     Dressing/Bathing Difficulty no     Home Accessibility not wheelchair  accessible     Home Layout Able to live on 1st floor     Equipment Currently Used at Home walker, rolling     Readmission within 30 days? No     Patient currently being followed by outpatient case management? No     Do you currently have service(s) that help you manage your care at home? No     Do you take prescription medications? Yes     Do you have prescription coverage? Yes     Coverage [employer]     Do you have any problems affording any of your prescribed medications? No     Is the patient taking medications as prescribed? yes     Who is going to help you get home at discharge? daughter, Magali     How do you get to doctors appointments? car, drives self     Are you on dialysis? No     Do you take coumadin? No     Discharge Plan A Home     Discharge Plan B Home     DME Needed Upon Discharge  none     Discharge Plan discussed with: Patient     Transition of Care Barriers None

## 2024-07-17 NOTE — NURSING
Nurses Note -- 4 Eyes      7/16/2024   7:21 PM      Skin assessed during: Admit      [] No Altered Skin Integrity Present    []Prevention Measures Documented      [x] Yes- Altered Skin Integrity Present or Discovered   [x] LDA Added if Not in Epic (Describe Wound)   [x] New Altered Skin Integrity was Present on Admit and Documented in LDA   [x] Wound Image Taken    Wound Care Consulted? No    Attending Nurse: Dot Carlton RN    Second RN/Staff Member:   WENDI Redmond

## 2024-07-17 NOTE — NURSING
Notified Dr. Almeida of patient and situation, ISBAR provided, informed MD that patient had abdominal cramping after eating a few bites of her regular diet. MD stated to place patient on a clear liquid diet and advance as tolerated.

## 2024-07-17 NOTE — PLAN OF CARE
07/17/24 1014   LOPEZ Message   Medicare Outpatient and Observation Notification regarding financial responsibility Explained to patient/caregiver;Signed/date by patient/caregiver   Date LOPEZ was signed 07/17/24   Time LOPEZ was signed 1014

## 2024-07-17 NOTE — PLAN OF CARE
Problem: Adult Inpatient Plan of Care  Goal: Plan of Care Review  Outcome: Progressing  Goal: Patient-Specific Goal (Individualized)  Outcome: Progressing  Goal: Optimal Comfort and Wellbeing  Outcome: Progressing     Problem: Wound  Goal: Absence of Infection Signs and Symptoms  Outcome: Progressing  Goal: Optimal Pain Control and Function  Outcome: Progressing     Problem: Pain Acute  Goal: Optimal Pain Control and Function  Outcome: Progressing     Problem: Fall Injury Risk  Goal: Absence of Fall and Fall-Related Injury  Outcome: Progressing     Problem: Infection  Goal: Absence of Infection Signs and Symptoms  Outcome: Progressing     Problem: Nausea and Vomiting  Goal: Nausea and Vomiting Relief  Outcome: Progressing

## 2024-07-18 ENCOUNTER — OFFICE VISIT (OUTPATIENT)
Dept: FAMILY MEDICINE | Facility: CLINIC | Age: 69
End: 2024-07-18
Payer: MEDICARE

## 2024-07-18 ENCOUNTER — PATIENT MESSAGE (OUTPATIENT)
Dept: GASTROENTEROLOGY | Facility: CLINIC | Age: 69
End: 2024-07-18
Payer: MEDICARE

## 2024-07-18 ENCOUNTER — NURSE TRIAGE (OUTPATIENT)
Dept: ADMINISTRATIVE | Facility: CLINIC | Age: 69
End: 2024-07-18
Payer: MEDICARE

## 2024-07-18 VITALS
WEIGHT: 195.5 LBS | RESPIRATION RATE: 14 BRPM | DIASTOLIC BLOOD PRESSURE: 84 MMHG | BODY MASS INDEX: 33.38 KG/M2 | HEART RATE: 78 BPM | SYSTOLIC BLOOD PRESSURE: 126 MMHG | OXYGEN SATURATION: 98 % | HEIGHT: 64 IN

## 2024-07-18 DIAGNOSIS — T80.1XXA PHLEBITIS AFTER INFUSION, INITIAL ENCOUNTER: Primary | ICD-10-CM

## 2024-07-18 DIAGNOSIS — I80.9 PHLEBITIS AFTER INFUSION, INITIAL ENCOUNTER: Primary | ICD-10-CM

## 2024-07-18 PROCEDURE — 99214 OFFICE O/P EST MOD 30 MIN: CPT | Mod: PBBFAC | Performed by: FAMILY MEDICINE

## 2024-07-18 PROCEDURE — 99999 PR PBB SHADOW E&M-EST. PATIENT-LVL IV: CPT | Mod: PBBFAC,,, | Performed by: FAMILY MEDICINE

## 2024-07-18 PROCEDURE — G2211 COMPLEX E/M VISIT ADD ON: HCPCS | Mod: S$PBB,,, | Performed by: FAMILY MEDICINE

## 2024-07-18 PROCEDURE — 99214 OFFICE O/P EST MOD 30 MIN: CPT | Mod: S$PBB,,, | Performed by: FAMILY MEDICINE

## 2024-07-18 RX ORDER — GABAPENTIN 300 MG/1
600 CAPSULE ORAL 3 TIMES DAILY
Qty: 180 CAPSULE | Refills: 2 | Status: SHIPPED | OUTPATIENT
Start: 2024-07-18

## 2024-07-18 RX ORDER — SULFAMETHOXAZOLE AND TRIMETHOPRIM 800; 160 MG/1; MG/1
1 TABLET ORAL 2 TIMES DAILY
Qty: 20 TABLET | Refills: 0 | Status: SHIPPED | OUTPATIENT
Start: 2024-07-18

## 2024-07-18 NOTE — TELEPHONE ENCOUNTER
Pt reports she was discharged from the hospital yesterday and now pt reports left wrist redness, swelling, and tenderness to previous IV site that began this morning. Pt denies fever. Care advice for pt to be seen in office now. Pt accepted virtual visit but no available virtual visits. Call dropped. Attempt to call pt back x1, voice message left. Contact with pt. Appointment scheduled for pt for PCP at 1545. Pt verbalizes understanding.   Reason for Disposition   Skin redness and extends > 1 inch (2.5 cm) from IV site    Additional Information   Negative: SEVERE difficulty breathing (e.g., struggling for each breath, speaks in single words)   Negative: Shock suspected (e.g., cold/pale/clammy skin, too weak to stand, low BP, rapid pulse)   Negative: Cracked or broken central line (e.g., [place], [place], [place]) or PICC Line   Negative: Sounds like a life-threatening emergency to the triager   Negative: Chest pain and has central line (e.g., [place], [place], Port) or PICC line   Negative: Moderate bleeding around IV site  (Exception: Mild bleeding that stops quickly with direct pressure.)   Negative: Difficulty breathing and has central line (e.g., [place], [place], [place]) or PICC line   Negative: Chest or neck swelling and has a central or PICC line  (Exception: Mild puffiness or swelling just around IV site.)   Negative: Arm or leg swelling and has a central or PICC line  (Exception: Mild puffiness or swelling just around IV site.)   Negative: Fever > 100.4 F (38.0 C)   Negative: Patient sounds very sick or weak to the triager   Negative: Central line (e.g., [place], [place], [place]) or PICC line has moved out of position (or can see cuff slipping out of skin; or more line externally exposed than before)   Negative: Pus or cloudy fluid from IV site   Negative: Red streak at IV site and palpable cord   Negative: Red streak at IV site and longer than 1 inch (2.5 cm)    Protocols used: IV Site and Other Symptoms-A-OH

## 2024-07-18 NOTE — PROGRESS NOTES
Subjective:       Patient ID: Pham Licea is a 68 y.o. female.    Chief Complaint: Follow-up (Hospital follow up)      Past Medical History:   Diagnosis Date    Anemia     Fatty liver     Former smoker     Osteoarthritis 2010    Pulmonary nodules 2019    Repeat CT in 6 mo    Ulcer of abdomen wall 2016       Past Surgical History:   Procedure Laterality Date    APPENDECTOMY  1993     SECTION  , , 1983    x3    CHOLECYSTECTOMY      2020    COLONOSCOPY  ~2014    [facility]: normal findings per patient report    COLONOSCOPY N/A 2020    Procedure: COLONOSCOPY;  Surgeon: Misael Holm MD;  Location: [facility] ENDO;  Service: General;  Laterality: N/A;    COLONOSCOPY, WITH RETROGRADE BALLOON ENTEROSCOPY, SINGLE OR DOUBLE BALLOON N/A 2023    Procedure: COLONOSCOPY, WITH RETROGRADE BALLOON ENTEROSCOPY, DOUBLE BALLOON;  Surgeon: Collin Mares MD;  Location: [facility] ENDO ([address] [place] [address]);  Service: Endoscopy;  Laterality: N/A;  inst via portal-MS-sutab per pt request-tb-new inst portal    DIAGNOSTIC LAPAROSCOPY N/A 2021    Procedure: LAPAROSCOPY, DIAGNOSTIC;  Surgeon: Misael Holm MD;  Location: [facility] OR;  Service: General;  Laterality: N/A;    EPIDURAL STEROID INJECTION INTO LUMBAR SPINE N/A 10/12/2018    Procedure: Injection-steroid-epidural-lumbar;  Surgeon: Kal Johnson MD;  Location: [facility] OR;  Service: Pain Management;  Laterality: N/A;  L5-S1    EPIDURAL STEROID INJECTION INTO LUMBAR SPINE N/A 2019    Procedure: Injection-steroid-epidural-lumbar L5-S1;  Surgeon: Kal Johnson MD;  Location: [facility] OR;  Service: Pain Management;  Laterality: N/A;    ESOPHAGOGASTRODUODENOSCOPY N/A 2020    Procedure: ESOPHAGOGASTRODUODENOSCOPY (EGD);  Surgeon: Misael Holm MD;  Location: [facility] ENDO;  Service: General;  Laterality: N/A;    ESOPHAGOGASTRODUODENOSCOPY N/A 2024    Procedure: EGD (ESOPHAGOGASTRODUODENOSCOPY);  Surgeon: Jas Gleason MD;   Location: [facility] ENDO;  Service: Endoscopy;  Laterality: N/A;    FUSION, SPINE, MINIMALLY INVASIVE, USING COMPUTER-ASSISTED NAVIGATION N/A 2022    Procedure: ENTEROSCOPY, DOUBLE BALLOON, ANTEGRADE;  Surgeon: Collin Mares MD;  Location: [facility] ENDO (2ND FLR);  Service: Endoscopy;  Laterality: N/A;  22-Instructions via portal-DS    *open to earlier date if available*    HYSTERECTOMY  1993    one ovary conserved    LAPAROSCOPIC CHOLECYSTECTOMY N/A 2020    Procedure: CHOLECYSTECTOMY, LAPAROSCOPIC;  Surgeon: Govind Frey MD;  Location: [facility] OR;  Service: General;  Laterality: N/A;    LAPAROSCOPIC LYSIS OF ADHESIONS N/A 2021    Procedure: LYSIS, ADHESIONS, LAPAROSCOPIC;  Surgeon: Misael Holm MD;  Location: [facility] OR;  Service: General;  Laterality: N/A;    TRANSFORAMINAL EPIDURAL INJECTION OF STEROID Right 2024    Procedure: Injection,steroid,epidural,transforaminal approach;  Surgeon: Kal Johnson MD;  Location: [facility] OR;  Service: Anesthesiology;  Laterality: Right;  L4-5 and L5-s1    UPPER GASTROINTESTINAL ENDOSCOPY          Social History     Socioeconomic History    Marital status:     Number of children: 4    Highest education level: Some college, no degree   Occupational History    Occupation: sale specialist-    Tobacco Use    Smoking status: Former     Current packs/day: 0.00     Average packs/day: 1 pack/day for 40.0 years (40.0 ttl pk-yrs)     Types: Cigarettes     Start date: 1969     Quit date: 2009     Years since quittin.7    Smokeless tobacco: Never    Tobacco comments:     quit 2009   Substance and Sexual Activity    Alcohol use: Not Currently     Comment: Not often - one glass of wine every now and then    Drug use: Not Currently     Types: Marijuana     Comment: in     Sexual activity: Not Currently     Social Determinants of Health     Financial Resource Strain: Medium Risk (2024)    Overall Financial Resource Strain  (CARDIA)     Difficulty of Paying Living Expenses: Somewhat hard   Food Insecurity: No Food Insecurity (1/7/2024)    Hunger Vital Sign     Worried About Running Out of Food in the Last Year: Never true     Ran Out of Food in the Last Year: Never true   Transportation Needs: No Transportation Needs (1/7/2024)    PRAPARE - Transportation     Lack of Transportation (Medical): No     Lack of Transportation (Non-Medical): No   Physical Activity: Inactive (1/7/2024)    Exercise Vital Sign     Days of Exercise per Week: 0 days     Minutes of Exercise per Session: 0 min   Stress: Stress Concern Present (1/7/2024)    [employer] [place] [employer] - Occupational Stress Questionnaire     Feeling of Stress : To some extent   Housing Stability: Low Risk  (1/7/2024)    Housing Stability Vital Sign     Unable to Pay for Housing in the Last Year: No     Number of Places Lived in the Last Year: 1     Unstable Housing in the Last Year: No       Family History   Problem Relation Name Age of Onset    Ovarian cancer Mother      Heart disease Mother      Osteoporosis Mother      Arthritis Mother      Hypertension Father      Stroke Father  50    Brain Hemorrhage Father      Aneurysm Father      Osteoarthritis Sister      Arthritis Sister      Hypertension Sister      Obesity Sister      Breast cancer Neg Hx      Colon cancer Neg Hx      Colon polyps Neg Hx      Crohn's disease Neg Hx      Ulcerative colitis Neg Hx         Review of patient's allergies indicates:  No Known Allergies       Current Outpatient Medications:     biotin 5,000 mcg TbDL, Take 5,000 mcg by mouth Daily., Disp: , Rfl:     CONSTULOSE 10 gram/15 mL solution, TAKE 10 ML BY MOUTH  THREE TIMES DAILY (Patient taking differently: Take 10 g by mouth 2 (two) times daily. TAKE 10 ML BY MOUTH  THREE TIMES DAILY), Disp: 948 mL, Rfl: 2    cyanocobalamin, vitamin B-12, (VITAMIN B-12) 1,000 mcg/mL Drop, Take 1 Dose by mouth once daily., Disp: , Rfl:     diclofenac  sodium (VOLTAREN) 1 % Gel, Apply 2 g topically 2 (two) times daily as needed., Disp: , Rfl:     gabapentin (NEURONTIN) 300 MG capsule, Take 2 capsules (600 mg total) by mouth 3 (three) times daily., Disp: 180 capsule, Rfl: 2    magnesium gluconate 27.5 mg magne- sium (500 mg) Tab, Take 250 mg by mouth once daily., Disp: , Rfl:     ondansetron (ZOFRAN-ODT) 4 MG TbDL, Take 8 mg by mouth every 6 (six) hours as needed., Disp: , Rfl:     orphenadrine (NORFLEX) 100 mg tablet, Take 1 tablet (100 mg total) by mouth 2 (two) times daily as needed for Muscle spasms., Disp: 40 tablet, Rfl: 1    pantoprazole (PROTONIX) 20 MG tablet, Take 1 tablet (20 mg total) by mouth once daily., Disp: 90 tablet, Rfl: 2    POTASSIUM CHLORIDE, BULK, MISC, Take 99 mg by mouth once daily., Disp: , Rfl:     vitamin D (VITAMIN D3) 1000 units Tab, Take 3,000 Units by mouth once daily., Disp: , Rfl:     sulfamethoxazole-trimethoprim 800-160mg (BACTRIM DS) 800-160 mg Tab, Take 1 tablet by mouth 2 (two) times daily., Disp: 20 tablet, Rfl: 0  No current facility-administered medications for this visit.    Facility-Administered Medications Ordered in Other Visits:     lactated ringers infusion, , Intravenous, Continuous, Kal Johnson MD, Last Rate: 25 mL/hr at 02/06/24 0915, New Bag at 02/06/24 0915    sodium chloride 0.9% 100 mL flush bag, , Intravenous, PRN, Mayra Bower MD, Stopped at 04/24/23 1519    sodium chloride 0.9% flush 10 mL, 10 mL, Intravenous, PRSathish BEACH Sumathi S., MD    Ms Licea is here s/p hospital visit for partial obstruction. She received IV fluids and she now has a phlebitis/cellulitis at the IV site. This is her 8th episode of partial obstruction in the past year. She does have a GI doctor who is managing her condition. She did have an endoscopy at this visit and no abdominal issues were noted.     Follow-up  Pertinent negatives include no rash.     Review of Systems   Gastrointestinal:         All gi issues were resolved    Skin:  Negative for rash.        Thrombophlebitis of left lower arm       Objective:      Physical Exam  Constitutional:       Appearance: Normal appearance.   Cardiovascular:      Rate and Rhythm: Normal rate and regular rhythm.   Pulmonary:      Effort: Pulmonary effort is normal.      Breath sounds: Normal breath sounds.   Skin:     General: Skin is warm.      Findings: Rash present.      Comments: Phlebitis to left forearm   Neurological:      General: No focal deficit present.      Mental Status: She is alert and oriented to person, place, and time.   Psychiatric:         Mood and Affect: Mood normal.         Behavior: Behavior normal.         Assessment:       1. Phlebitis after infusion, initial encounter        Plan:         Phlebitis after infusion, initial encounter  -     sulfamethoxazole-trimethoprim 800-160mg (BACTRIM DS) 800-160 mg Tab; Take 1 tablet by mouth 2 (two) times daily.  Dispense: 20 tablet; Refill: 0        Risks, benefits, and side effects were discussed with the patient. All questions were answered to the fullest satisfaction of the patient, and pt verbalized understanding and agreement to treatment plan. Pt was to call with any new or worsening symptoms, or present to the ER.        Lani Grossman MD

## 2024-07-19 ENCOUNTER — CLINICAL SUPPORT (OUTPATIENT)
Dept: REHABILITATION | Facility: HOSPITAL | Age: 69
End: 2024-07-19
Payer: MEDICARE

## 2024-07-19 DIAGNOSIS — Z74.09 IMPAIRED FUNCTIONAL MOBILITY, BALANCE, GAIT, AND ENDURANCE: Primary | ICD-10-CM

## 2024-07-19 PROCEDURE — 97112 NEUROMUSCULAR REEDUCATION: CPT | Mod: PO

## 2024-07-19 PROCEDURE — 97110 THERAPEUTIC EXERCISES: CPT | Mod: PO

## 2024-07-19 NOTE — PROGRESS NOTES
[facility]  Outpatient Physical Therapy Daily Treatment Note      Name: Pham Licea  Clinic Number: [id number]  Visit Date: 7/19/2024    Therapy Diagnosis:   Encounter Diagnosis   Name Primary?    Impaired functional mobility, balance, gait, and endurance Yes       Physician: Jeremy Mendez MD  Physician Orders: PT Eval and Treat   Medical Diagnosis from Referral: M70.61 (ICD-10-CM) - Trochanteric bursitis of right hip   Evaluation Date: 7/2/2024   Authorization Period Expiration: 6/18/2025  Plan of Care Expiration: 9/30/2024 or 12v post eval  Visit # (episodes) / Visits authorized: 2/ eval (POC 2 / 12)  2nd FOTO visit 5  3rd FOTO visit 10  Progress Note Due: 8/2/2024     Time In: 145  Time Out: 230  Total Billable Time: 45 minutes     Precautions: OA  Insurance: Payor: MEDICARE / Plan: MEDICARE PART A & B / Product Type: Government /     Subjective     Reports she had an obstruction on 7/16 and went into hospital for the night. Terrible bed at the hospital. Lower pain levels wrapping around in her right hip; now more in her low back and posterior right hip.    Response to previous treatment: first f/u  Functional change: first f/u     Pain: 3/10  Location: right hip       Objective       Innominates: Supine Right short to long (in seated) = Right posterior innominate rotation    ___________________________________________________________________________________________________________________    Elicia received therapeutic exercises to develop strength, endurance, ROM, flexibility, posture, and core stabilization for 14 minutes including:    Seated piriformis stretch 3 x 30 sec, pull up  Standing hip abduction 2 x 10   Standing hip extension 2 x 10       Elicia participated in neuromuscular re-education activities to improve: Balance, Coordination, Kinesthetic, Sense, Proprioception, and Posture for 30 minutes. The following activities were  included:    MET to correct Right posterior innominate rotation: Right Hip Flexion / Left Hip Extension - 5x8sec - corrected post MET  Lumbar rotation, 10x Bilateral   Ab brace with Pelvic tilts, [id number]  Ab brace with Bent Knee Fall Out, 2x10  Bridges with glute set  x 10   Lime Supine Clamshells, 30x  Zaheer stretch, [id number]  Prone press ups 20 x       Elicia received the following manual therapy techniques: Joint mobilizations, Manual traction, Myofacial release, and Soft tissue Mobilization were applied to the:  for 0 minutes, including:       Add NEXT/FUTURE: pt has low pn tolerance; apply slow progression of exercises  Massage roller to Right glute and ITB  Standing Hip Flexion (HOLD supine against gravity)  Teach log roll  Squat at work and stairs  NOTE: Standing Right Quadratus Lumborum stretch, [id number] - too painful in lumbar region day of eval      Patient Education and HEP     She was compliant with home exercise program.    Education provided:   - home exercise program update     Written Home Exercises Provided: yes.  Exercises were reviewed and Elicia was able to demonstrate them prior to the end of the session.  Elicia demonstrated good  understanding of the education provided.     See EMR under Patient Instructions for exercises provided 7/10/2024.    Assessment     Pt demonstrates low levels of pain, decreased Range of Motion, weakness, and balance and gait deficits. Pt able to perform all therex given with minimal pn provocation. Asymmetrical innominates noted; corrected with MET.  Continue to progress as tolerated.     Pt will continue to benefit from skilled outpatient physical therapy to address the deficits listed in the problem list box on initial evaluation, provide pt/family education and to maximize pt's level of independence in the home and community environment.     Elicia Is progressing well towards her goals.   Pt prognosis is Good.     Pt's spiritual, cultural and educational needs  considered and pt agreeable to plan of care and goals.    Anticipated barriers to physical therapy: Chronicity     Goals: Goals:  Short Term GOALS: 3 weeks PROGRESS 7/19/2024  1. Patient is independent with HEP.   2. Patient demonstrates independence with core activation and postural awareness while sitting and standing.   3. Patient will reduce pn to 2/10 while performing daily activities.  4. Patient will reduce RLE radiculopathy frequency and intensity.     Long Term GOALS: 6 weeks.   1. Patient demonstrates increased l/s FB ROM to 15 cm from floor to improve tolerance to reaching activities.   2. Patient demonstrates increased core, hip and BLE strength by 1/2 grade or greater to improve tolerance to home chores.  3. Patient demonstrates independence with body mechanics while working.  4. Patient will improve FOTO function score by 25% to show improvement in condition and functional mobility.     Plan     Continue with the plan of care established per initial evaluation    Plan of care Certification: 7/2/2024 to 9/30/2024.     Outpatient Physical Therapy 2 times weekly for 6 weeks    Marisabel Bey, PT

## 2024-07-22 ENCOUNTER — CLINICAL SUPPORT (OUTPATIENT)
Dept: REHABILITATION | Facility: HOSPITAL | Age: 69
End: 2024-07-22
Payer: MEDICARE

## 2024-07-22 DIAGNOSIS — Z74.09 IMPAIRED FUNCTIONAL MOBILITY, BALANCE, GAIT, AND ENDURANCE: Primary | ICD-10-CM

## 2024-07-22 PROCEDURE — 97112 NEUROMUSCULAR REEDUCATION: CPT | Mod: PO

## 2024-07-22 PROCEDURE — 97110 THERAPEUTIC EXERCISES: CPT | Mod: PO

## 2024-07-22 NOTE — PROGRESS NOTES
[facility]  Outpatient Physical Therapy Daily Treatment Note      Name: Pham Licea  Clinic Number: [id number]  Visit Date: 7/22/2024    Therapy Diagnosis:   Encounter Diagnosis   Name Primary?    Impaired functional mobility, balance, gait, and endurance Yes       Physician: Jeremy eMndez MD  Physician Orders: PT Eval and Treat   Medical Diagnosis from Referral: M70.61 (ICD-10-CM) - Trochanteric bursitis of right hip   Evaluation Date: 7/2/2024   Authorization Period Expiration: 6/18/2025  Plan of Care Expiration: 9/30/2024 or 12v post eval  Visit # (episodes) / Visits authorized: 4 / eval (POC 3 / 12)  2nd FOTO visit 5  3rd FOTO visit 10  Progress Note Due: 8/2/2024     Time In: 1215  Time Out: 1259  Total Billable Time: 44 minutes     Precautions: OA  Insurance: Payor: MEDICARE / Plan: MEDICARE PART A & B / Product Type: Government /     Subjective     Lower pain since last visit; no pain upon arrival. Did have sharp pain in Right knee the other day, when she got up and first put weight through it; went away after 5 minutes.     Response to previous treatment: first f/u  Functional change: first f/u     Pain: 0/10  Location: right hip       Objective     7/19/2024  Innominates: Supine Right short (supine) to long (in seated) = Right posterior innominate rotation    ___________________________________________________________________________________________________________________    Elicia received therapeutic exercises to develop strength, endurance, ROM, flexibility, posture, and core stabilization for 13 minutes including:    Seated piriformis stretch 2 x 30 sec, pull up  Standing hip abduction 2 x 10, Alternate pendulum   Standing hip extension 2 x 10       Elicia participated in neuromuscular re-education activities to improve: Balance, Coordination, Kinesthetic, Sense, Proprioception, and Posture for 27 minutes. The following activities were  included:    MET to correct Right posterior innominate rotation: Right Hip Flexion / Left Hip Extension - 5x8sec - NOT PERFORMED (neg today)  Lumbar rotation, 10x Bilateral   Ab brace with Pelvic tilts, [id number]  Ab brace with Bent Knee Fall Out, 2x10  Bridges with glute set  2 x 10   Lime Supine Clamshells with ab brace, 30x  Zaheer stretch, [id number]  Prone press up, 2x10  +Prone donkey kicks, 2x10 alternate Bilateral       Elicia received the following manual therapy techniques: Joint mobilizations, Manual traction, Myofacial release, and Soft tissue Mobilization were applied to the: for 4 minutes, including:  Massage roller to Bilateral glute and Iliotibial Band, > Right        Add NEXT/FUTURE: pt has low pn tolerance; apply slow progression of exercises  Standing Hip Flexion (HOLD supine against gravity)  Teach log roll  Squat at work and stairs  NOTE: Standing Right Quadratus Lumborum stretch, [id number] - too painful in lumbar region day of eval      Patient Education and HEP     She was compliant with home exercise program.    Education provided:   - home exercise program update     Written Home Exercises Provided: no, continue prior  Exercises were reviewed and Elicia was able to demonstrate them prior to the end of the session.  Elicia demonstrated good  understanding of the education provided.     See EMR under Patient Instructions for exercises provided 7/10/2024.    Assessment     Pt demonstrates low levels of pain, decreased Range of Motion, weakness, and balance and gait deficits. Pt able to perform all therex given with minimal pn provocation. Symmetrical innominates today; no correction needed.   Continue to progress as tolerated.     Pt will continue to benefit from skilled outpatient physical therapy to address the deficits listed in the problem list box on initial evaluation, provide pt/family education and to maximize pt's level of independence in the home and community environment.     Elicia Is  progressing well towards her goals.   Pt prognosis is Good.     Pt's spiritual, cultural and educational needs considered and pt agreeable to plan of care and goals.    Anticipated barriers to physical therapy: Chronicity     Goals: Goals:  Short Term GOALS: 3 weeks PROGRESS 7/22/2024  1. Patient is independent with HEP.   2. Patient demonstrates independence with core activation and postural awareness while sitting and standing.   3. Patient will reduce pn to 2/10 while performing daily activities.  4. Patient will reduce RLE radiculopathy frequency and intensity.     Long Term GOALS: 6 weeks.   1. Patient demonstrates increased l/s FB ROM to 15 cm from floor to improve tolerance to reaching activities.   2. Patient demonstrates increased core, hip and BLE strength by 1/2 grade or greater to improve tolerance to home chores.  3. Patient demonstrates independence with body mechanics while working.  4. Patient will improve FOTO function score by 25% to show improvement in condition and functional mobility.     Plan     Continue with the plan of care established per initial evaluation    Plan of care Certification: 7/2/2024 to 9/30/2024.     Outpatient Physical Therapy 2 times weekly for 6 weeks    Marisabel Bey, PT

## 2024-07-25 ENCOUNTER — CLINICAL SUPPORT (OUTPATIENT)
Dept: REHABILITATION | Facility: HOSPITAL | Age: 69
End: 2024-07-25
Payer: MEDICARE

## 2024-07-25 DIAGNOSIS — Z74.09 IMPAIRED FUNCTIONAL MOBILITY, BALANCE, GAIT, AND ENDURANCE: Primary | ICD-10-CM

## 2024-07-25 PROCEDURE — 97112 NEUROMUSCULAR REEDUCATION: CPT | Mod: PO

## 2024-07-25 PROCEDURE — 97110 THERAPEUTIC EXERCISES: CPT | Mod: PO

## 2024-07-25 NOTE — PROGRESS NOTES
Formerly [facility]  Outpatient Physical Therapy Daily Treatment Note      Name: Pham Licea  Clinic Number: [id number]  Visit Date: 7/25/2024    Therapy Diagnosis:   Encounter Diagnosis   Name Primary?    Impaired functional mobility, balance, gait, and endurance Yes       Physician: Jeremy Mendez MD  Physician Orders: PT Eval and Treat   Medical Diagnosis from Referral: M70.61 (ICD-10-CM) - Trochanteric bursitis of right hip   Evaluation Date: 7/2/2024   Authorization Period Expiration: 6/18/2025  Plan of Care Expiration: 9/30/2024 or 12v post eval  Visit # (episodes) / Visits authorized: 5 / eval +20 (POC 4 / 12)  2nd FOTO visit 5  3rd FOTO visit 10  Progress Note Due: 8/2/2024     Time In: 230  Time Out: 314  Total Billable Time: 44 minutes     Precautions: OA  Insurance: Payor: MEDICARE / Plan: MEDICARE PART A & B / Product Type: Government /     Subjective     Reports Her left sciatica is flared up; this is new and recent, no hx. Reports the left side pn has been coming for about a week but really flared up today. Right hip is fine and no pain.     Response to previous treatment: first f/u  Functional change: first f/u     Pain: 3/10 pain on left side today (glute into posterior thigh and ankle, denies n/t)  Location: Right glute wraps into anterior thigh, ant tib and calf      Objective     7/19/2024  Innominates: Supine Right short (supine) to long (in seated) = Right posterior innominate rotation    7/25/2024: innominates test neg    ___________________________________________________________________________________________________________________    Elicia received therapeutic exercises to develop strength, endurance, ROM, flexibility, posture, and core stabilization for 13 minutes including:    Seated piriformis stretch 2 x 30 sec, pull up  Standing hip abduction 2 x 10, Alternate pendulum   Standing hip extension 2 x 10       Elicia participated in  neuromuscular re-education activities to improve: Balance, Coordination, Kinesthetic, Sense, Proprioception, and Posture for 27 minutes. The following activities were included:    MET to correct Right posterior innominate rotation: Right Hip Flexion / Left Hip Extension - 5x8sec - NOT PERFORMED (neg today)  Lumbar rotation, 10x Bilateral   Ab brace with Pelvic tilts, [id number]  Ab brace with Bent Knee Fall Out, 2x10  Bridges with glute set  2 x 10   +Sciatic nerve glide, 2x10 Bilateral   Lime Supine Clamshells with ab brace, 30x  Zaheer stretch, [id number]  Prone press up, 2x10    +Prone donkey kicks, 2x10 alternate Bilateral - HELD due to sciatic flare-up after adding these       Elicia received the following manual therapy techniques: Joint mobilizations, Manual traction, Myofacial release, and Soft tissue Mobilization were applied to the: for 0 minutes, including:  Massage roller to Bilateral glute and Iliotibial Band, > Right        Add NEXT/FUTURE: pt has low pn tolerance; apply slow progression of exercises  Standing Hip Flexion (HOLD supine against gravity)  Teach log roll  Squat at work and stairs  NOTE: Standing Right Quadratus Lumborum stretch, [id number] - too painful in lumbar region day of eval      Patient Education and HEP     She was compliant with home exercise program.    Education provided:   - home exercise program update     Written Home Exercises Provided: no, continue prior  Exercises were reviewed and Elicia was able to demonstrate them prior to the end of the session.  Elicia demonstrated good  understanding of the education provided.     See EMR under Patient Instructions for exercises provided 7/10/2024.    Assessment     Pt arrived with new pain along left posterior glute radiating into calf. Reviewed prone press up and added sciatic nerve glide to reduce sxs. Pt able to perform all therex given with minimal pn provocation. Symmetrical innominates today; no correction needed.   Continue to progress  as tolerated.     Pt will continue to benefit from skilled outpatient physical therapy to address the deficits listed in the problem list box on initial evaluation, provide pt/family education and to maximize pt's level of independence in the home and community environment.     Elicia Is progressing well towards her goals.   Pt prognosis is Good.     Pt's spiritual, cultural and educational needs considered and pt agreeable to plan of care and goals.    Anticipated barriers to physical therapy: Chronicity     Goals: Goals:  Short Term GOALS: 3 weeks PROGRESS 7/25/2024  1. Patient is independent with HEP.   2. Patient demonstrates independence with core activation and postural awareness while sitting and standing.   3. Patient will reduce pn to 2/10 while performing daily activities.  4. Patient will reduce RLE radiculopathy frequency and intensity.     Long Term GOALS: 6 weeks.   1. Patient demonstrates increased l/s FB ROM to 15 cm from floor to improve tolerance to reaching activities.   2. Patient demonstrates increased core, hip and BLE strength by 1/2 grade or greater to improve tolerance to home chores.  3. Patient demonstrates independence with body mechanics while working.  4. Patient will improve FOTO function score by 25% to show improvement in condition and functional mobility.     Plan     Continue with the plan of care established per initial evaluation    Plan of care Certification: 7/2/2024 to 9/30/2024.     Outpatient Physical Therapy 2 times weekly for 6 weeks    Marisabel Bey, PT

## 2024-07-29 ENCOUNTER — CLINICAL SUPPORT (OUTPATIENT)
Dept: REHABILITATION | Facility: HOSPITAL | Age: 69
End: 2024-07-29
Payer: MEDICARE

## 2024-07-29 DIAGNOSIS — Z74.09 IMPAIRED FUNCTIONAL MOBILITY, BALANCE, GAIT, AND ENDURANCE: Primary | ICD-10-CM

## 2024-07-29 PROCEDURE — 97530 THERAPEUTIC ACTIVITIES: CPT | Mod: KX,PO

## 2024-07-29 PROCEDURE — 97110 THERAPEUTIC EXERCISES: CPT | Mod: KX,PO

## 2024-07-29 NOTE — PROGRESS NOTES
[facility]  Outpatient Physical Therapy Daily Treatment Note      Name: Pham Licea  Clinic Number: [id number]  Visit Date: 7/29/2024    Therapy Diagnosis:   Encounter Diagnosis   Name Primary?    Impaired functional mobility, balance, gait, and endurance Yes       Physician: Jeremy Mendez MD  Physician Orders: PT Eval and Treat   Medical Diagnosis from Referral: M70.61 (ICD-10-CM) - Trochanteric bursitis of right hip   Evaluation Date: 7/2/2024   Authorization Period Expiration: 6/18/2025  Plan of Care Expiration: 9/30/2024 or 12v post eval  Visit # (episodes) / Visits authorized: 5 / eval +20 (POC 5 / 12)  2nd FOTO visit 5 - 7/29/2024  3rd FOTO visit 10  Progress Note Due: 8/2/2024     Time In: 230  Time Out: 314  Total Billable Time: 44 minutes     Precautions: OA  Insurance: Payor: MEDICARE / Plan: MEDICARE PART A & B / Product Type: Government /     Subjective     Reports she is feeling much better. Did some work around the house this weekend and did okay.    Response to previous treatment: first f/u  Functional change: first f/u     Pain: 0/10   Location: Right glute wraps into anterior thigh, ant tib and calf      Objective     7/19/2024  Innominates: Supine Right short (supine) to long (in seated) = Right posterior innominate rotation    7/25/2024: innominates test neg    ___________________________________________________________________________________________________________________    Elicia received therapeutic exercises to develop strength, endurance, ROM, flexibility, posture, and core stabilization for 13 minutes including:    Seated piriformis stretch 2 x 30 sec, pull up  Standing hip abduction 2 x 10, Alternate pendulum   Standing hip extension 2 x 10       Elicia participated in neuromuscular re-education activities to improve: Balance, Coordination, Kinesthetic, Sense, Proprioception, and Posture for 27 minutes. The following  activities were included:    MET to correct Right posterior innominate rotation: Right Hip Flexion / Left Hip Extension - 5x8sec -  Lumbar rotation, 10x Bilateral   Ab brace with Pelvic tilts, [id number]  +Ab brace with alternate marching, 20x  Ab brace with Lime Bent Knee Fall Out, 2x10  Lime Supine Clamshells with ab brace, 30x  Lime Bridges with glute set  2 x 10   Sciatic nerve glide, 2x10 Bilateral   Prone press up, 2x10  STS 10x    Lunge Hip Flexor stretch, [id number] Bilateral         Elicia received the following manual therapy techniques: Joint mobilizations, Manual traction, Myofacial release, and Soft tissue Mobilization were applied to the: for 0 minutes, including:  Massage roller to Bilateral glute and Iliotibial Band, > Right          Add NEXT/FUTURE: pt has low pn tolerance; apply slow progression of exercises  Teach log roll  Squat at work and stairs  NOTE: Standing Right Quadratus Lumborum stretch, [id number] - too painful in lumbar region day of eval  Prone donkey kicks, 2x10 alternate Bilateral - HELD due to sciatic flare-up after adding these     Patient Education and HEP     She was compliant with home exercise program.    Education provided:   - home exercise program update     Written Home Exercises Provided: no, continue prior  Exercises were reviewed and Elicia was able to demonstrate them prior to the end of the session.  Elicia demonstrated good  understanding of the education provided.     See EMR under Patient Instructions for exercises provided 7/10/2024.    Assessment     Pt arrived with new pain along left posterior glute radiating into calf. Reviewed prone press up and added sciatic nerve glide to reduce sxs. Pt able to perform all therex given with minimal pn provocation. ASymmetrical innominates today; corrected with MET. Changed lucie stretch to lunge hip flexor stretch due to bed being too soft to accommodate the stretch appropriately.  Continue to progress as tolerated.     Pt will  continue to benefit from skilled outpatient physical therapy to address the deficits listed in the problem list box on initial evaluation, provide pt/family education and to maximize pt's level of independence in the home and community environment.     Elicia Is progressing well towards her goals.   Pt prognosis is Good.     Pt's spiritual, cultural and educational needs considered and pt agreeable to plan of care and goals.    Anticipated barriers to physical therapy: Chronicity     Goals: Goals:  Short Term GOALS: 3 weeks PROGRESS 7/29/2024  1. Patient is independent with HEP.   2. Patient demonstrates independence with core activation and postural awareness while sitting and standing.   3. Patient will reduce pn to 2/10 while performing daily activities.  4. Patient will reduce RLE radiculopathy frequency and intensity.     Long Term GOALS: 6 weeks.   1. Patient demonstrates increased l/s FB ROM to 15 cm from floor to improve tolerance to reaching activities.   2. Patient demonstrates increased core, hip and BLE strength by 1/2 grade or greater to improve tolerance to home chores.  3. Patient demonstrates independence with body mechanics while working.  4. Patient will improve FOTO function score by 25% to show improvement in condition and functional mobility.     Plan     Continue with the plan of care established per initial evaluation    Plan of care Certification: 7/2/2024 to 9/30/2024.     Outpatient Physical Therapy 2 times weekly for 6 weeks    Marisabel Bey, PT

## 2024-08-01 ENCOUNTER — CLINICAL SUPPORT (OUTPATIENT)
Dept: REHABILITATION | Facility: HOSPITAL | Age: 69
End: 2024-08-01
Payer: MEDICARE

## 2024-08-01 DIAGNOSIS — Z74.09 IMPAIRED FUNCTIONAL MOBILITY, BALANCE, GAIT, AND ENDURANCE: Primary | ICD-10-CM

## 2024-08-01 PROCEDURE — 97112 NEUROMUSCULAR REEDUCATION: CPT | Mod: PO

## 2024-08-01 PROCEDURE — 97140 MANUAL THERAPY 1/> REGIONS: CPT | Mod: PO

## 2024-08-01 PROCEDURE — 97110 THERAPEUTIC EXERCISES: CPT | Mod: PO

## 2024-08-01 NOTE — PROGRESS NOTES
[facility]  Outpatient Physical Therapy Daily Treatment Note      Name: Pham Licea  Clinic Number: [id number]  Visit Date: 8/1/2024    Therapy Diagnosis:   Encounter Diagnosis   Name Primary?    Impaired functional mobility, balance, gait, and endurance Yes       Physician: Jeremy Mendez MD  Physician Orders: PT Eval and Treat   Medical Diagnosis from Referral: M70.61 (ICD-10-CM) - Trochanteric bursitis of right hip   Evaluation Date: 7/2/2024   Authorization Period Expiration: 6/18/2025  Plan of Care Expiration: 9/30/2024 or 12v post eval  Visit # (episodes) / Visits authorized: 5 / eval +20 (POC 5 / 12)  2nd FOTO visit 5 - 7/29/2024  3rd FOTO visit 10  Progress Note Due: 8/2/2024     Time In: 230  Time Out: 314  Total Billable Time: 44 minutes     Precautions: OA  Insurance: Payor: MEDICARE / Plan: MEDICARE PART A & B / Product Type: Government /     Subjective     Reports she is feeling much better. Did some work around the house this weekend and did okay.    Response to previous treatment: first f/u  Functional change: first f/u     Pain: 0/10   Location: Right glute wraps into anterior thigh, ant tib and calf      Objective     7/19/2024  Innominates: Supine Right short (supine) to long (in seated) = Right posterior innominate rotation    7/25/2024: innominates test neg    ___________________________________________________________________________________________________________________    Elicia received therapeutic exercises to develop strength, endurance, ROM, flexibility, posture, and core stabilization for 14 minutes including:    Seated piriformis stretch 2 x 30 sec, pull up  Standing hip abduction 2 x 10, Alternate pendulum   Standing hip extension 2 x 10     Stair trials: activate abs and avoid excessive hip External Rotation       Elicia participated in neuromuscular re-education activities to improve: Balance, Coordination,  Kinesthetic, Sense, Proprioception, and Posture for 22 minutes. The following activities were included:    MET to correct Right posterior innominate rotation: Right Hip Flexion / Left Hip Extension - 5x8sec - NOT PERFORMED, neg  Lumbar rotation, 5x Bilateral   Ab brace with Pelvic tilts, [id number]  Ab brace with alternate marching, 20x  Ab brace with Lime Bent Knee Fall Out, 2x10  Lime Bridges with glute set  2 x 10   Lime Supine Clamshells with ab brace, 30x  Sciatic nerve glide, 2x10 Bilateral   Tennis ball self massage, 2 minutes  Prone press up, 2x10  STS 10x    Lunge Hip Flexor stretch, [id number] Bilateral         Elicia received the following manual therapy techniques: Joint mobilizations, Manual traction, Myofacial release, and Soft tissue Mobilization were applied to the: for 8 minutes, including:  Massage roller to Bilateral glute and Iliotibial Band, > Right   Deep pressure to Right glute      Add NEXT/FUTURE: pt has low pn tolerance; apply slow progression of exercises  Teach log roll  Squat at work and stairs  NOTE: Standing Right Quadratus Lumborum stretch, [id number] - too painful in lumbar region day of eval  Prone donkey kicks, 2x10 alternate Bilateral - HELD due to sciatic flare-up after adding these     Patient Education and HEP     She was compliant with home exercise program.    Education provided:   - home exercise program update     Written Home Exercises Provided: no, continue prior  Exercises were reviewed and Elicia was able to demonstrate them prior to the end of the session.  Elicia demonstrated good  understanding of the education provided.     See EMR under Patient Instructions for exercises provided 7/10/2024.    Assessment     Pt arrived with new pain along left posterior glute radiating into calf. Reviewed prone press up and added sciatic nerve glide to reduce sxs. Pt able to perform all therex given with minimal pn provocation. Symmetrical innominates today. Utilized manual tx to decrease  myofascial restrictions. Continue to progress as tolerated.     Pt will continue to benefit from skilled outpatient physical therapy to address the deficits listed in the problem list box on initial evaluation, provide pt/family education and to maximize pt's level of independence in the home and community environment.     Elicia Is progressing well towards her goals.   Pt prognosis is Good.     Pt's spiritual, cultural and educational needs considered and pt agreeable to plan of care and goals.    Anticipated barriers to physical therapy: Chronicity     Goals: Goals:  Short Term GOALS: 3 weeks PROGRESS 8/1/2024  1. Patient is independent with HEP.   2. Patient demonstrates independence with core activation and postural awareness while sitting and standing.   3. Patient will reduce pn to 2/10 while performing daily activities.  4. Patient will reduce RLE radiculopathy frequency and intensity.     Long Term GOALS: 6 weeks.   1. Patient demonstrates increased l/s FB ROM to 15 cm from floor to improve tolerance to reaching activities.   2. Patient demonstrates increased core, hip and BLE strength by 1/2 grade or greater to improve tolerance to home chores.  3. Patient demonstrates independence with body mechanics while working.  4. Patient will improve FOTO function score by 25% to show improvement in condition and functional mobility.     Plan     Continue with the plan of care established per initial evaluation    Plan of care Certification: 7/2/2024 to 9/30/2024.     Outpatient Physical Therapy 2 times weekly for 6 weeks    Marisabel Bey, PT

## 2024-08-05 ENCOUNTER — CLINICAL SUPPORT (OUTPATIENT)
Dept: REHABILITATION | Facility: HOSPITAL | Age: 69
End: 2024-08-05
Payer: MEDICARE

## 2024-08-05 DIAGNOSIS — Z74.09 IMPAIRED FUNCTIONAL MOBILITY, BALANCE, GAIT, AND ENDURANCE: Primary | ICD-10-CM

## 2024-08-05 PROCEDURE — 97112 NEUROMUSCULAR REEDUCATION: CPT | Mod: KX,PO

## 2024-08-05 PROCEDURE — 97110 THERAPEUTIC EXERCISES: CPT | Mod: KX,PO

## 2024-08-06 DIAGNOSIS — K59.09 CHRONIC CONSTIPATION: Primary | ICD-10-CM

## 2024-08-07 ENCOUNTER — CLINICAL SUPPORT (OUTPATIENT)
Dept: REHABILITATION | Facility: HOSPITAL | Age: 69
End: 2024-08-07
Payer: MEDICARE

## 2024-08-07 DIAGNOSIS — Z74.09 IMPAIRED FUNCTIONAL MOBILITY, BALANCE, GAIT, AND ENDURANCE: Primary | ICD-10-CM

## 2024-08-07 PROCEDURE — 97110 THERAPEUTIC EXERCISES: CPT | Mod: PO,CQ

## 2024-08-07 PROCEDURE — 97112 NEUROMUSCULAR REEDUCATION: CPT | Mod: PO,CQ

## 2024-08-07 RX ORDER — LACTULOSE 10 G/15ML
SOLUTION ORAL
Qty: 948 ML | Refills: 2 | Status: SHIPPED | OUTPATIENT
Start: 2024-08-07

## 2024-08-11 NOTE — ASSESSMENT & PLAN NOTE
Admit to inpatient  IVFs  NG tube to suction  General surgery consulted, appreciate their recommendations  NPO for bowel rest  Daily labs   IV/PO pain medications and antiemetics     Epidural Block    Date/Time: 8/11/2024 10:33 AM    Performed by: Joanne De Jesus MD  Authorized by: Joanne De Jesus MD    Patient Location:  L&D  Indication: Labor Pain    Pre anesthesia checklist Patient Identified (2 criteria), Consent Verified, Necessary Block Equipment Present, Monitors applied, IV Bolus, Allergies confirmed, Block Plan Confirmed, Drugs/Solutions Labeled, IV Access functioning, Timeout Performed, Coagulation Status, Block site marked (if applicable), Resuscitation equipment available, Sedation given (if needed) and Aseptic technique  Epidural:     Patient Position:  Sitting    Prep:  Chlorhexidine Gluconate w/Alcohol    Max Sterile Barrier Technique:  Hand washing, cap/mask, sterile gloves, sterile drape and sterile dressing applied    Monitoring:  FHT's, heart rate, non-invasive blood pressure and continuous pulse oximetry    Approach:  Midline    Location:  L3-4  Injection Technique:  ANA saline  Ultrasound Used:  No  Needle and Epidural Catheter:     Needle Type:  Tuohy    Needle Gauge:  17 G    Needle Length:  3.5 in    ANA Depth:  5 cm  Catheter Type:  End hole  Catheter Size:  19 G  Catheter at Skin Depth:  10 cm  Securement:  Transparent dressing and Tape  Test Dose:  Lidocaine 1.5% with epinephrine 1-to-200,000  Test Dose Volume (mL):  3  Test Dose Result:  Negative  Assessment:   Number of Attempts: 1   Procedure Assessment: patient tolerated procedure well with no complications   Sensory Level:  T6  Start Time:  8/11/2024 10:33 AM  Stop Time: 8/11/2024 10:38 AM

## 2024-08-12 ENCOUNTER — CLINICAL SUPPORT (OUTPATIENT)
Dept: REHABILITATION | Facility: HOSPITAL | Age: 69
End: 2024-08-12
Payer: MEDICARE

## 2024-08-12 DIAGNOSIS — Z74.09 IMPAIRED FUNCTIONAL MOBILITY, BALANCE, GAIT, AND ENDURANCE: Primary | ICD-10-CM

## 2024-08-12 PROCEDURE — 97112 NEUROMUSCULAR REEDUCATION: CPT | Mod: KX,PO

## 2024-08-12 PROCEDURE — 97110 THERAPEUTIC EXERCISES: CPT | Mod: KX,PO

## 2024-08-12 NOTE — PROGRESS NOTES
Former[facility]  Outpatient Physical Therapy Daily Treatment Note      Name: Pham Licea  Clinic Number: [id number]  Visit Date: 8/12/2024    Therapy Diagnosis:   Encounter Diagnosis   Name Primary?    Impaired functional mobility, balance, gait, and endurance Yes       Physician: Jeremy Mendez MD  Physician Orders: PT Eval and Treat   Medical Diagnosis from Referral: M70.61 (ICD-10-CM) - Trochanteric bursitis of right hip   Evaluation Date: 7/2/2024   Authorization Period Expiration: 6/18/2025  Plan of Care Expiration: 9/30/2024 or 12v post eval  Visit # (episodes) / Visits authorized: 10 / eval +20 (POC 9 / 12)  2nd FOTO visit 5 - 7/29/2024  3rd FOTO visit 10  Progress Note Due: 8/2/2024     Time In: 145  Time Out: 229  Total Billable Time: 44 minutes     Precautions: OA  Insurance: Payor: MEDICARE / Plan: MEDICARE PART A & B / Product Type: Government /     Subjective     Reports soreness from massage roller of previous session. No issue after last session.    Response to previous treatment: moderate soreness   Functional change: ongoing     Pain: 2/10   Location: Right glute wraps into anterior thigh, ant tib and calf      Objective     None taken today      TREATMENT__________________________________________________________________________________________________________________    Elicia received therapeutic exercises to develop strength, endurance, ROM, flexibility, posture, and core stabilization for 14 minutes including:    Lumbar extension with sacral support, 10-20x  +Steamboats, 2x10, cues to avoid excessive hip External Rotation   Retro walks, 35#, 10x  Green bands Oblique press, 20x Bilateral    Lunge Hip Flexor stretch, [id number] Bilateral   Seated piriformis stretch 2 x 30 sec, pull up    Stair trials: activate abs and avoid excessive hip External Rotation - NOT PERFORMED        Elicia participated in neuromuscular re-education activities to  improve: Balance, Coordination, Kinesthetic, Sense, Proprioception, and Posture for 30 minutes. The following activities were included:    MET to correct Right posterior innominate rotation: Right Hip Flexion / Left Hip Extension - 5x8sec - NOT PERFORMED, neg  Lumbar rotation, 5x Bilateral   Ab brace with Pelvic tilts, [id number]  Ab brace with alternate marching, 20x ( performed as kickouts today)   Ab brace with Lime Bent Knee Fall Out, 2x10  Lime Bridges with glute set  2 x 10   Lime Supine Clamshells with ab brace, 30x  Sciatic nerve glide, 2x10 Bilateral Not performed today   Prone press up, 2x10  STS 10x      Elicia received the following manual therapy techniques: Joint mobilizations, Manual traction, Myofacial release, and Soft tissue Mobilization were applied to the: for 0 minutes, including:  Massage roller to Bilateral glute and Iliotibial Band, > Right   Deep pressure to Right glute      Add NEXT/FUTURE: pt has low pn tolerance; apply slow progression of exercises  Teach log roll  Squat at work and stairs  NOTE: Standing Right Quadratus Lumborum stretch, [id number] - too painful in lumbar region day of eval  Prone donkey kicks, 2x10 alternate Bilateral - HELD due to sciatic flare-up after adding these     Patient Education and HEP     She was compliant with home exercise program.    Education provided:   - home exercise program update     Written Home Exercises Provided: no, continue prior  Exercises were reviewed and Elicia was able to demonstrate them prior to the end of the session.  Elicia demonstrated good  understanding of the education provided.     See EMR under Patient Instructions for exercises provided 7/10/2024.    Assessment     Pt demonstrates core and hip weakness, decreased hip and lumbar Range of Motion and posture and gait deficits. Pt able to perform all therex given with minimal pn provocation. Challenged appropriate with standing steamboats; Right hip soreness resolved with piriformis stretch  after. No manual tx was performed. Continue to progress as tolerated.     Pt will continue to benefit from skilled outpatient physical therapy to address the deficits listed in the problem list box on initial evaluation, provide pt/family education and to maximize pt's level of independence in the home and community environment.     Elicia Is progressing well towards her goals.   Pt prognosis is Good.     Pt's spiritual, cultural and educational needs considered and pt agreeable to plan of care and goals.    Anticipated barriers to physical therapy: Chronicity     Goals: Goals:  Short Term GOALS: 3 weeks PROGRESS 8/12/2024  1. Patient is independent with HEP.   2. Patient demonstrates independence with core activation and postural awareness while sitting and standing.   3. Patient will reduce pn to 2/10 while performing daily activities.  4. Patient will reduce RLE radiculopathy frequency and intensity.     Long Term GOALS: 6 weeks.   1. Patient demonstrates increased l/s FB ROM to 15 cm from floor to improve tolerance to reaching activities.   2. Patient demonstrates increased core, hip and BLE strength by 1/2 grade or greater to improve tolerance to home chores.  3. Patient demonstrates independence with body mechanics while working.  4. Patient will improve FOTO function score by 25% to show improvement in condition and functional mobility.     Plan     Continue with the plan of care established per initial evaluation    Plan of care Certification: 7/2/2024 to 9/30/2024.     Outpatient Physical Therapy 2 times weekly for 6 weeks    Marisabel Bey, PT

## 2024-08-16 ENCOUNTER — TELEPHONE (OUTPATIENT)
Dept: ENDOSCOPY | Facility: HOSPITAL | Age: 69
End: 2024-08-16
Payer: MEDICARE

## 2024-08-19 ENCOUNTER — CLINICAL SUPPORT (OUTPATIENT)
Dept: REHABILITATION | Facility: HOSPITAL | Age: 69
End: 2024-08-19
Payer: MEDICARE

## 2024-08-19 DIAGNOSIS — Z74.09 IMPAIRED FUNCTIONAL MOBILITY, BALANCE, GAIT, AND ENDURANCE: Primary | ICD-10-CM

## 2024-08-19 PROCEDURE — 97110 THERAPEUTIC EXERCISES: CPT | Mod: KX,PO

## 2024-08-19 PROCEDURE — 97112 NEUROMUSCULAR REEDUCATION: CPT | Mod: KX,PO

## 2024-08-19 NOTE — PROGRESS NOTES
[facility]  Outpatient Physical Therapy Daily Treatment Note      Name: Pham Licea  Clinic Number: [id number]  Visit Date: 8/19/2024    Therapy Diagnosis:   Encounter Diagnosis   Name Primary?    Impaired functional mobility, balance, gait, and endurance Yes       Physician: Jeremy Mendez MD  Physician Orders: PT Eval and Treat   Medical Diagnosis from Referral: M70.61 (ICD-10-CM) - Trochanteric bursitis of right hip   Evaluation Date: 7/2/2024   Authorization Period Expiration: 6/18/2025  Plan of Care Expiration: 9/30/2024 or 12v post eval  Visit # (episodes) / Visits authorized: 10 / eval +20 (POC 10 / 12)  2nd FOTO visit 5 - 7/29/2024  3rd FOTO visit 10  Progress Note Due: 8/2/2024     Time In: 145  Time Out: 229  Total Billable Time: 44 minutes     Precautions: OA  Insurance: Payor: MEDICARE / Plan: MEDICARE PART A & B / Product Type: Government /     Subjective     Doing pretty good today. Learning what stretches and exercises help relieve pain after chores.    Response to previous treatment: moderate soreness   Functional change: ongoing     Pain: 2/10   Location: Right glute wraps into anterior thigh, ant tib and calf      Objective     None taken today      TREATMENT__________________________________________________________________________________________________________________    Elicia received therapeutic exercises to develop strength, endurance, ROM, flexibility, posture, and core stabilization for 14 minutes including:    Steamboats, 2x10, cues to avoid excessive hip External Rotation (Add to HEP)  Seated piriformis stretch 2 x 30 sec, pull up  Retro walks, 35#, 10x  Lumbar extension with sacral support, 10-20x  Green bands Oblique press, 20x Bilateral    Lunge Hip Flexor stretch, [id number] Bilateral       Stair trials: activate abs and avoid excessive hip External Rotation - NOT PERFORMED        Elicia participated in neuromuscular  re-education activities to improve: Balance, Coordination, Kinesthetic, Sense, Proprioception, and Posture for 30 minutes. The following activities were included:    MET to correct Right posterior innominate rotation: Right Hip Flexion / Left Hip Extension - 5x8sec - NOT PERFORMED, neg  Lumbar rotation, 5x Bilateral   Ab brace with Pelvic tilts, [id number]  Ab brace with alternate marching, 20x   Ab brace with Lime Bent Knee Fall Out, 2x10  Lime Bridges with glute set  2 x 10   Lime Supine Clamshells with ab brace, 30x  Sciatic nerve glide, 2x10 Bilateral Not performed today   Prone press up, 2x10  STS 10x      Elicia received the following manual therapy techniques: Joint mobilizations, Manual traction, Myofacial release, and Soft tissue Mobilization were applied to the: for 0 minutes, including:  Massage roller to Bilateral glute and Iliotibial Band, > Right   Deep pressure to Right glute      Add NEXT/FUTURE: pt has low pn tolerance; apply slow progression of exercises  Teach log roll  Squat at work and stairs  NOTE: Standing Right Quadratus Lumborum stretch, [id number] - too painful in lumbar region day of eval  Prone donkey kicks, 2x10 alternate Bilateral - HELD due to sciatic flare-up after adding these     Patient Education and HEP     She was compliant with home exercise program.    Education provided:   - home exercise program update     Written Home Exercises Provided: no, continue prior  Exercises were reviewed and Elicia was able to demonstrate them prior to the end of the session.  Elicia demonstrated good  understanding of the education provided.     See EMR under Patient Instructions for exercises provided 7/10/2024.    Assessment     Pt demonstrates core and hip weakness, decreased hip and lumbar Range of Motion and posture and gait deficits. Pt able to perform all therex given with minimal pn provocation. Reviewed exercises without difficulty but appropriate challenge. Continue to progress as tolerated.      Pt will continue to benefit from skilled outpatient physical therapy to address the deficits listed in the problem list box on initial evaluation, provide pt/family education and to maximize pt's level of independence in the home and community environment.     Elicia Is progressing well towards her goals.   Pt prognosis is Good.     Pt's spiritual, cultural and educational needs considered and pt agreeable to plan of care and goals.    Anticipated barriers to physical therapy: Chronicity     Goals: Goals:  Short Term GOALS: 3 weeks PROGRESS 8/19/2024  1. Patient is independent with HEP.   2. Patient demonstrates independence with core activation and postural awareness while sitting and standing.   3. Patient will reduce pn to 2/10 while performing daily activities.  4. Patient will reduce RLE radiculopathy frequency and intensity.     Long Term GOALS: 6 weeks.   1. Patient demonstrates increased l/s FB ROM to 15 cm from floor to improve tolerance to reaching activities.   2. Patient demonstrates increased core, hip and BLE strength by 1/2 grade or greater to improve tolerance to home chores.  3. Patient demonstrates independence with body mechanics while working.  4. Patient will improve FOTO function score by 25% to show improvement in condition and functional mobility.     Plan     Continue with the plan of care established per initial evaluation    Plan of care Certification: 7/2/2024 to 9/30/2024.     Outpatient Physical Therapy 2 times weekly for 6 weeks    Marisabel Bey, PT

## 2024-08-20 ENCOUNTER — PATIENT MESSAGE (OUTPATIENT)
Dept: GASTROENTEROLOGY | Facility: CLINIC | Age: 69
End: 2024-08-20
Payer: MEDICARE

## 2024-08-21 ENCOUNTER — CLINICAL SUPPORT (OUTPATIENT)
Dept: REHABILITATION | Facility: HOSPITAL | Age: 69
End: 2024-08-21
Payer: MEDICARE

## 2024-08-21 DIAGNOSIS — Z74.09 IMPAIRED FUNCTIONAL MOBILITY, BALANCE, GAIT, AND ENDURANCE: Primary | ICD-10-CM

## 2024-08-21 PROCEDURE — 97530 THERAPEUTIC ACTIVITIES: CPT | Mod: PO

## 2024-08-21 PROCEDURE — 97112 NEUROMUSCULAR REEDUCATION: CPT | Mod: PO

## 2024-08-21 NOTE — PROGRESS NOTES
[facility]  Outpatient Physical Therapy Daily Treatment Note & Outpatient Discharge Summary      Name: Pham Licea  Clinic Number: [id number]  Visit Date: 8/21/2024    Therapy Diagnosis:   Encounter Diagnosis   Name Primary?    Impaired functional mobility, balance, gait, and endurance Yes       Physician: Jeremy Mendez MD  Physician Orders: PT Eval and Treat   Medical Diagnosis from Referral: M70.61 (ICD-10-CM) - Trochanteric bursitis of right hip   Evaluation Date: 7/2/2024   Authorization Period Expiration: 6/18/2025  Plan of Care Expiration: 9/30/2024 or 12v post eval  Visit # (episodes) / Visits authorized: 10 / eval +20 (POC 11 / 12)  2nd FOTO visit 5 - 7/29/2024  3rd FOTO visit 10  Progress Note Due: 8/2/2024     Time In: 1045  Time Out: 1129  Total Billable Time: 44 minutes     Precautions: OA  Insurance: Payor: MEDICARE / Plan: MEDICARE PART A & B / Product Type: Government /     Subjective     Doing pretty good today. Would like today to be her last day; feels she has learned enough to control her pain at this time.    Response to previous treatment: moderate soreness   Functional change: ongoing     Pain: 1/10   Location: Right glute wraps into anterior thigh, ant tib and calf      Objective     Therapeutic Activity: updated measurements, 8 minutes    Lumbar AROM: ROM-   Response to repeated movements   Flexion 31 cm from floor - stretch -> 6 cm   Extension (15 norm) 20 degrees - none   Right side bending  (20 norm) 15 degrees->20 deg   Left side bending  (20 norm) 15 degrees->20 deg   Rotation Observation Mild Restriction Bilateral -> no restriction      Sensation: NT     L/E MMT: 5/5 Left Right   Hip Flexion 3+/5->4 3+/5->4+   Hip Extension 5/5. 5/5.   Hip Abduction 4/5. 4/5.   Hip Adduction 5/5. 5/5.   Hip IR 5/5. 5/5.   Hip ER 3+/5->5 3+/5->5   Knee Flexion 5/5. 5/5.   Knee Extension 3+/5-> 3+/5->5   Ankle DF 5/5. 5/5.   Ankle INV  5/5. 5/5.   Ankle EV 5/5. 5/5.      Hip & LE Range of Motion:   Restricted Grading Left  Severe restriction hip flexion and Internal Rotation->WNL  Right  Severe restriction hip flexion and Internal Rotation / Mild restriction with hip abduction->WNL        Flexibility Left Right   Hamstrings 90/90 (-20 norm) -20 degrees -30 degrees->-20     See FOTO score in Media section of EPIC       TREATMENT__________________________________________________________________________________________________________________    Elicia received therapeutic exercises to develop strength, endurance, ROM, flexibility, posture, and core stabilization for 0 minutes including:    Steamboats, 2x10, cues to avoid excessive hip External Rotation (Add to HEP)  Seated piriformis stretch 2 x 30 sec, pull up  Retro walks, 35#, 10x  Lumbar extension with sacral support, 10-20x  Green bands Oblique press, 20x Bilateral    Lunge Hip Flexor stretch, [id number] Bilateral       Stair trials: activate abs and avoid excessive hip External Rotation - NOT PERFORMED        Elicia participated in neuromuscular re-education activities to improve: Balance, Coordination, Kinesthetic, Sense, Proprioception, and Posture for 30 minutes. The following activities were included:    MET to correct Right posterior innominate rotation: Right Hip Flexion / Left Hip Extension - 5x8sec - NOT PERFORMED, neg  Lumbar rotation, 5x Bilateral   Ab brace with Pelvic tilts, [id number]  Ab brace with alternate marching, 20x   Ab brace with Lime Bent Knee Fall Out, 2x10  Lime Bridges with glute set  2 x 10   Lime Supine Clamshells with ab brace, 30x  Sciatic nerve glide, 2x10 Bilateral    Prone press up, 2x10  STS 10x      Elicia received the following manual therapy techniques: Joint mobilizations, Manual traction, Myofacial release, and Soft tissue Mobilization were applied to the: for 0 minutes, including:  Massage roller to Bilateral glute and Iliotibial Band, > Right   Deep pressure to  Right glute      Add NEXT/FUTURE: pt has low pn tolerance; apply slow progression of exercises  Teach log roll  Squat at work and stairs  NOTE: Standing Right Quadratus Lumborum stretch, [id number] - too painful in lumbar region day of eval  Prone donkey kicks, 2x10 alternate Bilateral - HELD due to sciatic flare-up after adding these     Patient Education and HEP     She was compliant with home exercise program.    Education provided:   - home exercise program update     Written Home Exercises Provided: no, continue prior  Exercises were reviewed and Elicia was able to demonstrate them prior to the end of the session.  Elicia demonstrated good  understanding of the education provided.     See EMR under Patient Instructions for exercises provided 7/10/2024.    Assessment     Pt pain levels have dropped significantly. Noted significant gains in hip and thigh flexibility and hip and Bilateral Lower Extremity strength. dPt able to perform all therex given with minimal pn provocation. Pt has reached max rehab potential at this time and is ready for discharge and to work independently on HEP. Continue to progress as tolerated.     Pt will continue to benefit from skilled outpatient physical therapy to address the deficits listed in the problem list box on initial evaluation, provide pt/family education and to maximize pt's level of independence in the home and community environment.     Elicia Is progressing well towards her goals.   Pt prognosis is Good.     Pt's spiritual, cultural and educational needs considered and pt agreeable to plan of care and goals.    Anticipated barriers to physical therapy: Chronicity     Goals: Goals:  Short Term GOALS: 3 weeks   1. Patient is independent with HEP. MET  2. Patient demonstrates independence with core activation and postural awareness while sitting and standing. PROGRESS  3. Patient will reduce pn to 2/10 while performing daily activities. PROGRESS/VARIABLE  4. Patient will reduce  RLE radiculopathy frequency and intensity. MET     Long Term GOALS: 6 weeks.   1. Patient demonstrates increased l/s FB ROM to 15 cm from floor to improve tolerance to reaching activities. MET  2. Patient demonstrates increased core, hip and BLE strength by 1/2 grade or greater to improve tolerance to home chores. MET  3. Patient demonstrates independence with body mechanics while working. PROGRESS  4. Patient will improve FOTO function score by 25% to show improvement in condition and functional mobility. PROGRESS    Plan     D/c today    Marisabel Bey, PT

## 2024-08-21 NOTE — PLAN OF CARE
[facility]  Outpatient Physical Therapy Daily Treatment Note & Outpatient Discharge Summary      Name: Pham Licea  Clinic Number: [id number]  Visit Date: 8/21/2024    Therapy Diagnosis:   Encounter Diagnosis   Name Primary?    Impaired functional mobility, balance, gait, and endurance Yes       Physician: Jeremy Mendez MD  Physician Orders: PT Eval and Treat   Medical Diagnosis from Referral: M70.61 (ICD-10-CM) - Trochanteric bursitis of right hip   Evaluation Date: 7/2/2024   Authorization Period Expiration: 6/18/2025  Plan of Care Expiration: 9/30/2024 or 12v post eval  Visit # (episodes) / Visits authorized: 10 / eval +20 (POC 11 / 12)  2nd FOTO visit 5 - 7/29/2024  3rd FOTO visit 10  Progress Note Due: 8/2/2024     Time In: 1045  Time Out: 1129  Total Billable Time: 44 minutes     Precautions: OA  Insurance: Payor: MEDICARE / Plan: MEDICARE PART A & B / Product Type: Government /     Subjective     Doing pretty good today. Would like today to be her last day; feels she has learned enough to control her pain at this time.    Response to previous treatment: moderate soreness   Functional change: ongoing     Pain: 1/10   Location: Right glute wraps into anterior thigh, ant tib and calf      Objective     Therapeutic Activity: updated measurements, 8 minutes    Lumbar AROM: ROM-   Response to repeated movements   Flexion 31 cm from floor - stretch -> 6 cm   Extension (15 norm) 20 degrees - none   Right side bending  (20 norm) 15 degrees->20 deg   Left side bending  (20 norm) 15 degrees->20 deg   Rotation Observation Mild Restriction Bilateral -> no restriction      Sensation: NT     L/E MMT: 5/5 Left Right   Hip Flexion 3+/5->4 3+/5->4+   Hip Extension 5/5. 5/5.   Hip Abduction 4/5. 4/5.   Hip Adduction 5/5. 5/5.   Hip IR 5/5. 5/5.   Hip ER 3+/5->5 3+/5->5   Knee Flexion 5/5. 5/5.   Knee Extension 3+/5-> 3+/5->5   Ankle DF 5/5. 5/5.   Ankle INV  5/5. 5/5.   Ankle EV 5/5. 5/5.      Hip & LE Range of Motion:   Restricted Grading Left  Severe restriction hip flexion and Internal Rotation->WNL  Right  Severe restriction hip flexion and Internal Rotation / Mild restriction with hip abduction->WNL        Flexibility Left Right   Hamstrings 90/90 (-20 norm) -20 degrees -30 degrees->-20     See FOTO score in Media section of EPIC       TREATMENT__________________________________________________________________________________________________________________    Elicia received therapeutic exercises to develop strength, endurance, ROM, flexibility, posture, and core stabilization for 0 minutes including:    Steamboats, 2x10, cues to avoid excessive hip External Rotation (Add to HEP)  Seated piriformis stretch 2 x 30 sec, pull up  Retro walks, 35#, 10x  Lumbar extension with sacral support, 10-20x  Green bands Oblique press, 20x Bilateral    Lunge Hip Flexor stretch, [id number] Bilateral       Stair trials: activate abs and avoid excessive hip External Rotation - NOT PERFORMED        Elicia participated in neuromuscular re-education activities to improve: Balance, Coordination, Kinesthetic, Sense, Proprioception, and Posture for 30 minutes. The following activities were included:    MET to correct Right posterior innominate rotation: Right Hip Flexion / Left Hip Extension - 5x8sec - NOT PERFORMED, neg  Lumbar rotation, 5x Bilateral   Ab brace with Pelvic tilts, [id number]  Ab brace with alternate marching, 20x   Ab brace with Lime Bent Knee Fall Out, 2x10  Lime Bridges with glute set  2 x 10   Lime Supine Clamshells with ab brace, 30x  Sciatic nerve glide, 2x10 Bilateral    Prone press up, 2x10  STS 10x      Elicia received the following manual therapy techniques: Joint mobilizations, Manual traction, Myofacial release, and Soft tissue Mobilization were applied to the: for 0 minutes, including:  Massage roller to Bilateral glute and Iliotibial Band, > Right   Deep pressure to  Right glute      Add NEXT/FUTURE: pt has low pn tolerance; apply slow progression of exercises  Teach log roll  Squat at work and stairs  NOTE: Standing Right Quadratus Lumborum stretch, [id number] - too painful in lumbar region day of eval  Prone donkey kicks, 2x10 alternate Bilateral - HELD due to sciatic flare-up after adding these     Patient Education and HEP     She was compliant with home exercise program.    Education provided:   - home exercise program update     Written Home Exercises Provided: no, continue prior  Exercises were reviewed and Elicia was able to demonstrate them prior to the end of the session.  Elicia demonstrated good  understanding of the education provided.     See EMR under Patient Instructions for exercises provided 7/10/2024.    Assessment     Pt pain levels have dropped significantly. Noted significant gains in hip and thigh flexibility and hip and Bilateral Lower Extremity strength. dPt able to perform all therex given with minimal pn provocation. Pt has reached max rehab potential at this time and is ready for discharge and to work independently on HEP. Continue to progress as tolerated.     Pt will continue to benefit from skilled outpatient physical therapy to address the deficits listed in the problem list box on initial evaluation, provide pt/family education and to maximize pt's level of independence in the home and community environment.     Elicia Is progressing well towards her goals.   Pt prognosis is Good.     Pt's spiritual, cultural and educational needs considered and pt agreeable to plan of care and goals.    Anticipated barriers to physical therapy: Chronicity     Goals: Goals:  Short Term GOALS: 3 weeks   1. Patient is independent with HEP. MET  2. Patient demonstrates independence with core activation and postural awareness while sitting and standing. PROGRESS  3. Patient will reduce pn to 2/10 while performing daily activities. PROGRESS/VARIABLE  4. Patient will reduce  RLE radiculopathy frequency and intensity. MET     Long Term GOALS: 6 weeks.   1. Patient demonstrates increased l/s FB ROM to 15 cm from floor to improve tolerance to reaching activities. MET  2. Patient demonstrates increased core, hip and BLE strength by 1/2 grade or greater to improve tolerance to home chores. MET  3. Patient demonstrates independence with body mechanics while working. PROGRESS  4. Patient will improve FOTO function score by 25% to show improvement in condition and functional mobility. PROGRESS    Plan     D/c today    Marisabel Bey, PT

## 2024-09-09 ENCOUNTER — LAB VISIT (OUTPATIENT)
Dept: LAB | Facility: HOSPITAL | Age: 69
End: 2024-09-09
Attending: INTERNAL MEDICINE
Payer: MEDICARE

## 2024-09-09 DIAGNOSIS — E78.5 DYSLIPIDEMIA: ICD-10-CM

## 2024-09-09 DIAGNOSIS — D53.9 NUTRITIONAL ANEMIA, UNSPECIFIED: ICD-10-CM

## 2024-09-09 DIAGNOSIS — D50.8 IRON DEFICIENCY ANEMIA SECONDARY TO INADEQUATE DIETARY IRON INTAKE: ICD-10-CM

## 2024-09-09 LAB
ALBUMIN SERPL BCP-MCNC: 3.9 G/DL (ref 3.5–5.2)
ALP SERPL-CCNC: 78 U/L (ref 55–135)
ALT SERPL W/O P-5'-P-CCNC: 17 U/L (ref 10–44)
ANION GAP SERPL CALC-SCNC: 10 MMOL/L (ref 8–16)
AST SERPL-CCNC: 21 U/L (ref 10–40)
BASOPHILS # BLD AUTO: 0.08 K/UL (ref 0–0.2)
BASOPHILS NFR BLD: 1.2 % (ref 0–1.9)
BILIRUB SERPL-MCNC: 0.4 MG/DL (ref 0.1–1)
BUN SERPL-MCNC: 14 MG/DL (ref 8–23)
CALCIUM SERPL-MCNC: 9.3 MG/DL (ref 8.7–10.5)
CHLORIDE SERPL-SCNC: 108 MMOL/L (ref 95–110)
CO2 SERPL-SCNC: 23 MMOL/L (ref 23–29)
CREAT SERPL-MCNC: 0.7 MG/DL (ref 0.5–1.4)
DIFFERENTIAL METHOD BLD: NORMAL
EOSINOPHIL # BLD AUTO: 0.2 K/UL (ref 0–0.5)
EOSINOPHIL NFR BLD: 2.7 % (ref 0–8)
ERYTHROCYTE [DISTWIDTH] IN BLOOD BY AUTOMATED COUNT: 13.1 % (ref 11.5–14.5)
EST. GFR  (NO RACE VARIABLE): >60 ML/MIN/1.73 M^2
FERRITIN SERPL-MCNC: 65 NG/ML (ref 20–300)
GLUCOSE SERPL-MCNC: 112 MG/DL (ref 70–110)
HCT VFR BLD AUTO: 40.2 % (ref 37–48.5)
HGB BLD-MCNC: 13.1 G/DL (ref 12–16)
IMM GRANULOCYTES # BLD AUTO: 0.02 K/UL (ref 0–0.04)
IMM GRANULOCYTES NFR BLD AUTO: 0.3 % (ref 0–0.5)
IRON SERPL-MCNC: 96 UG/DL (ref 30–160)
LYMPHOCYTES # BLD AUTO: 2.4 K/UL (ref 1–4.8)
LYMPHOCYTES NFR BLD: 35.3 % (ref 18–48)
MCH RBC QN AUTO: 29.9 PG (ref 27–31)
MCHC RBC AUTO-ENTMCNC: 32.6 G/DL (ref 32–36)
MCV RBC AUTO: 92 FL (ref 82–98)
MONOCYTES # BLD AUTO: 0.7 K/UL (ref 0.3–1)
MONOCYTES NFR BLD: 9.8 % (ref 4–15)
NEUTROPHILS # BLD AUTO: 3.4 K/UL (ref 1.8–7.7)
NEUTROPHILS NFR BLD: 50.7 % (ref 38–73)
NRBC BLD-RTO: 0 /100 WBC
PLATELET # BLD AUTO: 259 K/UL (ref 150–450)
PMV BLD AUTO: 10.4 FL (ref 9.2–12.9)
POTASSIUM SERPL-SCNC: 3.8 MMOL/L (ref 3.5–5.1)
PROT SERPL-MCNC: 6.5 G/DL (ref 6–8.4)
RBC # BLD AUTO: 4.38 M/UL (ref 4–5.4)
SATURATED IRON: 23 % (ref 20–50)
SODIUM SERPL-SCNC: 141 MMOL/L (ref 136–145)
TOTAL IRON BINDING CAPACITY: 410 UG/DL (ref 250–450)
TRANSFERRIN SERPL-MCNC: 277 MG/DL (ref 200–375)
VIT B12 SERPL-MCNC: 306 PG/ML (ref 210–950)
WBC # BLD AUTO: 6.65 K/UL (ref 3.9–12.7)

## 2024-09-09 PROCEDURE — 82607 VITAMIN B-12: CPT | Performed by: INTERNAL MEDICINE

## 2024-09-09 PROCEDURE — 85025 COMPLETE CBC W/AUTO DIFF WBC: CPT | Performed by: INTERNAL MEDICINE

## 2024-09-09 PROCEDURE — 36415 COLL VENOUS BLD VENIPUNCTURE: CPT | Performed by: INTERNAL MEDICINE

## 2024-09-09 PROCEDURE — 82728 ASSAY OF FERRITIN: CPT | Performed by: INTERNAL MEDICINE

## 2024-09-09 PROCEDURE — 83540 ASSAY OF IRON: CPT | Performed by: INTERNAL MEDICINE

## 2024-09-09 PROCEDURE — 80053 COMPREHEN METABOLIC PANEL: CPT | Performed by: INTERNAL MEDICINE

## 2024-09-13 ENCOUNTER — OFFICE VISIT (OUTPATIENT)
Dept: HEMATOLOGY/ONCOLOGY | Facility: CLINIC | Age: 69
End: 2024-09-13
Payer: MEDICARE

## 2024-09-13 ENCOUNTER — PATIENT MESSAGE (OUTPATIENT)
Dept: SPINE | Facility: CLINIC | Age: 69
End: 2024-09-13
Payer: MEDICARE

## 2024-09-13 DIAGNOSIS — D51.8 OTHER VITAMIN B12 DEFICIENCY ANEMIAS: ICD-10-CM

## 2024-09-13 DIAGNOSIS — D53.8 OTHER SPECIFIED NUTRITIONAL ANEMIAS: Primary | ICD-10-CM

## 2024-09-13 PROCEDURE — 99214 OFFICE O/P EST MOD 30 MIN: CPT | Mod: 95,,, | Performed by: INTERNAL MEDICINE

## 2024-09-16 NOTE — PROGRESS NOTES
Subjective:   The patient location is:  home  Visit type: Virtual visit with synchronous audio and video  Face-to-face or time spent with patient on the encounter:25 min  Total time spent on and for  this encounter which includes non face-to-face time preparing to see patient, review of tests, obtaining and or reviewing separately obtained records documenting clinical information in the electronic or other health records, independently interpreting results which is not separately reported ,and communicating results to the patient/family/caregiver and in care coordination and treatment planning/communicating with pharmacy for prescriptions/addressing social needs/arranging follow-up and or referrals :25 min    Each patient I provide medical services by telemedicine is:  (1) informed of the relationship between the physician and patient and the respective role of any other health care provider with respect to management of the patient; and (2) notified that he or she may decline to receive medical services by telemedicine and may withdraw from such care at any time.  This is a video visit therefore some elements of the physical exam such as vital signs, heart sounds are breath sounds are not included and may be included if found in recent clinic notes of other providers assessing same patient. Any symptoms or signs that were visualized were stated by the patient may be included in this note.      Patient ID: Pham Licea is a 69 y.o. female.    Chief Complaint:      sept 2020 had gall bladder surgery, thern she had a bowel obstruction, no surgery then, had 2 feet of bowels removed after a second bowel obstruction  Esophageal ulcers and dudenal ulcers- with bleeding in 2017  Recd blood in [facility] this weekend, pt went to ED because she was shaky and week and couldn't think was found to be anemic in ed   pt readmitted 3/22 had repeat bowel obstruction   Seen at emergency room May 25, 2022 with CT scan of abdomen  which shows partial obstruction patient having significant abdominal pain.  Discomfort belching similar to her prior obstructive symptoms  Past Medical History:   Diagnosis Date    Anemia     Fatty liver 2015    Former smoker 2009    Osteoarthritis 2010    Pulmonary nodules 2019    Repeat CT in 6 mo    Ulcer of abdomen wall 2016     Past Surgical History:   Procedure Laterality Date    APPENDECTOMY  1993     SECTION  , , 1983    x3    CHOLECYSTECTOMY          COLONOSCOPY  ~    [facility]: normal findings per patient report    COLONOSCOPY N/A 2020    Procedure: COLONOSCOPY;  Surgeon: Misael Holm MD;  Location: [facility] ENDO;  Service: General;  Laterality: N/A;    COLONOSCOPY, WITH RETROGRADE BALLOON ENTEROSCOPY, SINGLE OR DOUBLE BALLOON N/A 2023    Procedure: COLONOSCOPY, WITH RETROGRADE BALLOON ENTEROSCOPY, DOUBLE BALLOON;  Surgeon: Collin Mares MD;  Location: [facility] ENDO ([address] [place] [address]);  Service: Endoscopy;  Laterality: N/A;  inst via portal-MS-sutab per pt request-tb-new inst portal    DIAGNOSTIC LAPAROSCOPY N/A 2021    Procedure: LAPAROSCOPY, DIAGNOSTIC;  Surgeon: Misael Holm MD;  Location: [facility] OR;  Service: General;  Laterality: N/A;    EPIDURAL STEROID INJECTION INTO LUMBAR SPINE N/A 10/12/2018    Procedure: Injection-steroid-epidural-lumbar;  Surgeon: Kal Johnson MD;  Location: [facility] OR;  Service: Pain Management;  Laterality: N/A;  L5-S1    EPIDURAL STEROID INJECTION INTO LUMBAR SPINE N/A 2019    Procedure: Injection-steroid-epidural-lumbar L5-S1;  Surgeon: Kal Johnson MD;  Location: [facility] OR;  Service: Pain Management;  Laterality: N/A;    ESOPHAGOGASTRODUODENOSCOPY N/A 2020    Procedure: ESOPHAGOGASTRODUODENOSCOPY (EGD);  Surgeon: Misael Holm MD;  Location: [facility] ENDO;  Service: General;  Laterality: N/A;    ESOPHAGOGASTRODUODENOSCOPY N/A 2024    Procedure: EGD (ESOPHAGOGASTRODUODENOSCOPY);  Surgeon: Jas Gleason MD;   Location: [facility] ENDO;  Service: Endoscopy;  Laterality: N/A;    FUSION, SPINE, MINIMALLY INVASIVE, USING COMPUTER-ASSISTED NAVIGATION N/A 12/22/2022    Procedure: ENTEROSCOPY, DOUBLE BALLOON, ANTEGRADE;  Surgeon: Collin Mares MD;  Location: [facility] ENDO (2ND FLR);  Service: Endoscopy;  Laterality: N/A;  11/14/22-Instructions via portal-DS    *open to earlier date if available*    HYSTERECTOMY  1993    one ovary conserved    LAPAROSCOPIC CHOLECYSTECTOMY N/A 09/02/2020    Procedure: CHOLECYSTECTOMY, LAPAROSCOPIC;  Surgeon: Govind Frey MD;  Location: [facility] OR;  Service: General;  Laterality: N/A;    LAPAROSCOPIC LYSIS OF ADHESIONS N/A 05/04/2021    Procedure: LYSIS, ADHESIONS, LAPAROSCOPIC;  Surgeon: Misael Homl MD;  Location: [facility] OR;  Service: General;  Laterality: N/A;    TRANSFORAMINAL EPIDURAL INJECTION OF STEROID Right 2/6/2024    Procedure: Injection,steroid,epidural,transforaminal approach;  Surgeon: Kal Johnson MD;  Location: [facility] OR;  Service: Anesthesiology;  Laterality: Right;  L4-5 and L5-s1    UPPER GASTROINTESTINAL ENDOSCOPY  2016     Family History   Problem Relation Name Age of Onset    Ovarian cancer Mother      Heart disease Mother      Osteoporosis Mother      Arthritis Mother      Hypertension Father      Stroke Father  50    Brain Hemorrhage Father      Aneurysm Father      Osteoarthritis Sister      Arthritis Sister      Hypertension Sister      Obesity Sister      Breast cancer Neg Hx      Colon cancer Neg Hx      Colon polyps Neg Hx      Crohn's disease Neg Hx      Ulcerative colitis Neg Hx        Social History     Socioeconomic History    Marital status:     Number of children: 4    Highest education level: Some college, no degree   Occupational History    Occupation: sale specialist-    Tobacco Use    Smoking status: Former     Current packs/day: 0.00     Average packs/day: 1 pack/day for 40.0 years (40.0 ttl pk-yrs)     Types: Cigarettes     Start date: 11/2/1969      Quit date: 2009     Years since quittin.8    Smokeless tobacco: Never    Tobacco comments:     quit 2009   Substance and Sexual Activity    Alcohol use: Not Currently     Comment: Not often - one glass of wine every now and then    Drug use: Not Currently     Types: Marijuana     Comment: in 1970s    Sexual activity: Not Currently     Social Determinants of Health     Financial Resource Strain: Medium Risk (2024)    Overall Financial Resource Strain (CARDIA)     Difficulty of Paying Living Expenses: Somewhat hard   Food Insecurity: No Food Insecurity (2024)    Hunger Vital Sign     Worried About Running Out of Food in the Last Year: Never true     Ran Out of Food in the Last Year: Never true   Transportation Needs: No Transportation Needs (2024)    PRAPARE - Transportation     Lack of Transportation (Medical): No     Lack of Transportation (Non-Medical): No   Physical Activity: Inactive (2024)    Exercise Vital Sign     Days of Exercise per Week: 0 days     Minutes of Exercise per Session: 0 min   Stress: Stress Concern Present (2024)    [employer] - Occupational Stress Questionnaire     Feeling of Stress : To some extent   Housing Stability: Low Risk  (2024)    Housing Stability Vital Sign     Unable to Pay for Housing in the Last Year: No     Number of Places Lived in the Last Year: 1     Unstable Housing in the Last Year: No     Review of patient's allergies indicates:  No Known Allergies    Current Outpatient Medications:     biotin 5,000 mcg TbDL, Take 5,000 mcg by mouth Daily., Disp: , Rfl:     CONSTULOSE 10 gram/15 mL solution, TAKE 10 ML BY MOUTH  THREE TIMES DAILY, Disp: 948 mL, Rfl: 2    cyanocobalamin, vitamin B-12, (VITAMIN B-12) 1,000 mcg/mL Drop, Take 1 Dose by mouth once daily., Disp: , Rfl:     diclofenac sodium (VOLTAREN) 1 % Gel, Apply 2 g topically 2 (two) times daily as needed., Disp: , Rfl:     gabapentin (NEURONTIN) 300 MG  capsule, Take 2 capsules (600 mg total) by mouth 3 (three) times daily., Disp: 180 capsule, Rfl: 2    magnesium gluconate 27.5 mg magne- sium (500 mg) Tab, Take 250 mg by mouth once daily., Disp: , Rfl:     ondansetron (ZOFRAN-ODT) 4 MG TbDL, Take 8 mg by mouth every 6 (six) hours as needed., Disp: , Rfl:     orphenadrine (NORFLEX) 100 mg tablet, Take 1 tablet (100 mg total) by mouth 2 (two) times daily as needed for Muscle spasms., Disp: 40 tablet, Rfl: 1    pantoprazole (PROTONIX) 20 MG tablet, Take 1 tablet (20 mg total) by mouth once daily., Disp: 90 tablet, Rfl: 2    POTASSIUM CHLORIDE, BULK, MISC, Take 99 mg by mouth once daily., Disp: , Rfl:     sulfamethoxazole-trimethoprim 800-160mg (BACTRIM DS) 800-160 mg Tab, Take 1 tablet by mouth 2 (two) times daily., Disp: 20 tablet, Rfl: 0    vitamin D (VITAMIN D3) 1000 units Tab, Take 3,000 Units by mouth once daily., Disp: , Rfl:   No current facility-administered medications for this visit.    Facility-Administered Medications Ordered in Other Visits:     lactated ringers infusion, , Intravenous, Continuous, VuKal MD, Last Rate: 25 mL/hr at 02/06/24 0915, New Bag at 02/06/24 0915    sodium chloride 0.9% 100 mL flush bag, , Intravenous, PRN, Mayra Bower MD, Stopped at 04/24/23 1519    sodium chloride 0.9% flush 10 mL, 10 mL, Intravenous, PRNSathish Sumathi S., MD  Review of Systems   Constitutional:  Positive for malaise/fatigue. Negative for weight loss.        Mostly good appetite lost weight with her sx   HENT:  Negative for hearing loss.    Eyes:  Negative for blurred vision and double vision.   Respiratory:  Negative for cough and shortness of breath.    Cardiovascular:  Negative for chest pain.   Gastrointestinal:  Positive for constipation. Negative for abdominal pain and diarrhea.        Takes lactulose twice a day and colace twice a day   Genitourinary:  Negative for frequency.   Musculoskeletal:  Negative for back pain, myalgias and neck  pain.        Leg cramps and shaky   Skin:  Negative for rash.   Neurological:  Negative for dizziness, weakness and headaches.   Endo/Heme/Allergies:  Does not bruise/bleed easily.   Psychiatric/Behavioral:  The patient is not nervous/anxious.    recent bowel obstruction 3/22 with repeat abdominal pain discomfort emergency room visit last night the outpatient surgical appointment  Physical Exam    Wt Readings from Last 3 Encounters:   07/18/24 88.7 kg (195 lb 8 oz)   07/16/24 89.8 kg (197 lb 15.6 oz)   07/16/24 89.8 kg (198 lb)     Temp Readings from Last 3 Encounters:   07/17/24 98 °F (36.7 °C)   03/26/24 98.5 °F (36.9 °C) (Oral)   02/06/24 97.7 °F (36.5 °C) (Skin)     BP Readings from Last 3 Encounters:   07/18/24 126/84   07/17/24 (!) 144/67   07/16/24 130/72     Pulse Readings from Last 3 Encounters:   07/18/24 78   07/17/24 78   07/16/24 67     Lab Results   Component Value Date    WBC 6.65 09/09/2024    HGB 13.1 09/09/2024    HCT 40.2 09/09/2024    MCV 92 09/09/2024     09/09/2024       BMP  Lab Results   Component Value Date     09/09/2024    K 3.8 09/09/2024     09/09/2024    CO2 23 09/09/2024    BUN 14 09/09/2024    CREATININE 0.7 09/09/2024    CALCIUM 9.3 09/09/2024    ANIONGAP 10 09/09/2024    ESTGFRAFRICA >60.0 06/27/2022    EGFRNONAA >60.0 06/27/2022     Lab Results   Component Value Date    IRON 96 09/09/2024    TIBC 410 09/09/2024    FERRITIN 65 09/09/2024       Impression:  CT scan abdomen May 25, 2022     1. Multiple dilated loops of small bowel are noted that are fluid-filled left upper quadrant with the suggestion of bowel wall thickening near the level of the umbilicus in the midline and extending into the left dilated loops of bowel.  This raises the concern for vascular injury and or bowel infarction/inflammation possibly associated with obstruction.  Surgical consultation is suggested.  Post-contrast oral and intravenous imaging may be of use for confirmation given that this  exam is severely hindered.  2. A 2nd region of concern is noted within the right hemipelvis where a dilated loop of small bowel is noted without obvious bowel wall thickening.  This region is nonspecific and could relate to colon, small bowel, ileus, or obstruction.  Further evaluation and clinical correlation is necessary     Patient Active Problem List   Diagnosis    Primary osteoarthritis involving multiple joints    Obesity (BMI 30.0-34.9)    Gastroesophageal reflux disease    Right knee pain    Chronic midline low back pain    Acute tear medial meniscus, right, sequela    Primary osteoarthritis of right knee    Lumbar radiculitis    Trigger middle finger of right hand    Nausea & vomiting    Abdominal pain    Encounter for postoperative care    Intestinal obstruction    Hypokalemia    Constipation    History of cholecystectomy    Right upper quadrant pain    History of hysterectomy    Rectal bleeding    Hemorrhoids    Mitral valve prolapse    Coronary artery calcification    Anemia    SAMPSON (dyspnea on exertion), noted 2010    History of smoking 25-50 pack years, 46 py, quit 2009    NSAID long-term use, started 2017, Aleve 2-4 tabs daily for a year    Bandemia    Family history of premature CAD    Cardiovascular event risk, ASCVD 10-year risk 4.8%, 2019, 6.7% in 2021    Abdominal obesity    Dyslipidemia, baseline LDL-C 112, HDL-C 49    Iron deficiency anemia secondary to inadequate dietary iron intake    Severe anemia    S/p small bowel obstruction    Chronic bilateral low back pain without sciatica    Atypical chest pain, onset 2021    Chronic fatigue    Excessive daytime sleepiness    Enteritis        Assessment and Plan   NATHAN: pt had partail bowel removal from opbstructions with recurrneces Bowel obs again 3/22 repeat sx and adhesiolysis, Goes to [facility] for evaluation of [facility] bowel.  Will keep patient on Q 4 months the iron checks   recvd infusinal iron with excellent response    May take liquid  iron  Orders will be placed for [place]   when neededfor isiah again see me in 6 weeks with cbc,c mp iron ferin   Caring for her special needs neice trying to get benefits for her has been very trying for patient  Slight B12 deficiency also continue to monitor most recent test J 9/2022 acceptable  Patient to continue follow-up of her DJD with PCP    Advance Care Planning     Date: 04/20/2023    Power of   I initiated the process of advance care planning today due to virtual nature of visit documents will be made to to upload into our system                      Answers submitted by the patient for this visit:  Review of Systems Questionnaire (Submitted on 9/13/2024)  appetite change : No  unexpected weight change: No  mouth sores: No  visual disturbance: No  adenopathy: No

## 2024-10-21 ENCOUNTER — OFFICE VISIT (OUTPATIENT)
Dept: ORTHOPEDICS | Facility: CLINIC | Age: 69
End: 2024-10-21
Payer: MEDICARE

## 2024-10-21 VITALS — BODY MASS INDEX: 33.38 KG/M2 | HEIGHT: 64 IN | WEIGHT: 195.56 LBS

## 2024-10-21 DIAGNOSIS — M15.2 DEGENERATIVE ARTHRITIS OF PROXIMAL INTERPHALANGEAL JOINT OF LITTLE FINGER OF RIGHT HAND: ICD-10-CM

## 2024-10-21 DIAGNOSIS — M18.12 ARTHRITIS OF CARPOMETACARPAL (CMC) JOINT OF LEFT THUMB: Primary | ICD-10-CM

## 2024-10-21 PROCEDURE — 20600 DRAIN/INJ JOINT/BURSA W/O US: CPT | Mod: PBBFAC,PO,LT | Performed by: ORTHOPAEDIC SURGERY

## 2024-10-21 PROCEDURE — 99999 PR PBB SHADOW E&M-EST. PATIENT-LVL III: CPT | Mod: PBBFAC,,, | Performed by: ORTHOPAEDIC SURGERY

## 2024-10-21 PROCEDURE — 99213 OFFICE O/P EST LOW 20 MIN: CPT | Mod: S$PBB,25,, | Performed by: ORTHOPAEDIC SURGERY

## 2024-10-21 PROCEDURE — 20600 DRAIN/INJ JOINT/BURSA W/O US: CPT | Mod: PBBFAC,PO,RT | Performed by: ORTHOPAEDIC SURGERY

## 2024-10-21 PROCEDURE — 99999PBSHW PR PBB SHADOW TECHNICAL ONLY FILED TO HB: Mod: PBBFAC,,,

## 2024-10-21 PROCEDURE — 99213 OFFICE O/P EST LOW 20 MIN: CPT | Mod: PBBFAC,PO,25 | Performed by: ORTHOPAEDIC SURGERY

## 2024-10-21 RX ORDER — MELOXICAM 15 MG/1
15 TABLET ORAL DAILY
Qty: 30 TABLET | Refills: 0 | Status: SHIPPED | OUTPATIENT
Start: 2024-10-21

## 2024-10-21 RX ADMIN — TRIAMCINOLONE ACETONIDE 40 MG: 40 INJECTION, SUSPENSION INTRA-ARTICULAR; INTRAMUSCULAR at 09:10

## 2024-10-27 ENCOUNTER — HOSPITAL ENCOUNTER (EMERGENCY)
Facility: HOSPITAL | Age: 69
Discharge: HOME | End: 2024-10-27
Attending: EMERGENCY MEDICINE
Payer: MEDICARE

## 2024-10-27 ENCOUNTER — PATIENT MESSAGE (OUTPATIENT)
Dept: ORTHOPEDICS | Facility: CLINIC | Age: 69
End: 2024-10-27
Payer: MEDICARE

## 2024-10-27 VITALS
HEART RATE: 73 BPM | RESPIRATION RATE: 16 BRPM | DIASTOLIC BLOOD PRESSURE: 73 MMHG | HEIGHT: 64 IN | BODY MASS INDEX: 35 KG/M2 | SYSTOLIC BLOOD PRESSURE: 157 MMHG | OXYGEN SATURATION: 100 % | WEIGHT: 205 LBS | TEMPERATURE: 98 F

## 2024-10-27 DIAGNOSIS — S52.125A CLOSED NONDISPLACED FRACTURE OF HEAD OF LEFT RADIUS, INITIAL ENCOUNTER: ICD-10-CM
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
## 2024-10-27 DIAGNOSIS — S00.81XA ABRASION OF FACE, INITIAL ENCOUNTER: Primary | ICD-10-CM

## 2024-10-27 LAB
HCV AB SERPL QL IA: NEGATIVE
HIV 1+2 AB+HIV1 P24 AG SERPL QL IA: NEGATIVE

## 2024-10-27 PROCEDURE — 86803 HEPATITIS C AB TEST: CPT | Performed by: EMERGENCY MEDICINE

## 2024-10-27 PROCEDURE — 25000003 PHARM REV CODE 250: Performed by: EMERGENCY MEDICINE

## 2024-10-27 PROCEDURE — 87389 HIV-1 AG W/HIV-1&-2 AB AG IA: CPT | Performed by: EMERGENCY MEDICINE

## 2024-10-27 PROCEDURE — 99285 EMERGENCY DEPT VISIT HI MDM: CPT | Mod: 25

## 2024-10-27 PROCEDURE — 36415 COLL VENOUS BLD VENIPUNCTURE: CPT | Performed by: EMERGENCY MEDICINE

## 2024-10-27 RX ORDER — HYDROCODONE BITARTRATE AND ACETAMINOPHEN 5; 325 MG/1; MG/1
1 TABLET ORAL EVERY 6 HOURS PRN
Qty: 12 TABLET | Refills: 0 | Status: SHIPPED | OUTPATIENT
Start: 2024-10-27

## 2024-10-27 RX ORDER — MUPIROCIN 20 MG/G
1 OINTMENT TOPICAL
Status: DISCONTINUED | OUTPATIENT
Start: 2024-10-27 | End: 2024-10-27 | Stop reason: HOSPADM

## 2024-10-27 RX ORDER — MUPIROCIN 20 MG/G
1 OINTMENT TOPICAL
Status: COMPLETED | OUTPATIENT
Start: 2024-10-27 | End: 2024-10-27

## 2024-10-27 RX ORDER — HYDROCODONE BITARTRATE AND ACETAMINOPHEN 5; 325 MG/1; MG/1
1 TABLET ORAL
Status: COMPLETED | OUTPATIENT
Start: 2024-10-27 | End: 2024-10-27

## 2024-10-27 RX ADMIN — HYDROCODONE BITARTRATE AND ACETAMINOPHEN 1 TABLET: 5; 325 TABLET ORAL at 03:10

## 2024-10-27 RX ADMIN — MUPIROCIN 1 G: 20 OINTMENT TOPICAL at 03:10

## 2024-10-28 ENCOUNTER — TELEPHONE (OUTPATIENT)
Dept: ORTHOPEDICS | Facility: CLINIC | Age: 69
End: 2024-10-28
Payer: MEDICARE

## 2024-10-28 ENCOUNTER — OFFICE VISIT (OUTPATIENT)
Dept: ORTHOPEDICS | Facility: CLINIC | Age: 69
End: 2024-10-28
Payer: MEDICARE

## 2024-10-28 ENCOUNTER — HOSPITAL ENCOUNTER (OUTPATIENT)
Dept: RADIOLOGY | Facility: HOSPITAL | Age: 69
Discharge: HOME | End: 2024-10-28
Attending: PHYSICIAN ASSISTANT
Payer: MEDICARE

## 2024-10-28 VITALS — HEIGHT: 64 IN | WEIGHT: 205 LBS | BODY MASS INDEX: 35 KG/M2

## 2024-10-28 DIAGNOSIS — S59.902A INJURY OF LEFT ELBOW, INITIAL ENCOUNTER: Primary | ICD-10-CM

## 2024-10-28 DIAGNOSIS — S69.92XA INJURY OF LEFT WRIST, INITIAL ENCOUNTER: ICD-10-CM

## 2024-10-28 DIAGNOSIS — S59.902A INJURY OF LEFT ELBOW, INITIAL ENCOUNTER: ICD-10-CM

## 2024-10-28 PROCEDURE — 73080 X-RAY EXAM OF ELBOW: CPT | Mod: 26,LT,, | Performed by: RADIOLOGY

## 2024-10-28 PROCEDURE — 73110 X-RAY EXAM OF WRIST: CPT | Mod: TC,PO,LT

## 2024-10-28 PROCEDURE — 29105 APPLICATION LONG ARM SPLINT: CPT | Mod: PBBFAC,PO | Performed by: PHYSICIAN ASSISTANT

## 2024-10-28 PROCEDURE — 73080 X-RAY EXAM OF ELBOW: CPT | Mod: TC,PO,LT

## 2024-10-28 PROCEDURE — 99999 PR PBB SHADOW E&M-EST. PATIENT-LVL IV: CPT | Mod: PBBFAC,,, | Performed by: PHYSICIAN ASSISTANT

## 2024-10-28 PROCEDURE — 99214 OFFICE O/P EST MOD 30 MIN: CPT | Mod: PBBFAC,25,PO | Performed by: PHYSICIAN ASSISTANT

## 2024-10-28 PROCEDURE — 99999PBSHW PR PBB SHADOW TECHNICAL ONLY FILED TO HB: Mod: PBBFAC,,,

## 2024-10-28 PROCEDURE — 73110 X-RAY EXAM OF WRIST: CPT | Mod: 26,LT,, | Performed by: RADIOLOGY

## 2024-10-28 RX ORDER — TRIAMCINOLONE ACETONIDE 40 MG/ML
40 INJECTION, SUSPENSION INTRA-ARTICULAR; INTRAMUSCULAR
Status: DISCONTINUED | OUTPATIENT
Start: 2024-10-21 | End: 2024-10-28 | Stop reason: HOSPADM

## 2024-10-29 ENCOUNTER — OFFICE VISIT (OUTPATIENT)
Dept: ORTHOPEDICS | Facility: CLINIC | Age: 69
End: 2024-10-29
Payer: MEDICARE

## 2024-10-29 DIAGNOSIS — S52.125A CLOSED NONDISPLACED FRACTURE OF HEAD OF LEFT RADIUS, INITIAL ENCOUNTER: Primary | ICD-10-CM

## 2024-10-29 PROCEDURE — 99999 PR PBB SHADOW E&M-EST. PATIENT-LVL II: CPT | Mod: PBBFAC,,, | Performed by: ORTHOPAEDIC SURGERY

## 2024-10-29 PROCEDURE — 99212 OFFICE O/P EST SF 10 MIN: CPT | Mod: PBBFAC,PO | Performed by: ORTHOPAEDIC SURGERY

## 2024-10-29 PROCEDURE — 99214 OFFICE O/P EST MOD 30 MIN: CPT | Mod: S$PBB,,, | Performed by: ORTHOPAEDIC SURGERY

## 2024-11-04 ENCOUNTER — TELEPHONE (OUTPATIENT)
Dept: ORTHOPEDICS | Facility: CLINIC | Age: 69
End: 2024-11-04
Payer: MEDICARE

## 2024-11-04 NOTE — TELEPHONE ENCOUNTER
----- Message from Rosaura sent at 11/4/2024  8:17 AM CST -----  Regarding: appointment  Contact: patient  Type:  Same Day Appointment Request    Caller is requesting a same day appointment.  Caller declined first available appointment listed below.      Name of Caller:  patient  When is the first available appointment?  11/11/24  Symptoms:  fell hurt knee  bruising is black  Best Call Back Number:  [phone] (home)     Additional Information:   Please call patient to advise.   Thanks!

## 2024-11-05 ENCOUNTER — HOSPITAL ENCOUNTER (OUTPATIENT)
Dept: RADIOLOGY | Facility: HOSPITAL | Age: 69
Discharge: HOME | End: 2024-11-05
Attending: FAMILY MEDICINE
Payer: MEDICARE

## 2024-11-05 ENCOUNTER — OFFICE VISIT (OUTPATIENT)
Dept: FAMILY MEDICINE | Facility: CLINIC | Age: 69
End: 2024-11-05
Payer: MEDICARE

## 2024-11-05 VITALS
OXYGEN SATURATION: 98 % | HEART RATE: 65 BPM | HEIGHT: 64 IN | RESPIRATION RATE: 15 BRPM | BODY MASS INDEX: 33.63 KG/M2 | SYSTOLIC BLOOD PRESSURE: 130 MMHG | DIASTOLIC BLOOD PRESSURE: 80 MMHG | WEIGHT: 197 LBS

## 2024-11-05 DIAGNOSIS — E66.811 OBESITY (BMI 30.0-34.9): ICD-10-CM

## 2024-11-05 DIAGNOSIS — R73.01 ELEVATED FASTING GLUCOSE: ICD-10-CM

## 2024-11-05 DIAGNOSIS — M25.522 LEFT ELBOW PAIN: ICD-10-CM

## 2024-11-05 DIAGNOSIS — Z13.1 SCREENING FOR DIABETES MELLITUS (DM): ICD-10-CM

## 2024-11-05 DIAGNOSIS — F17.211 CIGARETTE NICOTINE DEPENDENCE IN REMISSION: ICD-10-CM

## 2024-11-05 DIAGNOSIS — Z79.899 OTHER LONG TERM (CURRENT) DRUG THERAPY: ICD-10-CM

## 2024-11-05 DIAGNOSIS — Z23 ENCOUNTER FOR ADMINISTRATION OF VACCINE: ICD-10-CM

## 2024-11-05 DIAGNOSIS — R03.0 ELEVATED BP WITHOUT DIAGNOSIS OF HYPERTENSION: ICD-10-CM

## 2024-11-05 DIAGNOSIS — G60.9 HEREDITARY AND IDIOPATHIC NEUROPATHY, UNSPECIFIED: ICD-10-CM

## 2024-11-05 DIAGNOSIS — Z00.00 ROUTINE MEDICAL EXAM: ICD-10-CM

## 2024-11-05 DIAGNOSIS — M25.522 LEFT ELBOW PAIN: Primary | ICD-10-CM

## 2024-11-05 DIAGNOSIS — E68 SEQUELAE OF HYPERALIMENTATION: ICD-10-CM

## 2024-11-05 DIAGNOSIS — D22.9 NUMEROUS SKIN MOLES: ICD-10-CM

## 2024-11-05 PROCEDURE — 99214 OFFICE O/P EST MOD 30 MIN: CPT | Mod: PBBFAC,25 | Performed by: FAMILY MEDICINE

## 2024-11-05 PROCEDURE — G2211 COMPLEX E/M VISIT ADD ON: HCPCS | Mod: S$PBB,,, | Performed by: FAMILY MEDICINE

## 2024-11-05 PROCEDURE — 71271 CT THORAX LUNG CANCER SCR C-: CPT | Mod: 26,,, | Performed by: RADIOLOGY

## 2024-11-05 PROCEDURE — 99999 PR PBB SHADOW E&M-EST. PATIENT-LVL IV: CPT | Mod: PBBFAC,,, | Performed by: FAMILY MEDICINE

## 2024-11-05 PROCEDURE — 99214 OFFICE O/P EST MOD 30 MIN: CPT | Mod: S$PBB,,, | Performed by: FAMILY MEDICINE

## 2024-11-05 PROCEDURE — 73080 X-RAY EXAM OF ELBOW: CPT | Mod: 26,LT,, | Performed by: RADIOLOGY

## 2024-11-05 PROCEDURE — 73080 X-RAY EXAM OF ELBOW: CPT | Mod: TC,LT

## 2024-11-05 PROCEDURE — 71271 CT THORAX LUNG CANCER SCR C-: CPT | Mod: TC

## 2024-11-05 RX ORDER — AMOXICILLIN 500 MG/1
500 CAPSULE ORAL 3 TIMES DAILY
COMMUNITY
Start: 2024-10-30

## 2024-11-05 NOTE — PROGRESS NOTES
Patient ID: Pham Licea is a 69 y.o. female.    Chief Complaint: Follow-up (Discuss meds, and a fall last Sunday.)    History of Present Illness    CHIEF COMPLAINT:  Pham presents today for follow-up after a recent fall.    RECENT FALL AND INJURIES:  She reports falling approximately one week ago while taking out the trash, when her foot became entangled in metal yard signs. She sustained multiple injuries including a fractured radial head of the elbow (placed in a hard sling by orthopedics), a knee injury (currently wearing a brace), and a fractured cheekbone. She visited the ER on October 27th, where a CT of the face confirmed the cheekbone fracture. X-rays showed joint effusion and findings suspicious for an occult fracture of the radial head. She is scheduled to see Dr. Huggins tomorrow for follow-up regarding her knee injury.    MUSCULOSKELETAL HISTORY:  She reports ongoing back issues that impact her daily life. She has received a few injections in different spots and was previously referred to Dr. Goldberg but did not follow through with the appointment. She also has a history of torn meniscus, for which she still wears a knee brace.    NEUROLOGICAL HISTORY:  She reports having neuropathy, for which she underwent nerve testing in 2016 or 2017 performed by Dr. Evans.    DENTAL:  She has a root canal scheduled for tomorrow at 10:00 AM at [facility]. She is currently taking amoxicillin for her tooth issue.    MEDICATIONS:  Current medications include:  - Diclofenac gel  - Gabapentin 300 mg twice daily (reduced from 600 mg 3 times daily)  - Hydrocodone  - Meloxicam (Mobic) taken after lunch  - Zofran as needed  - Norflex as needed  - Protonix 20 mg in the morning  She reports no need for medication refills at this time.    PAST MEDICAL HISTORY:  She reports a history of anemia following surgery.    SOCIAL HISTORY:  She is a former smoker who quit in 2009 after smoking for approximately 40 years,  having started at age 16 or 17.    REVIEW OF SYSTEMS:  She reports experiencing fatigue.      ROS:  ROS as indicated in HPI.         Physical Exam    General: No acute distress. Well-developed. Well-nourished.  Eyes: EOMI. Sclerae anicteric.  HENT: Normocephalic. Atraumatic. Nares patent. Moist oral mucosa.  Cardiovascular: Regular rate. Regular rhythm. No murmurs. No rubs. No gallops. Normal S1, S2. Right carotid normal, no bruit. Left carotid normal, no bruit.  Respiratory: Normal respiratory effort. Clear to auscultation bilaterally. No rales. No rhonchi. No wheezing.  Musculoskeletal: No  obvious deformity.  Extremities: No lower extremity edema.  Neurological: Alert & oriented x3. No slurred speech. Normal gait.  Psychiatric: Normal mood. Normal affect. Good insight. Good judgment.  Skin: Warm. Dry. No rash.         Assessment & Plan    Evaluated recent fall injury, including facial trauma, elbow fracture, and knee contusion  Reviewed previous imaging results, including CT of face and x-rays of wrist, knee, and elbow  Considered potential for elbow fracture displacement due to continued use without proper immobilization  Ordered repeat elbow x-ray to assess current status before orthopedic follow-up  Recommend continued use of elbow sling until orthopedic evaluation  Assessed for vitamin D deficiency  Evaluated smoking history and considered lung cancer screening    POTENTIAL FRACTURE:  - Explained importance of proper immobilization for potential fractures to prevent displacement.  - Pham to resume wearing elbow sling until orthopedic evaluation.  - X-ray of left elbow ordered.    BONE HEALTH:  - Discussed prevalence of vitamin D deficiency in older adults and its impact on bone health.    RESPIRATORY HEALTH:  - Educated on differences between flu, pneumonia, RSV, and COVID-19 viruses and their respective vaccines.    CARDIOVASCULAR HEALTH:  - Explained significance of carotid artery auscultation in former  smokers.    DERMATOLOGY:  - Recommend scheduling a full body skin screening with a dermatologist due to history of sun exposure.    MEDICATIONS/SUPPLEMENTS:  - Continued gabapentin 300 mg twice daily (reduced from 300 mg 3 times daily).  - Continued meloxicam, taken after lunch.  - Continued Norflex as needed.  - Discontinued Protonix 20 mg in the morning.    LABS:  - Routine labs ordered: CBC, CMP, Hemoglobin A1C, Lipid panel, Microalbumin to creatinine ratio, Thyroid panel, Vitamin D, Vitamin B12.    LUNG CANCER SCREENING:  - Lung cancer screening CT ordered (pending insurance approval).    FLU VACCINATION:  - Return for nurse visit next week for Prevnar and flu vaccines when available.    FOLLOW UP:  - Follow up in 6 months for routine care.  - Contact office if elbow pain worsens or new symptoms develop.          1. Left elbow pain (Primary)    - X-Ray Elbow Complete Left; Future    2. Encounter for administration of vaccine    - influenza (adjuvanted) (Fluad) 45 mcg/0.5 mL IM vaccine (> or = 64 yo) 0.5 mL  - (VFC) PCV20 (Prevnar 20) IM vaccine (>/= 6 wks)    3. Elevated fasting glucose    - Hemoglobin A1C; Future    4. Screening for diabetes mellitus (DM)    - Hemoglobin A1C; Future    5. Elevated BP without diagnosis of hypertension    - Microalbumin/Creatinine Ratio, Urine; Future  - CBC Auto Differential; Future  - Comprehensive Metabolic Panel; Future  - TSH; Future    6. Obesity (BMI 30.0-34.9)    - Vitamin D; Future    7. Routine medical exam    - Microalbumin/Creatinine Ratio, Urine; Future  - Vitamin D; Future  - Vitamin B12; Future  - Lipid Panel; Future  - CBC Auto Differential; Future  - Comprehensive Metabolic Panel; Future  - Hemoglobin A1C; Future  - TSH; Future    8. Sequelae of hyperalimentation    - Vitamin D; Future    9. Other long term (current) drug therapy    - Vitamin B12; Future  - Lipid Panel; Future    10. Hereditary and idiopathic neuropathy, unspecified    - TSH; Future    11.  Numerous skin moles    - Ambulatory referral/consult to Dermatology; Future    12. Cigarette nicotine dependence in remission    - CT Chest Lung Screening Low Dose; Future       Follow up in about 6 months (around 5/5/2025), or if symptoms worsen or fail to improve.    This note was generated with the assistance of ambient listening technology. Verbal consent was obtained by the patient and accompanying visitor(s) for the recording of patient appointment to facilitate this note. I attest to having reviewed and edited the generated note for accuracy, though some syntax or spelling errors may persist. Please contact the author of this note for any clarification.

## 2024-11-07 ENCOUNTER — LAB VISIT (OUTPATIENT)
Dept: LAB | Facility: HOSPITAL | Age: 69
End: 2024-11-07
Attending: FAMILY MEDICINE
Payer: MEDICARE

## 2024-11-07 ENCOUNTER — OFFICE VISIT (OUTPATIENT)
Dept: ORTHOPEDICS | Facility: CLINIC | Age: 69
End: 2024-11-07
Payer: MEDICARE

## 2024-11-07 VITALS — BODY MASS INDEX: 33.63 KG/M2 | WEIGHT: 197 LBS | HEIGHT: 64 IN

## 2024-11-07 DIAGNOSIS — R73.01 ELEVATED FASTING GLUCOSE: ICD-10-CM

## 2024-11-07 DIAGNOSIS — Z79.899 OTHER LONG TERM (CURRENT) DRUG THERAPY: ICD-10-CM

## 2024-11-07 DIAGNOSIS — E66.811 OBESITY (BMI 30.0-34.9): ICD-10-CM

## 2024-11-07 DIAGNOSIS — E68 SEQUELAE OF HYPERALIMENTATION: ICD-10-CM

## 2024-11-07 DIAGNOSIS — G60.9 HEREDITARY AND IDIOPATHIC NEUROPATHY, UNSPECIFIED: ICD-10-CM

## 2024-11-07 DIAGNOSIS — S52.125A CLOSED NONDISPLACED FRACTURE OF HEAD OF LEFT RADIUS, INITIAL ENCOUNTER: Primary | ICD-10-CM

## 2024-11-07 DIAGNOSIS — Z13.1 SCREENING FOR DIABETES MELLITUS (DM): ICD-10-CM

## 2024-11-07 DIAGNOSIS — R03.0 ELEVATED BP WITHOUT DIAGNOSIS OF HYPERTENSION: ICD-10-CM

## 2024-11-07 DIAGNOSIS — M25.562 LEFT KNEE PAIN, UNSPECIFIED CHRONICITY: Primary | ICD-10-CM

## 2024-11-07 DIAGNOSIS — Z00.00 ROUTINE MEDICAL EXAM: ICD-10-CM

## 2024-11-07 LAB
25(OH)D3+25(OH)D2 SERPL-MCNC: 33 NG/ML (ref 30–96)
ALBUMIN SERPL BCP-MCNC: 3.8 G/DL (ref 3.5–5.2)
ALBUMIN/CREAT UR: 8.8 UG/MG (ref 0–30)
ALP SERPL-CCNC: 79 U/L (ref 40–150)
ALT SERPL W/O P-5'-P-CCNC: 16 U/L (ref 10–44)
ANION GAP SERPL CALC-SCNC: 9 MMOL/L (ref 8–16)
AST SERPL-CCNC: 16 U/L (ref 10–40)
BASOPHILS # BLD AUTO: 0.06 K/UL (ref 0–0.2)
BASOPHILS NFR BLD: 1 % (ref 0–1.9)
BILIRUB SERPL-MCNC: 0.6 MG/DL (ref 0.1–1)
BUN SERPL-MCNC: 18 MG/DL (ref 8–23)
CALCIUM SERPL-MCNC: 9.1 MG/DL (ref 8.7–10.5)
CHLORIDE SERPL-SCNC: 111 MMOL/L (ref 95–110)
CHOLEST SERPL-MCNC: 189 MG/DL (ref 120–199)
CHOLEST/HDLC SERPL: 3.3 {RATIO} (ref 2–5)
CO2 SERPL-SCNC: 22 MMOL/L (ref 23–29)
CREAT SERPL-MCNC: 0.8 MG/DL (ref 0.5–1.4)
CREAT UR-MCNC: 125 MG/DL (ref 15–325)
DIFFERENTIAL METHOD BLD: NORMAL
EOSINOPHIL # BLD AUTO: 0.2 K/UL (ref 0–0.5)
EOSINOPHIL NFR BLD: 3 % (ref 0–8)
ERYTHROCYTE [DISTWIDTH] IN BLOOD BY AUTOMATED COUNT: 12.9 % (ref 11.5–14.5)
EST. GFR  (NO RACE VARIABLE): >60 ML/MIN/1.73 M^2
ESTIMATED AVG GLUCOSE: 108 MG/DL (ref 68–131)
GLUCOSE SERPL-MCNC: 94 MG/DL (ref 70–110)
HBA1C MFR BLD: 5.4 % (ref 4–5.6)
HCT VFR BLD AUTO: 39.5 % (ref 37–48.5)
HDLC SERPL-MCNC: 58 MG/DL (ref 40–75)
HDLC SERPL: 30.7 % (ref 20–50)
HGB BLD-MCNC: 12.8 G/DL (ref 12–16)
IMM GRANULOCYTES # BLD AUTO: 0.02 K/UL (ref 0–0.04)
IMM GRANULOCYTES NFR BLD AUTO: 0.3 % (ref 0–0.5)
LDLC SERPL CALC-MCNC: 119.4 MG/DL (ref 63–159)
LYMPHOCYTES # BLD AUTO: 1.7 K/UL (ref 1–4.8)
LYMPHOCYTES NFR BLD: 26.6 % (ref 18–48)
MCH RBC QN AUTO: 29.1 PG (ref 27–31)
MCHC RBC AUTO-ENTMCNC: 32.4 G/DL (ref 32–36)
MCV RBC AUTO: 90 FL (ref 82–98)
MICROALBUMIN UR DL<=1MG/L-MCNC: 11 UG/ML
MONOCYTES # BLD AUTO: 0.5 K/UL (ref 0.3–1)
MONOCYTES NFR BLD: 7.9 % (ref 4–15)
NEUTROPHILS # BLD AUTO: 3.9 K/UL (ref 1.8–7.7)
NEUTROPHILS NFR BLD: 61.2 % (ref 38–73)
NONHDLC SERPL-MCNC: 131 MG/DL
NRBC BLD-RTO: 0 /100 WBC
PLATELET # BLD AUTO: 237 K/UL (ref 150–450)
PMV BLD AUTO: 9.6 FL (ref 9.2–12.9)
POTASSIUM SERPL-SCNC: 4.2 MMOL/L (ref 3.5–5.1)
PROT SERPL-MCNC: 6.3 G/DL (ref 6–8.4)
RBC # BLD AUTO: 4.4 M/UL (ref 4–5.4)
SODIUM SERPL-SCNC: 142 MMOL/L (ref 136–145)
TRIGL SERPL-MCNC: 58 MG/DL (ref 30–150)
TSH SERPL DL<=0.005 MIU/L-ACNC: 1.11 UIU/ML (ref 0.4–4)
VIT B12 SERPL-MCNC: 309 PG/ML (ref 210–950)
WBC # BLD AUTO: 6.29 K/UL (ref 3.9–12.7)

## 2024-11-07 PROCEDURE — 80053 COMPREHEN METABOLIC PANEL: CPT | Performed by: FAMILY MEDICINE

## 2024-11-07 PROCEDURE — 85025 COMPLETE CBC W/AUTO DIFF WBC: CPT | Performed by: FAMILY MEDICINE

## 2024-11-07 PROCEDURE — 82043 UR ALBUMIN QUANTITATIVE: CPT | Performed by: FAMILY MEDICINE

## 2024-11-07 PROCEDURE — 84443 ASSAY THYROID STIM HORMONE: CPT | Performed by: FAMILY MEDICINE

## 2024-11-07 PROCEDURE — 80061 LIPID PANEL: CPT | Performed by: FAMILY MEDICINE

## 2024-11-07 PROCEDURE — 99215 OFFICE O/P EST HI 40 MIN: CPT | Mod: S$PBB,,, | Performed by: ORTHOPAEDIC SURGERY

## 2024-11-07 PROCEDURE — 82607 VITAMIN B-12: CPT | Performed by: FAMILY MEDICINE

## 2024-11-07 PROCEDURE — 36415 COLL VENOUS BLD VENIPUNCTURE: CPT | Performed by: FAMILY MEDICINE

## 2024-11-07 PROCEDURE — 83036 HEMOGLOBIN GLYCOSYLATED A1C: CPT | Performed by: FAMILY MEDICINE

## 2024-11-07 PROCEDURE — 99213 OFFICE O/P EST LOW 20 MIN: CPT | Mod: PBBFAC,PN | Performed by: ORTHOPAEDIC SURGERY

## 2024-11-07 PROCEDURE — 99999 PR PBB SHADOW E&M-EST. PATIENT-LVL III: CPT | Mod: PBBFAC,,, | Performed by: ORTHOPAEDIC SURGERY

## 2024-11-07 PROCEDURE — 82306 VITAMIN D 25 HYDROXY: CPT | Performed by: FAMILY MEDICINE

## 2024-11-07 NOTE — PROGRESS NOTES
Subjective:      Patient ID: Pham Licea is a 69 y.o. female.    Chief Complaint: Pain and Injury of the Left Knee (DOI 10/27/2024)    HPI  69-year-old female with a 10 day history of left elbow left knee discomfort.  Here primarily for her left knee today she sustained a a twitch knee injury she has had difficulty with the ambulation and range of motion since that time denies any other complaints or prior problems with the knee.  ROS      Objective:    Ortho Exam     Constitutional:   Patient is alert  and oriented in no acute distress  HEENT:  normocephalic atraumatic; PERRL EOMI  Neck:  Supple without adenopathy  Cardiovascular:  Normal rate and rhythm  Pulmonary:  Normal respiratory effort normal chest wall expansion  Abdominal:  Nonprotuberant nondistended  Musculoskeletal:  Steady mildly antalgic gait  Significant resolving ecchymosis around the knee  Diffuse medial tenderness positive Lachman's although she does have some guarding  Neurological:  No focal defect; cranial nerves 2-12 grossly intact  Psychiatric/behavioral:  Mood and behavior normal           My Radiographs Findings:    Radiographs left knee without acute osseous abnormalities  Assessment:       Encounter Diagnosis   Name Primary?    Left knee pain, unspecified chronicity Yes         Plan:       I have discussed medical condition treatment options with her at length.  Exam consistent with the ACL pathology.  I have reviewed radiographs of the elbow as well.  Continue with gentle range-of-motion exercises for her left elbow she has a brace for her knee we will get him into physical therapy program we have discussed ice elevation compressive wrapping generalized activity restrictions regarding her knee.  Follow up in 3-4 weeks for repeat radiographs of the elbow and clinical evaluation of the knee.  If symptoms fail to improve may need to consider MRI rule out other chondral meniscal pathology.        Past Medical History:   Diagnosis  Date    Anemia     Fatty liver 2015    Former smoker 2009    Osteoarthritis 2010    Pulmonary nodules 2019    Repeat CT in 6 mo    Ulcer of abdomen wall 2016     Past Surgical History:   Procedure Laterality Date    APPENDECTOMY  1993     SECTION  , , 1983    x3    CHOLECYSTECTOMY          COLONOSCOPY  ~    [facility]: normal findings per patient report    COLONOSCOPY N/A 2020    Procedure: COLONOSCOPY;  Surgeon: Misael Holm MD;  Location: [facility] ENDO;  Service: General;  Laterality: N/A;    COLONOSCOPY, WITH RETROGRADE BALLOON ENTEROSCOPY, SINGLE OR DOUBLE BALLOON N/A 2023    Procedure: COLONOSCOPY, WITH RETROGRADE BALLOON ENTEROSCOPY, DOUBLE BALLOON;  Surgeon: Collin Mares MD;  Location: [facility] ENDO ([facility]);  Service: Endoscopy;  Laterality: N/A;  inst via portal-MS-sutab per pt request-tb-new inst portal    DIAGNOSTIC LAPAROSCOPY N/A 2021    Procedure: LAPAROSCOPY, DIAGNOSTIC;  Surgeon: Misael Holm MD;  Location: [facility] OR;  Service: General;  Laterality: N/A;    EPIDURAL STEROID INJECTION INTO LUMBAR SPINE N/A 10/12/2018    Procedure: Injection-steroid-epidural-lumbar;  Surgeon: Kal Johnson MD;  Location: [facility] OR;  Service: Pain Management;  Laterality: N/A;  L5-S1    EPIDURAL STEROID INJECTION INTO LUMBAR SPINE N/A 2019    Procedure: Injection-steroid-epidural-lumbar L5-S1;  Surgeon: Kal Johnson MD;  Location: [facility] OR;  Service: Pain Management;  Laterality: N/A;    ESOPHAGOGASTRODUODENOSCOPY N/A 2020    Procedure: ESOPHAGOGASTRODUODENOSCOPY (EGD);  Surgeon: Misael Holm MD;  Location: [facility];  Service: General;  Laterality: N/A;    ESOPHAGOGASTRODUODENOSCOPY N/A 2024    Procedure: EGD (ESOPHAGOGASTRODUODENOSCOPY);  Surgeon: Jas Gleason MD;  Location: [facility];  Service: Endoscopy;  Laterality: N/A;    FUSION, SPINE, MINIMALLY INVASIVE, USING COMPUTER-ASSISTED NAVIGATION N/A 2022    Procedure:  ENTEROSCOPY, DOUBLE BALLOON, ANTEGRADE;  Surgeon: Collin Mares MD;  [facility]  Service: Endoscopy;  Laterality: N/A;  11/14/22-Instructions via portal-DS    *open to earlier date if available*    HYSTERECTOMY  1993    one ovary conserved    LAPAROSCOPIC CHOLECYSTECTOMY N/A 09/02/2020    Procedure: CHOLECYSTECTOMY, LAPAROSCOPIC;  Surgeon: Govind Frey MD;  Location: [facility] OR;  Service: General;  Laterality: N/A;    LAPAROSCOPIC LYSIS OF ADHESIONS N/A 05/04/2021    Procedure: LYSIS, ADHESIONS, LAPAROSCOPIC;  Surgeon: Misael Holm MD;  Location: [facility] OR;  Service: General;  Laterality: N/A;    TRANSFORAMINAL EPIDURAL INJECTION OF STEROID Right 2/6/2024    Procedure: Injection,steroid,epidural,transforaminal approach;  Surgeon: Kal Johnson MD;  Location: [facility] OR;  Service: Anesthesiology;  Laterality: Right;  L4-5 and L5-s1    UPPER GASTROINTESTINAL ENDOSCOPY  2016         Current Outpatient Medications:     amoxicillin (AMOXIL) 500 MG capsule, Take 500 mg by mouth 3 (three) times daily., Disp: , Rfl:     biotin 5,000 mcg TbDL, Take 5,000 mcg by mouth Daily., Disp: , Rfl:     CONSTULOSE 10 gram/15 mL solution, TAKE 10 ML BY MOUTH  THREE TIMES DAILY, Disp: 948 mL, Rfl: 2    diclofenac sodium (VOLTAREN) 1 % Gel, Apply 2 g topically 2 (two) times daily as needed., Disp: , Rfl:     gabapentin (NEURONTIN) 300 MG capsule, Take 2 capsules (600 mg total) by mouth 3 (three) times daily., Disp: 180 capsule, Rfl: 2    meloxicam (MOBIC) 15 MG tablet, Take 1 tablet (15 mg total) by mouth once daily., Disp: 30 tablet, Rfl: 0    ondansetron (ZOFRAN-ODT) 4 MG TbDL, Take 8 mg by mouth every 6 (six) hours as needed., Disp: , Rfl:     orphenadrine (NORFLEX) 100 mg tablet, Take 1 tablet (100 mg total) by mouth 2 (two) times daily as needed for Muscle spasms., Disp: 40 tablet, Rfl: 1    pantoprazole (PROTONIX) 20 MG tablet, Take 1 tablet (20 mg total) by mouth once daily., Disp: 90 tablet, Rfl: 2     HYDROcodone-acetaminophen (NORCO) 5-325 mg per tablet, Take 1 tablet by mouth every 6 (six) hours as needed for Pain. (Patient not taking: Reported on 11/7/2024), Disp: 12 tablet, Rfl: 0    Review of patient's allergies indicates:  No Known Allergies    Family History   Problem Relation Name Age of Onset    Ovarian cancer Mother      Heart disease Mother      Osteoporosis Mother      Arthritis Mother      Hypertension Father      Stroke Father  50    Brain Hemorrhage Father      Aneurysm Father      Osteoarthritis Sister      Arthritis Sister      Hypertension Sister      Obesity Sister      Breast cancer Neg Hx      Colon cancer Neg Hx      Colon polyps Neg Hx      Crohn's disease Neg Hx      Ulcerative colitis Neg Hx       Social History     Occupational History    Occupation: sale specialist-    Tobacco Use    Smoking status: Former     Current packs/day: 0.00     Average packs/day: 1 pack/day for 40.0 years (40.0 ttl pk-yrs)     Types: Cigarettes     Start date: 11/2/1969     Quit date: 11/2/2009     Years since quitting: 15.0    Smokeless tobacco: Never    Tobacco comments:     quit 2009   Substance and Sexual Activity    Alcohol use: Not Currently     Comment: Not often - one glass of wine every now and then    Drug use: Not Currently     Types: Marijuana     Comment: in 1970s    Sexual activity: Not Currently

## 2024-11-08 ENCOUNTER — PATIENT MESSAGE (OUTPATIENT)
Dept: FAMILY MEDICINE | Facility: CLINIC | Age: 69
End: 2024-11-08
Payer: MEDICARE

## 2024-11-08 RX ORDER — CYANOCOBALAMIN 1000 UG/ML
1000 INJECTION, SOLUTION INTRAMUSCULAR; SUBCUTANEOUS ONCE
Status: SHIPPED | OUTPATIENT
Start: 2024-11-08

## 2024-11-12 ENCOUNTER — CLINICAL SUPPORT (OUTPATIENT)
Dept: FAMILY MEDICINE | Facility: CLINIC | Age: 69
End: 2024-11-12
Payer: MEDICARE

## 2024-11-12 DIAGNOSIS — Z23 ENCOUNTER FOR IMMUNIZATION: Primary | ICD-10-CM

## 2024-11-12 PROCEDURE — 99999PBSHW PR PBB SHADOW TECHNICAL ONLY FILED TO HB: Mod: PBBFAC,,,

## 2024-11-12 PROCEDURE — G0009 ADMIN PNEUMOCOCCAL VACCINE: HCPCS | Mod: PBBFAC

## 2024-11-12 PROCEDURE — 90653 IIV ADJUVANT VACCINE IM: CPT | Mod: PBBFAC

## 2024-11-12 PROCEDURE — 96372 THER/PROPH/DIAG INJ SC/IM: CPT | Mod: PBBFAC

## 2024-11-12 PROCEDURE — G0008 ADMIN INFLUENZA VIRUS VAC: HCPCS | Mod: PBBFAC

## 2024-11-12 PROCEDURE — 90677 PCV20 VACCINE IM: CPT | Mod: PBBFAC

## 2024-11-12 RX ADMIN — PNEUMOCOCCAL 20-VALENT CONJUGATE VACCINE 0.5 ML
2.2; 2.2; 2.2; 2.2; 2.2; 2.2; 2.2; 2.2; 2.2; 2.2; 2.2; 2.2; 2.2; 2.2; 2.2; 2.2; 4.4; 2.2; 2.2; 2.2 INJECTION, SUSPENSION INTRAMUSCULAR at 10:11

## 2024-11-12 RX ADMIN — CYANOCOBALAMIN 1000 MCG: 1000 INJECTION INTRAMUSCULAR; SUBCUTANEOUS at 10:11

## 2024-11-12 RX ADMIN — INFLUENZA A VIRUS A/VICTORIA/4897/2022 IVR-238 (H1N1) ANTIGEN (FORMALDEHYDE INACTIVATED), INFLUENZA A VIRUS A/THAILAND/8/2022 IVR-237 (H3N2) ANTIGEN (FORMALDEHYDE INACTIVATED), INFLUENZA B VIRUS B/AUSTRIA/1359417/2021 BVR-26 ANTIGEN (FORMALDEHYDE INACTIVATED) 0.5 ML: 15; 15; 15 INJECTION, SUSPENSION INTRAMUSCULAR at 10:11

## 2024-11-12 NOTE — PROGRESS NOTES
Patient seen today for B12, HD Flu and Prevnar 20 vaccines. Administered per MAR. Pt tolerated well.

## 2024-11-18 ENCOUNTER — PATIENT MESSAGE (OUTPATIENT)
Dept: FAMILY MEDICINE | Facility: CLINIC | Age: 69
End: 2024-11-18
Payer: MEDICARE

## 2024-11-20 ENCOUNTER — OFFICE VISIT (OUTPATIENT)
Dept: FAMILY MEDICINE | Facility: CLINIC | Age: 69
End: 2024-11-20
Payer: MEDICARE

## 2024-11-20 VITALS
HEART RATE: 79 BPM | RESPIRATION RATE: 15 BRPM | OXYGEN SATURATION: 97 % | BODY MASS INDEX: 34.08 KG/M2 | SYSTOLIC BLOOD PRESSURE: 146 MMHG | HEIGHT: 64 IN | WEIGHT: 199.63 LBS | DIASTOLIC BLOOD PRESSURE: 82 MMHG

## 2024-11-20 DIAGNOSIS — L30.9 DERMATITIS: Primary | ICD-10-CM

## 2024-11-20 PROCEDURE — 99214 OFFICE O/P EST MOD 30 MIN: CPT | Mod: S$PBB,,, | Performed by: FAMILY MEDICINE

## 2024-11-20 PROCEDURE — 99999 PR PBB SHADOW E&M-EST. PATIENT-LVL IV: CPT | Mod: PBBFAC,,, | Performed by: FAMILY MEDICINE

## 2024-11-20 PROCEDURE — 99214 OFFICE O/P EST MOD 30 MIN: CPT | Mod: PBBFAC | Performed by: FAMILY MEDICINE

## 2024-11-20 PROCEDURE — G2211 COMPLEX E/M VISIT ADD ON: HCPCS | Mod: S$PBB,,, | Performed by: FAMILY MEDICINE

## 2024-11-20 RX ORDER — CETIRIZINE HYDROCHLORIDE 10 MG/1
10 TABLET ORAL DAILY
Qty: 90 TABLET | Refills: 0 | Status: SHIPPED | OUTPATIENT
Start: 2024-11-20

## 2024-11-20 RX ORDER — BETAMETHASONE DIPROPIONATE 0.5 MG/G
CREAM TOPICAL 2 TIMES DAILY
Qty: 45 G | Refills: 1 | Status: SHIPPED | OUTPATIENT
Start: 2024-11-20

## 2024-11-20 NOTE — PROGRESS NOTES
Patient ID: Pham Licea is a 69 y.o. female.    Chief Complaint: Rash (On face since the 12th since getting b12 injection, pneumonia vaccine and flu vaccine here in office)    History of Present Illness    CHIEF COMPLAINT:  Pham presents today for evaluation of a facial rash.    FACIAL RASH:  She presents with a facial rash that feels like sandpaper and is itchy. The rash is limited to her face and extends into the scalp. She reports new white spots appear in the morning, which she describes as feeling like pimples with white heads. These spots subsequently pop, leaving behind bumpy lesions. She denies experiencing the rash on other parts of her body. She has attempted self-treatment, but specific measures are not detailed.    RECENT VACCINATIONS:  She reports receiving multiple vaccinations recently. She received the COVID vaccination on November 11th. The following day, on November 12th, she received flu, pneumonia, and B12 shots.      ROS:  ROS as indicated in HPI.         Physical Exam    General: No acute distress. Well-developed.  Obese.  Eyes: EOMI. Sclerae anicteric.  HENT: Normocephalic. Atraumatic. Nares patent. Moist oral mucosa.  Cardiovascular: Regular rate. Regular rhythm. No murmurs. No rubs. No gallops. Normal S1, S2.  Respiratory: Normal respiratory effort. Clear to auscultation bilaterally. No rales. No rhonchi. No wheezing.  Musculoskeletal: No  obvious deformity.  Extremities: No lower extremity edema.  Neurological: Alert & oriented x3. No slurred speech. Normal gait.  Psychiatric: Normal mood. Normal affect. Good insight. Good judgment.  Skin: Warm. Dry. Fine sandpaper-like rash all over body including scalp.  Some Rash on neck. Large bump on forehead.         Assessment & Plan    Assessed facial rash likely related to recent COVID vaccination, considering possible contribution from other vaccines (flu, pneumonia) administered the following day  Reviewed published literature on  COVID vaccine-related rashes  Will initiate treatment with oral antihistamine and topical steroid cream, opting for betamethasone as a moderate potency option suitable for facial use  Considered stronger interventions (oral steroids) and specialist consultation (dermatology) if initial treatment proves ineffective    SKIN ERUPTION DUE TO DRUGS:  - Explained that COVID vaccines have been associated with various dermatological side effects.  - Discussed the importance of identifying which vaccine was received and reporting side effects.  - Informed about the potential for preservatives in injectable medications to cause reactions.  - Pham to clean affected areas daily to avoid over-cleansing.  - Pham to avoid popping or manipulating any pimple-like lesions that appear.  - Recommend applying a warm cloth over affected areas after applying prescribed cream.  - Started betamethasone cream for topical application to affected facial areas.  - Started Zyrtec (cetirizine) capsules, to be taken twice daily for 1 week.  - Started hydroxyzine as needed for itching.    FOLLOW-UP:  - Contact the office via portal message if symptoms are not improved by Friday.  - Follow up in 1 week if symptoms not improved, for potential e-consult to dermatology for further recommendations.         Plan:          Dermatitis  -     betamethasone dipropionate 0.05 % cream; Apply topically 2 (two) times daily.  Dispense: 45 g; Refill: 1  -     cetirizine (ZYRTEC) 10 MG tablet; Take 1 tablet (10 mg total) by mouth once daily.  Dispense: 90 tablet; Refill: 0  -     E-Consult to Dermatology        Follow up if symptoms worsen or fail to improve, within 2 days by portal.    This note was generated with the assistance of ambient listening technology. Verbal consent was obtained by the patient and accompanying visitor(s) for the recording of patient appointment to facilitate this note. I attest to having reviewed and edited the generated note for  accuracy, though some syntax or spelling errors may persist. Please contact the author of this note for any clarification.

## 2024-11-21 ENCOUNTER — HOSPITAL ENCOUNTER (OUTPATIENT)
Dept: RADIOLOGY | Facility: HOSPITAL | Age: 69
Discharge: HOME | End: 2024-11-21
Attending: ORTHOPAEDIC SURGERY
Payer: MEDICARE

## 2024-11-21 ENCOUNTER — PATIENT MESSAGE (OUTPATIENT)
Dept: ORTHOPEDICS | Facility: CLINIC | Age: 69
End: 2024-11-21

## 2024-11-21 ENCOUNTER — OFFICE VISIT (OUTPATIENT)
Dept: ORTHOPEDICS | Facility: CLINIC | Age: 69
End: 2024-11-21
Payer: MEDICARE

## 2024-11-21 VITALS — BODY MASS INDEX: 33.97 KG/M2 | HEIGHT: 64 IN | WEIGHT: 199 LBS

## 2024-11-21 DIAGNOSIS — S52.125A CLOSED NONDISPLACED FRACTURE OF HEAD OF LEFT RADIUS, INITIAL ENCOUNTER: ICD-10-CM

## 2024-11-21 DIAGNOSIS — M25.562 LEFT KNEE PAIN, UNSPECIFIED CHRONICITY: Primary | ICD-10-CM

## 2024-11-21 DIAGNOSIS — M25.562 LEFT KNEE PAIN, UNSPECIFIED CHRONICITY: ICD-10-CM

## 2024-11-21 PROCEDURE — 24650 CLTX RDL HEAD/NCK FX WO MNPJ: CPT | Mod: PBBFAC,PN | Performed by: ORTHOPAEDIC SURGERY

## 2024-11-21 PROCEDURE — 73721 MRI JNT OF LWR EXTRE W/O DYE: CPT | Mod: 26,LT,, | Performed by: RADIOLOGY

## 2024-11-21 PROCEDURE — 73721 MRI JNT OF LWR EXTRE W/O DYE: CPT | Mod: TC,LT

## 2024-11-21 PROCEDURE — 99213 OFFICE O/P EST LOW 20 MIN: CPT | Mod: PBBFAC,25,PN | Performed by: ORTHOPAEDIC SURGERY

## 2024-11-21 PROCEDURE — 73080 X-RAY EXAM OF ELBOW: CPT | Mod: TC,PN,LT

## 2024-11-21 PROCEDURE — 73080 X-RAY EXAM OF ELBOW: CPT | Mod: 26,LT,, | Performed by: RADIOLOGY

## 2024-11-21 PROCEDURE — 99999 PR PBB SHADOW E&M-EST. PATIENT-LVL III: CPT | Mod: PBBFAC,,, | Performed by: ORTHOPAEDIC SURGERY

## 2024-11-21 NOTE — PROGRESS NOTES
Subjective:      Patient ID: Pham Licea is a 69 y.o. female.    Chief Complaint: Pain of the Left Elbow and Injury and Pain of the Left Knee (DOI 10/27/2024)    HPI  The patient is in for follow up on her left radial head fracture and left knee pain.  Her pain persist in her knee with a burning and intermittent giving way episodes.  Left elbow was improving.  ROS      Objective:    Ortho Exam     Constitutional:   Patient is alert  and oriented in no acute distress  HEENT:  normocephalic atraumatic; PERRL EOMI  Neck:  Supple without adenopathy  Cardiovascular:  Normal rate and rhythm  Pulmonary:  Normal respiratory effort normal chest wall expansion  Abdominal:  Nonprotuberant nondistended  Musculoskeletal:  Patient continues to have some diffuse joint line tenderness of the left knee and a mildly antalgic gait  She has been guarding but appears to have some instability with Lachman's testing  She has improved left elbow range of motion still lacks a few degrees of terminal extension  Neurological:  No focal defect; cranial nerves 2-12 grossly intact  Psychiatric/behavioral:  Mood and behavior normal      X-Ray Elbow Complete Left  EXAMINATION:  XR ELBOW COMPLETE 3 VIEW LEFT    CLINICAL HISTORY:  Nondisplaced fracture of head of left radius, initial encounter for closed fracture    TECHNIQUE:  AP, lateral, and oblique views of the left elbow were performed.    COMPARISON:  11/05/2024.    FINDINGS:  Healing subacute nondisplaced transverse fracture of the radial head with increasing sclerosis and callus formation.  No significant joint effusion or soft tissue swelling.    Electronically signed by: Alan Montenegro  Date:    11/21/2024  Time:    08:41       My Radiographs Findings:    I have personally reviewed radiographs and concur with above findings    Assessment:       Encounter Diagnoses   Name Primary?    Left knee pain, unspecified chronicity Yes    Closed nondisplaced fracture of head of left radius,  initial encounter          Plan:       I have discussed medical condition treatment options with her at length.  We will get an MRI of the left knee.  Continue with the range-of-motion exercises of her left elbow sling immobilization only as needed follow up after MRI of the left knee follow up radiographs and range-of-motion check of the left elbow in 6 weeks.        Past Medical History:   Diagnosis Date    Anemia     Fatty liver 2015    Former smoker     Osteoarthritis 2010    Pulmonary nodules 2019    Repeat CT in 6 mo    Ulcer of abdomen wall 2016     Past Surgical History:   Procedure Laterality Date    APPENDECTOMY  1993     SECTION  , , 1983    x3    CHOLECYSTECTOMY          COLONOSCOPY  ~    [facility]: normal findings per patient report    COLONOSCOPY N/A 2020    Procedure: COLONOSCOPY;  Surgeon: Misael Holm MD;  Location: [facility];  Service: General;  Laterality: N/A;    COLONOSCOPY, WITH RETROGRADE BALLOON ENTEROSCOPY, SINGLE OR DOUBLE BALLOON N/A 2023    Procedure: COLONOSCOPY, WITH RETROGRADE BALLOON ENTEROSCOPY, DOUBLE BALLOON;  Surgeon: Collin Mares MD;  Location: [facility] ENDO ([address] [place] [address]);  Service: Endoscopy;  Laterality: N/A;  inst via portal-MS-sutab per pt request-tb-new inst portal    DIAGNOSTIC LAPAROSCOPY N/A 2021    Procedure: LAPAROSCOPY, DIAGNOSTIC;  Surgeon: Miasel Holm MD;  Location: [facility] OR;  Service: General;  Laterality: N/A;    EPIDURAL STEROID INJECTION INTO LUMBAR SPINE N/A 10/12/2018    Procedure: Injection-steroid-epidural-lumbar;  Surgeon: Kal Johnson MD;  Location: [facility] OR;  Service: Pain Management;  Laterality: N/A;  L5-S1    EPIDURAL STEROID INJECTION INTO LUMBAR SPINE N/A 2019    Procedure: Injection-steroid-epidural-lumbar L5-S1;  Surgeon: Kal Johnson MD;  Location: [facility] OR;  Service: Pain Management;  Laterality: N/A;    ESOPHAGOGASTRODUODENOSCOPY N/A 2020    Procedure:  ESOPHAGOGASTRODUODENOSCOPY (EGD);  Surgeon: Misael Holm MD;  Location: [facility] ENDO;  Service: General;  Laterality: N/A;    ESOPHAGOGASTRODUODENOSCOPY N/A 7/16/2024    Procedure: EGD (ESOPHAGOGASTRODUODENOSCOPY);  Surgeon: Jas Gleason MD;  Location: [facility] ENDO;  Service: Endoscopy;  Laterality: N/A;    FUSION, SPINE, MINIMALLY INVASIVE, USING COMPUTER-ASSISTED NAVIGATION N/A 12/22/2022    Procedure: ENTEROSCOPY, DOUBLE BALLOON, ANTEGRADE;  Surgeon: Collin Mares MD;  Location: [facility] ENDO (2ND FLR);  Service: Endoscopy;  Laterality: N/A;  11/14/22-Instructions via portal-DS    *open to earlier date if available*    HYSTERECTOMY  1993    one ovary conserved    LAPAROSCOPIC CHOLECYSTECTOMY N/A 09/02/2020    Procedure: CHOLECYSTECTOMY, LAPAROSCOPIC;  Surgeon: Govind Frey MD;  Location: [facility] OR;  Service: General;  Laterality: N/A;    LAPAROSCOPIC LYSIS OF ADHESIONS N/A 05/04/2021    Procedure: LYSIS, ADHESIONS, LAPAROSCOPIC;  Surgeon: Misael Holm MD;  Location: [facility] OR;  Service: General;  Laterality: N/A;    TRANSFORAMINAL EPIDURAL INJECTION OF STEROID Right 2/6/2024    Procedure: Injection,steroid,epidural,transforaminal approach;  Surgeon: Kal Johnson MD;  Location: [facility] OR;  Service: Anesthesiology;  Laterality: Right;  L4-5 and L5-s1    UPPER GASTROINTESTINAL ENDOSCOPY  2016         Current Outpatient Medications:     betamethasone dipropionate 0.05 % cream, Apply topically 2 (two) times daily., Disp: 45 g, Rfl: 1    cetirizine (ZYRTEC) 10 MG tablet, Take 1 tablet (10 mg total) by mouth once daily., Disp: 90 tablet, Rfl: 0    CONSTULOSE 10 gram/15 mL solution, TAKE 10 ML BY MOUTH  THREE TIMES DAILY, Disp: 948 mL, Rfl: 2    diclofenac sodium (VOLTAREN) 1 % Gel, Apply 2 g topically 2 (two) times daily as needed., Disp: , Rfl:     gabapentin (NEURONTIN) 300 MG capsule, Take 2 capsules (600 mg total) by mouth 3 (three) times daily., Disp: 180 capsule, Rfl: 2    meloxicam (MOBIC) 15  MG tablet, Take 1 tablet (15 mg total) by mouth once daily., Disp: 30 tablet, Rfl: 0    ondansetron (ZOFRAN-ODT) 4 MG TbDL, Take 8 mg by mouth every 6 (six) hours as needed., Disp: , Rfl:     orphenadrine (NORFLEX) 100 mg tablet, Take 1 tablet (100 mg total) by mouth 2 (two) times daily as needed for Muscle spasms., Disp: 40 tablet, Rfl: 1    pantoprazole (PROTONIX) 20 MG tablet, Take 1 tablet (20 mg total) by mouth once daily., Disp: 90 tablet, Rfl: 2    amoxicillin (AMOXIL) 500 MG capsule, Take 500 mg by mouth 3 (three) times daily. (Patient not taking: Reported on 11/20/2024), Disp: , Rfl:     biotin 5,000 mcg TbDL, Take 5,000 mcg by mouth Daily. (Patient not taking: Reported on 11/20/2024), Disp: , Rfl:     HYDROcodone-acetaminophen (NORCO) 5-325 mg per tablet, Take 1 tablet by mouth every 6 (six) hours as needed for Pain., Disp: 12 tablet, Rfl: 0    Review of patient's allergies indicates:  No Known Allergies    Family History   Problem Relation Name Age of Onset    Ovarian cancer Mother      Heart disease Mother      Osteoporosis Mother      Arthritis Mother      Hypertension Father      Stroke Father  50    Brain Hemorrhage Father      Aneurysm Father      Osteoarthritis Sister      Arthritis Sister      Hypertension Sister      Obesity Sister      Breast cancer Neg Hx      Colon cancer Neg Hx      Colon polyps Neg Hx      Crohn's disease Neg Hx      Ulcerative colitis Neg Hx       Social History     Occupational History    Occupation: sale specialist-    Tobacco Use    Smoking status: Former     Current packs/day: 0.00     Average packs/day: 1 pack/day for 40.0 years (40.0 ttl pk-yrs)     Types: Cigarettes     Start date: 11/2/1969     Quit date: 11/2/2009     Years since quitting: 15.0    Smokeless tobacco: Never    Tobacco comments:     quit 2009   Substance and Sexual Activity    Alcohol use: Not Currently     Comment: Not often - one glass of wine every now and then    Drug use: Not Currently     Types:  Marijuana     Comment: in 1970s    Sexual activity: Not Currently

## 2024-11-25 ENCOUNTER — CLINICAL SUPPORT (OUTPATIENT)
Dept: REHABILITATION | Facility: HOSPITAL | Age: 69
End: 2024-11-25
Attending: ORTHOPAEDIC SURGERY
Payer: MEDICARE

## 2024-11-25 DIAGNOSIS — M25.562 LEFT KNEE PAIN, UNSPECIFIED CHRONICITY: ICD-10-CM

## 2024-11-25 DIAGNOSIS — M25.662 STIFFNESS OF LEFT KNEE: ICD-10-CM

## 2024-11-25 DIAGNOSIS — M25.562 ACUTE PAIN OF LEFT KNEE: Primary | ICD-10-CM

## 2024-11-25 PROCEDURE — 97110 THERAPEUTIC EXERCISES: CPT

## 2024-11-25 PROCEDURE — 97162 PT EVAL MOD COMPLEX 30 MIN: CPT

## 2024-11-25 NOTE — PLAN OF CARE
[facility]  Physical Therapy Initial Evaluation / Plan Of Care    Name: Pham Licea  Clinic Number: [id number]    Therapy Diagnosis:   Encounter Diagnoses   Name Primary?    Left knee pain, unspecified chronicity     Acute pain of left knee Yes    Stiffness of left knee      Physician: Panchito Huggins,*    Physician Orders: PT Eval and Treat   Medical Diagnosis: M25.562 (ICD-10-CM) - Left knee pain, unspecified chronicity  Evaluation Date: 2024  Authorization period Expiration: 2024  Plan of Care Expiration: 2025  Progress Note Due: 2024  Visit #/Visits authorized:    FOTO: 1/ 3       Precautions: Standard  Date of Surgery: NA      Time In: 8:05  Time Out: 8:45  Total Appointment Time: 40 minutes  Total Billable Time: 40 minutes  SUBJECTIVE       Past Medical History:   Diagnosis Date    Anemia     Fatty liver 2015    Former smoker 2009    Osteoarthritis 2010    Pulmonary nodules 2019    Repeat CT in 6 mo    Ulcer of abdomen wall 2016     Pham Licea  has a past surgical history that includes Upper gastrointestinal endoscopy (); Colonoscopy (~); Epidural steroid injection into lumbar spine (N/A, 10/12/2018); Epidural steroid injection into lumbar spine (N/A, 2019);  section (, , ); Hysterectomy (); Appendectomy (); Laparoscopic cholecystectomy (N/A, 2020); Colonoscopy (N/A, 2020); Esophagogastroduodenoscopy (N/A, 2020); Diagnostic laparoscopy (N/A, 2021); Laparoscopic lysis of adhesions (N/A, 2021); FUSION, SPINE, MINIMALLY INVASIVE, USING COMPUTER ASSISTED NAVIGATION (N/A, 2022); colonoscopy, with retrograde balloon enteroscopy, single or double balloon (N/A, 2023); Cholecystectomy; Transforaminal epidural injection of steroid (Right, 2024); and Esophagogastroduodenoscopy (N/A, 2024).    Pham has a current medication list which includes  the following prescription(s): amoxicillin, betamethasone dipropionate, biotin, cetirizine, constulose, diclofenac sodium, gabapentin, hydrocodone-acetaminophen, meloxicam, ondansetron, orphenadrine, and pantoprazole.    Review of patient's allergies indicates:  No Known Allergies     Imaging, MRI KNEE WITHOUT CONTRAST LEFT     CLINICAL HISTORY:  Knee trauma, internal derangement suspected, xray done;Pain in left knee     TECHNIQUE:  Multiplanar MR imaging of the left knee was performed utilizing coronal T1, coronal and sagittal PD fat-suppressed, and axial and sagittal T2 FSE fat-suppressed pulse sequences.     COMPARISON:  None     FINDINGS:  Cruciate ligaments: The ACL, PCL, MCL, LCL complex, popliteus tendon, and IT band show no acute injury.     Quadriceps and patellar tendon: There is insertional quadriceps tendinopathy/tendinosis.  The quadriceps tendon and patellar tendon show no acute injury.  No definite patellar tendonitis.     Menisci: No medial meniscal tear appreciated.No lateral meniscal tear appreciated.No parameniscal cyst formation.     Cartilage: There is high-grade partial-thickness abnormal chondral signal observed within the medial aspect of the lateral patellar facet at the level of the patellar mid-pole on series 201, image 19.  There is also high-grade partial-thickness chondral loss present over the patellar eminence on series 201, image 19.  No full-thickness cartilage loss or fissuring appreciated within the knee joint.     Bones: There is edema like signal present within the left lateral patellar facet suggesting possible direct post-traumatic bone contusion.  Please correlate clinically for a history of recent trauma..  There is tricompartmental osteophyte formation in the left knee.     Musculature: The visualized musculature about the knee joint is normal in signal with no evidence of strain, intramuscular hematoma, or mass.     Miscellaneous: There is a minimal left knee joint  effusion..There is a partially ruptured Baker's cyst present with fluid and edema like signal noted along the superficial border of the medial head of the gastrocnemius muscle in the proximal left calf on series 201 images 1-14.No pes anserine bursitis.  There is subcutaneous edema like signal and T1 hyperintense superficial fascial fluid noted along the medial and lateral retinaculum compatible with soft tissue contusion/hematoma.     Impression:     1. Probable soft tissue contusion/hematoma along the anterior soft tissues of the left knee, most pronounced along the superficial border of the medial retinaculum.  2. Probable post-traumatic bone contusion involving the far lateral margin of the lateral patellar facet.  3. Partially ruptured small Baker's cyst with fluid and edema like signal observed tracking along the superficial border of the medial head of the gastrocnemius muscle.  4. High-grade partial-thickness chondral fissuring and loss within the patellar eminence and medial aspect of the lateral patellar facet at the level of the patellar midpole.        Electronically signed by: Shiv Geronimo MD  Date:                                            11/21/2024  Time:                                           15:19    EXAMINATION:  XR KNEE 3 VIEW LEFT     CLINICAL HISTORY:  pain;  Unspecified fall, initial encounter     TECHNIQUE:  AP and lateral views of the left knee.     COMPARISON:  06/22/2016.     FINDINGS:  No acute fracture or dislocation.  No significant soft tissue swelling.     The joint spaces are preserved.     No significant suprapatellar effusion.     Impression:     No acute radiographic findings of the left knee.        Electronically signed by: Alan oMntenegro  Date:                                            10/27/2024  Time:                                           14:58    Prior Therapy: none for current condition  Social History: Patient lives with family in a single story home with 0 steps  to enter   Occupation:  with a flight of stairs to enter the office  Prior Level of Function: independent with ambulation, but does occasionally use a cane for longer distances and/or uneven surfaces; independent with ADLs  Current Level of Function: difficulty with stairs, squatting, unable to sleep on the left side    Pain:  Current 0/10, worst 8/10, best 0/10   Location:  left knee  Description: Aching  Aggravating Factors: Standing, Laying, Bending, Walking, Night Time, and activity  Easing Factors: ice      Onset/SOCO: s/p fall    History of current condition - Elicia reports: On 10/27/2024 she fell in which she landed on her face and bruised her left knee, fractured her left radial head, but did not require surgery on the elbow. Patient had an MRI of the knee but has not had a follow up with ortho yet. Patient is wearing a knee brace which she states helps. She reports a tightness in the medial side of the knee and a burning on the kneecap. She reports the knee feels warm to the touch. Primary complaint is feeling of instability    Pts goals: to restore mobility    OBJECTIVE     Observation: well developed female arriving to clinic with antalgic gait, knee brace donned, no assistive device     Edema: noted edema and inflammation throughout knee    Range of Motion:   Knee Left active Right Active    0-121 0-135       Lower Extremity Strength   Left Right   Knee extension: 4/5 5/5   Knee flexion: 3/5 5/5   Hip flexion: 3-/5 5/5   Hip extension:  2/5 3/5   Hip abduction: 3-/5 4/5   Hip adduction: 3-/5 4/5   Hip IR 3/5 5/5   Hip ER 4/5 5/5   Ankle dorsiflexion: 5/5 5/5   Ankle plantarflexion: 5/5 5/5       Special Tests:   Left Right   Valgus Stress Test Negative Negative   Varus Stress test Positive    Negative      Lachman's test Negative    Negative      Posterior Lachman Negative    Negative      Aashish's Test Negative    Negative      Thessaly's Test Negative Negative   Patellar Grind Test  "Negative Negative       Function:    - SLS R: unable  - SLS L: unable   - Squat: 25% range   - Sit <--> Stand:      Joint Mobility: Patellar restricted,   Tibiofemoral unrestricted,     Palpation: moderate tenderness to palpation at medial knee joint, patellar tendon      Sensation: intact to light touch    Flexibility:    90/90 SLR = R no restriction, L minimal restriction   Ely's test: R no restriction, L minimal restriction    PT Evaluation Completed: Yes  Discussed Plan of Care with patient: Yes        Treatment     Treatment Time In: 0830  Treatment Time Out: 0845  Total Treatment time separate from Evaluation: 15 minutes    Elicia received therapeutic exercises to develop strength and ROM for 15 minutes including:  Heel slides with strap x 10  Quad sets 5"H x 10  SLR- unable to perform due to pain  Side-lying clams x 10  LAQ x 10  Standing hip flexion - unable to perform due to pain  Reviewed HEP with demonstration by patient to demonstrate understanding       Patient Education and Home Exercises     Home Exercises and Patient Education Provided    Education provided re:   - progress towards goals   - role of therapy in multi - disciplinary team, goals for therapy  - exercise in pain free ranges. If pain persists stop the exercise and inform PT at next visit  Pt educated on condition, POC, and expectations in therapy.  No spiritual or educational barriers to learning provided    Home exercises:  Pt will be provided HEP during course of treatment with progressions as appropriate. Pt was advised to perform these exercises free of pain, and to stop performing them if pain occurs.   Elicia demonstrated good  understanding of the education provided.       ASSESSMENT      Pham is a 69 y.o. female referred to outpatient physical therapy with a medical diagnosis of M25.562 (ICD-10-CM) - Left knee pain, unspecified chronicity, and presents to PT with noted laxity in LCL, no laxity or pain with any other ligamentous " stress. Negative meniscus compression tests, however palpable tenderness at medial joint line. Patient with functional flexion, although painful beyond 90 deg flexion. Able to achieve full extension, but again with pain at end range. Patient able to bear weight on left lower extremity with minimal complaint of increased pain. Unable to perform SLR in supine or standing due to onset of patellar pain. Patient to benefit from skilled therapy to prevent further decline in functional status. Patient with fair tolerance to initial treatment due to pain in knee. All questions and concerns addressed, HEP issued.  Patient demonstrates limitations as described in the problem list. Pt will benefit from physcial therapy services in order to maximize pain free mobility and/or functional use of left lower extremity. The following goals were discussed with the patient and patient is in agreement with them as to be addressed in the treatment plan.     Pt prognosis is Good.     Pt's spiritual, cultural and educational needs considered and pt agreeable to plan of care and goals as stated below:     Anticipated Barriers for therapy: pain, co-morbidities    Plan of care discussed with patient: Yes    Pt will benefit from skilled outpatient Physical Therapy to address the deficits stated above and in the chart below, provide pt/family education, and to maximize pt's level of independence.     Medical necessity is demonstrated by the following IMPAIRMENTS/PROBLEM LIST:    weakness, impaired functional mobility, gait instability, impaired balance, decreased lower extremity function, pain, decreased ROM, edema, impaired joint extensibility, and impaired muscle length      Medical Necessity is demonstrated by the following  History  Co-morbidities and personal factors that may impact the plan of care Co-morbidities/Personal Factors    0= low, 1-2 = mod, 3+ = high   moderate   Examination  Body Structures and Functions, activity limitations  and participation restrictions that may impact the plan of care Body Regions/Body Systems    Participation Restrictions    Activity limitations  Mobility/Self care/Domestic Life/Community and Social Life    1-2 = low, 3+ = mod, 4+ = high       moderate   Clinical Presentation evolving clinical presentation with changing clinical characteristics  Stable/uncomplicated = low, evolving/changing = mod,  Unstable/unpredictable = high moderate   Decision Making/ Complexity Score: moderate           GOALS     Short Term Goals: 3 weeks  Demonstrate improvement in recent symptoms to progress toward long term goals  Correct postural deviations in sitting and standing to decrease pain and promote postural awareness for injury prevention.  Demonstrate compliance with initial exercise program    Long Term Goals: 6 weeks  Perform usual household tasks with no limitation  Perform usual work duties with no limitation  Ambulate required community distances with minimal limitation  Ascend/descend flight of stairs with minimal limitation, reciprocal pattern, handrail for safety  Transfer from sit to stand with no limitation equal weight between both legs  Perform squat to 50% range of motion or better to protect spine with lifting and reaching lower surfaces  Demonstrate independence with home exercise program to maintain gains made in therapy.      PLAN      Certification Period: 11/25/2024 to 1/31/2025.    Outpatient Physical Therapy 2 times weekly for 6 weeks to include the following interventions: patient education, Gait Training, Manual Therapy, Moist Heat/ Ice, Neuromuscular Re-ed, Patient Education, Self Care, Therapeutic Activities, Therapeutic Exercise, and Ultrasound.   Pt may be seen by PTA as part of the rehabilitation team.     I certify the need for these services furnished under this plan of treatment and while under my care.    Doris Hollis, PT        Attestation:   I have seen the patient, reviewed the therapist's  plan of care, and I agree with the plan of care.   I certify the need for these services furnished under this plan of treatment and while under my care.         _______________            ________                                               _____________________  Physician/Referring Practitioner                                                            Date of Signature

## 2024-11-25 NOTE — PROGRESS NOTES
[facility]  Physical Therapy Initial Evaluation / Plan Of Care    Name: Pham Licea  Clinic Number: [id number]    Therapy Diagnosis:   Encounter Diagnoses   Name Primary?    Left knee pain, unspecified chronicity     Acute pain of left knee Yes    Stiffness of left knee      Physician: Panchito Huggins,*    Physician Orders: PT Eval and Treat   Medical Diagnosis: M25.562 (ICD-10-CM) - Left knee pain, unspecified chronicity  Evaluation Date: 2024  Authorization period Expiration: 2024  Plan of Care Expiration: 2025  Progress Note Due: 2024  Visit #/Visits authorized:    FOTO: 1/ 3       Precautions: Standard  Date of Surgery: NA      Time In: 8:05  Time Out: 8:45  Total Appointment Time: 40 minutes  Total Billable Time: 40 minutes  SUBJECTIVE       Past Medical History:   Diagnosis Date    Anemia     Fatty liver 2015    Former smoker 2009    Osteoarthritis 2010    Pulmonary nodules 2019    Repeat CT in 6 mo    Ulcer of abdomen wall 2016     Pham Licea  has a past surgical history that includes Upper gastrointestinal endoscopy (); Colonoscopy (~); Epidural steroid injection into lumbar spine (N/A, 10/12/2018); Epidural steroid injection into lumbar spine (N/A, 2019);  section (, , ); Hysterectomy (); Appendectomy (); Laparoscopic cholecystectomy (N/A, 2020); Colonoscopy (N/A, 2020); Esophagogastroduodenoscopy (N/A, 2020); Diagnostic laparoscopy (N/A, 2021); Laparoscopic lysis of adhesions (N/A, 2021); FUSION, SPINE, MINIMALLY INVASIVE, USING COMPUTER ASSISTED NAVIGATION (N/A, 2022); colonoscopy, with retrograde balloon enteroscopy, single or double balloon (N/A, 2023); Cholecystectomy; Transforaminal epidural injection of steroid (Right, 2024); and Esophagogastroduodenoscopy (N/A, 2024).    Pham has a current medication list which includes  the following prescription(s): amoxicillin, betamethasone dipropionate, biotin, cetirizine, constulose, diclofenac sodium, gabapentin, hydrocodone-acetaminophen, meloxicam, ondansetron, orphenadrine, and pantoprazole.    Review of patient's allergies indicates:  No Known Allergies     Imaging, MRI KNEE WITHOUT CONTRAST LEFT     CLINICAL HISTORY:  Knee trauma, internal derangement suspected, xray done;Pain in left knee     TECHNIQUE:  Multiplanar MR imaging of the left knee was performed utilizing coronal T1, coronal and sagittal PD fat-suppressed, and axial and sagittal T2 FSE fat-suppressed pulse sequences.     COMPARISON:  None     FINDINGS:  Cruciate ligaments: The ACL, PCL, MCL, LCL complex, popliteus tendon, and IT band show no acute injury.     Quadriceps and patellar tendon: There is insertional quadriceps tendinopathy/tendinosis.  The quadriceps tendon and patellar tendon show no acute injury.  No definite patellar tendonitis.     Menisci: No medial meniscal tear appreciated.No lateral meniscal tear appreciated.No parameniscal cyst formation.     Cartilage: There is high-grade partial-thickness abnormal chondral signal observed within the medial aspect of the lateral patellar facet at the level of the patellar mid-pole on series 201, image 19.  There is also high-grade partial-thickness chondral loss present over the patellar eminence on series 201, image 19.  No full-thickness cartilage loss or fissuring appreciated within the knee joint.     Bones: There is edema like signal present within the left lateral patellar facet suggesting possible direct post-traumatic bone contusion.  Please correlate clinically for a history of recent trauma..  There is tricompartmental osteophyte formation in the left knee.     Musculature: The visualized musculature about the knee joint is normal in signal with no evidence of strain, intramuscular hematoma, or mass.     Miscellaneous: There is a minimal left knee joint  effusion..There is a partially ruptured Baker's cyst present with fluid and edema like signal noted along the superficial border of the medial head of the gastrocnemius muscle in the proximal left calf on series 201 images 1-14.No pes anserine bursitis.  There is subcutaneous edema like signal and T1 hyperintense superficial fascial fluid noted along the medial and lateral retinaculum compatible with soft tissue contusion/hematoma.     Impression:     1. Probable soft tissue contusion/hematoma along the anterior soft tissues of the left knee, most pronounced along the superficial border of the medial retinaculum.  2. Probable post-traumatic bone contusion involving the far lateral margin of the lateral patellar facet.  3. Partially ruptured small Baker's cyst with fluid and edema like signal observed tracking along the superficial border of the medial head of the gastrocnemius muscle.  4. High-grade partial-thickness chondral fissuring and loss within the patellar eminence and medial aspect of the lateral patellar facet at the level of the patellar midpole.        Electronically signed by: Shiv Geronimo MD  Date:                                            11/21/2024  Time:                                           15:19    EXAMINATION:  XR KNEE 3 VIEW LEFT     CLINICAL HISTORY:  pain;  Unspecified fall, initial encounter     TECHNIQUE:  AP and lateral views of the left knee.     COMPARISON:  06/22/2016.     FINDINGS:  No acute fracture or dislocation.  No significant soft tissue swelling.     The joint spaces are preserved.     No significant suprapatellar effusion.     Impression:     No acute radiographic findings of the left knee.        Electronically signed by: Alan Montenegro  Date:                                            10/27/2024  Time:                                           14:58    Prior Therapy: none for current condition  Social History: Patient lives with family in a single story home with 0 steps  to enter   Occupation:  with a flight of stairs to enter the office  Prior Level of Function: independent with ambulation, but does occasionally use a cane for longer distances and/or uneven surfaces; independent with ADLs  Current Level of Function: difficulty with stairs, squatting, unable to sleep on the left side    Pain:  Current 0/10, worst 8/10, best 0/10   Location:  left knee  Description: Aching  Aggravating Factors: Standing, Laying, Bending, Walking, Night Time, and activity  Easing Factors: ice      Onset/SOCO: s/p fall    History of current condition - Elicia reports: On 10/27/2024 she fell in which she landed on her face and bruised her left knee, fractured her left radial head, but did not require surgery on the elbow. Patient had an MRI of the knee but has not had a follow up with ortho yet. Patient is wearing a knee brace which she states helps. She reports a tightness in the medial side of the knee and a burning on the kneecap. She reports the knee feels warm to the touch. Primary complaint is feeling of instability    Pts goals: to restore mobility    OBJECTIVE     Observation: well developed female arriving to clinic with antalgic gait, knee brace donned, no assistive device     Edema: noted edema and inflammation throughout knee    Range of Motion:   Knee Left active Right Active    0-121 0-135       Lower Extremity Strength   Left Right   Knee extension: 4/5 5/5   Knee flexion: 3/5 5/5   Hip flexion: 3-/5 5/5   Hip extension:  2/5 3/5   Hip abduction: 3-/5 4/5   Hip adduction: 3-/5 4/5   Hip IR 3/5 5/5   Hip ER 4/5 5/5   Ankle dorsiflexion: 5/5 5/5   Ankle plantarflexion: 5/5 5/5       Special Tests:   Left Right   Valgus Stress Test Negative Negative   Varus Stress test Positive    Negative      Lachman's test Negative    Negative      Posterior Lachman Negative    Negative      Aashish's Test Negative    Negative      Thessaly's Test Negative Negative   Patellar Grind Test  "Negative Negative       Function:    - SLS R: unable  - SLS L: unable   - Squat: 25% range   - Sit <--> Stand:      Joint Mobility: Patellar restricted,   Tibiofemoral unrestricted,     Palpation: moderate tenderness to palpation at medial knee joint, patellar tendon      Sensation: intact to light touch    Flexibility:    90/90 SLR = R no restriction, L minimal restriction   Ely's test: R no restriction, L minimal restriction    PT Evaluation Completed: Yes  Discussed Plan of Care with patient: Yes        Treatment     Treatment Time In: 0830  Treatment Time Out: 0845  Total Treatment time separate from Evaluation: 15 minutes    Elicia received therapeutic exercises to develop strength and ROM for 15 minutes including:  Heel slides with strap x 10  Quad sets 5"H x 10  SLR- unable to perform due to pain  Side-lying clams x 10  LAQ x 10  Standing hip flexion - unable to perform due to pain  Reviewed HEP with demonstration by patient to demonstrate understanding       Patient Education and Home Exercises     Home Exercises and Patient Education Provided    Education provided re:   - progress towards goals   - role of therapy in multi - disciplinary team, goals for therapy  - exercise in pain free ranges. If pain persists stop the exercise and inform PT at next visit  Pt educated on condition, POC, and expectations in therapy.  No spiritual or educational barriers to learning provided    Home exercises:  Pt will be provided HEP during course of treatment with progressions as appropriate. Pt was advised to perform these exercises free of pain, and to stop performing them if pain occurs.   Elicia demonstrated good  understanding of the education provided.       ASSESSMENT      Pham is a 69 y.o. female referred to outpatient physical therapy with a medical diagnosis of M25.562 (ICD-10-CM) - Left knee pain, unspecified chronicity, and presents to PT with noted laxity in LCL, no laxity or pain with any other ligamentous " stress. Negative meniscus compression tests, however palpable tenderness at medial joint line. Patient with functional flexion, although painful beyond 90 deg flexion. Able to achieve full extension, but again with pain at end range. Patient able to bear weight on left lower extremity with minimal complaint of increased pain. Unable to perform SLR in supine or standing due to onset of patellar pain. Patient to benefit from skilled therapy to prevent further decline in functional status. Patient with fair tolerance to initial treatment due to pain in knee. All questions and concerns addressed, HEP issued.  Patient demonstrates limitations as described in the problem list. Pt will benefit from physcial therapy services in order to maximize pain free mobility and/or functional use of left lower extremity. The following goals were discussed with the patient and patient is in agreement with them as to be addressed in the treatment plan.     Pt prognosis is Good.     Pt's spiritual, cultural and educational needs considered and pt agreeable to plan of care and goals as stated below:     Anticipated Barriers for therapy: pain, co-morbidities    Plan of care discussed with patient: Yes    Pt will benefit from skilled outpatient Physical Therapy to address the deficits stated above and in the chart below, provide pt/family education, and to maximize pt's level of independence.     Medical necessity is demonstrated by the following IMPAIRMENTS/PROBLEM LIST:    weakness, impaired functional mobility, gait instability, impaired balance, decreased lower extremity function, pain, decreased ROM, edema, impaired joint extensibility, and impaired muscle length      Medical Necessity is demonstrated by the following  History  Co-morbidities and personal factors that may impact the plan of care Co-morbidities/Personal Factors    0= low, 1-2 = mod, 3+ = high   moderate   Examination  Body Structures and Functions, activity limitations  and participation restrictions that may impact the plan of care Body Regions/Body Systems    Participation Restrictions    Activity limitations  Mobility/Self care/Domestic Life/Community and Social Life    1-2 = low, 3+ = mod, 4+ = high       moderate   Clinical Presentation evolving clinical presentation with changing clinical characteristics  Stable/uncomplicated = low, evolving/changing = mod,  Unstable/unpredictable = high moderate   Decision Making/ Complexity Score: moderate           GOALS     Short Term Goals: 3 weeks  Demonstrate improvement in recent symptoms to progress toward long term goals  Correct postural deviations in sitting and standing to decrease pain and promote postural awareness for injury prevention.  Demonstrate compliance with initial exercise program    Long Term Goals: 6 weeks  Perform usual household tasks with no limitation  Perform usual work duties with no limitation  Ambulate required community distances with minimal limitation  Ascend/descend flight of stairs with minimal limitation, reciprocal pattern, handrail for safety  Transfer from sit to stand with no limitation equal weight between both legs  Perform squat to 50% range of motion or better to protect spine with lifting and reaching lower surfaces  Demonstrate independence with home exercise program to maintain gains made in therapy.      PLAN      Certification Period: 11/25/2024 to 1/31/2025.    Outpatient Physical Therapy 2 times weekly for 6 weeks to include the following interventions: patient education, Gait Training, Manual Therapy, Moist Heat/ Ice, Neuromuscular Re-ed, Patient Education, Self Care, Therapeutic Activities, Therapeutic Exercise, and Ultrasound.   Pt may be seen by PTA as part of the rehabilitation team.     I certify the need for these services furnished under this plan of treatment and while under my care.    Doris Hollis, PT        Attestation:   I have seen the patient, reviewed the therapist's  plan of care, and I agree with the plan of care.   I certify the need for these services furnished under this plan of treatment and while under my care.         _______________            ________                                               _____________________  Physician/Referring Practitioner                                                            Date of Signature

## 2024-11-26 ENCOUNTER — E-CONSULT (OUTPATIENT)
Dept: DERMATOLOGY | Facility: CLINIC | Age: 69
End: 2024-11-26

## 2024-11-26 DIAGNOSIS — L30.9 DERMATITIS, UNSPECIFIED: Primary | ICD-10-CM

## 2024-11-26 NOTE — CONSULTS
[facility]  Response for E-Consult     Patient Name: Pham Licea  MRN: [id number]  Primary Care Provider: Lani Grossman MD   Requesting Provider: Lani Grossman MD  E-Consult to Dermatology  Consult performed by: Lalita Rooney MD  Consult ordered by: Lani Grossman MD  Reason for consult: Ulcer  Assessment/Recommendations: Thank you for this consult. I apologize for the delay in answering this econsult. My differential include acneiform eruption secondary to the vaccine, papulopustular rosacea flare, or urticarial dermatitis. If not improved, consider a trial of doxycycline 100 mg bid x 2 weeks. If not improving, please reconsult.           Recommendation: as above     Contingency that warrants a repeat eConsult or referral: Reconsult if the condition worsens or fails to improve       Total time of Consultation: 5 minute    I did not speak to the requesting provider verbally about this.     *This eConsult is based on the clinical data available to me and is furnished without benefit of a physical examination. The eConsult will need to be interpreted in light of any clinical issues or changes in patient status not available to me at the time of filing this eConsults. Significant changes in patient condition or level of acuity should result in immediate formal consultation and reevaluation. Please alert me if you have further questions.    Thank you for this eConsult referral.     Lalita Rooney MD  [facility]

## 2024-11-27 NOTE — PROGRESS NOTES
"[facility]   Physical Therapy Treatment Note      Name: Pham Licea  Clinic Number: [id number]    Therapy Diagnosis: Impaired functional mobility and activity tolerance  Physician: Panchito Huggins,*    Visit Date: 11/29/2024    Physician Orders: PT Eval and Treat   Medical Diagnosis: M25.562 (ICD-10-CM) - Left knee pain, unspecified chronicity  Evaluation Date: 11/25/2024  Authorization period Expiration: 1/31/2024  Plan of Care Expiration: 1/31/2025  Progress Note Due: 12/27/2024  Visit #/Visits authorized: 2/12  FOTO: 1/ 3         Precautions: Standard  Date of Surgery: NA        Time In: 7:00  Time Out: 7:45  Total Billable Time: 45 minutes    PTA Visit #: 0/5       Subjective     Patient reports:  she feels stiff this morning.  She was compliant with home exercise program.  Response to previous treatment: good  Functional change: none    Pain: 2/10  Location: left knee      Objective      Objective Measures updated at progress report unless specified.     Treatment     Elicia received the treatments listed below:      therapeutic exercises to develop strength and ROM for 35 minutes including:  Heel slides with strap 10 x 20"  Quad sets x 15  SLR x 10  Side-lying clams x 10  SAQ's x 10  LAQ x 10  Knee flexion RTB x 10    therapeutic activities to improve functional performance for 10  minutes, including:  Forward step ups x 10  Lateral step ups x 10  Alternating toe taps x 1'        Patient Education and Home Exercises       Education provided:   - quad sets, SLR    Written Home Exercises Provided: Yes. Exercises were reviewed and Elicia was able to demonstrate them prior to the end of the session.  Elicia demonstrated good  understanding of the education provided. See Electronic Medical Record under Patient Instructions for exercises provided during therapy sessions    Assessment     Pham is a 69 y.o. female referred to outpatient physical therapy with a medical " diagnosis of M25.562 (ICD-10-CM) - Left knee pain, unspecified chronicity, and presents to PT with noted laxity in LCL, no laxity or pain with any other ligamentous stress. Negative meniscus compression tests, however palpable tenderness at medial joint line. Patient with functional flexion, although painful beyond 90 deg flexion. Able to achieve full extension, but again with pain at end range. Patient able to bear weight on left lower extremity with minimal complaint of increased pain. Unable to perform SLR in supine or standing due to onset of patellar pain. Patient to benefit from skilled therapy to prevent further decline in functional status. Patient with fair tolerance to initial treatment due to pain in knee. All questions and concerns addressed, HEP issued.  Patient demonstrates limitations as described in the problem list. Pt will benefit from physcial therapy services in order to maximize pain free mobility and/or functional use of left lower extremity. The following goals were discussed with the patient and patient is in agreement with them as to be addressed in the treatment plan.     Elicia Is progressing well towards her goals.   Patient prognosis is Good.     Patient will continue to benefit from skilled outpatient physical therapy to address the deficits listed in the problem list box on initial evaluation, provide pt/family education and to maximize pt's level of independence in the home and community environment.     Patient's spiritual, cultural and educational needs considered and pt agreeable to plan of care and goals.     Anticipated barriers to physical therapy: 0    Goals: Short Term Goals: 3 weeks  Demonstrate improvement in recent symptoms to progress toward long term goals  Correct postural deviations in sitting and standing to decrease pain and promote postural awareness for injury prevention.  Demonstrate compliance with initial exercise program     Long Term Goals: 6 weeks  Perform  usual household tasks with no limitation  Perform usual work duties with no limitation  Ambulate required community distances with minimal limitation  Ascend/descend flight of stairs with minimal limitation, reciprocal pattern, handrail for safety  Transfer from sit to stand with no limitation equal weight between both legs  Perform squat to 50% range of motion or better to protect spine with lifting and reaching lower surfaces  Demonstrate independence with home exercise program to maintain gains made in therapy.    Plan     Certification Period: 11/25/2024 to 1/31/2025.     Outpatient Physical Therapy 2 times weekly for 6 weeks to include the following interventions: patient education, Gait Training, Manual Therapy, Moist Heat/ Ice, Neuromuscular Re-ed, Patient Education, Self Care, Therapeutic Activities, Therapeutic Exercise, and Ultrasound.   Pt may be seen by PTA as part of the rehabilitation team.     Lani Aguilar, PT

## 2024-11-27 NOTE — H&P
[facility] - ED Hospital Medicine  History & Physical    Patient Name: Pham Licea  MRN: [id number]  Admission Date: 2020  Attending Physician: Donnell De La Torre MD   Primary Care Provider: Deven Emerson MD         Patient information was obtained from patient and ER records.     Subjective:     Principal Problem:SBO (small bowel obstruction)    Chief Complaint:   Chief Complaint   Patient presents with    Abdominal Pain    3 weeks s/p cholecystectomy        HPI: Patient is a 65-year-old female with past medical history of hepatic steatosis, osteoarthritis, GERD who presents to the ER with a several hours history of severe abdominal pain.  Three weeks ago patient had a laparoscopic cholecystectomy with unremarkable/routine postop recovery.  She was discharged home and was tolerating p.o. intake well.  Had follow-up with general surgery a few days ago in continue to do well until this morning when she developed severe abdominal pain in started to have nausea and vomiting.  The pain became so severe that she came to the ER.  In the ER, CT abdomen/pelvis showed a high-grade small-bowel obstruction.  General surgery notified and hospital team called for admission.  Appreciate the opportunity to be involved in this case.    Past Medical History:   Diagnosis Date    Fatty liver 2015    Former smoker     Osteoarthritis 2010    Pulmonary nodules 2019    Repeat CT in 6 mo    Ulcer of abdomen wall 2016       Past Surgical History:   Procedure Laterality Date    APPENDECTOMY  1993     SECTION  , , 1983    x3    COLONOSCOPY  ~2014    [facility]: normal findings per patient report    EPIDURAL STEROID INJECTION INTO LUMBAR SPINE N/A 10/12/2018    Procedure: Injection-steroid-epidural-lumbar;  Surgeon: Kal Johnson MD;  Location: [facility];  Service: Pain Management;  Laterality: N/A;  L5-S1    EPIDURAL STEROID INJECTION INTO LUMBAR SPINE N/A 2019     Procedure: Injection-steroid-epidural-lumbar L5-S1;  Surgeon: Kal Johnson MD;  Location: [facility] OR;  Service: Pain Management;  Laterality: N/A;    HYSTERECTOMY  1993    one ovary conserved    LAPAROSCOPIC CHOLECYSTECTOMY N/A 9/2/2020    Procedure: CHOLECYSTECTOMY, LAPAROSCOPIC;  Surgeon: Govind Frey MD;  Location: [facility] OR;  Service: General;  Laterality: N/A;    UPPER GASTROINTESTINAL ENDOSCOPY  2016       Review of patient's allergies indicates:  No Known Allergies    No current facility-administered medications on file prior to encounter.      Current Outpatient Medications on File Prior to Encounter   Medication Sig    acetaminophen (TYLENOL) 325 MG tablet Take 325 mg by mouth every 6 (six) hours as needed for Pain.    albuterol (PROVENTIL/VENTOLIN HFA) 90 mcg/actuation inhaler Inhale 2 puffs into the lungs every 6 (six) hours as needed for Wheezing or Shortness of Breath. Rescue    diclofenac sodium (VOLTAREN) 1 % Gel APPLY AS DIRECTED    gabapentin (NEURONTIN) 300 MG capsule TAKE 1 CAPSULE BY MOUTH IN THE MORNING , 1 CAPSULE 8 HOURS LATER AND 2 CAPSULES DAILY AT BEDTIME (Patient taking differently: 2 (two) times daily. )    HYDROcodone-acetaminophen (NORCO) 5-325 mg per tablet Take 1 tablet by mouth every 6 (six) hours as needed for Pain.    meloxicam (MOBIC) 7.5 MG tablet Take 2 tablets (15 mg total) by mouth once daily.    ondansetron (ZOFRAN) 4 MG tablet Take 1 tablet (4 mg total) by mouth every 6 (six) hours as needed for Nausea.    ondansetron (ZOFRAN-ODT) 8 MG TbDL Take 1 tablet (8 mg total) by mouth every 6 (six) hours as needed (nausea or vomiting).    pantoprazole (PROTONIX) 20 MG tablet Take 1 tablet (20 mg total) by mouth once daily.    predniSONE (DELTASONE) 20 MG tablet Take 1 tablet (20 mg total) by mouth once daily.    sucralfate (CARAFATE) 1 gram tablet Take 1 tablet (1 g total) by mouth 4 (four) times daily.     Family History     Problem Relation (Age of Onset)    Aneurysm  Father    Arthritis Mother, Sister    Brain Hemorrhage Father    Heart disease Mother    Hypertension Father, Sister    Obesity Sister    Osteoarthritis Sister    Osteoporosis Mother    Ovarian cancer Mother    Stroke Father (50)        Tobacco Use    Smoking status: Former Smoker     Packs/day: 1.00     Years: 40.00     Pack years: 40.00     Quit date: 11/2/2009     Years since quitting: 10.8    Smokeless tobacco: Never Used    Tobacco comment: quit 2009   Substance and Sexual Activity    Alcohol use: Yes     Frequency: Monthly or less     Drinks per session: 1 or 2     Binge frequency: Never     Comment: Not often - one glass of wine every now and then    Drug use: Not Currently     Types: Marijuana     Comment: in 1970s    Sexual activity: Not Currently     Review of Systems   Constitutional: Negative for chills, diaphoresis, fatigue and fever.   HENT: Negative.    Eyes: Negative for visual disturbance.   Respiratory: Negative for shortness of breath.    Cardiovascular: Negative for chest pain, palpitations and leg swelling.   Gastrointestinal: Positive for abdominal pain, nausea and vomiting.   Endocrine: Negative.    Genitourinary: Negative.    Musculoskeletal: Negative.    Skin: Negative.    Allergic/Immunologic: Negative.    Neurological: Negative.    Hematological: Negative.    Psychiatric/Behavioral: Negative.      Objective:     Vital Signs (Most Recent):  Temp: 98 °F (36.7 °C) (09/23/20 1822)  Pulse: 73 (09/23/20 1817)  Resp: 19 (09/23/20 1818)  BP: 117/66 (09/23/20 1817)  SpO2: 100 % (09/23/20 1817) Vital Signs (24h Range):  Temp:  [98 °F (36.7 °C)-98.4 °F (36.9 °C)] 98 °F (36.7 °C)  Pulse:  [72-87] 73  Resp:  [14-19] 19  SpO2:  [95 %-100 %] 100 %  BP: ()/(43-79) 117/66     Weight: 83.9 kg (184 lb 15.5 oz)  Body mass index is 32.77 kg/m².    Physical Exam  Constitutional:       Appearance: She is not toxic-appearing.      Comments: Tearful, slightly anxious but in no acute distress.  Alert    HENT:      Head: Normocephalic and atraumatic.      Right Ear: External ear normal.      Left Ear: External ear normal.      Nose:      Comments: NG tube in place     Mouth/Throat:      Mouth: Mucous membranes are moist.   Eyes:      Conjunctiva/sclera: Conjunctivae normal.   Cardiovascular:      Rate and Rhythm: Normal rate and regular rhythm.      Pulses: Normal pulses.      Heart sounds: No murmur. No gallop.    Pulmonary:      Effort: Pulmonary effort is normal. No respiratory distress.      Breath sounds: Normal breath sounds. No wheezing or rales.   Abdominal:      General: There is no distension.      Comments: Bowel sounds present, soft abdomen, tender to palpation in the midepigastric region but essentially generalized tenderness   Musculoskeletal:      Right lower leg: No edema.      Left lower leg: No edema.   Skin:     General: Skin is warm and dry.   Neurological:      Mental Status: She is oriented to person, place, and time. Mental status is at baseline.   Psychiatric:         Thought Content: Thought content normal.         Judgment: Judgment normal.      Comments: Anxious mood and affect             Significant Labs:   Recent Lab Results       09/23/20  1816   09/23/20  1800   09/23/20  1704        Albumin     4.7     Alkaline Phosphatase     74     ALT     17     Anion Gap     17     Appearance, UA Clear         AST     20     Bacteria, UA Rare         Baso #     0.09     Basophil%     0.5     Bilirubin (UA) Negative         BILIRUBIN TOTAL     0.5  Comment:  For infants and newborns, interpretation of results should be based  on gestational age, weight and in agreement with clinical  observations.  Premature Infant recommended reference ranges:  Up to 24 hours.............<8.0 mg/dL  Up to 48 hours............<12.0 mg/dL  3-5 days..................<15.0 mg/dL  6-29 days.................<15.0 mg/dL       BUN, Bld     18     Calcium     9.7     Chloride     98     CO2     25     Color, UA Yellow          Creatinine     0.8     Differential Method     Automated     eGFR if      >60.0     eGFR if non      >60.0  Comment:  Calculation used to obtain the estimated glomerular filtration  rate (eGFR) is the CKD-EPI equation.        Eos #     0.3     Eosinophil%     1.5     Glucose     125     Glucose, UA Negative         Gran # (ANC)     12.6     Gran%     70.9     Hematocrit     43.0     Hemoglobin     14.0     Immature Grans (Abs)     0.06  Comment:  Mild elevation in immature granulocytes is non specific and   can be seen in a variety of conditions including stress response,   acute inflammation, trauma and pregnancy. Correlation with other   laboratory and clinical findings is essential.       Immature Granulocytes     0.3     Ketones, UA Negative         Leukocytes, UA Trace         Lipase     32     Lymph #     3.8     Lymph%     21.1     MCH     28.3     MCHC     32.6     MCV     87     Microscopic Comment SEE COMMENT  Comment:  Other formed elements not mentioned in the report are not   present in the microscopic examination.            Mono #     1.0     Mono%     5.7     MPV     10.6     NITRITE UA Negative         nRBC     0     Occult Blood UA Negative         pH, UA >8.0         Platelets     313     Potassium     4.0     PROTEIN TOTAL     7.2     Protein, UA Negative  Comment:  Recommend a 24 hour urine protein or a urine   protein/creatinine ratio if globulin induced proteinuria is  clinically suspected.           RBC     4.94     RBC, UA 2         RDW     13.4     SARS-CoV-2 RNA, Amplification, Qual   Negative  Comment:  This test utilizes isothermal nucleic acid amplification   technology to detect the SARS-CoV-2 RdRp nucleic acid segment.   The analytical sensitivity (limit of detection) is 125 genome   equivalents/mL.   A POSITIVE result implies infection with the SARS-CoV-2 virus;  the patient is presumed to be contagious.    A NEGATIVE result means that SARS-CoV-2  nucleic acids are not  present above the limit of detection. A NEGATIVE result should be   treated as presumptive. It does not rule out the possibility of   COVID-19 and should not be the sole basis for treatment decisions.   If COVID-19 is strongly suspected based on clinical and exposure   history, re-testing using an alternate molecular assay should be   considered.   This test is only for use under the Food and Drug   Administration s Emergency Use Authorization (EUA).   Commercial kits are provided by "[employer]   Performance characteristics of the EUA have been independently  verified by [facility] Department of  Pathology and Laboratory Medicine.   _________________________________________________________________  The ID NOW COVID-19 Letter of Authorization, along with the   authorized Fact Sheet for Healthcare Providers, the authorized Fact  Sheet for Patients, and authorized labeling are available on the FDA   website:  www.fda.gov/MedicalDevices/Safety/EmergencySituations/tdo072665.htm         Sodium     140     Specific [place], UA 1.015         Specimen UA Urine, Clean Catch         UROBILINOGEN UA Negative         WBC, UA 25         WBC     17.80         All pertinent labs within the past 24 hours have been reviewed.    Significant Imaging: I have reviewed all pertinent imaging results/findings within the past 24 hours.    Assessment/Plan:     * SBO (small bowel obstruction)  Admit and consult General surgery, follow up recommendations  NG tube placed  IV fluids at 125 mL/hour  NPO  IV morphine prn pain          Gastroesophageal reflux disease  Continue Protonix        VTE Risk Mitigation (From admission, onward)    None             Eloise Coombs MD  Department of Hospital Medicine   [facility] - ED   24

## 2024-11-29 ENCOUNTER — CLINICAL SUPPORT (OUTPATIENT)
Dept: REHABILITATION | Facility: HOSPITAL | Age: 69
End: 2024-11-29
Payer: MEDICARE

## 2024-11-29 DIAGNOSIS — M25.662 STIFFNESS OF LEFT KNEE: ICD-10-CM

## 2024-11-29 DIAGNOSIS — M25.562 ACUTE PAIN OF LEFT KNEE: Primary | ICD-10-CM

## 2024-11-29 PROCEDURE — 97530 THERAPEUTIC ACTIVITIES: CPT

## 2024-11-29 PROCEDURE — 97110 THERAPEUTIC EXERCISES: CPT

## 2024-12-02 ENCOUNTER — PATIENT MESSAGE (OUTPATIENT)
Dept: FAMILY MEDICINE | Facility: CLINIC | Age: 69
End: 2024-12-02
Payer: MEDICARE

## 2024-12-04 ENCOUNTER — OFFICE VISIT (OUTPATIENT)
Dept: FAMILY MEDICINE | Facility: CLINIC | Age: 69
End: 2024-12-04
Payer: MEDICARE

## 2024-12-04 VITALS
SYSTOLIC BLOOD PRESSURE: 140 MMHG | HEIGHT: 64 IN | RESPIRATION RATE: 12 BRPM | DIASTOLIC BLOOD PRESSURE: 96 MMHG | OXYGEN SATURATION: 97 % | TEMPERATURE: 98 F | HEART RATE: 73 BPM | BODY MASS INDEX: 33.84 KG/M2 | WEIGHT: 198.19 LBS

## 2024-12-04 DIAGNOSIS — L08.9 PUSTULE: Primary | ICD-10-CM

## 2024-12-04 PROCEDURE — 99214 OFFICE O/P EST MOD 30 MIN: CPT | Mod: S$PBB,,, | Performed by: STUDENT IN AN ORGANIZED HEALTH CARE EDUCATION/TRAINING PROGRAM

## 2024-12-04 PROCEDURE — 99214 OFFICE O/P EST MOD 30 MIN: CPT | Mod: PBBFAC | Performed by: STUDENT IN AN ORGANIZED HEALTH CARE EDUCATION/TRAINING PROGRAM

## 2024-12-04 PROCEDURE — 99999 PR PBB SHADOW E&M-EST. PATIENT-LVL IV: CPT | Mod: PBBFAC,,, | Performed by: STUDENT IN AN ORGANIZED HEALTH CARE EDUCATION/TRAINING PROGRAM

## 2024-12-04 RX ORDER — DOXYCYCLINE 100 MG/1
100 CAPSULE ORAL 2 TIMES DAILY
Qty: 14 CAPSULE | Refills: 0 | Status: SHIPPED | OUTPATIENT
Start: 2024-12-04

## 2024-12-05 ENCOUNTER — CLINICAL SUPPORT (OUTPATIENT)
Dept: REHABILITATION | Facility: HOSPITAL | Age: 69
End: 2024-12-05
Payer: MEDICARE

## 2024-12-05 ENCOUNTER — PATIENT MESSAGE (OUTPATIENT)
Dept: REHABILITATION | Facility: HOSPITAL | Age: 69
End: 2024-12-05

## 2024-12-05 DIAGNOSIS — M25.662 STIFFNESS OF LEFT KNEE: ICD-10-CM

## 2024-12-05 DIAGNOSIS — M25.562 ACUTE PAIN OF LEFT KNEE: Primary | ICD-10-CM

## 2024-12-05 PROCEDURE — 97110 THERAPEUTIC EXERCISES: CPT

## 2024-12-05 NOTE — PROGRESS NOTES
"[facility]   Physical Therapy Treatment Note      Name: Pham Licea  Clinic Number: [id number]    Therapy Diagnosis: Impaired functional mobility and activity tolerance  Physician: Panchito Huggins,*    Visit Date: 12/5/2024    Physician Orders: PT Eval and Treat   Medical Diagnosis: M25.562 (ICD-10-CM) - Left knee pain, unspecified chronicity  Evaluation Date: 11/25/2024  Authorization period Expiration: 1/31/2024  Plan of Care Expiration: 1/31/2025  Progress Note Due: 12/27/2024  Visit #/Visits authorized: 3/12  FOTO: 1/ 3         Precautions: Standard  Date of Surgery: NA        Time In: 7:15  Time Out: 8:00  Total Billable Time: 45 minutes    PTA Visit #: 0/5       Subjective     Patient reports:  she's been feeling pain in the superior patella.  She was compliant with home exercise program.  Response to previous treatment: good  Functional change: none    Pain: 5/10  Location: left knee      Objective      Objective Measures updated at progress report unless specified.     Treatment     Elicia received the treatments listed below:      therapeutic exercises to develop strength and ROM for 35 minutes including:  Hamstring stretch long seat 3 x 30"  Heel slides with strap 10 x 20"  Quad sets x 15  SLR x 10  Side-lying clams x 15  SAQ's x 10  LAQ x 10  Knee flexion RTB x 15    therapeutic activities to improve functional performance for 10  minutes, including:  Forward step ups x 15  Lateral step ups x 15  Alternating toe taps x 1'        Patient Education and Home Exercises       Education provided:   - quad sets, SLR    Written Home Exercises Provided: Yes. Exercises were reviewed and Elicia was able to demonstrate them prior to the end of the session.  Elicia demonstrated good  understanding of the education provided. See Electronic Medical Record under Patient Instructions for exercises provided during therapy sessions    Assessment     Pham is a 69 y.o. female " referred to outpatient physical therapy with a medical diagnosis of M25.562 (ICD-10-CM) - Left knee pain, unspecified chronicity, and presents to PT with noted laxity in LCL, no laxity or pain with any other ligamentous stress. Negative meniscus compression tests, however palpable tenderness at medial joint line. Patient with functional flexion, although painful beyond 90 deg flexion. Able to achieve full extension, but again with pain at end range. Patient able to bear weight on left lower extremity with minimal complaint of increased pain. Unable to perform SLR in supine or standing due to onset of patellar pain. Patient to benefit from skilled therapy to prevent further decline in functional status. Patient with fair tolerance to initial treatment due to pain in knee. All questions and concerns addressed, HEP issued.  Patient demonstrates limitations as described in the problem list. Pt will benefit from physcial therapy services in order to maximize pain free mobility and/or functional use of left lower extremity. The following goals were discussed with the patient and patient is in agreement with them as to be addressed in the treatment plan.     Elicia Is progressing well towards her goals.   Patient prognosis is Good.     Patient will continue to benefit from skilled outpatient physical therapy to address the deficits listed in the problem list box on initial evaluation, provide pt/family education and to maximize pt's level of independence in the home and community environment.     Patient's spiritual, cultural and educational needs considered and pt agreeable to plan of care and goals.     Anticipated barriers to physical therapy: 0    Goals: Short Term Goals: 3 weeks  Demonstrate improvement in recent symptoms to progress toward long term goals  Correct postural deviations in sitting and standing to decrease pain and promote postural awareness for injury prevention.  Demonstrate compliance with initial  exercise program     Long Term Goals: 6 weeks  Perform usual household tasks with no limitation  Perform usual work duties with no limitation  Ambulate required community distances with minimal limitation  Ascend/descend flight of stairs with minimal limitation, reciprocal pattern, handrail for safety  Transfer from sit to stand with no limitation equal weight between both legs  Perform squat to 50% range of motion or better to protect spine with lifting and reaching lower surfaces  Demonstrate independence with home exercise program to maintain gains made in therapy.    Plan     Certification Period: 11/25/2024 to 1/31/2025.     Outpatient Physical Therapy 2 times weekly for 6 weeks to include the following interventions: patient education, Gait Training, Manual Therapy, Moist Heat/ Ice, Neuromuscular Re-ed, Patient Education, Self Care, Therapeutic Activities, Therapeutic Exercise, and Ultrasound.   Pt may be seen by PTA as part of the rehabilitation team.     Lani Aguilar, PT

## 2024-12-05 NOTE — PROGRESS NOTES
SUBJECTIVE:    CHIEF COMPLAINT:   Chief Complaint   Patient presents with    skin infection     On face           274}    HISTORY OF PRESENT ILLNESS:  Pham Licea is a 69 y.o. female who presents to the clinic today   History of Present Illness    CHIEF COMPLAINT:  Ms. Licea presents today with a persistent rash.    RASH AND ASSOCIATED SYMPTOMS:  She reports a persistent rash that began after a fall on October 27th. Initially thought to be a reaction to multiple vaccines (COVID, B12, pneumonia, and flu) received within a two-day period. She saw Dr. Grossman on November 20th for the rash, who consulted with a dermatologist. The dermatologist recommended doxycycline, which has not yet been started. She was given generic Zyrtec and hydrocortisone cream, but reports these have not been effective in managing the symptoms. She also reports that her lips are splitting and dry. No fever or pus drainage reported. The affected area on her lips remains bumpy since the fall incident.    RECENT MEDICAL HISTORY:  She had a fall on October 27th, resulting in scrapes to her face and lips. On October 29th, she had a root canal and was prescribed antibiotics by her dentist.    ONGOING TREATMENT:  She is currently attending physical therapy for her knee.    SOCIAL HISTORY:  She recently assumed care of her special needs niece following the passing of the niece's parents. She reports finding the situation challenging and expresses uncertainty about available resources for assistance with her niece's care.      ROS:  General: -fever, -chills, -fatigue, -weight gain, -weight loss  Eyes: -vision changes, -redness, -discharge  ENT: -ear pain, -nasal congestion, -sore throat  Cardiovascular: -chest pain, -palpitations, -lower extremity edema  Respiratory: -cough, -shortness of breath  Gastrointestinal: -abdominal pain, -nausea, -vomiting, -diarrhea, -constipation, -blood in stool  Genitourinary: -dysuria, -hematuria,  -frequency  Musculoskeletal: -joint pain, -muscle pain  Skin: +rash, -lesion  Neurological: -headache, -dizziness, -numbness, -tingling  Psychiatric: -anxiety, -depression, -sleep difficulty          PAST MEDICAL HISTORY:     274}  Past Medical History:   Diagnosis Date    Anemia     Fatty liver 2015    Former smoker 2009    Osteoarthritis 2010    Pulmonary nodules 2019    Repeat CT in 6 mo    Ulcer of abdomen wall 2016       PAST SURGICAL HISTORY:  Past Surgical History:   Procedure Laterality Date    APPENDECTOMY  1993     SECTION  , , 1983    x3    CHOLECYSTECTOMY          COLONOSCOPY  ~    [facility]: normal findings per patient report    COLONOSCOPY N/A 2020    Procedure: COLONOSCOPY;  Surgeon: Misael Holm MD;  Location: [facility] ENDO;  Service: General;  Laterality: N/A;    COLONOSCOPY, WITH RETROGRADE BALLOON ENTEROSCOPY, SINGLE OR DOUBLE BALLOON N/A 2023    Procedure: COLONOSCOPY, WITH RETROGRADE BALLOON ENTEROSCOPY, DOUBLE BALLOON;  Surgeon: Collin Mares MD;  Location: [facility] ENDO ([address] [place] [address]);  Service: Endoscopy;  Laterality: N/A;  inst via portal-MS-sutab per pt request-tb-new inst portal    DIAGNOSTIC LAPAROSCOPY N/A 2021    Procedure: LAPAROSCOPY, DIAGNOSTIC;  Surgeon: Misael Holm MD;  Location: [facility] OR;  Service: General;  Laterality: N/A;    EPIDURAL STEROID INJECTION INTO LUMBAR SPINE N/A 10/12/2018    Procedure: Injection-steroid-epidural-lumbar;  Surgeon: Kal Johnson MD;  [facility];  Service: Pain Management;  Laterality: N/A;  L5-S1    EPIDURAL STEROID INJECTION INTO LUMBAR SPINE N/A 2019    Procedure: Injection-steroid-epidural-lumbar L5-S1;  Surgeon: Kal Johnson MD;  [facility];  Service: Pain Management;  Laterality: N/A;    ESOPHAGOGASTRODUODENOSCOPY N/A 2020    Procedure: ESOPHAGOGASTRODUODENOSCOPY (EGD);  Surgeon: Misael Holm MD;  Location: [facility] ENDO;  Service: General;  Laterality: N/A;     ESOPHAGOGASTRODUODENOSCOPY N/A 7/16/2024    Procedure: EGD (ESOPHAGOGASTRODUODENOSCOPY);  Surgeon: Jas Gleason MD;  Location: [facility] ENDO;  Service: Endoscopy;  Laterality: N/A;    FUSION, SPINE, MINIMALLY INVASIVE, USING COMPUTER-ASSISTED NAVIGATION N/A 12/22/2022    Procedure: ENTEROSCOPY, DOUBLE BALLOON, ANTEGRADE;  Surgeon: Collin Mares MD;  Location: [facility] ENDO (2ND FLR);  Service: Endoscopy;  Laterality: N/A;  11/14/22-Instructions via portal-DS    *open to earlier date if available*    HYSTERECTOMY  1993    one ovary conserved    LAPAROSCOPIC CHOLECYSTECTOMY N/A 09/02/2020    Procedure: CHOLECYSTECTOMY, LAPAROSCOPIC;  Surgeon: Govind Frey MD;  Location: [facility] OR;  Service: General;  Laterality: N/A;    LAPAROSCOPIC LYSIS OF ADHESIONS N/A 05/04/2021    Procedure: LYSIS, ADHESIONS, LAPAROSCOPIC;  Surgeon: Misael Holm MD;  Location: [facility] OR;  Service: General;  Laterality: N/A;    TRANSFORAMINAL EPIDURAL INJECTION OF STEROID Right 2/6/2024    Procedure: Injection,steroid,epidural,transforaminal approach;  Surgeon: Kal Johnson MD;  Location: [facility] OR;  Service: Anesthesiology;  Laterality: Right;  L4-5 and L5-s1    UPPER GASTROINTESTINAL ENDOSCOPY  2016       SOCIAL HISTORY:  Social History     Socioeconomic History    Marital status:     Number of children: 4    Highest education level: Some college, no degree   Occupational History    Occupation: sale specialist-    Tobacco Use    Smoking status: Former     Current packs/day: 0.00     Average packs/day: 1 pack/day for 40.0 years (40.0 ttl pk-yrs)     Types: Cigarettes     Start date: 11/2/1969     Quit date: 11/2/2009     Years since quitting: 15.0    Smokeless tobacco: Never    Tobacco comments:     quit 2009   Substance and Sexual Activity    Alcohol use: Not Currently     Comment: Not often - one glass of wine every now and then    Drug use: Not Currently     Types: Marijuana     Comment: in 1970s    Sexual activity: Not  Currently     Social Drivers of Health     Financial Resource Strain: Medium Risk (1/7/2024)    Overall Financial Resource Strain (CARDIA)     Difficulty of Paying Living Expenses: Somewhat hard   Food Insecurity: No Food Insecurity (1/7/2024)    Hunger Vital Sign     Worried About Running Out of Food in the Last Year: Never true     Ran Out of Food in the Last Year: Never true   Transportation Needs: No Transportation Needs (1/7/2024)    PRAPARE - Transportation     Lack of Transportation (Medical): No     Lack of Transportation (Non-Medical): No   Physical Activity: Inactive (1/7/2024)    Exercise Vital Sign     Days of Exercise per Week: 0 days     Minutes of Exercise per Session: 0 min   Stress: Stress Concern Present (1/7/2024)    [employer] [place] - Occupational Stress Questionnaire     Feeling of Stress : To some extent   Housing Stability: Low Risk  (1/7/2024)    Housing Stability Vital Sign     Unable to Pay for Housing in the Last Year: No     Number of Places Lived in the Last Year: 1     Unstable Housing in the Last Year: No       FAMILY HISTORY:       Family History   Problem Relation Name Age of Onset    Ovarian cancer Mother      Heart disease Mother      Osteoporosis Mother      Arthritis Mother      Hypertension Father      Stroke Father  50    Brain Hemorrhage Father      Aneurysm Father      Osteoarthritis Sister      Arthritis Sister      Hypertension Sister      Obesity Sister      Breast cancer Neg Hx      Colon cancer Neg Hx      Colon polyps Neg Hx      Crohn's disease Neg Hx      Ulcerative colitis Neg Hx         ALLERGIES AND MEDICATIONS: updated and reviewed.      274}  Review of patient's allergies indicates:  No Known Allergies  Medication List with Changes/Refills   New Medications    DOXYCYCLINE (VIBRAMYCIN) 100 MG CAP    Take 1 capsule (100 mg total) by mouth 2 (two) times daily.   Current Medications    BETAMETHASONE DIPROPIONATE 0.05 % CREAM    Apply topically  "2 (two) times daily.    BIOTIN 5,000 MCG TBDL    Take 5,000 mcg by mouth Daily.    CETIRIZINE (ZYRTEC) 10 MG TABLET    Take 1 tablet (10 mg total) by mouth once daily.    CONSTULOSE 10 GRAM/15 ML SOLUTION    TAKE 10 ML BY MOUTH  THREE TIMES DAILY    DICLOFENAC SODIUM (VOLTAREN) 1 % GEL    Apply 2 g topically 2 (two) times daily as needed.    GABAPENTIN (NEURONTIN) 300 MG CAPSULE    Take 2 capsules (600 mg total) by mouth 3 (three) times daily.    HYDROCODONE-ACETAMINOPHEN (NORCO) 5-325 MG PER TABLET    Take 1 tablet by mouth every 6 (six) hours as needed for Pain.    MELOXICAM (MOBIC) 15 MG TABLET    Take 1 tablet (15 mg total) by mouth once daily.    ONDANSETRON (ZOFRAN-ODT) 4 MG TBDL    Take 8 mg by mouth every 6 (six) hours as needed.    ORPHENADRINE (NORFLEX) 100 MG TABLET    Take 1 tablet (100 mg total) by mouth 2 (two) times daily as needed for Muscle spasms.    PANTOPRAZOLE (PROTONIX) 20 MG TABLET    Take 1 tablet (20 mg total) by mouth once daily.   Discontinued Medications    AMOXICILLIN (AMOXIL) 500 MG CAPSULE    Take 500 mg by mouth 3 (three) times daily.       SCREENING HISTORY:    274}  Health Maintenance         Date Due Completion Date    Aspirin/Antiplatelet Therapy Never done ---    High Dose Statin Never done ---    RSV Vaccine (Age 60+ and Pregnant patients) (1 - Risk 60-74 years 1-dose series) Never done ---    COVID-19 Vaccine (4 - 2024-25 season) 09/01/2024 1/4/2022    Mammogram 04/23/2025 4/23/2024    Hemoglobin A1c (Prediabetes) 11/07/2025 11/7/2024    DEXA Scan 04/21/2026 4/21/2023    TETANUS VACCINE 08/16/2029 8/16/2019    Lipid Panel 11/07/2029 11/7/2024    Colorectal Cancer Screening 01/30/2033 1/30/2023            REVIEW OF SYSTEMS:   ROS    PHYSICAL EXAM:      274}  BP (!) 140/96 (BP Location: Left arm, Patient Position: Sitting)   Pulse 73   Temp 98.3 °F (36.8 °C) (Oral)   Resp 12   Ht 5' 4" (1.626 m)   Wt 89.9 kg (198 lb 3.2 oz)   LMP  (LMP Unknown)   SpO2 97%   BMI 34.02 " "kg/m²   Wt Readings from Last 3 Encounters:   12/04/24 89.9 kg (198 lb 3.2 oz)   11/21/24 90.3 kg (199 lb)   11/20/24 90.5 kg (199 lb 9.6 oz)     BP Readings from Last 3 Encounters:   12/04/24 (!) 140/96   11/20/24 (!) 146/82   11/05/24 130/80     Estimated body mass index is 34.02 kg/m² as calculated from the following:    Height as of this encounter: 5' 4" (1.626 m).    Weight as of this encounter: 89.9 kg (198 lb 3.2 oz).     Physical Exam  Constitutional:       Appearance: Normal appearance.   HENT:      Head: Normocephalic and atraumatic.      Nose: Nose normal.      Mouth/Throat:      Mouth: Mucous membranes are dry.      Pharynx: Oropharynx is clear.   Eyes:      Extraocular Movements: Extraocular movements intact.      Conjunctiva/sclera: Conjunctivae normal.   Cardiovascular:      Rate and Rhythm: Normal rate and regular rhythm.   Pulmonary:      Effort: Pulmonary effort is normal.      Breath sounds: Normal breath sounds.   Abdominal:      General: Bowel sounds are normal.      Palpations: Abdomen is soft.   Musculoskeletal:         General: Normal range of motion.      Cervical back: Normal range of motion.   Skin:     General: Skin is warm.      Comments: Pustules scattered across face with discharge.   Neurological:      General: No focal deficit present.      Mental Status: She is alert. Mental status is at baseline.   Psychiatric:         Mood and Affect: Mood normal.         Thought Content: Thought content normal.         LABS:   274}  I have reviewed old labs below:  Lab Results   Component Value Date    WBC 6.29 11/07/2024    HGB 12.8 11/07/2024    HCT 39.5 11/07/2024    MCV 90 11/07/2024     11/07/2024     11/07/2024    K 4.2 11/07/2024     (H) 11/07/2024    CALCIUM 9.1 11/07/2024    PHOS 4.1 07/17/2024    CO2 22 (L) 11/07/2024    GLU 94 11/07/2024    BUN 18 11/07/2024    CREATININE 0.8 11/07/2024    ANIONGAP 9 11/07/2024    ESTGFRAFRICA >60.0 06/27/2022    EGFRNONAA >60.0 " 06/27/2022    PROT 6.3 11/07/2024    ALBUMIN 3.8 11/07/2024    BILITOT 0.6 11/07/2024    ALKPHOS 79 11/07/2024    ALT 16 11/07/2024    AST 16 11/07/2024    INR 0.9 01/28/2022    CHOL 189 11/07/2024    TRIG 58 11/07/2024    HDL 58 11/07/2024    LDLCALC 119.4 11/07/2024    TSH 1.113 11/07/2024    HGBA1C 5.4 11/07/2024       ASSESSMENT AND PLAN:  274}  Assessment & Plan    IMPRESSION:  Assessed rash persisting since November, initially thought to be a reaction to recent vaccines  Reviewed dermatologist's recommendation for doxycycline, likely due to suspected infection with possible underlying pus  Evaluated effectiveness of current treatments including antihistamine and hydrocortisone cream  Noted elevated blood pressure, but deferred medication changes pending home monitoring    DERMATITIS:  - Started doxycycline 100 mg twice daily with meals for 2 weeks.  - Continued Zyrtec (generic) as previously prescribed.  - Continued hydrocortisone cream as previously prescribed.  - Contact the office if rash does not clear up after doxycycline treatment.    LIP ABRASION:  - Educated on proper use of Vaseline for cracked lips to lock in moisture .    HYPERTENSION FOLLOW-UP:  - Ms. Licea to monitor blood pressure at home regularly.  - Follow up for blood pressure recheck before considering any medication changes.        1. Pustule  - doxycycline (VIBRAMYCIN) 100 MG Cap; Take 1 capsule (100 mg total) by mouth 2 (two) times daily.  Dispense: 14 capsule; Refill: 0         No orders of the defined types were placed in this encounter.      No follow-ups on file. or sooner as needed.

## 2024-12-06 RX ORDER — MELOXICAM 15 MG/1
15 TABLET ORAL DAILY
Qty: 30 TABLET | Refills: 0 | Status: SHIPPED | OUTPATIENT
Start: 2024-12-06

## 2024-12-09 ENCOUNTER — CLINICAL SUPPORT (OUTPATIENT)
Dept: REHABILITATION | Facility: HOSPITAL | Age: 69
End: 2024-12-09
Payer: MEDICARE

## 2024-12-09 DIAGNOSIS — M25.662 STIFFNESS OF LEFT KNEE: ICD-10-CM

## 2024-12-09 DIAGNOSIS — M25.562 ACUTE PAIN OF LEFT KNEE: Primary | ICD-10-CM

## 2024-12-09 PROCEDURE — 97110 THERAPEUTIC EXERCISES: CPT

## 2024-12-09 NOTE — PROGRESS NOTES
"[facility]   Physical Therapy Treatment Note      Name: Pham Licea  Clinic Number: [id number]    Therapy Diagnosis: Impaired functional mobility and activity tolerance  Physician: Panchito Huggins,*    Visit Date: 12/9/2024    Physician Orders: PT Eval and Treat   Medical Diagnosis: M25.562 (ICD-10-CM) - Left knee pain, unspecified chronicity  Evaluation Date: 11/25/2024  Authorization period Expiration: 1/31/2024  Plan of Care Expiration: 1/31/2025  Progress Note Due: 12/27/2024  Visit #/Visits authorized: 4/12  FOTO: 1/ 3         Precautions: Standard  Date of Surgery: NA        Time In: 1:30  Time Out: 2:10  Total Billable Time: 40 minutes    PTA Visit #: 0/5       Subjective     Patient reports:  she's been feeling pain in the superior patella.  She was compliant with home exercise program.  Response to previous treatment: good  Functional change: none    Pain: 5/10  Location: left knee      Objective      Objective Measures updated at progress report unless specified.     Treatment     Elicia received the treatments listed below:      therapeutic exercises to develop strength and ROM for 40 minutes including:  Hamstring stretch long seat 3 x 30"  Heel slides with strap 5 x 20"  Quad sets x 15  SLR x 10  Side-lying clams x 15  SAQ's x 10  LAQ x 10  Knee flexion RTB x 15 not performed    therapeutic activities to improve functional performance for 0  minutes, including:  Forward step ups x 15  Lateral step ups x 15  Alternating toe taps x 1'    Cold pack to left knee x 10 min        Patient Education and Home Exercises       Education provided:   - quad sets, SLR    Written Home Exercises Provided: Yes. Exercises were reviewed and Elicia was able to demonstrate them prior to the end of the session.  Elicia demonstrated good  understanding of the education provided. See Electronic Medical Record under Patient Instructions for exercises provided during therapy " sessions    Assessment     Pham is a 69 y.o. female referred to outpatient physical therapy with a medical diagnosis of M25.562 (ICD-10-CM) - Left knee pain, unspecified chronicity, and presents to PT with noted laxity in LCL, no laxity or pain with any other ligamentous stress. Negative meniscus compression tests, however palpable tenderness at medial joint line. Patient with functional flexion, although painful beyond 90 deg flexion. Able to achieve full extension, but again with pain at end range. Patient able to bear weight on left lower extremity with minimal complaint of increased pain. Unable to perform SLR in supine or standing due to onset of patellar pain. Patient to benefit from skilled therapy to prevent further decline in functional status. Patient with fair tolerance to initial treatment due to pain in knee. All questions and concerns addressed, HEP issued.  Patient demonstrates limitations as described in the problem list. Pt will benefit from physcial therapy services in order to maximize pain free mobility and/or functional use of left lower extremity. The following goals were discussed with the patient and patient is in agreement with them as to be addressed in the treatment plan.     Elicia Is progressing well towards her goals.   Patient prognosis is Good.     Patient will continue to benefit from skilled outpatient physical therapy to address the deficits listed in the problem list box on initial evaluation, provide pt/family education and to maximize pt's level of independence in the home and community environment.     Patient's spiritual, cultural and educational needs considered and pt agreeable to plan of care and goals.     Anticipated barriers to physical therapy: 0    Goals: Short Term Goals: 3 weeks  Demonstrate improvement in recent symptoms to progress toward long term goals  Correct postural deviations in sitting and standing to decrease pain and promote postural awareness for  injury prevention.  Demonstrate compliance with initial exercise program     Long Term Goals: 6 weeks  Perform usual household tasks with no limitation  Perform usual work duties with no limitation  Ambulate required community distances with minimal limitation  Ascend/descend flight of stairs with minimal limitation, reciprocal pattern, handrail for safety  Transfer from sit to stand with no limitation equal weight between both legs  Perform squat to 50% range of motion or better to protect spine with lifting and reaching lower surfaces  Demonstrate independence with home exercise program to maintain gains made in therapy.    Plan     Certification Period: 11/25/2024 to 1/31/2025.     Outpatient Physical Therapy 2 times weekly for 6 weeks to include the following interventions: patient education, Gait Training, Manual Therapy, Moist Heat/ Ice, Neuromuscular Re-ed, Patient Education, Self Care, Therapeutic Activities, Therapeutic Exercise, and Ultrasound.   Pt may be seen by PTA as part of the rehabilitation team.     Lani Aguilar, PT

## 2024-12-13 ENCOUNTER — CLINICAL SUPPORT (OUTPATIENT)
Dept: REHABILITATION | Facility: HOSPITAL | Age: 69
End: 2024-12-13
Payer: MEDICARE

## 2024-12-13 DIAGNOSIS — M25.562 ACUTE PAIN OF LEFT KNEE: Primary | ICD-10-CM

## 2024-12-13 DIAGNOSIS — M25.662 STIFFNESS OF LEFT KNEE: ICD-10-CM

## 2024-12-13 PROCEDURE — 97110 THERAPEUTIC EXERCISES: CPT

## 2024-12-13 PROCEDURE — 97530 THERAPEUTIC ACTIVITIES: CPT

## 2024-12-13 NOTE — PROGRESS NOTES
"[facility]   Physical Therapy Treatment Note      Name: Pham Licea  Clinic Number: [id number]    Therapy Diagnosis: Impaired functional mobility and activity tolerance  Physician: Panchito Huggins,*    Visit Date: 12/13/2024    Physician Orders: PT Eval and Treat   Medical Diagnosis: M25.562 (ICD-10-CM) - Left knee pain, unspecified chronicity  Evaluation Date: 11/25/2024  Authorization period Expiration: 1/31/2024  Plan of Care Expiration: 1/31/2025  Progress Note Due: 12/27/2024  Visit #/Visits authorized: 5/12  FOTO: 1/ 3         Precautions: Standard  Date of Surgery: NA        Time In: 7:30  Time Out: 8:15  Total Billable Time: 40 minutes    PTA Visit #: 0/5       Subjective     Patient reports:  she's been feeling pain in the superior patella.  She was compliant with home exercise program.  Response to previous treatment: good  Functional change: none    Pain: 3/10  Location: left knee      Objective      Objective Measures updated at progress report unless specified.     Treatment     Elicia received the treatments listed below:      therapeutic exercises to develop strength and ROM for 35 minutes including:  Hamstring stretch long seat 3 x 30"  Heel slides with strap 5 x 20"  Quad sets x 15  SLR x 10  Side-lying clams x 15  SAQ's x 10  LAQ x 10  Knee flexion RTB x 10  Heelcord stretch on wedge 3 x 30"    therapeutic activities to improve functional performance for 10  minutes, including:  Forward step ups x 15  Lateral step ups x 15  Alternating toe taps x 1'    Cold pack to left knee x 10 min        Patient Education and Home Exercises       Education provided:   - quad sets, SLR    Written Home Exercises Provided: Yes. Exercises were reviewed and Elicia was able to demonstrate them prior to the end of the session.  Elicia demonstrated good  understanding of the education provided. See Electronic Medical Record under Patient Instructions for exercises provided during " therapy sessions    Assessment     Pham is a 69 y.o. female referred to outpatient physical therapy with a medical diagnosis of M25.562 (ICD-10-CM) - Left knee pain, unspecified chronicity, and presents to PT with noted laxity in LCL, no laxity or pain with any other ligamentous stress. Negative meniscus compression tests, however palpable tenderness at medial joint line. Patient with functional flexion, although painful beyond 90 deg flexion. Able to achieve full extension, but again with pain at end range. Patient able to bear weight on left lower extremity with minimal complaint of increased pain. Unable to perform SLR in supine or standing due to onset of patellar pain. Patient to benefit from skilled therapy to prevent further decline in functional status. Patient with fair tolerance to initial treatment due to pain in knee. All questions and concerns addressed, HEP issued.  Patient demonstrates limitations as described in the problem list. Pt will benefit from physcial therapy services in order to maximize pain free mobility and/or functional use of left lower extremity. The following goals were discussed with the patient and patient is in agreement with them as to be addressed in the treatment plan.     Elicia Is progressing well towards her goals.   Patient prognosis is Good.     Patient will continue to benefit from skilled outpatient physical therapy to address the deficits listed in the problem list box on initial evaluation, provide pt/family education and to maximize pt's level of independence in the home and community environment.     Patient's spiritual, cultural and educational needs considered and pt agreeable to plan of care and goals.     Anticipated barriers to physical therapy: 0    Goals: Short Term Goals: 3 weeks  Demonstrate improvement in recent symptoms to progress toward long term goals  Correct postural deviations in sitting and standing to decrease pain and promote postural awareness  for injury prevention.  Demonstrate compliance with initial exercise program     Long Term Goals: 6 weeks  Perform usual household tasks with no limitation  Perform usual work duties with no limitation  Ambulate required community distances with minimal limitation  Ascend/descend flight of stairs with minimal limitation, reciprocal pattern, handrail for safety  Transfer from sit to stand with no limitation equal weight between both legs  Perform squat to 50% range of motion or better to protect spine with lifting and reaching lower surfaces  Demonstrate independence with home exercise program to maintain gains made in therapy.    Plan     Certification Period: 11/25/2024 to 1/31/2025.     Outpatient Physical Therapy 2 times weekly for 6 weeks to include the following interventions: patient education, Gait Training, Manual Therapy, Moist Heat/ Ice, Neuromuscular Re-ed, Patient Education, Self Care, Therapeutic Activities, Therapeutic Exercise, and Ultrasound.   Pt may be seen by PTA as part of the rehabilitation team.     Lani Aguilar, PT

## 2024-12-17 ENCOUNTER — CLINICAL SUPPORT (OUTPATIENT)
Dept: REHABILITATION | Facility: HOSPITAL | Age: 69
End: 2024-12-17
Payer: MEDICARE

## 2024-12-17 DIAGNOSIS — M25.562 ACUTE PAIN OF LEFT KNEE: Primary | ICD-10-CM

## 2024-12-17 DIAGNOSIS — M25.662 STIFFNESS OF LEFT KNEE: ICD-10-CM

## 2024-12-17 PROCEDURE — 97530 THERAPEUTIC ACTIVITIES: CPT

## 2024-12-17 PROCEDURE — 97110 THERAPEUTIC EXERCISES: CPT

## 2024-12-17 NOTE — PROGRESS NOTES
"[facility]   Physical Therapy Treatment Note      Name: Pham Licea  Clinic Number: [id number]    Therapy Diagnosis: Impaired functional mobility and activity tolerance  Physician: Panchito Huggins,*    Visit Date: 12/17/2024    Physician Orders: PT Eval and Treat   Medical Diagnosis: M25.562 (ICD-10-CM) - Left knee pain, unspecified chronicity  Evaluation Date: 11/25/2024  Authorization period Expiration: 1/31/2024  Plan of Care Expiration: 1/31/2025  Progress Note Due: 12/27/2024  Visit #/Visits authorized: 6/12  FOTO: 1/ 3         Precautions: Standard  Date of Surgery: NA        Time In: 7:30  Time Out: 8:15  Total Billable Time: 45 minutes    PTA Visit #: 0/5       Subjective     Patient reports:  she's been feeling pain in the superior patella.  She was compliant with home exercise program.  Response to previous treatment: good  Functional change: none    Pain: 4/10  Location: left knee      Objective      Objective Measures updated at progress report unless specified.     Treatment     Elicia received the treatments listed below:      therapeutic exercises to develop strength and ROM for 35 minutes including:  Hamstring stretch long seat 3 x 30"  Heel slides with strap 5 x 20"  Quad sets x 15  SLR x 10  Side-lying clams x 15  SAQ's x 2# wt x 10  LAQ x 10  Knee flexion RTB x 10  Heelcord stretch on wedge 3 x 30"    therapeutic activities to improve functional performance for 10  minutes, including:  Forward step ups x 15  Lateral step ups x 15  Alternating toe taps x 1'    Cold pack to left knee x 10 min        Patient Education and Home Exercises       Education provided:   - quad sets, SLR    Written Home Exercises Provided: Yes. Exercises were reviewed and Elicia was able to demonstrate them prior to the end of the session.  Elicia demonstrated good  understanding of the education provided. See Electronic Medical Record under Patient Instructions for exercises provided " during therapy sessions    Assessment     Pham is a 69 y.o. female referred to outpatient physical therapy with a medical diagnosis of M25.562 (ICD-10-CM) - Left knee pain, unspecified chronicity, and presents to PT with noted laxity in LCL, no laxity or pain with any other ligamentous stress. Negative meniscus compression tests, however palpable tenderness at medial joint line. Patient with functional flexion, although painful beyond 90 deg flexion. Able to achieve full extension, but again with pain at end range. Patient able to bear weight on left lower extremity with minimal complaint of increased pain. Unable to perform SLR in supine or standing due to onset of patellar pain. Patient to benefit from skilled therapy to prevent further decline in functional status. Patient with fair tolerance to initial treatment due to pain in knee. All questions and concerns addressed, HEP issued.  Patient demonstrates limitations as described in the problem list. Pt will benefit from physcial therapy services in order to maximize pain free mobility and/or functional use of left lower extremity. The following goals were discussed with the patient and patient is in agreement with them as to be addressed in the treatment plan.     Elicia Is progressing well towards her goals.   Patient prognosis is Good.     Patient will continue to benefit from skilled outpatient physical therapy to address the deficits listed in the problem list box on initial evaluation, provide pt/family education and to maximize pt's level of independence in the home and community environment.     Patient's spiritual, cultural and educational needs considered and pt agreeable to plan of care and goals.     Anticipated barriers to physical therapy: 0    Goals: Short Term Goals: 3 weeks  Demonstrate improvement in recent symptoms to progress toward long term goals  Correct postural deviations in sitting and standing to decrease pain and promote postural  awareness for injury prevention.  Demonstrate compliance with initial exercise program     Long Term Goals: 6 weeks  Perform usual household tasks with no limitation  Perform usual work duties with no limitation  Ambulate required community distances with minimal limitation  Ascend/descend flight of stairs with minimal limitation, reciprocal pattern, handrail for safety  Transfer from sit to stand with no limitation equal weight between both legs  Perform squat to 50% range of motion or better to protect spine with lifting and reaching lower surfaces  Demonstrate independence with home exercise program to maintain gains made in therapy.    Plan     Certification Period: 11/25/2024 to 1/31/2025.     Outpatient Physical Therapy 2 times weekly for 6 weeks to include the following interventions: patient education, Gait Training, Manual Therapy, Moist Heat/ Ice, Neuromuscular Re-ed, Patient Education, Self Care, Therapeutic Activities, Therapeutic Exercise, and Ultrasound.   Pt may be seen by PTA as part of the rehabilitation team.     Lani Aguilar, PT

## 2024-12-19 ENCOUNTER — CLINICAL SUPPORT (OUTPATIENT)
Dept: REHABILITATION | Facility: HOSPITAL | Age: 69
End: 2024-12-19
Payer: MEDICARE

## 2024-12-19 DIAGNOSIS — M25.662 STIFFNESS OF LEFT KNEE: ICD-10-CM

## 2024-12-19 DIAGNOSIS — M25.562 ACUTE PAIN OF LEFT KNEE: Primary | ICD-10-CM

## 2024-12-19 PROCEDURE — 97110 THERAPEUTIC EXERCISES: CPT

## 2024-12-19 NOTE — PROGRESS NOTES
"[facility]   Physical Therapy Treatment Note      Name: Pham Licea  Clinic Number: [id number]    Therapy Diagnosis: Impaired functional mobility and activity tolerance  Physician: Panchito Huggins,*    Visit Date: 12/19/2024    Physician Orders: PT Eval and Treat   Medical Diagnosis: M25.562 (ICD-10-CM) - Left knee pain, unspecified chronicity  Evaluation Date: 11/25/2024  Authorization period Expiration: 1/31/2024  Plan of Care Expiration: 1/31/2025  Progress Note Due: 12/27/2024  Visit #/Visits authorized: 7/12  FOTO: 1/ 3         Precautions: Standard  Date of Surgery: NA        Time In: 7:30  Time Out: 8:00  Total Billable Time: 30 minutes    PTA Visit #: 0/5       Subjective     Patient reports:  she's been feeling pain in the superior patella.  She states she has a follow up appointment with Dr. Huggins on 12/23/24.  She was compliant with home exercise program.  Response to previous treatment: increased pain left knee the night of her therapy session  Functional change: none    Pain: 4/10  Location: left knee      Objective      Objective Measures updated at progress report unless specified.     Treatment     Elicia received the treatments listed below:      therapeutic exercises to develop strength and ROM for 30 minutes including:  Hamstring stretch long seat 3 x 30"  Heel slides with strap 5 x 20"  Quad sets x 15  SLR x 10  Side-lying clams x 15  SAQ's  x 10  LAQ x 10  Knee flexion RTB x 10 not performed  Heelcord stretch on wedge 3 x 30" not performed    therapeutic activities to improve functional performance for 0  minutes, including:    NOT PERFORMED:    Forward step ups x 15  Lateral step ups x 15  Alternating toe taps x 1'          Patient Education and Home Exercises       Education provided:   - quad sets, SLR    Written Home Exercises Provided: Yes. Exercises were reviewed and Elicia was able to demonstrate them prior to the end of the session.  Elicia " demonstrated good  understanding of the education provided. See Electronic Medical Record under Patient Instructions for exercises provided during therapy sessions    Assessment     Pham is a 69 y.o. female referred to outpatient physical therapy with a medical diagnosis of M25.562 (ICD-10-CM) - Left knee pain, unspecified chronicity, and presents to PT with noted laxity in LCL, no laxity or pain with any other ligamentous stress. Negative meniscus compression tests, however palpable tenderness at medial joint line. Patient with functional flexion, although painful beyond 90 deg flexion. Able to achieve full extension, but again with pain at end range. Patient able to bear weight on left lower extremity with minimal complaint of increased pain. Unable to perform SLR in supine or standing due to onset of patellar pain. Patient to benefit from skilled therapy to prevent further decline in functional status. Patient with fair tolerance to initial treatment due to pain in knee. All questions and concerns addressed, HEP issued.  Patient demonstrates limitations as described in the problem list. Pt will benefit from physcial therapy services in order to maximize pain free mobility and/or functional use of left lower extremity. The following goals were discussed with the patient and patient is in agreement with them as to be addressed in the treatment plan.     Elicia did not perform all activities due to increased left knee pain during her therapy session.    Elicia Is progressing well towards her goals.   Patient prognosis is Good.     Patient will continue to benefit from skilled outpatient physical therapy to address the deficits listed in the problem list box on initial evaluation, provide pt/family education and to maximize pt's level of independence in the home and community environment.     Patient's spiritual, cultural and educational needs considered and pt agreeable to plan of care and goals.     Anticipated  barriers to physical therapy: 0    Goals: Short Term Goals: 3 weeks  Demonstrate improvement in recent symptoms to progress toward long term goals  Correct postural deviations in sitting and standing to decrease pain and promote postural awareness for injury prevention.  Demonstrate compliance with initial exercise program     Long Term Goals: 6 weeks  Perform usual household tasks with no limitation  Perform usual work duties with no limitation  Ambulate required community distances with minimal limitation  Ascend/descend flight of stairs with minimal limitation, reciprocal pattern, handrail for safety  Transfer from sit to stand with no limitation equal weight between both legs  Perform squat to 50% range of motion or better to protect spine with lifting and reaching lower surfaces  Demonstrate independence with home exercise program to maintain gains made in therapy.    Plan     Certification Period: 11/25/2024 to 1/31/2025.     Outpatient Physical Therapy 2 times weekly for 6 weeks to include the following interventions: patient education, Gait Training, Manual Therapy, Moist Heat/ Ice, Neuromuscular Re-ed, Patient Education, Self Care, Therapeutic Activities, Therapeutic Exercise, and Ultrasound.   Pt may be seen by PTA as part of the rehabilitation team.     Lani Aguilar, PT

## 2024-12-23 ENCOUNTER — OFFICE VISIT (OUTPATIENT)
Dept: ORTHOPEDICS | Facility: CLINIC | Age: 69
End: 2024-12-23
Payer: MEDICARE

## 2024-12-23 VITALS — HEIGHT: 64 IN | WEIGHT: 198 LBS | BODY MASS INDEX: 33.8 KG/M2

## 2024-12-23 DIAGNOSIS — S52.125A CLOSED NONDISPLACED FRACTURE OF HEAD OF LEFT RADIUS, INITIAL ENCOUNTER: Primary | ICD-10-CM

## 2024-12-23 DIAGNOSIS — M17.12 PRIMARY OSTEOARTHRITIS OF LEFT KNEE: Primary | ICD-10-CM

## 2024-12-23 PROCEDURE — 99999PBSHW PR PBB SHADOW TECHNICAL ONLY FILED TO HB: Mod: PBBFAC,,,

## 2024-12-23 PROCEDURE — 99213 OFFICE O/P EST LOW 20 MIN: CPT | Mod: PBBFAC,PN | Performed by: ORTHOPAEDIC SURGERY

## 2024-12-23 PROCEDURE — 20610 DRAIN/INJ JOINT/BURSA W/O US: CPT | Mod: PBBFAC,PN,LT | Performed by: ORTHOPAEDIC SURGERY

## 2024-12-23 PROCEDURE — 99999 PR PBB SHADOW E&M-EST. PATIENT-LVL III: CPT | Mod: PBBFAC,,, | Performed by: ORTHOPAEDIC SURGERY

## 2024-12-23 RX ORDER — TRIAMCINOLONE ACETONIDE 40 MG/ML
40 INJECTION, SUSPENSION INTRA-ARTICULAR; INTRAMUSCULAR
Status: DISCONTINUED | OUTPATIENT
Start: 2024-12-23 | End: 2024-12-23 | Stop reason: HOSPADM

## 2024-12-23 RX ADMIN — TRIAMCINOLONE ACETONIDE 40 MG: 40 INJECTION, SUSPENSION INTRA-ARTICULAR; INTRAMUSCULAR at 08:12

## 2024-12-23 NOTE — PROCEDURES
Large Joint Aspiration/Injection: L knee    Date/Time: 12/23/2024 8:15 AM    Performed by: Panchito Huggins MD  Authorized by: Panchito Huggins MD    Consent Done?:  Yes (Verbal)  Indications:  Pain  Site marked: the procedure site was marked    Timeout: prior to procedure the correct patient, procedure, and site was verified    Local anesthetic:  Lidocaine 1% without epinephrine and bupivacaine 0.25% without epinephrine  Anesthetic total (ml):  6      Details:  Needle Size:  20 G  Approach:  Anterolateral  Location:  Knee  Site:  L knee  Medications:  40 mg triamcinolone acetonide 40 mg/mL  Patient tolerance:  Patient tolerated the procedure well with no immediate complications

## 2024-12-23 NOTE — PROGRESS NOTES
Subjective:      Patient ID: Pham Licea is a 69 y.o. female.    Chief Complaint: Pain of the Left Knee (MRI review. Pt finished PT)    HPI  Patient follow up on left knee and MRI.  Left knee pain persists.  It is interfering with activities of daily living at times.  Even in his short drive causes pain in her knee.  ROS      Objective:    Ortho Exam     Constitutional:   Patient is alert  and oriented in no acute distress  HEENT:  normocephalic atraumatic; PERRL EOMI  Neck:  Supple without adenopathy  Cardiovascular:  Normal rate and rhythm  Pulmonary:  Normal respiratory effort normal chest wall expansion  Abdominal:  Nonprotuberant nondistended  Musculoskeletal:  Patient has a minimally antalgic steady gait  Good range of motion diffuse mild medial joint line tenderness  No effusion no gross instability no increased warmth or erythema  Neurological:  No focal defect; cranial nerves 2-12 grossly intact  Psychiatric/behavioral:  Mood and behavior normal      MRI Knee Without Contrast Left  Narrative: EXAMINATION:  MRI KNEE WITHOUT CONTRAST LEFT    CLINICAL HISTORY:  Knee trauma, internal derangement suspected, xray done;Pain in left knee    TECHNIQUE:  Multiplanar MR imaging of the left knee was performed utilizing coronal T1, coronal and sagittal PD fat-suppressed, and axial and sagittal T2 FSE fat-suppressed pulse sequences.    COMPARISON:  None    FINDINGS:  Cruciate ligaments: The ACL, PCL, MCL, LCL complex, popliteus tendon, and IT band show no acute injury.    Quadriceps and patellar tendon: There is insertional quadriceps tendinopathy/tendinosis.  The quadriceps tendon and patellar tendon show no acute injury.  No definite patellar tendonitis.    Menisci: No medial meniscal tear appreciated.No lateral meniscal tear appreciated.No parameniscal cyst formation.    Cartilage: There is high-grade partial-thickness abnormal chondral signal observed within the medial aspect of the lateral patellar facet  at the level of the patellar mid-pole on series 201, image 19.  There is also high-grade partial-thickness chondral loss present over the patellar eminence on series 201, image 19.  No full-thickness cartilage loss or fissuring appreciated within the knee joint.    Bones: There is edema like signal present within the left lateral patellar facet suggesting possible direct post-traumatic bone contusion.  Please correlate clinically for a history of recent trauma..  There is tricompartmental osteophyte formation in the left knee.    Musculature: The visualized musculature about the knee joint is normal in signal with no evidence of strain, intramuscular hematoma, or mass.    Miscellaneous: There is a minimal left knee joint effusion..There is a partially ruptured Baker's cyst present with fluid and edema like signal noted along the superficial border of the medial head of the gastrocnemius muscle in the proximal left calf on series 201 images 1-14.No pes anserine bursitis.  There is subcutaneous edema like signal and T1 hyperintense superficial fascial fluid noted along the medial and lateral retinaculum compatible with soft tissue contusion/hematoma.  Impression: 1. Probable soft tissue contusion/hematoma along the anterior soft tissues of the left knee, most pronounced along the superficial border of the medial retinaculum.  2. Probable post-traumatic bone contusion involving the far lateral margin of the lateral patellar facet.  3. Partially ruptured small Baker's cyst with fluid and edema like signal observed tracking along the superficial border of the medial head of the gastrocnemius muscle.  4. High-grade partial-thickness chondral fissuring and loss within the patellar eminence and medial aspect of the lateral patellar facet at the level of the patellar midpole.    Electronically signed by: Shiv Geronimo MD  Date:    11/21/2024  Time:    15:19  X-Ray Elbow Complete Left  EXAMINATION:  XR ELBOW COMPLETE 3 VIEW  LEFT    CLINICAL HISTORY:  Nondisplaced fracture of head of left radius, initial encounter for closed fracture    TECHNIQUE:  AP, lateral, and oblique views of the left elbow were performed.    COMPARISON:  2024.    FINDINGS:  Healing subacute nondisplaced transverse fracture of the radial head with increasing sclerosis and callus formation.  No significant joint effusion or soft tissue swelling.    Electronically signed by: Alan Montenegro  Date:    2024  Time:    08:41       My Radiographs Findings:    I have personally reviewed radiographs and concur with above findings    Assessment:       Encounter Diagnosis   Name Primary?    Primary osteoarthritis of left knee Yes         Plan:       I have discussed medical condition treatment options with her at length.  After a verbal consent and sterile prep I injected her left knee today without complication.  Continue with the NSAIDs we have discussed general knee rehab if symptoms fail to improve may need to consider viscosupplementation.  Follow up in 6 weeks sooner if any questions or problems.  Follow up in 2-3 weeks from the standpoint of the left elbow.        Past Medical History:   Diagnosis Date    Anemia     Fatty liver     Former smoker     Osteoarthritis 2010    Pulmonary nodules 2019    Repeat CT in 6 mo    Ulcer of abdomen wall 2016     Past Surgical History:   Procedure Laterality Date    APPENDECTOMY  1993     SECTION  , , 1983    x3    CHOLECYSTECTOMY      2020    COLONOSCOPY  ~    [facility]: normal findings per patient report    COLONOSCOPY N/A 2020    Procedure: COLONOSCOPY;  Surgeon: Misael Holm MD;  Location: [facility];  Service: General;  Laterality: N/A;    COLONOSCOPY, WITH RETROGRADE BALLOON ENTEROSCOPY, SINGLE OR DOUBLE BALLOON N/A 2023    Procedure: COLONOSCOPY, WITH RETROGRADE BALLOON ENTEROSCOPY, DOUBLE BALLOON;  Surgeon: Collin Mares MD;  Location: [facility] (2ND FLR);   Service: Endoscopy;  Laterality: N/A;  inst via portal-MS-sutab per pt request-tb-new inst portal    DIAGNOSTIC LAPAROSCOPY N/A 05/04/2021    Procedure: LAPAROSCOPY, DIAGNOSTIC;  Surgeon: Misael Holm MD;  Location: [facility] OR;  Service: General;  Laterality: N/A;    EPIDURAL STEROID INJECTION INTO LUMBAR SPINE N/A 10/12/2018    Procedure: Injection-steroid-epidural-lumbar;  Surgeon: Kal Johnson MD;  Location: [facility];  Service: Pain Management;  Laterality: N/A;  L5-S1    EPIDURAL STEROID INJECTION INTO LUMBAR SPINE N/A 05/31/2019    Procedure: Injection-steroid-epidural-lumbar L5-S1;  Surgeon: Kal Johnson MD;  Location: [facility] OR;  Service: Pain Management;  Laterality: N/A;    ESOPHAGOGASTRODUODENOSCOPY N/A 11/09/2020    Procedure: ESOPHAGOGASTRODUODENOSCOPY (EGD);  Surgeon: Misael Holm MD;  Location: [facility];  Service: General;  Laterality: N/A;    ESOPHAGOGASTRODUODENOSCOPY N/A 7/16/2024    Procedure: EGD (ESOPHAGOGASTRODUODENOSCOPY);  Surgeon: Jas Gleason MD;  Location: [facility];  Service: Endoscopy;  Laterality: N/A;    FUSION, SPINE, MINIMALLY INVASIVE, USING COMPUTER-ASSISTED NAVIGATION N/A 12/22/2022    Procedure: ENTEROSCOPY, DOUBLE BALLOON, ANTEGRADE;  Surgeon: Collin Mares MD;  Location: [facility] ([address] [place] [address]);  Service: Endoscopy;  Laterality: N/A;  11/14/22-Instructions via portal-DS    *open to earlier date if available*    HYSTERECTOMY  1993    one ovary conserved    LAPAROSCOPIC CHOLECYSTECTOMY N/A 09/02/2020    Procedure: CHOLECYSTECTOMY, LAPAROSCOPIC;  Surgeon: Govind Frey MD;  Location: [facility] OR;  Service: General;  Laterality: N/A;    LAPAROSCOPIC LYSIS OF ADHESIONS N/A 05/04/2021    Procedure: LYSIS, ADHESIONS, LAPAROSCOPIC;  Surgeon: Misael Holm MD;  Location: [facility] OR;  Service: General;  Laterality: N/A;    TRANSFORAMINAL EPIDURAL INJECTION OF STEROID Right 2/6/2024    Procedure: Injection,steroid,epidural,transforaminal approach;  Surgeon: Kal Johnson  MD [place];  Location: [facility];  Service: Anesthesiology;  Laterality: Right;  L4-5 and L5-s1    UPPER GASTROINTESTINAL ENDOSCOPY  2016         Current Outpatient Medications:     betamethasone dipropionate 0.05 % cream, Apply topically 2 (two) times daily., Disp: 45 g, Rfl: 1    biotin 5,000 mcg TbDL, Take 5,000 mcg by mouth Daily., Disp: , Rfl:     cetirizine (ZYRTEC) 10 MG tablet, Take 1 tablet (10 mg total) by mouth once daily., Disp: 90 tablet, Rfl: 0    CONSTULOSE 10 gram/15 mL solution, TAKE 10 ML BY MOUTH  THREE TIMES DAILY, Disp: 948 mL, Rfl: 2    diclofenac sodium (VOLTAREN) 1 % Gel, Apply 2 g topically 2 (two) times daily as needed., Disp: , Rfl:     doxycycline (VIBRAMYCIN) 100 MG Cap, Take 1 capsule (100 mg total) by mouth 2 (two) times daily., Disp: 14 capsule, Rfl: 0    gabapentin (NEURONTIN) 300 MG capsule, Take 2 capsules (600 mg total) by mouth 3 (three) times daily., Disp: 180 capsule, Rfl: 2    meloxicam (MOBIC) 15 MG tablet, Take 1 tablet (15 mg total) by mouth once daily., Disp: 30 tablet, Rfl: 0    ondansetron (ZOFRAN-ODT) 4 MG TbDL, Take 8 mg by mouth every 6 (six) hours as needed., Disp: , Rfl:     orphenadrine (NORFLEX) 100 mg tablet, Take 1 tablet (100 mg total) by mouth 2 (two) times daily as needed for Muscle spasms., Disp: 40 tablet, Rfl: 1    pantoprazole (PROTONIX) 20 MG tablet, Take 1 tablet (20 mg total) by mouth once daily., Disp: 90 tablet, Rfl: 2    HYDROcodone-acetaminophen (NORCO) 5-325 mg per tablet, Take 1 tablet by mouth every 6 (six) hours as needed for Pain., Disp: 12 tablet, Rfl: 0    Review of patient's allergies indicates:  No Known Allergies    Family History   Problem Relation Name Age of Onset    Ovarian cancer Mother      Heart disease Mother      Osteoporosis Mother      Arthritis Mother      Hypertension Father      Stroke Father  50    Brain Hemorrhage Father      Aneurysm Father      Osteoarthritis Sister      Arthritis Sister      Hypertension Sister       Obesity Sister      Breast cancer Neg Hx      Colon cancer Neg Hx      Colon polyps Neg Hx      Crohn's disease Neg Hx      Ulcerative colitis Neg Hx       Social History     Occupational History    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

## 2024-12-30 RX ORDER — ORPHENADRINE CITRATE 100 MG/1
100 TABLET, EXTENDED RELEASE ORAL 2 TIMES DAILY PRN
Qty: 40 TABLET | Refills: 1 | Status: SHIPPED | OUTPATIENT
Start: 2024-12-30

## 2025-01-13 ENCOUNTER — PATIENT MESSAGE (OUTPATIENT)
Dept: ORTHOPEDICS | Facility: CLINIC | Age: 70
End: 2025-01-13
Payer: MEDICARE

## 2025-01-13 ENCOUNTER — PATIENT MESSAGE (OUTPATIENT)
Dept: HEMATOLOGY/ONCOLOGY | Facility: CLINIC | Age: 70
End: 2025-01-13
Payer: MEDICARE

## 2025-01-28 ENCOUNTER — OFFICE VISIT (OUTPATIENT)
Dept: SURGERY | Facility: CLINIC | Age: 70
End: 2025-01-28
Payer: MEDICARE

## 2025-01-28 VITALS
WEIGHT: 190.5 LBS | DIASTOLIC BLOOD PRESSURE: 90 MMHG | HEIGHT: 64 IN | BODY MASS INDEX: 32.52 KG/M2 | SYSTOLIC BLOOD PRESSURE: 168 MMHG | OXYGEN SATURATION: 100 % | HEART RATE: 64 BPM

## 2025-01-28 DIAGNOSIS — Z87.19 HISTORY OF SMALL BOWEL OBSTRUCTION: Primary | ICD-10-CM

## 2025-01-28 PROCEDURE — 99212 OFFICE O/P EST SF 10 MIN: CPT | Mod: S$PBB,,, | Performed by: SPECIALIST

## 2025-01-28 PROCEDURE — 99213 OFFICE O/P EST LOW 20 MIN: CPT | Mod: PBBFAC | Performed by: SPECIALIST

## 2025-01-28 PROCEDURE — 99999 PR PBB SHADOW E&M-EST. PATIENT-LVL III: CPT | Mod: PBBFAC,,, | Performed by: SPECIALIST

## 2025-01-28 NOTE — PROGRESS NOTES
"Subjective     Patient ID: Pham Licea  is a 69 y.o. female     Chief Complaint:   Chief Complaint   Patient presents with    Consult      Vitals:    25 0924   BP: (!) 168/90   BP Location: Left arm   Patient Position: Sitting   Pulse: 64   SpO2: 100%   Weight: 86.4 kg (190 lb 8 oz)   Height: 5' 4" (1.626 m)         Patient is a very pleasant 69-year-old female who presents with complaints of abdominal pain over the weekend.  She reports she has had multiple operations for bowel obstructions in the past and was having similar symptoms.  She has subsequently had a bowel movement and feeling better today.  She is just here for evaluation no nausea or vomiting.  Bowel discomfort is improved  Past Medical History:   Diagnosis Date    Anemia     Fatty liver     Former smoker     Osteoarthritis 2010    Pulmonary nodules 2019    Repeat CT in 6 mo    Ulcer of abdomen wall 2016     Past Surgical History:   Procedure Laterality Date    APPENDECTOMY  1993     SECTION  , , 1983    x3    CHOLECYSTECTOMY          COLONOSCOPY  ~    [facility]: normal findings per patient report    COLONOSCOPY N/A 2020    Procedure: COLONOSCOPY;  Surgeon: Misael Holm MD;  Location: [facility];  Service: General;  Laterality: N/A;    COLONOSCOPY, WITH RETROGRADE BALLOON ENTEROSCOPY, SINGLE OR DOUBLE BALLOON N/A 2023    Procedure: COLONOSCOPY, WITH RETROGRADE BALLOON ENTEROSCOPY, DOUBLE BALLOON;  Surgeon: Collin Mares MD;  Location: [facility] ([address] [place] [address]);  Service: Endoscopy;  Laterality: N/A;  inst via portal-MS-sutab per pt request-tb-new inst portal    DIAGNOSTIC LAPAROSCOPY N/A 2021    Procedure: LAPAROSCOPY, DIAGNOSTIC;  Surgeon: Misael Holm MD;  Location: [facility] OR;  Service: General;  Laterality: N/A;    EPIDURAL STEROID INJECTION INTO LUMBAR SPINE N/A 10/12/2018    Procedure: Injection-steroid-epidural-lumbar;  Surgeon: Kal Johnson MD;  Location: [facility] OR;  " Service: Pain Management;  Laterality: N/A;  L5-S1    EPIDURAL STEROID INJECTION INTO LUMBAR SPINE N/A 05/31/2019    Procedure: Injection-steroid-epidural-lumbar L5-S1;  Surgeon: Kal Johnson MD;  Location: [facility] OR;  Service: Pain Management;  Laterality: N/A;    ESOPHAGOGASTRODUODENOSCOPY N/A 11/09/2020    Procedure: ESOPHAGOGASTRODUODENOSCOPY (EGD);  Surgeon: Misael Holm MD;  Location: [facility] ENDO;  Service: General;  Laterality: N/A;    ESOPHAGOGASTRODUODENOSCOPY N/A 7/16/2024    Procedure: EGD (ESOPHAGOGASTRODUODENOSCOPY);  Surgeon: Jas Gleason MD;  Location: [facility] ENDO;  Service: Endoscopy;  Laterality: N/A;    FUSION, SPINE, MINIMALLY INVASIVE, USING COMPUTER-ASSISTED NAVIGATION N/A 12/22/2022    Procedure: ENTEROSCOPY, DOUBLE BALLOON, ANTEGRADE;  Surgeon: Collin Mares MD;  Location: [facility] ENDO ([address] [place] [address]);  Service: Endoscopy;  Laterality: N/A;  11/14/22-Instructions via portal-DS    *open to earlier date if available*    HYSTERECTOMY  1993    one ovary conserved    LAPAROSCOPIC CHOLECYSTECTOMY N/A 09/02/2020    Procedure: CHOLECYSTECTOMY, LAPAROSCOPIC;  Surgeon: Govind Frey MD;  Location: [facility] OR;  Service: General;  Laterality: N/A;    LAPAROSCOPIC LYSIS OF ADHESIONS N/A 05/04/2021    Procedure: LYSIS, ADHESIONS, LAPAROSCOPIC;  Surgeon: Misael Holm MD;  Location: [facility] OR;  Service: General;  Laterality: N/A;    TRANSFORAMINAL EPIDURAL INJECTION OF STEROID Right 2/6/2024    Procedure: Injection,steroid,epidural,transforaminal approach;  Surgeon: Kal Johnson MD;  Location: [facility] OR;  Service: Anesthesiology;  Laterality: Right;  L4-5 and L5-s1    UPPER GASTROINTESTINAL ENDOSCOPY  2016     Family History   Problem Relation Name Age of Onset    Ovarian cancer Mother      Heart disease Mother      Osteoporosis Mother      Arthritis Mother      Hypertension Father      Stroke Father  50    Brain Hemorrhage Father      Aneurysm Father      Osteoarthritis Sister      Arthritis  Sister      Hypertension Sister      Obesity Sister      Breast cancer Neg Hx      Colon cancer Neg Hx      Colon polyps Neg Hx      Crohn's disease Neg Hx      Ulcerative colitis Neg Hx       Past Surgical History:   Procedure Laterality Date    APPENDECTOMY  1993     SECTION  , , 1983    x3    CHOLECYSTECTOMY          COLONOSCOPY  ~    [facility]: normal findings per patient report    COLONOSCOPY N/A 2020    Procedure: COLONOSCOPY;  Surgeon: Misael Holm MD;  Location: [facility] ENDO;  Service: General;  Laterality: N/A;    COLONOSCOPY, WITH RETROGRADE BALLOON ENTEROSCOPY, SINGLE OR DOUBLE BALLOON N/A 2023    Procedure: COLONOSCOPY, WITH RETROGRADE BALLOON ENTEROSCOPY, DOUBLE BALLOON;  Surgeon: Collin Mares MD;  [facility]  Service: Endoscopy;  Laterality: N/A;  inst via portal-MS-sutab per pt request-tb-new inst portal    DIAGNOSTIC LAPAROSCOPY N/A 2021    Procedure: LAPAROSCOPY, DIAGNOSTIC;  Surgeon: Misael Holm MD;  Location: [facility] OR;  Service: General;  Laterality: N/A;    EPIDURAL STEROID INJECTION INTO LUMBAR SPINE N/A 10/12/2018    Procedure: Injection-steroid-epidural-lumbar;  Surgeon: Kal Johnson MD;  Location: [facility] OR;  Service: Pain Management;  Laterality: N/A;  L5-S1    EPIDURAL STEROID INJECTION INTO LUMBAR SPINE N/A 2019    Procedure: Injection-steroid-epidural-lumbar L5-S1;  Surgeon: Kal Johnson MD;  Location: [facility] OR;  Service: Pain Management;  Laterality: N/A;    ESOPHAGOGASTRODUODENOSCOPY N/A 2020    Procedure: ESOPHAGOGASTRODUODENOSCOPY (EGD);  Surgeon: Misael Holm MD;  Location: [facility];  Service: General;  Laterality: N/A;    ESOPHAGOGASTRODUODENOSCOPY N/A 2024    Procedure: EGD (ESOPHAGOGASTRODUODENOSCOPY);  Surgeon: Jas Gleason MD;  Location: [facility];  Service: Endoscopy;  Laterality: N/A;    FUSION, SPINE, MINIMALLY INVASIVE, USING COMPUTER-ASSISTED NAVIGATION N/A 2022     Procedure: ENTEROSCOPY, DOUBLE BALLOON, ANTEGRADE;  Surgeon: Collin Mares MD;  [facility]  Service: Endoscopy;  Laterality: N/A;  11/14/22-Instructions via portal-DS    *open to earlier date if available*    HYSTERECTOMY  1993    one ovary conserved    LAPAROSCOPIC CHOLECYSTECTOMY N/A 09/02/2020    Procedure: CHOLECYSTECTOMY, LAPAROSCOPIC;  Surgeon: Govind Frey MD;  Location: [facility] OR;  Service: General;  Laterality: N/A;    LAPAROSCOPIC LYSIS OF ADHESIONS N/A 05/04/2021    Procedure: LYSIS, ADHESIONS, LAPAROSCOPIC;  Surgeon: Misael Holm MD;  Location: [facility] OR;  Service: General;  Laterality: N/A;    TRANSFORAMINAL EPIDURAL INJECTION OF STEROID Right 2/6/2024    Procedure: Injection,steroid,epidural,transforaminal approach;  Surgeon: Kal Johnson MD;  Location: [facility] OR;  Service: Anesthesiology;  Laterality: Right;  L4-5 and L5-s1    UPPER GASTROINTESTINAL ENDOSCOPY  2016     Social History     Socioeconomic History    Marital status:     Number of children: 4    Highest education level: Some college, no degree   Occupational History    Occupation: sale specialist-    Tobacco Use    Smoking status: Former     Current packs/day: 0.00     Average packs/day: 1 pack/day for 40.0 years (40.0 ttl pk-yrs)     Types: Cigarettes     Start date: 11/2/1969     Quit date: 11/2/2009     Years since quitting: 15.2    Smokeless tobacco: Never    Tobacco comments:     quit 2009   Substance and Sexual Activity    Alcohol use: Not Currently     Comment: Not often - one glass of wine every now and then    Drug use: Not Currently     Types: Marijuana     Comment: in 1970s    Sexual activity: Not Currently     Social Drivers of Health     Financial Resource Strain: Medium Risk (1/7/2024)    Overall Financial Resource Strain (CARDIA)     Difficulty of Paying Living Expenses: Somewhat hard   Food Insecurity: No Food Insecurity (1/7/2024)    Hunger Vital Sign     Worried About Running  Out of Food in the Last Year: Never true     Ran Out of Food in the Last Year: Never true   Transportation Needs: No Transportation Needs (1/7/2024)    PRAPARE - Transportation     Lack of Transportation (Medical): No     Lack of Transportation (Non-Medical): No   Physical Activity: Inactive (1/7/2024)    Exercise Vital Sign     Days of Exercise per Week: 0 days     Minutes of Exercise per Session: 0 min   Stress: Stress Concern Present (1/7/2024)    [facility] [place] of Occupational Health - Occupational Stress Questionnaire     Feeling of Stress : To some extent   Housing Stability: Low Risk  (1/7/2024)    Housing Stability Vital Sign     Unable to Pay for Housing in the Last Year: No     Number of Places Lived in the Last Year: 1     Unstable Housing in the Last Year: No        Current Outpatient Medications:     betamethasone dipropionate 0.05 % cream, Apply topically 2 (two) times daily., Disp: 45 g, Rfl: 1    biotin 5,000 mcg TbDL, Take 5,000 mcg by mouth Daily., Disp: , Rfl:     cetirizine (ZYRTEC) 10 MG tablet, Take 1 tablet (10 mg total) by mouth once daily., Disp: 90 tablet, Rfl: 0    CONSTULOSE 10 gram/15 mL solution, TAKE 10 ML BY MOUTH  THREE TIMES DAILY, Disp: 948 mL, Rfl: 2    diclofenac sodium (VOLTAREN) 1 % Gel, Apply 2 g topically 2 (two) times daily as needed., Disp: , Rfl:     doxycycline (VIBRAMYCIN) 100 MG Cap, Take 1 capsule (100 mg total) by mouth 2 (two) times daily., Disp: 14 capsule, Rfl: 0    gabapentin (NEURONTIN) 300 MG capsule, Take 2 capsules (600 mg total) by mouth 3 (three) times daily., Disp: 180 capsule, Rfl: 2    HYDROcodone-acetaminophen (NORCO) 5-325 mg per tablet, Take 1 tablet by mouth every 6 (six) hours as needed for Pain., Disp: 12 tablet, Rfl: 0    meloxicam (MOBIC) 15 MG tablet, Take 1 tablet (15 mg total) by mouth once daily., Disp: 30 tablet, Rfl: 0    ondansetron (ZOFRAN-ODT) 4 MG TbDL, Take 8 mg by mouth every 6 (six) hours as needed., Disp: , Rfl:     orphenadrine  (NORFLEX) 100 mg tablet, Take 1 tablet (100 mg total) by mouth 2 (two) times daily as needed for Muscle spasms., Disp: 40 tablet, Rfl: 1    pantoprazole (PROTONIX) 20 MG tablet, Take 1 tablet (20 mg total) by mouth once daily., Disp: 90 tablet, Rfl: 2   Review of patient's allergies indicates:  No Known Allergies         Review of Systems   Gastrointestinal:  Positive for abdominal pain.        Distention with the abdominal pain over the weekend now resolved.  Having bowel movements        I have reviewed the following:     Details / Date    []   Labs     []   Micro     []   Pathology     []   Imaging     []   Cardiology Procedures     []   Other         Objective         Physical Exam  Vitals and nursing note reviewed. Exam conducted with a chaperone present.   Constitutional:       Appearance: Normal appearance. She is normal weight.   HENT:      Head: Normocephalic and atraumatic.      Mouth/Throat:      Mouth: Mucous membranes are moist.   Eyes:      Extraocular Movements: Extraocular movements intact.      Conjunctiva/sclera: Conjunctivae normal.      Pupils: Pupils are equal, round, and reactive to light.   Cardiovascular:      Rate and Rhythm: Normal rate.   Pulmonary:      Effort: Pulmonary effort is normal.   Abdominal:      General: Abdomen is flat. Bowel sounds are normal.      Palpations: Abdomen is soft.      Comments: Mild discomfort to palpation no peritoneal signs rebound or guarding   Musculoskeletal:         General: Normal range of motion.      Cervical back: Normal range of motion and neck supple.   Skin:     General: Skin is warm.   Neurological:      General: No focal deficit present.      Mental Status: She is alert and oriented to person, place, and time. Mental status is at baseline.   Psychiatric:         Mood and Affect: Mood normal.          Assessment and Plan     1. History of small bowel obstruction       No orders of the defined types were placed in this encounter.     Patient with  complaints of symptoms of questionable bowel obstruction with the weekend but now resolved no nausea vomiting diarrhea or constipation reported.  Abdominal discomfort distention as resolved with bowel movements now normal.  Recommended follow up with the emergency room should symptoms reoccur  An After Visit Summary was printed and given to the patient.       Govind Zarate MD  [facility] 2nd Floor General Surgery  [address] [place] [address]  [phone]     This note was created using [employer] direct voice recognition software. Note may have occasional typographical errors that may not have been identified and edited despite initial review prior to signing.     Patient instructed that best way to communicate with my office staff is for patient to get on the [facility] epic patient portal to expedite communication and communication issues that may occur.  Patient was given instructions on how to get on the portal.  I encouraged patient to obtain portal access as well.  Ultimately it is up to the patient to obtain access.  Patient voiced understanding.

## 2025-02-12 ENCOUNTER — PATIENT MESSAGE (OUTPATIENT)
Dept: HEMATOLOGY/ONCOLOGY | Facility: CLINIC | Age: 70
End: 2025-02-12
Payer: MEDICARE

## 2025-02-13 ENCOUNTER — LAB VISIT (OUTPATIENT)
Dept: LAB | Facility: HOSPITAL | Age: 70
End: 2025-02-13
Attending: INTERNAL MEDICINE
Payer: MEDICARE

## 2025-02-13 DIAGNOSIS — D53.8 OTHER SPECIFIED NUTRITIONAL ANEMIAS: ICD-10-CM

## 2025-02-13 DIAGNOSIS — K21.9 GASTROESOPHAGEAL REFLUX DISEASE WITHOUT ESOPHAGITIS: ICD-10-CM

## 2025-02-13 LAB
ALBUMIN SERPL BCP-MCNC: 3.6 G/DL (ref 3.5–5.2)
ALP SERPL-CCNC: 77 U/L (ref 40–150)
ALT SERPL W/O P-5'-P-CCNC: 11 U/L (ref 10–44)
ANION GAP SERPL CALC-SCNC: 9 MMOL/L (ref 8–16)
AST SERPL-CCNC: 15 U/L (ref 10–40)
BASOPHILS # BLD AUTO: 0.07 K/UL (ref 0–0.2)
BASOPHILS NFR BLD: 1.1 % (ref 0–1.9)
BILIRUB SERPL-MCNC: 0.4 MG/DL (ref 0.1–1)
BUN SERPL-MCNC: 18 MG/DL (ref 8–23)
CALCIUM SERPL-MCNC: 8.9 MG/DL (ref 8.7–10.5)
CHLORIDE SERPL-SCNC: 108 MMOL/L (ref 95–110)
CO2 SERPL-SCNC: 23 MMOL/L (ref 23–29)
CREAT SERPL-MCNC: 0.8 MG/DL (ref 0.5–1.4)
DIFFERENTIAL METHOD BLD: ABNORMAL
EOSINOPHIL # BLD AUTO: 0.1 K/UL (ref 0–0.5)
EOSINOPHIL NFR BLD: 1.8 % (ref 0–8)
ERYTHROCYTE [DISTWIDTH] IN BLOOD BY AUTOMATED COUNT: 12.7 % (ref 11.5–14.5)
EST. GFR  (NO RACE VARIABLE): >60 ML/MIN/1.73 M^2
FERRITIN SERPL-MCNC: 12 NG/ML (ref 20–300)
GLUCOSE SERPL-MCNC: 87 MG/DL (ref 70–110)
HCT VFR BLD AUTO: 33.9 % (ref 37–48.5)
HGB BLD-MCNC: 10.8 G/DL (ref 12–16)
IMM GRANULOCYTES # BLD AUTO: 0.02 K/UL (ref 0–0.04)
IMM GRANULOCYTES NFR BLD AUTO: 0.3 % (ref 0–0.5)
IRON SERPL-MCNC: 37 UG/DL (ref 30–160)
LYMPHOCYTES # BLD AUTO: 1.9 K/UL (ref 1–4.8)
LYMPHOCYTES NFR BLD: 28.6 % (ref 18–48)
MCH RBC QN AUTO: 28.1 PG (ref 27–31)
MCHC RBC AUTO-ENTMCNC: 31.9 G/DL (ref 32–36)
MCV RBC AUTO: 88 FL (ref 82–98)
MONOCYTES # BLD AUTO: 0.7 K/UL (ref 0.3–1)
MONOCYTES NFR BLD: 10.5 % (ref 4–15)
NEUTROPHILS # BLD AUTO: 3.8 K/UL (ref 1.8–7.7)
NEUTROPHILS NFR BLD: 57.7 % (ref 38–73)
NRBC BLD-RTO: 0 /100 WBC
PLATELET # BLD AUTO: 286 K/UL (ref 150–450)
PMV BLD AUTO: 9.8 FL (ref 9.2–12.9)
POTASSIUM SERPL-SCNC: 4 MMOL/L (ref 3.5–5.1)
PROT SERPL-MCNC: 6.2 G/DL (ref 6–8.4)
RBC # BLD AUTO: 3.85 M/UL (ref 4–5.4)
SATURATED IRON: 8 % (ref 20–50)
SODIUM SERPL-SCNC: 140 MMOL/L (ref 136–145)
TOTAL IRON BINDING CAPACITY: 466 UG/DL (ref 250–450)
TRANSFERRIN SERPL-MCNC: 315 MG/DL (ref 200–375)
WBC # BLD AUTO: 6.6 K/UL (ref 3.9–12.7)

## 2025-02-13 PROCEDURE — 82728 ASSAY OF FERRITIN: CPT | Performed by: INTERNAL MEDICINE

## 2025-02-13 PROCEDURE — 80053 COMPREHEN METABOLIC PANEL: CPT | Performed by: INTERNAL MEDICINE

## 2025-02-13 PROCEDURE — 36415 COLL VENOUS BLD VENIPUNCTURE: CPT | Performed by: INTERNAL MEDICINE

## 2025-02-13 PROCEDURE — 85025 COMPLETE CBC W/AUTO DIFF WBC: CPT | Performed by: INTERNAL MEDICINE

## 2025-02-13 PROCEDURE — 84466 ASSAY OF TRANSFERRIN: CPT | Performed by: INTERNAL MEDICINE

## 2025-02-13 RX ORDER — PANTOPRAZOLE SODIUM 20 MG/1
20 TABLET, DELAYED RELEASE ORAL DAILY
Qty: 90 TABLET | Refills: 2 | Status: SHIPPED | OUTPATIENT
Start: 2025-02-13 | End: 2025-11-10

## 2025-02-17 ENCOUNTER — OFFICE VISIT (OUTPATIENT)
Dept: HEMATOLOGY/ONCOLOGY | Facility: CLINIC | Age: 70
End: 2025-02-17
Payer: MEDICARE

## 2025-02-17 DIAGNOSIS — D51.8 OTHER VITAMIN B12 DEFICIENCY ANEMIAS: Primary | ICD-10-CM

## 2025-02-17 DIAGNOSIS — D50.8 IRON DEFICIENCY ANEMIA SECONDARY TO INADEQUATE DIETARY IRON INTAKE: ICD-10-CM

## 2025-02-17 RX ORDER — SODIUM FERRIC GLUCONATE COMPLEX IN SUCROSE 12.5 MG/ML
250 INJECTION INTRAVENOUS
Status: CANCELLED | OUTPATIENT
Start: 2025-02-17

## 2025-02-17 RX ORDER — DIPHENHYDRAMINE HYDROCHLORIDE 50 MG/ML
50 INJECTION INTRAMUSCULAR; INTRAVENOUS ONCE AS NEEDED
Status: CANCELLED | OUTPATIENT
Start: 2025-02-17

## 2025-02-17 RX ORDER — EPINEPHRINE 0.3 MG/.3ML
0.3 INJECTION SUBCUTANEOUS ONCE AS NEEDED
Status: CANCELLED | OUTPATIENT
Start: 2025-02-17

## 2025-02-17 RX ORDER — SODIUM CHLORIDE 0.9 % (FLUSH) 0.9 %
10 SYRINGE (ML) INJECTION
Status: CANCELLED | OUTPATIENT
Start: 2025-02-17

## 2025-02-17 RX ORDER — CYANOCOBALAMIN 1000 UG/ML
1000 INJECTION, SOLUTION INTRAMUSCULAR; SUBCUTANEOUS
Status: CANCELLED | OUTPATIENT
Start: 2025-02-17

## 2025-02-17 RX ORDER — HEPARIN 100 UNIT/ML
500 SYRINGE INTRAVENOUS
Status: CANCELLED | OUTPATIENT
Start: 2025-02-17

## 2025-02-17 NOTE — Clinical Note
Iron infusion in [place] see me 1 month after with CBC CMP iron studies. Patient to get B12 injections either in [place] or in [place] orders are in

## 2025-02-17 NOTE — PROGRESS NOTES
Subjective:   The patient location is:  home  Visit type: Virtual visit with synchronous audio and video  Face-to-face or time spent with patient on the encounter:25 min  Total time spent on and for  this encounter which includes non face-to-face time preparing to see patient, review of tests, obtaining and or reviewing separately obtained records documenting clinical information in the electronic or other health records, independently interpreting results which is not separately reported ,and communicating results to the patient/family/caregiver and in care coordination and treatment planning/communicating with pharmacy for prescriptions/addressing social needs/arranging follow-up and or referrals :25 min    Each patient I provide medical services by telemedicine is:  (1) informed of the relationship between the physician and patient and the respective role of any other health care provider with respect to management of the patient; and (2) notified that he or she may decline to receive medical services by telemedicine and may withdraw from such care at any time.  This is a video visit therefore some elements of the physical exam such as vital signs, heart sounds are breath sounds are not included and may be included if found in recent clinic notes of other providers assessing same patient. Any symptoms or signs that were visualized were stated by the patient may be included in this note.      Patient ID: Pham Licea is a 69 y.o. female.    Chief Complaint:      sept 2020 had gall bladder surgery, thern she had a bowel obstruction, no surgery then, had 2 feet of bowels removed after a second bowel obstruction  Esophageal ulcers and dudenal ulcers- with bleeding in 2017  Recd blood in [facility] this weekend, pt went to ED because she was shaky and week and couldn't think was found to be anemic in ed   pt readmitted 3/22 had repeat bowel obstruction   Seen at emergency room May 25, 2022 with CT scan of abdomen  which shows partial obstruction patient having significant abdominal pain.  Discomfort belching similar to her prior obstructive symptoms  Past Medical History:   Diagnosis Date    Anemia     Fatty liver 2015    Former smoker 2009    Osteoarthritis 2010    Pulmonary nodules 2019    Repeat CT in 6 mo    Ulcer of abdomen wall 2016     Past Surgical History:   Procedure Laterality Date    APPENDECTOMY  1993     SECTION  , , 1983    x3    CHOLECYSTECTOMY          COLONOSCOPY  ~    [facility]: normal findings per patient report    COLONOSCOPY N/A 2020    Procedure: COLONOSCOPY;  Surgeon: Misael Holm MD;  Location: [facility] ENDO;  Service: General;  Laterality: N/A;    COLONOSCOPY, WITH RETROGRADE BALLOON ENTEROSCOPY, SINGLE OR DOUBLE BALLOON N/A 2023    Procedure: COLONOSCOPY, WITH RETROGRADE BALLOON ENTEROSCOPY, DOUBLE BALLOON;  Surgeon: Collin Mares MD;  Location: [facility] ENDO ([address] [place] [address]);  Service: Endoscopy;  Laterality: N/A;  inst via portal-MS-sutab per pt request-tb-new inst portal    DIAGNOSTIC LAPAROSCOPY N/A 2021    Procedure: LAPAROSCOPY, DIAGNOSTIC;  Surgeon: Misael Holm MD;  Location: [facility] OR;  Service: General;  Laterality: N/A;    EPIDURAL STEROID INJECTION INTO LUMBAR SPINE N/A 10/12/2018    Procedure: Injection-steroid-epidural-lumbar;  Surgeon: Kal Johnson MD;  Location: [facility] OR;  Service: Pain Management;  Laterality: N/A;  L5-S1    EPIDURAL STEROID INJECTION INTO LUMBAR SPINE N/A 2019    Procedure: Injection-steroid-epidural-lumbar L5-S1;  Surgeon: Kal Johnson MD;  Location: [facility] OR;  Service: Pain Management;  Laterality: N/A;    ESOPHAGOGASTRODUODENOSCOPY N/A 2020    Procedure: ESOPHAGOGASTRODUODENOSCOPY (EGD);  Surgeon: Misael Holm MD;  Location: [facility] ENDO;  Service: General;  Laterality: N/A;    ESOPHAGOGASTRODUODENOSCOPY N/A 2024    Procedure: EGD (ESOPHAGOGASTRODUODENOSCOPY);  Surgeon: Jas Gleason MD;   Location: [facility] ENDO;  Service: Endoscopy;  Laterality: N/A;    FUSION, SPINE, MINIMALLY INVASIVE, USING COMPUTER-ASSISTED NAVIGATION N/A 12/22/2022    Procedure: ENTEROSCOPY, DOUBLE BALLOON, ANTEGRADE;  Surgeon: Collin Mares MD;  Location: [facility] ENDO (2ND FLR);  Service: Endoscopy;  Laterality: N/A;  11/14/22-Instructions via portal-DS    *open to earlier date if available*    HYSTERECTOMY  1993    one ovary conserved    LAPAROSCOPIC CHOLECYSTECTOMY N/A 09/02/2020    Procedure: CHOLECYSTECTOMY, LAPAROSCOPIC;  Surgeon: Govind Frey MD;  Location: [facility] OR;  Service: General;  Laterality: N/A;    LAPAROSCOPIC LYSIS OF ADHESIONS N/A 05/04/2021    Procedure: LYSIS, ADHESIONS, LAPAROSCOPIC;  Surgeon: Misael Holm MD;  Location: [facility] OR;  Service: General;  Laterality: N/A;    TRANSFORAMINAL EPIDURAL INJECTION OF STEROID Right 2/6/2024    Procedure: Injection,steroid,epidural,transforaminal approach;  Surgeon: Kal Johnson MD;  Location: [facility] OR;  Service: Anesthesiology;  Laterality: Right;  L4-5 and L5-s1    UPPER GASTROINTESTINAL ENDOSCOPY  2016     Family History   Problem Relation Name Age of Onset    Ovarian cancer Mother      Heart disease Mother      Osteoporosis Mother      Arthritis Mother      Hypertension Father      Stroke Father  50    Brain Hemorrhage Father      Aneurysm Father      Osteoarthritis Sister      Arthritis Sister      Hypertension Sister      Obesity Sister      Breast cancer Neg Hx      Colon cancer Neg Hx      Colon polyps Neg Hx      Crohn's disease Neg Hx      Ulcerative colitis Neg Hx        Social History     Socioeconomic History    Marital status:     Number of children: 4    Highest education level: Some college, no degree   Occupational History    Occupation: sale specialist-    Tobacco Use    Smoking status: Former     Current packs/day: 0.00     Average packs/day: 1 pack/day for 40.0 years (40.0 ttl pk-yrs)     Types: Cigarettes     Start date: 11/2/1969      Quit date: 11/2/2009     Years since quitting: 15.3    Smokeless tobacco: Never    Tobacco comments:     quit 2009   Substance and Sexual Activity    Alcohol use: Not Currently     Comment: Not often - one glass of wine every now and then    Drug use: Not Currently     Types: Marijuana     Comment: in 1970s    Sexual activity: Not Currently     Social Drivers of Health     Financial Resource Strain: Medium Risk (2/17/2025)    Overall Financial Resource Strain (CARDIA)     Difficulty of Paying Living Expenses: Somewhat hard   Food Insecurity: Food Insecurity Present (2/17/2025)    Hunger Vital Sign     Worried About Running Out of Food in the Last Year: Sometimes true     Ran Out of Food in the Last Year: Sometimes true   Transportation Needs: No Transportation Needs (2/17/2025)    PRAPARE - Transportation     Lack of Transportation (Medical): No     Lack of Transportation (Non-Medical): No   Physical Activity: Inactive (2/17/2025)    Exercise Vital Sign     Days of Exercise per Week: 0 days     Minutes of Exercise per Session: 0 min   Stress: Stress Concern Present (2/17/2025)    [place] [employer] - Occupational Stress Questionnaire     Feeling of Stress : Rather much   Housing Stability: Low Risk  (2/17/2025)    Housing Stability Vital Sign     Unable to Pay for Housing in the Last Year: No     Number of Times Moved in the Last Year: 0     Homeless in the Last Year: No     Review of patient's allergies indicates:  No Known Allergies    Current Outpatient Medications:     betamethasone dipropionate 0.05 % cream, Apply topically 2 (two) times daily., Disp: 45 g, Rfl: 1    biotin 5,000 mcg TbDL, Take 5,000 mcg by mouth Daily., Disp: , Rfl:     cetirizine (ZYRTEC) 10 MG tablet, Take 1 tablet (10 mg total) by mouth once daily., Disp: 90 tablet, Rfl: 0    CONSTULOSE 10 gram/15 mL solution, TAKE 10 ML BY MOUTH  THREE TIMES DAILY, Disp: 948 mL, Rfl: 2    diclofenac sodium (VOLTAREN) 1 % Gel,  Apply 2 g topically 2 (two) times daily as needed., Disp: , Rfl:     doxycycline (VIBRAMYCIN) 100 MG Cap, Take 1 capsule (100 mg total) by mouth 2 (two) times daily., Disp: 14 capsule, Rfl: 0    gabapentin (NEURONTIN) 300 MG capsule, Take 2 capsules (600 mg total) by mouth 3 (three) times daily., Disp: 180 capsule, Rfl: 2    HYDROcodone-acetaminophen (NORCO) 5-325 mg per tablet, Take 1 tablet by mouth every 6 (six) hours as needed for Pain., Disp: 12 tablet, Rfl: 0    meloxicam (MOBIC) 15 MG tablet, Take 1 tablet (15 mg total) by mouth once daily., Disp: 30 tablet, Rfl: 0    ondansetron (ZOFRAN-ODT) 4 MG TbDL, Take 8 mg by mouth every 6 (six) hours as needed., Disp: , Rfl:     orphenadrine (NORFLEX) 100 mg tablet, Take 1 tablet (100 mg total) by mouth 2 (two) times daily as needed for Muscle spasms., Disp: 40 tablet, Rfl: 1    pantoprazole (PROTONIX) 20 MG tablet, Take 1 tablet (20 mg total) by mouth once daily., Disp: 90 tablet, Rfl: 2  Review of Systems   Constitutional:  Positive for malaise/fatigue. Negative for weight loss.        Mostly good appetite lost weight with her sx   HENT:  Negative for hearing loss.    Eyes:  Negative for blurred vision and double vision.   Respiratory:  Negative for cough and shortness of breath.    Cardiovascular:  Negative for chest pain.   Gastrointestinal:  Positive for constipation. Negative for abdominal pain and diarrhea.        Takes lactulose twice a day and colace twice a day   Genitourinary:  Negative for frequency.   Musculoskeletal:  Negative for back pain, myalgias and neck pain.        Leg cramps and shaky   Skin:  Negative for rash.   Neurological:  Negative for dizziness, weakness and headaches.   Endo/Heme/Allergies:  Does not bruise/bleed easily.   Psychiatric/Behavioral:  The patient is not nervous/anxious.    recent bowel obstruction 3/22 with repeat abdominal pain discomfort emergency room visit last night the outpatient surgical appointment  Physical Exam     Wt Readings from Last 3 Encounters:   01/28/25 86.4 kg (190 lb 8 oz)   12/23/24 89.8 kg (198 lb)   12/04/24 89.9 kg (198 lb 3.2 oz)     Temp Readings from Last 3 Encounters:   12/04/24 98.3 °F (36.8 °C) (Oral)   10/27/24 98 °F (36.7 °C)   07/17/24 98 °F (36.7 °C)     BP Readings from Last 3 Encounters:   01/28/25 (!) 168/90   12/04/24 (!) 140/96   11/20/24 (!) 146/82     Pulse Readings from Last 3 Encounters:   01/28/25 64   12/04/24 73   11/20/24 79     Lab Results   Component Value Date    WBC 6.60 02/13/2025    HGB 10.8 (L) 02/13/2025    HCT 33.9 (L) 02/13/2025    MCV 88 02/13/2025     02/13/2025       BMP  Lab Results   Component Value Date     02/13/2025    K 4.0 02/13/2025     02/13/2025    CO2 23 02/13/2025    BUN 18 02/13/2025    CREATININE 0.8 02/13/2025    CALCIUM 8.9 02/13/2025    ANIONGAP 9 02/13/2025    ESTGFRAFRICA >60.0 06/27/2022    EGFRNONAA >60.0 06/27/2022     Lab Results   Component Value Date    IRON 37 02/13/2025    TIBC 466 (H) 02/13/2025    FERRITIN 12 (L) 02/13/2025       Impression:  CT scan abdomen May 25, 2022     1. Multiple dilated loops of small bowel are noted that are fluid-filled left upper quadrant with the suggestion of bowel wall thickening near the level of the umbilicus in the midline and extending into the left dilated loops of bowel.  This raises the concern for vascular injury and or bowel infarction/inflammation possibly associated with obstruction.  Surgical consultation is suggested.  Post-contrast oral and intravenous imaging may be of use for confirmation given that this exam is severely hindered.  2. A 2nd region of concern is noted within the right hemipelvis where a dilated loop of small bowel is noted without obvious bowel wall thickening.  This region is nonspecific and could relate to colon, small bowel, ileus, or obstruction.  Further evaluation and clinical correlation is necessary     Patient Active Problem List   Diagnosis    Primary  osteoarthritis involving multiple joints    Obesity (BMI 30.0-34.9)    Gastroesophageal reflux disease    Acute pain of left knee    Chronic midline low back pain    Acute tear medial meniscus, right, sequela    Primary osteoarthritis of right knee    Lumbar radiculitis    Trigger middle finger of right hand    Nausea & vomiting    Abdominal pain    Encounter for postoperative care    Intestinal obstruction    Hypokalemia    Constipation    History of cholecystectomy    Right upper quadrant pain    History of hysterectomy    Rectal bleeding    Hemorrhoids    Mitral valve prolapse    Coronary artery calcification    Anemia    SAMPSON (dyspnea on exertion), noted 2010    History of smoking 25-50 pack years, 46 py, quit 2009    NSAID long-term use, started 2017, Aleve 2-4 tabs daily for a year    Bandemia    Family history of premature CAD    Cardiovascular event risk, ASCVD 10-year risk 4.8%, 2019, 6.7% in 2021    Abdominal obesity    Dyslipidemia, baseline LDL-C 112, HDL-C 49    Iron deficiency anemia secondary to inadequate dietary iron intake    Severe anemia    S/p small bowel obstruction    Chronic bilateral low back pain without sciatica    Atypical chest pain, onset 2021    Chronic fatigue    Excessive daytime sleepiness    Enteritis    Stiffness of left knee        Assessment and Plan   NATHAN: pt had partail bowel removal from opbstructions with recurrneces Bowel obs again 3/22 repeat sx and adhesiolysis, Goes to [facility] for evaluation of small bowel.  Severe iron-deficiency anemia has recurred been replace intravenous  See me 1 month after with CBC CMP iron studies B12  Patient has not been replacing B12 sublingually start B12 injections Will keep patient on Q 4 months the iron checks   recvd infusinal iron with excellent response    May take liquid iron  again see me in 6 weeks with cbc,c mp iron ferin   Caring for her special needs aguila trying to get benefits for her has been very trying for patient  Slight  B12 deficiency also continue to monitor most recent test J 9/2022 acceptable  Patient to continue follow-up of her DJD with PCP            Answers submitted by the patient for this visit:  Review of Systems Questionnaire (Submitted on 2/16/2025)  appetite change : No  unexpected weight change: No  mouth sores: No  visual disturbance: No  adenopathy: No

## 2025-02-20 ENCOUNTER — INFUSION (OUTPATIENT)
Dept: INFUSION THERAPY | Facility: HOSPITAL | Age: 70
End: 2025-02-20
Attending: ORTHOPAEDIC SURGERY
Payer: MEDICARE

## 2025-02-20 VITALS
RESPIRATION RATE: 17 BRPM | DIASTOLIC BLOOD PRESSURE: 59 MMHG | TEMPERATURE: 97 F | HEART RATE: 76 BPM | WEIGHT: 189 LBS | OXYGEN SATURATION: 97 % | HEIGHT: 64 IN | BODY MASS INDEX: 32.27 KG/M2 | SYSTOLIC BLOOD PRESSURE: 149 MMHG

## 2025-02-20 DIAGNOSIS — D50.8 IRON DEFICIENCY ANEMIA SECONDARY TO INADEQUATE DIETARY IRON INTAKE: Primary | ICD-10-CM

## 2025-02-20 DIAGNOSIS — D51.8 OTHER VITAMIN B12 DEFICIENCY ANEMIAS: ICD-10-CM

## 2025-02-20 PROCEDURE — 25000003 PHARM REV CODE 250: Performed by: INTERNAL MEDICINE

## 2025-02-20 PROCEDURE — 96365 THER/PROPH/DIAG IV INF INIT: CPT

## 2025-02-20 PROCEDURE — 63600175 PHARM REV CODE 636 W HCPCS: Performed by: INTERNAL MEDICINE

## 2025-02-20 RX ORDER — HEPARIN 100 UNIT/ML
500 SYRINGE INTRAVENOUS
Status: DISCONTINUED | OUTPATIENT
Start: 2025-02-20 | End: 2025-02-20 | Stop reason: HOSPADM

## 2025-02-20 RX ORDER — CYANOCOBALAMIN 1000 UG/ML
1000 INJECTION, SOLUTION INTRAMUSCULAR; SUBCUTANEOUS
Status: DISCONTINUED | OUTPATIENT
Start: 2025-02-20 | End: 2025-02-20 | Stop reason: HOSPADM

## 2025-02-20 RX ORDER — EPINEPHRINE 0.3 MG/.3ML
0.3 INJECTION SUBCUTANEOUS ONCE AS NEEDED
OUTPATIENT
Start: 2025-02-27

## 2025-02-20 RX ORDER — SODIUM FERRIC GLUCONATE COMPLEX IN SUCROSE 12.5 MG/ML
250 INJECTION INTRAVENOUS
OUTPATIENT
Start: 2025-02-27

## 2025-02-20 RX ORDER — HEPARIN 100 UNIT/ML
500 SYRINGE INTRAVENOUS
OUTPATIENT
Start: 2025-02-27

## 2025-02-20 RX ORDER — CYANOCOBALAMIN 1000 UG/ML
1000 INJECTION, SOLUTION INTRAMUSCULAR; SUBCUTANEOUS
OUTPATIENT
Start: 2025-02-20

## 2025-02-20 RX ORDER — SODIUM CHLORIDE 0.9 % (FLUSH) 0.9 %
10 SYRINGE (ML) INJECTION
Status: DISCONTINUED | OUTPATIENT
Start: 2025-02-20 | End: 2025-02-20 | Stop reason: HOSPADM

## 2025-02-20 RX ORDER — SODIUM FERRIC GLUCONATE COMPLEX IN SUCROSE 12.5 MG/ML
250 INJECTION INTRAVENOUS
Status: COMPLETED | OUTPATIENT
Start: 2025-02-20 | End: 2025-02-20

## 2025-02-20 RX ORDER — DIPHENHYDRAMINE HYDROCHLORIDE 50 MG/ML
50 INJECTION INTRAMUSCULAR; INTRAVENOUS ONCE AS NEEDED
OUTPATIENT
Start: 2025-02-27

## 2025-02-20 RX ORDER — SODIUM CHLORIDE 0.9 % (FLUSH) 0.9 %
10 SYRINGE (ML) INJECTION
OUTPATIENT
Start: 2025-02-27

## 2025-02-20 RX ADMIN — SODIUM CHLORIDE 250 MG: 9 INJECTION, SOLUTION INTRAVENOUS at 09:02

## 2025-02-20 RX ADMIN — CYANOCOBALAMIN 1000 MCG: 1000 INJECTION INTRAMUSCULAR; SUBCUTANEOUS at 08:02

## 2025-02-24 DIAGNOSIS — K59.09 CHRONIC CONSTIPATION: ICD-10-CM

## 2025-02-24 DIAGNOSIS — Z00.00 ENCOUNTER FOR MEDICARE ANNUAL WELLNESS EXAM: ICD-10-CM

## 2025-02-24 RX ORDER — LACTULOSE 10 G/15ML
SOLUTION ORAL
Qty: 948 ML | Refills: 0 | Status: SHIPPED | OUTPATIENT
Start: 2025-02-24

## 2025-02-25 ENCOUNTER — OFFICE VISIT (OUTPATIENT)
Dept: SURGERY | Facility: CLINIC | Age: 70
End: 2025-02-25
Payer: MEDICARE

## 2025-02-25 VITALS
DIASTOLIC BLOOD PRESSURE: 81 MMHG | BODY MASS INDEX: 32.29 KG/M2 | SYSTOLIC BLOOD PRESSURE: 144 MMHG | WEIGHT: 189.13 LBS | HEART RATE: 80 BPM | TEMPERATURE: 98 F | HEIGHT: 64 IN

## 2025-02-25 DIAGNOSIS — M62.89 PELVIC FLOOR DYSFUNCTION: Primary | ICD-10-CM

## 2025-02-25 PROCEDURE — 99213 OFFICE O/P EST LOW 20 MIN: CPT | Mod: PBBFAC,PN | Performed by: SURGERY

## 2025-02-25 PROCEDURE — 99213 OFFICE O/P EST LOW 20 MIN: CPT | Mod: S$PBB,,, | Performed by: SURGERY

## 2025-02-25 PROCEDURE — 99999 PR PBB SHADOW E&M-EST. PATIENT-LVL III: CPT | Mod: PBBFAC,,, | Performed by: SURGERY

## 2025-02-26 ENCOUNTER — TELEPHONE (OUTPATIENT)
Dept: SURGERY | Facility: CLINIC | Age: 70
End: 2025-02-26
Payer: MEDICARE

## 2025-02-26 ENCOUNTER — PATIENT MESSAGE (OUTPATIENT)
Dept: SURGERY | Facility: CLINIC | Age: 70
End: 2025-02-26
Payer: MEDICARE

## 2025-02-26 NOTE — TELEPHONE ENCOUNTER
I called patient and she stated that she'll go for the MRI Defecography on Friday, 2/28/25 @ 11:30am @ [facility], [address] [place] [address]. Patient needs to arrive @ 11am and eat a light breakfast. Marquis

## 2025-02-26 NOTE — TELEPHONE ENCOUNTER
LMOR @ 4:05pm to inform patient that I scheduled her for an MRI Defecography on Friday, 2/28/25 @ 11:30am @ [facility], [address] [place] [address].  Patient needs to arrive @ 11am and eat a light breakfast.  Envestnethart message sent.  Marquis

## 2025-02-26 NOTE — PROGRESS NOTES
[age] year female whom I am familiar with presents to my office to discuss difficulty with bowel movement.  Patient denies having truly obstructive symptoms but notes she is having to strain more to have her bowel movements.  She states the symptoms were similar to what she had when we 1st met in regards to her rectal intussusception.  She denies any shortness of breath or chest pain.  No blood in her stool.    Abdomen is soft nontender nondistended     Discussion with the patient.  Clinically no signs of obstruction or bowel obstruction.  We will repeat MR defecography to evaluate for any significant changes.  We will also refer to pelvic floor therapy.  Patient will follow up with me after these results.

## 2025-02-26 NOTE — TELEPHONE ENCOUNTER
----- Message from Harika sent at 2/26/2025  4:51 PM CST -----  Type:  Needs Medical AdviceWho Called: pt Symptoms (please be specific): inform staff  Would the patient rather a call back or a response via [facility]? Best Call Back Number: [phone] Additional Information: pt wants to confirm with Marquis she received the appt and she'll be there     please call to advise, Thank You.

## 2025-02-27 ENCOUNTER — INFUSION (OUTPATIENT)
Dept: INFUSION THERAPY | Facility: HOSPITAL | Age: 70
End: 2025-02-27
Attending: INTERNAL MEDICINE
Payer: MEDICARE

## 2025-02-27 VITALS
SYSTOLIC BLOOD PRESSURE: 116 MMHG | BODY MASS INDEX: 32.29 KG/M2 | WEIGHT: 189.13 LBS | HEIGHT: 64 IN | RESPIRATION RATE: 16 BRPM | TEMPERATURE: 97 F | OXYGEN SATURATION: 97 % | HEART RATE: 74 BPM | DIASTOLIC BLOOD PRESSURE: 64 MMHG

## 2025-02-27 DIAGNOSIS — D50.8 IRON DEFICIENCY ANEMIA SECONDARY TO INADEQUATE DIETARY IRON INTAKE: Primary | ICD-10-CM

## 2025-02-27 PROCEDURE — 25000003 PHARM REV CODE 250: Performed by: INTERNAL MEDICINE

## 2025-02-27 PROCEDURE — 63600175 PHARM REV CODE 636 W HCPCS: Performed by: INTERNAL MEDICINE

## 2025-02-27 RX ORDER — SODIUM FERRIC GLUCONATE COMPLEX IN SUCROSE 12.5 MG/ML
250 INJECTION INTRAVENOUS
OUTPATIENT
Start: 2025-03-06

## 2025-02-27 RX ORDER — EPINEPHRINE 0.3 MG/.3ML
0.3 INJECTION SUBCUTANEOUS ONCE AS NEEDED
OUTPATIENT
Start: 2025-03-06

## 2025-02-27 RX ORDER — SODIUM FERRIC GLUCONATE COMPLEX IN SUCROSE 12.5 MG/ML
250 INJECTION INTRAVENOUS
Status: COMPLETED | OUTPATIENT
Start: 2025-02-27 | End: 2025-02-27

## 2025-02-27 RX ORDER — HEPARIN 100 UNIT/ML
500 SYRINGE INTRAVENOUS
OUTPATIENT
Start: 2025-03-06

## 2025-02-27 RX ORDER — DIPHENHYDRAMINE HYDROCHLORIDE 50 MG/ML
50 INJECTION INTRAMUSCULAR; INTRAVENOUS ONCE AS NEEDED
OUTPATIENT
Start: 2025-03-06

## 2025-02-27 RX ORDER — SODIUM CHLORIDE 0.9 % (FLUSH) 0.9 %
10 SYRINGE (ML) INJECTION
Status: DISCONTINUED | OUTPATIENT
Start: 2025-02-27 | End: 2025-02-27 | Stop reason: HOSPADM

## 2025-02-27 RX ORDER — HEPARIN 100 UNIT/ML
500 SYRINGE INTRAVENOUS
Status: DISCONTINUED | OUTPATIENT
Start: 2025-02-27 | End: 2025-02-27 | Stop reason: HOSPADM

## 2025-02-27 RX ORDER — SODIUM CHLORIDE 0.9 % (FLUSH) 0.9 %
10 SYRINGE (ML) INJECTION
OUTPATIENT
Start: 2025-03-06

## 2025-02-27 RX ADMIN — SODIUM CHLORIDE 250 MG: 9 INJECTION, SOLUTION INTRAVENOUS at 01:02

## 2025-02-28 ENCOUNTER — HOSPITAL ENCOUNTER (OUTPATIENT)
Dept: RADIOLOGY | Facility: HOSPITAL | Age: 70
Discharge: HOME | End: 2025-02-28
Attending: SURGERY
Payer: MEDICARE

## 2025-02-28 ENCOUNTER — PATIENT MESSAGE (OUTPATIENT)
Dept: HEMATOLOGY/ONCOLOGY | Facility: CLINIC | Age: 70
End: 2025-02-28
Payer: MEDICARE

## 2025-02-28 DIAGNOSIS — M62.89 PELVIC FLOOR DYSFUNCTION: ICD-10-CM

## 2025-02-28 PROCEDURE — 72195 MRI PELVIS W/O DYE: CPT | Mod: TC

## 2025-02-28 PROCEDURE — 72195 MRI PELVIS W/O DYE: CPT | Mod: 26,,, | Performed by: RADIOLOGY

## 2025-03-06 ENCOUNTER — INFUSION (OUTPATIENT)
Dept: INFUSION THERAPY | Facility: HOSPITAL | Age: 70
End: 2025-03-06
Attending: INTERNAL MEDICINE
Payer: MEDICARE

## 2025-03-06 VITALS
WEIGHT: 189.13 LBS | OXYGEN SATURATION: 99 % | RESPIRATION RATE: 17 BRPM | SYSTOLIC BLOOD PRESSURE: 132 MMHG | BODY MASS INDEX: 32.29 KG/M2 | HEIGHT: 64 IN | DIASTOLIC BLOOD PRESSURE: 61 MMHG | TEMPERATURE: 97 F | HEART RATE: 73 BPM

## 2025-03-06 DIAGNOSIS — D50.8 IRON DEFICIENCY ANEMIA SECONDARY TO INADEQUATE DIETARY IRON INTAKE: Primary | ICD-10-CM

## 2025-03-06 PROCEDURE — 96365 THER/PROPH/DIAG IV INF INIT: CPT

## 2025-03-06 PROCEDURE — 25000003 PHARM REV CODE 250: Performed by: INTERNAL MEDICINE

## 2025-03-06 PROCEDURE — 63600175 PHARM REV CODE 636 W HCPCS: Performed by: INTERNAL MEDICINE

## 2025-03-06 RX ORDER — EPINEPHRINE 0.3 MG/.3ML
0.3 INJECTION SUBCUTANEOUS ONCE AS NEEDED
OUTPATIENT
Start: 2025-03-13

## 2025-03-06 RX ORDER — DIPHENHYDRAMINE HYDROCHLORIDE 50 MG/ML
50 INJECTION INTRAMUSCULAR; INTRAVENOUS ONCE AS NEEDED
OUTPATIENT
Start: 2025-03-13

## 2025-03-06 RX ORDER — HEPARIN 100 UNIT/ML
500 SYRINGE INTRAVENOUS
OUTPATIENT
Start: 2025-03-13

## 2025-03-06 RX ORDER — SODIUM FERRIC GLUCONATE COMPLEX IN SUCROSE 12.5 MG/ML
250 INJECTION INTRAVENOUS
OUTPATIENT
Start: 2025-03-13

## 2025-03-06 RX ORDER — SODIUM CHLORIDE 0.9 % (FLUSH) 0.9 %
10 SYRINGE (ML) INJECTION
OUTPATIENT
Start: 2025-03-13

## 2025-03-06 RX ORDER — SODIUM FERRIC GLUCONATE COMPLEX IN SUCROSE 12.5 MG/ML
250 INJECTION INTRAVENOUS
Status: COMPLETED | OUTPATIENT
Start: 2025-03-06 | End: 2025-03-06

## 2025-03-06 RX ORDER — SODIUM CHLORIDE 0.9 % (FLUSH) 0.9 %
10 SYRINGE (ML) INJECTION
Status: DISCONTINUED | OUTPATIENT
Start: 2025-03-06 | End: 2025-03-06 | Stop reason: HOSPADM

## 2025-03-06 RX ADMIN — SODIUM CHLORIDE 250 MG: 9 INJECTION, SOLUTION INTRAVENOUS at 08:03

## 2025-03-06 NOTE — PLAN OF CARE
Problem: Fatigue  Goal: Improved Activity Tolerance  3/6/2025 0848 by Iram Mares, RN  Outcome: Progressing  3/6/2025 0847 by Iram Mares, RN  Outcome: Progressing

## 2025-03-10 ENCOUNTER — TELEPHONE (OUTPATIENT)
Dept: SURGERY | Facility: CLINIC | Age: 70
End: 2025-03-10
Payer: MEDICARE

## 2025-03-10 NOTE — TELEPHONE ENCOUNTER
"----- Message from Julieta sent at 3/10/2025  3:44 PM CDT -----  Regarding:  PELVIC HEALTH EVAL  Good day to you:The [facility]  received your order to get the attached patient scheduled for an evaluation. The patient has refused to the evaluation stating they do not want to do therapy at this time.We wanted you to be aware that your patient has not started therapy. Please have the patient call us at [phone] should they decide to schedule for therapy.Best regards,Julieta "Ms. Campbell" [facility] [phone] FAX: [phone]  "

## 2025-03-13 ENCOUNTER — INFUSION (OUTPATIENT)
Dept: INFUSION THERAPY | Facility: HOSPITAL | Age: 70
End: 2025-03-13
Attending: INTERNAL MEDICINE
Payer: MEDICARE

## 2025-03-13 VITALS
BODY MASS INDEX: 31.99 KG/M2 | TEMPERATURE: 98 F | WEIGHT: 187.38 LBS | HEIGHT: 64 IN | DIASTOLIC BLOOD PRESSURE: 67 MMHG | RESPIRATION RATE: 16 BRPM | OXYGEN SATURATION: 99 % | HEART RATE: 75 BPM | SYSTOLIC BLOOD PRESSURE: 141 MMHG

## 2025-03-13 DIAGNOSIS — D50.8 IRON DEFICIENCY ANEMIA SECONDARY TO INADEQUATE DIETARY IRON INTAKE: Primary | ICD-10-CM

## 2025-03-13 PROCEDURE — 63600175 PHARM REV CODE 636 W HCPCS: Performed by: INTERNAL MEDICINE

## 2025-03-13 PROCEDURE — 96365 THER/PROPH/DIAG IV INF INIT: CPT

## 2025-03-13 PROCEDURE — 25000003 PHARM REV CODE 250: Performed by: INTERNAL MEDICINE

## 2025-03-13 RX ORDER — SODIUM FERRIC GLUCONATE COMPLEX IN SUCROSE 12.5 MG/ML
250 INJECTION INTRAVENOUS
Status: COMPLETED | OUTPATIENT
Start: 2025-03-13 | End: 2025-03-13

## 2025-03-13 RX ORDER — EPINEPHRINE 0.3 MG/.3ML
0.3 INJECTION SUBCUTANEOUS ONCE AS NEEDED
OUTPATIENT
Start: 2025-03-13

## 2025-03-13 RX ORDER — HEPARIN 100 UNIT/ML
500 SYRINGE INTRAVENOUS
Status: DISCONTINUED | OUTPATIENT
Start: 2025-03-13 | End: 2025-03-13 | Stop reason: HOSPADM

## 2025-03-13 RX ORDER — DIPHENHYDRAMINE HYDROCHLORIDE 50 MG/ML
50 INJECTION, SOLUTION INTRAMUSCULAR; INTRAVENOUS ONCE AS NEEDED
OUTPATIENT
Start: 2025-03-13

## 2025-03-13 RX ORDER — SODIUM CHLORIDE 0.9 % (FLUSH) 0.9 %
10 SYRINGE (ML) INJECTION
Status: DISCONTINUED | OUTPATIENT
Start: 2025-03-13 | End: 2025-03-13 | Stop reason: HOSPADM

## 2025-03-13 RX ORDER — SODIUM CHLORIDE 0.9 % (FLUSH) 0.9 %
10 SYRINGE (ML) INJECTION
OUTPATIENT
Start: 2025-03-13

## 2025-03-13 RX ORDER — SODIUM FERRIC GLUCONATE COMPLEX IN SUCROSE 12.5 MG/ML
250 INJECTION INTRAVENOUS
Status: CANCELLED | OUTPATIENT
Start: 2025-03-13

## 2025-03-13 RX ORDER — HEPARIN 100 UNIT/ML
500 SYRINGE INTRAVENOUS
OUTPATIENT
Start: 2025-03-13

## 2025-03-13 RX ADMIN — SODIUM CHLORIDE 250 MG: 9 INJECTION, SOLUTION INTRAVENOUS at 09:03

## 2025-03-20 ENCOUNTER — PATIENT MESSAGE (OUTPATIENT)
Dept: GASTROENTEROLOGY | Facility: CLINIC | Age: 70
End: 2025-03-20
Payer: MEDICARE

## 2025-03-20 ENCOUNTER — INFUSION (OUTPATIENT)
Dept: INFUSION THERAPY | Facility: HOSPITAL | Age: 70
End: 2025-03-20
Attending: INTERNAL MEDICINE
Payer: MEDICARE

## 2025-03-20 VITALS
DIASTOLIC BLOOD PRESSURE: 69 MMHG | BODY MASS INDEX: 31.58 KG/M2 | SYSTOLIC BLOOD PRESSURE: 130 MMHG | HEART RATE: 73 BPM | WEIGHT: 185 LBS | RESPIRATION RATE: 17 BRPM | OXYGEN SATURATION: 98 % | HEIGHT: 64 IN | TEMPERATURE: 97 F

## 2025-03-20 DIAGNOSIS — D51.8 OTHER VITAMIN B12 DEFICIENCY ANEMIAS: Primary | ICD-10-CM

## 2025-03-20 PROCEDURE — 63600175 PHARM REV CODE 636 W HCPCS: Performed by: INTERNAL MEDICINE

## 2025-03-20 PROCEDURE — 96372 THER/PROPH/DIAG INJ SC/IM: CPT

## 2025-03-20 RX ORDER — CYANOCOBALAMIN 1000 UG/ML
1000 INJECTION, SOLUTION INTRAMUSCULAR; SUBCUTANEOUS
OUTPATIENT
Start: 2025-03-20

## 2025-03-20 RX ORDER — CYANOCOBALAMIN 1000 UG/ML
1000 INJECTION, SOLUTION INTRAMUSCULAR; SUBCUTANEOUS
Status: DISCONTINUED | OUTPATIENT
Start: 2025-03-20 | End: 2025-03-20 | Stop reason: HOSPADM

## 2025-03-20 RX ADMIN — CYANOCOBALAMIN 1000 MCG: 1000 INJECTION INTRAMUSCULAR; SUBCUTANEOUS at 12:03

## 2025-03-21 ENCOUNTER — HOSPITAL ENCOUNTER (INPATIENT)
Facility: HOSPITAL | Age: 70
LOS: 1 days | Discharge: LEFT BEFORE DISCHARGE | DRG: 389 | End: 2025-03-22
Attending: EMERGENCY MEDICINE | Admitting: INTERNAL MEDICINE
Payer: MEDICARE

## 2025-03-21 DIAGNOSIS — R07.9 CHEST PAIN: ICD-10-CM

## 2025-03-21 DIAGNOSIS — K56.609 SMALL BOWEL OBSTRUCTION: ICD-10-CM

## 2025-03-21 DIAGNOSIS — K56.609 SBO (SMALL BOWEL OBSTRUCTION): Primary | ICD-10-CM

## 2025-03-21 PROBLEM — E87.20 METABOLIC ACIDOSIS: Status: ACTIVE | Noted: 2025-03-21

## 2025-03-21 LAB
ALBUMIN SERPL BCP-MCNC: 4.1 G/DL (ref 3.5–5.2)
ALP SERPL-CCNC: 82 U/L (ref 40–150)
ALT SERPL W/O P-5'-P-CCNC: 18 U/L (ref 10–44)
ANION GAP SERPL CALC-SCNC: 17 MMOL/L (ref 8–16)
AST SERPL-CCNC: 32 U/L (ref 10–40)
BASOPHILS # BLD AUTO: 0.06 K/UL (ref 0–0.2)
BASOPHILS NFR BLD: 0.5 % (ref 0–1.9)
BILIRUB SERPL-MCNC: 0.4 MG/DL (ref 0.1–1)
BUN SERPL-MCNC: 10 MG/DL (ref 8–23)
CALCIUM SERPL-MCNC: 9.3 MG/DL (ref 8.7–10.5)
CHLORIDE SERPL-SCNC: 104 MMOL/L (ref 95–110)
CO2 SERPL-SCNC: 16 MMOL/L (ref 23–29)
CREAT SERPL-MCNC: 0.8 MG/DL (ref 0.5–1.4)
DIFFERENTIAL METHOD BLD: ABNORMAL
EOSINOPHIL # BLD AUTO: 0 K/UL (ref 0–0.5)
EOSINOPHIL NFR BLD: 0.3 % (ref 0–8)
ERYTHROCYTE [DISTWIDTH] IN BLOOD BY AUTOMATED COUNT: 15.2 % (ref 11.5–14.5)
EST. GFR  (NO RACE VARIABLE): >60 ML/MIN/1.73 M^2
GLUCOSE SERPL-MCNC: 125 MG/DL (ref 70–110)
HCT VFR BLD AUTO: 42.8 % (ref 37–48.5)
HGB BLD-MCNC: 13.8 G/DL (ref 12–16)
IMM GRANULOCYTES # BLD AUTO: 0.05 K/UL (ref 0–0.04)
IMM GRANULOCYTES NFR BLD AUTO: 0.5 % (ref 0–0.5)
LACTATE SERPL-SCNC: 1.1 MMOL/L (ref 0.5–2.2)
LIPASE SERPL-CCNC: 15 U/L (ref 4–60)
LYMPHOCYTES # BLD AUTO: 1.6 K/UL (ref 1–4.8)
LYMPHOCYTES NFR BLD: 14.4 % (ref 18–48)
MCH RBC QN AUTO: 28.1 PG (ref 27–31)
MCHC RBC AUTO-ENTMCNC: 32.2 G/DL (ref 32–36)
MCV RBC AUTO: 87 FL (ref 82–98)
MONOCYTES # BLD AUTO: 0.6 K/UL (ref 0.3–1)
MONOCYTES NFR BLD: 5.4 % (ref 4–15)
NEUTROPHILS # BLD AUTO: 8.8 K/UL (ref 1.8–7.7)
NEUTROPHILS NFR BLD: 78.9 % (ref 38–73)
NRBC BLD-RTO: 0 /100 WBC
PLATELET # BLD AUTO: 273 K/UL (ref 150–450)
PMV BLD AUTO: 10.6 FL (ref 9.2–12.9)
POTASSIUM SERPL-SCNC: 4.6 MMOL/L (ref 3.5–5.1)
PROT SERPL-MCNC: 7.2 G/DL (ref 6–8.4)
RBC # BLD AUTO: 4.91 M/UL (ref 4–5.4)
SODIUM SERPL-SCNC: 137 MMOL/L (ref 136–145)
WBC # BLD AUTO: 11.08 K/UL (ref 3.9–12.7)

## 2025-03-21 PROCEDURE — 74177 CT ABD & PELVIS W/CONTRAST: CPT | Mod: TC

## 2025-03-21 PROCEDURE — 83605 ASSAY OF LACTIC ACID: CPT | Performed by: NURSE PRACTITIONER

## 2025-03-21 PROCEDURE — 25000003 PHARM REV CODE 250: Performed by: EMERGENCY MEDICINE

## 2025-03-21 PROCEDURE — 80053 COMPREHEN METABOLIC PANEL: CPT | Performed by: EMERGENCY MEDICINE

## 2025-03-21 PROCEDURE — 36415 COLL VENOUS BLD VENIPUNCTURE: CPT | Performed by: NURSE PRACTITIONER

## 2025-03-21 PROCEDURE — 83690 ASSAY OF LIPASE: CPT | Performed by: EMERGENCY MEDICINE

## 2025-03-21 PROCEDURE — 74177 CT ABD & PELVIS W/CONTRAST: CPT | Mod: 26,,, | Performed by: RADIOLOGY

## 2025-03-21 PROCEDURE — 96376 TX/PRO/DX INJ SAME DRUG ADON: CPT

## 2025-03-21 PROCEDURE — 96374 THER/PROPH/DIAG INJ IV PUSH: CPT

## 2025-03-21 PROCEDURE — 96361 HYDRATE IV INFUSION ADD-ON: CPT

## 2025-03-21 PROCEDURE — 11000001 HC ACUTE MED/SURG PRIVATE ROOM

## 2025-03-21 PROCEDURE — 25500020 PHARM REV CODE 255: Performed by: EMERGENCY MEDICINE

## 2025-03-21 PROCEDURE — 94760 N-INVAS EAR/PLS OXIMETRY 1: CPT

## 2025-03-21 PROCEDURE — 99223 1ST HOSP IP/OBS HIGH 75: CPT | Mod: ,,, | Performed by: NURSE PRACTITIONER

## 2025-03-21 PROCEDURE — 99222 1ST HOSP IP/OBS MODERATE 55: CPT | Mod: ,,, | Performed by: SPECIALIST

## 2025-03-21 PROCEDURE — 0D9670Z DRAINAGE OF STOMACH WITH DRAINAGE DEVICE, VIA NATURAL OR ARTIFICIAL OPENING: ICD-10-PCS | Performed by: EMERGENCY MEDICINE

## 2025-03-21 PROCEDURE — 63600175 PHARM REV CODE 636 W HCPCS: Performed by: EMERGENCY MEDICINE

## 2025-03-21 PROCEDURE — 63600175 PHARM REV CODE 636 W HCPCS: Performed by: NURSE PRACTITIONER

## 2025-03-21 PROCEDURE — 96375 TX/PRO/DX INJ NEW DRUG ADDON: CPT

## 2025-03-21 PROCEDURE — 74018 RADEX ABDOMEN 1 VIEW: CPT | Mod: TC

## 2025-03-21 PROCEDURE — 94761 N-INVAS EAR/PLS OXIMETRY MLT: CPT

## 2025-03-21 PROCEDURE — 85025 COMPLETE CBC W/AUTO DIFF WBC: CPT | Performed by: EMERGENCY MEDICINE

## 2025-03-21 PROCEDURE — 74018 RADEX ABDOMEN 1 VIEW: CPT | Mod: 26,,, | Performed by: RADIOLOGY

## 2025-03-21 PROCEDURE — 99285 EMERGENCY DEPT VISIT HI MDM: CPT | Mod: 25

## 2025-03-21 RX ORDER — IBUPROFEN 200 MG
16 TABLET ORAL
Status: DISCONTINUED | OUTPATIENT
Start: 2025-03-21 | End: 2025-03-22 | Stop reason: HOSPADM

## 2025-03-21 RX ORDER — ONDANSETRON HYDROCHLORIDE 2 MG/ML
4 INJECTION, SOLUTION INTRAVENOUS EVERY 6 HOURS PRN
Status: DISCONTINUED | OUTPATIENT
Start: 2025-03-21 | End: 2025-03-22 | Stop reason: HOSPADM

## 2025-03-21 RX ORDER — IBUPROFEN 200 MG
24 TABLET ORAL
Status: DISCONTINUED | OUTPATIENT
Start: 2025-03-21 | End: 2025-03-22 | Stop reason: HOSPADM

## 2025-03-21 RX ORDER — MORPHINE SULFATE 2 MG/ML
4 INJECTION, SOLUTION INTRAMUSCULAR; INTRAVENOUS EVERY 4 HOURS PRN
Refills: 0 | Status: DISCONTINUED | OUTPATIENT
Start: 2025-03-21 | End: 2025-03-21

## 2025-03-21 RX ORDER — GABAPENTIN 300 MG/1
600 CAPSULE ORAL 3 TIMES DAILY
Status: DISCONTINUED | OUTPATIENT
Start: 2025-03-21 | End: 2025-03-22 | Stop reason: HOSPADM

## 2025-03-21 RX ORDER — MORPHINE SULFATE 2 MG/ML
2 INJECTION, SOLUTION INTRAMUSCULAR; INTRAVENOUS ONCE
Status: COMPLETED | OUTPATIENT
Start: 2025-03-21 | End: 2025-03-21

## 2025-03-21 RX ORDER — LANOLIN ALCOHOL/MO/W.PET/CERES
800 CREAM (GRAM) TOPICAL
Status: DISCONTINUED | OUTPATIENT
Start: 2025-03-21 | End: 2025-03-22 | Stop reason: HOSPADM

## 2025-03-21 RX ORDER — SODIUM,POTASSIUM PHOSPHATES 280-250MG
2 POWDER IN PACKET (EA) ORAL
Status: DISCONTINUED | OUTPATIENT
Start: 2025-03-21 | End: 2025-03-22 | Stop reason: HOSPADM

## 2025-03-21 RX ORDER — SODIUM CHLORIDE 0.9 % (FLUSH) 0.9 %
10 SYRINGE (ML) INJECTION EVERY 12 HOURS PRN
Status: DISCONTINUED | OUTPATIENT
Start: 2025-03-21 | End: 2025-03-22 | Stop reason: HOSPADM

## 2025-03-21 RX ORDER — HYDROMORPHONE HYDROCHLORIDE 1 MG/ML
1 INJECTION, SOLUTION INTRAMUSCULAR; INTRAVENOUS; SUBCUTANEOUS EVERY 4 HOURS PRN
Status: DISCONTINUED | OUTPATIENT
Start: 2025-03-21 | End: 2025-03-22 | Stop reason: HOSPADM

## 2025-03-21 RX ORDER — SODIUM CHLORIDE, SODIUM LACTATE, POTASSIUM CHLORIDE, CALCIUM CHLORIDE 600; 310; 30; 20 MG/100ML; MG/100ML; MG/100ML; MG/100ML
INJECTION, SOLUTION INTRAVENOUS CONTINUOUS
Status: DISCONTINUED | OUTPATIENT
Start: 2025-03-21 | End: 2025-03-22 | Stop reason: HOSPADM

## 2025-03-21 RX ORDER — ACETAMINOPHEN 325 MG/1
650 TABLET ORAL EVERY 8 HOURS PRN
Status: DISCONTINUED | OUTPATIENT
Start: 2025-03-21 | End: 2025-03-22 | Stop reason: HOSPADM

## 2025-03-21 RX ORDER — LIDOCAINE HYDROCHLORIDE 20 MG/ML
5 SOLUTION OROPHARYNGEAL
Status: COMPLETED | OUTPATIENT
Start: 2025-03-21 | End: 2025-03-21

## 2025-03-21 RX ORDER — TOPICAL ANESTHETIC 200 MG/ML
SPRAY DENTAL; PERIODONTAL
Status: COMPLETED | OUTPATIENT
Start: 2025-03-21 | End: 2025-03-21

## 2025-03-21 RX ORDER — ONDANSETRON HYDROCHLORIDE 2 MG/ML
8 INJECTION, SOLUTION INTRAVENOUS
Status: COMPLETED | OUTPATIENT
Start: 2025-03-21 | End: 2025-03-21

## 2025-03-21 RX ORDER — GLUCAGON 1 MG
1 KIT INJECTION
Status: DISCONTINUED | OUTPATIENT
Start: 2025-03-21 | End: 2025-03-22 | Stop reason: HOSPADM

## 2025-03-21 RX ORDER — PANTOPRAZOLE SODIUM 40 MG/10ML
40 INJECTION, POWDER, LYOPHILIZED, FOR SOLUTION INTRAVENOUS DAILY
Status: DISCONTINUED | OUTPATIENT
Start: 2025-03-21 | End: 2025-03-22 | Stop reason: HOSPADM

## 2025-03-21 RX ORDER — NALOXONE HCL 0.4 MG/ML
0.02 VIAL (ML) INJECTION
Status: DISCONTINUED | OUTPATIENT
Start: 2025-03-21 | End: 2025-03-22 | Stop reason: HOSPADM

## 2025-03-21 RX ORDER — MORPHINE SULFATE 2 MG/ML
6 INJECTION, SOLUTION INTRAMUSCULAR; INTRAVENOUS
Status: COMPLETED | OUTPATIENT
Start: 2025-03-21 | End: 2025-03-21

## 2025-03-21 RX ORDER — ACETAMINOPHEN 325 MG/1
650 TABLET ORAL EVERY 4 HOURS PRN
Status: DISCONTINUED | OUTPATIENT
Start: 2025-03-21 | End: 2025-03-22 | Stop reason: HOSPADM

## 2025-03-21 RX ORDER — MORPHINE SULFATE 2 MG/ML
4 INJECTION, SOLUTION INTRAMUSCULAR; INTRAVENOUS
Refills: 0 | Status: COMPLETED | OUTPATIENT
Start: 2025-03-21 | End: 2025-03-21

## 2025-03-21 RX ORDER — TALC
6 POWDER (GRAM) TOPICAL NIGHTLY PRN
Status: DISCONTINUED | OUTPATIENT
Start: 2025-03-21 | End: 2025-03-22 | Stop reason: HOSPADM

## 2025-03-21 RX ORDER — ENOXAPARIN SODIUM 100 MG/ML
40 INJECTION SUBCUTANEOUS EVERY 24 HOURS
Status: DISCONTINUED | OUTPATIENT
Start: 2025-03-21 | End: 2025-03-22 | Stop reason: HOSPADM

## 2025-03-21 RX ORDER — MORPHINE SULFATE 2 MG/ML
4 INJECTION, SOLUTION INTRAMUSCULAR; INTRAVENOUS EVERY 4 HOURS PRN
Status: DISCONTINUED | OUTPATIENT
Start: 2025-03-21 | End: 2025-03-22 | Stop reason: HOSPADM

## 2025-03-21 RX ORDER — MORPHINE SULFATE 2 MG/ML
2 INJECTION, SOLUTION INTRAMUSCULAR; INTRAVENOUS EVERY 4 HOURS PRN
Refills: 0 | Status: DISCONTINUED | OUTPATIENT
Start: 2025-03-21 | End: 2025-03-21

## 2025-03-21 RX ORDER — ALUMINUM HYDROXIDE, MAGNESIUM HYDROXIDE, AND SIMETHICONE 1200; 120; 1200 MG/30ML; MG/30ML; MG/30ML
30 SUSPENSION ORAL 4 TIMES DAILY PRN
Status: DISCONTINUED | OUTPATIENT
Start: 2025-03-21 | End: 2025-03-22 | Stop reason: HOSPADM

## 2025-03-21 RX ADMIN — MORPHINE SULFATE 2 MG: 2 INJECTION, SOLUTION INTRAMUSCULAR; INTRAVENOUS at 11:03

## 2025-03-21 RX ADMIN — MORPHINE SULFATE 4 MG: 2 INJECTION, SOLUTION INTRAMUSCULAR; INTRAVENOUS at 08:03

## 2025-03-21 RX ADMIN — MORPHINE SULFATE 4 MG: 2 INJECTION, SOLUTION INTRAMUSCULAR; INTRAVENOUS at 09:03

## 2025-03-21 RX ADMIN — MORPHINE SULFATE 4 MG: 2 INJECTION, SOLUTION INTRAMUSCULAR; INTRAVENOUS at 04:03

## 2025-03-21 RX ADMIN — ONDANSETRON 8 MG: 2 INJECTION INTRAMUSCULAR; INTRAVENOUS at 06:03

## 2025-03-21 RX ADMIN — ONDANSETRON 4 MG: 2 INJECTION INTRAMUSCULAR; INTRAVENOUS at 11:03

## 2025-03-21 RX ADMIN — ONDANSETRON 4 MG: 2 INJECTION INTRAMUSCULAR; INTRAVENOUS at 04:03

## 2025-03-21 RX ADMIN — PANTOPRAZOLE SODIUM 40 MG: 40 INJECTION, POWDER, FOR SOLUTION INTRAVENOUS at 10:03

## 2025-03-21 RX ADMIN — IOHEXOL 75 ML: 350 INJECTION, SOLUTION INTRAVENOUS at 07:03

## 2025-03-21 RX ADMIN — TOPICAL ANESTHETIC 1 EACH: 200 SPRAY DENTAL; PERIODONTAL at 10:03

## 2025-03-21 RX ADMIN — SODIUM CHLORIDE, POTASSIUM CHLORIDE, SODIUM LACTATE AND CALCIUM CHLORIDE 125 ML/HR: 600; 310; 30; 20 INJECTION, SOLUTION INTRAVENOUS at 10:03

## 2025-03-21 RX ADMIN — SODIUM CHLORIDE, POTASSIUM CHLORIDE, SODIUM LACTATE AND CALCIUM CHLORIDE 1000 ML: 600; 310; 30; 20 INJECTION, SOLUTION INTRAVENOUS at 06:03

## 2025-03-21 RX ADMIN — SODIUM CHLORIDE, POTASSIUM CHLORIDE, SODIUM LACTATE AND CALCIUM CHLORIDE: 600; 310; 30; 20 INJECTION, SOLUTION INTRAVENOUS at 07:03

## 2025-03-21 RX ADMIN — ENOXAPARIN SODIUM 40 MG: 40 INJECTION SUBCUTANEOUS at 04:03

## 2025-03-21 RX ADMIN — MORPHINE SULFATE 6 MG: 2 INJECTION, SOLUTION INTRAMUSCULAR; INTRAVENOUS at 06:03

## 2025-03-21 RX ADMIN — LIDOCAINE HYDROCHLORIDE 5 ML: 20 SOLUTION ORAL at 10:03

## 2025-03-21 NOTE — NURSING
Plan of care reviewed with patient. VSS. No changes in status. Patient has no needs or requests at this time. Bed locked in lowest position, side rails x2, call light within reach.    Chart review done

## 2025-03-21 NOTE — ED NOTES
Pt reports she has a hx of obstructions and multiple abd surgeries. Pt reports that she began having abd pain in the umbilical area with vomiting four days ago. Pt reports that tonight the vomiting and nausea became worse and was unrelieved by zofran. Pt reports she had b12 infusion yesterday.

## 2025-03-21 NOTE — CONSULTS
[facility] Surg  General Surgery  Consult Note    Patient Name: Pham Licea  MRN: [id number]  Admission Date: 3/21/2025  Attending Physician: Paco Biswas DO   Consult Physician: Govind Zarate MD  Primary Care Provider: Lani Grossman MD    Patient information was obtained from patient and ER records.     Subjective:     Reason for consultation:  Small bowel obstruction    History of Present Illness:  Pham Licea is a 69 y.o. female with a history of multiple operative procedures including to explanations for bowel obstruction 1 in  and  presents with reoccurrence of abdominal discomfort mild distention and symptoms consistent with a bowel obstruction presented to the emergency room with complaints of same.  CT scan shows evidence of a bowel obstruction.  Patient reports last bowel movement yesterday and passing flatus this morning.  Patient reports pain sometimes an 8 to 9/10 but currently not having discomfort    Review of patient's allergies indicates:  No Known Allergies    Past Medical History:   Diagnosis Date    Anemia     Fatty liver 2015    Former smoker 2009    Osteoarthritis 2010    Pulmonary nodules 2019    Repeat CT in 6 mo    SBO (small bowel obstruction)     Ulcer of abdomen wall 2016     Past Surgical History:   Procedure Laterality Date    APPENDECTOMY  1993     SECTION  , 1982, 1983    x3    CHOLECYSTECTOMY      2020    COLONOSCOPY  ~    [facility]: normal findings per patient report    COLONOSCOPY N/A 2020    Procedure: COLONOSCOPY;  Surgeon: Misael Holm MD;  Location: [facility];  Service: General;  Laterality: N/A;    COLONOSCOPY, WITH RETROGRADE BALLOON ENTEROSCOPY, SINGLE OR DOUBLE BALLOON N/A 2023    Procedure: COLONOSCOPY, WITH RETROGRADE BALLOON ENTEROSCOPY, DOUBLE BALLOON;  Surgeon: Collin Mares MD;  [facility]  Service: Endoscopy;  Laterality: N/A;  inst via portal-MS-sutab per pt  request-tb-new inst portal    DIAGNOSTIC LAPAROSCOPY N/A 05/04/2021    Procedure: LAPAROSCOPY, DIAGNOSTIC;  Surgeon: Misael Holm MD;  Location: [facility] OR;  Service: General;  Laterality: N/A;    EPIDURAL STEROID INJECTION INTO LUMBAR SPINE N/A 10/12/2018    Procedure: Injection-steroid-epidural-lumbar;  Surgeon: Kal Johnson MD;  Location: [facility] OR;  Service: Pain Management;  Laterality: N/A;  L5-S1    EPIDURAL STEROID INJECTION INTO LUMBAR SPINE N/A 05/31/2019    Procedure: Injection-steroid-epidural-lumbar L5-S1;  Surgeon: Kal Johnson MD;  Location: [facility] OR;  Service: Pain Management;  Laterality: N/A;    ESOPHAGOGASTRODUODENOSCOPY N/A 11/09/2020    Procedure: ESOPHAGOGASTRODUODENOSCOPY (EGD);  Surgeon: Misael Holm MD;  Location: [facility] ENDO;  Service: General;  Laterality: N/A;    ESOPHAGOGASTRODUODENOSCOPY N/A 7/16/2024    Procedure: EGD (ESOPHAGOGASTRODUODENOSCOPY);  Surgeon: Jas Gleason MD;  Location: [facility] ENDO;  Service: Endoscopy;  Laterality: N/A;    FUSION, SPINE, MINIMALLY INVASIVE, USING COMPUTER-ASSISTED NAVIGATION N/A 12/22/2022    Procedure: ENTEROSCOPY, DOUBLE BALLOON, ANTEGRADE;  Surgeon: Collin Mares MD;  Location: [facility] ENDO (2ND FLR);  Service: Endoscopy;  Laterality: N/A;  11/14/22-Instructions via portal-DS    *open to earlier date if available*    HYSTERECTOMY  1993    one ovary conserved    LAPAROSCOPIC CHOLECYSTECTOMY N/A 09/02/2020    Procedure: CHOLECYSTECTOMY, LAPAROSCOPIC;  Surgeon: Govind Frey MD;  Location: [facility] OR;  Service: General;  Laterality: N/A;    LAPAROSCOPIC LYSIS OF ADHESIONS N/A 05/04/2021    Procedure: LYSIS, ADHESIONS, LAPAROSCOPIC;  Surgeon: Misael Holm MD;  Location: [facility] OR;  Service: General;  Laterality: N/A;    TRANSFORAMINAL EPIDURAL INJECTION OF STEROID Right 2/6/2024    Procedure: Injection,steroid,epidural,transforaminal approach;  Surgeon: Kal Johnson MD;  [facility];  Service: Anesthesiology;  Laterality:  Right;  L4-5 and L5-s1    UPPER GASTROINTESTINAL ENDOSCOPY  2016     Family History       Problem Relation (Age of Onset)    Aneurysm Father    Arthritis Mother, Sister    Brain Hemorrhage Father    Heart disease Mother    Hypertension Father, Sister    Obesity Sister    Osteoarthritis Sister    Osteoporosis Mother    Ovarian cancer Mother    Stroke Father (50)          Tobacco Use    Smoking status: Former     Current packs/day: 0.00     Average packs/day: 1 pack/day for 40.0 years (40.0 ttl pk-yrs)     Types: Cigarettes     Start date: 11/2/1969     Quit date: 11/2/2009     Years since quitting: 15.3    Smokeless tobacco: Never    Tobacco comments:     quit 2009   Substance and Sexual Activity    Alcohol use: Not Currently     Comment: Not often - one glass of wine every now and then    Drug use: Not Currently     Types: Marijuana     Comment: in 1970s    Sexual activity: Not Currently     Review of Systems   Gastrointestinal:  Positive for abdominal pain and nausea.        Chronic intermittent abdominal discomfort now with worsening abdominal discomfort mild distention and nausea for the last 48 hours.     Objective:     Vital Signs (Most Recent):  Temp: 98.3 °F (36.8 °C) (03/21/25 1115)  Pulse: (!) 145 (03/21/25 1115)  Resp: 16 (03/21/25 1135)  BP: 137/63 (03/21/25 1115)  SpO2: 97 % (03/21/25 1115) Vital Signs (24h Range):  Temp:  [97.9 °F (36.6 °C)-98.3 °F (36.8 °C)] 98.3 °F (36.8 °C)  Pulse:  [] 145  Resp:  [12-21] 16  SpO2:  [96 %-100 %] 97 %  BP: (102-137)/(55-74) 137/63     Weight: 83.9 kg (185 lb)    Body mass index is 31.76 kg/m².    Review of Systems   Gastrointestinal:  Positive for abdominal pain and nausea.        Chronic intermittent abdominal discomfort now with worsening abdominal discomfort mild distention and nausea for the last 48 hours.       Physical Exam  Vitals and nursing note reviewed. Exam conducted with a chaperone present.   Constitutional:       Appearance: Normal appearance. She  "is normal weight.   HENT:      Head: Normocephalic and atraumatic.      Mouth/Throat:      Mouth: Mucous membranes are moist.   Eyes:      Extraocular Movements: Extraocular movements intact.      Conjunctiva/sclera: Conjunctivae normal.      Pupils: Pupils are equal, round, and reactive to light.   Cardiovascular:      Rate and Rhythm: Normal rate.   Pulmonary:      Effort: Pulmonary effort is normal.   Abdominal:      General: Abdomen is flat.      Palpations: Abdomen is soft.   Musculoskeletal:         General: Normal range of motion.      Cervical back: Normal range of motion and neck supple.   Skin:     General: Skin is warm.   Neurological:      General: No focal deficit present.      Mental Status: She is alert and oriented to person, place, and time. Mental status is at baseline.   Psychiatric:         Mood and Affect: Mood normal.         Significant Labs:  CBC:   Recent Labs   Lab 03/21/25  0638   WBC 11.08   RBC 4.91   HGB 13.8   HCT 42.8      MCV 87   MCH 28.1   MCHC 32.2     BMP:   Recent Labs   Lab 03/21/25  0638   *      K 4.6      CO2 16*   BUN 10   CREATININE 0.8   CALCIUM 9.3     CMP:   Recent Labs   Lab 03/21/25  0638   *   CALCIUM 9.3   ALBUMIN 4.1   PROT 7.2      K 4.6   CO2 16*      BUN 10   CREATININE 0.8   ALKPHOS 82   ALT 18   AST 32   BILITOT 0.4     LFTs:   Recent Labs   Lab 03/21/25  0638   ALT 18   AST 32   ALKPHOS 82   BILITOT 0.4   PROT 7.2   ALBUMIN 4.1     Coagulation: No results for input(s): "LABPROT", "INR", "APTT" in the last 168 hours.  Specimen (24h ago, onward)      None          No results for input(s): "COLORU", "CLARITYU", "SPECGRAV", "PHUR", "PROTEINUA", "GLUCOSEU", "BILIRUBINCON", "BLOODU", "WBCU", "RBCU", "BACTERIA", "MUCUS", "NITRITE", "LEUKOCYTESUR", "UROBILINOGEN", "HYALINECASTS" in the last 168 hours.    Significant Diagnostics:  I have reviewed all pertinent imaging results/findings within the past 24 " hours.    Assessment:   Pham Licea is a 69 y.o. female who presents with symptoms consistent with a small-bowel obstruction.  CT scan consistent with same.  Nasogastric tube has been placed.  Recommend hydration nasogastric decompression and conservative management..    Active Diagnoses:    Diagnosis Date Noted POA    PRINCIPAL PROBLEM:  Small bowel obstruction [K56.609] 09/23/2020 Yes    Metabolic acidosis [E87.20] 03/21/2025 Yes    Iron deficiency anemia secondary to inadequate dietary iron intake [D50.8] 05/26/2021 Yes    Gastroesophageal reflux disease [K21.9] 01/09/2017 Yes      Problems Resolved During this Admission:     VTE Risk Mitigation (From admission, onward)           Ordered     enoxaparin injection 40 mg  Daily         03/21/25 0947     IP VTE HIGH RISK PATIENT  Once         03/21/25 0947     Place sequential compression device  Until discontinued         03/21/25 0947                    Medical Decision Making/Plan:  Intermediate    Govind Zarate MD  General Surgery  [facility] Surg

## 2025-03-21 NOTE — HPI
Pham Licea is a year-old female with PMHx significant for anemia, fatty liver disease, and multiple small bowel obstructions.  Her surgical history includes cholecystectomy, appendectomy, hysterectomy, small bowel resection May 2021, lysis of adhesions and she reports her last abdominal surgery was March 2022.  She presented to the ED with a 1 week onset of abdominal pain which began Saturday.  She reports that she rested her bowels Saturday and Sunday and was able to return to work on Monday.  Then, 2 days ago again began to have mid abdominal and periumbilical cramping abdominal pain.  She reports that it comes and goes.  It feels like her prior bowel obstructions.  It is associated with nausea and vomiting.  She reports she last vomited approximately 1 hour ago.  She denies fever, chills, chest pain, shortness of breath, dysuria, or any other symptoms at this time.  CT scan of the abdomen and pelvis obtained in the ED reveals recurrent small-bowel obstruction near the small bowel anastomosis in the LLQ.  Her CO2 level is 16 with an anion gap of 17.  General surgery was consulted from the ED with recommendations for admission.  NG tube is to be placed and patient will be admitted to the services of Hospital Medicine for further evaluation and treatment.

## 2025-03-21 NOTE — ASSESSMENT & PLAN NOTE
Anemia is likely due to Iron deficiency. Most recent hemoglobin and hematocrit are listed below.  Recent Labs     03/21/25  0638   HGB 13.8   HCT 42.8     Plan  - Monitor serial CBC: Daily  - Transfuse PRBC if patient becomes hemodynamically unstable, symptomatic or H/H drops below 7/21.  - Patient has not received any PRBC transfusions to date  - Patient's anemia is currently stable

## 2025-03-21 NOTE — PLAN OF CARE
Patient provided with Medicare Rights IMM. Patient signed IMM, Original placed in blue chart.   03/21/25 1221   Medicare Message   Important Message from Medicare regarding Discharge Appeal Rights Given to patient/caregiver;Explained to patient/caregiver;Signed/date by patient/caregiver   Date IMM was signed 03/21/25   Time IMM was signed [id number]

## 2025-03-21 NOTE — PLAN OF CARE
[facility]  Initial Discharge Assessment       Primary Care Provider: Lani Grossman MD    Admission Diagnosis: SBO (small bowel obstruction) [K56.609]  Chest pain [R07.9]    Admission Date: 3/21/2025  Expected Discharge Date: 3/24/2025    Transition of Care Barriers: None    Payor: MEDICARE / Plan: MEDICARE PART A & B / Product Type: Government /     Extended Emergency Contact Information  Primary Emergency Contact: Cait Moncada  Mobile Phone: [phone]  Relation: Daughter  Preferred language: English   needed? No  Secondary Emergency Contact: NunezMagali   [place]  Mobile Phone: [phone]  Relation: Daughter    Discharge Plan A: Home with family  Discharge Plan B: Home with family    Leaheitn met at bedside in Ed. Patient lives alone is independent. Pt has insurance. Pt uses [facility] for prescriptions. Pt demographics are correct now SW added name and number.PCP Lani Grossman.Son will transport at discharge home.      [id number] [address]  [address] [address]  Phone: [phone] Fax: [phone]    [id number] - [place] [address] [address]  [address] [address]  Phone: [phone] Fax: [phone]      Initial Assessment (most recent)       Adult Discharge Assessment - 03/21/25 1226          Discharge Assessment    Assessment Type Discharge Planning Assessment     Confirmed/corrected address, phone number and insurance Yes     Confirmed Demographics Correct on Facesheet     Source of Information patient     If unable to respond/provide information was family/caregiver contacted? Yes     Contact Name/Number Kal (son)  [phone]     Does patient/caregiver understand observation status Yes     Communicated GABBY with patient/caregiver Yes     People in Home alone     Do you expect to return to your current living situation? Yes     Do you have help  at home or someone to help you manage your care at home? Yes     Who are your caregiver(s) and their phone number(s)? Crescencio [phone]     Prior to hospitilization cognitive status: Alert/Oriented     Current cognitive status: Alert/Oriented     Walking or Climbing Stairs Difficulty no     Dressing/Bathing Difficulty no     Home Layout Able to live on 1st floor     Equipment Currently Used at Home bath bench;grab bar     Readmission within 30 days? No     Patient currently being followed by outpatient case management? No     Do you currently have service(s) that help you manage your care at home? No     Do you take prescription medications? Yes     Do you have prescription coverage? Yes     Do you have any problems affording any of your prescribed medications? No     Is the patient taking medications as prescribed? yes     Who is going to help you get home at discharge? Crescencio Marques [phone]     How do you get to doctors appointments? car, drives self     Are you on dialysis? No     Do you take coumadin? No     Discharge Plan A Home with family     Discharge Plan B Home with family     DME Needed Upon Discharge  none     Transition of Care Barriers None

## 2025-03-21 NOTE — SUBJECTIVE & OBJECTIVE
Past Medical History:   Diagnosis Date    Anemia     Fatty liver 2015    Former smoker 2009    Osteoarthritis 2010    Pulmonary nodules 2019    Repeat CT in 6 mo    SBO (small bowel obstruction)     Ulcer of abdomen wall 2016       Past Surgical History:   Procedure Laterality Date    APPENDECTOMY  1993     SECTION  , , 1983    x3    CHOLECYSTECTOMY      2020    COLONOSCOPY  ~    [facility]: normal findings per patient report    COLONOSCOPY N/A 2020    Procedure: COLONOSCOPY;  Surgeon: Misael Holm MD;  Location: [facility] ENDO;  Service: General;  Laterality: N/A;    COLONOSCOPY, WITH RETROGRADE BALLOON ENTEROSCOPY, SINGLE OR DOUBLE BALLOON N/A 2023    Procedure: COLONOSCOPY, WITH RETROGRADE BALLOON ENTEROSCOPY, DOUBLE BALLOON;  Surgeon: Collin Mares MD;  Location: [facility] ENDO (2ND FLR);  Service: Endoscopy;  Laterality: N/A;  inst via portal-MS-sutab per pt request-tb-new inst portal    DIAGNOSTIC LAPAROSCOPY N/A 2021    Procedure: LAPAROSCOPY, DIAGNOSTIC;  Surgeon: Misael Holm MD;  Location: [facility] OR;  Service: General;  Laterality: N/A;    EPIDURAL STEROID INJECTION INTO LUMBAR SPINE N/A 10/12/2018    Procedure: Injection-steroid-epidural-lumbar;  Surgeon: Kal Johnson MD;  Location: [facility] OR;  Service: Pain Management;  Laterality: N/A;  L5-S1    EPIDURAL STEROID INJECTION INTO LUMBAR SPINE N/A 2019    Procedure: Injection-steroid-epidural-lumbar L5-S1;  Surgeon: Kal Johnson MD;  Location: [facility] OR;  Service: Pain Management;  Laterality: N/A;    ESOPHAGOGASTRODUODENOSCOPY N/A 2020    Procedure: ESOPHAGOGASTRODUODENOSCOPY (EGD);  Surgeon: Misael Holm MD;  Location: [facility] ENDO;  Service: General;  Laterality: N/A;    ESOPHAGOGASTRODUODENOSCOPY N/A 2024    Procedure: EGD (ESOPHAGOGASTRODUODENOSCOPY);  Surgeon: Jas Gleason MD;  Location: [facility] ENDO;  Service: Endoscopy;  Laterality: N/A;    FUSION, SPINE, MINIMALLY INVASIVE, USING  COMPUTER-ASSISTED NAVIGATION N/A 12/22/2022    Procedure: ENTEROSCOPY, DOUBLE BALLOON, ANTEGRADE;  Surgeon: Collin Mares MD;  Location: [facility] ENDO ([address] [place] [address]);  Service: Endoscopy;  Laterality: N/A;  11/14/22-Instructions via portal-DS    *open to earlier date if available*    HYSTERECTOMY  1993    one ovary conserved    LAPAROSCOPIC CHOLECYSTECTOMY N/A 09/02/2020    Procedure: CHOLECYSTECTOMY, LAPAROSCOPIC;  Surgeon: Govind Frey MD;  Location: [facility] OR;  Service: General;  Laterality: N/A;    LAPAROSCOPIC LYSIS OF ADHESIONS N/A 05/04/2021    Procedure: LYSIS, ADHESIONS, LAPAROSCOPIC;  Surgeon: Misael Holm MD;  Location: [facility] OR;  Service: General;  Laterality: N/A;    TRANSFORAMINAL EPIDURAL INJECTION OF STEROID Right 2/6/2024    Procedure: Injection,steroid,epidural,transforaminal approach;  Surgeon: Kal Johnson MD;  Location: [facility] OR;  Service: Anesthesiology;  Laterality: Right;  L4-5 and L5-s1    UPPER GASTROINTESTINAL ENDOSCOPY  2016       Review of patient's allergies indicates:  No Known Allergies    Current Facility-Administered Medications on File Prior to Encounter   Medication    [DISCONTINUED] cyanocobalamin injection 1,000 mcg     Current Outpatient Medications on File Prior to Encounter   Medication Sig    betamethasone dipropionate 0.05 % cream Apply topically 2 (two) times daily.    biotin 5,000 mcg TbDL Take 5,000 mcg by mouth Daily.    cetirizine (ZYRTEC) 10 MG tablet Take 1 tablet (10 mg total) by mouth once daily. (Patient not taking: Reported on 2/25/2025)    CONSTULOSE 10 gram/15 mL solution TAKE 10 ML BY MOUTH  THREE TIMES DAILY    diclofenac sodium (VOLTAREN) 1 % Gel Apply 2 g topically 2 (two) times daily as needed.    doxycycline (VIBRAMYCIN) 100 MG Cap Take 1 capsule (100 mg total) by mouth 2 (two) times daily. (Patient not taking: Reported on 2/25/2025)    gabapentin (NEURONTIN) 300 MG capsule Take 2 capsules (600 mg total) by mouth 3 (three) times daily.     HYDROcodone-acetaminophen (NORCO) 5-325 mg per tablet Take 1 tablet by mouth every 6 (six) hours as needed for Pain. (Patient not taking: Reported on 2/25/2025)    meloxicam (MOBIC) 15 MG tablet Take 1 tablet (15 mg total) by mouth once daily. (Patient not taking: Reported on 2/25/2025)    ondansetron (ZOFRAN-ODT) 4 MG TbDL Take 8 mg by mouth every 6 (six) hours as needed.    orphenadrine (NORFLEX) 100 mg tablet Take 1 tablet (100 mg total) by mouth 2 (two) times daily as needed for Muscle spasms.    pantoprazole (PROTONIX) 20 MG tablet Take 1 tablet (20 mg total) by mouth once daily.     Family History       Problem Relation (Age of Onset)    Aneurysm Father    Arthritis Mother, Sister    Brain Hemorrhage Father    Heart disease Mother    Hypertension Father, Sister    Obesity Sister    Osteoarthritis Sister    Osteoporosis Mother    Ovarian cancer Mother    Stroke Father (50)          Tobacco Use    Smoking status: Former     Current packs/day: 0.00     Average packs/day: 1 pack/day for 40.0 years (40.0 ttl pk-yrs)     Types: Cigarettes     Start date: 11/2/1969     Quit date: 11/2/2009     Years since quitting: 15.3    Smokeless tobacco: Never    Tobacco comments:     quit 2009   Substance and Sexual Activity    Alcohol use: Not Currently     Comment: Not often - one glass of wine every now and then    Drug use: Not Currently     Types: Marijuana     Comment: in 1970s    Sexual activity: Not Currently     Review of Systems   Gastrointestinal:  Positive for abdominal pain, nausea and vomiting.   All other systems reviewed and are negative.    Objective:     Vital Signs (Most Recent):  Temp: 97.9 °F (36.6 °C) (03/21/25 0612)  Pulse: 82 (03/21/25 0902)  Resp: 14 (03/21/25 0958)  BP: 102/61 (03/21/25 0902)  SpO2: 96 % (03/21/25 0902) Vital Signs (24h Range):  Temp:  [97.3 °F (36.3 °C)-97.9 °F (36.6 °C)] 97.9 °F (36.6 °C)  Pulse:  [66-92] 82  Resp:  [12-21] 14  SpO2:  [96 %-100 %] 96 %  BP: (102-135)/(55-74) 102/61      Weight: 83.9 kg (185 lb)  Body mass index is 31.76 kg/m².     Physical Exam  Constitutional:       General: She is not in acute distress.     Appearance: She is not toxic-appearing or diaphoretic.   HENT:      Head: Normocephalic and atraumatic.      Mouth/Throat:      Mouth: Mucous membranes are moist.      Pharynx: Oropharynx is clear.   Eyes:      General: No scleral icterus.     Extraocular Movements: Extraocular movements intact.      Conjunctiva/sclera: Conjunctivae normal.      Pupils: Pupils are equal, round, and reactive to light.   Cardiovascular:      Rate and Rhythm: Normal rate and regular rhythm.      Pulses: Normal pulses.      Heart sounds: Normal heart sounds.   Pulmonary:      Effort: Pulmonary effort is normal. No respiratory distress.      Breath sounds: Normal breath sounds.   Abdominal:      General: There is distension (mild).      Tenderness: There is abdominal tenderness. There is guarding (Voluntary).      Comments: Hypoactive bowel sounds   Musculoskeletal:         General: Normal range of motion.      Right lower leg: No edema.      Left lower leg: No edema.   Skin:     General: Skin is warm and dry.      Capillary Refill: Capillary refill takes less than 2 seconds.      Coloration: Skin is not jaundiced or pale.   Neurological:      Mental Status: She is alert and oriented to person, place, and time. Mental status is at baseline.   Psychiatric:         Mood and Affect: Mood normal.         Behavior: Behavior normal.              CRANIAL NERVES     CN III, IV, VI   Pupils are equal, round, and reactive to light.       Significant Labs: All pertinent labs within the past 24 hours have been reviewed.  CBC:   Recent Labs   Lab 03/21/25  0638   WBC 11.08   HGB 13.8   HCT 42.8        CMP:   Recent Labs   Lab 03/21/25  0638      K 4.6      CO2 16*   *   BUN 10   CREATININE 0.8   CALCIUM 9.3   PROT 7.2   ALBUMIN 4.1   BILITOT 0.4   ALKPHOS 82   AST 32   ALT 18    ANIONGAP 17*       Significant Imaging: I have reviewed all pertinent imaging results/findings within the past 24 hours.    CT Abdomen Pelvis With IV Contrast NO Oral Contrast [[id number]] Resulted: 03/21/25 0810   Order Status: Completed Updated: 03/21/25 0813   Narrative:     EXAMINATION:   CT ABDOMEN PELVIS WITH IV CONTRAST     CLINICAL HISTORY:   LLQ abdominal pain;     TECHNIQUE:   Low dose axial images, sagittal and coronal reformations were obtained from the lung bases to the pubic symphysis following the IV administration of 75 mL of Omnipaque 350 .  No oral contrast was administered     COMPARISON:   07/16/2024     FINDINGS:   There are no significant findings in the visualized portions of the lung bases.     The gallbladder is surgically absent.  Mild fatty infiltration of the liver again noted.  Spleen, adrenal glands, pancreas, kidneys are normal.     The aorta is normal in caliber.     Urinary bladder demonstrates a focus of calcification in the left posterior bladder wall similar to prior.  Uterus surgically absent.     Trace pelvic free fluid is present.     There is a staple line in the sigmoid colon.  A lipoma of the right colon is again noted.  The colon and distal small bowel are collapsed, and there are fluid-filled, dilated loops of small bowel present in the left mid abdomen and pelvis with adjacent mesenteric edema.  Once again, the site of transition in caliber appears to be at or near a small bowel anastomosis in the left lower quadrant.  The bowel wall thickening noted on the prior study is not evident currently.  There is no free air or portal venous gas.    Impression:       Recurrent small-bowel obstruction at or near the small bowel anastomosis in the left lower quadrant.     Additional unchanged findings described above.       Electronically signed by: Rupa Uribe   Date: 03/21/2025   Time: 08:10

## 2025-03-21 NOTE — ED PROVIDER NOTES
History     Chief Complaint   Patient presents with    Abdominal Pain     Pt reports abd pain in umbilical area x 4 days. Pt reports that tonight the vomiting became worse. Pt reports she took zofran at 0430. Pt reports she has a hx of obstructions.     Vomiting     HPI:  Pham Licea is a 69 y.o. female with PMH as below including PUD, SBO, PSH including cholecystectomy, appendectomy, CS x3, hysterectomy, & THIAGO for SBO, who presents to the [facility] emergency department for evaluation of 1 week of periumbilical abdominal pain. The pain has worsened, now with N/V. Last BM was yesterday and was smaller caliber.     PCP: Lani Grossman MD    Review of patient's allergies indicates:  No Known Allergies   Past Medical History:   Diagnosis Date    Anemia     Fatty liver 2015    Former smoker     Osteoarthritis 2010    Pulmonary nodules 2019    Repeat CT in 6 mo    Ulcer of abdomen wall 2016     Past Surgical History:   Procedure Laterality Date    APPENDECTOMY  1993     SECTION  , , 1983    x3    CHOLECYSTECTOMY          COLONOSCOPY  ~    [facility]: normal findings per patient report    COLONOSCOPY N/A 2020    Procedure: COLONOSCOPY;  Surgeon: Misael Holm MD;  Location: [facility];  Service: General;  Laterality: N/A;    COLONOSCOPY, WITH RETROGRADE BALLOON ENTEROSCOPY, SINGLE OR DOUBLE BALLOON N/A 2023    Procedure: COLONOSCOPY, WITH RETROGRADE BALLOON ENTEROSCOPY, DOUBLE BALLOON;  Surgeon: Collin Mares MD;  Location: [facility] ([address] [place] [address]);  Service: Endoscopy;  Laterality: N/A;  inst via portal-MS-sutab per pt request-tb-new inst portal    DIAGNOSTIC LAPAROSCOPY N/A 2021    Procedure: LAPAROSCOPY, DIAGNOSTIC;  Surgeon: Misael Holm MD;  Location: [facility] OR;  Service: General;  Laterality: N/A;    EPIDURAL STEROID INJECTION INTO LUMBAR SPINE N/A 10/12/2018    Procedure: Injection-steroid-epidural-lumbar;  Surgeon: Kal Johnson MD;   Location: [facility] OR;  Service: Pain Management;  Laterality: N/A;  L5-S1    EPIDURAL STEROID INJECTION INTO LUMBAR SPINE N/A 05/31/2019    Procedure: Injection-steroid-epidural-lumbar L5-S1;  Surgeon: Kal Johnson MD;  Location: [facility] OR;  Service: Pain Management;  Laterality: N/A;    ESOPHAGOGASTRODUODENOSCOPY N/A 11/09/2020    Procedure: ESOPHAGOGASTRODUODENOSCOPY (EGD);  Surgeon: Misael Holm MD;  Location: [facility] ENDO;  Service: General;  Laterality: N/A;    ESOPHAGOGASTRODUODENOSCOPY N/A 7/16/2024    Procedure: EGD (ESOPHAGOGASTRODUODENOSCOPY);  Surgeon: Jas Gleason MD;  Location: [facility] ENDO;  Service: Endoscopy;  Laterality: N/A;    FUSION, SPINE, MINIMALLY INVASIVE, USING COMPUTER-ASSISTED NAVIGATION N/A 12/22/2022    Procedure: ENTEROSCOPY, DOUBLE BALLOON, ANTEGRADE;  Surgeon: Collin Mares MD;  Location: [facility] ENDO (2ND FLR);  Service: Endoscopy;  Laterality: N/A;  11/14/22-Instructions via portal-DS    *open to earlier date if available*    HYSTERECTOMY  1993    one ovary conserved    LAPAROSCOPIC CHOLECYSTECTOMY N/A 09/02/2020    Procedure: CHOLECYSTECTOMY, LAPAROSCOPIC;  Surgeon: Govind Frey MD;  Location: [facility] OR;  Service: General;  Laterality: N/A;    LAPAROSCOPIC LYSIS OF ADHESIONS N/A 05/04/2021    Procedure: LYSIS, ADHESIONS, LAPAROSCOPIC;  Surgeon: Misael Holm MD;  Location: [facility] OR;  Service: General;  Laterality: N/A;    TRANSFORAMINAL EPIDURAL INJECTION OF STEROID Right 2/6/2024    Procedure: Injection,steroid,epidural,transforaminal approach;  Surgeon: Kal Johnson MD;  Location: [facility] OR;  Service: Anesthesiology;  Laterality: Right;  L4-5 and L5-s1    UPPER GASTROINTESTINAL ENDOSCOPY  2016       Family History   Problem Relation Name Age of Onset    Ovarian cancer Mother      Heart disease Mother      Osteoporosis Mother      Arthritis Mother      Hypertension Father      Stroke Father  50    Brain Hemorrhage Father      Aneurysm Father      Osteoarthritis  Sister      Arthritis Sister      Hypertension Sister      Obesity Sister      Breast cancer Neg Hx      Colon cancer Neg Hx      Colon polyps Neg Hx      Crohn's disease Neg Hx      Ulcerative colitis Neg Hx       Social History     Tobacco Use    Smoking status: Former     Current packs/day: 0.00     Average packs/day: 1 pack/day for 40.0 years (40.0 ttl pk-yrs)     Types: Cigarettes     Start date: 11/2/1969     Quit date: 11/2/2009     Years since quitting: 15.3    Smokeless tobacco: Never    Tobacco comments:     quit 2009   Substance and Sexual Activity    Alcohol use: Not Currently     Comment: Not often - one glass of wine every now and then    Drug use: Not Currently     Types: Marijuana     Comment: in 1970s    Sexual activity: Not Currently      Review of Systems     Review of Systems   Constitutional: Negative.  Negative for fever.   HENT: Negative.  Negative for congestion.    Eyes: Negative.    Respiratory: Negative.  Negative for cough.    Cardiovascular: Negative.    Gastrointestinal:  Positive for abdominal pain, nausea and vomiting. Negative for blood in stool, constipation and diarrhea.   Endocrine: Negative.    Genitourinary: Negative.    Musculoskeletal: Negative.    Skin: Negative.    Allergic/Immunologic: Negative.    Neurological: Negative.    Hematological: Negative.    Psychiatric/Behavioral: Negative.     All other systems reviewed and are negative.       Physical Exam     Initial Vitals [03/21/25 0612]   BP Pulse Resp Temp SpO2   131/68 75 (!) 21 97.9 °F (36.6 °C) 99 %      MAP       --          Nursing notes and vital signs reviewed.  Constitutional: Patient is in moderate distress.   Head: Normocephalic. Atraumatic.   Eyes:  Conjunctivae are not pale. No scleral icterus.   ENT: Mucous membranes moist.   Neck: Supple.   Cardiovascular: Regular rate. Regular rhythm.   Pulmonary: No respiratory distress.   Abdominal: Soft. Non-distended. Periumbilical and LLQ tenderness with LLQ guarding.  "  Musculoskeletal: Moves all extremities. No obvious deformities.   Skin: Warm and dry.   Neurological:  Alert, awake, and appropriate. Normal speech. No acute lateralizing neurologic deficits appreciated.   Psychiatric: Normal affect.       ED Course   Procedures  Vitals:    03/21/25 0612 03/21/25 0632 03/21/25 0634 03/21/25 0635   BP: 131/68 135/74     Pulse: 75 68  71   Resp: (!) 21  20    Temp: 97.9 °F (36.6 °C)      TempSrc: Oral      SpO2: 99% 97%  98%   Weight: 83.9 kg (185 lb)      Height: 5' 4" (1.626 m)       03/21/25 0700 03/21/25 0735 03/21/25 0802 03/21/25 0805   BP: 114/66 (!) 105/59 (!) 106/59    Pulse: 87 74 66    Resp: 16   16   Temp:       TempSrc:       SpO2: 100% 97% 99%    Weight:       Height:         Lab Results Interpreted as Abnormal:  Labs Reviewed   CBC W/ AUTO DIFFERENTIAL - Abnormal       Result Value    WBC 11.08      RBC 4.91      Hemoglobin 13.8      Hematocrit 42.8      MCV 87      MCH 28.1      MCHC 32.2      RDW 15.2 (*)     Platelets 273      MPV 10.6      Immature Granulocytes 0.5      Gran # (ANC) 8.8 (*)     Immature Grans (Abs) 0.05 (*)     Lymph # 1.6      Mono # 0.6      Eos # 0.0      Baso # 0.06      nRBC 0      Gran % 78.9 (*)     Lymph % 14.4 (*)     Mono % 5.4      Eosinophil % 0.3      Basophil % 0.5      Differential Method Automated     COMPREHENSIVE METABOLIC PANEL - Abnormal    Sodium 137      Potassium 4.6      Chloride 104      CO2 16 (*)     Glucose 125 (*)     BUN 10      Creatinine 0.8      Calcium 9.3      Total Protein 7.2      Albumin 4.1      Total Bilirubin 0.4      Alkaline Phosphatase 82      AST 32      ALT 18      eGFR >60.0      Anion Gap 17 (*)    LIPASE    Lipase 15        All Lab Results:  Results for orders placed or performed during the hospital encounter of 03/21/25   CBC auto differential    Collection Time: 03/21/25  6:38 AM   Result Value Ref Range    WBC 11.08 3.90 - 12.70 K/uL    RBC 4.91 4.00 - 5.40 M/uL    Hemoglobin 13.8 12.0 - 16.0 " g/dL    Hematocrit 42.8 37.0 - 48.5 %    MCV 87 82 - 98 fL    MCH 28.1 27.0 - 31.0 pg    MCHC 32.2 32.0 - 36.0 g/dL    RDW 15.2 (H) 11.5 - 14.5 %    Platelets 273 150 - 450 K/uL    MPV 10.6 9.2 - 12.9 fL    Immature Granulocytes 0.5 0.0 - 0.5 %    Gran # (ANC) 8.8 (H) 1.8 - 7.7 K/uL    Immature Grans (Abs) 0.05 (H) 0.00 - 0.04 K/uL    Lymph # 1.6 1.0 - 4.8 K/uL    Mono # 0.6 0.3 - 1.0 K/uL    Eos # 0.0 0.0 - 0.5 K/uL    Baso # 0.06 0.00 - 0.20 K/uL    nRBC 0 0 /100 WBC    Gran % 78.9 (H) 38.0 - 73.0 %    Lymph % 14.4 (L) 18.0 - 48.0 %    Mono % 5.4 4.0 - 15.0 %    Eosinophil % 0.3 0.0 - 8.0 %    Basophil % 0.5 0.0 - 1.9 %    Differential Method Automated    Comprehensive metabolic panel    Collection Time: 03/21/25  6:38 AM   Result Value Ref Range    Sodium 137 136 - 145 mmol/L    Potassium 4.6 3.5 - 5.1 mmol/L    Chloride 104 95 - 110 mmol/L    CO2 16 (L) 23 - 29 mmol/L    Glucose 125 (H) 70 - 110 mg/dL    BUN 10 8 - 23 mg/dL    Creatinine 0.8 0.5 - 1.4 mg/dL    Calcium 9.3 8.7 - 10.5 mg/dL    Total Protein 7.2 6.0 - 8.4 g/dL    Albumin 4.1 3.5 - 5.2 g/dL    Total Bilirubin 0.4 0.1 - 1.0 mg/dL    Alkaline Phosphatase 82 40 - 150 U/L    AST 32 10 - 40 U/L    ALT 18 10 - 44 U/L    eGFR >60.0 >60 mL/min/1.73 m^2    Anion Gap 17 (H) 8 - 16 mmol/L   Lipase    Collection Time: 03/21/25  6:38 AM   Result Value Ref Range    Lipase 15 4 - 60 U/L     *Note: Due to a large number of results and/or encounters for the requested time period, some results have not been displayed. A complete set of results can be found in Results Review.     Imaging Results              CT Abdomen Pelvis With IV Contrast NO Oral Contrast (Final result)  Result time 03/21/25 08:10:56      Final result by Rupa Uribe MD (03/21/25 08:10:56)                   Impression:      Recurrent small-bowel obstruction at or near the small bowel anastomosis in the left lower quadrant.    Additional unchanged findings described above.      Electronically  signed by: Rupa Uribe  Date:    03/21/2025  Time:    08:10               Narrative:    EXAMINATION:  CT ABDOMEN PELVIS WITH IV CONTRAST    CLINICAL HISTORY:  LLQ abdominal pain;    TECHNIQUE:  Low dose axial images, sagittal and coronal reformations were obtained from the lung bases to the pubic symphysis following the IV administration of 75 mL of Omnipaque 350 .  No oral contrast was administered    COMPARISON:  07/16/2024    FINDINGS:  There are no significant findings in the visualized portions of the lung bases.    The gallbladder is surgically absent.  Mild fatty infiltration of the liver again noted.  Spleen, adrenal glands, pancreas, kidneys are normal.    The aorta is normal in caliber.    Urinary bladder demonstrates a focus of calcification in the left posterior bladder wall similar to prior.  Uterus surgically absent.    Trace pelvic free fluid is present.    There is a staple line in the sigmoid colon.  A lipoma of the right colon is again noted.  The colon and distal small bowel are collapsed, and there are fluid-filled, dilated loops of small bowel present in the left mid abdomen and pelvis with adjacent mesenteric edema.  Once again, the site of transition in caliber appears to be at or near a small bowel anastomosis in the left lower quadrant.  The bowel wall thickening noted on the prior study is not evident currently.  There is no free air or portal venous gas.                                       Emergency Physician Independent Interpretation of Imaging: agree with radiologist     The emergency physician reviewed the vital signs / test results outlined above.     ED Discussion     ED Course as of 03/21/25 0908   Fri Mar 21, 2025   0907 Discussed patient with Dr. Zarate who recommends admission.   [ND]   0907 I discussed the patient's case with Hortencia Lock NP, hospital medicine, who accepts the patient for admission.     Admitting physician: Dr. Biswas  Admitting service:   Hospital Medicine  [ND]      ED Course User Index  [ND] Dedrick Christine MD         ED Medication(s) Administered:  Medications   ondansetron injection 8 mg (8 mg Intravenous Given 3/21/25 0634)   lactated ringers bolus 1,000 mL (0 mLs Intravenous Stopped 3/21/25 0816)   morphine injection 6 mg (6 mg Intravenous Given 3/21/25 0634)   iohexoL (OMNIPAQUE 350) injection 75 mL (75 mLs Intravenous Given 3/21/25 0739)   morphine injection 4 mg (4 mg Intravenous Given 3/21/25 0805)       Prescription Management: I performed a review of the patient's current Rx medication list as input by nursing staff.    Patient's Medications   New Prescriptions    No medications on file   Previous Medications    BETAMETHASONE DIPROPIONATE 0.05 % CREAM    Apply topically 2 (two) times daily.    BIOTIN 5,000 MCG TBDL    Take 5,000 mcg by mouth Daily.    CETIRIZINE (ZYRTEC) 10 MG TABLET    Take 1 tablet (10 mg total) by mouth once daily.    CONSTULOSE 10 GRAM/15 ML SOLUTION    TAKE 10 ML BY MOUTH  THREE TIMES DAILY    DICLOFENAC SODIUM (VOLTAREN) 1 % GEL    Apply 2 g topically 2 (two) times daily as needed.    DOXYCYCLINE (VIBRAMYCIN) 100 MG CAP    Take 1 capsule (100 mg total) by mouth 2 (two) times daily.    GABAPENTIN (NEURONTIN) 300 MG CAPSULE    Take 2 capsules (600 mg total) by mouth 3 (three) times daily.    HYDROCODONE-ACETAMINOPHEN (NORCO) 5-325 MG PER TABLET    Take 1 tablet by mouth every 6 (six) hours as needed for Pain.    MELOXICAM (MOBIC) 15 MG TABLET    Take 1 tablet (15 mg total) by mouth once daily.    ONDANSETRON (ZOFRAN-ODT) 4 MG TBDL    Take 8 mg by mouth every 6 (six) hours as needed.    ORPHENADRINE (NORFLEX) 100 MG TABLET    Take 1 tablet (100 mg total) by mouth 2 (two) times daily as needed for Muscle spasms.    PANTOPRAZOLE (PROTONIX) 20 MG TABLET    Take 1 tablet (20 mg total) by mouth once daily.   Modified Medications    No medications on file   Discontinued Medications    No medications on file            Clinical Impression       ICD-10-CM ICD-9-CM   1. SBO (small bowel obstruction)  K56.609 560.9      ED Disposition Condition    Admit Stable             Dedrick Christine MD  03/21/25 0923

## 2025-03-21 NOTE — ASSESSMENT & PLAN NOTE
Consult General Surgery  Keep NPO  Gastrointestinal decompression via NGT to MARLENI WHITAKER daily  Pain control requiring IV opioids  Antiemetics p.r.n.  Daily SHERMAN

## 2025-03-21 NOTE — CARE UPDATE
03/21/25 0700   Patient Assessment/Suction   Level of Consciousness (AVPU) alert   Respiratory Effort Unlabored   Expansion/Accessory Muscles/Retractions no use of accessory muscles   Rhythm/Pattern, Respiratory pattern regular;depth regular   Cough Frequency no cough   PRE-TX-O2   Device (Oxygen Therapy) room air   SpO2 100 %   Pulse Oximetry Type Continuous   $ Pulse Oximetry - Multiple Charge Pulse Oximetry - Multiple   Pulse 87   Resp 16   /66

## 2025-03-22 VITALS
SYSTOLIC BLOOD PRESSURE: 140 MMHG | HEIGHT: 64 IN | HEART RATE: 71 BPM | WEIGHT: 184.06 LBS | TEMPERATURE: 99 F | DIASTOLIC BLOOD PRESSURE: 67 MMHG | RESPIRATION RATE: 18 BRPM | BODY MASS INDEX: 31.42 KG/M2 | OXYGEN SATURATION: 98 %

## 2025-03-22 LAB
ABSOLUTE EOSINOPHIL (OHS): 0.12 K/UL
ABSOLUTE MONOCYTE (OHS): 0.68 K/UL (ref 0.3–1)
ABSOLUTE NEUTROPHIL COUNT (OHS): 2.54 K/UL (ref 1.8–7.7)
ALBUMIN SERPL BCP-MCNC: 3.4 G/DL (ref 3.5–5.2)
ALP SERPL-CCNC: 120 UNIT/L (ref 40–150)
ALT SERPL W/O P-5'-P-CCNC: 60 UNIT/L (ref 10–44)
ANION GAP (OHS): 9 MMOL/L (ref 8–16)
AST SERPL-CCNC: 63 UNIT/L (ref 11–45)
BASOPHILS # BLD AUTO: 0.05 K/UL
BASOPHILS NFR BLD AUTO: 0.9 %
BILIRUB SERPL-MCNC: 0.6 MG/DL (ref 0.1–1)
BUN SERPL-MCNC: 8 MG/DL (ref 8–23)
CALCIUM SERPL-MCNC: 8.6 MG/DL (ref 8.7–10.5)
CHLORIDE SERPL-SCNC: 107 MMOL/L (ref 95–110)
CO2 SERPL-SCNC: 24 MMOL/L (ref 23–29)
CREAT SERPL-MCNC: 0.7 MG/DL (ref 0.5–1.4)
ERYTHROCYTE [DISTWIDTH] IN BLOOD BY AUTOMATED COUNT: 15.7 % (ref 11.5–14.5)
GFR SERPLBLD CREATININE-BSD FMLA CKD-EPI: >60 ML/MIN/1.73/M2
GLUCOSE SERPL-MCNC: 96 MG/DL (ref 70–110)
HCT VFR BLD AUTO: 36.9 % (ref 37–48.5)
HGB BLD-MCNC: 11.8 GM/DL (ref 12–16)
IMM GRANULOCYTES # BLD AUTO: 0.05 K/UL (ref 0–0.04)
IMM GRANULOCYTES NFR BLD AUTO: 0.9 % (ref 0–0.5)
LYMPHOCYTES # BLD AUTO: 2.34 K/UL (ref 1–4.8)
MAGNESIUM SERPL-MCNC: 1.9 MG/DL (ref 1.6–2.6)
MCH RBC QN AUTO: 28 PG (ref 27–50)
MCHC RBC AUTO-ENTMCNC: 32 G/DL (ref 32–36)
MCV RBC AUTO: 87 FL (ref 82–98)
NUCLEATED RBC (/100WBC) (OHS): 0 /100 WBC
PHOSPHATE SERPL-MCNC: 3.4 MG/DL (ref 2.7–4.5)
PLATELET # BLD AUTO: 246 K/UL (ref 150–450)
PMV BLD AUTO: 10 FL (ref 9.2–12.9)
POTASSIUM SERPL-SCNC: 3.9 MMOL/L (ref 3.5–5.1)
PROT SERPL-MCNC: 5.7 GM/DL (ref 6–8.4)
RBC # BLD AUTO: 4.22 M/UL (ref 4–5.4)
RELATIVE EOSINOPHIL (OHS): 2.1 %
RELATIVE LYMPHOCYTE (OHS): 40.5 % (ref 18–48)
RELATIVE MONOCYTE (OHS): 11.8 % (ref 4–15)
RELATIVE NEUTROPHIL (OHS): 43.8 % (ref 38–73)
SODIUM SERPL-SCNC: 140 MMOL/L (ref 136–145)
WBC # BLD AUTO: 5.78 K/UL (ref 3.9–12.7)

## 2025-03-22 PROCEDURE — 84100 ASSAY OF PHOSPHORUS: CPT | Performed by: NURSE PRACTITIONER

## 2025-03-22 PROCEDURE — 63600175 PHARM REV CODE 636 W HCPCS: Performed by: NURSE PRACTITIONER

## 2025-03-22 PROCEDURE — 83735 ASSAY OF MAGNESIUM: CPT | Performed by: NURSE PRACTITIONER

## 2025-03-22 PROCEDURE — 80053 COMPREHEN METABOLIC PANEL: CPT | Performed by: NURSE PRACTITIONER

## 2025-03-22 PROCEDURE — 85025 COMPLETE CBC W/AUTO DIFF WBC: CPT | Performed by: NURSE PRACTITIONER

## 2025-03-22 RX ADMIN — MORPHINE SULFATE 4 MG: 2 INJECTION, SOLUTION INTRAMUSCULAR; INTRAVENOUS at 11:03

## 2025-03-22 RX ADMIN — PANTOPRAZOLE SODIUM 40 MG: 40 INJECTION, POWDER, FOR SOLUTION INTRAVENOUS at 09:03

## 2025-03-22 NOTE — PLAN OF CARE
Problem: Intestinal Obstruction  Goal: Optimal Bowel Function  Outcome: Progressing  Goal: Fluid and Electrolyte Balance  Outcome: Progressing  Goal: Absence of Infection Signs and Symptoms  Outcome: Progressing  Goal: Optimize Nutrition Status  Outcome: Progressing  Goal: Optimal Pain Control and Function  Outcome: Progressing

## 2025-03-22 NOTE — NURSING
The patient elected to leave the facility against medical advice, stating she needed to return home to care for her special needs niece. Prior to discharge, her nasogastric (NG) tube and intravenous (IV) lines were removed by  supervisor Lisha. The patient was alert, oriented, and exhibited no signs of distress. She ambulated independently from the unit with all personal belongings in hand. AMA paperwork was sign with supervisor and patient spoke with virtual MD and .

## 2025-03-22 NOTE — PLAN OF CARE
03/22/25 1249   Final Note   Assessment Type Final Discharge Note   Anticipated Discharge Disposition Left Against   What phone number can be called within the next 1-3 days to see how you are doing after discharge?   ([phone])     During rounds, pt informed provider she must go home, She is better and needs the NG out, she has a family situation that she must deal with immediately. Provider informed her she was not medically clear. Pt states she will take responsibility and sign herself out AMA, she has been dealing with bowel obstructions for 5 years and needed some IV medications. Pt agrees to f/u with Pcp and return if needed. RN notified Clear by VIVIANE

## 2025-03-22 NOTE — NURSING
Ambulatory to bathroom.  NGT to LIWS with green drainage.  Pain controlled with IV morphine see MAR.  Zofran x1 for c/o nausea.

## 2025-03-22 NOTE — RESPIRATORY THERAPY
03/21/25 2000   Patient Assessment/Suction   Level of Consciousness (AVPU) alert   Respiratory Effort Normal;Unlabored   Expansion/Accessory Muscles/Retractions no retractions;no use of accessory muscles   Rhythm/Pattern, Respiratory no shortness of breath reported;unlabored   PRE-TX-O2   Device (Oxygen Therapy) room air   SpO2 98 %   Pulse Oximetry Type Intermittent   $ Pulse Oximetry - Single Charge Pulse Oximetry - Single   Pulse 70   Resp 20

## 2025-03-22 NOTE — CONSULTS
[facility] Medicine  Telemedicine Consult Note       Pham Licea has been accepted for transfer to [facility] and will be followed through telemedicine services beginning 03/22/25 at 7 AM.    Kathie Oakes MD  [facility] Medicine Staff

## 2025-03-24 ENCOUNTER — TELEPHONE (OUTPATIENT)
Dept: SURGERY | Facility: HOSPITAL | Age: 70
End: 2025-03-24

## 2025-03-24 ENCOUNTER — PATIENT MESSAGE (OUTPATIENT)
Dept: SURGERY | Facility: CLINIC | Age: 70
End: 2025-03-24
Payer: MEDICARE

## 2025-03-24 ENCOUNTER — RESULTS FOLLOW-UP (OUTPATIENT)
Dept: SURGERY | Facility: HOSPITAL | Age: 70
End: 2025-03-24

## 2025-03-24 NOTE — TELEPHONE ENCOUNTER
Attempted to call to review results of MRI.  NO answer          Anatomic Evaluation     Hysterectomy.  Normal levator anatomy.     Functional Evaluation     Patient did defecate adequately during the examination.     [place] used for evaluation of prolapse: Pubococcygeal line (PCL).     H line (levator hiatus)     Rest: 4.8 cm.  (Normal <=5 cm)     Defecation/Maximal strain: 5.3 cm.     M line (anorectal junction location relative to PCL) (Drawn perpendicular from the PCL.)     Rest: 1.1 cm above.  (Normal <=2 cm below)     Defecation/Maximal strain: 3.4 cm below.     Interpretation: Normal levator hiatus and normal anorectal junction at rest with normal widening and moderate/Grade 2 (4-6 cm) descent during defecation/maximal strain.     Anterior Compartment     Bladder base location relative to the PCL     Rest: 2.2 cm above.     Defecation/Maximal strain: 1.1 cm above.     Interpretation: No cystocele.     Urethral hypermobility: Absent.     Middle Compartment     Vaginal apex location relative to PCL     Rest: 3.0 cm above.     Defecation/Maximal strain: 2.0 cm above.     Interpretation: No vaginal descent.     Posterior Compartment     AnorectalLevator-plate angle     Rest: 91 degrees.     Kegel: 89 degrees.     Defecation/Maximal strain: 115 degrees.     Interpretation: Normal resting angle with expected narrowing during Kegel and expected widening during defecation/maximal strain.     Rectal Intussusception: Absent.     Thickness: N/A     Level: N/A     Rectocele: Present.     Rectocele size: 1-2 cm (AP dimension).     Interpretation: Small (<2 cm) rectocele.     Peritoneocele/Enterocele/Sigmoidocele: Absent.     Interpretation: No peritoneocele/enterocele/sigmoidocele.     Other: None.     Impression:     1.    Anatomic findings: Hysterectomy..     2.    Functional evaluation: Normal levator hiatus and normal anorectal junction at rest with normal widening and moderate/Grade 2 (4-6 cm) descent during  defecation/maximal strain.     3.    Anterior compartment findings: Normal.     4.    Middle compartment findings: Normal.     5.    Posterior compartment findings: Small rectocele..         Will have office reach out to pt.

## 2025-03-26 NOTE — DISCHARGE SUMMARY
[facility] Medicine  Discharge Summary      Patient Name: Pham Licea  MRN: [id number]  Patient Class: IP- Inpatient  Admission Date: 3/21/2025  Hospital Length of Stay: 1 days  Discharge Date and Time: 3/22/2025 12:20 PM  Attending Physician: Wendy att. providers found   Discharging Provider: Kathie Oakes MD  Primary Care Provider: Lani Grossman MD      HPI:   Pham Licea is a year-old female with PMHx significant for anemia, fatty liver disease, and multiple small bowel obstructions.  Her surgical history includes cholecystectomy, appendectomy, hysterectomy, small bowel resection May 2021, lysis of adhesions and she reports her last abdominal surgery was March 2022.  She presented to the ED with a 1 week onset of abdominal pain which began Saturday.  She reports that she rested her bowels Saturday and Sunday and was able to return to work on Monday.  Then, 2 days ago again began to have mid abdominal and periumbilical cramping abdominal pain.  She reports that it comes and goes.  It feels like her prior bowel obstructions.  It is associated with nausea and vomiting.  She reports she last vomited approximately 1 hour ago.  She denies fever, chills, chest pain, shortness of breath, dysuria, or any other symptoms at this time.  CT scan of the abdomen and pelvis obtained in the ED reveals recurrent small-bowel obstruction near the small bowel anastomosis in the LLQ.  Her CO2 level is 16 with an anion gap of 17.  General surgery was consulted from the ED with recommendations for admission.  NG tube is to be placed and patient will be admitted to the services of Hospital Medicine for further evaluation and treatment.    * No surgery found *      Hospital Course:   Small bowel obstruction  Consult General Surgery  Keep NPO  Gastrointestinal decompression via NGT to LIS  KUB daily  Pain control requiring IV opioids  Antiemetics p.r.n.  Daily KUB  The patient has decided to leave  "against medical advice. The patient has a normal mental status and vital signs, understands the risks of leaving, including permanent disability and or/death, and has had an opportunity to ask questions about his medical condition. The patient has been informed that he may return for care at any time.             Metabolic acidosis  Suspect secondary to dehydration  IVF hydration  Monitor BMP        Iron deficiency anemia secondary to inadequate dietary iron intake  Anemia is likely due to Iron deficiency. Most recent hemoglobin and hematocrit are listed below.      Recent Labs     03/21/25  0638   HGB 13.8   HCT 42.8      Plan  - Monitor serial CBC: Daily  - Transfuse PRBC if patient becomes hemodynamically unstable, symptomatic or H/H drops below 7/21.  - Patient has not received any PRBC transfusions to date  - Patient's anemia is currently stable        Gastroesophageal reflux disease  Chronic problem.  IV Protonix.          Goals of Care Treatment Preferences:  Code Status: Full Code      Consults:   Consults (From admission, onward)          Status Ordering Provider     Inpatient virtual consult to Hospital Medicine  Once        Provider:  (Not yet assigned)    Completed YAYO GALVAN     Inpatient consult to General Surgery  Once        Provider:  Govind Zarate MD    Completed YAYO GALVAN            Assessment & Plan  Small bowel obstruction  Consult General Surgery  Keep NPO  Gastrointestinal decompression via NGT to LIS  KUB daily  Pain control requiring IV opioids  Antiemetics p.r.n.  Daily KUB      Gastroesophageal reflux disease  Chronic problem.  IV Protonix.    Iron deficiency anemia secondary to inadequate dietary iron intake  Anemia is likely due to Iron deficiency. Most recent hemoglobin and hematocrit are listed below.  No results for input(s): "HGB", "HCT" in the last 72 hours.    Plan  - Monitor serial CBC: Daily  - Transfuse PRBC if patient becomes hemodynamically unstable, " "symptomatic or H/H drops below 7/21.  - Patient has not received any PRBC transfusions to date  - Patient's anemia is currently stable    Metabolic acidosis  Suspect secondary to dehydration  IVF hydration  Monitor BMP    Final Active Diagnoses:    Diagnosis Date Noted POA    PRINCIPAL PROBLEM:  Small bowel obstruction [K56.609] 09/23/2020 Yes    Metabolic acidosis [E87.20] 03/21/2025 Yes    Iron deficiency anemia secondary to inadequate dietary iron intake [D50.8] 05/26/2021 Yes    Gastroesophageal reflux disease [K21.9] 01/09/2017 Yes      Problems Resolved During this Admission:       Discharged Condition: AMA (stable)    Disposition: Left Against Medical Adv*    Follow Up:    Patient Instructions:      Diet Cardiac     Notify your health care provider if you experience any of the following:  temperature >100.4     Notify your health care provider if you experience any of the following:  persistent nausea and vomiting or diarrhea     Notify your health care provider if you experience any of the following:  severe uncontrolled pain     Notify your health care provider if you experience any of the following:  redness, tenderness, or signs of infection (pain, swelling, redness, odor or green/yellow discharge around incision site)     Notify your health care provider if you experience any of the following:  difficulty breathing or increased cough     Notify your health care provider if you experience any of the following:  severe persistent headache     Notify your health care provider if you experience any of the following:  worsening rash     Notify your health care provider if you experience any of the following:  persistent dizziness, light-headedness, or visual disturbances     Notify your health care provider if you experience any of the following:  increased confusion or weakness     Activity as tolerated       Significant Diagnostic Studies: Labs: CMP No results for input(s): "NA", "K", "CL", "CO2", "GLU", " ""BUN", "CREATININE", "CALCIUM", "PROT", "ALBUMIN", "BILITOT", "ALKPHOS", "AST", "ALT", "ANIONGAP", "ESTGFRAFRICA", "EGFRNONAA" in the last 48 hours. and CBC No results for input(s): "WBC", "HGB", "HCT", "PLT" in the last 48 hours.    Pending Diagnostic Studies:       None           Medications:  Reconciled Home Medications:      Medication List        CONTINUE taking these medications      betamethasone dipropionate 0.05 % cream  Apply topically 2 (two) times daily.     biotin 5,000 mcg Tbdl  Take 5,000 mcg by mouth Daily.     CONSTULOSE 10 gram/15 mL solution  Generic drug: lactulose  TAKE 10 ML BY MOUTH  THREE TIMES DAILY     diclofenac sodium 1 % Gel  Commonly known as: VOLTAREN  Apply 2 g topically 2 (two) times daily as needed.     gabapentin 300 MG capsule  Commonly known as: NEURONTIN  Take 2 capsules (600 mg total) by mouth 3 (three) times daily.     ondansetron 4 MG Tbdl  Commonly known as: ZOFRAN-ODT  Take 8 mg by mouth every 6 (six) hours as needed.     orphenadrine 100 mg tablet  Commonly known as: NORFLEX  Take 1 tablet (100 mg total) by mouth 2 (two) times daily as needed for Muscle spasms.     pantoprazole 20 MG tablet  Commonly known as: PROTONIX  Take 1 tablet (20 mg total) by mouth once daily.            STOP taking these medications      doxycycline 100 MG Cap  Commonly known as: VIBRAMYCIN     HYDROcodone-acetaminophen 5-325 mg per tablet  Commonly known as: NORCO     meloxicam 15 MG tablet  Commonly known as: MOBIC            ASK your doctor about these medications      cetirizine 10 MG tablet  Commonly known as: ZYRTEC  Take 1 tablet (10 mg total) by mouth once daily.              Indwelling Lines/Drains at time of discharge:   Lines/Drains/Airways       None                   Time spent on the discharge of patient: 39 minutes         The attending portion of this evaluation, treatment, and documentation was performed per Kathie Oakes MD via Telemedicine AudioVisual using the [employer]" software platform with 2 way audio/video. The provider was located off-site and the patient is located in the hospital. The aforementioned video software was utilized to document the relevant history and physical exam    Kathie Oakes MD  Department of [facility] Medicine  [facility]

## 2025-03-26 NOTE — HOSPITAL COURSE
Small bowel obstruction  Consult General Surgery  Keep NPO  Gastrointestinal decompression via NGT to LIS  KUB daily  Pain control requiring IV opioids  Antiemetics p.r.n.  Daily KUB  The patient has decided to leave against medical advice. The patient has a normal mental status and vital signs, understands the risks of leaving, including permanent disability and or/death, and has had an opportunity to ask questions about his medical condition. The patient has been informed that he may return for care at any time.             Metabolic acidosis  Suspect secondary to dehydration  IVF hydration  Monitor BMP        Iron deficiency anemia secondary to inadequate dietary iron intake  Anemia is likely due to Iron deficiency. Most recent hemoglobin and hematocrit are listed below.      Recent Labs     03/21/25  0638   HGB 13.8   HCT 42.8      Plan  - Monitor serial CBC: Daily  - Transfuse PRBC if patient becomes hemodynamically unstable, symptomatic or H/H drops below 7/21.  - Patient has not received any PRBC transfusions to date  - Patient's anemia is currently stable        Gastroesophageal reflux disease  Chronic problem.  IV Protonix.

## 2025-04-14 ENCOUNTER — LAB VISIT (OUTPATIENT)
Dept: LAB | Facility: HOSPITAL | Age: 70
End: 2025-04-14
Attending: INTERNAL MEDICINE
Payer: MEDICARE

## 2025-04-14 DIAGNOSIS — D50.8 IRON DEFICIENCY ANEMIA SECONDARY TO INADEQUATE DIETARY IRON INTAKE: ICD-10-CM

## 2025-04-14 DIAGNOSIS — D51.8 OTHER VITAMIN B12 DEFICIENCY ANEMIAS: ICD-10-CM

## 2025-04-14 LAB
ABSOLUTE EOSINOPHIL (OHS): 0.09 K/UL
ABSOLUTE MONOCYTE (OHS): 0.56 K/UL (ref 0.3–1)
ABSOLUTE NEUTROPHIL COUNT (OHS): 3.35 K/UL (ref 1.8–7.7)
ALBUMIN SERPL BCP-MCNC: 4.1 G/DL (ref 3.5–5.2)
ALP SERPL-CCNC: 71 UNIT/L (ref 40–150)
ALT SERPL W/O P-5'-P-CCNC: 18 UNIT/L (ref 10–44)
ANION GAP (OHS): 10 MMOL/L (ref 8–16)
AST SERPL-CCNC: 18 UNIT/L (ref 11–45)
BASOPHILS # BLD AUTO: 0.06 K/UL
BASOPHILS NFR BLD AUTO: 1 %
BILIRUB SERPL-MCNC: 0.5 MG/DL (ref 0.1–1)
BUN SERPL-MCNC: 12 MG/DL (ref 8–23)
CALCIUM SERPL-MCNC: 9.4 MG/DL (ref 8.7–10.5)
CHLORIDE SERPL-SCNC: 107 MMOL/L (ref 95–110)
CO2 SERPL-SCNC: 25 MMOL/L (ref 23–29)
CREAT SERPL-MCNC: 0.8 MG/DL (ref 0.5–1.4)
ERYTHROCYTE [DISTWIDTH] IN BLOOD BY AUTOMATED COUNT: 16.1 % (ref 11.5–14.5)
FERRITIN SERPL-MCNC: 195 NG/ML (ref 20–300)
GFR SERPLBLD CREATININE-BSD FMLA CKD-EPI: >60 ML/MIN/1.73/M2
GLUCOSE SERPL-MCNC: 102 MG/DL (ref 70–110)
HCT VFR BLD AUTO: 42.1 % (ref 37–48.5)
HGB BLD-MCNC: 13.6 GM/DL (ref 12–16)
IMM GRANULOCYTES # BLD AUTO: 0.01 K/UL (ref 0–0.04)
IMM GRANULOCYTES NFR BLD AUTO: 0.2 % (ref 0–0.5)
IRON SATN MFR SERPL: 19 % (ref 20–50)
IRON SERPL-MCNC: 73 UG/DL (ref 30–160)
LYMPHOCYTES # BLD AUTO: 1.78 K/UL (ref 1–4.8)
MCH RBC QN AUTO: 28.5 PG (ref 27–31)
MCHC RBC AUTO-ENTMCNC: 32.3 G/DL (ref 32–36)
MCV RBC AUTO: 88 FL (ref 82–98)
NUCLEATED RBC (/100WBC) (OHS): 0 /100 WBC
PLATELET # BLD AUTO: 268 K/UL (ref 150–450)
PMV BLD AUTO: 9.4 FL (ref 9.2–12.9)
POTASSIUM SERPL-SCNC: 4 MMOL/L (ref 3.5–5.1)
PROT SERPL-MCNC: 6.8 GM/DL (ref 6–8.4)
RBC # BLD AUTO: 4.78 M/UL (ref 4–5.4)
RELATIVE EOSINOPHIL (OHS): 1.5 %
RELATIVE LYMPHOCYTE (OHS): 30.4 % (ref 18–48)
RELATIVE MONOCYTE (OHS): 9.6 % (ref 4–15)
RELATIVE NEUTROPHIL (OHS): 57.3 % (ref 38–73)
SODIUM SERPL-SCNC: 142 MMOL/L (ref 136–145)
TIBC SERPL-MCNC: 383 UG/DL (ref 250–450)
TRANSFERRIN SERPL-MCNC: 259 MG/DL (ref 200–375)
VIT B12 SERPL-MCNC: 440 PG/ML (ref 210–950)
WBC # BLD AUTO: 5.85 K/UL (ref 3.9–12.7)

## 2025-04-14 PROCEDURE — 82607 VITAMIN B-12: CPT

## 2025-04-14 PROCEDURE — 83540 ASSAY OF IRON: CPT

## 2025-04-14 PROCEDURE — 80053 COMPREHEN METABOLIC PANEL: CPT

## 2025-04-14 PROCEDURE — 85025 COMPLETE CBC W/AUTO DIFF WBC: CPT

## 2025-04-14 PROCEDURE — 82728 ASSAY OF FERRITIN: CPT

## 2025-04-14 PROCEDURE — 36415 COLL VENOUS BLD VENIPUNCTURE: CPT

## 2025-04-14 RX ORDER — GABAPENTIN 300 MG/1
600 CAPSULE ORAL 3 TIMES DAILY
Qty: 180 CAPSULE | Refills: 2 | Status: SHIPPED | OUTPATIENT
Start: 2025-04-14

## 2025-04-21 NOTE — PLAN OF CARE
2nd attempt - left message to call back in Estonian via language line.  See message below.   Pleasant alert and oriented patient ambulated independently to clinic for ferrelicit infusion - pt tolerated well - discharged home with no questions or concerns.

## 2025-04-22 DIAGNOSIS — K59.09 CHRONIC CONSTIPATION: ICD-10-CM

## 2025-04-23 ENCOUNTER — OFFICE VISIT (OUTPATIENT)
Dept: HEMATOLOGY/ONCOLOGY | Facility: CLINIC | Age: 70
End: 2025-04-23
Payer: MEDICARE

## 2025-04-23 DIAGNOSIS — D53.9 NUTRITIONAL ANEMIA, UNSPECIFIED: ICD-10-CM

## 2025-04-23 DIAGNOSIS — D50.8 IRON DEFICIENCY ANEMIA SECONDARY TO INADEQUATE DIETARY IRON INTAKE: ICD-10-CM

## 2025-04-23 DIAGNOSIS — Z87.19 S/P SMALL BOWEL OBSTRUCTION: ICD-10-CM

## 2025-04-23 DIAGNOSIS — D51.8 OTHER VITAMIN B12 DEFICIENCY ANEMIAS: Primary | ICD-10-CM

## 2025-04-23 DIAGNOSIS — E53.8 B12 DEFICIENCY: ICD-10-CM

## 2025-04-23 RX ORDER — LACTULOSE 10 G/15ML
SOLUTION ORAL
Qty: 948 ML | Refills: 0 | Status: SHIPPED | OUTPATIENT
Start: 2025-04-23

## 2025-04-23 NOTE — PROGRESS NOTES
Subjective:   The patient location is:  home  Visit type: Virtual visit with synchronous audio and video  Face-to-face or time spent with patient on the encounter:25 min  Total time spent on and for  this encounter which includes non face-to-face time preparing to see patient, review of tests, obtaining and or reviewing separately obtained records documenting clinical information in the electronic or other health records, independently interpreting results which is not separately reported ,and communicating results to the patient/family/caregiver and in care coordination and treatment planning/communicating with pharmacy for prescriptions/addressing social needs/arranging follow-up and or referrals :25 min    Each patient I provide medical services by telemedicine is:  (1) informed of the relationship between the physician and patient and the respective role of any other health care provider with respect to management of the patient; and (2) notified that he or she may decline to receive medical services by telemedicine and may withdraw from such care at any time.  This is a video visit therefore some elements of the physical exam such as vital signs, heart sounds are breath sounds are not included and may be included if found in recent clinic notes of other providers assessing same patient. Any symptoms or signs that were visualized were stated by the patient may be included in this note.      Patient ID: Pham Licea is a 69 y.o. female.    Chief Complaint:      sept 2020 had gall bladder surgery, thern she had a bowel obstruction, no surgery then, had 2 feet of bowels removed after a second bowel obstruction  Esophageal ulcers and dudenal ulcers- with bleeding in 2017  Recd blood in [facility] this weekend, pt went to ED because she was shaky and week and couldn't think was found to be anemic in ed   pt readmitted 3/22 had repeat bowel obstruction   Seen at emergency room May 25, 2022 with CT scan of abdomen  which shows partial obstruction patient having significant abdominal pain.  Discomfort belching similar to her prior obstructive symptoms  Past Medical History:   Diagnosis Date    Anemia     Fatty liver 2015    Former smoker 2009    Osteoarthritis 2010    Pulmonary nodules 2019    Repeat CT in 6 mo    SBO (small bowel obstruction)     Ulcer of abdomen wall 2016     Past Surgical History:   Procedure Laterality Date    APPENDECTOMY  1993     SECTION  , , 1983    x3    CHOLECYSTECTOMY          COLONOSCOPY  ~    [facility]: normal findings per patient report    COLONOSCOPY N/A 2020    Procedure: COLONOSCOPY;  Surgeon: Misael Holm MD;  Location: [facility] ENDO;  Service: General;  Laterality: N/A;    COLONOSCOPY, WITH RETROGRADE BALLOON ENTEROSCOPY, SINGLE OR DOUBLE BALLOON N/A 2023    Procedure: COLONOSCOPY, WITH RETROGRADE BALLOON ENTEROSCOPY, DOUBLE BALLOON;  Surgeon: Collin Mares MD;  Location: [facility] ENDO (2ND FLR);  Service: Endoscopy;  Laterality: N/A;  inst via portal-MS-sutab per pt request-tb-new inst portal    DIAGNOSTIC LAPAROSCOPY N/A 2021    Procedure: LAPAROSCOPY, DIAGNOSTIC;  Surgeon: Misael Holm MD;  Location: [facility] OR;  Service: General;  Laterality: N/A;    EPIDURAL STEROID INJECTION INTO LUMBAR SPINE N/A 10/12/2018    Procedure: Injection-steroid-epidural-lumbar;  Surgeon: Kal Johnson MD;  Location: [facility] OR;  Service: Pain Management;  Laterality: N/A;  L5-S1    EPIDURAL STEROID INJECTION INTO LUMBAR SPINE N/A 2019    Procedure: Injection-steroid-epidural-lumbar L5-S1;  Surgeon: Kal Johnson MD;  Location: [facility] OR;  Service: Pain Management;  Laterality: N/A;    ESOPHAGOGASTRODUODENOSCOPY N/A 2020    Procedure: ESOPHAGOGASTRODUODENOSCOPY (EGD);  Surgeon: Misael Holm MD;  Location: [facility] ENDO;  Service: General;  Laterality: N/A;    ESOPHAGOGASTRODUODENOSCOPY N/A 2024    Procedure: EGD (ESOPHAGOGASTRODUODENOSCOPY);   Surgeon: Jas Gleason MD;  Location: [facility] ENDO;  Service: Endoscopy;  Laterality: N/A;    FUSION, SPINE, MINIMALLY INVASIVE, USING COMPUTER-ASSISTED NAVIGATION N/A 12/22/2022    Procedure: ENTEROSCOPY, DOUBLE BALLOON, ANTEGRADE;  Surgeon: Collin Mares MD;  Location: [facility] ENDO (2ND FLR);  Service: Endoscopy;  Laterality: N/A;  11/14/22-Instructions via portal-DS    *open to earlier date if available*    HYSTERECTOMY  1993    one ovary conserved    LAPAROSCOPIC CHOLECYSTECTOMY N/A 09/02/2020    Procedure: CHOLECYSTECTOMY, LAPAROSCOPIC;  Surgeon: Govind Frey MD;  Location: [facility] OR;  Service: General;  Laterality: N/A;    LAPAROSCOPIC LYSIS OF ADHESIONS N/A 05/04/2021    Procedure: LYSIS, ADHESIONS, LAPAROSCOPIC;  Surgeon: Misael Holm MD;  Location: [facility] OR;  Service: General;  Laterality: N/A;    TRANSFORAMINAL EPIDURAL INJECTION OF STEROID Right 2/6/2024    Procedure: Injection,steroid,epidural,transforaminal approach;  Surgeon: Kal Johnson MD;  Location: [facility] OR;  Service: Anesthesiology;  Laterality: Right;  L4-5 and L5-s1    UPPER GASTROINTESTINAL ENDOSCOPY  2016     Family History   Problem Relation Name Age of Onset    Ovarian cancer Mother      Heart disease Mother      Osteoporosis Mother      Arthritis Mother      Hypertension Father      Stroke Father  50    Brain Hemorrhage Father      Aneurysm Father      Osteoarthritis Sister      Arthritis Sister      Hypertension Sister      Obesity Sister      Breast cancer Neg Hx      Colon cancer Neg Hx      Colon polyps Neg Hx      Crohn's disease Neg Hx      Ulcerative colitis Neg Hx        Social History     Socioeconomic History    Marital status:     Number of children: 4    Highest education level: Some college, no degree   Occupational History    Occupation: sale specialist-    Tobacco Use    Smoking status: Former     Current packs/day: 0.00     Average packs/day: 1 pack/day for 40.0 years (40.0 ttl pk-yrs)     Types:  Cigarettes     Start date: 11/2/1969     Quit date: 11/2/2009     Years since quitting: 15.4    Smokeless tobacco: Never    Tobacco comments:     quit 2009   Substance and Sexual Activity    Alcohol use: Not Currently     Comment: Not often - one glass of wine every now and then    Drug use: Not Currently     Types: Marijuana     Comment: in 1970s    Sexual activity: Not Currently     Social Drivers of Health     Financial Resource Strain: Patient Declined (3/21/2025)    Overall Financial Resource Strain (CARDIA)     Difficulty of Paying Living Expenses: Patient declined   Recent Concern: Financial Resource Strain - Medium Risk (2/17/2025)    Overall Financial Resource Strain (CARDIA)     Difficulty of Paying Living Expenses: Somewhat hard   Food Insecurity: Patient Declined (3/21/2025)    Hunger Vital Sign     Worried About Running Out of Food in the Last Year: Patient declined     Ran Out of Food in the Last Year: Patient declined   Recent Concern: Food Insecurity - Food Insecurity Present (2/17/2025)    Hunger Vital Sign     Worried About Running Out of Food in the Last Year: Sometimes true     Ran Out of Food in the Last Year: Sometimes true   Transportation Needs: Patient Declined (3/21/2025)    PRAPARE - Transportation     Lack of Transportation (Medical): Patient declined     Lack of Transportation (Non-Medical): Patient declined   Physical Activity: Inactive (2/17/2025)    Exercise Vital Sign     Days of Exercise per Week: 0 days     Minutes of Exercise per Session: 0 min   Stress: Patient Declined (3/21/2025)    [employer] - Occupational Stress Questionnaire     Feeling of Stress : Patient declined   Recent Concern: Stress - Stress Concern Present (2/17/2025)    [employer] - Occupational Stress Questionnaire     Feeling of Stress : Rather much   Housing Stability: Patient Declined (3/21/2025)    Housing Stability Vital Sign     Unable to Pay for  Housing in the Last Year: Patient declined     Number of Times Moved in the Last Year: 0     Homeless in the Last Year: Patient declined     Review of patient's allergies indicates:  No Known Allergies    Current Outpatient Medications:     betamethasone dipropionate 0.05 % cream, Apply topically 2 (two) times daily., Disp: 45 g, Rfl: 1    biotin 5,000 mcg TbDL, Take 5,000 mcg by mouth Daily., Disp: , Rfl:     cetirizine (ZYRTEC) 10 MG tablet, Take 1 tablet (10 mg total) by mouth once daily. (Patient not taking: Reported on 2/25/2025), Disp: 90 tablet, Rfl: 0    CONSTULOSE 10 gram/15 mL solution, TAKE 10 ML BY MOUTH  THREE TIMES DAILY, Disp: 948 mL, Rfl: 0    diclofenac sodium (VOLTAREN) 1 % Gel, Apply 2 g topically 2 (two) times daily as needed., Disp: , Rfl:     gabapentin (NEURONTIN) 300 MG capsule, Take 2 capsules (600 mg total) by mouth 3 (three) times daily., Disp: 180 capsule, Rfl: 2    ondansetron (ZOFRAN-ODT) 4 MG TbDL, Take 8 mg by mouth every 6 (six) hours as needed., Disp: , Rfl:     orphenadrine (NORFLEX) 100 mg tablet, Take 1 tablet (100 mg total) by mouth 2 (two) times daily as needed for Muscle spasms., Disp: 40 tablet, Rfl: 1    pantoprazole (PROTONIX) 20 MG tablet, Take 1 tablet (20 mg total) by mouth once daily., Disp: 90 tablet, Rfl: 2  Review of Systems   Constitutional:  Positive for malaise/fatigue. Negative for weight loss.        Mostly good appetite lost weight with her sx   HENT:  Negative for hearing loss.    Eyes:  Negative for blurred vision and double vision.   Respiratory:  Negative for cough and shortness of breath.    Cardiovascular:  Negative for chest pain.   Gastrointestinal:  Positive for constipation. Negative for abdominal pain and diarrhea.        Takes lactulose twice a day and colace twice a day   Genitourinary:  Negative for frequency.   Musculoskeletal:  Negative for back pain, myalgias and neck pain.        Leg cramps and shaky   Skin:  Negative for rash.   Neurological:   Negative for dizziness, weakness and headaches.   Endo/Heme/Allergies:  Does not bruise/bleed easily.   Psychiatric/Behavioral:  The patient is not nervous/anxious.    recent bowel obstruction 3/22 with repeat abdominal pain discomfort emergency room visit last night the outpatient surgical appointment  Physical Exam    Wt Readings from Last 3 Encounters:   03/21/25 83.5 kg (184 lb 1.4 oz)   03/20/25 83.9 kg (185 lb)   03/13/25 85 kg (187 lb 6.3 oz)     Temp Readings from Last 3 Encounters:   03/22/25 98.5 °F (36.9 °C) (Oral)   03/20/25 97.3 °F (36.3 °C) (Temporal)   03/13/25 97.5 °F (36.4 °C)     BP Readings from Last 3 Encounters:   03/22/25 (!) 140/67   03/20/25 130/69   03/13/25 (!) 141/67     Pulse Readings from Last 3 Encounters:   03/22/25 71   03/20/25 73   03/13/25 75     Lab Results   Component Value Date    WBC 5.85 04/14/2025    HGB 13.6 04/14/2025    HCT 42.1 04/14/2025    MCV 88 04/14/2025     04/14/2025       BMP  Lab Results   Component Value Date     04/14/2025    K 4.0 04/14/2025     04/14/2025    CO2 25 04/14/2025    BUN 12 04/14/2025    CREATININE 0.8 04/14/2025    CALCIUM 9.4 04/14/2025    ANIONGAP 10 04/14/2025    ESTGFRAFRICA >60.0 06/27/2022    EGFRNONAA >60.0 06/27/2022     Lab Results   Component Value Date    IRON 73 04/14/2025    TIBC 383 04/14/2025    FERRITIN 195.0 04/14/2025       Impression:  CT scan abdomen May 25, 2022     1. Multiple dilated loops of small bowel are noted that are fluid-filled left upper quadrant with the suggestion of bowel wall thickening near the level of the umbilicus in the midline and extending into the left dilated loops of bowel.  This raises the concern for vascular injury and or bowel infarction/inflammation possibly associated with obstruction.  Surgical consultation is suggested.  Post-contrast oral and intravenous imaging may be of use for confirmation given that this exam is severely hindered.  2. A 2nd region of concern is noted  within the right hemipelvis where a dilated loop of small bowel is noted without obvious bowel wall thickening.  This region is nonspecific and could relate to colon, small bowel, ileus, or obstruction.  Further evaluation and clinical correlation is necessary     Patient Active Problem List   Diagnosis    Primary osteoarthritis involving multiple joints    Obesity (BMI 30.0-34.9)    Gastroesophageal reflux disease    Acute pain of left knee    Chronic midline low back pain    Acute tear medial meniscus, right, sequela    Primary osteoarthritis of right knee    Lumbar radiculitis    Trigger middle finger of right hand    Nausea & vomiting    Abdominal pain    Encounter for postoperative care    Small bowel obstruction    Hypokalemia    Constipation    History of cholecystectomy    Right upper quadrant pain    History of hysterectomy    Rectal bleeding    Hemorrhoids    Mitral valve prolapse    Coronary artery calcification    Anemia    SAMPSON (dyspnea on exertion), noted 2010    History of smoking 25-50 pack years, 46 py, quit 2009    NSAID long-term use, started 2017, Aleve 2-4 tabs daily for a year    Bandemia    Family history of premature CAD    Cardiovascular event risk, ASCVD 10-year risk 4.8%, 2019, 6.7% in 2021    Abdominal obesity    Dyslipidemia, baseline LDL-C 112, HDL-C 49    Iron deficiency anemia secondary to inadequate dietary iron intake    Other vitamin B12 deficiency anemias    S/p small bowel obstruction    Chronic bilateral low back pain without sciatica    Atypical chest pain, onset 2021    Chronic fatigue    Excessive daytime sleepiness    Enteritis    Stiffness of left knee    Metabolic acidosis        Assessment and Plan   NATHAN: pt had partail bowel removal from opbstructions with recurrneces Bowel obs again 3/22 repeat sx and adhesiolysis, Goes to [facility] for evaluation of small bowel.  Severe iron-deficiency anemia has recurred been replace intravenous  See me 1 month after with CBC CMP  iron studies B12  Patient has not been replacing B12 sublingually start B12 injections Will keep patient on Q 4 months the iron checks   recvd infusinal iron with excellent response    May take liquid iron  again see me in 12 weeks with cbc,c mp iron ferin b12  Caring for her special needs neice trying to get benefits for her has been very trying for patient  Slight B12 deficiency also continue to monitor most recent test J 9/2022 acceptable  Patient to continue follow-up of her DJD with PCP                  Answers submitted by the patient for this visit:  Review of Systems Questionnaire (Submitted on 4/23/2025)  appetite change : No  unexpected weight change: No  mouth sores: No  visual disturbance: No  adenopathy: No

## 2025-04-24 ENCOUNTER — INFUSION (OUTPATIENT)
Dept: INFUSION THERAPY | Facility: HOSPITAL | Age: 70
End: 2025-04-24
Attending: INTERNAL MEDICINE
Payer: MEDICARE

## 2025-04-24 VITALS
SYSTOLIC BLOOD PRESSURE: 128 MMHG | DIASTOLIC BLOOD PRESSURE: 62 MMHG | OXYGEN SATURATION: 100 % | HEART RATE: 63 BPM | RESPIRATION RATE: 20 BRPM | BODY MASS INDEX: 32.18 KG/M2 | HEIGHT: 64 IN | WEIGHT: 188.5 LBS | TEMPERATURE: 98 F

## 2025-04-24 DIAGNOSIS — D51.8 OTHER VITAMIN B12 DEFICIENCY ANEMIAS: Primary | ICD-10-CM

## 2025-04-24 PROCEDURE — 96377 APPLICATON ON-BODY INJECTOR: CPT

## 2025-04-24 PROCEDURE — 63600175 PHARM REV CODE 636 W HCPCS: Performed by: INTERNAL MEDICINE

## 2025-04-24 RX ORDER — CYANOCOBALAMIN 1000 UG/ML
1000 INJECTION, SOLUTION INTRAMUSCULAR; SUBCUTANEOUS
OUTPATIENT
Start: 2025-04-24

## 2025-04-24 RX ORDER — CYANOCOBALAMIN 1000 UG/ML
1000 INJECTION, SOLUTION INTRAMUSCULAR; SUBCUTANEOUS
Status: DISCONTINUED | OUTPATIENT
Start: 2025-04-24 | End: 2025-04-24 | Stop reason: HOSPADM

## 2025-04-24 RX ADMIN — CYANOCOBALAMIN 1000 MCG: 1000 INJECTION INTRAMUSCULAR; SUBCUTANEOUS at 01:04

## 2025-04-24 NOTE — PLAN OF CARE
Problem: Fatigue  Goal: Improved Activity Tolerance  Intervention: Promote Improved Energy  Flowsheets (Taken 4/24/2025 130)  Sleep/Rest Enhancement: music provided  Environmental Support:   calm environment promoted   environmental consistency promoted   rest periods encouraged

## 2025-05-15 ENCOUNTER — OFFICE VISIT (OUTPATIENT)
Dept: OTOLARYNGOLOGY | Facility: CLINIC | Age: 70
End: 2025-05-15
Payer: MEDICARE

## 2025-05-15 VITALS — HEIGHT: 64 IN | WEIGHT: 173 LBS | BODY MASS INDEX: 29.53 KG/M2

## 2025-05-15 DIAGNOSIS — H92.01 RIGHT EAR PAIN: Primary | ICD-10-CM

## 2025-05-15 PROCEDURE — 99213 OFFICE O/P EST LOW 20 MIN: CPT | Mod: PBBFAC,PN | Performed by: OTOLARYNGOLOGY

## 2025-05-15 PROCEDURE — 99999 PR PBB SHADOW E&M-EST. PATIENT-LVL III: CPT | Mod: PBBFAC,,, | Performed by: OTOLARYNGOLOGY

## 2025-05-15 PROCEDURE — 99213 OFFICE O/P EST LOW 20 MIN: CPT | Mod: S$PBB,,, | Performed by: OTOLARYNGOLOGY

## 2025-05-15 NOTE — PROGRESS NOTES
"Subjective:       Patient ID: Pham Licea is a 69 y.o. female.    Chief Complaint: Ear Problem (Patient here with c/o "Sharp pain in and around the ear. My dad had an aneurysm from behind the ear so I want to get it looked at." )      I have help this patient before with external otitis and evaluating an audiogram but she comes in today because for a long time nonspecifically but years that is sounds like and a bit flared up lately she will get a pain and that is her right ear but also behind her ear the scalp a above but mostly behind her ear and even points to her neck a little bit.  That is not anterior to the ear.    She has successfully obtained hearing aids that she finds very helpful.          Objective:      ENT Physical Exam    So her tympanic membranes and ear canals are normal, that is no evidence of external otitis that is no tenderness to her TMJ joint on either side 0 abnormalities if her skin.        Assessment:       1. Right ear pain         Plan:          So the pain is intermittent it has some aspects that are neuralgia in character I am being behind her ear that is possibly a cervical spine issue however she does not in general have cervical spine issues although she does mentioned some lumbar spine issues.  I do not see any abnormality on her exam she mentions that is someone told her it was a risk factor for aneurysm ear pain but that is not commonly thought of as a concern with the type of ear pain she is describing she does not have any pulsatile tinnitus.        "

## 2025-05-22 ENCOUNTER — INFUSION (OUTPATIENT)
Dept: INFUSION THERAPY | Facility: HOSPITAL | Age: 70
End: 2025-05-22
Attending: INTERNAL MEDICINE
Payer: MEDICARE

## 2025-05-22 VITALS
HEIGHT: 64 IN | WEIGHT: 173.06 LBS | BODY MASS INDEX: 29.55 KG/M2 | OXYGEN SATURATION: 98 % | TEMPERATURE: 97 F | DIASTOLIC BLOOD PRESSURE: 58 MMHG | HEART RATE: 78 BPM | SYSTOLIC BLOOD PRESSURE: 125 MMHG | RESPIRATION RATE: 18 BRPM

## 2025-05-22 DIAGNOSIS — D51.8 OTHER VITAMIN B12 DEFICIENCY ANEMIAS: Primary | ICD-10-CM

## 2025-05-22 PROCEDURE — 96372 THER/PROPH/DIAG INJ SC/IM: CPT

## 2025-05-22 PROCEDURE — 63600175 PHARM REV CODE 636 W HCPCS: Performed by: INTERNAL MEDICINE

## 2025-05-22 RX ORDER — CYANOCOBALAMIN 1000 UG/ML
1000 INJECTION, SOLUTION INTRAMUSCULAR; SUBCUTANEOUS
OUTPATIENT
Start: 2025-05-22

## 2025-05-22 RX ORDER — CYANOCOBALAMIN 1000 UG/ML
1000 INJECTION, SOLUTION INTRAMUSCULAR; SUBCUTANEOUS
Status: DISCONTINUED | OUTPATIENT
Start: 2025-05-22 | End: 2025-05-22 | Stop reason: HOSPADM

## 2025-05-22 RX ADMIN — CYANOCOBALAMIN 1000 MCG: 1000 INJECTION INTRAMUSCULAR; SUBCUTANEOUS at 12:05

## 2025-06-19 ENCOUNTER — INFUSION (OUTPATIENT)
Dept: INFUSION THERAPY | Facility: HOSPITAL | Age: 70
End: 2025-06-19
Attending: INTERNAL MEDICINE
Payer: MEDICARE

## 2025-06-19 VITALS
SYSTOLIC BLOOD PRESSURE: 106 MMHG | RESPIRATION RATE: 15 BRPM | DIASTOLIC BLOOD PRESSURE: 62 MMHG | OXYGEN SATURATION: 98 % | WEIGHT: 175 LBS | BODY MASS INDEX: 29.88 KG/M2 | TEMPERATURE: 98 F | HEART RATE: 89 BPM | HEIGHT: 64 IN

## 2025-06-19 DIAGNOSIS — D51.8 OTHER VITAMIN B12 DEFICIENCY ANEMIAS: Primary | ICD-10-CM

## 2025-06-19 PROCEDURE — 63600175 PHARM REV CODE 636 W HCPCS: Performed by: INTERNAL MEDICINE

## 2025-06-19 PROCEDURE — 96372 THER/PROPH/DIAG INJ SC/IM: CPT

## 2025-06-19 RX ORDER — CYANOCOBALAMIN 1000 UG/ML
1000 INJECTION, SOLUTION INTRAMUSCULAR; SUBCUTANEOUS
OUTPATIENT
Start: 2025-06-19

## 2025-06-19 RX ORDER — CYANOCOBALAMIN 1000 UG/ML
1000 INJECTION, SOLUTION INTRAMUSCULAR; SUBCUTANEOUS
Status: DISCONTINUED | OUTPATIENT
Start: 2025-06-19 | End: 2025-06-19 | Stop reason: HOSPADM

## 2025-06-19 RX ADMIN — CYANOCOBALAMIN 1000 MCG: 1000 INJECTION INTRAMUSCULAR; SUBCUTANEOUS at 12:06

## 2025-06-19 NOTE — PLAN OF CARE
Problem: Fatigue  Goal: Improved Activity Tolerance  6/19/2025 1233 by Iram Mares, RN  Outcome: Progressing  6/19/2025 1231 by Iram Mares, RN  Outcome: Progressing

## 2025-06-26 ENCOUNTER — HOSPITAL ENCOUNTER (OUTPATIENT)
Dept: RADIOLOGY | Facility: OTHER | Age: 70
Discharge: HOME | End: 2025-06-26
Attending: ORTHOPAEDIC SURGERY
Payer: MEDICARE

## 2025-06-26 ENCOUNTER — OFFICE VISIT (OUTPATIENT)
Dept: ORTHOPEDICS | Facility: CLINIC | Age: 70
End: 2025-06-26
Payer: MEDICARE

## 2025-06-26 VITALS — WEIGHT: 174.19 LBS | BODY MASS INDEX: 29.74 KG/M2 | HEIGHT: 64 IN

## 2025-06-26 DIAGNOSIS — M79.641 BILATERAL HAND PAIN: Primary | ICD-10-CM

## 2025-06-26 DIAGNOSIS — M15.2 DEGENERATIVE ARTHRITIS OF PROXIMAL INTERPHALANGEAL JOINT OF LITTLE FINGER OF RIGHT HAND: ICD-10-CM

## 2025-06-26 DIAGNOSIS — M79.642 BILATERAL HAND PAIN: Primary | ICD-10-CM

## 2025-06-26 DIAGNOSIS — M18.12 ARTHRITIS OF CARPOMETACARPAL (CMC) JOINT OF LEFT THUMB: Primary | ICD-10-CM

## 2025-06-26 DIAGNOSIS — M79.642 BILATERAL HAND PAIN: ICD-10-CM

## 2025-06-26 DIAGNOSIS — M79.641 BILATERAL HAND PAIN: ICD-10-CM

## 2025-06-26 PROCEDURE — 20600 DRAIN/INJ JOINT/BURSA W/O US: CPT | Mod: PBBFAC,LT | Performed by: ORTHOPAEDIC SURGERY

## 2025-06-26 PROCEDURE — 99213 OFFICE O/P EST LOW 20 MIN: CPT | Mod: PBBFAC,25 | Performed by: ORTHOPAEDIC SURGERY

## 2025-06-26 PROCEDURE — 73130 X-RAY EXAM OF HAND: CPT | Mod: TC,50,FY

## 2025-06-26 PROCEDURE — 99999 PR PBB SHADOW E&M-EST. PATIENT-LVL III: CPT | Mod: PBBFAC,,, | Performed by: ORTHOPAEDIC SURGERY

## 2025-06-26 PROCEDURE — 73130 X-RAY EXAM OF HAND: CPT | Mod: 26,50,, | Performed by: RADIOLOGY

## 2025-06-26 PROCEDURE — 20600 DRAIN/INJ JOINT/BURSA W/O US: CPT | Mod: PBBFAC,RT | Performed by: ORTHOPAEDIC SURGERY

## 2025-06-26 PROCEDURE — 99999PBSHW PR PBB SHADOW TECHNICAL ONLY FILED TO HB: Mod: PBBFAC,,,

## 2025-06-26 RX ORDER — METHYLPREDNISOLONE ACETATE 40 MG/ML
20 INJECTION, SUSPENSION INTRA-ARTICULAR; INTRALESIONAL; INTRAMUSCULAR; SOFT TISSUE
Status: DISCONTINUED | OUTPATIENT
Start: 2025-06-26 | End: 2025-06-26 | Stop reason: HOSPADM

## 2025-06-26 RX ADMIN — METHYLPREDNISOLONE ACETATE 20 MG: 40 INJECTION, SUSPENSION INTRA-ARTICULAR; INTRALESIONAL; INTRAMUSCULAR; SOFT TISSUE at 01:06

## 2025-06-26 NOTE — PROCEDURES
Small Joint Aspiration/Injection: L thumb CMC    Date/Time: 6/26/2025 1:15 PM    Performed by: Geo Vaughan MD  Authorized by: Geo Vaughan MD    Consent Done?:  Yes (Verbal)  Indications:  Pain and arthritis  Site marked: the procedure site was marked    Timeout: prior to procedure the correct patient, procedure, and site was verified    Prep: patient was prepped and draped in usual sterile fashion      Local anesthesia used?: Yes    Local anesthetic:  Topical anesthetic  Location:  Thumb  Site:  L thumb CMC  Ultrasonic guidance for needle placement?: No    Needle size:  25 G  Approach:  Dorsal  Medications:  20 mg methylPREDNISolone acetate 40 mg/mL  Patient tolerance:  Patient tolerated the procedure well with no immediate complications

## 2025-06-26 NOTE — PROCEDURES
Small Joint Aspiration/Injection: R small PIP    Date/Time: 6/26/2025 1:15 PM    Performed by: Geo Vaughan MD  Authorized by: Geo Vaughan MD    Consent Done?:  Yes (Verbal)  Indications:  Arthritis and pain  Site marked: the procedure site was marked    Timeout: prior to procedure the correct patient, procedure, and site was verified    Prep: patient was prepped and draped in usual sterile fashion      Local anesthesia used?: Yes    Local anesthetic:  Topical anesthetic  Location:  Small finger  Site:  R small PIP  Ultrasonic guidance for needle placement?: No    Needle size:  25 G  Approach:  Dorsal  Medications:  20 mg methylPREDNISolone acetate 40 mg/mL  Patient tolerance:  Patient tolerated the procedure well with no immediate complications

## 2025-06-26 NOTE — PROGRESS NOTES
[facility]  History & Physical  Orthopedics    SUBJECTIVE:      Chief Complaint: Left wrist and forearm pain    Referring Provider: No ref. provider found     Dr. Vaughan is the supervising physician for this encounter/patient    History of Present Illness:  Patient is a 69 y.o. right hand dominant female who presents today with complaints of left wrist and forearm pain. History of left distal radius fracture from July 2021, treated conservatively with [facility] in [place], [place]. She has generalized hand arthritis and was also treated for left thumb CMC arthritis with steroid injection in the past. She wear thumb spica support brace, she takes Mobic daily and uses Voltaren gel. She would like a repeat CMC injection today. She also mentions a left forearm mass that started within the last year, no injury. She does note that she will get a sharp stabbing pain at the mass with any resisted rotation of the wrist, such as lifting a heavy pot to pour.     Interval history July 7, 2022:  The patient returns today for re-evaluation.  She sustained a left distal radius fracture treated closed at an outside facility.  She subsequently developed a pain and swelling in the mid to proximal forearm of the left upper extremity after lifting a TV 2-3 months ago.  She was sent for MRI to evaluate further this mass and returns today for these results and re-evaluation.  Fortunately she notes her pain is significantly improved since her last visit with us.  Pain is rated 0/10 today.    Interval history October 6, 2022: The patient returns today for re-evaluation.  She notes that her left forearm is doing very well.  She still has occasional discomfort at times which she is steadily improving with time.  In regards to her CMC arthritis, her prior injection helped significantly and pain has only returned to a rather modest extent.  She has no new complaints and is pleased with her results of conservative  treatment for these issues thus far.    Interval history Mar 15, 2023: The patient returns today for re-evaluation.  She is still really suffering with her left-sided thumb CMC arthritis.  Additionally she has pain at the left radial styloid and 1st dorsal compartment.  She would like to discuss options for this today and has no other complaints.    Interval history 2025: The patient returns today for re-evaluation.  She once again reports that she is having widespread arthritic pains throughout bilateral hands but these are worst to the left thumb CMC joint as well as her right small finger PIP joint.  These what she would like to focus on today.  No other complaints.    Onset of symptoms/DOI was 1 year.    Symptoms are aggravated by activity.    Symptoms are alleviated by rest, immobilization and medication.    Symptoms consist of pain.    The patient rates their pain as a 5/10.    Attempted treatment(s) and/or interventions include activity modifications, rest, anti-inflammatory medications, immobilization and steroid injection.     The patient denies any fevers, chills, N/V, D/C and presents for evaluation.       Past Medical History:   Diagnosis Date    Anemia     Fatty liver     Former smoker     Osteoarthritis 2010    Pulmonary nodules 2019    Repeat CT in 6 mo    SBO (small bowel obstruction)     Ulcer of abdomen wall 2016     Past Surgical History:   Procedure Laterality Date    APPENDECTOMY  1993     SECTION  , , 1983    x3    CHOLECYSTECTOMY          COLONOSCOPY  ~    [facility]: normal findings per patient report    COLONOSCOPY N/A 2020    Procedure: COLONOSCOPY;  Surgeon: Misael Holm MD;  Location: [facility];  Service: General;  Laterality: N/A;    COLONOSCOPY, WITH RETROGRADE BALLOON ENTEROSCOPY, SINGLE OR DOUBLE BALLOON N/A 2023    Procedure: COLONOSCOPY, WITH RETROGRADE BALLOON ENTEROSCOPY, DOUBLE BALLOON;  Surgeon: Collin Mares MD;   Location: [facility] ENDO (2ND FLR);  Service: Endoscopy;  Laterality: N/A;  inst via portal-[place]-sutab per pt request-tb-new inst portal    DIAGNOSTIC LAPAROSCOPY N/A 05/04/2021    Procedure: LAPAROSCOPY, DIAGNOSTIC;  Surgeon: Misael Holm MD;  Location: [facility] OR;  Service: General;  Laterality: N/A;    EPIDURAL STEROID INJECTION INTO LUMBAR SPINE N/A 10/12/2018    Procedure: Injection-steroid-epidural-lumbar;  Surgeon: Kal Johnson MD;  Location: [facility] OR;  Service: Pain Management;  Laterality: N/A;  L5-S1    EPIDURAL STEROID INJECTION INTO LUMBAR SPINE N/A 05/31/2019    Procedure: Injection-steroid-epidural-lumbar L5-S1;  Surgeon: Kal Johnson MD;  Location: [facility] OR;  Service: Pain Management;  Laterality: N/A;    ESOPHAGOGASTRODUODENOSCOPY N/A 11/09/2020    Procedure: ESOPHAGOGASTRODUODENOSCOPY (EGD);  Surgeon: Misael Holm MD;  Location: [facility];  Service: General;  Laterality: N/A;    ESOPHAGOGASTRODUODENOSCOPY N/A 7/16/2024    Procedure: EGD (ESOPHAGOGASTRODUODENOSCOPY);  Surgeon: Jas Gleason MD;  Location: [facility];  Service: Endoscopy;  Laterality: N/A;    FUSION, SPINE, MINIMALLY INVASIVE, USING COMPUTER-ASSISTED NAVIGATION N/A 12/22/2022    Procedure: ENTEROSCOPY, DOUBLE BALLOON, ANTEGRADE;  Surgeon: Collin Mares MD;  Location: [facility] ENDO (2ND FLR);  Service: Endoscopy;  Laterality: N/A;  11/14/22-Instructions via portal-DS    *open to earlier date if available*    HYSTERECTOMY  1993    one ovary conserved    LAPAROSCOPIC CHOLECYSTECTOMY N/A 09/02/2020    Procedure: CHOLECYSTECTOMY, LAPAROSCOPIC;  Surgeon: Govind Frey MD;  Location: [facility] OR;  Service: General;  Laterality: N/A;    LAPAROSCOPIC LYSIS OF ADHESIONS N/A 05/04/2021    Procedure: LYSIS, ADHESIONS, LAPAROSCOPIC;  Surgeon: Misael Holm MD;  Location: [facility] OR;  Service: General;  Laterality: N/A;    TRANSFORAMINAL EPIDURAL INJECTION OF STEROID Right 2/6/2024    Procedure:  Injection,steroid,epidural,transforaminal approach;  Surgeon: Kal Johnson MD;  Location: [facility] OR;  Service: Anesthesiology;  Laterality: Right;  L4-5 and L5-s1    UPPER GASTROINTESTINAL ENDOSCOPY  2016     Review of patient's allergies indicates:  No Known Allergies  Social History     Social History Narrative    Not on file     Family History   Problem Relation Name Age of Onset    Ovarian cancer Mother      Heart disease Mother      Osteoporosis Mother      Arthritis Mother      Hypertension Father      Stroke Father  50    Brain Hemorrhage Father      Aneurysm Father      Osteoarthritis Sister      Arthritis Sister      Hypertension Sister      Obesity Sister      Breast cancer Neg Hx      Colon cancer Neg Hx      Colon polyps Neg Hx      Crohn's disease Neg Hx      Ulcerative colitis Neg Hx           Current Outpatient Medications:     betamethasone dipropionate 0.05 % cream, Apply topically 2 (two) times daily., Disp: 45 g, Rfl: 1    cetirizine (ZYRTEC) 10 MG tablet, Take 1 tablet (10 mg total) by mouth once daily., Disp: 90 tablet, Rfl: 0    CONSTULOSE 10 gram/15 mL solution, TAKE 10 ML BY MOUTH  THREE TIMES DAILY, Disp: 948 mL, Rfl: 0    diclofenac sodium (VOLTAREN) 1 % Gel, Apply 2 g topically 2 (two) times daily as needed., Disp: , Rfl:     gabapentin (NEURONTIN) 300 MG capsule, Take 2 capsules (600 mg total) by mouth 3 (three) times daily., Disp: 180 capsule, Rfl: 2    ondansetron (ZOFRAN-ODT) 4 MG TbDL, Take 8 mg by mouth every 6 (six) hours as needed., Disp: , Rfl:     orphenadrine (NORFLEX) 100 mg tablet, Take 1 tablet (100 mg total) by mouth 2 (two) times daily as needed for Muscle spasms., Disp: 40 tablet, Rfl: 1    pantoprazole (PROTONIX) 20 MG tablet, Take 1 tablet (20 mg total) by mouth once daily., Disp: 90 tablet, Rfl: 2    biotin 5,000 mcg TbDL, Take 5,000 mcg by mouth Daily., Disp: , Rfl:       Review of Systems:  Constitutional: no fever or chills  Eyes: no visual changes  ENT: no nasal  "congestion or sore throat  Respiratory: no cough or shortness of breath  Cardiovascular: no chest pain  Gastrointestinal: no nausea or vomiting, tolerating diet  Musculoskeletal: pain and soreness    OBJECTIVE:      Vital Signs (Most Recent):  Vitals:    06/26/25 1316   Weight: 79 kg (174 lb 2.6 oz)   Height: 5' 4" (1.626 m)     Body mass index is 29.9 kg/m².      Physical Exam:  Constitutional: The patient appears well-developed and well-nourished. No distress.   Skin: No lesions appreciated  Head: Normocephalic and atraumatic.   Nose: Nose normal.   Ears: No deformities seen  Eyes: Conjunctivae and EOM are normal.   Neck: No tracheal deviation present.   Cardiovascular: Normal rate and intact distal pulses.    Pulmonary/Chest: Effort normal. No respiratory distress.   Abdominal: There is no guarding.   Neurological: The patient is alert.   Psychiatric: The patient has a normal mood and affect.     Bilateral Hand/Wrist Examination:    Observation/Inspection:  Swelling  osteophytosis bilateral hands  Deformity  none  Discoloration  none     Scars   none    Atrophy  none  Patient with significant tenderness palpation right small finger PIP joint    HAND/WRIST EXAMINATION:  Finkelstein's Test   negative  WHAT Test    negative  Snuff box tenderness   Neg  Hilliard's Test    Neg  Hook of Hamate Tenderness  Neg  CMC grind    positive on the left  Circumduction test   positive on the left      Neurovascular Exam:  Digits WWP, brisk CR < 3s throughout  NVI motor/LTS to M/R/U nerves, radial pulse 2+  Tinel's Test - Carpal Tunnel  Neg  Tinel's Test - Cubital Tunnel  Neg  Phalen's Test    Neg  Median Nerve Compression Test Neg    ROM hand is somewhat restricted and painful at the IP joints of bilateral hands    ROM wrist full, painless    ROM elbow full, painless    Abdomen not guarded  Respirations nonlabored  Perfusion intact    Diagnostic Results:   X-rays of the patient's bilateral hands today demonstrate widespread " arthritic change  ASSESSMENT/PLAN:      69 y.o. yo female with left thumb CMC arthritis, right small finger PIP arthritis      Plan: The patient and I had a thorough discussion today.  We discussed the working diagnosis as well as several other potential alternative diagnoses.  Treatment options were discussed, both conservative and surgical.  Conservative treatment options would include things such as activity modifications, workplace modifications, a period of rest, oral vs topical OTC and prescription anti-inflammatory medications, occupational therapy, splinting/bracing, immobilization, corticosteroid injections, and others.  Surgical options were discussed as well.     At this time, the patient would like to proceed with corticosteroid injections to both the left thumb CMC joint and right small finger PIP joint today.  These will be administered.  Follow up as needed.    Should the patient's symptoms worsen, persist, or fail to improve they should return for reevaluation and I would be happy to see them back anytime.        Please do not hesitate to reach out to us via email, phone, or MyChart with any questions, concerns, or feedback.

## 2025-07-17 ENCOUNTER — INFUSION (OUTPATIENT)
Dept: INFUSION THERAPY | Facility: HOSPITAL | Age: 70
End: 2025-07-17
Attending: INTERNAL MEDICINE
Payer: MEDICARE

## 2025-07-17 VITALS
TEMPERATURE: 98 F | HEART RATE: 73 BPM | BODY MASS INDEX: 29.74 KG/M2 | DIASTOLIC BLOOD PRESSURE: 58 MMHG | SYSTOLIC BLOOD PRESSURE: 122 MMHG | HEIGHT: 64 IN | WEIGHT: 174.19 LBS | RESPIRATION RATE: 16 BRPM | OXYGEN SATURATION: 99 %

## 2025-07-17 DIAGNOSIS — D51.8 OTHER VITAMIN B12 DEFICIENCY ANEMIAS: Primary | ICD-10-CM

## 2025-07-17 PROCEDURE — 96377 APPLICATON ON-BODY INJECTOR: CPT

## 2025-07-17 PROCEDURE — 63600175 PHARM REV CODE 636 W HCPCS: Performed by: INTERNAL MEDICINE

## 2025-07-17 RX ORDER — CYANOCOBALAMIN 1000 UG/ML
1000 INJECTION, SOLUTION INTRAMUSCULAR; SUBCUTANEOUS
Status: DISCONTINUED | OUTPATIENT
Start: 2025-07-17 | End: 2025-07-17 | Stop reason: HOSPADM

## 2025-07-17 RX ORDER — CYANOCOBALAMIN 1000 UG/ML
1000 INJECTION, SOLUTION INTRAMUSCULAR; SUBCUTANEOUS
OUTPATIENT
Start: 2025-07-17

## 2025-07-17 RX ADMIN — CYANOCOBALAMIN 1000 MCG: 1000 INJECTION INTRAMUSCULAR; SUBCUTANEOUS at 12:07

## 2025-07-24 ENCOUNTER — TELEPHONE (OUTPATIENT)
Facility: CLINIC | Age: 70
End: 2025-07-24
Payer: MEDICARE

## 2025-07-28 ENCOUNTER — LAB VISIT (OUTPATIENT)
Dept: LAB | Facility: HOSPITAL | Age: 70
End: 2025-07-28
Attending: INTERNAL MEDICINE
Payer: MEDICARE

## 2025-07-28 DIAGNOSIS — E53.8 B12 DEFICIENCY: ICD-10-CM

## 2025-07-28 DIAGNOSIS — D51.8 OTHER VITAMIN B12 DEFICIENCY ANEMIAS: ICD-10-CM

## 2025-07-28 DIAGNOSIS — D50.8 IRON DEFICIENCY ANEMIA SECONDARY TO INADEQUATE DIETARY IRON INTAKE: ICD-10-CM

## 2025-07-28 LAB
ABSOLUTE EOSINOPHIL (OHS): 0.11 K/UL
ABSOLUTE MONOCYTE (OHS): 0.49 K/UL (ref 0.3–1)
ABSOLUTE NEUTROPHIL COUNT (OHS): 3.53 K/UL (ref 1.8–7.7)
ALBUMIN SERPL BCP-MCNC: 4.2 G/DL (ref 3.5–5.2)
ALP SERPL-CCNC: 77 UNIT/L (ref 40–150)
ALT SERPL W/O P-5'-P-CCNC: 18 UNIT/L (ref 10–44)
ANION GAP (OHS): 10 MMOL/L (ref 8–16)
AST SERPL-CCNC: 24 UNIT/L (ref 11–45)
BASOPHILS # BLD AUTO: 0.05 K/UL
BASOPHILS NFR BLD AUTO: 0.8 %
BILIRUB SERPL-MCNC: 0.4 MG/DL (ref 0.1–1)
BUN SERPL-MCNC: 11 MG/DL (ref 8–23)
CALCIUM SERPL-MCNC: 9.3 MG/DL (ref 8.7–10.5)
CHLORIDE SERPL-SCNC: 107 MMOL/L (ref 95–110)
CO2 SERPL-SCNC: 24 MMOL/L (ref 23–29)
CREAT SERPL-MCNC: 0.6 MG/DL (ref 0.5–1.4)
ERYTHROCYTE [DISTWIDTH] IN BLOOD BY AUTOMATED COUNT: 13.5 % (ref 11.5–14.5)
FERRITIN SERPL-MCNC: 155 NG/ML (ref 20–300)
GFR SERPLBLD CREATININE-BSD FMLA CKD-EPI: >60 ML/MIN/1.73/M2
GLUCOSE SERPL-MCNC: 88 MG/DL (ref 70–110)
HCT VFR BLD AUTO: 40.7 % (ref 37–48.5)
HGB BLD-MCNC: 13.4 GM/DL (ref 12–16)
IMM GRANULOCYTES # BLD AUTO: 0.03 K/UL (ref 0–0.04)
IMM GRANULOCYTES NFR BLD AUTO: 0.5 % (ref 0–0.5)
IRON SATN MFR SERPL: 23 % (ref 20–50)
IRON SERPL-MCNC: 94 UG/DL (ref 30–160)
LYMPHOCYTES # BLD AUTO: 1.78 K/UL (ref 1–4.8)
MCH RBC QN AUTO: 30.7 PG (ref 27–31)
MCHC RBC AUTO-ENTMCNC: 32.9 G/DL (ref 32–36)
MCV RBC AUTO: 93 FL (ref 82–98)
NUCLEATED RBC (/100WBC) (OHS): 0 /100 WBC
PLATELET # BLD AUTO: 280 K/UL (ref 150–450)
PMV BLD AUTO: 9.8 FL (ref 9.2–12.9)
POTASSIUM SERPL-SCNC: 4 MMOL/L (ref 3.5–5.1)
PROT SERPL-MCNC: 6.7 GM/DL (ref 6–8.4)
RBC # BLD AUTO: 4.36 M/UL (ref 4–5.4)
RELATIVE EOSINOPHIL (OHS): 1.8 %
RELATIVE LYMPHOCYTE (OHS): 29.7 % (ref 18–48)
RELATIVE MONOCYTE (OHS): 8.2 % (ref 4–15)
RELATIVE NEUTROPHIL (OHS): 59 % (ref 38–73)
SODIUM SERPL-SCNC: 141 MMOL/L (ref 136–145)
TIBC SERPL-MCNC: 401 UG/DL (ref 250–450)
TRANSFERRIN SERPL-MCNC: 271 MG/DL (ref 200–375)
WBC # BLD AUTO: 5.99 K/UL (ref 3.9–12.7)

## 2025-07-28 PROCEDURE — 82728 ASSAY OF FERRITIN: CPT

## 2025-07-28 PROCEDURE — 80053 COMPREHEN METABOLIC PANEL: CPT

## 2025-07-28 PROCEDURE — 83540 ASSAY OF IRON: CPT

## 2025-07-28 PROCEDURE — 36415 COLL VENOUS BLD VENIPUNCTURE: CPT

## 2025-07-28 PROCEDURE — 85025 COMPLETE CBC W/AUTO DIFF WBC: CPT

## 2025-07-30 ENCOUNTER — OFFICE VISIT (OUTPATIENT)
Dept: HEMATOLOGY/ONCOLOGY | Facility: CLINIC | Age: 70
End: 2025-07-30
Payer: MEDICARE

## 2025-07-30 DIAGNOSIS — E53.8 B12 DEFICIENCY: ICD-10-CM

## 2025-07-30 DIAGNOSIS — K62.5 RECTAL BLEEDING: ICD-10-CM

## 2025-07-30 DIAGNOSIS — D51.8 OTHER VITAMIN B12 DEFICIENCY ANEMIAS: ICD-10-CM

## 2025-07-30 DIAGNOSIS — Z87.19 S/P SMALL BOWEL OBSTRUCTION: ICD-10-CM

## 2025-07-30 DIAGNOSIS — D50.8 IRON DEFICIENCY ANEMIA SECONDARY TO INADEQUATE DIETARY IRON INTAKE: Primary | ICD-10-CM

## 2025-07-30 NOTE — PROGRESS NOTES
Subjective:   The patient location is:  [place]  Visit type: Virtual visit with synchronous audio and video  Face-to-face or time spent with patient on the encounter:25 min  Total time spent on and for  this encounter which includes non face-to-face time preparing to see patient, review of tests, obtaining and or reviewing separately obtained records documenting clinical information in the electronic or other health records, independently interpreting results which is not separately reported ,and communicating results to the patient/family/caregiver and in care coordination and treatment planning/communicating with pharmacy for prescriptions/addressing social needs/arranging follow-up and or referrals :25 min    Each patient I provide medical services by telemedicine is:  (1) informed of the relationship between the physician and patient and the respective role of any other health care provider with respect to management of the patient; and (2) notified that he or she may decline to receive medical services by telemedicine and may withdraw from such care at any time.  This is a video visit therefore some elements of the physical exam such as vital signs, heart sounds are breath sounds are not included and may be included if found in recent clinic notes of other providers assessing same patient. Any symptoms or signs that were visualized were stated by the patient may be included in this note.      Patient ID: Pham Licea is a 69 y.o. female.    Chief Complaint:      sept 2020 had gall bladder surgery, thern she had a bowel obstruction, no surgery then, had 2 feet of bowels removed after a second bowel obstruction  Esophageal ulcers and dudenal ulcers- with bleeding in 2017  Recd blood in [facility] this weekend, pt went to ED because she was shaky and week and couldn't think was found to be anemic in ed   pt readmitted 3/22 had repeat bowel obstruction   Seen at emergency room May 25, 2022 with CT scan of abdomen  which shows partial obstruction patient having significant abdominal pain.  Discomfort belching similar to her prior obstructive symptoms  Past Medical History:   Diagnosis Date    Anemia     Fatty liver 2015    Former smoker 2009    Osteoarthritis 2010    Pulmonary nodules 2019    Repeat CT in 6 mo    SBO (small bowel obstruction)     Ulcer of abdomen wall 2016     Past Surgical History:   Procedure Laterality Date    APPENDECTOMY  1993     SECTION  , , 1983    x3    CHOLECYSTECTOMY          COLONOSCOPY  ~    [facility]: normal findings per patient report    COLONOSCOPY N/A 2020    Procedure: COLONOSCOPY;  Surgeon: Misael Holm MD;  Location: [facility] ENDO;  Service: General;  Laterality: N/A;    COLONOSCOPY, WITH RETROGRADE BALLOON ENTEROSCOPY, SINGLE OR DOUBLE BALLOON N/A 2023    Procedure: COLONOSCOPY, WITH RETROGRADE BALLOON ENTEROSCOPY, DOUBLE BALLOON;  Surgeon: Collin Mares MD;  Location: [facility] ENDO (2ND FLR);  Service: Endoscopy;  Laterality: N/A;  inst via portal-[place]-sutab per pt request-tb-new inst portal    DIAGNOSTIC LAPAROSCOPY N/A 2021    Procedure: LAPAROSCOPY, DIAGNOSTIC;  Surgeon: Misael oHlm MD;  Location: [facility] OR;  Service: General;  Laterality: N/A;    EPIDURAL STEROID INJECTION INTO LUMBAR SPINE N/A 10/12/2018    Procedure: Injection-steroid-epidural-lumbar;  Surgeon: Kal Johnson MD;  Location: [facility] OR;  Service: Pain Management;  Laterality: N/A;  L5-S1    EPIDURAL STEROID INJECTION INTO LUMBAR SPINE N/A 2019    Procedure: Injection-steroid-epidural-lumbar L5-S1;  Surgeon: Kal Johnson MD;  Location: [facility] OR;  Service: Pain Management;  Laterality: N/A;    ESOPHAGOGASTRODUODENOSCOPY N/A 2020    Procedure: ESOPHAGOGASTRODUODENOSCOPY (EGD);  Surgeon: Misael Holm MD;  Location: [facility] ENDO;  Service: General;  Laterality: N/A;    ESOPHAGOGASTRODUODENOSCOPY N/A 2024    Procedure: EGD (ESOPHAGOGASTRODUODENOSCOPY);   Surgeon: Jas Gleason MD;  Location: [facility] ENDO;  Service: Endoscopy;  Laterality: N/A;    FUSION, SPINE, MINIMALLY INVASIVE, USING COMPUTER-ASSISTED NAVIGATION N/A 12/22/2022    Procedure: ENTEROSCOPY, DOUBLE BALLOON, ANTEGRADE;  Surgeon: Collin Mares MD;  Location: [facility] ENDO (2ND FLR);  Service: Endoscopy;  Laterality: N/A;  11/14/22-Instructions via portal-DS    *open to earlier date if available*    HYSTERECTOMY  1993    one ovary conserved    LAPAROSCOPIC CHOLECYSTECTOMY N/A 09/02/2020    Procedure: CHOLECYSTECTOMY, LAPAROSCOPIC;  Surgeon: Govind Frey MD;  Location: [facility] OR;  Service: General;  Laterality: N/A;    LAPAROSCOPIC LYSIS OF ADHESIONS N/A 05/04/2021    Procedure: LYSIS, ADHESIONS, LAPAROSCOPIC;  Surgeon: Misael Holm MD;  Location: [facility] OR;  Service: General;  Laterality: N/A;    TRANSFORAMINAL EPIDURAL INJECTION OF STEROID Right 2/6/2024    Procedure: Injection,steroid,epidural,transforaminal approach;  Surgeon: Kal Johnson MD;  Location: [facility] OR;  Service: Anesthesiology;  Laterality: Right;  L4-5 and L5-s1    UPPER GASTROINTESTINAL ENDOSCOPY  2016     Family History   Problem Relation Name Age of Onset    Ovarian cancer Mother      Heart disease Mother      Osteoporosis Mother      Arthritis Mother      Hypertension Father      Stroke Father  50    Brain Hemorrhage Father      Aneurysm Father      Osteoarthritis Sister      Arthritis Sister      Hypertension Sister      Obesity Sister      Breast cancer Neg Hx      Colon cancer Neg Hx      Colon polyps Neg Hx      Crohn's disease Neg Hx      Ulcerative colitis Neg Hx        Social History     Socioeconomic History    Marital status:     Number of children: 4    Highest education level: Some college, no degree   Occupational History    Occupation: sale specialist-    Tobacco Use    Smoking status: Former     Current packs/day: 0.00     Average packs/day: 1 pack/day for 40.0 years (40.0 ttl pk-yrs)     Types:  Cigarettes     Start date: 11/2/1969     Quit date: 11/2/2009     Years since quitting: 15.7    Smokeless tobacco: Never    Tobacco comments:     quit 2009   Substance and Sexual Activity    Alcohol use: Not Currently     Comment: Not often - one glass of wine every now and then    Drug use: Not Currently     Types: Marijuana     Comment: in 1970s    Sexual activity: Not Currently     Social Drivers of Health     Financial Resource Strain: Patient Declined (3/21/2025)    Overall Financial Resource Strain (CARDIA)     Difficulty of Paying Living Expenses: Patient declined   Recent Concern: Financial Resource Strain - Medium Risk (2/17/2025)    Overall Financial Resource Strain (CARDIA)     Difficulty of Paying Living Expenses: Somewhat hard   Food Insecurity: Patient Declined (3/21/2025)    Hunger Vital Sign     Worried About Running Out of Food in the Last Year: Patient declined     Ran Out of Food in the Last Year: Patient declined   Recent Concern: Food Insecurity - Food Insecurity Present (2/17/2025)    Hunger Vital Sign     Worried About Running Out of Food in the Last Year: Sometimes true     Ran Out of Food in the Last Year: Sometimes true   Transportation Needs: Patient Declined (3/21/2025)    PRAPARE - Transportation     Lack of Transportation (Medical): Patient declined     Lack of Transportation (Non-Medical): Patient declined   Physical Activity: Inactive (2/17/2025)    Exercise Vital Sign     Days of Exercise per Week: 0 days     Minutes of Exercise per Session: 0 min   Stress: Patient Declined (3/21/2025)    [place] [place] [employer] - Occupational Stress Questionnaire     Feeling of Stress : Patient declined   Recent Concern: Stress - Stress Concern Present (2/17/2025)    [place] [place] [employer] - Occupational Stress Questionnaire     Feeling of Stress : Rather much   Housing Stability: Patient Declined (3/21/2025)    Housing Stability Vital Sign     Unable to Pay for  Housing in the Last Year: Patient declined     Number of Times Moved in the Last Year: 0     Homeless in the Last Year: Patient declined     Review of patient's allergies indicates:  No Known Allergies    Current Outpatient Medications:     betamethasone dipropionate 0.05 % cream, Apply topically 2 (two) times daily., Disp: 45 g, Rfl: 1    biotin 5,000 mcg TbDL, Take 5,000 mcg by mouth Daily., Disp: , Rfl:     cetirizine (ZYRTEC) 10 MG tablet, Take 1 tablet (10 mg total) by mouth once daily., Disp: 90 tablet, Rfl: 0    CONSTULOSE 10 gram/15 mL solution, TAKE 10 ML BY MOUTH  THREE TIMES DAILY, Disp: 948 mL, Rfl: 0    diclofenac sodium (VOLTAREN) 1 % Gel, Apply 2 g topically 2 (two) times daily as needed., Disp: , Rfl:     gabapentin (NEURONTIN) 300 MG capsule, Take 2 capsules (600 mg total) by mouth 3 (three) times daily., Disp: 180 capsule, Rfl: 2    ondansetron (ZOFRAN-ODT) 4 MG TbDL, Take 8 mg by mouth every 6 (six) hours as needed., Disp: , Rfl:     orphenadrine (NORFLEX) 100 mg tablet, Take 1 tablet (100 mg total) by mouth 2 (two) times daily as needed for Muscle spasms., Disp: 40 tablet, Rfl: 1    pantoprazole (PROTONIX) 20 MG tablet, Take 1 tablet (20 mg total) by mouth once daily., Disp: 90 tablet, Rfl: 2  Review of Systems   Constitutional:  Positive for malaise/fatigue. Negative for weight loss.        Mostly good appetite lost weight with her sx   HENT:  Negative for hearing loss.    Eyes:  Negative for blurred vision and double vision.   Respiratory:  Negative for cough and shortness of breath.    Cardiovascular:  Negative for chest pain.   Gastrointestinal:  Positive for constipation. Negative for abdominal pain and diarrhea.        Takes lactulose twice a day and colace twice a day   Genitourinary:  Negative for frequency.   Musculoskeletal:  Negative for back pain, myalgias and neck pain.        Leg cramps and shaky   Skin:  Negative for rash.   Neurological:  Negative for dizziness, weakness and  headaches.   Endo/Heme/Allergies:  Does not bruise/bleed easily.   Psychiatric/Behavioral:  The patient is not nervous/anxious.    recent bowel obstruction 3/22 with repeat abdominal pain discomfort emergency room visit last night the outpatient surgical appointment  Physical Exam    Wt Readings from Last 3 Encounters:   07/17/25 79 kg (174 lb 2.6 oz)   06/26/25 79 kg (174 lb 2.6 oz)   06/19/25 79.4 kg (175 lb)     Temp Readings from Last 3 Encounters:   07/17/25 97.8 °F (36.6 °C)   06/19/25 98 °F (36.7 °C) (Temporal)   05/22/25 97.4 °F (36.3 °C)     BP Readings from Last 3 Encounters:   07/17/25 (!) 122/58   06/19/25 106/62   05/22/25 (!) 125/58     Pulse Readings from Last 3 Encounters:   07/17/25 73   06/19/25 89   05/22/25 78   VITAL SIGNS:  as above   GENERAL: appears well-built, well-nourished.  No anxiety, no agitation, and in no distress.  Patient is awake, alert, oriented and cooperative.  HEENT:  Showed no congestion. Trachea is central no obvious icterus or pallor noted no hoarseness. no obvious JVD   NECK:  Supple.  No JVD. No obvious cervical submental or supraclavicular adenopathy.  RS:the visualized portion of  Chest expands well. chest appears symmetric, no audible wheezes.  No dyspnea recognized  ABDOMEN:  abdomen appears undistended.  EXTREMITIES:  Without edema.  NEUROLOGICAL:  The patient is appropriate, higher functions are normal.  No  obvious neurological deficits.  normal judgement normal thought content  No confusion, no speech impediment. Cranial nerves are intact and show no deficit. No gross motor deficits noted   SKIN MUSCULOSKELETAL: no joint or skeletal deformity, no clubbing of nails.  No visible rash ecchymosis or petechiae   Lab Results   Component Value Date    WBC 5.99 07/28/2025    HGB 13.4 07/28/2025    HCT 40.7 07/28/2025    MCV 93 07/28/2025     07/28/2025       BMP  Lab Results   Component Value Date     07/28/2025    K 4.0 07/28/2025     07/28/2025    CO2  24 07/28/2025    BUN 11 07/28/2025    CREATININE 0.6 07/28/2025    CALCIUM 9.3 07/28/2025    ANIONGAP 10 07/28/2025    ESTGFRAFRICA >60.0 06/27/2022    EGFRNONAA >60.0 06/27/2022     Lab Results   Component Value Date    IRON 94 07/28/2025    TIBC 401 07/28/2025    FERRITIN 155.0 07/28/2025       Impression:  CT scan abdomen May 25, 2022     1. Multiple dilated loops of small bowel are noted that are fluid-filled left upper quadrant with the suggestion of bowel wall thickening near the level of the umbilicus in the midline and extending into the left dilated loops of bowel.  This raises the concern for vascular injury and or bowel infarction/inflammation possibly associated with obstruction.  Surgical consultation is suggested.  Post-contrast oral and intravenous imaging may be of use for confirmation given that this exam is severely hindered.  2. A 2nd region of concern is noted within the right hemipelvis where a dilated loop of small bowel is noted without obvious bowel wall thickening.  This region is nonspecific and could relate to colon, small bowel, ileus, or obstruction.  Further evaluation and clinical correlation is necessary     Patient Active Problem List   Diagnosis    Primary osteoarthritis involving multiple joints    Obesity (BMI 30.0-34.9)    Gastroesophageal reflux disease    Acute pain of left knee    Chronic midline low back pain    Acute tear medial meniscus, right, sequela    Primary osteoarthritis of right knee    Lumbar radiculitis    Trigger middle finger of right hand    Nausea & vomiting    Abdominal pain    Encounter for postoperative care    Small bowel obstruction    Hypokalemia    Constipation    History of cholecystectomy    Right upper quadrant pain    History of hysterectomy    Rectal bleeding    Hemorrhoids    Mitral valve prolapse    Coronary artery calcification    Anemia    SAMPSON (dyspnea on exertion), noted 2010    History of smoking 25-50 pack years, 46 py, quit 2009    NSAID  long-term use, started 2017, Aleve 2-4 tabs daily for a year    Bandemia    Family history of premature CAD    Cardiovascular event risk, ASCVD 10-year risk 4.8%, 2019, 6.7% in 2021    Abdominal obesity    Dyslipidemia, baseline LDL-C 112, HDL-C 49    Iron deficiency anemia secondary to inadequate dietary iron intake    Other vitamin B12 deficiency anemias    S/p small bowel obstruction    Chronic bilateral low back pain without sciatica    Atypical chest pain, onset 2021    Chronic fatigue    Excessive daytime sleepiness    Enteritis    Stiffness of left knee    Metabolic acidosis        Assessment and Plan   NATHAN: pt had partail bowel removal from opbstructions with recurrneces Bowel obs again 3/22 repeat sx and adhesiolysis, Goes to [facility] [place] for evaluation of small bowel.  Severe iron-deficiency anemia has recurred been replaced intravenous periodically currently on liquid iron  See me 4 month after with CBC CMP iron studies B12    B12 deficiency continue injections once a month originally had difficulty with compliance and getting levels to therapeutic with sublingual only         Caring for her special needs neice trying to get benefits for her has been very trying for patient    Patient to continue follow-up of her DJD with PCP    Visit today included increased complexity associated with the care of the episodic problem of iron-deficiency and B12 deficiency which is recurrent and chronic has been addressed and managing the longitudinal care of the patient due to the serious and/or complex managed problem(s) as discussed in Assessment Plan.                 Answers submitted by the patient for this visit:  Review of Systems Questionnaire (Submitted on 7/26/2025)  appetite change : No  unexpected weight change: No  mouth sores: No  visual disturbance: No  adenopathy: No     no history of blood product transfusion

## 2025-08-15 ENCOUNTER — INFUSION (OUTPATIENT)
Dept: INFUSION THERAPY | Facility: HOSPITAL | Age: 70
End: 2025-08-15
Attending: INTERNAL MEDICINE
Payer: MEDICARE

## 2025-08-15 VITALS
DIASTOLIC BLOOD PRESSURE: 57 MMHG | OXYGEN SATURATION: 98 % | HEIGHT: 64 IN | BODY MASS INDEX: 29.74 KG/M2 | SYSTOLIC BLOOD PRESSURE: 122 MMHG | HEART RATE: 77 BPM | RESPIRATION RATE: 16 BRPM | TEMPERATURE: 98 F | WEIGHT: 174.19 LBS

## 2025-08-15 DIAGNOSIS — D51.8 OTHER VITAMIN B12 DEFICIENCY ANEMIAS: Primary | ICD-10-CM

## 2025-08-15 PROCEDURE — 96372 THER/PROPH/DIAG INJ SC/IM: CPT

## 2025-08-15 PROCEDURE — 63600175 PHARM REV CODE 636 W HCPCS: Performed by: INTERNAL MEDICINE

## 2025-08-15 RX ORDER — CYANOCOBALAMIN 1000 UG/ML
1000 INJECTION, SOLUTION INTRAMUSCULAR; SUBCUTANEOUS
Status: DISCONTINUED | OUTPATIENT
Start: 2025-08-15 | End: 2025-08-15 | Stop reason: HOSPADM

## 2025-08-15 RX ORDER — CYANOCOBALAMIN 1000 UG/ML
1000 INJECTION, SOLUTION INTRAMUSCULAR; SUBCUTANEOUS
OUTPATIENT
Start: 2025-08-15

## 2025-08-15 RX ADMIN — CYANOCOBALAMIN 1000 MCG: 1000 INJECTION INTRAMUSCULAR; SUBCUTANEOUS at 01:08

## (undated) DEVICE — SCISSOR TIP ENDOCUT DISPOSABLE

## (undated) DEVICE — SYR DISP LL 5CC

## (undated) DEVICE — GLOVE SENSICARE PI ALOE 7.5

## (undated) DEVICE — GLOVE SURG ULTRA TOUCH 7

## (undated) DEVICE — TUBING MINIBORE EXTENSION

## (undated) DEVICE — SEE MEDLINE ITEM 157116

## (undated) DEVICE — CHLORAPREP W TINT 26ML APPL

## (undated) DEVICE — BLADE EZ CLEAN 2.5IN MODIFIED

## (undated) DEVICE — GLOVE SENSICARE PI ALOE 6

## (undated) DEVICE — SUT PDS II 3-0 SH 36IN

## (undated) DEVICE — SYR GLASS 5CC LUER LOK

## (undated) DEVICE — APPLIER CLIP ENDO LIGAMAX 5MM

## (undated) DEVICE — BITE BLOCK ADULT LATEX FREE

## (undated) DEVICE — SEE MEDLINE ITEM 156964

## (undated) DEVICE — SPONGE LAP 18X18 PREWASHED

## (undated) DEVICE — GOWN B1 X-LG X-LONG

## (undated) DEVICE — COUNTER BDL BLADEGUARD LI DBL

## (undated) DEVICE — SEE MEDLINE ITEM 157148

## (undated) DEVICE — RELOAD TRI-STAPLE 2.0 30MM

## (undated) DEVICE — GLOVE SURG ULTRA TOUCH 6

## (undated) DEVICE — COVER LIGHT HANDLE 80/CA

## (undated) DEVICE — ELECTRODE REM PLYHSV RETURN 9

## (undated) DEVICE — DRAPE ABDOMINAL TIBURON 14X11

## (undated) DEVICE — GAUZE SPONGE 4X4 8 PLY NS

## (undated) DEVICE — GLOVE SURGEONS ULTRA TOUCH 6.5

## (undated) DEVICE — SOL CLEARIFY VISUALIZATION LAP

## (undated) DEVICE — GLOVE SURG ULTRA TOUCH 7.5

## (undated) DEVICE — WARMER DRAPE STERILE LF

## (undated) DEVICE — SUT 0 VICRYL / UR6 (J603)

## (undated) DEVICE — APPLICATOR CHLORAPREP ORN 26ML

## (undated) DEVICE — SYR BULB IRRIG ST 60 LF

## (undated) DEVICE — UNDERGLOVE BIOGEL PI SZ 6.5 LF

## (undated) DEVICE — RELOAD PROXIMATE CUT BLUE 55MM

## (undated) DEVICE — TAPE ADH MEDIPORE 4 X 10YDS

## (undated) DEVICE — GOWN SURGICAL XX LARGE X LONG

## (undated) DEVICE — UNDERGLOVES BIOGEL PI SZ 7 LF

## (undated) DEVICE — SUT 3-0 VICRYL / SH (J416)

## (undated) DEVICE — ADHESIVE DERMABOND ADVANCED

## (undated) DEVICE — APPLICATOR CHLORAPREP CLR 10.5

## (undated) DEVICE — NDL TUOHY EPIDURAL 20G X 3.5

## (undated) DEVICE — SEALER LIGASURE IMPACT 18CM

## (undated) DEVICE — CANNULA LAP SEAL Z THRD 5X100

## (undated) DEVICE — SUT CTD VICRYL 3-0 CR/SH

## (undated) DEVICE — CANISTER SUCTION 3000CC

## (undated) DEVICE — DRESSING WND ADH PRIMAPORE 10

## (undated) DEVICE — TOWEL OR DISP STRL BLUE 4/PK

## (undated) DEVICE — STAPLER SKIN ROTATING HEAD

## (undated) DEVICE — ELECTRODE PENCIL W/ROCKER NDL

## (undated) DEVICE — TROCAR KII BLLN 12MM 10CM

## (undated) DEVICE — GLOVE PROTEXIS PI CLASSIC 7.5

## (undated) DEVICE — SYS LABEL CORRECT MED

## (undated) DEVICE — PAD ABD 8X10 STERILE

## (undated) DEVICE — IRRIGATOR SUCTION W/TIP

## (undated) DEVICE — GLOVE BIOGEL SKINSENSE PI 7.5

## (undated) DEVICE — SUT CTD VICRYL VIL BR CR/SH

## (undated) DEVICE — PACK NASAL SINUS

## (undated) DEVICE — TROCAR ENDO Z THREAD KII 5X100

## (undated) DEVICE — STAPLER ENDO GIA ULT UNIV 12MM

## (undated) DEVICE — SPATULA CURVED 33CM HC BLACK

## (undated) DEVICE — ELECTRODE BLD EXT 6.50 ST MDFD

## (undated) DEVICE — DRAPE CORETEMP FLD WRM 56X62IN

## (undated) DEVICE — SUT 1 48IN PDS II VIO MONO

## (undated) DEVICE — FILTER LAPAROSCOPIC SMOKE EVAC

## (undated) DEVICE — GLOVE PI ULTRA TOUCH G SURGEON

## (undated) DEVICE — NDL ELECTRODE EXTENDED 6

## (undated) DEVICE — KIT DEFENDO VLV  AIR WATER SUC

## (undated) DEVICE — NDL SAFETY 25G X 1.5 ECLIPSE

## (undated) DEVICE — CHLORAPREP 10.5 ML APPLICATOR

## (undated) DEVICE — SUT CTD VICRYL 4-0 P-3 18IN

## (undated) DEVICE — SEE ITEM #153244

## (undated) DEVICE — SLEEVE SCD EXPRESS CALF MEDIUM

## (undated) DEVICE — SUT SILK 0 SUTUPAK SA86H

## (undated) DEVICE — SPONGE NOVAPLUS LAP 18X18IN

## (undated) DEVICE — SEE MEDLINE ITEM 152622

## (undated) DEVICE — TRAY CATH FOL SIL DRN BAG 16FR

## (undated) DEVICE — SEE MEDLINE ITEM 152678

## (undated) DEVICE — FORCEP BIOSY RJ 4 HOT 2.2X240

## (undated) DEVICE — SUT SA85H SILK 2-0

## (undated) DEVICE — NDL HYPODERMIC BLUNT 18G 1.5IN

## (undated) DEVICE — SUT SILK BLK. BR. 3-0 10

## (undated) DEVICE — Device

## (undated) DEVICE — TUBING HEATED INSUFFLATOR

## (undated) DEVICE — SEE MEDLINE ITEM 146416

## (undated) DEVICE — SUT CTD VICRYL 4-0 BR PS-2

## (undated) DEVICE — SOL 9P NACL IRR PIC IL

## (undated) DEVICE — PAD ABDOMINAL 8X7.5 STERILE

## (undated) DEVICE — GLOVE SENSICARE PI ALOE 6.5

## (undated) DEVICE — SOL WATER STRL IRR 1000ML

## (undated) DEVICE — SOL IRR NACL .9% 3000ML

## (undated) DEVICE — BAG TISS RETRV MONARCH 10MM

## (undated) DEVICE — COVER TABLE BACK 44IN X 90IN

## (undated) DEVICE — NDL SPINAL 22GX5

## (undated) DEVICE — CUTTER PROXIMATE BLUE 75MM

## (undated) DEVICE — GAUZE SPONGE 4X4 12PLY

## (undated) DEVICE — KIT ENDO CARRY-ON PROC 100310